# Patient Record
Sex: FEMALE | Race: WHITE | Employment: UNEMPLOYED | URBAN - METROPOLITAN AREA
[De-identification: names, ages, dates, MRNs, and addresses within clinical notes are randomized per-mention and may not be internally consistent; named-entity substitution may affect disease eponyms.]

---

## 2019-08-28 ENCOUNTER — HOSPITAL ENCOUNTER (EMERGENCY)
Age: 54
Discharge: HOME | End: 2019-08-28
Attending: EMERGENCY MEDICINE
Payer: COMMERCIAL

## 2019-08-28 ENCOUNTER — APPOINTMENT (OUTPATIENT)
Dept: GENERAL RADIOLOGY | Age: 54
End: 2019-08-28
Payer: COMMERCIAL

## 2019-08-28 VITALS
DIASTOLIC BLOOD PRESSURE: 77 MMHG | WEIGHT: 170 LBS | OXYGEN SATURATION: 96 % | TEMPERATURE: 98.3 F | BODY MASS INDEX: 29.02 KG/M2 | RESPIRATION RATE: 16 BRPM | HEIGHT: 64 IN | SYSTOLIC BLOOD PRESSURE: 166 MMHG | HEART RATE: 70 BPM

## 2019-08-28 DIAGNOSIS — R06.02 SHORTNESS OF BREATH: ICD-10-CM

## 2019-08-28 DIAGNOSIS — Z53.29 LEFT AGAINST MEDICAL ADVICE: ICD-10-CM

## 2019-08-28 DIAGNOSIS — R07.9 CHEST PAIN, UNSPECIFIED TYPE: Primary | ICD-10-CM

## 2019-08-28 LAB
ANION GAP SERPL CALCULATED.3IONS-SCNC: 13 MMOL/L (ref 7–16)
BASOPHILS ABSOLUTE: 0.1 E9/L (ref 0–0.2)
BASOPHILS RELATIVE PERCENT: 1 % (ref 0–2)
BUN BLDV-MCNC: 12 MG/DL (ref 6–20)
CALCIUM SERPL-MCNC: 9.3 MG/DL (ref 8.6–10.2)
CHLORIDE BLD-SCNC: 103 MMOL/L (ref 98–107)
CO2: 24 MMOL/L (ref 22–29)
CREAT SERPL-MCNC: 0.8 MG/DL (ref 0.5–1)
D DIMER: <200 NG/ML DDU
EKG ATRIAL RATE: 81 BPM
EKG P AXIS: 46 DEGREES
EKG P-R INTERVAL: 152 MS
EKG Q-T INTERVAL: 390 MS
EKG QRS DURATION: 88 MS
EKG QTC CALCULATION (BAZETT): 453 MS
EKG R AXIS: 70 DEGREES
EKG T AXIS: 0 DEGREES
EKG VENTRICULAR RATE: 81 BPM
EOSINOPHILS ABSOLUTE: 0.5 E9/L (ref 0.05–0.5)
EOSINOPHILS RELATIVE PERCENT: 4.8 % (ref 0–6)
GFR AFRICAN AMERICAN: >60
GFR NON-AFRICAN AMERICAN: >60 ML/MIN/1.73
GLUCOSE BLD-MCNC: 103 MG/DL (ref 74–99)
HCT VFR BLD CALC: 46.2 % (ref 34–48)
HEMOGLOBIN: 15.2 G/DL (ref 11.5–15.5)
IMMATURE GRANULOCYTES #: 0.04 E9/L
IMMATURE GRANULOCYTES %: 0.4 % (ref 0–5)
LYMPHOCYTES ABSOLUTE: 4.03 E9/L (ref 1.5–4)
LYMPHOCYTES RELATIVE PERCENT: 38.8 % (ref 20–42)
MCH RBC QN AUTO: 29.5 PG (ref 26–35)
MCHC RBC AUTO-ENTMCNC: 32.9 % (ref 32–34.5)
MCV RBC AUTO: 89.7 FL (ref 80–99.9)
MONOCYTES ABSOLUTE: 0.6 E9/L (ref 0.1–0.95)
MONOCYTES RELATIVE PERCENT: 5.8 % (ref 2–12)
NEUTROPHILS ABSOLUTE: 5.13 E9/L (ref 1.8–7.3)
NEUTROPHILS RELATIVE PERCENT: 49.2 % (ref 43–80)
PDW BLD-RTO: 14.5 FL (ref 11.5–15)
PLATELET # BLD: 401 E9/L (ref 130–450)
PMV BLD AUTO: 9.4 FL (ref 7–12)
POTASSIUM REFLEX MAGNESIUM: 3.9 MMOL/L (ref 3.5–5)
RBC # BLD: 5.15 E12/L (ref 3.5–5.5)
SODIUM BLD-SCNC: 140 MMOL/L (ref 132–146)
TROPONIN: <0.01 NG/ML (ref 0–0.03)
WBC # BLD: 10.4 E9/L (ref 4.5–11.5)

## 2019-08-28 PROCEDURE — 85025 COMPLETE CBC W/AUTO DIFF WBC: CPT

## 2019-08-28 PROCEDURE — 80048 BASIC METABOLIC PNL TOTAL CA: CPT

## 2019-08-28 PROCEDURE — 93010 ELECTROCARDIOGRAM REPORT: CPT | Performed by: INTERNAL MEDICINE

## 2019-08-28 PROCEDURE — 84484 ASSAY OF TROPONIN QUANT: CPT

## 2019-08-28 PROCEDURE — 85378 FIBRIN DEGRADE SEMIQUANT: CPT

## 2019-08-28 PROCEDURE — 99285 EMERGENCY DEPT VISIT HI MDM: CPT

## 2019-08-28 PROCEDURE — 71046 X-RAY EXAM CHEST 2 VIEWS: CPT

## 2019-08-28 PROCEDURE — 93005 ELECTROCARDIOGRAM TRACING: CPT | Performed by: EMERGENCY MEDICINE

## 2019-08-28 ASSESSMENT — PAIN DESCRIPTION - LOCATION: LOCATION: CHEST

## 2019-08-28 ASSESSMENT — ENCOUNTER SYMPTOMS
COUGH: 0
DIARRHEA: 0
SINUS PAIN: 0
SHORTNESS OF BREATH: 1
VOMITING: 0
ABDOMINAL PAIN: 0
SORE THROAT: 0
CHEST TIGHTNESS: 1
BACK PAIN: 0
NAUSEA: 0

## 2019-08-28 ASSESSMENT — PAIN DESCRIPTION - PAIN TYPE: TYPE: ACUTE PAIN

## 2019-08-28 ASSESSMENT — PAIN SCALES - GENERAL: PAINLEVEL_OUTOF10: 6

## 2019-08-28 NOTE — ED PROVIDER NOTES
range of motion. Neck supple. Cardiovascular: Normal rate, regular rhythm, normal heart sounds and intact distal pulses. Pulmonary/Chest: Effort normal and breath sounds normal. No respiratory distress. She exhibits tenderness. Tenderness along the left upper chest with palpation   Abdominal: Soft. Bowel sounds are normal. She exhibits no distension. There is no tenderness. Musculoskeletal: Normal range of motion. She exhibits no edema. Tender left shoulder, no deformity, normal R OM   Neurological: She is alert and oriented to person, place, and time. No cranial nerve deficit or sensory deficit. She exhibits normal muscle tone. Skin: Skin is warm and dry. Capillary refill takes less than 2 seconds. She is not diaphoretic. Psychiatric: She has a normal mood and affect. Her behavior is normal. Judgment and thought content normal.   Nursing note and vitals reviewed. Procedures     MDM  Number of Diagnoses or Management Options  Chest pain, unspecified type:   Shortness of breath:   Diagnosis management comments: 58-year-old female presents with chest pain and shortness of breath that have been ongoing for the past week or so. Besides elevated BP, vitals are stable. She is nontoxic-appearing. Will obtain basic labs and reevaluate. Work-up reveals T wave inversions on EKG without prior for comparison. Discussed my concern that the patient should be brought in for cardiac evaluation but she refuses, stating that she has to go home to take care of her daughter. Welcomed her back for reevaluation at any time and she voices understanding. All questions answered and she is discharged with stable vital signs. Amount and/or Complexity of Data Reviewed  Clinical lab tests: ordered and reviewed  Tests in the radiology section of CPT®: ordered and reviewed         ED Course as of Aug 28 0818   Wed Aug 28, 2019   0712 EKG: This EKG is signed and interpreted by me.     Rate: 81  Rhythm: Sinus  Interpretation: nsr, TWI anteroseptal and inferior leads, no HUONG  Comparison: no previous EKG      [CW]   0727 Patient in NAD, awaiting labs    [CW]   0749 D-Dimer, Quant: <200 [CW]   0800 Discussed results with patient and offered admission for evaluation for her chest pain and rule out ACS. Patient adamantly refused, states she has to go home now to take care of her daughter. Discussed that she should stop smoking and she should follow-up and establish care with a primary care physician to have a cardiac evaluation. Welcomed her to return to the emergency department at any time for reevaluation and admission and she voiced understanding. She has the mental capacity to make her own decisions and she understood her risk of leaving included morbidity and mortality. Patient had a chance to ask questions and she understood my concern for her leaving with chest pain and SOB AGAINST MEDICAL ADVICE. Vital signs stable, patient in no acute distress. [CW]      ED Course User Index  [CW] Devika Powell, DO        The HEART Risk Score for Acute Coronary Syndrome  Age <46 years, 55-63, 65+  ?  1   > 2 Risk Factors for CAD?*, 1-2, 0  2   History: slight, moderate, highly suspicious  1   EKG: Normal, nonspecific repolarization changes, ST depression  1   Troponin; low, 1-3x normal  Limit, 3x+  0   Total HEART Score:  5     *Risk factors as follows:                  - History/Family history of CAD    - Hypertension      - Hyperlipidemia     - Diabetes mellitus  - Current smoker  - Obesity    Predicts 6-week risk of major adverse cardiac event. Low Score (0-3 points), risk of MACE of 0.9-1.7%. Moderate Score (4-6 points), risk of MACE of 12-16.6%. High Score (7-10 points), risk of MACE of 50-65%.       Well's Criteria for Pulmonary Embolism  Clinical Signs and Symptoms of DVT? no   PE Is #1 Diagnosis, or Equally Likely no   Heart Rate > 100? no   Immobilization at least 3 days, or Surgery in the previous 4 Panel w/ Reflex to MG   Result Value Ref Range    Sodium 140 132 - 146 mmol/L    Potassium reflex Magnesium 3.9 3.5 - 5.0 mmol/L    Chloride 103 98 - 107 mmol/L    CO2 24 22 - 29 mmol/L    Anion Gap 13 7 - 16 mmol/L    Glucose 103 (H) 74 - 99 mg/dL    BUN 12 6 - 20 mg/dL    CREATININE 0.8 0.5 - 1.0 mg/dL    GFR Non-African American >60 >=60 mL/min/1.73    GFR African American >60     Calcium 9.3 8.6 - 10.2 mg/dL   Troponin   Result Value Ref Range    Troponin <0.01 0.00 - 0.03 ng/mL   D-Dimer, Quantitative   Result Value Ref Range    D-Dimer, Quant <200 ng/mL DDU   EKG 12 Lead   Result Value Ref Range    Ventricular Rate 81 BPM    Atrial Rate 81 BPM    P-R Interval 152 ms    QRS Duration 88 ms    Q-T Interval 390 ms    QTc Calculation (Bazett) 453 ms    P Axis 46 degrees    R Axis 70 degrees    T Axis 0 degrees       Radiology:  XR CHEST STANDARD (2 VW)   Final Result   No airspace opacities or pleural effusion.                                         ------------------------- NURSING NOTES AND VITALS REVIEWED ---------------------------  Date / Time Roomed:  8/28/2019  5:38 AM  ED Bed Assignment:  GARLAND/GARLAND    The nursing notes within the ED encounter and vital signs as below have been reviewed. BP (!) 166/77   Pulse 70   Temp 98.3 °F (36.8 °C) (Oral)   Resp 16   Ht 5' 4\" (1.626 m)   Wt 170 lb (77.1 kg)   SpO2 96%   BMI 29.18 kg/m²   Oxygen Saturation Interpretation: Normal      ------------------------------------------ PROGRESS NOTES ------------------------------------------  ED COURSE MEDICATIONS:              Medications - No data to display    I have spoken with the patient and discussed todays results, in addition to providing specific details for the plan of care and counseling regarding the diagnosis and prognosis. Their questions are answered at this time and they are agreeable with the plan. I discussed at length with them reasons for immediate return here for re evaluation.  They will followup with primary care by calling their office tomorrow. --------------------------------- ADDITIONAL PROVIDER NOTES ---------------------------------  At this time the patient is without objective evidence of an acute process requiring hospitalization or inpatient management. They have remained hemodynamically stable throughout their entire ED visit and are stable for discharge with outpatient follow-up. The plan has been discussed in detail and they are aware of the specific conditions for emergent return, as well as the importance of follow-up. There are no discharge medications for this patient. Diagnosis:  1. Chest pain, unspecified type    2. Shortness of breath    3.  Left against medical advice        Disposition:  Patient's disposition: [address] [place] [address]  Resident  08/28/19 [id number]

## 2019-08-28 NOTE — ED NOTES
Pt states that she has been having cp for the past week. States that earlier this week when walking to her daughters house he became sob. Pt states that her pain goes down her right arm. Pt states that she thinks that it is a pulled muscle but she has a family history and she wants to make sure that it is not cardiac related. Pt is resting in bed at this time.  Will continue to monitor     Veena Keys RN  08/28/19 2058

## 2019-09-13 ENCOUNTER — OFFICE VISIT (OUTPATIENT)
Dept: FAMILY MEDICINE CLINIC | Age: 54
End: 2019-09-13
Payer: COMMERCIAL

## 2019-09-13 VITALS
HEIGHT: 64 IN | BODY MASS INDEX: 29.06 KG/M2 | WEIGHT: 170.2 LBS | RESPIRATION RATE: 18 BRPM | TEMPERATURE: 97 F | SYSTOLIC BLOOD PRESSURE: 153 MMHG | HEART RATE: 82 BPM | DIASTOLIC BLOOD PRESSURE: 88 MMHG

## 2019-09-13 DIAGNOSIS — R07.9 CHEST PAIN, UNSPECIFIED TYPE: Primary | ICD-10-CM

## 2019-09-13 DIAGNOSIS — I10 ESSENTIAL HYPERTENSION: ICD-10-CM

## 2019-09-13 DIAGNOSIS — Z13.31 POSITIVE DEPRESSION SCREENING: ICD-10-CM

## 2019-09-13 PROCEDURE — 96160 PT-FOCUSED HLTH RISK ASSMT: CPT | Performed by: FAMILY MEDICINE

## 2019-09-13 PROCEDURE — 93000 ELECTROCARDIOGRAM COMPLETE: CPT | Performed by: STUDENT IN AN ORGANIZED HEALTH CARE EDUCATION/TRAINING PROGRAM

## 2019-09-13 PROCEDURE — 99213 OFFICE O/P EST LOW 20 MIN: CPT | Performed by: STUDENT IN AN ORGANIZED HEALTH CARE EDUCATION/TRAINING PROGRAM

## 2019-09-13 PROCEDURE — G8419 CALC BMI OUT NRM PARAM NOF/U: HCPCS | Performed by: STUDENT IN AN ORGANIZED HEALTH CARE EDUCATION/TRAINING PROGRAM

## 2019-09-13 PROCEDURE — 3017F COLORECTAL CA SCREEN DOC REV: CPT | Performed by: STUDENT IN AN ORGANIZED HEALTH CARE EDUCATION/TRAINING PROGRAM

## 2019-09-13 PROCEDURE — G8431 POS CLIN DEPRES SCRN F/U DOC: HCPCS | Performed by: STUDENT IN AN ORGANIZED HEALTH CARE EDUCATION/TRAINING PROGRAM

## 2019-09-13 PROCEDURE — 4004F PT TOBACCO SCREEN RCVD TLK: CPT | Performed by: STUDENT IN AN ORGANIZED HEALTH CARE EDUCATION/TRAINING PROGRAM

## 2019-09-13 PROCEDURE — G8427 DOCREV CUR MEDS BY ELIG CLIN: HCPCS | Performed by: STUDENT IN AN ORGANIZED HEALTH CARE EDUCATION/TRAINING PROGRAM

## 2019-09-13 RX ORDER — ATORVASTATIN CALCIUM 20 MG/1
10 TABLET, FILM COATED ORAL DAILY
Qty: 30 TABLET | Refills: 3 | Status: SHIPPED | OUTPATIENT
Start: 2019-09-13 | End: 2020-01-12 | Stop reason: SDUPTHER

## 2019-09-13 RX ORDER — ATORVASTATIN CALCIUM 20 MG
20 TABLET ORAL DAILY
Qty: 30 TABLET | Refills: 3
Start: 2019-09-13 | End: 2019-09-13 | Stop reason: SDUPTHER

## 2019-09-13 RX ORDER — NITROGLYCERIN 0.4 MG/1
0.4 TABLET SUBLINGUAL EVERY 5 MIN PRN
Qty: 25 TABLET | Refills: 1 | Status: SHIPPED | OUTPATIENT
Start: 2019-09-13 | End: 2021-11-06

## 2019-09-13 RX ORDER — LISINOPRIL 10 MG/1
10 TABLET ORAL DAILY
Qty: 30 TABLET | Refills: 5 | Status: SHIPPED | OUTPATIENT
Start: 2019-09-13 | End: 2019-09-16 | Stop reason: SINTOL

## 2019-09-13 RX ORDER — BUPROPION HYDROCHLORIDE 150 MG/1
150 TABLET, EXTENDED RELEASE ORAL 2 TIMES DAILY
Qty: 60 TABLET | Refills: 3 | Status: SHIPPED | OUTPATIENT
Start: 2019-09-13 | End: 2020-01-12 | Stop reason: ALTCHOICE

## 2019-09-13 ASSESSMENT — PATIENT HEALTH QUESTIONNAIRE - PHQ9
SUM OF ALL RESPONSES TO PHQ QUESTIONS 1-9: 21
6. FEELING BAD ABOUT YOURSELF - OR THAT YOU ARE A FAILURE OR HAVE LET YOURSELF OR YOUR FAMILY DOWN: 2
9. THOUGHTS THAT YOU WOULD BE BETTER OFF DEAD, OR OF HURTING YOURSELF: 0
4. FEELING TIRED OR HAVING LITTLE ENERGY: 3
1. LITTLE INTEREST OR PLEASURE IN DOING THINGS: 3
2. FEELING DOWN, DEPRESSED OR HOPELESS: 3
10. IF YOU CHECKED OFF ANY PROBLEMS, HOW DIFFICULT HAVE THESE PROBLEMS MADE IT FOR YOU TO DO YOUR WORK, TAKE CARE OF THINGS AT HOME, OR GET ALONG WITH OTHER PEOPLE: 2
SUM OF ALL RESPONSES TO PHQ9 QUESTIONS 1 & 2: 6
5. POOR APPETITE OR OVEREATING: 3
8. MOVING OR SPEAKING SO SLOWLY THAT OTHER PEOPLE COULD HAVE NOTICED. OR THE OPPOSITE, BEING SO FIGETY OR RESTLESS THAT YOU HAVE BEEN MOVING AROUND A LOT MORE THAN USUAL: 1
7. TROUBLE CONCENTRATING ON THINGS, SUCH AS READING THE NEWSPAPER OR WATCHING TELEVISION: 3
SUM OF ALL RESPONSES TO PHQ QUESTIONS 1-9: 21
3. TROUBLE FALLING OR STAYING ASLEEP: 3

## 2019-09-14 ASSESSMENT — ENCOUNTER SYMPTOMS
CHEST TIGHTNESS: 1
COUGH: 0
WHEEZING: 0
SHORTNESS OF BREATH: 0

## 2019-09-16 ENCOUNTER — TELEPHONE (OUTPATIENT)
Dept: FAMILY MEDICINE CLINIC | Age: 54
End: 2019-09-16

## 2019-09-16 ENCOUNTER — HOSPITAL ENCOUNTER (OUTPATIENT)
Age: 54
Discharge: HOME | End: 2019-09-16
Payer: COMMERCIAL

## 2019-09-16 ENCOUNTER — TELEPHONE (OUTPATIENT)
Dept: ADMINISTRATIVE | Age: 54
End: 2019-09-16

## 2019-09-16 DIAGNOSIS — R07.9 CHEST PAIN, UNSPECIFIED TYPE: ICD-10-CM

## 2019-09-16 LAB
ANION GAP SERPL CALCULATED.3IONS-SCNC: 12 MMOL/L (ref 7–16)
BUN BLDV-MCNC: 20 MG/DL (ref 6–20)
CALCIUM SERPL-MCNC: 9.1 MG/DL (ref 8.6–10.2)
CHLORIDE BLD-SCNC: 105 MMOL/L (ref 98–107)
CHOLESTEROL, TOTAL: 202 MG/DL (ref 0–199)
CO2: 26 MMOL/L (ref 22–29)
CREAT SERPL-MCNC: 1.1 MG/DL (ref 0.5–1)
GFR AFRICAN AMERICAN: >60
GFR NON-AFRICAN AMERICAN: 52 ML/MIN/1.73
GLUCOSE BLD-MCNC: 109 MG/DL (ref 74–99)
HDLC SERPL-MCNC: 37 MG/DL
LDL CHOLESTEROL CALCULATED: 142 MG/DL (ref 0–99)
POTASSIUM SERPL-SCNC: 4.3 MMOL/L (ref 3.5–5)
SODIUM BLD-SCNC: 143 MMOL/L (ref 132–146)
TRIGL SERPL-MCNC: 113 MG/DL (ref 0–149)
VLDLC SERPL CALC-MCNC: 23 MG/DL

## 2019-09-16 PROCEDURE — 80048 BASIC METABOLIC PNL TOTAL CA: CPT

## 2019-09-16 PROCEDURE — 80061 LIPID PANEL: CPT

## 2019-09-16 PROCEDURE — 36415 COLL VENOUS BLD VENIPUNCTURE: CPT

## 2019-09-18 ENCOUNTER — TELEPHONE (OUTPATIENT)
Dept: FAMILY MEDICINE CLINIC | Age: 54
End: 2019-09-18

## 2019-09-30 ENCOUNTER — OFFICE VISIT (OUTPATIENT)
Dept: CARDIOLOGY CLINIC | Age: 54
End: 2019-09-30
Payer: COMMERCIAL

## 2019-09-30 VITALS
BODY MASS INDEX: 28.68 KG/M2 | WEIGHT: 168 LBS | HEIGHT: 64 IN | RESPIRATION RATE: 16 BRPM | HEART RATE: 77 BPM | DIASTOLIC BLOOD PRESSURE: 80 MMHG | SYSTOLIC BLOOD PRESSURE: 130 MMHG

## 2019-09-30 DIAGNOSIS — R07.9 CHEST PAIN, UNSPECIFIED TYPE: ICD-10-CM

## 2019-09-30 DIAGNOSIS — R94.31 ABNORMAL EKG: ICD-10-CM

## 2019-09-30 DIAGNOSIS — I51.9 HEART PROBLEM: Primary | ICD-10-CM

## 2019-09-30 PROCEDURE — 93000 ELECTROCARDIOGRAM COMPLETE: CPT | Performed by: INTERNAL MEDICINE

## 2019-09-30 PROCEDURE — 99242 OFF/OP CONSLTJ NEW/EST SF 20: CPT | Performed by: INTERNAL MEDICINE

## 2019-09-30 PROCEDURE — G8419 CALC BMI OUT NRM PARAM NOF/U: HCPCS | Performed by: INTERNAL MEDICINE

## 2019-09-30 PROCEDURE — G8427 DOCREV CUR MEDS BY ELIG CLIN: HCPCS | Performed by: INTERNAL MEDICINE

## 2019-10-02 ENCOUNTER — OFFICE VISIT (OUTPATIENT)
Dept: FAMILY MEDICINE CLINIC | Age: 54
End: 2019-10-02
Payer: COMMERCIAL

## 2019-10-02 VITALS
RESPIRATION RATE: 18 BRPM | HEART RATE: 75 BPM | OXYGEN SATURATION: 98 % | WEIGHT: 168 LBS | DIASTOLIC BLOOD PRESSURE: 80 MMHG | HEIGHT: 64 IN | BODY MASS INDEX: 28.68 KG/M2 | SYSTOLIC BLOOD PRESSURE: 126 MMHG

## 2019-10-02 DIAGNOSIS — Z72.0 TOBACCO ABUSE: ICD-10-CM

## 2019-10-02 DIAGNOSIS — F32.A DEPRESSION, UNSPECIFIED DEPRESSION TYPE: ICD-10-CM

## 2019-10-02 DIAGNOSIS — R07.9 CHEST PAIN, UNSPECIFIED TYPE: Primary | ICD-10-CM

## 2019-10-02 PROCEDURE — 4004F PT TOBACCO SCREEN RCVD TLK: CPT | Performed by: STUDENT IN AN ORGANIZED HEALTH CARE EDUCATION/TRAINING PROGRAM

## 2019-10-02 PROCEDURE — G8484 FLU IMMUNIZE NO ADMIN: HCPCS | Performed by: STUDENT IN AN ORGANIZED HEALTH CARE EDUCATION/TRAINING PROGRAM

## 2019-10-02 PROCEDURE — G8419 CALC BMI OUT NRM PARAM NOF/U: HCPCS | Performed by: STUDENT IN AN ORGANIZED HEALTH CARE EDUCATION/TRAINING PROGRAM

## 2019-10-02 PROCEDURE — 99213 OFFICE O/P EST LOW 20 MIN: CPT | Performed by: STUDENT IN AN ORGANIZED HEALTH CARE EDUCATION/TRAINING PROGRAM

## 2019-10-02 PROCEDURE — G8427 DOCREV CUR MEDS BY ELIG CLIN: HCPCS | Performed by: STUDENT IN AN ORGANIZED HEALTH CARE EDUCATION/TRAINING PROGRAM

## 2019-10-02 PROCEDURE — 3017F COLORECTAL CA SCREEN DOC REV: CPT | Performed by: STUDENT IN AN ORGANIZED HEALTH CARE EDUCATION/TRAINING PROGRAM

## 2019-10-02 ASSESSMENT — ENCOUNTER SYMPTOMS
COUGH: 0
BACK PAIN: 0
ABDOMINAL PAIN: 0
SORE THROAT: 0
NAUSEA: 0
CHEST TIGHTNESS: 0
DIARRHEA: 0
WHEEZING: 0

## 2019-10-03 ENCOUNTER — TELEPHONE (OUTPATIENT)
Dept: CARDIOLOGY | Age: 54
End: 2019-10-03

## 2019-10-04 DIAGNOSIS — R06.02 SHORTNESS OF BREATH: ICD-10-CM

## 2019-10-04 DIAGNOSIS — R07.9 CHEST PAIN, UNSPECIFIED TYPE: Primary | ICD-10-CM

## 2019-10-07 ENCOUNTER — HOSPITAL ENCOUNTER (OUTPATIENT)
Dept: CARDIOLOGY | Age: 54
Discharge: HOME | End: 2019-10-07
Payer: COMMERCIAL

## 2019-10-07 VITALS
DIASTOLIC BLOOD PRESSURE: 90 MMHG | BODY MASS INDEX: 28.68 KG/M2 | HEIGHT: 64 IN | SYSTOLIC BLOOD PRESSURE: 150 MMHG | WEIGHT: 168 LBS

## 2019-10-07 DIAGNOSIS — R94.31 ABNORMAL EKG: ICD-10-CM

## 2019-10-07 DIAGNOSIS — R06.02 SHORTNESS OF BREATH: ICD-10-CM

## 2019-10-07 DIAGNOSIS — R07.9 CHEST PAIN, UNSPECIFIED TYPE: ICD-10-CM

## 2019-10-14 ENCOUNTER — TELEPHONE (OUTPATIENT)
Dept: CARDIOLOGY | Age: 54
End: 2019-10-14

## 2019-10-16 ENCOUNTER — HOSPITAL ENCOUNTER (OUTPATIENT)
Dept: CARDIOLOGY | Age: 54
Discharge: HOME | End: 2019-10-16
Payer: COMMERCIAL

## 2019-10-16 VITALS
DIASTOLIC BLOOD PRESSURE: 78 MMHG | HEIGHT: 64 IN | WEIGHT: 168 LBS | HEART RATE: 91 BPM | BODY MASS INDEX: 28.68 KG/M2 | SYSTOLIC BLOOD PRESSURE: 136 MMHG

## 2019-10-16 PROCEDURE — 93017 CV STRESS TEST TRACING ONLY: CPT

## 2019-10-17 ENCOUNTER — TELEPHONE (OUTPATIENT)
Dept: CARDIOLOGY CLINIC | Age: 54
End: 2019-10-17

## 2019-10-17 DIAGNOSIS — R06.02 SHORTNESS OF BREATH: Primary | ICD-10-CM

## 2019-10-17 DIAGNOSIS — R07.9 CHEST PAIN, UNSPECIFIED TYPE: ICD-10-CM

## 2019-10-17 DIAGNOSIS — R94.39 ABNORMAL STRESS ECG WITH TREADMILL: ICD-10-CM

## 2019-11-01 ENCOUNTER — TELEPHONE (OUTPATIENT)
Dept: CARDIOLOGY | Age: 54
End: 2019-11-01

## 2019-11-05 ENCOUNTER — TELEPHONE (OUTPATIENT)
Dept: CARDIOLOGY | Age: 54
End: 2019-11-05

## 2019-11-19 ENCOUNTER — HOSPITAL ENCOUNTER (EMERGENCY)
Age: 54
Discharge: HOME | End: 2019-11-19
Payer: COMMERCIAL

## 2019-11-19 ENCOUNTER — APPOINTMENT (OUTPATIENT)
Dept: GENERAL RADIOLOGY | Age: 54
End: 2019-11-19
Payer: COMMERCIAL

## 2019-11-19 VITALS
HEART RATE: 92 BPM | WEIGHT: 168 LBS | DIASTOLIC BLOOD PRESSURE: 84 MMHG | TEMPERATURE: 98.2 F | RESPIRATION RATE: 16 BRPM | SYSTOLIC BLOOD PRESSURE: 182 MMHG | HEIGHT: 64 IN | OXYGEN SATURATION: 97 % | BODY MASS INDEX: 28.68 KG/M2

## 2019-11-19 DIAGNOSIS — S93.601A SPRAIN OF RIGHT FOOT, INITIAL ENCOUNTER: Primary | ICD-10-CM

## 2019-11-19 PROCEDURE — 6370000000 HC RX 637 (ALT 250 FOR IP)

## 2019-11-19 PROCEDURE — 73630 X-RAY EXAM OF FOOT: CPT

## 2019-11-19 PROCEDURE — 73610 X-RAY EXAM OF ANKLE: CPT

## 2019-11-19 PROCEDURE — 99283 EMERGENCY DEPT VISIT LOW MDM: CPT

## 2019-11-19 RX ORDER — IBUPROFEN 600 MG/1
600 TABLET ORAL ONCE
Status: COMPLETED | OUTPATIENT
Start: 2019-11-19 | End: 2019-11-19

## 2019-11-19 RX ORDER — IBUPROFEN 600 MG/1
TABLET ORAL
Status: COMPLETED
Start: 2019-11-19 | End: 2019-11-19

## 2019-11-19 RX ADMIN — IBUPROFEN 600 MG: 600 TABLET, FILM COATED ORAL at 01:21

## 2019-11-19 RX ADMIN — IBUPROFEN 600 MG: 600 TABLET ORAL at 01:21

## 2019-11-19 ASSESSMENT — PAIN DESCRIPTION - ORIENTATION
ORIENTATION: RIGHT
ORIENTATION: RIGHT

## 2019-11-19 ASSESSMENT — PAIN SCALES - GENERAL
PAINLEVEL_OUTOF10: 7
PAINLEVEL_OUTOF10: 10
PAINLEVEL_OUTOF10: 10

## 2019-11-19 ASSESSMENT — PAIN DESCRIPTION - LOCATION: LOCATION: FOOT

## 2019-11-19 ASSESSMENT — PAIN DESCRIPTION - PAIN TYPE
TYPE: ACUTE PAIN
TYPE: ACUTE PAIN

## 2019-11-19 ASSESSMENT — PAIN DESCRIPTION - DESCRIPTORS: DESCRIPTORS: ACHING

## 2019-11-25 ENCOUNTER — TELEPHONE (OUTPATIENT)
Dept: CARDIOLOGY | Age: 54
End: 2019-11-25

## 2020-01-10 ENCOUNTER — OFFICE VISIT (OUTPATIENT)
Dept: FAMILY MEDICINE CLINIC | Age: 55
End: 2020-01-10
Payer: COMMERCIAL

## 2020-01-10 VITALS
HEART RATE: 76 BPM | WEIGHT: 160 LBS | HEIGHT: 64 IN | SYSTOLIC BLOOD PRESSURE: 147 MMHG | RESPIRATION RATE: 16 BRPM | BODY MASS INDEX: 27.31 KG/M2 | OXYGEN SATURATION: 98 % | DIASTOLIC BLOOD PRESSURE: 80 MMHG

## 2020-01-10 LAB
CHP ED QC CHECK: NORMAL
GLUCOSE BLD-MCNC: 104 MG/DL
HBA1C MFR BLD: 5.4 %

## 2020-01-10 PROCEDURE — 96160 PT-FOCUSED HLTH RISK ASSMT: CPT | Performed by: STUDENT IN AN ORGANIZED HEALTH CARE EDUCATION/TRAINING PROGRAM

## 2020-01-10 PROCEDURE — 3017F COLORECTAL CA SCREEN DOC REV: CPT | Performed by: STUDENT IN AN ORGANIZED HEALTH CARE EDUCATION/TRAINING PROGRAM

## 2020-01-10 PROCEDURE — 82962 GLUCOSE BLOOD TEST: CPT | Performed by: STUDENT IN AN ORGANIZED HEALTH CARE EDUCATION/TRAINING PROGRAM

## 2020-01-10 PROCEDURE — 4004F PT TOBACCO SCREEN RCVD TLK: CPT | Performed by: STUDENT IN AN ORGANIZED HEALTH CARE EDUCATION/TRAINING PROGRAM

## 2020-01-10 PROCEDURE — G8431 POS CLIN DEPRES SCRN F/U DOC: HCPCS | Performed by: STUDENT IN AN ORGANIZED HEALTH CARE EDUCATION/TRAINING PROGRAM

## 2020-01-10 PROCEDURE — G8427 DOCREV CUR MEDS BY ELIG CLIN: HCPCS | Performed by: STUDENT IN AN ORGANIZED HEALTH CARE EDUCATION/TRAINING PROGRAM

## 2020-01-10 PROCEDURE — 90715 TDAP VACCINE 7 YRS/> IM: CPT | Performed by: STUDENT IN AN ORGANIZED HEALTH CARE EDUCATION/TRAINING PROGRAM

## 2020-01-10 PROCEDURE — 99213 OFFICE O/P EST LOW 20 MIN: CPT | Performed by: STUDENT IN AN ORGANIZED HEALTH CARE EDUCATION/TRAINING PROGRAM

## 2020-01-10 PROCEDURE — G8484 FLU IMMUNIZE NO ADMIN: HCPCS | Performed by: STUDENT IN AN ORGANIZED HEALTH CARE EDUCATION/TRAINING PROGRAM

## 2020-01-10 PROCEDURE — 90471 IMMUNIZATION ADMIN: CPT | Performed by: STUDENT IN AN ORGANIZED HEALTH CARE EDUCATION/TRAINING PROGRAM

## 2020-01-10 PROCEDURE — 83036 HEMOGLOBIN GLYCOSYLATED A1C: CPT | Performed by: STUDENT IN AN ORGANIZED HEALTH CARE EDUCATION/TRAINING PROGRAM

## 2020-01-10 PROCEDURE — G8419 CALC BMI OUT NRM PARAM NOF/U: HCPCS | Performed by: STUDENT IN AN ORGANIZED HEALTH CARE EDUCATION/TRAINING PROGRAM

## 2020-01-10 RX ORDER — BUPROPION HYDROCHLORIDE 150 MG/1
150 TABLET, EXTENDED RELEASE ORAL 2 TIMES DAILY
Qty: 60 TABLET | Refills: 3 | Status: CANCELLED | OUTPATIENT
Start: 2020-01-10

## 2020-01-10 ASSESSMENT — PATIENT HEALTH QUESTIONNAIRE - PHQ9
7. TROUBLE CONCENTRATING ON THINGS, SUCH AS READING THE NEWSPAPER OR WATCHING TELEVISION: 3
4. FEELING TIRED OR HAVING LITTLE ENERGY: 3
6. FEELING BAD ABOUT YOURSELF - OR THAT YOU ARE A FAILURE OR HAVE LET YOURSELF OR YOUR FAMILY DOWN: 0
SUM OF ALL RESPONSES TO PHQ QUESTIONS 1-9: 20
10. IF YOU CHECKED OFF ANY PROBLEMS, HOW DIFFICULT HAVE THESE PROBLEMS MADE IT FOR YOU TO DO YOUR WORK, TAKE CARE OF THINGS AT HOME, OR GET ALONG WITH OTHER PEOPLE: 3
2. FEELING DOWN, DEPRESSED OR HOPELESS: 3
SUM OF ALL RESPONSES TO PHQ9 QUESTIONS 1 & 2: 6
5. POOR APPETITE OR OVEREATING: 3
SUM OF ALL RESPONSES TO PHQ QUESTIONS 1-9: 20
8. MOVING OR SPEAKING SO SLOWLY THAT OTHER PEOPLE COULD HAVE NOTICED. OR THE OPPOSITE, BEING SO FIGETY OR RESTLESS THAT YOU HAVE BEEN MOVING AROUND A LOT MORE THAN USUAL: 2
9. THOUGHTS THAT YOU WOULD BE BETTER OFF DEAD, OR OF HURTING YOURSELF: 0
1. LITTLE INTEREST OR PLEASURE IN DOING THINGS: 3
3. TROUBLE FALLING OR STAYING ASLEEP: 3

## 2020-01-10 ASSESSMENT — COLUMBIA-SUICIDE SEVERITY RATING SCALE - C-SSRS
1. WITHIN THE PAST MONTH, HAVE YOU WISHED YOU WERE DEAD OR WISHED YOU COULD GO TO SLEEP AND NOT WAKE UP?: NO
6. HAVE YOU EVER DONE ANYTHING, STARTED TO DO ANYTHING, OR PREPARED TO DO ANYTHING TO END YOUR LIFE?: NO
2. HAVE YOU ACTUALLY HAD ANY THOUGHTS OF KILLING YOURSELF?: NO

## 2020-01-10 NOTE — PROGRESS NOTES
[facility]  Precepting Note    Subjective:  F/u of depression   On Wellbutrin. Symptoms are controlled but feels that the dose of wellbutrin needs to be increased  No SI/HI  ROS otherwise negative     Past medical, surgical, family and social history were reviewed, non-contributory, and unchanged unless otherwise stated. Objective:    BP (!) 147/80   Pulse 76   Resp 16   Ht 5' 4\" (1.626 m)   Wt 160 lb (72.6 kg)   SpO2 98%   Breastfeeding? No   BMI 27.46 kg/m²     Exam is as noted by resident with the following changes, additions or corrections:    General:  NAD; alert & oriented x 3   Heart:  RRR, no murmurs, gallops, or rubs. Lungs:  CTA bilaterally, no wheeze, rales or rhonchi  Abd: bowel sounds present, nontender, nondistended, no masses  Extrem:  No clubbing, cyanosis, or edema    Assessment/Plan:  Depression: increase dose of Wellbutrin. F/u in 3 weeks     Attending Physician Statement  I have reviewed the chart, including any radiology or labs. I have discussed the case, including pertinent history and exam findings with the resident. I agree with the assessment, plan and orders as documented by the resident. Please refer to the resident note for additional information.       Electronically signed by Traci Larson MD on 1/10/2020 at 11:24 AM

## 2020-01-10 NOTE — PROGRESS NOTES
[facility]  Family Medicine Residency Program  Phone: [phone]  Fax: [phone]    Patient:  Jaison Rodrgiuez 53 y.o. female                                 Date of Service: 1/10/20                            Chiefcomplaint:   Chief Complaint   Patient presents with    Depression         History of Present Illness: The patient is a 53 y.o. female  with PMH of HTN,Hyperlipidemia, Depression,  presented to the clinic for follow up visit for Hypertension and Depression. She further stated that she recently visited her Cardiologist Dr Justo Cardenas and did  Exercise Stress test which was non reassuring and has been waiting for another test /Nuclear stress test and waiting  Just for insurance approval . She denied any chest pain, SOB, headache, Dizziness, nausea vomiting in this visit. She is chronic patient of depression on medication, she said that she has been taking wellbutrin  BID. She further  mentioned that it has not been effective so she wants to increase the dose. She  further elaborated that she has been taking 300 BID in the past ,& mentioned that it had worked  very well for a few years. We suggested that that  It is very high dose so we cant refill that much , instead we can do Wellbutrin  mg  once daily( the max dos),have also advised her  to follow up with in 2-3 weeks,been advised to make a call in a week about medication effects and about the status of depression/ how she is feeling after dose adjustment . She repeatedly denied any suicidal ideation during my conversation. She neither has any  plan or intend to do it. She further explained that she has developed cold sore in her mouth ,at  inner part of lower lip. It started with bunch of vesicles , it is painful and burning. She has had similar lesion in the past that got better with 3 doses of  Acyclovir .     Regarding health  Maintenance , she is okay to do Mamaogram , FIT test and other lab test.    Review of Systems:   Review of Systems   Constitutional: Negative for chills and fever. HENT: Negative for congestion, mouth sores, nosebleeds, rhinorrhea, sinus pressure and sore throat. Eyes: Negative for redness. Respiratory: Negative for cough, chest tightness and wheezing. Cardiovascular: Negative for chest pain, palpitations and leg swelling. Gastrointestinal: Negative for abdominal pain, blood in stool, constipation, diarrhea, nausea and vomiting. Endocrine: Negative. Genitourinary: Negative for dysuria, enuresis and pelvic pain. Allergic/Immunologic: Negative. Neurological: Negative. Negative for seizures, syncope, facial asymmetry and light-headedness. Hematological: Negative. Psychiatric/Behavioral: Positive for decreased concentration and sleep disturbance. Negative for agitation, behavioral problems, confusion, dysphoric mood, hallucinations, self-injury and suicidal ideas. The patient is nervous/anxious. The patient is not hyperactive.     Lower lip : small grouped vesicle at inner lip    Past Medical History:      Diagnosis Date    Depression     Hyperlipidemia     Hypertension        Past Surgical History:        Procedure Laterality Date    ANKLE SURGERY   or 2016    LEFT   ankles and screws    BACK SURGERY      Cervical surgery      SECTION      FRACTURE SURGERY      Rotaor cuff surgery    HYSTERECTOMY         Allergies:    Aspirin; Lisinopril; Morphine; and Seasonal    Social History:   Social History     Socioeconomic History    Marital status:      Spouse name: Not on file    Number of children: Not on file    Years of education: Not on file    Highest education level: Not on file   Occupational History    Not on file   Social Needs    Financial resource strain: Not on file    Food insecurity:     Worry: Not on file     Inability: Not on file    Transportation needs:     Medical: Not on file     Non-medical: Not on file   Tobacco Use    Smoking status: Current Every Day Smoker     Packs/day: 0.75     Types: Cigarettes     Start date: 9/13/1973    Smokeless tobacco: Never Used   Substance and Sexual Activity    Alcohol use: Yes     Comment: occassional    Drug use: No    Sexual activity: Not Currently   Lifestyle    Physical activity:     Days per week: Not on file     Minutes per session: Not on file    Stress: Not on file   Relationships    Social connections:     Talks on phone: Not on file     Gets together: Not on file     Attends [employer] service: Not on file     Active member of club or organization: Not on file     Attends meetings of clubs or organizations: Not on file     Relationship status: Not on file    Intimate partner violence:     Fear of current or ex partner: Not on file     Emotionally abused: Not on file     Physically abused: Not on file     Forced sexual activity: Not on file   Other Topics Concern    Not on file   Social History Narrative    Not on file        Family History:       Problem Relation Age of Onset    Stroke Mother 76        mini     Stroke Father 76    Heart Attack Maternal Grandfather        BP Readings from Last 3 Encounters:   11/19/19 (!) 182/84   10/16/19 136/78   10/07/19 (!) 150/90       Physical Exam:    Vitals: Resp 16   Ht 5' 4\" (1.626 m)   Wt 160 lb (72.6 kg)   SpO2 98%   Breastfeeding? No   BMI 27.46 kg/m²   General Appearance: Well developed, awake, alert, oriented, no acute distress  HEENT: Normocephalic,atraumatic. PERRL, EOM's intact, EAC without erythemaor swelling, no pallor or icterus. Mouth : small grouped vesicle lower  inner lip , no pustule. , no ulcers  Neck: Supple, symmetrical, trachea midline. No JVD. Chest wall/Lung: Clear to auscultation bilaterally,  respirations unlabored.  No ronchi/wheezing/rales  Heart[de-identified]  Regular rate and rhythm, S1and S2 normal,Abdomen: Soft, non-tender, bowel sounds normoactive, no masses, no organomegaly  Extremities:  Extremities normal, atraumatic, no cyanosis. no edema. Skin: Skin color, texture, turgor normal, no rashes or lesions  Musculokeletal: ROM grossly normal in all joints of extremities, no obvious joint swelling. Lymph nodes: no lymph node enlargement appreciated  Neurologic:   Alert&Oriented. Normal gait and coordination  No focal neurological deficits appreaciated         Psychiatric: has a normal mood and affect. Behavior is normal. No suicidal ideation, Normal HMF. Assessment and Plan:       The 10-year ASCVD risk score (Torie Milan et al., 2013) is: 13.6%    Values used to calculate the score:      Age: 53 years      Sex: Female      Is Non- : No      Diabetic: No      Tobacco smoker: Yes      Systolic Blood Pressure: 833 mmHg      Is BP treated: No      HDL Cholesterol: 37 mg/dL      Total Cholesterol: 202 mg/dL  Milly Del Rio was seen today for depression. Diagnoses and all orders for this visit:    Major Depression  _ she is a pt with hx of depression  - she was taking Wellbutrin for long time without any side effects  - She said that  she was okay with Wellbutrin  BID in the past   - We had started wellbutrin  BID in our last visit  - She wanted us to adjust the dose of wellbutrin as she was taking much higher dose without any side effects and got nice improvement in the past.  - We gave her Wellbutrin  Mg Once daily the max dose we can go further and have asked her to follow up with in 2-3 weeks, She will call us earlier than her appointment about how she has been feeling after changing the dose . Cold sore  - grouped vesicle inside her lower lip , no pustule, no ulcder  Looks like Herpes type 1 lesion  - gave acyclovir , as she told us that she has had similar lesion in the past and got better with acyclovir oral tabs.     Hypertension   She is a known pt of Hypertension on medication  - her initial BP was elevated and re-checked BP at clinic  was with in normal rage  - we possible that grammatical, syntax,  or spelling errors may occur. On the basis of positive PHQ-9 screening (PHQ-9 Total Score: 20), the following plan was implemented: false positive screen suspected- will re-evaluate at next visit. Patient will follow-up in 3 week(s) with PCP.

## 2020-01-12 RX ORDER — ACYCLOVIR 400 MG/1
400 TABLET ORAL 2 TIMES DAILY
Qty: 6 TABLET | Refills: 0 | Status: SHIPPED
Start: 2020-01-12 | End: 2020-03-04

## 2020-01-12 RX ORDER — BUPROPION HYDROCHLORIDE 450 MG/1
450 TABLET, FILM COATED, EXTENDED RELEASE ORAL EVERY MORNING
Qty: 60 TABLET | Refills: 2 | Status: SHIPPED | OUTPATIENT
Start: 2020-01-12 | End: 2020-01-13 | Stop reason: CLARIF

## 2020-01-12 RX ORDER — ATORVASTATIN CALCIUM 20 MG/1
10 TABLET, FILM COATED ORAL DAILY
Qty: 30 TABLET | Refills: 3 | Status: SHIPPED
Start: 2020-01-12 | End: 2020-04-07 | Stop reason: SDUPTHER

## 2020-01-12 ASSESSMENT — ENCOUNTER SYMPTOMS
WHEEZING: 0
VOMITING: 0
DIARRHEA: 0
ALLERGIC/IMMUNOLOGIC NEGATIVE: 1
RHINORRHEA: 0
ABDOMINAL PAIN: 0
CHEST TIGHTNESS: 0
BLOOD IN STOOL: 0
CONSTIPATION: 0
NAUSEA: 0
SORE THROAT: 0
SINUS PRESSURE: 0
EYE REDNESS: 0
COUGH: 0

## 2020-01-13 RX ORDER — BUPROPION HYDROCHLORIDE 150 MG/1
450 TABLET ORAL EVERY MORNING
Qty: 90 TABLET | Refills: 2 | Status: SHIPPED
Start: 2020-01-13 | End: 2020-04-08 | Stop reason: SDUPTHER

## 2020-01-31 ENCOUNTER — OFFICE VISIT (OUTPATIENT)
Dept: FAMILY MEDICINE CLINIC | Age: 55
End: 2020-01-31
Payer: COMMERCIAL

## 2020-01-31 VITALS
WEIGHT: 167 LBS | HEART RATE: 74 BPM | HEIGHT: 64 IN | SYSTOLIC BLOOD PRESSURE: 139 MMHG | OXYGEN SATURATION: 96 % | RESPIRATION RATE: 14 BRPM | TEMPERATURE: 98.3 F | DIASTOLIC BLOOD PRESSURE: 94 MMHG | BODY MASS INDEX: 28.51 KG/M2

## 2020-01-31 PROCEDURE — 99213 OFFICE O/P EST LOW 20 MIN: CPT | Performed by: STUDENT IN AN ORGANIZED HEALTH CARE EDUCATION/TRAINING PROGRAM

## 2020-01-31 PROCEDURE — G8419 CALC BMI OUT NRM PARAM NOF/U: HCPCS | Performed by: STUDENT IN AN ORGANIZED HEALTH CARE EDUCATION/TRAINING PROGRAM

## 2020-01-31 PROCEDURE — G8484 FLU IMMUNIZE NO ADMIN: HCPCS | Performed by: STUDENT IN AN ORGANIZED HEALTH CARE EDUCATION/TRAINING PROGRAM

## 2020-01-31 PROCEDURE — 4004F PT TOBACCO SCREEN RCVD TLK: CPT | Performed by: STUDENT IN AN ORGANIZED HEALTH CARE EDUCATION/TRAINING PROGRAM

## 2020-01-31 PROCEDURE — 3017F COLORECTAL CA SCREEN DOC REV: CPT | Performed by: STUDENT IN AN ORGANIZED HEALTH CARE EDUCATION/TRAINING PROGRAM

## 2020-01-31 PROCEDURE — G8427 DOCREV CUR MEDS BY ELIG CLIN: HCPCS | Performed by: STUDENT IN AN ORGANIZED HEALTH CARE EDUCATION/TRAINING PROGRAM

## 2020-01-31 RX ORDER — AMLODIPINE BESYLATE 5 MG/1
5 TABLET ORAL DAILY
Qty: 30 TABLET | Refills: 0 | Status: SHIPPED
Start: 2020-01-31 | End: 2020-04-07 | Stop reason: SDUPTHER

## 2020-01-31 NOTE — PROGRESS NOTES
[facility]  Precepting Note    Subjective:  F/u of depression  Doing well on Wellbutrin    BP Is elevated. Could not tolerate Lisinopril  asymptomatic    ROS otherwise negative     Past medical, surgical, family and social history were reviewed, non-contributory, and unchanged unless otherwise stated. Objective:    BP (!) 139/94   Pulse 74   Temp 98.3 °F (36.8 °C) (Oral)   Resp 14   Ht 5' 4\" (1.626 m)   Wt 167 lb (75.8 kg)   SpO2 96%   BMI 28.67 kg/m²     Exam is as noted by resident with the following changes, additions or corrections:    General:  NAD; alert & oriented x 3   Heart:  RRR, no murmurs, gallops, or rubs. Lungs:  CTA bilaterally, no wheeze, rales or rhonchi  Abd: bowel sounds present, nontender, nondistended, no masses  Extrem:  No clubbing, cyanosis, or edema    Assessment/Plan:  Depression: symptoms improved  HTN: started Norvasc, monitor BP  Follow with cardiology for cardiac stress test     Attending Physician Statement  I have reviewed the chart, including any radiology or labs. I have discussed the case, including pertinent history and exam findings with the resident. I agree with the assessment, plan and orders as documented by the resident. Please refer to the resident note for additional information.       Electronically signed by Ignacio Guerin MD on 1/31/2020 at 10:04 AM

## 2020-01-31 NOTE — PROGRESS NOTES
urine volume, dysuria and hematuria. Musculoskeletal: Negative. Negative for back pain, joint swelling and neck stiffness. Skin: Negative for pallor and rash. Allergic/Immunologic: Negative. Neurological: Negative. Negative for dizziness, seizures, syncope and light-headedness. Psychiatric/Behavioral: Negative for behavioral problems, confusion, decreased concentration and sleep disturbance. The patient is nervous/anxious.         Past Medical History:      Diagnosis Date    Depression     Hyperlipidemia     Hypertension        Past Surgical History:        Procedure Laterality Date    ANKLE SURGERY   or 2016    LEFT   ankles and screws    BACK SURGERY      Cervical surgery      SECTION      FRACTURE SURGERY      Rotaor cuff surgery    HYSTERECTOMY         Allergies:    Aspirin; Lisinopril; Morphine; and Seasonal    Social History:   Social History     Socioeconomic History    Marital status:      Spouse name: Not on file    Number of children: Not on file    Years of education: Not on file    Highest education level: Not on file   Occupational History    Not on file   Social Needs    Financial resource strain: Not on file    Food insecurity:     Worry: Not on file     Inability: Not on file    Transportation needs:     Medical: Not on file     Non-medical: Not on file   Tobacco Use    Smoking status: Current Every Day Smoker     Packs/day: 0.75     Types: Cigarettes     Start date: 1973    Smokeless tobacco: Never Used   Substance and Sexual Activity    Alcohol use: Yes     Comment: occassional    Drug use: No    Sexual activity: Not Currently   Lifestyle    Physical activity:     Days per week: Not on file     Minutes per session: Not on file    Stress: Not on file   Relationships    Social connections:     Talks on phone: Not on file     Gets together: Not on file     Attends Congregational service: Not on file     Active member of club or organization: Not on file     Attends meetings of clubs or organizations: Not on file     Relationship status: Not on file    Intimate partner violence:     Fear of current or ex partner: Not on file     Emotionally abused: Not on file     Physically abused: Not on file     Forced sexual activity: Not on file   Other Topics Concern    Not on file   Social History Narrative    Not on file        Family History:       Problem Relation Age of Onset    Stroke Mother 76        mini     Stroke Father 76    Heart Attack Maternal Grandfather        BP Readings from Last 3 Encounters:   02/03/20 134/83   01/31/20 (!) 139/94   01/10/20 (!) 147/80       Physical Exam:    Vitals: BP (!) 139/94   Pulse 74   Temp 98.3 °F (36.8 °C) (Oral)   Resp 14   Ht 5' 4\" (1.626 m)   Wt 167 lb (75.8 kg)   SpO2 96%   BMI 28.67 kg/m²   General Appearance: Well developed, awake, alert, oriented, no acute distress  HEENT: Normocephalic,atraumatic. PERRL, EOM's intact, EAC without erythemaor swelling, no pallor or icterus. Neck: Supple, symmetrical, trachea midline. No JVD. Chest wall/Lung: Clear to auscultation bilaterally,  respirations unlabored. No ronchi/wheezing/rales  Heart[de-identified]  Regular rate and rhythm, S1and S2 normal, no murmur, rub or gallop. Abdomen: Soft, non-tender, bowel sounds normoactive, no masses, no organomegaly  Extremities:  Extremities normal, atraumatic, no cyanosis. no edema. Skin: Skin color, texture, turgor normal, no rashes or lesions  Musculokeletal: ROM grossly normal in all joints of extremities, no obvious joint swelling. Lymph nodes: no lymph node enlargement appreciated  Neurologic:   Alert&Oriented. Normal gait and coordination  No focal neurological deficits appreaciated         Psychiatric: has a normal mood and affect.  Behavior is normal.       Assessment and Plan:     The 10-year ASCVD risk score (Mohan Quiles, et al., 2013) is: 7.7%    Values used to calculate the score:      Age: 53 years      Sex: Female Is Non- : No      Diabetic: No      Tobacco smoker: Yes      Systolic Blood Pressure: 212 mmHg      Is BP treated: No      HDL Cholesterol: 37 mg/dL      Total Cholesterol: 202 mg/dL  Meagan Peña was seen today for medication check. Diagnoses and all orders for this visit:    Recurrent major depressive disorder, remission status unspecified ([facility].)  -She is a patient with recurrent  depressive disorder   -She has been taking Wellbutrin  mg daily  -She stated that she has been  feeling better, better mood , getting more pleasure in her regular activities  -We advised her to continue the same dose and have  explained her this is the max dose we can rationally prescribe. -She denied any unwanted side effects and tolerating the medication very well    Essential hypertension  -She has persistent elevated blood pressure  -She told us the blood pressure taken at home was also  elevated  -She was given lisinopril in the past but she could not take due to unwanted side effect  -We started low-dose amlodipine 5 mg daily  -She will take/measure blood pressure at home and put a log  -She will follow-up with us with log blood pressure report    Other orders  -     amLODIPine (NORVASC) 5 MG tablet; Take 1 tablet by mouth daily          I encourage further reading and education about your health conditions. Information on many healthconditions is provided by the [employer] Academy [employer]: https://familydoctor. org/  Please bring any questions to me at your next visit. Return to Office: Return in about 3 months (around 4/30/2020).     Medication List:    Current Outpatient Medications   Medication Sig Dispense Refill    amLODIPine (NORVASC) 5 MG tablet Take 1 tablet by mouth daily 30 tablet 0    buPROPion (WELLBUTRIN XL) 150 MG extended release tablet Take 3 tablets by mouth every morning 90 tablet 2    atorvastatin (LIPITOR) 20 MG tablet Take 0.5 tablets by mouth daily 30 tablet 3    acyclovir (ZOVIRAX) 400 MG tablet Take 1 tablet by mouth 2 times daily (Patient not taking: Reported on 2/3/2020) 6 tablet 0    nitroGLYCERIN (NITROSTAT) 0.4 MG SL tablet Place 1 tablet under the tongue every 5 minutes as needed for Chest pain (Chest pain) up to max of 3 total doses. If no relief after 1 dose, call 911. (Patient taking differently: Place 0.4 mg under the tongue every 5 minutes as needed for Chest pain (Chest pain) Indications: has never taken up to max of 3 total doses. If no relief after 1 dose, call 911.) 25 tablet 1     No current facility-administered medications for this visit. Abdulaziz Deluna MD       This document may have been prepared at least partiallythrough the use of voice recognition software. Although effort is taken to assure the accuracy of this document, it is possible that grammatical, syntax,  or spelling errors may occur.

## 2020-02-03 ENCOUNTER — OFFICE VISIT (OUTPATIENT)
Dept: ENT CLINIC | Age: 55
End: 2020-02-03
Payer: COMMERCIAL

## 2020-02-03 ENCOUNTER — PROCEDURE VISIT (OUTPATIENT)
Dept: AUDIOLOGY | Age: 55
End: 2020-02-03
Payer: COMMERCIAL

## 2020-02-03 VITALS
WEIGHT: 170 LBS | DIASTOLIC BLOOD PRESSURE: 83 MMHG | HEIGHT: 64 IN | HEART RATE: 70 BPM | SYSTOLIC BLOOD PRESSURE: 134 MMHG | BODY MASS INDEX: 29.02 KG/M2

## 2020-02-03 PROBLEM — F33.9 RECURRENT MAJOR DEPRESSIVE DISORDER (HCC): Status: ACTIVE | Noted: 2020-02-03

## 2020-02-03 PROBLEM — I10 ESSENTIAL HYPERTENSION: Status: ACTIVE | Noted: 2020-02-03

## 2020-02-03 LAB
CONTROL: NORMAL
HEMOCCULT STL QL: NORMAL

## 2020-02-03 PROCEDURE — G8427 DOCREV CUR MEDS BY ELIG CLIN: HCPCS | Performed by: OTOLARYNGOLOGY

## 2020-02-03 PROCEDURE — G8484 FLU IMMUNIZE NO ADMIN: HCPCS | Performed by: OTOLARYNGOLOGY

## 2020-02-03 PROCEDURE — 92567 TYMPANOMETRY: CPT | Performed by: AUDIOLOGIST

## 2020-02-03 PROCEDURE — G8419 CALC BMI OUT NRM PARAM NOF/U: HCPCS | Performed by: OTOLARYNGOLOGY

## 2020-02-03 PROCEDURE — 3017F COLORECTAL CA SCREEN DOC REV: CPT | Performed by: OTOLARYNGOLOGY

## 2020-02-03 PROCEDURE — 99204 OFFICE O/P NEW MOD 45 MIN: CPT | Performed by: OTOLARYNGOLOGY

## 2020-02-03 PROCEDURE — 92557 COMPREHENSIVE HEARING TEST: CPT | Performed by: AUDIOLOGIST

## 2020-02-03 PROCEDURE — 4004F PT TOBACCO SCREEN RCVD TLK: CPT | Performed by: OTOLARYNGOLOGY

## 2020-02-03 ASSESSMENT — ENCOUNTER SYMPTOMS
BACK PAIN: 0
SORE THROAT: 0
EYES NEGATIVE: 1
DIARRHEA: 0
ABDOMINAL PAIN: 0
ALLERGIC/IMMUNOLOGIC NEGATIVE: 1
RHINORRHEA: 0
EYE PAIN: 0
GASTROINTESTINAL NEGATIVE: 1
EYE REDNESS: 0

## 2020-02-03 NOTE — PROGRESS NOTES
DEPARTMENT OF OTOLARYNGOLOGY   HISTORY AND PHYSICAL      PATIENT: Jin Wang : 1965 (53 y.o.)   DATE: February 3, 2020  REFERRED BY: No referring provider defined for this encounter. HISTORY OBTAINED FROM:  patient    CHIEF COMPLAINT:  had concerns including New Patient (decreased hearing both ears x2 years, reports occasional pressure, ringing, and sounds seem muffled ). HISTORY OF PRESENT ILLNESS:                                                                                        Jin Wang is a(n) 53 y.o. female without a significant past medical history presents to the office today as a new patient for evaluation of her hearing loss that started about 2 years ago, bilaterally. Patient also complains of occasional pressure, periodic tinnitus, muffled sound, and popping sound. She exposed to loud sound when she was younger - track racing. Patient has trouble hearing everywhere including crowded environment. She will not hear if the person doesn't look straight at her. Denies vertigo or imbalance. Denies history of ear infection or surgery. Past Medical History:   Diagnosis Date    Depression     Hyperlipidemia     Hypertension         Past Surgical History:   Procedure Laterality Date    ANKLE SURGERY   or 2016    LEFT   ankles and screws    BACK SURGERY      Cervical surgery      SECTION      FRACTURE SURGERY      Rotaor cuff surgery    HYSTERECTOMY           Current Outpatient Medications:     amLODIPine (NORVASC) 5 MG tablet, Take 1 tablet by mouth daily, Disp: 30 tablet, Rfl: 0    buPROPion (WELLBUTRIN XL) 150 MG extended release tablet, Take 3 tablets by mouth every morning, Disp: 90 tablet, Rfl: 2    atorvastatin (LIPITOR) 20 MG tablet, Take 0.5 tablets by mouth daily, Disp: 30 tablet, Rfl: 3    nitroGLYCERIN (NITROSTAT) 0.4 MG SL tablet, Place 1 tablet under the tongue every 5 minutes as needed for Chest pain (Chest pain) up to max of 3 total doses.  If partner violence:     Fear of current or ex partner: Not on file     Emotionally abused: Not on file     Physically abused: Not on file     Forced sexual activity: Not on file   Other Topics Concern    Not on file   Social History Narrative    Not on file       REVIEW OF SYSTEMS:  Review of Systems  Constitutional: Negative for chills and fever. Eyes: Negative for discharge and itching. ENT: Negative for congestion, ear discharge, ear pain, hearing loss and sore throat. Respiratory: Negative for chest tightness and shortness of breath. Cardiovascular: Negative for chestpain and palpitations. Gastrointestinal: Negative for abdominal distention and abdominal pain. Endocrinology: Negative for heat and cold intolerance. Neurological: Negative for focal weaknessor seizure   Skin: Negative for rash and itchiness   Psychiatric: Negative for depression and hallucinations     PHYSICAL EXAM:                                                                                                                /83 (Site: Right Upper Arm, Position: Sitting, Cuff Size: Large Adult)   Pulse 70   Ht 5' 4\" (1.626 m)   Wt 170 lb (77.1 kg)   BMI 29.18 kg/m²   Physical Exam  Constitutional: Appears well-developed and well-nourished. Appears as stated age. Eyes: Lids and lashes normal, pupils equal, extra ocular muscles intact, sclera clear   ENT: Normocephalic, without obvious abnormality, atraumatic, sinuses nontender on palpation, external ears without lesions, bilateral EACs & TMs WNL, oral pharynx with moist mucus membranes, gums normal and edentulous   Neck:  Supple, symmetrical,trachea midline, no adenopathy, thyroid symmetric, not enlarged and no tenderness, skin normal   Respiratory: No increased work of breathing.  No stridor or wheezes   Cardiovascular: Regular rate   Skin: Warm and dry   Neurologic:  Alert and oriented       ASSESSMENTAND PLAN: Diagnosis Orders   1. Sensorineural hearing loss (SNHL) of both ears         · Audio and tymp done - showed mild SNHL bilaterally. Tymp normal bilaterally  · Patient is encouraged with hearing protection  · Patient may try hearing aids if beneficial  · Follow up in 1 year with yearly hearing test or sooner if symptoms acutely exacerbate    Patient was seen and examined with attending physician, who agrees with the assessment andplans. Maria T Kathleen DO  Resident Physician  [facility]  Otolaryngology Residency  2/3/2020  9:58 AM          Mayme Certain  1965      I have discussed the case, including pertinent history and exam findings with the resident. I have seen and examined the patient and the key elements of the encounter have been performed by me. I agree with the assessment, plan and orders as documented by the resident. Patient here for follow up of medical problems. Remainder of medical problems as per resident note.       [address] [facility],   2/8/20

## 2020-03-04 ENCOUNTER — APPOINTMENT (OUTPATIENT)
Dept: GENERAL RADIOLOGY | Age: 55
End: 2020-03-04
Payer: COMMERCIAL

## 2020-03-04 ENCOUNTER — HOSPITAL ENCOUNTER (EMERGENCY)
Age: 55
Discharge: HOME | End: 2020-03-04
Payer: COMMERCIAL

## 2020-03-04 VITALS
DIASTOLIC BLOOD PRESSURE: 79 MMHG | HEART RATE: 91 BPM | TEMPERATURE: 98.2 F | HEIGHT: 64 IN | BODY MASS INDEX: 29.02 KG/M2 | SYSTOLIC BLOOD PRESSURE: 157 MMHG | WEIGHT: 170 LBS | RESPIRATION RATE: 16 BRPM | OXYGEN SATURATION: 96 %

## 2020-03-04 LAB
EKG ATRIAL RATE: 88 BPM
EKG P AXIS: 41 DEGREES
EKG P-R INTERVAL: 150 MS
EKG Q-T INTERVAL: 398 MS
EKG QRS DURATION: 92 MS
EKG QTC CALCULATION (BAZETT): 481 MS
EKG R AXIS: 74 DEGREES
EKG T AXIS: 12 DEGREES
EKG VENTRICULAR RATE: 88 BPM
INFLUENZA A BY PCR: NOT DETECTED
INFLUENZA B BY PCR: NOT DETECTED
STREP GRP A PCR: NEGATIVE

## 2020-03-04 PROCEDURE — 99283 EMERGENCY DEPT VISIT LOW MDM: CPT

## 2020-03-04 PROCEDURE — 87502 INFLUENZA DNA AMP PROBE: CPT

## 2020-03-04 PROCEDURE — 87880 STREP A ASSAY W/OPTIC: CPT

## 2020-03-04 PROCEDURE — 71046 X-RAY EXAM CHEST 2 VIEWS: CPT

## 2020-03-04 PROCEDURE — 93005 ELECTROCARDIOGRAM TRACING: CPT | Performed by: EMERGENCY MEDICINE

## 2020-03-04 RX ORDER — ACYCLOVIR 400 MG/1
400 TABLET ORAL
Qty: 50 TABLET | Refills: 0 | Status: SHIPPED | OUTPATIENT
Start: 2020-03-04 | End: 2020-11-02 | Stop reason: SDUPTHER

## 2020-03-04 RX ORDER — BENZONATATE 100 MG/1
100 CAPSULE ORAL 3 TIMES DAILY PRN
Qty: 21 CAPSULE | Refills: 0 | Status: SHIPPED | OUTPATIENT
Start: 2020-03-04 | End: 2020-03-11

## 2020-03-04 RX ORDER — ALBUTEROL SULFATE 90 UG/1
2 AEROSOL, METERED RESPIRATORY (INHALATION) EVERY 6 HOURS PRN
Qty: 1 INHALER | Refills: 0 | Status: SHIPPED | OUTPATIENT
Start: 2020-03-04 | End: 2020-11-02 | Stop reason: SDUPTHER

## 2020-03-04 ASSESSMENT — PAIN DESCRIPTION - FREQUENCY: FREQUENCY: CONTINUOUS

## 2020-03-04 ASSESSMENT — PAIN DESCRIPTION - DESCRIPTORS: DESCRIPTORS: ACHING;CONSTANT;DISCOMFORT

## 2020-03-04 ASSESSMENT — PAIN DESCRIPTION - LOCATION: LOCATION: CHEST

## 2020-03-04 ASSESSMENT — PAIN DESCRIPTION - PAIN TYPE: TYPE: ACUTE PAIN

## 2020-03-04 ASSESSMENT — PAIN DESCRIPTION - ONSET: ONSET: ON-GOING

## 2020-03-04 ASSESSMENT — PAIN DESCRIPTION - PROGRESSION: CLINICAL_PROGRESSION: NOT CHANGED

## 2020-03-04 ASSESSMENT — PAIN SCALES - GENERAL: PAINLEVEL_OUTOF10: 8

## 2020-03-05 ENCOUNTER — TELEPHONE (OUTPATIENT)
Dept: FAMILY MEDICINE CLINIC | Age: 55
End: 2020-03-05

## 2020-03-06 NOTE — ED PROVIDER NOTES
12 Lead   Result Value Ref Range    Ventricular Rate 88 BPM    Atrial Rate 88 BPM    P-R Interval 150 ms    QRS Duration 92 ms    Q-T Interval 398 ms    QTc Calculation (Bazett) 481 ms    P Axis 41 degrees    R Axis 74 degrees    T Axis 12 degrees       RADIOLOGY:  Interpreted by Radiologist.  XR CHEST STANDARD (2 VW)   Final Result   No acute cardiopulmonary pathology. ------------------------- NURSING NOTES AND VITALS REVIEWED ---------------------------   The nursing notes within the ED encounter and vital signs as below have been reviewed. BP (!) 157/79   Pulse 91   Temp 98.2 °F (36.8 °C) (Oral)   Resp 16   Ht 5' 4\" (1.626 m)   Wt 170 lb (77.1 kg)   SpO2 96%   BMI 29.18 kg/m²   Oxygen Saturation Interpretation: Normal      ---------------------------------------------------PHYSICAL EXAM--------------------------------------      Constitutional/General: Alert and oriented x3, well appearing, non toxic in NAD  Head: Normocephalic and atraumatic  Eyes: PERRL, EOMI  Mouth: Oropharynx clear, handling secretions, no trismus small herpetic lesion of the right upper lip  Neck: Supple, full ROM,   Pulmonary: Lungs clear to auscultation bilaterally, no wheezes, rales, or rhonchi. Not in respiratory distress  Cardiovascular:  Regular rate and rhythm, no murmurs, gallops, or rubs. 2+ distal pulses  Abdomen: Soft, non tender, non distended,   Extremities: Moves all extremities x 4. Warm and well perfused  Skin: warm and dry without rash  Neurologic: GCS 15,  Psych: Normal Affect      ------------------------------ ED COURSE/MEDICAL DECISION MAKING----------------------  Medications - No data to display      ED COURSE:       Medical Decision Making:    Patient has history of cold sores requesting acyclovir for treatment. She was negative for influenza and strep patient treated symptomatically Tessalon Perles Mucinex follow-up primary care 1 to 2 days    Counseling:    The emergency provider has spoken with the patient and discussed todays results, in addition to providing specific details for the plan of care and counseling regarding the diagnosis and prognosis. Questions are answered at this time and they are agreeable with the plan.      --------------------------------- IMPRESSION AND DISPOSITION ---------------------------------    IMPRESSION  1. Viral URI with cough    2. Cold sore        DISPOSITION  Disposition: Discharge to home  Patient condition is good      NOTE: This report was transcribed using voice recognition software.  Every effort was made to ensure accuracy; however, inadvertent computerized transcription errors may be present     Zechariah Brewster  03/05/20 1919

## 2020-04-08 RX ORDER — BUPROPION HYDROCHLORIDE 150 MG/1
450 TABLET ORAL EVERY MORNING
Qty: 90 TABLET | Refills: 2 | Status: SHIPPED | OUTPATIENT
Start: 2020-04-08 | End: 2020-11-02 | Stop reason: SDUPTHER

## 2020-04-08 RX ORDER — AMLODIPINE BESYLATE 5 MG/1
5 TABLET ORAL DAILY
Qty: 30 TABLET | Refills: 2 | Status: SHIPPED
Start: 2020-04-08 | End: 2020-05-05

## 2020-04-08 RX ORDER — ATORVASTATIN CALCIUM 20 MG/1
10 TABLET, FILM COATED ORAL DAILY
Qty: 30 TABLET | Refills: 2 | Status: SHIPPED | OUTPATIENT
Start: 2020-04-08 | End: 2020-11-02 | Stop reason: SDUPTHER

## 2020-04-23 ENCOUNTER — HOSPITAL ENCOUNTER (EMERGENCY)
Age: 55
Discharge: HOME | End: 2020-04-23
Attending: EMERGENCY MEDICINE
Payer: COMMERCIAL

## 2020-04-23 VITALS
HEIGHT: 64 IN | WEIGHT: 170 LBS | DIASTOLIC BLOOD PRESSURE: 86 MMHG | SYSTOLIC BLOOD PRESSURE: 175 MMHG | TEMPERATURE: 97.8 F | BODY MASS INDEX: 29.02 KG/M2 | HEART RATE: 95 BPM | OXYGEN SATURATION: 96 % | RESPIRATION RATE: 16 BRPM

## 2020-04-23 PROCEDURE — 99282 EMERGENCY DEPT VISIT SF MDM: CPT

## 2020-04-23 PROCEDURE — 12001 RPR S/N/AX/GEN/TRNK 2.5CM/<: CPT

## 2020-04-23 PROCEDURE — 90471 IMMUNIZATION ADMIN: CPT | Performed by: PHYSICIAN ASSISTANT

## 2020-04-23 PROCEDURE — 6370000000 HC RX 637 (ALT 250 FOR IP): Performed by: PHYSICIAN ASSISTANT

## 2020-04-23 PROCEDURE — 90715 TDAP VACCINE 7 YRS/> IM: CPT | Performed by: PHYSICIAN ASSISTANT

## 2020-04-23 PROCEDURE — 6360000002 HC RX W HCPCS: Performed by: PHYSICIAN ASSISTANT

## 2020-04-23 RX ORDER — LIDOCAINE HYDROCHLORIDE 20 MG/ML
5 INJECTION, SOLUTION INFILTRATION; PERINEURAL ONCE
Status: DISCONTINUED | OUTPATIENT
Start: 2020-04-23 | End: 2020-04-23 | Stop reason: HOSPADM

## 2020-04-23 RX ORDER — GINSENG 100 MG
CAPSULE ORAL ONCE
Status: COMPLETED | OUTPATIENT
Start: 2020-04-23 | End: 2020-04-23

## 2020-04-23 RX ADMIN — TETANUS TOXOID, REDUCED DIPHTHERIA TOXOID AND ACELLULAR PERTUSSIS VACCINE, ADSORBED 0.5 ML: 5; 2.5; 8; 8; 2.5 SUSPENSION INTRAMUSCULAR at 19:35

## 2020-04-23 RX ADMIN — BACITRACIN: 500 OINTMENT TOPICAL at 19:35

## 2020-04-23 ASSESSMENT — PAIN DESCRIPTION - ORIENTATION: ORIENTATION: RIGHT

## 2020-04-23 ASSESSMENT — PAIN DESCRIPTION - FREQUENCY: FREQUENCY: CONTINUOUS

## 2020-04-23 ASSESSMENT — PAIN DESCRIPTION - DESCRIPTORS: DESCRIPTORS: THROBBING

## 2020-04-23 ASSESSMENT — PAIN DESCRIPTION - PAIN TYPE: TYPE: ACUTE PAIN

## 2020-04-23 ASSESSMENT — PAIN - FUNCTIONAL ASSESSMENT: PAIN_FUNCTIONAL_ASSESSMENT: PREVENTS OR INTERFERES SOME ACTIVE ACTIVITIES AND ADLS

## 2020-04-23 ASSESSMENT — PAIN DESCRIPTION - LOCATION: LOCATION: FINGER (COMMENT WHICH ONE)

## 2020-04-23 ASSESSMENT — PAIN DESCRIPTION - ONSET: ONSET: SUDDEN
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
## 2020-04-24 ENCOUNTER — TELEPHONE (OUTPATIENT)
Dept: PRIMARY CARE CLINIC | Age: 55
End: 2020-04-24

## 2020-04-24 ENCOUNTER — TELEPHONE (OUTPATIENT)
Dept: ORTHOPEDIC SURGERY | Age: 55
End: 2020-04-24

## 2020-04-24 NOTE — TELEPHONE ENCOUNTER
[facility] ED Follow Up Call    ED Visit Date:  2020    Patient: Nayana Martínez Patient : 1965     Terri Dawson MD     Spoke with: Patient    Interactive Patient Contact:  Was patient able to fill all prescriptions: na  Is patient taking all medications as directed on the After Visit Summary? yes  Does patient understand their visit instructions: Yes  Does patient have questions or concerns that need addressed?: no    Current patient status and patient narrative:  improved    Self-care instructions reviewed:  yes    Face to face required? no  Able to participate in virtual visit? yes, Patient does have a smart phone and is able to participate in VV. ED summary reviewed, including labs, tests and important procedures:yes    Scheduled to see Dr. Mert Mansfield on 2020  Scheduled to see PCP on 2020.     Owen Mueller RN   20

## 2020-04-26 ENCOUNTER — TELEPHONE (OUTPATIENT)
Dept: ORTHOPEDIC SURGERY | Age: 55
End: 2020-04-26

## 2020-04-27 ENCOUNTER — OFFICE VISIT (OUTPATIENT)
Dept: ORTHOPEDIC SURGERY | Age: 55
End: 2020-04-27
Payer: COMMERCIAL

## 2020-04-27 VITALS — WEIGHT: 160 LBS | BODY MASS INDEX: 27.31 KG/M2 | HEIGHT: 64 IN | TEMPERATURE: 97.7 F

## 2020-04-27 PROCEDURE — 4004F PT TOBACCO SCREEN RCVD TLK: CPT | Performed by: ORTHOPAEDIC SURGERY

## 2020-04-27 PROCEDURE — 3017F COLORECTAL CA SCREEN DOC REV: CPT | Performed by: ORTHOPAEDIC SURGERY

## 2020-04-27 PROCEDURE — 99202 OFFICE O/P NEW SF 15 MIN: CPT | Performed by: ORTHOPAEDIC SURGERY

## 2020-04-27 PROCEDURE — G8419 CALC BMI OUT NRM PARAM NOF/U: HCPCS | Performed by: ORTHOPAEDIC SURGERY

## 2020-04-27 PROCEDURE — G8427 DOCREV CUR MEDS BY ELIG CLIN: HCPCS | Performed by: ORTHOPAEDIC SURGERY

## 2020-04-27 NOTE — PROGRESS NOTES
Temp 97.7 °F (36.5 °C) (Oral)   Ht 5' 4\" (1.626 m)   Wt 160 lb (72.6 kg)   BMI 27.46 kg/m²   CONSTITUTIONAL:  awake, alert, cooperative, no apparent distress, and appears stated age  EYES:  Lids and lashes normal, pupils equal, round and reactive to light, extra ocular muscles intact, sclera clear, conjunctiva normal  ENT:  Normocephalic, without obvious abnormality, atraumatic, sinuses nontender on palpation, external ears without lesions, oral pharynx with moist mucus membranes, tonsils without erythema or exudates, gums normal and good dentition. NECK:  Supple, symmetrical, trachea midline, no adenopathy, thyroid symmetric, not enlarged and no tenderness, skin normal  NEUROLOGIC:  Awake, alert, oriented to name, place and time. Cranial nerves II-XII are grossly intact. Motor is 5 out of 5 bilaterally. Sensory is intact. gait is normal.  MUSCULOSKELETAL:    Right upper extremity: Nontender of the shoulder elbow and wrist region. Oblique laceration extending across the palmar ulnar aspect of the index finger DIP joint. FDP and FDS function intact. Full digital flexion extension. Radial digital nerve sensation intact light touch. Paresthesias and diminished sensation in the ulnar digital nerve distally. There is brisk capillary refill to the tip of the finger. She is otherwise neurovascular intact. DATA:    CBC:   Lab Results   Component Value Date    WBC 10.4 08/28/2019    RBC 5.15 08/28/2019    HGB 15.2 08/28/2019    HCT 46.2 08/28/2019    MCV 89.7 08/28/2019    MCH 29.5 08/28/2019    MCHC 32.9 08/28/2019    RDW 14.5 08/28/2019     08/28/2019    MPV 9.4 08/28/2019     PT/INR:  No results found for: PROTIME, INR      IMPRESSION:  · Right index finger laceration with diminished sensation in the ulnar digital nerve distribution at the level of the distal interphalangeal joint    PLAN:  I have explained today's findings with the patient. She does not have any signs or symptoms of infection.

## 2020-04-30 ENCOUNTER — OFFICE VISIT (OUTPATIENT)
Dept: FAMILY MEDICINE CLINIC | Age: 55
End: 2020-04-30
Payer: COMMERCIAL

## 2020-04-30 VITALS
OXYGEN SATURATION: 98 % | TEMPERATURE: 97.9 F | WEIGHT: 168 LBS | RESPIRATION RATE: 16 BRPM | SYSTOLIC BLOOD PRESSURE: 156 MMHG | HEIGHT: 64 IN | HEART RATE: 80 BPM | DIASTOLIC BLOOD PRESSURE: 93 MMHG | BODY MASS INDEX: 28.68 KG/M2

## 2020-04-30 PROBLEM — J06.9 VIRAL UPPER RESPIRATORY TRACT INFECTION: Status: ACTIVE | Noted: 2020-04-30

## 2020-04-30 PROBLEM — B00.1 HERPES LABIALIS: Status: ACTIVE | Noted: 2020-04-30

## 2020-04-30 PROBLEM — S61.219A LACERATION OF FINGER: Status: ACTIVE | Noted: 2020-04-30

## 2020-04-30 PROCEDURE — G8431 POS CLIN DEPRES SCRN F/U DOC: HCPCS | Performed by: STUDENT IN AN ORGANIZED HEALTH CARE EDUCATION/TRAINING PROGRAM

## 2020-04-30 PROCEDURE — 96160 PT-FOCUSED HLTH RISK ASSMT: CPT | Performed by: STUDENT IN AN ORGANIZED HEALTH CARE EDUCATION/TRAINING PROGRAM

## 2020-04-30 PROCEDURE — 99213 OFFICE O/P EST LOW 20 MIN: CPT | Performed by: STUDENT IN AN ORGANIZED HEALTH CARE EDUCATION/TRAINING PROGRAM

## 2020-04-30 PROCEDURE — 3017F COLORECTAL CA SCREEN DOC REV: CPT | Performed by: STUDENT IN AN ORGANIZED HEALTH CARE EDUCATION/TRAINING PROGRAM

## 2020-04-30 PROCEDURE — G8427 DOCREV CUR MEDS BY ELIG CLIN: HCPCS | Performed by: STUDENT IN AN ORGANIZED HEALTH CARE EDUCATION/TRAINING PROGRAM

## 2020-04-30 PROCEDURE — 4004F PT TOBACCO SCREEN RCVD TLK: CPT | Performed by: STUDENT IN AN ORGANIZED HEALTH CARE EDUCATION/TRAINING PROGRAM

## 2020-04-30 PROCEDURE — G8419 CALC BMI OUT NRM PARAM NOF/U: HCPCS | Performed by: STUDENT IN AN ORGANIZED HEALTH CARE EDUCATION/TRAINING PROGRAM

## 2020-04-30 RX ORDER — SERTRALINE HYDROCHLORIDE 25 MG/1
25 TABLET, FILM COATED ORAL DAILY
Qty: 30 TABLET | Refills: 3 | Status: SHIPPED | OUTPATIENT
Start: 2020-04-30 | End: 2020-11-02 | Stop reason: SDUPTHER

## 2020-04-30 ASSESSMENT — ENCOUNTER SYMPTOMS
DIARRHEA: 0
EYES NEGATIVE: 1
SORE THROAT: 0
ALLERGIC/IMMUNOLOGIC NEGATIVE: 1
SHORTNESS OF BREATH: 0
CHEST TIGHTNESS: 0
EYE REDNESS: 0
BLOOD IN STOOL: 0
NAUSEA: 0
GASTROINTESTINAL NEGATIVE: 1

## 2020-04-30 ASSESSMENT — PATIENT HEALTH QUESTIONNAIRE - PHQ9
SUM OF ALL RESPONSES TO PHQ QUESTIONS 1-9: 14
SUM OF ALL RESPONSES TO PHQ QUESTIONS 1-9: 14
8. MOVING OR SPEAKING SO SLOWLY THAT OTHER PEOPLE COULD HAVE NOTICED. OR THE OPPOSITE, BEING SO FIGETY OR RESTLESS THAT YOU HAVE BEEN MOVING AROUND A LOT MORE THAN USUAL: 0
7. TROUBLE CONCENTRATING ON THINGS, SUCH AS READING THE NEWSPAPER OR WATCHING TELEVISION: 3
2. FEELING DOWN, DEPRESSED OR HOPELESS: 2
SUM OF ALL RESPONSES TO PHQ9 QUESTIONS 1 & 2: 3
1. LITTLE INTEREST OR PLEASURE IN DOING THINGS: 1
5. POOR APPETITE OR OVEREATING: 3
6. FEELING BAD ABOUT YOURSELF - OR THAT YOU ARE A FAILURE OR HAVE LET YOURSELF OR YOUR FAMILY DOWN: 0
10. IF YOU CHECKED OFF ANY PROBLEMS, HOW DIFFICULT HAVE THESE PROBLEMS MADE IT FOR YOU TO DO YOUR WORK, TAKE CARE OF THINGS AT HOME, OR GET ALONG WITH OTHER PEOPLE: 1
4. FEELING TIRED OR HAVING LITTLE ENERGY: 2
3. TROUBLE FALLING OR STAYING ASLEEP: 3
9. THOUGHTS THAT YOU WOULD BE BETTER OFF DEAD, OR OF HURTING YOURSELF: 0

## 2020-04-30 NOTE — PROGRESS NOTES
Tobacco Use    Smoking status: Current Every Day Smoker     Packs/day: 0.07     Types: Cigarettes     Start date: 9/13/1973    Smokeless tobacco: Never Used    Tobacco comment: 6-7 a day   Substance and Sexual Activity    Alcohol use: Yes     Comment: occassional    Drug use: No    Sexual activity: Not Currently   Lifestyle    Physical activity     Days per week: Not on file     Minutes per session: Not on file    Stress: Not on file   Relationships    Social connections     Talks on phone: Not on file     Gets together: Not on file     Attends Rastafari service: Not on file     Active member of club or organization: Not on file     Attends meetings of clubs or organizations: Not on file     Relationship status: Not on file    Intimate partner violence     Fear of current or ex partner: Not on file     Emotionally abused: Not on file     Physically abused: Not on file     Forced sexual activity: Not on file   Other Topics Concern    Not on file   Social History Narrative    Not on file        Family History:       Problem Relation Age of Onset    Stroke Mother 76        mini     Stroke Father 76    Heart Attack Maternal Grandfather        BP Readings from Last 3 Encounters:   04/30/20 (!) 156/93   04/23/20 (!) 175/86   03/04/20 (!) 157/79       Physical Exam:    Vitals: BP (!) 156/93   Pulse 80   Temp 97.9 °F (36.6 °C) (Temporal)   Resp 16   Ht 5' 4\" (1.626 m)   Wt 168 lb (76.2 kg)   SpO2 98%   Breastfeeding No   BMI 28.84 kg/m²   General Appearance: Well developed, awake, alert, oriented, no acute distress  HEENT: Normocephalic,atraumatic. PERRL, EOM's intact, EAC without erythemaor swelling, no pallor or icterus. Neck: Supple, symmetrical, trachea midline. No JVD. Chest wall/Lung: Clear to auscultation bilaterally,  respirations unlabored. No ronchi/wheezing/rales  Heart[de-identified]  Regular rate and rhythm, S1and S2 normal, no murmur, rub or gallop.   Abdomen: Soft, non-tender, bowel sounds normoactive, no masses, no organomegaly  Extremities:  Extremities normal, atraumatic, no cyanosis. no edema. Skin: Skin color, texture, turgor normal, no rashes or lesions  Musculokeletal: ROM grossly normal in all joints of extremities, no obvious joint swelling. Right index finger; incised wound on healing process, suture in situ  Lymph nodes: no lymph node enlargement appreciated  Neurologic:   Alert&Oriented. Normal gait and coordination  No focal neurological deficits appreaciated         Psychiatric: has a normal mood and affect. Behavior is normal.       Assessment and Plan:         Radha Rizo was seen today for suture / staple removal, hypertension and depression. Diagnoses and all orders for this visit:    Laceration of right index finger, foreign body presence unspecified, nail damage status unspecified, subsequent encounter  -      - MN REMOVAL OF SUTURES  -Patient presented for suture removal on her right index finger  -She has had cut injury on palmar aspect of right index finger a week ago, suture was placed in ED and she already visited hand surgeon Dr. Bruna Brown  -She requested us to remove the suture, the wound was healthy and aligned , we removed the suture today.     Essential hypertension  -Known patient with essential hypertension taking 5 mg daily  -Blood pressure taken at clinic was elevated 156/93, her other blood pressure records for past 3- 4-months looks elevated  -We recommended to increase the dose of amlodipine 10 mg daily  -We will measure her blood pressure at home and put a log    Recurrent major depressive disorder, [facility] status unspecified ([facility]  -Patient has been taking Wellbutrin 450 mg /extended release daily for a long time  -She is tolerating Wellbutrin very well, no unusual side effects, she feels slightly agitated during nighttime(she thinks that it might be related to the stress at home)  -We have added Zoloft 25 daily, have properly  discussed regarding drug interaction and side effects  -We have also suggested to visit counselor to address stress related issues. Positive depression screening  -     Positive Screen for Clinical Depression with a Documented Follow-up Plan     Other orders  -     sertraline (ZOLOFT) 25 MG tablet; Take 1 tablet by mouth daily      I encourage further reading and education about your health conditions. Information on many healthconditions is provided by the [employer] Academy [employer]: https://familydoctor. org/  Please bring any questions to me at your next visit. Return to Office: Return in about 3 months (around 7/30/2020). Medication List:    Current Outpatient Medications   Medication Sig Dispense Refill    sertraline (ZOLOFT) 25 MG tablet Take 1 tablet by mouth daily 30 tablet 3    amLODIPine (NORVASC) 5 MG tablet Take 1 tablet by mouth daily 30 tablet 2    buPROPion (WELLBUTRIN XL) 150 MG extended release tablet Take 3 tablets by mouth every morning 90 tablet 2    atorvastatin (LIPITOR) 20 MG tablet Take 0.5 tablets by mouth daily 30 tablet 2    albuterol sulfate HFA (PROAIR HFA) 108 (90 Base) MCG/ACT inhaler Inhale 2 puffs into the lungs every 6 hours as needed for Wheezing (Patient not taking: Reported on 4/30/2020) 1 Inhaler 0    nitroGLYCERIN (NITROSTAT) 0.4 MG SL tablet Place 1 tablet under the tongue every 5 minutes as needed for Chest pain (Chest pain) up to max of 3 total doses. If no relief after 1 dose, call 911. (Patient not taking: Reported on 4/30/2020) 25 tablet 1     No current facility-administered medications for this visit. Kristi Russell MD     This document may have been prepared at least partiallythrough the use of voice recognition software. Although effort is taken to assure the accuracy of this document, it is possible that grammatical, syntax,  or spelling errors may occur.   On the basis of positive PHQ-9 screening (PHQ-9 Total Score: 14), the following plan was

## 2020-05-05 ENCOUNTER — HOSPITAL ENCOUNTER (EMERGENCY)
Age: 55
Discharge: HOME | End: 2020-05-05
Attending: EMERGENCY MEDICINE
Payer: COMMERCIAL

## 2020-05-05 VITALS
TEMPERATURE: 98 F | OXYGEN SATURATION: 95 % | SYSTOLIC BLOOD PRESSURE: 168 MMHG | HEART RATE: 88 BPM | RESPIRATION RATE: 16 BRPM | DIASTOLIC BLOOD PRESSURE: 74 MMHG | BODY MASS INDEX: 28.17 KG/M2 | HEIGHT: 64 IN | WEIGHT: 165 LBS

## 2020-05-05 PROCEDURE — 99282 EMERGENCY DEPT VISIT SF MDM: CPT

## 2020-05-05 RX ORDER — AMLODIPINE BESYLATE 5 MG/1
TABLET ORAL
Qty: 30 TABLET | Refills: 2 | Status: SHIPPED | OUTPATIENT
Start: 2020-05-05 | End: 2020-11-02 | Stop reason: SDUPTHER

## 2020-05-05 RX ORDER — CEPHALEXIN 500 MG/1
500 CAPSULE ORAL 4 TIMES DAILY
Qty: 20 CAPSULE | Refills: 0 | Status: SHIPPED | OUTPATIENT
Start: 2020-05-06 | End: 2020-05-11

## 2020-05-05 ASSESSMENT — ENCOUNTER SYMPTOMS
COLOR CHANGE: 0
NAUSEA: 0
COUGH: 0
BACK PAIN: 0
DIARRHEA: 0
SHORTNESS OF BREATH: 0
BLOOD IN STOOL: 0
CHEST TIGHTNESS: 0
VOMITING: 0
ABDOMINAL PAIN: 0

## 2020-05-06 ENCOUNTER — TELEPHONE (OUTPATIENT)
Dept: PRIMARY CARE CLINIC | Age: 55
End: 2020-05-06

## 2020-05-06 NOTE — ED PROVIDER NOTES
Normal    --------------------------------------- ED Clinical Course/MDM --------------------------------------    Patient is a 69-year-old female here for wound check of her right index finger. Tendon laceration via a kitchen knife on 23 April. Did follow-up with orthopedic surgery hand surgeon, no further interventions planned. States she had change in the color, was slightly erythematous. Is able to flex and extend her DIP. No fusiform swelling, localized tenderness. Knievel signs are negative,   Not felt to be flexor tenosynovitis. Afebrile, no signs of lymphadenitis. Due to the minimal erythema, decision made to cover with Keflex and close outpatient follow-up. Given concerning signs symptoms which return emergency department. Additional verbal orders provided. I have spoken with the patient and discussed todays results, in addition to providing specific details for the plan of care and counseling regarding the diagnosis and prognosis. Their questions are answered at this time and they are agreeable with the plan. I discussed at length with them reasons for immediate return here for re evaluation. They will followup with their orthopedic surgeon and primary care physician by calling their office tomorrow. --------------------------------- ADDITIONAL PROVIDER NOTES ---------------------------------  At this time the patient is without objective evidence of an acute process requiring hospitalization or inpatient management. They have remained hemodynamically stable throughout their entire ED visit and are stable for discharge with outpatient follow-up. The plan has been discussed in detail and they are aware of the specific conditions for emergent return, as well as the importance of follow-up. New Prescriptions    CEPHALEXIN (KEFLEX) 500 MG CAPSULE    Take 1 capsule by mouth 4 times daily for 5 days       Diagnosis:  1. Visit for wound check    2.  Elevated blood pressure reading

## 2020-11-02 ENCOUNTER — OFFICE VISIT (OUTPATIENT)
Dept: PRIMARY CARE CLINIC | Age: 55
End: 2020-11-02
Payer: COMMERCIAL

## 2020-11-02 VITALS
HEIGHT: 64 IN | OXYGEN SATURATION: 97 % | SYSTOLIC BLOOD PRESSURE: 130 MMHG | HEART RATE: 90 BPM | DIASTOLIC BLOOD PRESSURE: 82 MMHG | WEIGHT: 154 LBS | TEMPERATURE: 99.8 F | BODY MASS INDEX: 26.29 KG/M2

## 2020-11-02 DIAGNOSIS — Z20.822 ENCOUNTER FOR LABORATORY TESTING FOR COVID-19 VIRUS: ICD-10-CM

## 2020-11-02 LAB — S PYO AG THROAT QL: NORMAL

## 2020-11-02 PROCEDURE — G8484 FLU IMMUNIZE NO ADMIN: HCPCS | Performed by: NURSE PRACTITIONER

## 2020-11-02 PROCEDURE — 4004F PT TOBACCO SCREEN RCVD TLK: CPT | Performed by: NURSE PRACTITIONER

## 2020-11-02 PROCEDURE — G8427 DOCREV CUR MEDS BY ELIG CLIN: HCPCS | Performed by: NURSE PRACTITIONER

## 2020-11-02 PROCEDURE — G8419 CALC BMI OUT NRM PARAM NOF/U: HCPCS | Performed by: NURSE PRACTITIONER

## 2020-11-02 PROCEDURE — 99213 OFFICE O/P EST LOW 20 MIN: CPT | Performed by: NURSE PRACTITIONER

## 2020-11-02 PROCEDURE — 87880 STREP A ASSAY W/OPTIC: CPT | Performed by: NURSE PRACTITIONER

## 2020-11-02 PROCEDURE — 3017F COLORECTAL CA SCREEN DOC REV: CPT | Performed by: NURSE PRACTITIONER

## 2020-11-02 RX ORDER — ACYCLOVIR 400 MG/1
400 TABLET ORAL
Qty: 50 TABLET | Refills: 0 | Status: SHIPPED
Start: 2020-11-02 | End: 2021-01-12

## 2020-11-02 RX ORDER — BUPROPION HYDROCHLORIDE 150 MG/1
450 TABLET ORAL EVERY MORNING
Qty: 90 TABLET | Refills: 1 | Status: SHIPPED
Start: 2020-11-02 | End: 2021-01-10 | Stop reason: SDUPTHER

## 2020-11-02 RX ORDER — AMLODIPINE BESYLATE 5 MG/1
TABLET ORAL
Qty: 30 TABLET | Refills: 1 | Status: SHIPPED
Start: 2020-11-02 | End: 2021-01-10 | Stop reason: SDUPTHER

## 2020-11-02 RX ORDER — FLUTICASONE PROPIONATE 50 MCG
1 SPRAY, SUSPENSION (ML) NASAL DAILY
Qty: 1 BOTTLE | Refills: 0 | Status: SHIPPED
Start: 2020-11-02 | End: 2020-11-22

## 2020-11-02 RX ORDER — ATORVASTATIN CALCIUM 20 MG/1
10 TABLET, FILM COATED ORAL DAILY
Qty: 30 TABLET | Refills: 1 | Status: SHIPPED
Start: 2020-11-02 | End: 2021-01-10 | Stop reason: SDUPTHER

## 2020-11-02 RX ORDER — SERTRALINE HYDROCHLORIDE 25 MG/1
25 TABLET, FILM COATED ORAL DAILY
Qty: 30 TABLET | Refills: 1 | Status: SHIPPED
Start: 2020-11-02 | End: 2021-01-10 | Stop reason: SDUPTHER

## 2020-11-02 RX ORDER — ALBUTEROL SULFATE 90 UG/1
2 AEROSOL, METERED RESPIRATORY (INHALATION) EVERY 6 HOURS PRN
Qty: 1 INHALER | Refills: 0 | Status: SHIPPED
Start: 2020-11-02 | End: 2020-11-22

## 2020-11-02 RX ORDER — DOXYCYCLINE HYCLATE 100 MG/1
100 CAPSULE ORAL 2 TIMES DAILY
Qty: 20 CAPSULE | Refills: 0 | Status: SHIPPED | OUTPATIENT
Start: 2020-11-02 | End: 2020-11-12

## 2020-11-02 NOTE — TELEPHONE ENCOUNTER
Last Appointment 4/30/20  No Show Appointment   7/28/2020  Next Appointment      Patient went to walk in care for Covid like symptoms, she can not come into the office due to these symptoms. Patient requesting refills on several medications.  meds for #30 days/1 refill pending drs approval.

## 2020-11-03 LAB
SARS-COV-2: NOT DETECTED
SOURCE: NORMAL

## 2020-11-22 ENCOUNTER — APPOINTMENT (OUTPATIENT)
Dept: CT IMAGING | Age: 55
End: 2020-11-22
Payer: COMMERCIAL

## 2020-11-22 ENCOUNTER — HOSPITAL ENCOUNTER (EMERGENCY)
Age: 55
Discharge: HOME | End: 2020-11-22
Attending: EMERGENCY MEDICINE
Payer: COMMERCIAL

## 2020-11-22 VITALS
OXYGEN SATURATION: 99 % | DIASTOLIC BLOOD PRESSURE: 70 MMHG | HEIGHT: 64 IN | SYSTOLIC BLOOD PRESSURE: 136 MMHG | HEART RATE: 94 BPM | RESPIRATION RATE: 16 BRPM | BODY MASS INDEX: 27.31 KG/M2 | TEMPERATURE: 96.4 F | WEIGHT: 160 LBS

## 2020-11-22 PROCEDURE — 96372 THER/PROPH/DIAG INJ SC/IM: CPT

## 2020-11-22 PROCEDURE — 99284 EMERGENCY DEPT VISIT MOD MDM: CPT

## 2020-11-22 PROCEDURE — 72125 CT NECK SPINE W/O DYE: CPT

## 2020-11-22 PROCEDURE — 6360000002 HC RX W HCPCS: Performed by: STUDENT IN AN ORGANIZED HEALTH CARE EDUCATION/TRAINING PROGRAM

## 2020-11-22 RX ORDER — ORPHENADRINE CITRATE 100 MG/1
100 TABLET, EXTENDED RELEASE ORAL 2 TIMES DAILY
Qty: 20 TABLET | Refills: 0 | Status: SHIPPED | OUTPATIENT
Start: 2020-11-22 | End: 2020-12-02

## 2020-11-22 RX ORDER — ORPHENADRINE CITRATE 30 MG/ML
60 INJECTION INTRAMUSCULAR; INTRAVENOUS ONCE
Status: COMPLETED | OUTPATIENT
Start: 2020-11-22 | End: 2020-11-22

## 2020-11-22 RX ADMIN — ORPHENADRINE CITRATE 60 MG: 30 INJECTION INTRAMUSCULAR; INTRAVENOUS at 19:28

## 2020-11-22 ASSESSMENT — ENCOUNTER SYMPTOMS
PHOTOPHOBIA: 0
VISUAL CHANGE: 0

## 2020-11-23 ASSESSMENT — ENCOUNTER SYMPTOMS
NAUSEA: 0
DIARRHEA: 0
ABDOMINAL PAIN: 0
VOMITING: 0
SHORTNESS OF BREATH: 0
ABDOMINAL DISTENTION: 0
COUGH: 0
CONSTIPATION: 0

## 2020-11-23 NOTE — ED PROVIDER NOTES
Millie Luciano is a 29-year-old female present emergency department with bilateral neck pain that has been present for several months but it became more severe over the past weekend. Patient denies any trauma to the neck but does have a history of spine surgery unknown details that was performed by Dr. Swapna Dubon in 2009 or 2010. Patient states that she has random shooting pain from her lower C-spine of to the top of her head. Patient has more pain on the right side but does have occasional left-sided pain as well. Patient denies any midline cervical tenderness. Patient denies any trauma to the spine. Patient does not have a headache, lightheadedness, dizziness, nausea, vomiting. Patient denies any weakness to the upper extremities, denies numbness or tingling. Pain does not happen every time patient moves her head but happens occasionally. Patient denies any vision changes. The history is provided by the patient and medical records. Neck Pain   Pain location: c spine. Quality:  Shooting and stiffness  Stiffness is present:  All day  Radiates to: to the top of head. Pain severity:  Mild  Pain is:  Same all the time  Onset quality:  Gradual  Duration:  3 months  Timing:  Intermittent  Progression:  Worsening  Chronicity:  New  Context: not fall, not lifting a heavy object, not MCA, not MVC and not recent injury    Relieved by:  Nothing  Worsened by:  Nothing  Ineffective treatments:  None tried  Associated symptoms: no fever, no numbness, no photophobia, no syncope, no tingling, no visual change and no weakness    Risk factors: no recent head injury and no recurrent falls         Review of Systems   Constitutional: Negative for chills, diaphoresis, fatigue and fever. Eyes: Negative for photophobia. Respiratory: Negative for cough and shortness of breath. Cardiovascular: Negative for syncope.    Gastrointestinal: Negative for abdominal distention, abdominal pain, constipation, diarrhea, nausea and vomiting. Genitourinary: Negative for difficulty urinating and dysuria. Musculoskeletal: Positive for neck pain. Negative for neck stiffness. Skin: Negative for pallor and rash. Allergic/Immunologic: Negative for immunocompromised state. Neurological: Negative for tingling, weakness and numbness. Psychiatric/Behavioral: Negative for confusion. Physical Exam  Vitals signs and nursing note reviewed. Constitutional:       General: She is not in acute distress. Appearance: Normal appearance. She is not ill-appearing. HENT:      Head: Normocephalic and atraumatic. Eyes:      General: No scleral icterus. Conjunctiva/sclera: Conjunctivae normal.      Pupils: Pupils are equal, round, and reactive to light. Neck:      Musculoskeletal: Normal range of motion and neck supple. Muscular tenderness present. No neck rigidity. Cardiovascular:      Pulses: Normal pulses. Comments: Equal 2+ radial pulses  Pulmonary:      Effort: Pulmonary effort is normal.      Breath sounds: Normal breath sounds. Abdominal:      General: Bowel sounds are normal. There is no distension. Palpations: Abdomen is soft. Tenderness: There is no abdominal tenderness. There is no guarding or rebound. Comments: Very mild RLQ pain on exam  Negative psoas  Negative rovsing     Musculoskeletal:      Right lower leg: No edema. Left lower leg: No edema. Skin:     General: Skin is warm and dry. Capillary Refill: Capillary refill takes less than 2 seconds. Coloration: Skin is not pale. Findings: No erythema or rash. Neurological:      Mental Status: She is alert and oriented to person, place, and time. Cranial Nerves: No cranial nerve deficit. Sensory: No sensory deficit. Motor: No weakness.       Coordination: Coordination normal.      Gait: Gait normal.   Psychiatric:         Mood and Affect: Mood normal.          Procedures     MDM  Number of Diagnoses or Management Options  Acute strain of neck muscle, initial encounter:   Spinal stenosis of cervical region:   Diagnosis management comments: Brooke Saba is a 63year old female who presented to ED with cervical spine pain that has been present for several months. Patient given norflex and toradol with improvement of symptoms. Patient's CT scan significant for spinal stenosis and degenerative disc disease without fracture and stable surgical repair. Patient does not have any radicular symptoms currently and denies symptoms of claudication currently. Patient is well appearing likely this is a chronic condition. Patient will be discharged home and advised to follow up with Dr. Christopher Orosco patient may need MRI if symptoms continue, exam is consistent with paraspinal tenderness and trapezius tenderness on the right likely cause of patient's symptoms  Discussed results and plan with patient along with indications to return to ED patient is agreeable to plan and well appearing at time of re-evaluation. CT findings discussed with patient.             --------------------------------------------- PAST HISTORY ---------------------------------------------  Past Medical History:  has a past medical history of Depression, Hyperlipidemia, and Hypertension. Past Surgical History:  has a past surgical history that includes  section; fracture surgery; back surgery; Hysterectomy; and Ankle surgery (2015 or 2016). Social History:  reports that she has been smoking cigarettes. She started smoking about 47 years ago. She has been smoking about 0.07 packs per day. She has never used smokeless tobacco. She reports current alcohol use. She reports that she does not use drugs. Family History: family history includes Heart Attack in her maternal grandfather; Stroke (age of onset: 76) in her father and mother. The patients home medications have been reviewed.     Allergies: Aspirin; Lisinopril; Morphine; and Seasonal    -------------------------------------------------- RESULTS -------------------------------------------------  Labs:  No results found for this visit on 11/22/20. Radiology:  CT CERVICAL SPINE WO CONTRAST   Final Result   1. Postsurgical changes at C5/C6 with osseous fusion of vertebral bodies. 2.  Degenerative posterior osteophytosis also at C5/C6 results in   mild-to-moderate effacement of ventral thecal sac. 3.  Right neural foraminal narrowing at level of C3/C4.          ------------------------- NURSING NOTES AND VITALS REVIEWED ---------------------------  Date / Time Roomed:  11/22/2020  6:56 PM  ED Bed Assignment:  20/20    The nursing notes within the ED encounter and vital signs as below have been reviewed. /70   Pulse 94   Temp 96.4 °F (35.8 °C) (Temporal)   Resp 16   Ht 5' 4\" (1.626 m)   Wt 160 lb (72.6 kg)   SpO2 99%   BMI 27.46 kg/m²   Oxygen Saturation Interpretation: Normal      ------------------------------------------ PROGRESS NOTES ------------------------------------------  10:53 AM EST  I have spoken with the patient and discussed todays results, in addition to providing specific details for the plan of care and counseling regarding the diagnosis and prognosis. Their questions are answered at this time and they are agreeable with the plan. I discussed at length with them reasons for immediate return here for re evaluation. They will followup with their primary care physician by calling their office tomorrow. --------------------------------- ADDITIONAL PROVIDER NOTES ---------------------------------  At this time the patient is without objective evidence of an acute process requiring hospitalization or inpatient management. They have remained hemodynamically stable throughout their entire ED visit and are stable for discharge with outpatient follow-up.      The plan has been discussed in detail and they are aware of the specific conditions for emergent return, as well as the importance of follow-up. Discharge Medication List as of 11/22/2020  8:41 PM          Diagnosis:  1. Acute strain of neck muscle, initial encounter    2. Spinal stenosis of cervical region        Disposition:  Patient's disposition: Discharge to home  Patient's condition is stable.                   Harley Moreira MD  Resident  11/23/20 [id number]

## 2020-11-24 ENCOUNTER — OFFICE VISIT (OUTPATIENT)
Dept: FAMILY MEDICINE CLINIC | Age: 55
End: 2020-11-24
Payer: COMMERCIAL

## 2020-11-24 VITALS
TEMPERATURE: 97.5 F | BODY MASS INDEX: 27.69 KG/M2 | WEIGHT: 162.2 LBS | HEIGHT: 64 IN | OXYGEN SATURATION: 98 % | SYSTOLIC BLOOD PRESSURE: 134 MMHG | DIASTOLIC BLOOD PRESSURE: 84 MMHG | HEART RATE: 86 BPM | RESPIRATION RATE: 16 BRPM

## 2020-11-24 PROCEDURE — G8482 FLU IMMUNIZE ORDER/ADMIN: HCPCS | Performed by: STUDENT IN AN ORGANIZED HEALTH CARE EDUCATION/TRAINING PROGRAM

## 2020-11-24 PROCEDURE — G8419 CALC BMI OUT NRM PARAM NOF/U: HCPCS | Performed by: STUDENT IN AN ORGANIZED HEALTH CARE EDUCATION/TRAINING PROGRAM

## 2020-11-24 PROCEDURE — 3017F COLORECTAL CA SCREEN DOC REV: CPT | Performed by: STUDENT IN AN ORGANIZED HEALTH CARE EDUCATION/TRAINING PROGRAM

## 2020-11-24 PROCEDURE — G8427 DOCREV CUR MEDS BY ELIG CLIN: HCPCS | Performed by: STUDENT IN AN ORGANIZED HEALTH CARE EDUCATION/TRAINING PROGRAM

## 2020-11-24 PROCEDURE — 4004F PT TOBACCO SCREEN RCVD TLK: CPT | Performed by: STUDENT IN AN ORGANIZED HEALTH CARE EDUCATION/TRAINING PROGRAM

## 2020-11-24 PROCEDURE — 99213 OFFICE O/P EST LOW 20 MIN: CPT | Performed by: STUDENT IN AN ORGANIZED HEALTH CARE EDUCATION/TRAINING PROGRAM

## 2020-11-24 RX ORDER — METHOCARBAMOL 500 MG/1
750 TABLET, FILM COATED ORAL 3 TIMES DAILY
Qty: 45 TABLET | Refills: 0 | Status: SHIPPED | OUTPATIENT
Start: 2020-11-24 | End: 2020-12-04

## 2020-11-24 NOTE — PROGRESS NOTES
S: 63 y.o. female with   Chief Complaint   Patient presents with    Neck Pain     Need new ortho referral    Medication Problem     Norflex not covered by insurance, requesting an alternative medication       ED f/u, chronic neck pain, h/o discectomy over 10 years ago with Dr. Dari Julien  More pain last couple weeks, right side, low cervical, some neck stiffness  No arm paresthesias or weakness  CT c-spine done, deg changes, right neural foraminal narrowing at C3/C4  Norflex not helping  Wants to f/u with spine surgery    BP Readings from Last 3 Encounters:   11/24/20 134/84   11/22/20 136/70   11/02/20 130/82       O: VS:  height is 5' 4\" (1.626 m) and weight is 162 lb 3.2 oz (73.6 kg). Her temporal temperature is 97.5 °F (36.4 °C). Her blood pressure is 134/84 and her pulse is 86. Her respiration is 16 and oxygen saturation is 98%. AAO/NAD, appropriate affect for mood  CV:  RRR, no murmur  Resp: CTAB  MS: Decreased cervical ROM, strength 5/5 UE b/l    Impression/Plan:   1) Neck pain: +deg changes with h/o discectomy and fusion C5/6, switch muscle relaxer, PT referral, patient with check with insurance for in-network neurosurgery/spine surgery        Health Maintenance Due   Topic Date Due    Hepatitis C screen  1965    HIV screen  06/29/1979    Cervical cancer screen  06/29/1985    Breast cancer screen  06/29/2014    Shingles Vaccine (1 of 2) 06/29/2014    Lipid screen  09/16/2020    Potassium monitoring  09/16/2020    Creatinine monitoring  09/16/2020         Attending Physician Statement  I have discussed the case, including pertinent history and exam findings with the resident. I agree with the documented assessment and plan.       Aditi Castellanos MD

## 2020-11-24 NOTE — PROGRESS NOTES
[facility]  Family Medicine Residency Program  Phone: [phone]  Fax: [phone]    Patient:  Prasanna Henson 63 y.o. female                                 Date of Service: 11/24/20                            Chiefcomplaint:   Chief Complaint   Patient presents with    Neck Pain     Need new ortho referral    Medication Problem     Norflex not covered by insurance, requesting an alternative medication         History of Present Illness: The patient is a 63 y.o. female  presented to the clinic : Neck pain for last couple of months: this is Post ED visit follow-up  -Patient has had surgery in the neck a years ago  -Now she has been having neck pain basically posterior neck on right side, -she has had trouble with turning head towards both right and left, denied any fall, direct trauma, denied any numbness and tingling, denied any weakness in the arm, -she used to take muscle relaxant which is not working  Well now, she wants other medication for pain.  -She said that she is going to find  neurosurgeon that her [employer] will cover and will let us know sothat we can make a referral  -CT neck showed degenerative changes with posterior osteophytes and foraminal stenosis, she has had fusion surgery at the levelC5-C6  -She said that she is willing to visit physical therapist in the meantime  -Patient denied any chest pain, shortness of breath, nausea vomiting, fever chills, history of malignancy, bowel bladder problem, limb weakness, joint pain, rash      Review of Systems:   Review of Systems   Constitutional: Negative. Negative for chills, fever and unexpected weight change. HENT: Negative. Negative for congestion and sore throat. Eyes: Negative for redness. Respiratory: Negative. Negative for cough, chest tightness and shortness of breath. Cardiovascular: Negative. Negative for chest pain, palpitations and leg swelling. Gastrointestinal: Negative.   Negative for abdominal pain, constipation, nausea and vomiting. Genitourinary: Negative. Negative for dysuria and hematuria. Musculoskeletal: Positive for neck pain and neck stiffness. Skin: Negative for pallor, rash and wound. Allergic/Immunologic: Negative. Neurological: Positive for headaches. Negative for dizziness, tremors, syncope, weakness and light-headedness. Hematological: Negative. Negative for adenopathy. Psychiatric/Behavioral: Negative. Negative for agitation and confusion.        Past Medical History:      Diagnosis Date    Depression     Hyperlipidemia     Hypertension        Past Surgical History:        Procedure Laterality Date    ANKLE SURGERY   or 2016    LEFT   ankles and screws    BACK SURGERY      Cervical surgery      SECTION      FRACTURE SURGERY      Rotaor cuff surgery    HYSTERECTOMY         Allergies:    Aspirin; Lisinopril; Morphine; and Seasonal    Social History:   Social History     Socioeconomic History    Marital status:      Spouse name: Not on file    Number of children: Not on file    Years of education: Not on file    Highest education level: Not on file   Occupational History    Not on file   Social Needs    Financial resource strain: Not on file    Food insecurity     Worry: Not on file     Inability: Not on file    Transportation needs     Medical: Not on file     Non-medical: Not on file   Tobacco Use    Smoking status: Current Every Day Smoker     Packs/day: 0.07     Types: Cigarettes     Start date: 1973    Smokeless tobacco: Never Used    Tobacco comment: 6-7 a day   Substance and Sexual Activity    Alcohol use: Yes     Comment: occassional    Drug use: No    Sexual activity: Not Currently   Lifestyle    Physical activity     Days per week: Not on file     Minutes per session: Not on file    Stress: Not on file   Relationships    Social connections     Talks on phone: Not on file     Gets together: Not on file Attends Yazidi service: Not on file     Active member of club or organization: Not on file     Attends meetings of clubs or organizations: Not on file     Relationship status: Not on file    Intimate partner violence     Fear of current or ex partner: Not on file     Emotionally abused: Not on file     Physically abused: Not on file     Forced sexual activity: Not on file   Other Topics Concern    Not on file   Social History Narrative    Not on file        Family History:       Problem Relation Age of Onset    Stroke Mother 76        mini     Stroke Father 76    Heart Attack Maternal Grandfather        BP Readings from Last 3 Encounters:   11/24/20 134/84   11/22/20 136/70   11/02/20 130/82       Physical Exam:    Vitals: /84 (Site: Left Upper Arm, Position: Sitting, Cuff Size: Medium Adult)   Pulse 86   Temp 97.5 °F (36.4 °C) (Temporal)   Resp 16   Ht 5' 4\" (1.626 m)   Wt 162 lb 3.2 oz (73.6 kg)   SpO2 98%   BMI 27.84 kg/m²   General Appearance: Well developed, awake, alert, oriented, no acute distress  HEENT: Normocephalic,atraumatic. PERRL, EOM's intact, EAC without erythemaor swelling, no pallor or icterus. Neck: Supple, symmetrical, trachea midline. No JVD. Chest wall/Lung: Clear to auscultation bilaterally,  respirations unlabored. No ronchi/wheezing/rales  Heart[de-identified]  Regular rate and rhythm, S1and S2 normal, no murmur, rub or gallop. Abdomen: Soft, non-tender, bowel sounds normoactive, no masses, no organomegaly  Extremities:  Extremities normal, atraumatic, no cyanosis. no edema. Skin: Skin color, texture, turgor normal, no rashes or lesions  Musculokeletal: ROM grossly normal in all joints of extremities, no obvious joint swelling. Lymph nodes: no lymph node enlargement appreciated  Neurologic:   Alert&Oriented. Normal gait and coordination  No focal neurological deficits appreaciated         Psychiatric: has a normal mood and affect.  Behavior is normal.   Neck; stiffness present, range of motion mildly restricted toward right side, Spurling negative, mild to moderate tenderness over lower cervical spine and paracervical muscle region, power reflexes sensation intact in both upper limbs    Assessment and Plan:         [facility] was seen today for neck pain and medication problem. Diagnoses and all orders for this visit:    Neck pain, chronic  -Patient has neck pain on and off for many years, she stated that she has had neck surgery/possibly anterior discectomy with fusion C5-6 level many years back  -Has had neck pain on and off, visited ED, CT neck done there showed degenerative changes with posterior osteophytes and foraminal stenosis, she has had fusion surgery C5-C6  -We have referred to physical therapist  -We have given muscle relaxant, that she will be taking it with NSAID  -She told us that she is going to search spine surgeon or neuro surgeon for referral , as she did surgery  Long ago  and wants to follow-up with them again        Other orders  -     methocarbamol (ROBAXIN) 500 MG tablet; Take 1.5 tablets by mouth 3 times daily for 10 days  -     [facility] - Physical Therapy, Claudia Mcclain          I encourage further reading and education about your health conditions. Information on many healthconditions is provided by the American Academy of Family Physicians: https://familydoctor. org/  Please bring any questions to me at your next visit. Return to Office: Return in about 3 months (around 2/24/2021).     Medication List:    Current Outpatient Medications   Medication Sig Dispense Refill    methocarbamol (ROBAXIN) 500 MG tablet Take 1.5 tablets by mouth 3 times daily for 10 days 45 tablet 0    orphenadrine (NORFLEX) 100 MG extended release tablet Take 1 tablet by mouth 2 times daily for 10 days 20 tablet 0    amLODIPine (NORVASC) 5 MG tablet take 1 tablet by mouth once daily 30 tablet 1    sertraline (ZOLOFT) 25 MG tablet Take 1 tablet by mouth daily 30 tablet 1    buPROPion (WELLBUTRIN XL) 150 MG extended release tablet Take 3 tablets by mouth every morning 90 tablet 1    atorvastatin (LIPITOR) 20 MG tablet Take 0.5 tablets by mouth daily 30 tablet 1    nitroGLYCERIN (NITROSTAT) 0.4 MG SL tablet Place 1 tablet under the tongue every 5 minutes as needed for Chest pain (Chest pain) up to max of 3 total doses. If no relief after 1 dose, call 911. 25 tablet 1     No current facility-administered medications for this visit. Jesus Recinos MD       This document may have been prepared at least partiallythrough the use of voice recognition software. Although effort is taken to assure the accuracy of this document, it is possible that grammatical, syntax,  or spelling errors may occur.

## 2020-11-26 ASSESSMENT — ENCOUNTER SYMPTOMS
EYE REDNESS: 0
CHEST TIGHTNESS: 0
SHORTNESS OF BREATH: 0
RESPIRATORY NEGATIVE: 1
ALLERGIC/IMMUNOLOGIC NEGATIVE: 1
CONSTIPATION: 0
VOMITING: 0
GASTROINTESTINAL NEGATIVE: 1
NAUSEA: 0
ABDOMINAL PAIN: 0
COUGH: 0
SORE THROAT: 0

## 2020-11-30 ENCOUNTER — TELEPHONE (OUTPATIENT)
Dept: FAMILY MEDICINE CLINIC | Age: 55
End: 2020-11-30

## 2020-11-30 NOTE — TELEPHONE ENCOUNTER
Last Appointment   11/24/2020  Next Appointment  2/24/2021    Patient states she was to check with her insurance and call dr with a name of a Neurosurgeon they will cover. Patient would like a referral to . Please add diagnosis, referral pending drs review.

## 2020-12-03 ENCOUNTER — HOSPITAL ENCOUNTER (OUTPATIENT)
Dept: MAMMOGRAPHY | Age: 55
Discharge: HOME | End: 2020-12-05
Payer: COMMERCIAL

## 2020-12-03 PROCEDURE — 77067 SCR MAMMO BI INCL CAD: CPT

## 2020-12-14 ENCOUNTER — HOSPITAL ENCOUNTER (OUTPATIENT)
Dept: PHYSICAL THERAPY | Age: 55
Setting detail: THERAPIES SERIES
Discharge: HOME | End: 2020-12-14
Payer: COMMERCIAL

## 2020-12-14 PROCEDURE — 97161 PT EVAL LOW COMPLEX 20 MIN: CPT | Performed by: PHYSICAL THERAPIST

## 2020-12-14 ASSESSMENT — PAIN DESCRIPTION - PAIN TYPE: TYPE: CHRONIC PAIN

## 2020-12-14 ASSESSMENT — PAIN DESCRIPTION - FREQUENCY: FREQUENCY: INTERMITTENT

## 2020-12-14 ASSESSMENT — PAIN DESCRIPTION - LOCATION: LOCATION: NECK

## 2020-12-14 ASSESSMENT — PAIN SCALES - GENERAL: PAINLEVEL_OUTOF10: 10

## 2020-12-14 ASSESSMENT — PAIN DESCRIPTION - ORIENTATION: ORIENTATION: RIGHT;LEFT

## 2020-12-14 ASSESSMENT — PAIN DESCRIPTION - DESCRIPTORS: DESCRIPTORS: SHARP

## 2020-12-14 NOTE — PROGRESS NOTES
Physical Therapy  Initial Assessment  Date: 2020  Patient Name: Justo Khanna  MRN: [id number]  : 1965          Restrictions       Subjective   General  Referring Practitioner: Jayla Woodruff  Diagnosis: neck pain  Follows Commands: Within Functional Limits  PT Visit Information  PT Insurance Information: Sergio  Total # of Visits to Date: 1  Subjective  Subjective: Patient presents to PT with c/o neck pain. Symptoms have been intermittent for approx 6 months. Admits to past discetomy from approx 10 years ago (Dr Reinaldo Browning). Patient denies any strength loss or n/t to extremities. Cervical Xray with degenerative changes across C5-6 region. No issues across past surgical location per chart. She takes ibuprofen and tylenol for pain relief prn. Pain Screening  Patient Currently in Pain: Yes  Pain Assessment  Pain Assessment: 0-10  Pain Level: 10  Pain Type: Chronic pain  Pain Location: Neck  Pain Orientation: Right;Left  Pain Descriptors: Sharp  Pain Frequency: Intermittent  Vital Signs  Patient Currently in Pain: Yes    Objective     Observation/Palpation  Posture: Fair(fwd head/rounded shoulders)  Palpation: pain across bilateral upper trap R > L  Edema: no obvious edema    AROM RUE (degrees)  RUE AROM : WFL  AROM LUE (degrees)  LUE AROM : WFL  Spine  Cervical: WFL    Strength RUE  Strength RUE: WFL  Strength LUE  Strength LUE: WFL  Strength Other  Other: Cervical/upper trap- grossly 4-/5 all planes        Sensation  Overall Sensation Status: WFL             Ambulation  Ambulation?: Yes  Ambulation 1  Surface: level tile  Device: No Device  Assistance: Independent  Gait Deviations: None  Balance  Sitting - Static: Good  Sitting - Dynamic: Good  Standing - Static: Good  Standing - Dynamic: Good     Assessment   Conditions Requiring Skilled Therapeutic Intervention  Body structures, Functions, Activity limitations: Decreased strength;Decreased posture; Increased pain Assessment: pt presents to PT with c/o intermittent cervical pain; limited strength across region with hindered posturing  Prognosis: Fair  Decision Making: Low Complexity  REQUIRES PT FOLLOW UP: Yes         Plan   Plan  Times per week: 1-2x/week x 4 weeks  Current Treatment Recommendations: Strengthening, ROM, Manual Therapy - Soft Tissue Mobilization, Pain Management, Home Exercise Program, Safety Education & Training, Patient/Caregiver Education & Training, Modalities     PT Education   Patient educated on importance of erect posturing; pt instructed on use of lumbar roll for seated posturing; also instructed to use one pillow under head to avoid increased cervical flexion; pt also instructed to limit bending head fwd playing games on phone- all movements would produce cervical flexion and could provoke symptoms         Goals  Short term goals  Time Frame for Short term goals: 2 weeks  Short term goal 1: increase strength of cervical region to grossly 4/5 improving upright posture to Ambriz Sachs term goal 2: decrease pain to < 5/10 across cervical region with activity  Long term goals  Time Frame for Long term goals : 4 weeks  Long term goal 1: increase strength of cervical region to grossly 4+/5 improving upright posture to GGOD-  Long term goal 2: decrease pain to < 3/10 across cervical region with activity  Long term goal 3: pt demonstrates independence with HEP  Patient Goals   Patient goals : Reduce neck pain       Therapy Time   Individual Concurrent Group Co-treatment   Time In 1320         Time Out 1345         Minutes 25                 Gen Dasilva, PT

## 2020-12-16 ENCOUNTER — HOSPITAL ENCOUNTER (OUTPATIENT)
Dept: PHYSICAL THERAPY | Age: 55
Setting detail: THERAPIES SERIES
Discharge: HOME | End: 2020-12-16
Payer: COMMERCIAL

## 2020-12-16 PROCEDURE — G0283 ELEC STIM OTHER THAN WOUND: HCPCS | Performed by: PHYSICAL THERAPIST

## 2020-12-16 PROCEDURE — 97110 THERAPEUTIC EXERCISES: CPT | Performed by: PHYSICAL THERAPIST

## 2020-12-16 NOTE — PROGRESS NOTES
[facility]  Phone: [phone] Fax: [phone]       Physical Therapy Daily Treatment Note  Date:  2020    Patient Name:  Angelique Kate    :  1965  MRN: [id number]    Restrictions/Precautions:    Diagnosis:  Neck pain  Insurance/Certification information: [employer]   Referring Physician:  Morgan Moran of care signed (Y/N):    Visit# / total visits:    Pain level: /10  Time In:    1100    Time Out:    1150      Subjective:  Pt reports cont to have intermittent symptoms across cervical region     Exercises:  Exercise/Equipment Resistance/Repetitions Other comments   UBE   x6mins 3f/3b           Chin tucks        w ext   x10  x10           Shrugs    x15    scap retractions    x15           Upper trap str   5x10s ea                                                                                       Other Therapeutic Activities:      Home Exercise Program:      Manual Treatments:  NT    Modalities: MH/IFC x 20mins      Timed Code Treatment Minutes: Total Treatment Minutes:      Treatment/Activity Tolerance:  [x] Patient tolerated treatment well [] Patient limited by fatigue  [] Patient limited by pain  [] Patient limited by other medical complications  [] Other: performed there ex focusing on increasing ROM/strength of cervical region to reduce pain and improve posturing; cont fwd head posturing throughout. Mild symptoms with ex. Performed modalities following for symptom relief.      Plan:   [x] Continue per plan of care [] Alter current plan (see comments)  [] Plan of care initiated [] Hold pending MD visit [] Discharge  Plan for Next Session:         Treatment Charges: Mins Units   Initial Evaluation     Re-Evaluation     Ther Exercise         TE 30 2   Manual Therapy     MT     Ther Activities        TA     Gait Training          GT     Neuro Re-education NR     Modalities 20 1   Non-Billable Service Time     Other     Total Time/Units 50 3 Electronically signed by:  Jonathan Gonzales DPT

## 2020-12-29 ENCOUNTER — HOSPITAL ENCOUNTER (OUTPATIENT)
Dept: PHYSICAL THERAPY | Age: 55
Setting detail: THERAPIES SERIES
Discharge: HOME | End: 2020-12-29
Payer: COMMERCIAL

## 2020-12-29 PROCEDURE — 97110 THERAPEUTIC EXERCISES: CPT | Performed by: PHYSICAL THERAPIST

## 2020-12-29 PROCEDURE — G0283 ELEC STIM OTHER THAN WOUND: HCPCS | Performed by: PHYSICAL THERAPIST

## 2020-12-29 NOTE — PROGRESS NOTES
[facility]  Phone: [phone] Fax: [phone]       Physical Therapy Daily Treatment Note  Date:  2020    Patient Name:  Freda Bauman    :  1965  MRN: [id number]    Restrictions/Precautions:    Diagnosis:  Neck pain  Insurance/Certification information: [facility] [facility] [address]   Referring Physician:  Regina Rendon of care signed (Y/N):    Visit# / total visits:  3/8  Pain level: /10  Time In:    1400    Time Out:    11852     Subjective:  Pt reports having some cont soreness across neck region. Admits to performing exercises at home    Exercises:  Exercise/Equipment Resistance/Repetitions Other comments   UBE   x6mins 3f/3b           Chin tucks        w ext   x10  x10           Shrugs    x15    scap retractions    x15           Upper trap str   5x10s ea            Chest str     6x10s                                                                       Other Therapeutic Activities:      Home Exercise Program:      Manual Treatments:  NT    Modalities: MH/IFC x 20mins      Timed Code Treatment Minutes: Total Treatment Minutes:      Treatment/Activity Tolerance:  [x] Patient tolerated treatment well [] Patient limited by fatigue  [] Patient limited by pain  [] Patient limited by other medical complications  [] Other: performed there ex focusing on increasing ROM/strength of cervical region to reduce pain and improve posturing; cont fwd head posturing throughout. Added chest stretch to reduce IR of shoulders. Performed modalities following for symptom relief.      Plan:   [x] Continue per plan of care [] Alter current plan (see comments)  [] Plan of care initiated [] Hold pending MD visit [] Discharge  Plan for Next Session:         Treatment Charges: Mins Units   Initial Evaluation     Re-Evaluation     Ther Exercise         TE 30 2   Manual Therapy     MT     Ther Activities        TA     Gait Training          GT     Neuro Re-education NR     Modalities 20 1 Non-Billable Service Time     Other     Total Time/Units 50 3     Electronically signed by:  Melissa Castro DPT

## 2020-12-31 ENCOUNTER — HOSPITAL ENCOUNTER (OUTPATIENT)
Dept: PHYSICAL THERAPY | Age: 55
Setting detail: THERAPIES SERIES
Discharge: HOME | End: 2020-12-31
Payer: COMMERCIAL

## 2020-12-31 PROCEDURE — 97110 THERAPEUTIC EXERCISES: CPT | Performed by: PHYSICAL THERAPIST

## 2020-12-31 PROCEDURE — G0283 ELEC STIM OTHER THAN WOUND: HCPCS | Performed by: PHYSICAL THERAPIST

## 2020-12-31 NOTE — PROGRESS NOTES
[facility]  Phone: [phone] Fax: [phone]       Physical Therapy Daily Treatment Note  Date:  2020    Patient Name:  Jaclyn Angulo    :  1965  MRN: [id number]    Restrictions/Precautions:    Diagnosis:  Neck pain  Insurance/Certification information: [employer]   Referring Physician:  Morgna Moran of care signed (Y/N):    Visit# / total visits:    Pain level: /10  Time In:    1350   Time Out:    1440     Subjective:  Pt reports having less frequent spasms     Exercises:  Exercise/Equipment Resistance/Repetitions Other comments   UBE   x6mins 3f/3b           Chin tucks        w ext   x10  x10           Shrugs    x15    scap retractions    x15           Upper trap str   5x10s ea            Chest str     6x10s                                                                       Other Therapeutic Activities:      Home Exercise Program:      Manual Treatments:  NT    Modalities: MH/IFC x 20mins      Timed Code Treatment Minutes: Total Treatment Minutes:      Treatment/Activity Tolerance:  [x] Patient tolerated treatment well [] Patient limited by fatigue  [] Patient limited by pain  [] Patient limited by other medical complications  [] Other: performed there ex focusing on increasing ROM/strength of cervical region to reduce pain and improve posturing; Posturing has improved with pt being aware of positioning. Cont chest stretch to reduce IR of shoulders. Performed modalities following for symptom relief.      Plan:   [x] Continue per plan of care [] Alter current plan (see comments)  [] Plan of care initiated [] Hold pending MD visit [] Discharge  Plan for Next Session:         Treatment Charges: Mins Units   Initial Evaluation     Re-Evaluation     Ther Exercise         TE 30 2   Manual Therapy     MT     Ther Activities        TA     Gait Training          GT     Neuro Re-education NR     Modalities 20 1   Non-Billable Service Time     Other Total Time/Units 50 3     Electronically signed by:  Nohemi DUCKWORTHT

## 2021-01-05 ENCOUNTER — HOSPITAL ENCOUNTER (OUTPATIENT)
Dept: PHYSICAL THERAPY | Age: 56
Setting detail: THERAPIES SERIES
Discharge: HOME | End: 2021-01-05
Payer: COMMERCIAL

## 2021-01-05 PROCEDURE — 97110 THERAPEUTIC EXERCISES: CPT | Performed by: PHYSICAL THERAPIST

## 2021-01-05 PROCEDURE — G0283 ELEC STIM OTHER THAN WOUND: HCPCS | Performed by: PHYSICAL THERAPIST

## 2021-01-05 NOTE — PROGRESS NOTES
[facility]  Phone: [phone] Fax: [phone]       Physical Therapy Daily Treatment Note  Date:  2021    Patient Name:  Rufus Allen    :  1965  MRN: [id number]    Restrictions/Precautions:    Diagnosis:  Neck pain  Insurance/Certification information: [employer]   Referring Physician:  Morgan Moran of care signed (Y/N):    Visit# / total visits:    Pain level: /10  Time In:    1400   Time Out:    1450     Subjective:  Pt reports having some tightness across mid back region. States neck is moving more. Exercises:  Exercise/Equipment Resistance/Repetitions Other comments   UBE   x6mins 3f/3b           Chin tucks        w ext   x10  x10           Shrugs    x15    scap retractions    x15           Upper trap str   5x10s ea            Chest str     6x10s                                                                       Other Therapeutic Activities:      Home Exercise Program:      Manual Treatments:  NT    Modalities: MH/IFC x 20mins      Timed Code Treatment Minutes: Total Treatment Minutes:      Treatment/Activity Tolerance:  [x] Patient tolerated treatment well [] Patient limited by fatigue  [] Patient limited by pain  [] Patient limited by other medical complications  [] Other: performed there ex focusing on increasing ROM/strength of cervical region to reduce pain and improve posturing; Posturing cont to improve. ROM of cervical region is [facility]. Performed modalities following for symptom relief.      Plan:   [x] Continue per plan of care [] Alter current plan (see comments)  [] Plan of care initiated [] Hold pending MD visit [] Discharge  Plan for Next Session:         Treatment Charges: Mins Units   Initial Evaluation     Re-Evaluation     Ther Exercise         TE 30 2   Manual Therapy     MT     Ther Activities        TA     Gait Training          GT     Neuro Re-education NR     Modalities 20 1   Non-Billable Service Time     Other Total Time/Units 50 3     Electronically signed by:  Randy Brady DPT

## 2021-01-07 ENCOUNTER — HOSPITAL ENCOUNTER (OUTPATIENT)
Dept: PHYSICAL THERAPY | Age: 56
Setting detail: THERAPIES SERIES
Discharge: HOME | End: 2021-01-07
Payer: COMMERCIAL

## 2021-01-07 PROCEDURE — 97110 THERAPEUTIC EXERCISES: CPT | Performed by: PHYSICAL THERAPIST

## 2021-01-07 PROCEDURE — G0283 ELEC STIM OTHER THAN WOUND: HCPCS | Performed by: PHYSICAL THERAPIST

## 2021-01-08 DIAGNOSIS — I10 ESSENTIAL HYPERTENSION: ICD-10-CM

## 2021-01-08 DIAGNOSIS — E78.5 HYPERLIPIDEMIA, UNSPECIFIED HYPERLIPIDEMIA TYPE: ICD-10-CM

## 2021-01-08 DIAGNOSIS — B00.1 HERPES LABIALIS: ICD-10-CM

## 2021-01-08 DIAGNOSIS — F41.9 ANXIETY: ICD-10-CM

## 2021-01-08 NOTE — TELEPHONE ENCOUNTER
Last Appointment   11/24/2020  Next Appointment  2/24/2021    Patient called for several refills.   Med pending drs approval.

## 2021-01-10 RX ORDER — AMLODIPINE BESYLATE 5 MG/1
TABLET ORAL
Qty: 30 TABLET | Refills: 1 | Status: SHIPPED
Start: 2021-01-10 | End: 2021-03-25 | Stop reason: SDUPTHER

## 2021-01-10 RX ORDER — BUPROPION HYDROCHLORIDE 150 MG/1
450 TABLET ORAL EVERY MORNING
Qty: 90 TABLET | Refills: 1 | Status: SHIPPED
Start: 2021-01-10 | End: 2021-03-25 | Stop reason: SDUPTHER

## 2021-01-10 RX ORDER — SERTRALINE HYDROCHLORIDE 25 MG/1
25 TABLET, FILM COATED ORAL DAILY
Qty: 30 TABLET | Refills: 1 | Status: SHIPPED
Start: 2021-01-10 | End: 2021-03-25

## 2021-01-10 RX ORDER — ATORVASTATIN CALCIUM 20 MG/1
10 TABLET, FILM COATED ORAL DAILY
Qty: 30 TABLET | Refills: 1 | Status: SHIPPED
Start: 2021-01-10 | End: 2021-03-25 | Stop reason: SDUPTHER

## 2021-01-12 RX ORDER — ACYCLOVIR 400 MG/1
TABLET ORAL
Qty: 50 TABLET | Refills: 0 | Status: SHIPPED
Start: 2021-01-12 | End: 2021-03-25 | Stop reason: SDUPTHER

## 2021-01-12 RX ORDER — BUPROPION HYDROCHLORIDE 150 MG/1
TABLET ORAL
Qty: 90 TABLET | Refills: 1 | Status: SHIPPED
Start: 2021-01-12 | End: 2021-03-25

## 2021-01-12 RX ORDER — AMLODIPINE BESYLATE 5 MG/1
TABLET ORAL
Qty: 30 TABLET | Refills: 1 | Status: SHIPPED
Start: 2021-01-12 | End: 2021-03-25

## 2021-01-12 RX ORDER — ATORVASTATIN CALCIUM 20 MG/1
TABLET, FILM COATED ORAL
Qty: 30 TABLET | Refills: 1 | Status: SHIPPED
Start: 2021-01-12 | End: 2021-03-25

## 2021-03-10 ENCOUNTER — TELEPHONE (OUTPATIENT)
Dept: ADMINISTRATIVE | Age: 56
End: 2021-03-10

## 2021-03-10 NOTE — TELEPHONE ENCOUNTER
After scheduling patient and trying to attach referral and I received a pop up that stated for patient not to be scheduled. I contacted Koki RIVAS at Neuro via Skype to let her know, explaining that I didn't see this note not to schedule the patient, and that I was unable to attach the referral at this time. Koki messaged me back stating that she changed the status on the referral and attached it the note.

## 2021-03-10 NOTE — TELEPHONE ENCOUNTER
Farshad Law (son) called to  Neuro appt for Cervical (neck) region somatic dysfunction. Patient had been scheduled in Jan (no show), Farshad Law says he wants Jose Casiano - asked him for Mother to arrive 10 min prior, protocols read, completed, mask, call if ill.

## 2021-03-25 ENCOUNTER — OFFICE VISIT (OUTPATIENT)
Dept: FAMILY MEDICINE CLINIC | Age: 56
End: 2021-03-25
Payer: COMMERCIAL

## 2021-03-25 VITALS
DIASTOLIC BLOOD PRESSURE: 95 MMHG | BODY MASS INDEX: 27.83 KG/M2 | HEART RATE: 98 BPM | HEIGHT: 64 IN | WEIGHT: 163 LBS | RESPIRATION RATE: 18 BRPM | TEMPERATURE: 97.9 F | OXYGEN SATURATION: 97 % | SYSTOLIC BLOOD PRESSURE: 165 MMHG

## 2021-03-25 DIAGNOSIS — Z12.11 COLON CANCER SCREENING: ICD-10-CM

## 2021-03-25 DIAGNOSIS — I10 ESSENTIAL HYPERTENSION: ICD-10-CM

## 2021-03-25 DIAGNOSIS — G89.29 CHRONIC NECK PAIN: ICD-10-CM

## 2021-03-25 DIAGNOSIS — B00.1 HERPES LABIALIS: ICD-10-CM

## 2021-03-25 DIAGNOSIS — F41.9 ANXIETY: ICD-10-CM

## 2021-03-25 DIAGNOSIS — M54.2 CHRONIC NECK PAIN: ICD-10-CM

## 2021-03-25 DIAGNOSIS — E78.5 HYPERLIPIDEMIA, UNSPECIFIED HYPERLIPIDEMIA TYPE: ICD-10-CM

## 2021-03-25 PROCEDURE — 4004F PT TOBACCO SCREEN RCVD TLK: CPT | Performed by: STUDENT IN AN ORGANIZED HEALTH CARE EDUCATION/TRAINING PROGRAM

## 2021-03-25 PROCEDURE — 3017F COLORECTAL CA SCREEN DOC REV: CPT | Performed by: STUDENT IN AN ORGANIZED HEALTH CARE EDUCATION/TRAINING PROGRAM

## 2021-03-25 PROCEDURE — 99213 OFFICE O/P EST LOW 20 MIN: CPT | Performed by: STUDENT IN AN ORGANIZED HEALTH CARE EDUCATION/TRAINING PROGRAM

## 2021-03-25 PROCEDURE — G8482 FLU IMMUNIZE ORDER/ADMIN: HCPCS | Performed by: STUDENT IN AN ORGANIZED HEALTH CARE EDUCATION/TRAINING PROGRAM

## 2021-03-25 PROCEDURE — G8419 CALC BMI OUT NRM PARAM NOF/U: HCPCS | Performed by: STUDENT IN AN ORGANIZED HEALTH CARE EDUCATION/TRAINING PROGRAM

## 2021-03-25 PROCEDURE — G8427 DOCREV CUR MEDS BY ELIG CLIN: HCPCS | Performed by: STUDENT IN AN ORGANIZED HEALTH CARE EDUCATION/TRAINING PROGRAM

## 2021-03-25 RX ORDER — ATORVASTATIN CALCIUM 20 MG/1
10 TABLET, FILM COATED ORAL DAILY
Qty: 30 TABLET | Refills: 1 | Status: SHIPPED | OUTPATIENT
Start: 2021-03-25 | End: 2021-09-14 | Stop reason: SDUPTHER

## 2021-03-25 RX ORDER — ACYCLOVIR 400 MG/1
400 TABLET ORAL 2 TIMES DAILY
Qty: 14 TABLET | Refills: 2 | Status: SHIPPED
Start: 2021-03-25 | End: 2021-05-03

## 2021-03-25 RX ORDER — TIZANIDINE 4 MG/1
4 TABLET ORAL NIGHTLY PRN
Qty: 20 TABLET | Refills: 1 | Status: SHIPPED
Start: 2021-03-25 | End: 2021-05-19

## 2021-03-25 RX ORDER — SERTRALINE HYDROCHLORIDE 25 MG/1
25 TABLET, FILM COATED ORAL DAILY
Qty: 30 TABLET | Refills: 1 | Status: CANCELLED | OUTPATIENT
Start: 2021-03-25

## 2021-03-25 RX ORDER — AMLODIPINE BESYLATE 2.5 MG/1
2.5 TABLET ORAL DAILY
Qty: 90 TABLET | Refills: 1 | Status: SHIPPED | OUTPATIENT
Start: 2021-03-25 | End: 2021-09-14 | Stop reason: SDUPTHER

## 2021-03-25 RX ORDER — AMLODIPINE BESYLATE 5 MG/1
TABLET ORAL
Qty: 30 TABLET | Refills: 1 | Status: SHIPPED | OUTPATIENT
Start: 2021-03-25 | End: 2021-09-14 | Stop reason: SDUPTHER

## 2021-03-25 RX ORDER — BUPROPION HYDROCHLORIDE 150 MG/1
300 TABLET ORAL EVERY MORNING
Qty: 90 TABLET | Refills: 1 | Status: SHIPPED
Start: 2021-03-25 | End: 2021-06-14 | Stop reason: SDUPTHER

## 2021-03-25 ASSESSMENT — ENCOUNTER SYMPTOMS
RESPIRATORY NEGATIVE: 1
ALLERGIC/IMMUNOLOGIC NEGATIVE: 1
ABDOMINAL PAIN: 0
SORE THROAT: 0
NAUSEA: 0
COUGH: 0
VOMITING: 0
GASTROINTESTINAL NEGATIVE: 1
SHORTNESS OF BREATH: 0
EYES NEGATIVE: 1

## 2021-03-25 NOTE — PROGRESS NOTES
[facility]  Family Medicine Residency Program  Phone: [phone]  Fax: [phone]    Patient:  Adrienne Mora 63 y.o. female                                 Date of Service: 3/25/21                            Chiefcomplaint:   Chief Complaint   Patient presents with    Hypertension    Depression         History of Present Illness: The patient is a 63 y.o. female  presented to the clinic: Neck pain, history of depression, refill medication  - Has Hx of Neck pain for last couple of months,Patient has had surgery in the neck a years ago  -Now she has been having neck pain basically posterior neck ,she has had trouble with turning head towards both right and left, denied any fall, direct trauma, denied any numbness and tingling, denied any weakness in the arm, -she used to take muscle relaxant on an off.  -CT neck done before showed degenerative changes with posterior osteophytes and foraminal stenosis, she has had fusion surgery at the level C5-C6  -She said that she did visit Physical therapy in the past.  -Patient denied any chest pain, shortness of breath, nausea vomiting, fever chills, history of malignancy, bowel bladder problem, limb weakness, joint pain, rash  -Patient wants refill of medication  -Patient is waiting for appointment with neurosurgeon in May, is also interested to visit pain management doctor for chronic persistent neck pain  -Patient has history of hypertension and taking amlodipine 5 daily, today's blood pressure is 165/95  -Patient has had history of herpes labialis and takes acyclovir ,asked for refill    Review of Systems:   Review of Systems   HENT: Negative. Negative for congestion and sore throat. Eyes: Negative. Respiratory: Negative. Negative for cough and shortness of breath. Cardiovascular: Negative. Negative for chest pain, palpitations and leg swelling. Gastrointestinal: Negative. Negative for abdominal pain, nausea and vomiting. Relationship status: Not on file    Intimate partner violence     Fear of current or ex partner: Not on file     Emotionally abused: Not on file     Physically abused: Not on file     Forced sexual activity: Not on file   Other Topics Concern    Not on file   Social History Narrative    Not on file        Family History:       Problem Relation Age of Onset    Stroke Mother 76        mini     Stroke Father 76    Heart Attack Maternal Grandfather        BP Readings from Last 3 Encounters:   03/25/21 (!) 165/95   11/24/20 134/84   11/22/20 136/70       Physical Exam:    Vitals: BP (!) 165/95   Pulse 98   Temp 97.9 °F (36.6 °C) (Temporal)   Resp 18   Ht 5' 4\" (1.626 m)   Wt 163 lb (73.9 kg)   SpO2 97%   BMI 27.98 kg/m²   General Appearance: Well developed, awake, alert, oriented, no acute distress  HEENT: Normocephalic,atraumatic. PERRL, EOM's intact, EAC without erythemaor swelling, no pallor or icterus. Neck: Supple, symmetrical, trachea midline. No JVD. Localized tenderness lower part of posterior neck oversee 4-5-6, minimal painful with full range of motion  Chest wall/Lung: Clear to auscultation bilaterally,  respirations unlabored. No ronchi/wheezing/rales  Heart[de-identified]  Regular rate and rhythm, S1and S2 normal, no murmur, rub or gallop. Abdomen: Soft, non-tender, bowel sounds normoactive, no masses, no organomegaly  Extremities:  Extremities normal, atraumatic, no cyanosis. no edema. Skin: Skin color, texture, turgor normal, no rashes or lesions  Musculokeletal: ROM grossly normal in all joints of extremities, no obvious joint swelling. Lymph nodes: no lymph node enlargement appreciated  Neurologic:   Alert&Oriented. Normal gait and coordination  No focal neurological deficits appreaciated         Psychiatric: has a normal mood and affect.  Behavior is normal.   The 10-year ASCVD risk score (Patricia Langford et al., 2013) is: 15.9%    Values used to calculate the score:      Age: 63 years      Sex: Female      Is Non- : No      Diabetic: No      Tobacco smoker: Yes      Systolic Blood Pressure: 180 mmHg      Is BP treated: Yes      HDL Cholesterol: 37 mg/dL      Total Cholesterol: 202 mg/dL    Assessment and Plan:         Sudarshan Barba was seen today for hypertension and depression. Diagnoses and all orders for this visit:    Chronic neck pain  -Has had history of chronic neck pain, has had C -spine fusion surgery in the past  -Admits mild to moderate neck pain, waiting for follow-up with spine surgeon in the month of May  -Tried physical therapy and pain medication, did not help much  -She is still has had neck pain willing to see some pain management doctor after visiting the spine surgeon  -Due to persistent neck pain, patient is not able to go back to the  work  -Patient is willing to go back to work after she visited a spine surgeon and possibly seeing pain management doctor    Essential hypertension  -     amLODIPine (NORVASC) 5 MG tablet; take 1 tablet by mouth once daily  -     Cologuard; Future  -     COMPREHENSIVE METABOLIC PANEL; Future  -     CBC WITH AUTO DIFFERENTIAL; Future  -     LIPID PANEL; Future  -     TSH; Future  -     HEPATITIS C ANTIBODY; Future    -Known patient with essential hypertension, takes amlodipine 5 daily  -The blood pressure taken today was elevated, 165/95  -We suggested to increase the dose of amlodipine 7.5 mg daily  -We requested to check blood pressure at home and put a log      Anxiety/depression  -     buPROPion (WELLBUTRIN XL) 150 MG extended release tablet; Take 2 tablets by mouth every morning  -     COMPREHENSIVE METABOLIC PANEL; Future  -     CBC WITH AUTO DIFFERENTIAL; Future  -     LIPID PANEL; Future  -     TSH; Future  -     HEPATITIS C ANTIBODY;  Future    -Patient has history of anxiety and depression, has been taking Wellbutrin 450 daily  -Working very well, no side effects  -Wants us to refill the medication    Hyperlipidemia, unspecified hyperlipidemia type  -     atorvastatin (LIPITOR) 20 MG tablet; Take 0.5 tablets by mouth daily  -     Cologuard; Future  -     COMPREHENSIVE METABOLIC PANEL; Future  -     CBC WITH AUTO DIFFERENTIAL; Future  -     LIPID PANEL; Future  -     TSH; Future  -     HEPATITIS C ANTIBODY; Future    -Patient is on Lipitor  -We have ordered lab    Herpes labialis  -     acyclovir (ZOVIRAX) 400 MG tablet; Take 1 tablet by mouth 2 times daily  -History of on and off herpes labialis, takes acyclovir    Colon cancer screening  -     Cologuard; Future    Other orders  -     amLODIPine (NORVASC) 2.5 MG tablet; Take 1 tablet by mouth daily  -     tiZANidine (ZANAFLEX) 4 MG tablet; Take 1 tablet by mouth nightly as needed (muscle pain)    Medical Decision Making  Number and Complexity of Problems Addressed:   Stable Chronic Illnesses: Chronic pain, hypertension, hyperlipidemia, depression  Level of Problems Addressed: Moderate (1+ chronic illness with exacerbation, progression, or side effects of treatment / 2+ stable chronic illnesses / 1 undiagnosed new problem with uncertain prognosis / 1 acute illness with systemic symptoms / 1 acute complicated injury)  Amount and/or Complexity of Data to be Reviewed and Analyzed:     Moderate (1 of 3 Categories) Category 1 (Any three of the following, can duplicate except historian) Same as Limited Category 1 plus Assessment Requiring Independent Historian / Category 2 Independent Interpretation of Tests (not separately reported) / Category 3 Discussion of Management or Test Interpretation with External Physician  Risk of Complications and/or Morbidity or Mortality of Patient Management:   Moderate risk of morbidity from additional diagnostic testing or treatment (ex. prescription drug management / minor surgery with risk factors / elective major surgery without risk factors / diagnosis or treatment significantly limited by social determinants of health)  Today's visit has a medical decision making level of Moderate. I encourage further reading and education about your health conditions. Information on many healthconditions is provided by the American Academy of Family Physicians: https://familydoctor. org/  Please bring any questions to me at your next visit. Return to Office: Return in about 3 months (around 6/25/2021). Medication List:    Current Outpatient Medications   Medication Sig Dispense Refill    buPROPion (WELLBUTRIN XL) 150 MG extended release tablet Take 2 tablets by mouth every morning 90 tablet 1    atorvastatin (LIPITOR) 20 MG tablet Take 0.5 tablets by mouth daily 30 tablet 1    amLODIPine (NORVASC) 5 MG tablet take 1 tablet by mouth once daily 30 tablet 1    acyclovir (ZOVIRAX) 400 MG tablet Take 1 tablet by mouth 2 times daily 14 tablet 2    amLODIPine (NORVASC) 2.5 MG tablet Take 1 tablet by mouth daily 90 tablet 1    tiZANidine (ZANAFLEX) 4 MG tablet Take 1 tablet by mouth nightly as needed (muscle pain) 20 tablet 1    nitroGLYCERIN (NITROSTAT) 0.4 MG SL tablet Place 1 tablet under the tongue every 5 minutes as needed for Chest pain (Chest pain) up to max of 3 total doses. If no relief after 1 dose, call 911. 25 tablet 1     No current facility-administered medications for this visit. Jennyfer Cespedes MD       This document may have been prepared at least partiallythrough the use of voice recognition software. Although effort is taken to assure the accuracy of this document, it is possible that grammatical, syntax,  or spelling errors may occur.

## 2021-03-25 NOTE — PROGRESS NOTES
Subjective:    Deepak is here for a follow up for hypertension. She has a number of other problems and she also has depression and is on Wellbutrin 150 mg 3 tabs QD taken at one time. She had neck surgery and is to follow up in May with her neurosurgeon. She is due for labs and medication renewal.  She needs Acyclovir for recurrent herpes simplex. ROS: Otherwise negative    Patient Active Problem List   Diagnosis    Recurrent major depressive disorder ([facility] [address]    Essential hypertension    Viral upper respiratory tract infection    Laceration of finger    Herpes labialis       Past medical, surgical, family and social history were reviewed, non-contributory, and unchanged unless otherwise stated. Objective:    BP (!) 165/95   Pulse 98   Temp 97.9 °F (36.6 °C) (Temporal)   Resp 18   Ht 5' 4\" (1.626 m)   Wt 163 lb (73.9 kg)   SpO2 97%   BMI 27.98 kg/m²     Exam is as noted by resident with the following changes, additions or corrections:      Assessment/Plan:        Deepak was seen today for hypertension and depression. Diagnoses and all orders for this visit:    Chronic neck pain    Essential hypertension  -     amLODIPine (NORVASC) 5 MG tablet; take 1 tablet by mouth once daily  -     Cologuard; Future  -     COMPREHENSIVE METABOLIC PANEL; Future  -     CBC WITH AUTO DIFFERENTIAL; Future  -     LIPID PANEL; Future  -     TSH; Future  -     HEPATITIS C ANTIBODY; Future    Anxiety  -     buPROPion (WELLBUTRIN XL) 150 MG extended release tablet; Take 2 tablets by mouth every morning  -     COMPREHENSIVE METABOLIC PANEL; Future  -     CBC WITH AUTO DIFFERENTIAL; Future  -     LIPID PANEL; Future  -     TSH; Future  -     HEPATITIS C ANTIBODY; Future    Hyperlipidemia, unspecified hyperlipidemia type  -     atorvastatin (LIPITOR) 20 MG tablet; Take 0.5 tablets by mouth daily  -     Cologuard; Future  -     COMPREHENSIVE METABOLIC PANEL; Future  -     CBC WITH AUTO DIFFERENTIAL;  Future  -     LIPID PANEL; Future  -     TSH; Future  -     HEPATITIS C ANTIBODY; Future    Herpes labialis  -     acyclovir (ZOVIRAX) 400 MG tablet; Take 1 tablet by mouth 2 times daily    Colon cancer screening  -     Cologuard; Future    Other orders  -     amLODIPine (NORVASC) 2.5 MG tablet; Take 1 tablet by mouth daily  -     tiZANidine (ZANAFLEX) 4 MG tablet; Take 1 tablet by mouth nightly as needed (muscle pain)           Attending Physician Statement    I have reviewed the chart, including any radiology or labs. I have discussed the case, including pertinent history and exam findings with the resident. I agree with the assessment, plan and orders as documented by the resident. Please refer to the resident note for additional information.       Electronically signed by Shakira Mei DO on 3/28/2021 at 4:45 PM

## 2021-03-26 ENCOUNTER — HOSPITAL ENCOUNTER (OUTPATIENT)
Age: 56
Discharge: HOME | End: 2021-03-26
Payer: COMMERCIAL

## 2021-03-26 DIAGNOSIS — F41.9 ANXIETY: ICD-10-CM

## 2021-03-26 DIAGNOSIS — I10 ESSENTIAL HYPERTENSION: ICD-10-CM

## 2021-03-26 DIAGNOSIS — E78.5 HYPERLIPIDEMIA, UNSPECIFIED HYPERLIPIDEMIA TYPE: ICD-10-CM

## 2021-03-26 LAB
ALBUMIN SERPL-MCNC: 4.2 G/DL (ref 3.5–5.2)
ALP BLD-CCNC: 131 U/L (ref 35–104)
ALT SERPL-CCNC: 12 U/L (ref 0–32)
ANION GAP SERPL CALCULATED.3IONS-SCNC: 9 MMOL/L (ref 7–16)
AST SERPL-CCNC: 15 U/L (ref 0–31)
BASOPHILS ABSOLUTE: 0.09 E9/L (ref 0–0.2)
BASOPHILS RELATIVE PERCENT: 1 % (ref 0–2)
BILIRUB SERPL-MCNC: 0.4 MG/DL (ref 0–1.2)
BUN BLDV-MCNC: 10 MG/DL (ref 6–20)
CALCIUM SERPL-MCNC: 9.5 MG/DL (ref 8.6–10.2)
CHLORIDE BLD-SCNC: 105 MMOL/L (ref 98–107)
CHOLESTEROL, TOTAL: 227 MG/DL (ref 0–199)
CO2: 28 MMOL/L (ref 22–29)
CREAT SERPL-MCNC: 0.9 MG/DL (ref 0.5–1)
EOSINOPHILS ABSOLUTE: 0.42 E9/L (ref 0.05–0.5)
EOSINOPHILS RELATIVE PERCENT: 4.8 % (ref 0–6)
GFR AFRICAN AMERICAN: >60
GFR NON-AFRICAN AMERICAN: >60 ML/MIN/1.73
GLUCOSE BLD-MCNC: 114 MG/DL (ref 74–99)
HCT VFR BLD CALC: 41.3 % (ref 34–48)
HDLC SERPL-MCNC: 48 MG/DL
HEMOGLOBIN: 13.2 G/DL (ref 11.5–15.5)
IMMATURE GRANULOCYTES #: 0.04 E9/L
IMMATURE GRANULOCYTES %: 0.5 % (ref 0–5)
LDL CHOLESTEROL CALCULATED: 154 MG/DL (ref 0–99)
LYMPHOCYTES ABSOLUTE: 2.65 E9/L (ref 1.5–4)
LYMPHOCYTES RELATIVE PERCENT: 30.6 % (ref 20–42)
MCH RBC QN AUTO: 28.9 PG (ref 26–35)
MCHC RBC AUTO-ENTMCNC: 32 % (ref 32–34.5)
MCV RBC AUTO: 90.4 FL (ref 80–99.9)
MONOCYTES ABSOLUTE: 0.46 E9/L (ref 0.1–0.95)
MONOCYTES RELATIVE PERCENT: 5.3 % (ref 2–12)
NEUTROPHILS ABSOLUTE: 5.01 E9/L (ref 1.8–7.3)
NEUTROPHILS RELATIVE PERCENT: 57.8 % (ref 43–80)
PDW BLD-RTO: 14 FL (ref 11.5–15)
PLATELET # BLD: 419 E9/L (ref 130–450)
PMV BLD AUTO: 9.5 FL (ref 7–12)
POTASSIUM SERPL-SCNC: 4.2 MMOL/L (ref 3.5–5)
RBC # BLD: 4.57 E12/L (ref 3.5–5.5)
SODIUM BLD-SCNC: 142 MMOL/L (ref 132–146)
TOTAL PROTEIN: 7.9 G/DL (ref 6.4–8.3)
TRIGL SERPL-MCNC: 123 MG/DL (ref 0–149)
TSH SERPL DL<=0.05 MIU/L-ACNC: 1.62 UIU/ML (ref 0.27–4.2)
VLDLC SERPL CALC-MCNC: 25 MG/DL
WBC # BLD: 8.7 E9/L (ref 4.5–11.5)

## 2021-03-26 PROCEDURE — 36415 COLL VENOUS BLD VENIPUNCTURE: CPT

## 2021-03-26 PROCEDURE — 85025 COMPLETE CBC W/AUTO DIFF WBC: CPT

## 2021-03-26 PROCEDURE — 80061 LIPID PANEL: CPT

## 2021-03-26 PROCEDURE — 84443 ASSAY THYROID STIM HORMONE: CPT

## 2021-03-26 PROCEDURE — 86803 HEPATITIS C AB TEST: CPT

## 2021-03-26 PROCEDURE — 80053 COMPREHEN METABOLIC PANEL: CPT

## 2021-03-29 LAB — HEPATITIS C ANTIBODY INTERPRETATION: NORMAL

## 2021-04-30 DIAGNOSIS — B00.1 HERPES LABIALIS: ICD-10-CM

## 2021-05-03 NOTE — TELEPHONE ENCOUNTER
Last Appointment   3/25/2021  Next Appointment  6/14/2021    3/25/21 Acyclovir was sent for patient to take 1 bid, but only 14 tablets with refills was sent. Amount dispensed needs corrected to a 30 day supply if patient is to take everyday.

## 2021-05-04 RX ORDER — TIZANIDINE 4 MG/1
TABLET ORAL
Qty: 20 TABLET | Refills: 1 | OUTPATIENT
Start: 2021-05-04

## 2021-05-05 DIAGNOSIS — B00.1 HERPES LABIALIS: ICD-10-CM

## 2021-05-05 RX ORDER — ACYCLOVIR 400 MG/1
TABLET ORAL
Qty: 14 TABLET | Refills: 2 | OUTPATIENT
Start: 2021-05-05

## 2021-05-06 RX ORDER — ACYCLOVIR 400 MG/1
TABLET ORAL
Qty: 14 TABLET | Refills: 2 | Status: SHIPPED
Start: 2021-05-06 | End: 2021-06-06

## 2021-05-19 ENCOUNTER — OFFICE VISIT (OUTPATIENT)
Dept: NEUROLOGY | Age: 56
End: 2021-05-19
Payer: COMMERCIAL

## 2021-05-19 VITALS
TEMPERATURE: 97.6 F | HEART RATE: 83 BPM | OXYGEN SATURATION: 97 % | DIASTOLIC BLOOD PRESSURE: 79 MMHG | SYSTOLIC BLOOD PRESSURE: 124 MMHG

## 2021-05-19 DIAGNOSIS — Z98.890 HISTORY OF CERVICAL SPINAL SURGERY: ICD-10-CM

## 2021-05-19 DIAGNOSIS — M54.2 CERVICAL PAIN: Primary | ICD-10-CM

## 2021-05-19 PROCEDURE — 99204 OFFICE O/P NEW MOD 45 MIN: CPT | Performed by: NURSE PRACTITIONER

## 2021-05-19 PROCEDURE — 3017F COLORECTAL CA SCREEN DOC REV: CPT | Performed by: NURSE PRACTITIONER

## 2021-05-19 PROCEDURE — 4004F PT TOBACCO SCREEN RCVD TLK: CPT | Performed by: NURSE PRACTITIONER

## 2021-05-19 PROCEDURE — G8427 DOCREV CUR MEDS BY ELIG CLIN: HCPCS | Performed by: NURSE PRACTITIONER

## 2021-05-19 PROCEDURE — G8419 CALC BMI OUT NRM PARAM NOF/U: HCPCS | Performed by: NURSE PRACTITIONER

## 2021-05-19 RX ORDER — GABAPENTIN 100 MG/1
100 CAPSULE ORAL NIGHTLY
Qty: 90 CAPSULE | Refills: 0 | Status: SHIPPED
Start: 2021-05-19 | End: 2021-06-30

## 2021-05-19 RX ORDER — BACLOFEN 10 MG/1
10 TABLET ORAL 2 TIMES DAILY
Qty: 60 TABLET | Refills: 3 | Status: SHIPPED
Start: 2021-05-19 | End: 2021-09-14

## 2021-05-19 NOTE — PROGRESS NOTES
strength 5/5   XI: neck extension strength  5/5   XII: tongue strength  Normal     Motor:  5/5 throughout  Normal bulk and tone  No drift   No abnormal movements    Sensory:  LT and PP normal except very sensitive to C spine all levels  Vibration normal throughout except decrease at C spine all levels     Coordination:   FN, FFM and JASON normal  HS normal    Gait:  Normal  Romberg's negative    DTR:   Right Brachioradialis reflex 3+  Left Brachioradialis reflex 3+  Right Biceps reflex 3+  Left Biceps reflex 3+  Right Triceps reflex 3+  Left Triceps reflex 3+  Right Quadriceps reflex 3+  Left Quadriceps reflex 3+  Right Achilles reflex 3+  Left Achilles reflex 3+    No Babinskis  No Vallejo's    No other pathological reflexes    Laboratory/Radiology:  ry/Radiology:     CBC with Differential:    Lab Results   Component Value Date    WBC 8.7 03/26/2021    RBC 4.57 03/26/2021    HGB 13.2 03/26/2021    HCT 41.3 03/26/2021     03/26/2021    MCV 90.4 03/26/2021    MCH 28.9 03/26/2021    MCHC 32.0 03/26/2021    RDW 14.0 03/26/2021    LYMPHOPCT 30.6 03/26/2021    MONOPCT 5.3 03/26/2021    BASOPCT 1.0 03/26/2021    MONOSABS 0.46 03/26/2021    LYMPHSABS 2.65 03/26/2021    EOSABS 0.42 03/26/2021    BASOSABS 0.09 03/26/2021     CMP:    Lab Results   Component Value Date     03/26/2021    K 4.2 03/26/2021    K 3.9 08/28/2019     03/26/2021    CO2 28 03/26/2021    BUN 10 03/26/2021    CREATININE 0.9 03/26/2021    GFRAA >60 03/26/2021    LABGLOM >60 03/26/2021    GLUCOSE 114 03/26/2021    PROT 7.9 03/26/2021    LABALBU 4.2 03/26/2021    CALCIUM 9.5 03/26/2021    BILITOT 0.4 03/26/2021    ALKPHOS 131 03/26/2021    AST 15 03/26/2021    ALT 12 03/26/2021     Hepatic Function Panel:    Lab Results   Component Value Date    ALKPHOS 131 03/26/2021    ALT 12 03/26/2021    AST 15 03/26/2021    PROT 7.9 03/26/2021    BILITOT 0.4 03/26/2021    LABALBU 4.2 03/26/2021     HgBA1c:    Lab Results   Component Value Date

## 2021-05-21 ENCOUNTER — HOSPITAL ENCOUNTER (OUTPATIENT)
Age: 56
Discharge: HOME | End: 2021-05-21
Payer: COMMERCIAL

## 2021-05-21 DIAGNOSIS — M54.2 CERVICAL PAIN: ICD-10-CM

## 2021-05-21 LAB
ANION GAP SERPL CALCULATED.3IONS-SCNC: 12 MMOL/L (ref 7–16)
BUN BLDV-MCNC: 10 MG/DL (ref 6–20)
CALCIUM SERPL-MCNC: 9.3 MG/DL (ref 8.6–10.2)
CHLORIDE BLD-SCNC: 104 MMOL/L (ref 98–107)
CO2: 26 MMOL/L (ref 22–29)
CREAT SERPL-MCNC: 0.9 MG/DL (ref 0.5–1)
GFR AFRICAN AMERICAN: >60
GFR NON-AFRICAN AMERICAN: >60 ML/MIN/1.73
GLUCOSE BLD-MCNC: 115 MG/DL (ref 74–99)
POTASSIUM SERPL-SCNC: 3.7 MMOL/L (ref 3.5–5)
SODIUM BLD-SCNC: 142 MMOL/L (ref 132–146)

## 2021-05-21 PROCEDURE — 36415 COLL VENOUS BLD VENIPUNCTURE: CPT

## 2021-05-21 PROCEDURE — 80048 BASIC METABOLIC PNL TOTAL CA: CPT

## 2021-05-27 ENCOUNTER — TELEPHONE (OUTPATIENT)
Dept: PAIN MANAGEMENT | Age: 56
End: 2021-05-27

## 2021-05-27 DIAGNOSIS — B00.1 HERPES LABIALIS: ICD-10-CM

## 2021-05-27 RX ORDER — ACYCLOVIR 400 MG/1
TABLET ORAL
Qty: 14 TABLET | Refills: 2 | OUTPATIENT
Start: 2021-05-27

## 2021-05-27 NOTE — TELEPHONE ENCOUNTER
DIPAK denial of MRI Cervical spine, does not meet guidelines.   Electronically signed by Bg Louie MA on 5/27/21 at 8:27 AM EDT

## 2021-06-03 DIAGNOSIS — B00.1 HERPES LABIALIS: ICD-10-CM

## 2021-06-06 RX ORDER — ACYCLOVIR 400 MG/1
TABLET ORAL
Qty: 14 TABLET | Refills: 2 | Status: SHIPPED | OUTPATIENT
Start: 2021-06-06 | End: 2021-09-14 | Stop reason: SDUPTHER

## 2021-06-14 ENCOUNTER — OFFICE VISIT (OUTPATIENT)
Dept: FAMILY MEDICINE CLINIC | Age: 56
End: 2021-06-14
Payer: COMMERCIAL

## 2021-06-14 VITALS
SYSTOLIC BLOOD PRESSURE: 122 MMHG | TEMPERATURE: 98 F | HEIGHT: 64 IN | DIASTOLIC BLOOD PRESSURE: 77 MMHG | HEART RATE: 90 BPM | BODY MASS INDEX: 30.52 KG/M2 | WEIGHT: 178.8 LBS

## 2021-06-14 DIAGNOSIS — R73.09 ELEVATED GLUCOSE: ICD-10-CM

## 2021-06-14 DIAGNOSIS — Z12.11 COLON CANCER SCREENING: ICD-10-CM

## 2021-06-14 DIAGNOSIS — F41.9 ANXIETY: Primary | ICD-10-CM

## 2021-06-14 PROCEDURE — G8417 CALC BMI ABV UP PARAM F/U: HCPCS | Performed by: STUDENT IN AN ORGANIZED HEALTH CARE EDUCATION/TRAINING PROGRAM

## 2021-06-14 PROCEDURE — 99213 OFFICE O/P EST LOW 20 MIN: CPT | Performed by: STUDENT IN AN ORGANIZED HEALTH CARE EDUCATION/TRAINING PROGRAM

## 2021-06-14 PROCEDURE — 4004F PT TOBACCO SCREEN RCVD TLK: CPT | Performed by: STUDENT IN AN ORGANIZED HEALTH CARE EDUCATION/TRAINING PROGRAM

## 2021-06-14 PROCEDURE — G8427 DOCREV CUR MEDS BY ELIG CLIN: HCPCS | Performed by: STUDENT IN AN ORGANIZED HEALTH CARE EDUCATION/TRAINING PROGRAM

## 2021-06-14 PROCEDURE — 3017F COLORECTAL CA SCREEN DOC REV: CPT | Performed by: STUDENT IN AN ORGANIZED HEALTH CARE EDUCATION/TRAINING PROGRAM

## 2021-06-14 RX ORDER — BUPROPION HYDROCHLORIDE 300 MG/1
300 TABLET ORAL EVERY MORNING
Qty: 90 TABLET | Refills: 2 | Status: SHIPPED | OUTPATIENT
Start: 2021-06-14 | End: 2022-03-01 | Stop reason: SDUPTHER

## 2021-06-14 RX ORDER — VENLAFAXINE HYDROCHLORIDE 37.5 MG/1
37.5 CAPSULE, EXTENDED RELEASE ORAL DAILY
Qty: 90 CAPSULE | Refills: 1 | Status: SHIPPED
Start: 2021-06-14 | End: 2021-06-30

## 2021-06-14 NOTE — PROGRESS NOTES
[facility]  Family Medicine Residency Program  Phone: [phone]  Fax: [phone]    Patient:  [place] Dk 63 y.o. female                                 Date of Service: 6/14/21                            Chiefcomplaint:   Chief Complaint   Patient presents with    3 Month Follow-Up     Anxiety, HTN, Hyperlipidemia          History of Present Illness: The patient is a 63 y.o. female  presented to the clinic ; for regular follow-up  -Patient does not have any new concern today, wants refill of medication  -Patient is stated that she recently did lab work, everything looks okay  -Patient further mentioned that she has had history of depression on Wellbutrin 300 daily, still she is feeling  A little bit down and will be happy if we could add something else, but she denied any suicidal ideation, sleeping problem. -Patient has been taking amlodipine 7.5 daily and her blood pressure is very well controlled, the blood pressure taken today was 122/77  -She has slightly elevated lipid panel, she is already on Lipitor  -She is okay to get A1c check as she has had slight elevated glucose and do  lipid panel in follow-up visit  -We also discussed regarding Pap smear and Cologuard test    Review of Systems:   Review of Systems   Constitutional: Negative. Negative for chills and fever. HENT: Negative. Negative for congestion and sore throat. Eyes: Negative for redness. Respiratory: Negative for chest tightness and shortness of breath. Cardiovascular: Negative. Negative for chest pain, palpitations and leg swelling. Gastrointestinal: Negative. Negative for constipation. Genitourinary: Negative. Negative for dysuria and hematuria. Musculoskeletal: Positive for neck pain. Skin: Negative. Allergic/Immunologic: Negative. Neurological: Negative. Negative for dizziness, syncope and numbness. Psychiatric/Behavioral: Positive for decreased concentration.  Negative for sleep disturbance. The patient is nervous/anxious. Past Medical History:      Diagnosis Date    Depression     Hyperlipidemia     Hypertension        Past Surgical History:        Procedure Laterality Date    ANKLE SURGERY  2015 or 2016    LEFT   ankles and screws    BACK SURGERY      Cervical surgery      SECTION      FRACTURE SURGERY      Rotaor cuff surgery    HYSTERECTOMY         Allergies:    Aspirin, Lisinopril, Morphine, and Seasonal    Social History:   Social History     Socioeconomic History    Marital status:      Spouse name: Not on file    Number of children: Not on file    Years of education: Not on file    Highest education level: Not on file   Occupational History    Not on file   Tobacco Use    Smoking status: Current Every Day Smoker     Packs/day: 0.07     Types: Cigarettes     Start date: 1973    Smokeless tobacco: Never Used    Tobacco comment: 6-7 a day   Vaping Use    Vaping Use: Former   Substance and Sexual Activity    Alcohol use: Yes     Comment: occassional    Drug use: No    Sexual activity: Not Currently   Other Topics Concern    Not on file   Social History Narrative    Not on file     Social Determinants of Health     Financial Resource Strain:     Difficulty of Paying Living Expenses:    Food Insecurity:     Worried About Running Out of Food in the Last Year:     Ran Out of Food in the Last Year:    Transportation Needs:     Lack of Transportation (Medical):      Lack of Transportation (Non-Medical):    Physical Activity:     Days of Exercise per Week:     Minutes of Exercise per Session:    Stress:     Feeling of Stress :    Social Connections:     Frequency of Communication with Friends and Family:     Frequency of Social Gatherings with Friends and Family:     Attends Confucianism Services:     Active Member of Clubs or Organizations:     Attends Club or Organization Meetings:     Marital Status:    Intimate Partner Violence:     Fear of Current or Ex-Partner:     Emotionally Abused:     Physically Abused:     Sexually Abused:         Family History:       Problem Relation Age of Onset    Stroke Mother 76        mini     Stroke Father 76    Heart Attack Maternal Grandfather        BP Readings from Last 3 Encounters:   06/14/21 122/77   05/19/21 124/79   03/25/21 (!) 165/95       Physical Exam:    Vitals: /77   Pulse 90   Temp 98 °F (36.7 °C) (Temporal)   Ht 5' 4\" (1.626 m)   Wt 178 lb 12.8 oz (81.1 kg)   BMI 30.69 kg/m²   General Appearance: Well developed, awake, alert, oriented, no acute distress  HEENT: Normocephalic,atraumatic. PERRL, EOM's intact, EAC without erythemaor swelling, no pallor or icterus. Neck: Supple, symmetrical, trachea midline. No JVD. Chest wall/Lung: Clear to auscultation bilaterally,  respirations unlabored. No ronchi/wheezing/rales  Heart[de-identified]  Regular rate and rhythm, S1and S2 normal, no murmur, rub or gallop. Abdomen: Soft, non-tender, bowel sounds normoactive, no masses, no organomegaly  Extremities:  Extremities normal, atraumatic, no cyanosis. no edema. Skin: Skin color, texture, turgor normal, no rashes or lesions  Musculokeletal: ROM grossly normal in all joints of extremities, no obvious joint swelling. Lymph nodes: no lymph node enlargement appreciated  Neurologic:   Alert&Oriented. Normal gait and coordination  No focal neurological deficits appreaciated         Psychiatric: has a normal mood and affect. Behavior is normal.       Assessment and Plan:       The 10-year ASCVD risk score (Kary Capps, et al., 2013) is: 8%    Values used to calculate the score:      Age: 63 years      Sex: Female      Is Non- : No      Diabetic: No      Tobacco smoker: Yes      Systolic Blood Pressure: 071 mmHg      Is BP treated: Yes      HDL Cholesterol: 48 mg/dL      Total Cholesterol: 227 mg/dL  Chantel Salazar was seen today for 3 month follow-up.     Diagnoses and all orders for this visit:    Anxiety/depression  -     buPROPion (WELLBUTRIN XL) 300 MG extended release tablet; Take 1 tablet by mouth every morning  -     LIPID PANEL; Future  -Seasonal patient with anxiety and depression on Wellbutrin 300 mg extended release tab  daily  -She she admits a  little bit down recently, she she would be happy if we add some other antidepressant  -We have added venlafaxine 37.5 mg extended release, daily  -She will follow-up within 3-month      Elevated glucose  -     HEMOGLOBIN A1C; Future  -She has slightly elevated blood glucose level, we have ordered A1c    Essential hypertension  -taking amlodipine 7.5 daily and her blood pressure is very well controlled, the blood pressure taken today was 122/77  -Suggested to continue the medication  -Suggested to check blood pressure at home and put a log      Colon cancer screening  -     Cologchet (For External Results Only); Future    Other orders  -     venlafaxine (EFFEXOR XR) 37.5 MG extended release capsule; Take 1 capsule by mouth daily    Medical Decision Making  Number and Complexity of Problems Addressed:   Stable Chronic Illnesses: Anxiety depression, hypertension  Level of Problems Addressed:  Low (2+ self-limited or minor problems / 1 stable chronic illness / 1 acute, uncomplicated illness or injury)  Amount and/or Complexity of Data to be Reviewed and Analyzed:    Limited (1 of 2 Categories) Category 1 (Any two of the following, can duplicate): External Note from Unique Source, Review Results of Unique Test, Order Unique Test / Category 2 (Assessment Requiring Independent Historian(s))  Risk of Complications and/or Morbidity or Mortality of Patient Management:   Low risk of morbidity from additional diagnostic testing or treatment  Today's visit has a medical decision making level of Low. I encourage further reading and education about your health conditions.   Information on many healthconditions is provided by the American Academy of Family Physicians: https://familydoctor. org/  Please bring any questions to me at your next visit. Return to Office: Return in about 3 months (around 9/14/2021). Medication List:    Current Outpatient Medications   Medication Sig Dispense Refill    venlafaxine (EFFEXOR XR) 37.5 MG extended release capsule Take 1 capsule by mouth daily 90 capsule 1    buPROPion (WELLBUTRIN XL) 300 MG extended release tablet Take 1 tablet by mouth every morning 90 tablet 2    acyclovir (ZOVIRAX) 400 MG tablet take 1 tablet by mouth twice a day 14 tablet 2    baclofen (LIORESAL) 10 MG tablet Take 1 tablet by mouth 2 times daily 60 tablet 3    gabapentin (NEURONTIN) 100 MG capsule Take 1 capsule by mouth nightly for 90 days. Intended supply: 30 days 90 capsule 0    atorvastatin (LIPITOR) 20 MG tablet Take 0.5 tablets by mouth daily 30 tablet 1    amLODIPine (NORVASC) 5 MG tablet take 1 tablet by mouth once daily 30 tablet 1    amLODIPine (NORVASC) 2.5 MG tablet Take 1 tablet by mouth daily 90 tablet 1    nitroGLYCERIN (NITROSTAT) 0.4 MG SL tablet Place 1 tablet under the tongue every 5 minutes as needed for Chest pain (Chest pain) up to max of 3 total doses. If no relief after 1 dose, call 911. 25 tablet 1     No current facility-administered medications for this visit. Odette Ivey MD       This document may have been prepared at least partiallythrough the use of voice recognition software. Although effort is taken to assure the accuracy of this document, it is possible that grammatical, syntax,  or spelling errors may occur.

## 2021-06-14 NOTE — PROGRESS NOTES
Subjective:    Griffin Brito is here for several issues. She has HTN, and is now experiencing an acute exacerbation of chronic depression. She had used Venlafaxine in the past.      ROS: Otherwise negative    Patient Active Problem List   Diagnosis    Recurrent major depressive disorder ([facility] [address]    Essential hypertension    Viral upper respiratory tract infection    Laceration of finger    Herpes labialis       Past medical, surgical, family and social history were reviewed, non-contributory, and unchanged unless otherwise stated. Objective:    /77   Pulse 90   Temp 98 °F (36.7 °C) (Temporal)   Ht 5' 4\" (1.626 m)   Wt 178 lb 12.8 oz (81.1 kg)   BMI 30.69 kg/m²     Exam is as noted by resident with the following changes, additions or corrections:      Assessment/Plan:        Griffin Brito was seen today for 3 month follow-up. Diagnoses and all orders for this visit:    Anxiety  -     buPROPion (WELLBUTRIN XL) 300 MG extended release tablet; Take 1 tablet by mouth every morning  -     LIPID PANEL; Future    Elevated glucose  -     HEMOGLOBIN A1C; Future    Colon cancer screening  -     Cologuard (For External Results Only); Future    Other orders  -     venlafaxine (EFFEXOR XR) 37.5 MG extended release capsule; Take 1 capsule by mouth daily           Attending Physician Statement    I have reviewed the chart, including any radiology or labs. I have discussed the case, including pertinent history and exam findings with the resident. I agree with the assessment, plan and orders as documented by the resident. Please refer to the resident note for additional information.       Electronically signed by Casper Perera DO on 6/17/2021 at 8:31 AM

## 2021-06-16 ASSESSMENT — ENCOUNTER SYMPTOMS
SORE THROAT: 0
SHORTNESS OF BREATH: 0
CONSTIPATION: 0
GASTROINTESTINAL NEGATIVE: 1
EYE REDNESS: 0
CHEST TIGHTNESS: 0
ALLERGIC/IMMUNOLOGIC NEGATIVE: 1

## 2021-06-22 ENCOUNTER — HOSPITAL ENCOUNTER (EMERGENCY)
Age: 56
Discharge: HOME | End: 2021-06-22
Payer: COMMERCIAL

## 2021-06-22 ENCOUNTER — APPOINTMENT (OUTPATIENT)
Dept: GENERAL RADIOLOGY | Age: 56
End: 2021-06-22
Payer: COMMERCIAL

## 2021-06-22 VITALS
DIASTOLIC BLOOD PRESSURE: 84 MMHG | RESPIRATION RATE: 16 BRPM | SYSTOLIC BLOOD PRESSURE: 128 MMHG | HEART RATE: 84 BPM | TEMPERATURE: 98.2 F | OXYGEN SATURATION: 98 %

## 2021-06-22 DIAGNOSIS — S63.501A SPRAIN OF RIGHT WRIST, INITIAL ENCOUNTER: Primary | ICD-10-CM

## 2021-06-22 DIAGNOSIS — M18.11 DEGENERATIVE ARTHRITIS OF THUMB, RIGHT: ICD-10-CM

## 2021-06-22 PROCEDURE — 6370000000 HC RX 637 (ALT 250 FOR IP): Performed by: NURSE PRACTITIONER

## 2021-06-22 PROCEDURE — 99284 EMERGENCY DEPT VISIT MOD MDM: CPT

## 2021-06-22 PROCEDURE — 73110 X-RAY EXAM OF WRIST: CPT

## 2021-06-22 RX ORDER — METHYLPREDNISOLONE 4 MG/1
TABLET ORAL
Qty: 1 KIT | Status: SHIPPED | OUTPATIENT
Start: 2021-06-22 | End: 2021-06-28

## 2021-06-22 RX ORDER — IBUPROFEN 800 MG/1
800 TABLET ORAL ONCE
Status: COMPLETED | OUTPATIENT
Start: 2021-06-22 | End: 2021-06-22

## 2021-06-22 RX ORDER — ACETAMINOPHEN 500 MG
500 TABLET ORAL EVERY 6 HOURS PRN
Qty: 20 TABLET | Refills: 0 | Status: SHIPPED | OUTPATIENT
Start: 2021-06-22 | End: 2022-04-13

## 2021-06-22 RX ADMIN — IBUPROFEN 800 MG: 800 TABLET, FILM COATED ORAL at 13:49

## 2021-06-22 ASSESSMENT — PAIN DESCRIPTION - DESCRIPTORS: DESCRIPTORS: ACHING

## 2021-06-22 ASSESSMENT — PAIN DESCRIPTION - LOCATION: LOCATION: WRIST

## 2021-06-22 ASSESSMENT — PAIN SCALES - GENERAL
PAINLEVEL_OUTOF10: 8
PAINLEVEL_OUTOF10: 8

## 2021-06-22 ASSESSMENT — PAIN DESCRIPTION - ORIENTATION: ORIENTATION: RIGHT

## 2021-06-22 ASSESSMENT — PAIN DESCRIPTION - PAIN TYPE: TYPE: ACUTE PAIN

## 2021-06-22 ASSESSMENT — PAIN DESCRIPTION - FREQUENCY: FREQUENCY: CONTINUOUS
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
## 2021-06-23 ENCOUNTER — TELEPHONE (OUTPATIENT)
Dept: ADMINISTRATIVE | Age: 56
End: 2021-06-23

## 2021-06-23 NOTE — TELEPHONE ENCOUNTER
Ed f/u  In 2 days wrist  Sprained . No avail appts . Please advise .  Patient can be reached @  [phone] (work) ask for Diogenes Smalls

## 2021-06-30 ENCOUNTER — OFFICE VISIT (OUTPATIENT)
Dept: NEUROLOGY | Age: 56
End: 2021-06-30
Payer: COMMERCIAL

## 2021-06-30 VITALS
HEART RATE: 85 BPM | DIASTOLIC BLOOD PRESSURE: 86 MMHG | TEMPERATURE: 98.6 F | OXYGEN SATURATION: 96 % | SYSTOLIC BLOOD PRESSURE: 152 MMHG

## 2021-06-30 DIAGNOSIS — Z98.890 HISTORY OF CERVICAL SPINAL SURGERY: Primary | ICD-10-CM

## 2021-06-30 DIAGNOSIS — M54.2 CERVICAL PAIN: ICD-10-CM

## 2021-06-30 PROCEDURE — 99213 OFFICE O/P EST LOW 20 MIN: CPT | Performed by: NURSE PRACTITIONER

## 2021-06-30 PROCEDURE — G8427 DOCREV CUR MEDS BY ELIG CLIN: HCPCS | Performed by: NURSE PRACTITIONER

## 2021-06-30 PROCEDURE — 3017F COLORECTAL CA SCREEN DOC REV: CPT | Performed by: NURSE PRACTITIONER

## 2021-06-30 PROCEDURE — G8417 CALC BMI ABV UP PARAM F/U: HCPCS | Performed by: NURSE PRACTITIONER

## 2021-06-30 PROCEDURE — 4004F PT TOBACCO SCREEN RCVD TLK: CPT | Performed by: NURSE PRACTITIONER

## 2021-06-30 RX ORDER — GABAPENTIN 300 MG/1
300 CAPSULE ORAL NIGHTLY
Qty: 90 CAPSULE | Refills: 0 | Status: SHIPPED
Start: 2021-06-30 | End: 2021-10-11

## 2021-06-30 NOTE — PROGRESS NOTES
[address] [facility]. Lexx Baez M.D., F.A.C.P. Florence Ruiz, DNP, APRN, ACNS-BC  Madi Mixon. Katja Bermeo, MSN, APRN-FNP-C  Berry Sacks, MSN, APRN-FNP-C  JULIANA Cruz, PALUCILLE [address], MSN, APRN-FNP-C  [address] [place], [address]  Phone: [phone]  Fax: [phone]       Cindi Polo is a 61 y.o. right handed female     Patient follows for cervical pain and decreased ROM of cervical spine      Onset of pain started in November 2020 and eventually she went to ED in December 2020 due to significant bilateral neck pain. CT of C spine showed significant spinal stenosis and degenerative disc disease without fracture with stable surgical repair. Back in 2009 or 2010 she did have anterior cervical spine surgery by Dr. Anders Bautista and reports that she had some type of new procedure where something was implanted. She complained of a shooting pain that radiated from her neck into the back of her head. She did not have injury or trauma to her neck to cause these symptoms. She had no dizziness, lightheadedness, headache or nausea/vomiting. No paresthesias into her bilateral arms. She has been to PT which helped with the stiffness in her neck but the pain continues. She went to PT back in December 2020 through January 2021 for 6 weeks. She has had no recent MRI of her C spine. She continues to report neck pain with the right side with greater pain than the left side. She has a history of left shoulder surgery and cannot decipher if her pain on the left side is related to the shoulder surgery or the neck pain. She has tried massaging her neck but no other OTC pain relievers or muscle rubs. Cervical pain is worsening hurts to touch her cervical area most of the pain is at the base of her neck. Baclofen and Neurontin helps with the pain.      Sleep: not sleeping only a couple hours     No issues with chewing or swallowing  No chest pain or palpitations  No SOB  No vertigo, lightheadedness or loss of consciousness  No falls, tripping or stumbling  No incontinence of bowels or bladder  No itching or bruising appreciated  + numbness and tingling both arms into hands  + left arm weakness    ROS is otherwise negative    Objective:     BP (!) 152/86 (Site: Left Lower Arm)   Pulse 85   Temp 98.6 °F (37 °C)   SpO2 96%     General appearance: alert, appears stated age, cooperative and in no distress  Head: normocephalic, without obvious abnormality, atraumatic  Eyes: conjunctivae/corneas clear; no drainage  Neck:  supple, symmetrical, trachea midline, decrease ROM in all directions, large lump to C 5-7 area which is tender to touch and PP  Lungs: clear to auscultation bilaterally  Heart: regular rate and rhythm, S1, S2 normal, no murmur  Abdomen: soft, non-tender; bowel sounds normal  Extremities: normal, atraumatic, no cyanosis or edema  Skin:  color, texture, turgor normal--no rashes or lesions      Mental Status: alert and oriented x 4    Appropriate attention/concentration  Intact fundus of knowledge  Repetition intact  Memories intact    Speech: no dysarthria  Language: no aphasias---reading, writing, repetition, and object identification intact    Cranial Nerves:  I: smell    II: visual acuity     II: visual fields Full    II: pupils DOTTIE   III,VII: ptosis None   III,IV,VI: extraocular muscles  EOMI without nystagmus   V: mastication Normal   V: facial light touch sensation  Normal   V,VII: corneal reflex     VII: facial muscle function - upper  Normal   VII: facial muscle function - lower Normal   VIII: hearing Normal   IX: soft palate elevation  Normal   IX,X: gag reflex    XI: trapezius strength  5/5   XI: sternocleidomastoid strength 5/5   XI: neck extension strength  5/5   XII: tongue strength  Normal     Motor:  5/5 throughout  Normal bulk and tone  No drift   No abnormal movements    Sensory:  LT and PP normal except very sensitive to C spine all levels  Vibration normal throughout except decrease at C spine all levels     Coordination:   FN, FFM and JASON normal  HS normal    Gait:  Normal    DTR:   Right Brachioradialis reflex 3+  Left Brachioradialis reflex 3+  Right Biceps reflex 3+  Left Biceps reflex 3+  Right Triceps reflex 3+  Left Triceps reflex 3+  Right Quadriceps reflex 3+  Left Quadriceps reflex 3+  Right Achilles reflex 3+  Left Achilles reflex 3+    Laboratory/Radiology:  ry/Radiology:     CBC with Differential:    Lab Results   Component Value Date    WBC 8.7 03/26/2021    RBC 4.57 03/26/2021    HGB 13.2 03/26/2021    HCT 41.3 03/26/2021     03/26/2021    MCV 90.4 03/26/2021    MCH 28.9 03/26/2021    MCHC 32.0 03/26/2021    RDW 14.0 03/26/2021    LYMPHOPCT 30.6 03/26/2021    MONOPCT 5.3 03/26/2021    BASOPCT 1.0 03/26/2021    MONOSABS 0.46 03/26/2021    LYMPHSABS 2.65 03/26/2021    EOSABS 0.42 03/26/2021    BASOSABS 0.09 03/26/2021     CMP:    Lab Results   Component Value Date     05/21/2021    K 3.7 05/21/2021    K 3.9 08/28/2019     05/21/2021    CO2 26 05/21/2021    BUN 10 05/21/2021    CREATININE 0.9 05/21/2021    GFRAA >60 05/21/2021    LABGLOM >60 05/21/2021    GLUCOSE 115 05/21/2021    PROT 7.9 03/26/2021    LABALBU 4.2 03/26/2021    CALCIUM 9.3 05/21/2021    BILITOT 0.4 03/26/2021    ALKPHOS 131 03/26/2021    AST 15 03/26/2021    ALT 12 03/26/2021     Hepatic Function Panel:    Lab Results   Component Value Date    ALKPHOS 131 03/26/2021    ALT 12 03/26/2021    AST 15 03/26/2021    PROT 7.9 03/26/2021    BILITOT 0.4 03/26/2021    LABALBU 4.2 03/26/2021     HgBA1c:    Lab Results   Component Value Date    LABA1C 5.4 01/10/2020     FLP:    Lab Results   Component Value Date    TRIG 123 03/26/2021    HDL 48 03/26/2021    LDLCALC 154 03/26/2021    LABVLDL 25 03/26/2021     CT C spine: 1.  Postsurgical changes at C5/C6 with osseous fusion of vertebral bodies.   2.  Degenerative posterior osteophytosis also at C5/C6 results in mild-to-moderate effacement of ventral thecal sac. 3.  Right neural foraminal narrowing at level of C3/C4.     All labs and images were personally reviewed at the time of this visit    Assessment:     Cervical spine pain  --- previous history of C spine surgery   --- sensitivity to the C spine region with LT, PP and Vibration  --- pain elicited with palpation to C spine region especially at C 5-6 region   --- ROM of Cspine is limited in all directions  --- will need MRI C spine for more definitive study/imaging rule out abnormality   --- PT did have 6 weeks of PT for her cervical pain from December 2020 through January 2021    Plan:     MRI C spine w/wo    BMP prior to MRI    Continue Baclofen 10 mg BID to take during the day    Continue Neurontin and increase to 300 mg nightly     Follow up in 2 months after MRI C spine or will call if we need to refer to [address] [place] [address]    Call with questions or concerns      ALL Vega CNP  1:25 PM  6/30/2021

## 2021-07-07 ENCOUNTER — TELEPHONE (OUTPATIENT)
Dept: NEUROLOGY | Age: 56
End: 2021-07-07

## 2021-07-07 NOTE — TELEPHONE ENCOUNTER
MRI C Spine approved for [facility] 6/30/21 to 7/30/2021.   Electronically signed by Campos Cummins MA on 7/7/21 at 7:50 AM EDT

## 2021-07-08 ENCOUNTER — HOSPITAL ENCOUNTER (OUTPATIENT)
Age: 56
Discharge: HOME | End: 2021-07-08
Payer: COMMERCIAL

## 2021-07-08 DIAGNOSIS — F41.9 ANXIETY: ICD-10-CM

## 2021-07-08 DIAGNOSIS — R73.09 ELEVATED GLUCOSE: ICD-10-CM

## 2021-07-08 DIAGNOSIS — Z98.890 HISTORY OF CERVICAL SPINAL SURGERY: ICD-10-CM

## 2021-07-08 DIAGNOSIS — M54.2 CERVICAL PAIN: ICD-10-CM

## 2021-07-08 LAB
ANION GAP SERPL CALCULATED.3IONS-SCNC: 10 MMOL/L (ref 7–16)
BUN BLDV-MCNC: 11 MG/DL (ref 6–20)
CALCIUM SERPL-MCNC: 9.5 MG/DL (ref 8.6–10.2)
CHLORIDE BLD-SCNC: 104 MMOL/L (ref 98–107)
CHOLESTEROL, TOTAL: 172 MG/DL (ref 0–199)
CO2: 27 MMOL/L (ref 22–29)
CREAT SERPL-MCNC: 1.3 MG/DL (ref 0.5–1)
GFR AFRICAN AMERICAN: 51
GFR NON-AFRICAN AMERICAN: 42 ML/MIN/1.73
GLUCOSE BLD-MCNC: 100 MG/DL (ref 74–99)
HBA1C MFR BLD: 5.7 % (ref 4–5.6)
HDLC SERPL-MCNC: 43 MG/DL
LDL CHOLESTEROL CALCULATED: 106 MG/DL (ref 0–99)
POTASSIUM SERPL-SCNC: 4.3 MMOL/L (ref 3.5–5)
SODIUM BLD-SCNC: 141 MMOL/L (ref 132–146)
TRIGL SERPL-MCNC: 116 MG/DL (ref 0–149)
VLDLC SERPL CALC-MCNC: 23 MG/DL

## 2021-07-08 PROCEDURE — 36415 COLL VENOUS BLD VENIPUNCTURE: CPT

## 2021-07-08 PROCEDURE — 83036 HEMOGLOBIN GLYCOSYLATED A1C: CPT

## 2021-07-08 PROCEDURE — 80061 LIPID PANEL: CPT

## 2021-07-08 PROCEDURE — 80048 BASIC METABOLIC PNL TOTAL CA: CPT

## 2021-07-16 ENCOUNTER — HOSPITAL ENCOUNTER (OUTPATIENT)
Dept: MRI IMAGING | Age: 56
Discharge: HOME | End: 2021-07-18
Payer: COMMERCIAL

## 2021-07-16 DIAGNOSIS — Z98.890 HISTORY OF CERVICAL SPINAL SURGERY: ICD-10-CM

## 2021-07-16 DIAGNOSIS — M54.2 CERVICAL PAIN: ICD-10-CM

## 2021-07-16 PROCEDURE — A9579 GAD-BASE MR CONTRAST NOS,1ML: HCPCS | Performed by: RADIOLOGY

## 2021-07-16 PROCEDURE — 72156 MRI NECK SPINE W/O & W/DYE: CPT

## 2021-07-16 PROCEDURE — 6360000004 HC RX CONTRAST MEDICATION: Performed by: RADIOLOGY

## 2021-07-16 RX ADMIN — GADOTERIDOL 16 ML: 279.3 INJECTION, SOLUTION INTRAVENOUS at 16:03

## 2021-07-18 NOTE — RESULT ENCOUNTER NOTE
Please let patient know MRI C spine was reviewed and does she want to follow up with Dr. Hector Hong or have another NSGY evaluate.   Thanks

## 2021-07-19 ENCOUNTER — TELEPHONE (OUTPATIENT)
Dept: NEUROLOGY | Age: 56
End: 2021-07-19

## 2021-07-19 NOTE — TELEPHONE ENCOUNTER
Patient would like referral back to Dr Nicolas Devlin.  Will need a new referral.   Electronically signed by Yoly Zuniga MA on 7/19/21 at 2:42 PM EDT

## 2021-07-19 NOTE — TELEPHONE ENCOUNTER
MA notified of Ann's response. Patient will check with insurance to she who is in network for her and will call back with names.   Electronically signed by Natanael Jaime MA on 7/19/21 at 9:11 AM EDT

## 2021-07-19 NOTE — TELEPHONE ENCOUNTER
----- Message from Criss Terrell, [address] - CNP sent at 7/18/2021 10:00 AM EDT -----  Please let patient know MRI C spine was reviewed and does she want to follow up with Dr. Patti Mora or have another NSGY evaluate.   Thanks

## 2021-07-20 ENCOUNTER — TELEPHONE (OUTPATIENT)
Dept: NEUROLOGY | Age: 56
End: 2021-07-20

## 2021-07-20 DIAGNOSIS — Z98.890 HISTORY OF CERVICAL SPINAL SURGERY: Primary | ICD-10-CM

## 2021-07-20 DIAGNOSIS — M54.2 CERVICAL PAIN: ICD-10-CM

## 2021-07-20 NOTE — TELEPHONE ENCOUNTER
MA faxed referral to Dr Cleo Godwin office.   Electronically signed by Yolande Bumpers, MA on 7/20/21 at 12:50 PM EDT

## 2021-07-20 NOTE — TELEPHONE ENCOUNTER
Patient notified, referral placed.   Electronically signed by Eduardo Jaquez MA on 7/20/21 at 10:45 AM EDT

## 2021-09-14 ENCOUNTER — HOSPITAL ENCOUNTER (OUTPATIENT)
Age: 56
Discharge: HOME | End: 2021-09-14
Payer: COMMERCIAL

## 2021-09-14 ENCOUNTER — OFFICE VISIT (OUTPATIENT)
Dept: FAMILY MEDICINE CLINIC | Age: 56
End: 2021-09-14
Payer: COMMERCIAL

## 2021-09-14 VITALS
TEMPERATURE: 97.8 F | OXYGEN SATURATION: 96 % | HEIGHT: 64 IN | HEART RATE: 85 BPM | SYSTOLIC BLOOD PRESSURE: 121 MMHG | DIASTOLIC BLOOD PRESSURE: 79 MMHG | BODY MASS INDEX: 31.92 KG/M2 | WEIGHT: 187 LBS | RESPIRATION RATE: 18 BRPM

## 2021-09-14 DIAGNOSIS — T30.0 FIRST DEGREE BURN INJURY: ICD-10-CM

## 2021-09-14 DIAGNOSIS — I10 ESSENTIAL HYPERTENSION: ICD-10-CM

## 2021-09-14 DIAGNOSIS — Z01.818 PRE-OP EXAM: Primary | ICD-10-CM

## 2021-09-14 DIAGNOSIS — Z01.818 PRE-OP EXAM: ICD-10-CM

## 2021-09-14 DIAGNOSIS — B00.1 HERPES LABIALIS: ICD-10-CM

## 2021-09-14 DIAGNOSIS — E78.5 HYPERLIPIDEMIA, UNSPECIFIED HYPERLIPIDEMIA TYPE: ICD-10-CM

## 2021-09-14 PROBLEM — J06.9 VIRAL UPPER RESPIRATORY TRACT INFECTION: Status: RESOLVED | Noted: 2020-04-30 | Resolved: 2021-09-14

## 2021-09-14 PROBLEM — S61.219A LACERATION OF FINGER: Status: RESOLVED | Noted: 2020-04-30 | Resolved: 2021-09-14

## 2021-09-14 LAB
ANION GAP SERPL CALCULATED.3IONS-SCNC: 12 MMOL/L (ref 7–16)
BASOPHILS ABSOLUTE: 0.09 E9/L (ref 0–0.2)
BASOPHILS RELATIVE PERCENT: 0.9 % (ref 0–2)
BUN BLDV-MCNC: 16 MG/DL (ref 6–20)
CALCIUM SERPL-MCNC: 9.2 MG/DL (ref 8.6–10.2)
CHLORIDE BLD-SCNC: 103 MMOL/L (ref 98–107)
CO2: 25 MMOL/L (ref 22–29)
CREAT SERPL-MCNC: 0.8 MG/DL (ref 0.5–1)
EOSINOPHILS ABSOLUTE: 0.32 E9/L (ref 0.05–0.5)
EOSINOPHILS RELATIVE PERCENT: 3.1 % (ref 0–6)
GFR AFRICAN AMERICAN: >60
GFR NON-AFRICAN AMERICAN: >60 ML/MIN/1.73
GLUCOSE BLD-MCNC: 101 MG/DL (ref 74–99)
HCT VFR BLD CALC: 42 % (ref 34–48)
HEMOGLOBIN: 13.8 G/DL (ref 11.5–15.5)
IMMATURE GRANULOCYTES #: 0.04 E9/L
IMMATURE GRANULOCYTES %: 0.4 % (ref 0–5)
LYMPHOCYTES ABSOLUTE: 2.92 E9/L (ref 1.5–4)
LYMPHOCYTES RELATIVE PERCENT: 28.3 % (ref 20–42)
MCH RBC QN AUTO: 29.8 PG (ref 26–35)
MCHC RBC AUTO-ENTMCNC: 32.9 % (ref 32–34.5)
MCV RBC AUTO: 90.7 FL (ref 80–99.9)
MONOCYTES ABSOLUTE: 0.61 E9/L (ref 0.1–0.95)
MONOCYTES RELATIVE PERCENT: 5.9 % (ref 2–12)
NEUTROPHILS ABSOLUTE: 6.32 E9/L (ref 1.8–7.3)
NEUTROPHILS RELATIVE PERCENT: 61.4 % (ref 43–80)
PDW BLD-RTO: 13.8 FL (ref 11.5–15)
PLATELET # BLD: 395 E9/L (ref 130–450)
PMV BLD AUTO: 9.8 FL (ref 7–12)
POTASSIUM SERPL-SCNC: 4.2 MMOL/L (ref 3.5–5)
RBC # BLD: 4.63 E12/L (ref 3.5–5.5)
SODIUM BLD-SCNC: 140 MMOL/L (ref 132–146)
WBC # BLD: 10.3 E9/L (ref 4.5–11.5)

## 2021-09-14 PROCEDURE — G8417 CALC BMI ABV UP PARAM F/U: HCPCS | Performed by: FAMILY MEDICINE

## 2021-09-14 PROCEDURE — 4004F PT TOBACCO SCREEN RCVD TLK: CPT | Performed by: FAMILY MEDICINE

## 2021-09-14 PROCEDURE — 99213 OFFICE O/P EST LOW 20 MIN: CPT | Performed by: FAMILY MEDICINE

## 2021-09-14 PROCEDURE — 3017F COLORECTAL CA SCREEN DOC REV: CPT | Performed by: FAMILY MEDICINE

## 2021-09-14 PROCEDURE — 85025 COMPLETE CBC W/AUTO DIFF WBC: CPT

## 2021-09-14 PROCEDURE — 80048 BASIC METABOLIC PNL TOTAL CA: CPT

## 2021-09-14 PROCEDURE — G8427 DOCREV CUR MEDS BY ELIG CLIN: HCPCS | Performed by: FAMILY MEDICINE

## 2021-09-14 PROCEDURE — 93000 ELECTROCARDIOGRAM COMPLETE: CPT | Performed by: FAMILY MEDICINE

## 2021-09-14 PROCEDURE — 36415 COLL VENOUS BLD VENIPUNCTURE: CPT

## 2021-09-14 RX ORDER — AMLODIPINE BESYLATE 2.5 MG/1
2.5 TABLET ORAL DAILY
Qty: 90 TABLET | Refills: 1 | Status: SHIPPED
Start: 2021-09-14 | End: 2022-02-23

## 2021-09-14 RX ORDER — ATORVASTATIN CALCIUM 20 MG/1
10 TABLET, FILM COATED ORAL DAILY
Qty: 30 TABLET | Refills: 1 | Status: SHIPPED
Start: 2021-09-14 | End: 2021-12-14 | Stop reason: SDUPTHER

## 2021-09-14 RX ORDER — BACLOFEN 10 MG/1
TABLET ORAL
Qty: 60 TABLET | Refills: 3 | Status: SHIPPED
Start: 2021-09-14 | End: 2022-01-04

## 2021-09-14 RX ORDER — AMLODIPINE BESYLATE 5 MG/1
TABLET ORAL
Qty: 30 TABLET | Refills: 1 | Status: SHIPPED
Start: 2021-09-14 | End: 2021-11-08

## 2021-09-14 RX ORDER — ACYCLOVIR 400 MG/1
TABLET ORAL
Qty: 14 TABLET | Refills: 2 | Status: SHIPPED
Start: 2021-09-14 | End: 2021-10-04

## 2021-09-14 SDOH — ECONOMIC STABILITY: FOOD INSECURITY: WITHIN THE PAST 12 MONTHS, YOU WORRIED THAT YOUR FOOD WOULD RUN OUT BEFORE YOU GOT MONEY TO BUY MORE.: NEVER TRUE

## 2021-09-14 SDOH — ECONOMIC STABILITY: FOOD INSECURITY: WITHIN THE PAST 12 MONTHS, THE FOOD YOU BOUGHT JUST DIDN'T LAST AND YOU DIDN'T HAVE MONEY TO GET MORE.: NEVER TRUE

## 2021-09-14 ASSESSMENT — ENCOUNTER SYMPTOMS
CONSTIPATION: 0
TROUBLE SWALLOWING: 0
SORE THROAT: 0
NAUSEA: 0
ABDOMINAL PAIN: 0
VOMITING: 0
COUGH: 0
DIARRHEA: 0

## 2021-09-14 ASSESSMENT — SOCIAL DETERMINANTS OF HEALTH (SDOH): HOW HARD IS IT FOR YOU TO PAY FOR THE VERY BASICS LIKE FOOD, HOUSING, MEDICAL CARE, AND HEATING?: NOT HARD AT ALL
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
## 2021-09-15 PROBLEM — E78.5 HYPERLIPIDEMIA: Status: ACTIVE | Noted: 2021-09-15

## 2021-09-15 PROBLEM — Z01.818 PRE-OP EXAM: Status: ACTIVE | Noted: 2021-09-15

## 2021-09-15 PROBLEM — T30.0 FIRST DEGREE BURN INJURY: Status: ACTIVE | Noted: 2021-09-15

## 2021-09-15 ASSESSMENT — ENCOUNTER SYMPTOMS: COLOR CHANGE: 1

## 2021-09-16 ENCOUNTER — HOSPITAL ENCOUNTER (OUTPATIENT)
Age: 56
Discharge: HOME | End: 2021-09-18

## 2021-09-16 PROCEDURE — 87081 CULTURE SCREEN ONLY: CPT

## 2021-09-18 LAB — MRSA CULTURE ONLY: NORMAL

## 2021-10-02 DIAGNOSIS — B00.1 HERPES LABIALIS: ICD-10-CM

## 2021-10-04 RX ORDER — ACYCLOVIR 400 MG/1
TABLET ORAL
Qty: 14 TABLET | Refills: 2 | Status: SHIPPED
Start: 2021-10-04 | End: 2021-12-14 | Stop reason: SDUPTHER

## 2021-10-11 DIAGNOSIS — M54.2 CERVICAL PAIN: ICD-10-CM

## 2021-10-11 RX ORDER — GABAPENTIN 300 MG/1
300 CAPSULE ORAL NIGHTLY
Qty: 90 CAPSULE | Refills: 3 | Status: SHIPPED
Start: 2021-10-11 | End: 2022-09-30 | Stop reason: SDUPTHER

## 2021-10-15 PROBLEM — Z01.818 PRE-OP EXAM: Status: RESOLVED | Noted: 2021-09-15 | Resolved: 2021-10-15

## 2021-10-23 DIAGNOSIS — B00.1 HERPES LABIALIS: ICD-10-CM

## 2021-10-25 RX ORDER — ACYCLOVIR 400 MG/1
TABLET ORAL
Qty: 14 TABLET | Refills: 2 | OUTPATIENT
Start: 2021-10-25

## 2021-11-06 ENCOUNTER — APPOINTMENT (OUTPATIENT)
Dept: CT IMAGING | Age: 56
End: 2021-11-06
Payer: COMMERCIAL

## 2021-11-06 ENCOUNTER — HOSPITAL ENCOUNTER (EMERGENCY)
Age: 56
Discharge: HOME | End: 2021-11-06
Payer: COMMERCIAL

## 2021-11-06 VITALS
TEMPERATURE: 97 F | OXYGEN SATURATION: 97 % | DIASTOLIC BLOOD PRESSURE: 84 MMHG | SYSTOLIC BLOOD PRESSURE: 139 MMHG | WEIGHT: 180 LBS | HEART RATE: 90 BPM | RESPIRATION RATE: 14 BRPM | HEIGHT: 64 IN | BODY MASS INDEX: 30.73 KG/M2

## 2021-11-06 DIAGNOSIS — H83.03 LABYRINTHITIS OF BOTH EARS: ICD-10-CM

## 2021-11-06 DIAGNOSIS — H93.13 TINNITUS OF BOTH EARS: Primary | ICD-10-CM

## 2021-11-06 PROCEDURE — 6370000000 HC RX 637 (ALT 250 FOR IP): Performed by: PHYSICIAN ASSISTANT

## 2021-11-06 PROCEDURE — 70450 CT HEAD/BRAIN W/O DYE: CPT

## 2021-11-06 PROCEDURE — 99283 EMERGENCY DEPT VISIT LOW MDM: CPT

## 2021-11-06 RX ORDER — MECLIZINE HYDROCHLORIDE 25 MG/1
25 TABLET ORAL 2 TIMES DAILY PRN
Qty: 30 TABLET | Refills: 0 | Status: SHIPPED | OUTPATIENT
Start: 2021-11-06 | End: 2022-04-13

## 2021-11-06 RX ORDER — MECLIZINE HCL 12.5 MG/1
25 TABLET ORAL ONCE
Status: COMPLETED | OUTPATIENT
Start: 2021-11-06 | End: 2021-11-06

## 2021-11-06 RX ADMIN — MECLIZINE 25 MG: 12.5 TABLET ORAL at 14:47

## 2021-11-06 ASSESSMENT — PAIN SCALES - GENERAL: PAINLEVEL_OUTOF10: 6

## 2021-11-06 NOTE — ED PROVIDER NOTES
[facility]     Department of Emergency Medicine   ED  Provider Note  Admit Date/RoomTime: 2021  2:39 PM  ED Room: 35/35    Chief Complaint:   Tinnitus and Otalgia (BL x2-3 Weeks)    History of Present Illness      Kayy Mitchell is a 61 y.o. old female who presents to the ED for tinnitus to bilateral ears that has been ongoing for the past 3 weeks. Patient also reports waking up this morning with some slight discomfort to her left ear. She denies any past history of ear problems. She states she did call ENT and cannot get in until . She does have some congestion and drainage. She does report some sinus pressure as well. She denies any vision changes, neck pain, chest pain, shortness of breath, pain with breathing, abdominal pain, nausea, vomiting, fever/chills, rash, or difficulty with ambulation or balance. Patient is alert oriented x3 and in no apparent distress at this exam.  She is nontoxic-appearing. ROS   Pertinent positives and negatives are stated within HPI, all other systems reviewed and are negative. Past Medical History:  has a past medical history of Depression, Hyperlipidemia, Hypertension, Laceration of finger, and Viral upper respiratory tract infection. Past Surgical History:  has a past surgical history that includes  section; fracture surgery; back surgery; Hysterectomy; and Ankle surgery (2015 or 2016). Social History:  reports that she has been smoking cigarettes. She started smoking about 48 years ago. She has a 3.15 pack-year smoking history. She has never used smokeless tobacco. She reports current alcohol use. She reports that she does not use drugs. Family History: family history includes Heart Attack in her maternal grandfather; Stroke (age of onset: 76) in her father and mother. The patients home medications have been reviewed. Allergies: Aspirin, Lisinopril, Morphine, and Seasonal  Allergies have been reviewed with patient. Physical Exam   VS:  /84   Pulse 90   Temp 97 °F (36.1 °C) (Temporal)   Resp 14   Ht 5' 4\" (1.626 m)   Wt 180 lb (81.6 kg)   SpO2 97%   BMI 30.90 kg/m²      Oxygen Saturation Interpretation: Normal.    Constitutional:  Alert and oriented x3, development consistent with age. NAD  Eyes: EOMI, non-injected conjunctiva   Ears:  External Ears: Bilateral normal.              TM's & External Canals:  normal TM's and external ear canals both ears. No canal redness or edema, no TM effusions  Throat: Post nasal drainage, no erythema or exudates noted. , uvula midline, Airway patent     Neck/Lymphatic: Supple. There is no cervical node tenderness. Non-tender with no meningeal signs   Respiratory:  Clear to auscultation and breath sounds equal, good air flow. No respiratory distress, unlabored breathing   CV: Regular rate and rhythm  Abdomen: Soft, non-tender, non-distended  Integument:  No rashes or erythema present. Neurological:  Motor functions intact. Stable gait    Lab / Imaging Results   (All laboratory and radiology results have been personally reviewed by myself)  Labs:  No results found for this visit on 11/06/21. Imaging: All Radiology results interpreted by Radiologist unless otherwise noted. CT HEAD WO CONTRAST   Final Result   No acute intracranial abnormality. ED Course / Medical Decision Making   ED Medications:   Medications   meclizine (ANTIVERT) tablet 25 mg (25 mg Oral Given 11/6/21 1447)     Consults:  None    Procedures:  none     Medical Decision Making:   Patient is well appearing, non toxic and appropriate for outpatient management. Plan is for symptom management and PCP follow up. Counseling: The emergency provider has spoken with the patient and/or caregiver and discussed todays results, in addition to providing specific details for the plan of care and counseling regarding the diagnosis and prognosis.   Questions are answered at this time and they are agreeable with the plan. All results reviewed with pt and all questions answered. I discussed the differential, results and discharge plan with the patient and/or family/friend/caregiver if present. I emphasized the importance of follow-up with the physician I referred them to in the timeframe recommended. I explained reasons for the patient to return to the Emergency Department. Additional verbal discharge instructions were also given and discussed with the patient to supplement those generated by the EMR. We also discussed medications that were prescribed (if any) including common side effects and interactions. All questions were addressed. They understand return precautions and discharge instructions. The patient and/or family/friend/caregiver expressed understanding. Vitals were stable and they were in no distress at discharge. Patient was able to ambulate out of department no difficulty or complaints of dizziness. Assessment     1. Tinnitus of both ears    2. Labyrinthitis of both ears      Plan   Discharge to home  Patient condition is good    New Medications     New Prescriptions    MECLIZINE (ANTIVERT) 25 MG TABLET    Take 1 tablet by mouth 2 times daily as needed for Dizziness       Electronically signed by Ama West PA-C   DD: 11/6/21  **This report was transcribed using voice recognition software. Every effort was made to ensure accuracy; however, inadvertent computerized transcription errors may be present.     END OF ED PROVIDER NOTE         Ama West PA-C  11/06/21 1997

## 2021-11-07 DIAGNOSIS — I10 ESSENTIAL HYPERTENSION: ICD-10-CM

## 2021-11-08 RX ORDER — AMLODIPINE BESYLATE 5 MG/1
TABLET ORAL
Qty: 30 TABLET | Refills: 1 | Status: SHIPPED
Start: 2021-11-08 | End: 2022-01-03

## 2021-11-08 NOTE — TELEPHONE ENCOUNTER
Requested Prescriptions     Pending Prescriptions Disp Refills    amLODIPine (NORVASC) 5 MG tablet [Pharmacy Med Name: AMLODIPINE BESYLATE 5 MG TAB] 30 tablet 1     Sig: take 1 tablet by mouth once daily

## 2021-11-23 ENCOUNTER — OFFICE VISIT (OUTPATIENT)
Dept: FAMILY MEDICINE CLINIC | Age: 56
End: 2021-11-23
Payer: COMMERCIAL

## 2021-11-23 VITALS
TEMPERATURE: 97.8 F | WEIGHT: 192.2 LBS | SYSTOLIC BLOOD PRESSURE: 134 MMHG | DIASTOLIC BLOOD PRESSURE: 78 MMHG | HEIGHT: 64 IN | RESPIRATION RATE: 14 BRPM | BODY MASS INDEX: 32.81 KG/M2 | HEART RATE: 86 BPM | OXYGEN SATURATION: 98 %

## 2021-11-23 DIAGNOSIS — J30.9 ALLERGIC RHINITIS, UNSPECIFIED SEASONALITY, UNSPECIFIED TRIGGER: Primary | ICD-10-CM

## 2021-11-23 DIAGNOSIS — H93.13 TINNITUS OF BOTH EARS: ICD-10-CM

## 2021-11-23 PROCEDURE — 99213 OFFICE O/P EST LOW 20 MIN: CPT | Performed by: STUDENT IN AN ORGANIZED HEALTH CARE EDUCATION/TRAINING PROGRAM

## 2021-11-23 PROCEDURE — G8427 DOCREV CUR MEDS BY ELIG CLIN: HCPCS | Performed by: STUDENT IN AN ORGANIZED HEALTH CARE EDUCATION/TRAINING PROGRAM

## 2021-11-23 PROCEDURE — G8484 FLU IMMUNIZE NO ADMIN: HCPCS | Performed by: STUDENT IN AN ORGANIZED HEALTH CARE EDUCATION/TRAINING PROGRAM

## 2021-11-23 PROCEDURE — G8417 CALC BMI ABV UP PARAM F/U: HCPCS | Performed by: STUDENT IN AN ORGANIZED HEALTH CARE EDUCATION/TRAINING PROGRAM

## 2021-11-23 PROCEDURE — 4004F PT TOBACCO SCREEN RCVD TLK: CPT | Performed by: STUDENT IN AN ORGANIZED HEALTH CARE EDUCATION/TRAINING PROGRAM

## 2021-11-23 PROCEDURE — 3017F COLORECTAL CA SCREEN DOC REV: CPT | Performed by: STUDENT IN AN ORGANIZED HEALTH CARE EDUCATION/TRAINING PROGRAM

## 2021-11-23 RX ORDER — HYDROCODONE BITARTRATE AND ACETAMINOPHEN 5; 325 MG/1; MG/1
TABLET ORAL
COMMUNITY
Start: 2021-11-19 | End: 2022-04-13

## 2021-11-23 RX ORDER — FLUTICASONE PROPIONATE 50 MCG
2 SPRAY, SUSPENSION (ML) NASAL DAILY
Qty: 48 G | Refills: 1 | Status: SHIPPED
Start: 2021-11-23 | End: 2022-04-13

## 2021-11-23 RX ORDER — FEXOFENADINE HCL 180 MG/1
180 TABLET ORAL DAILY
Qty: 30 TABLET | Refills: 0 | Status: SHIPPED | OUTPATIENT
Start: 2021-11-23 | End: 2021-12-23

## 2021-11-23 ASSESSMENT — ENCOUNTER SYMPTOMS
PHOTOPHOBIA: 0
TROUBLE SWALLOWING: 0
ABDOMINAL DISTENTION: 0
COUGH: 0
EYE PAIN: 0
NAUSEA: 0
SINUS PAIN: 0
SINUS PRESSURE: 0
SHORTNESS OF BREATH: 0
VOMITING: 0
SORE THROAT: 0
CONSTIPATION: 0
DIARRHEA: 0
RHINORRHEA: 1
ABDOMINAL PAIN: 0

## 2021-11-23 NOTE — PROGRESS NOTES
S: 61 y.o. female with   Chief Complaint   Patient presents with    Ear Problem     B/L \"Buzzing\" times 1 month    Migraine       1 month with tinnitus, no injury, +borderline hearing loss and in needs of hearing aids   Went to ED, little dizziness and given meclizine, resolved  Trouble sleeping  Worse at night  Appt with ENT next month    O: VS:  height is 5' 4\" (1.626 m) and weight is 192 lb 3.2 oz (87.2 kg). Her temporal temperature is 97.8 °F (36.6 °C). Her blood pressure is 134/78 and her pulse is 86. Her respiration is 14 and oxygen saturation is 98%. BP Readings from Last 3 Encounters:   11/23/21 134/78   11/06/21 139/84   09/14/21 121/79     See resident note      Impression/Plan:   1) Tinnitus  2) Known mild hearing loss    Trial of nasal steroid and regular antihistamine. Fan/white noise at night to help with sleep. Keep appt with ENT next month. Health Maintenance Due   Topic Date Due    HIV screen  Never done    Shingles Vaccine (1 of 2) Never done    COVID-19 Vaccine (3 - Booster for Rhonda Estimable series) 10/14/2021         Attending Physician Statement  I have discussed the case, including pertinent history and exam findings with the resident. I agree with the documented assessment and plan.       Wendy Liriano MD

## 2021-12-14 ENCOUNTER — OFFICE VISIT (OUTPATIENT)
Dept: FAMILY MEDICINE CLINIC | Age: 56
End: 2021-12-14
Payer: COMMERCIAL

## 2021-12-14 VITALS
DIASTOLIC BLOOD PRESSURE: 78 MMHG | HEIGHT: 64 IN | BODY MASS INDEX: 32.95 KG/M2 | WEIGHT: 193 LBS | OXYGEN SATURATION: 97 % | RESPIRATION RATE: 18 BRPM | SYSTOLIC BLOOD PRESSURE: 124 MMHG | TEMPERATURE: 97.5 F | HEART RATE: 79 BPM

## 2021-12-14 DIAGNOSIS — E78.5 HYPERLIPIDEMIA, UNSPECIFIED HYPERLIPIDEMIA TYPE: ICD-10-CM

## 2021-12-14 DIAGNOSIS — I10 ESSENTIAL HYPERTENSION: ICD-10-CM

## 2021-12-14 DIAGNOSIS — R73.03 PREDIABETES: Primary | ICD-10-CM

## 2021-12-14 DIAGNOSIS — B00.1 HERPES LABIALIS: ICD-10-CM

## 2021-12-14 LAB — HBA1C MFR BLD: 6.1 %

## 2021-12-14 PROCEDURE — G8427 DOCREV CUR MEDS BY ELIG CLIN: HCPCS | Performed by: FAMILY MEDICINE

## 2021-12-14 PROCEDURE — 83036 HEMOGLOBIN GLYCOSYLATED A1C: CPT | Performed by: FAMILY MEDICINE

## 2021-12-14 PROCEDURE — 4004F PT TOBACCO SCREEN RCVD TLK: CPT | Performed by: FAMILY MEDICINE

## 2021-12-14 PROCEDURE — G8482 FLU IMMUNIZE ORDER/ADMIN: HCPCS | Performed by: FAMILY MEDICINE

## 2021-12-14 PROCEDURE — G8417 CALC BMI ABV UP PARAM F/U: HCPCS | Performed by: FAMILY MEDICINE

## 2021-12-14 PROCEDURE — 99213 OFFICE O/P EST LOW 20 MIN: CPT | Performed by: FAMILY MEDICINE

## 2021-12-14 PROCEDURE — 3017F COLORECTAL CA SCREEN DOC REV: CPT | Performed by: FAMILY MEDICINE

## 2021-12-14 RX ORDER — METFORMIN HYDROCHLORIDE 500 MG/1
500 TABLET, EXTENDED RELEASE ORAL
Qty: 90 TABLET | Refills: 1 | Status: SHIPPED
Start: 2021-12-14 | End: 2022-04-13 | Stop reason: SDUPTHER

## 2021-12-14 RX ORDER — ACYCLOVIR 400 MG/1
400 TABLET ORAL ONCE
Qty: 14 TABLET | Refills: 2 | Status: SHIPPED
Start: 2021-12-14 | End: 2022-01-21

## 2021-12-14 RX ORDER — ATORVASTATIN CALCIUM 20 MG/1
10 TABLET, FILM COATED ORAL DAILY
Qty: 30 TABLET | Refills: 1 | Status: SHIPPED
Start: 2021-12-14 | End: 2021-12-14

## 2021-12-14 RX ORDER — ATORVASTATIN CALCIUM 10 MG/1
10 TABLET, FILM COATED ORAL DAILY
Qty: 30 TABLET | Refills: 3 | Status: SHIPPED
Start: 2021-12-14 | End: 2022-04-13 | Stop reason: SDUPTHER

## 2021-12-14 ASSESSMENT — ENCOUNTER SYMPTOMS
DIARRHEA: 0
SORE THROAT: 0
CONSTIPATION: 0
TROUBLE SWALLOWING: 0
ABDOMINAL PAIN: 0
VOMITING: 0
NAUSEA: 0
COUGH: 0
BACK PAIN: 1

## 2021-12-14 NOTE — PROGRESS NOTES
S: 61 y.o. female with   Chief Complaint   Patient presents with    Hypertension       HTN - no sx - at goal  Pre-DM - A1C up slightly but still in range      O: VS:  height is 5' 4\" (1.626 m) and weight is 193 lb (87.5 kg). Her temporal temperature is 97.5 °F (36.4 °C). Her blood pressure is 124/78 and her pulse is 79. Her respiration is 18 and oxygen saturation is 97%. BP Readings from Last 3 Encounters:   12/14/21 124/78   11/23/21 134/78   11/06/21 139/84     See resident note      Impression/Plan:   1) HTN - cont meds - controlled   2) Pre-DM - lifestyle discussed      Health Maintenance Due   Topic Date Due    HIV screen  Never done    Shingles Vaccine (1 of 2) Never done         Attending Physician Statement  I have discussed the case, including pertinent history and exam findings with the resident. I agree with the documented assessment and plan.       Wilder Cherry MD

## 2021-12-14 NOTE — PROGRESS NOTES
[facility]  Family Medicine Residency  Phone: [phone]  Fax: [phone]    Patient:  Shayla Garcia 61 y.o. female                                 Date of Service: 12/14/21                            Chiefcomplaint:   Chief Complaint   Patient presents with    Hypertension         History of Present Illness: The patient is a 61 y.o. female  presented to the clinic with complaints as above. Here for 3 mo fu     Tinnitus  Constant, hearing is getting impaired too   Seeing ENT this Friday    HTN  Pt remains of norvasc 5 mg and 2.5 mg   Checks at home, highest 132/85, usually stays   Has one episode of having lightheadedness, unsure if its inner ear. Elevated A1C  A1C: 5.7 in July     HLD  Continue Lipitor 20mg half pill day  The 10-year ASCVD risk score (Niharika Leslie, et al., 2013) is: 7.4%    Values used to calculate the score:      Age: 61 years      Sex: Female      Is Non- : No      Diabetic: No      Tobacco smoker: Yes      Systolic Blood Pressure: 878 mmHg      Is BP treated: Yes      HDL Cholesterol: 43 mg/dL      Total Cholesterol: 172 mg/dL     Spine surgery Jan 25, Dr Chaya Suárez , refilling gabapentin 300 Qd   Baclofen in morning and afternoon   Neurontin : for back pain   Bupropion: 300 mg Depression     Review of Systems:   Review of Systems   Constitutional: Negative for chills and fever. HENT: Positive for tinnitus. Negative for congestion, sore throat and trouble swallowing. Respiratory: Negative for cough. Cardiovascular: Negative for chest pain and leg swelling. Gastrointestinal: Negative for abdominal pain, constipation, diarrhea, nausea and vomiting. Genitourinary: Negative for difficulty urinating. Musculoskeletal: Positive for back pain. Negative for arthralgias and myalgias. Skin: Negative for rash and wound. Neurological: Negative for dizziness and headaches. Psychiatric/Behavioral: Negative for agitation.        Past Medical History:      Diagnosis Date    Depression     Hyperlipidemia     Hypertension     Laceration of finger 2020    Viral upper respiratory tract infection 2020       Past Surgical History:        Procedure Laterality Date    ANKLE SURGERY  2015 or 2016    LEFT   ankles and screws    BACK SURGERY      Cervical surgery      SECTION      FRACTURE SURGERY      Rotaor cuff surgery    HYSTERECTOMY         Allergies:    Aspirin, Lisinopril, Morphine, and Seasonal    Social History:   Social History     Socioeconomic History    Marital status:      Spouse name: Not on file    Number of children: Not on file    Years of education: Not on file    Highest education level: Not on file   Occupational History    Not on file   Tobacco Use    Smoking status: Current Every Day Smoker     Packs/day: 0.07     Years: 45.00     Pack years: 3.15     Types: Cigarettes     Start date: 1973    Smokeless tobacco: Never Used    Tobacco comment: 1 every other day   Vaping Use    Vaping Use: Former   Substance and Sexual Activity    Alcohol use: Yes     Comment: occassional    Drug use: No    Sexual activity: Not Currently   Other Topics Concern    Not on file   Social History Narrative    Not on file     Social Determinants of Health     Financial Resource Strain: Low Risk     Difficulty of Paying Living Expenses: Not hard at all   Food Insecurity: No Food Insecurity    Worried About Running Out of Food in the Last Year: Never true    Bright of Food in the Last Year: Never true   Transportation Needs:     Lack of Transportation (Medical): Not on file    Lack of Transportation (Non-Medical):  Not on file   Physical Activity:     Days of Exercise per Week: Not on file    Minutes of Exercise per Session: Not on file   Stress:     Feeling of Stress : Not on file   Social Connections:     Frequency of Communication with Friends and Family: Not on file    Frequency of Social Gatherings with Friends and Family: Not on file    Attends Christian Services: Not on file    Active Member of Clubs or Organizations: Not on file    Attends Club or Organization Meetings: Not on file    Marital Status: Not on file   Intimate Partner Violence:     Fear of Current or Ex-Partner: Not on file    Emotionally Abused: Not on file    Physically Abused: Not on file    Sexually Abused: Not on file   Housing Stability:     Unable to Pay for Housing in the Last Year: Not on file    Number of [place] in the Last Year: Not on file    Unstable Housing in the Last Year: Not on file        Family History:       Problem Relation Age of Onset    Stroke Mother 76        mini     Stroke Father 76    Heart Attack Maternal Grandfather        Physical Exam:    Vitals: /78   Pulse 79   Temp 97.5 °F (36.4 °C) (Temporal)   Resp 18   Ht 5' 4\" (1.626 m)   Wt 193 lb (87.5 kg)   SpO2 97%   BMI 33.13 kg/m²   BP Readings from Last 3 Encounters:   12/14/21 124/78   11/23/21 134/78   11/06/21 139/84     General Appearance: Well developed, awake, alert, oriented, no acute distress  HEENT: Normocephalic,atraumatic. PERRL, EOM's intact, EAC without erythema or swelling, no pallor or icterus. Neck: Supple, symmetrical, trachea midline. No JVD. Chest wall/Lung: Clear to auscultation bilaterally,  respirations unlabored. No ronchi/wheezing/rales  Heart[de-identified]  Regular rate and rhythm, S1and S2 normal, no murmur, rub or gallop. Abdomen: Soft, non-tender, bowel sounds normoactive, no masses, no organomegaly  Extremities:  Extremities normal, atraumatic, no cyanosis. edema. Skin: Skin color, texture, turgor normal, no rashes or lesions  Musculokeletal: ROM grossly normal in all joints of extremities, no obvious joint swelling. Lymph nodes: no lymph node enlargement appreciated  Neurologic:   Alert&Oriented.     Normal gait and coordination  No focal neurological deficits appreaciated         Psychiatric: has a normal mood and affect. Behavior is normal.       Assessment and Plan:       1. Prediabetes  A1C today 6.1, encouraged diet and physical activity (which is limited at this time due to back pain)   Will start patient on Metformin  once daily  - POCT glycosylated hemoglobin (Hb A1C)    2. Hyperlipidemia, unspecified hyperlipidemia type  Pt ASCVD 8.6  Will continue Lipitor 10 mg for now   - atorvastatin (LIPITOR) 20 MG tablet; Take 0.5 tablets by mouth daily  Dispense: 30 tablet; Refill: 1    3. HTN  Well controlled ,stable BP readings at home  Will keep BP goal <130/85  Will continue Norvasc 5 mg and 2.5 mg      4. Herpes labialis  refill  - acyclovir (ZOVIRAX) 400 MG tablet; Take 1 tablet by mouth once for 1 dose  Dispense: 14 tablet; Refill: 2    Pt will RTC after her spinal surgery and recovery . Return to Office: Return in about 4 months (around 4/29/2022) for DM check, HTN. I encourage further reading and education about your health conditions. Information on many healthconditions is provided by the American Academy of [employer]icians: https://familydoctor. org/  Please bring any questions to me at your next visit. This document may have been prepared at least partiallythrough the use of voice recognition software. Although effort is taken to assure the accuracy of this document, it is possible that grammatical, syntax,  or spelling errors may occur.     Medication List:    Current Outpatient Medications   Medication Sig Dispense Refill    acyclovir (ZOVIRAX) 400 MG tablet Take 1 tablet by mouth once for 1 dose 14 tablet 2    metFORMIN (GLUCOPHAGE-XR) 500 MG extended release tablet Take 1 tablet by mouth daily (with breakfast) 90 tablet 1    atorvastatin (LIPITOR) 10 MG tablet Take 1 tablet by mouth daily 30 tablet 3    HYDROcodone-acetaminophen (NORCO) 5-325 MG per tablet       fluticasone (FLONASE) 50 MCG/ACT nasal spray 2 sprays by Each Nostril route daily 48 g 1    fexofenadine (ALLEGRA) 180 MG tablet Take 1 tablet by mouth daily 30 tablet 0    amLODIPine (NORVASC) 5 MG tablet take 1 tablet by mouth once daily 30 tablet 1    meclizine (ANTIVERT) 25 MG tablet Take 1 tablet by mouth 2 times daily as needed for Dizziness 30 tablet 0    gabapentin (NEURONTIN) 300 MG capsule Take 1 capsule by mouth nightly for 90 days. 90 capsule 3    baclofen (LIORESAL) 10 MG tablet take 1 tablet by mouth twice a day 60 tablet 3    amLODIPine (NORVASC) 2.5 MG tablet Take 1 tablet by mouth daily 90 tablet 1    acetaminophen (APAP EXTRA STRENGTH) 500 MG tablet Take 1 tablet by mouth every 6 hours as needed for Pain 20 tablet 0    buPROPion (WELLBUTRIN XL) 300 MG extended release tablet Take 1 tablet by mouth every morning 90 tablet 2     No current facility-administered medications for this visit.         Kar Frazier MD

## 2021-12-17 ENCOUNTER — PROCEDURE VISIT (OUTPATIENT)
Dept: AUDIOLOGY | Age: 56
End: 2021-12-17
Payer: COMMERCIAL

## 2021-12-17 ENCOUNTER — OFFICE VISIT (OUTPATIENT)
Dept: ENT CLINIC | Age: 56
End: 2021-12-17
Payer: COMMERCIAL

## 2021-12-17 VITALS — BODY MASS INDEX: 32.44 KG/M2 | WEIGHT: 190 LBS | HEIGHT: 64 IN

## 2021-12-17 DIAGNOSIS — H90.3 SENSORINEURAL HEARING LOSS, BILATERAL: ICD-10-CM

## 2021-12-17 DIAGNOSIS — H91.93 BILATERAL HEARING LOSS, UNSPECIFIED HEARING LOSS TYPE: Primary | ICD-10-CM

## 2021-12-17 DIAGNOSIS — H93.13 TINNITUS OF BOTH EARS: Primary | ICD-10-CM

## 2021-12-17 DIAGNOSIS — H93.13 TINNITUS OF BOTH EARS: ICD-10-CM

## 2021-12-17 PROCEDURE — 92567 TYMPANOMETRY: CPT | Performed by: AUDIOLOGIST

## 2021-12-17 PROCEDURE — 3017F COLORECTAL CA SCREEN DOC REV: CPT | Performed by: NURSE PRACTITIONER

## 2021-12-17 PROCEDURE — 4004F PT TOBACCO SCREEN RCVD TLK: CPT | Performed by: NURSE PRACTITIONER

## 2021-12-17 PROCEDURE — G8417 CALC BMI ABV UP PARAM F/U: HCPCS | Performed by: NURSE PRACTITIONER

## 2021-12-17 PROCEDURE — G8482 FLU IMMUNIZE ORDER/ADMIN: HCPCS | Performed by: NURSE PRACTITIONER

## 2021-12-17 PROCEDURE — G8427 DOCREV CUR MEDS BY ELIG CLIN: HCPCS | Performed by: NURSE PRACTITIONER

## 2021-12-17 PROCEDURE — 92557 COMPREHENSIVE HEARING TEST: CPT | Performed by: AUDIOLOGIST

## 2021-12-17 PROCEDURE — 99203 OFFICE O/P NEW LOW 30 MIN: CPT | Performed by: NURSE PRACTITIONER

## 2021-12-17 NOTE — PROGRESS NOTES
[facility] Otolaryngology  Dr. Aylin Rodarte D.O. Ms.Ed. New Consult       Patient Name:  Maria Teresa Menezes  :  1965     CHIEF C/O:    Chief Complaint   Patient presents with    Follow-up     B/L hearing loss, ringing in ears, keeps her up at night,        HISTORY OBTAINED FROM:  patient    HISTORY OF PRESENT ILLNESS:       Roderick Mcnamara is a 61y.o. year old female, here today for tinnitus and hearing loss. Symptoms for 4 months  Constant static sound in both ears  Does notice hearing loss  Family hx of hearing loss - father  Some noise exposure from watching auto racing  No pain or drainage from either ear  Denies current dizziness  No hx of ear infections or previous ear surgeries  Denies chronic sinus issues  Is currently taking flonase and allegra for allergy symptoms.             Past Medical History:   Diagnosis Date    Depression     Hyperlipidemia     Hypertension     Laceration of finger 2020    Viral upper respiratory tract infection 2020     Past Surgical History:   Procedure Laterality Date    ANKLE SURGERY   or 2016    LEFT   ankles and screws    BACK SURGERY      Cervical surgery      SECTION      FRACTURE SURGERY      Rotaor cuff surgery    HYSTERECTOMY         Current Outpatient Medications:     metFORMIN (GLUCOPHAGE-XR) 500 MG extended release tablet, Take 1 tablet by mouth daily (with breakfast), Disp: 90 tablet, Rfl: 1    atorvastatin (LIPITOR) 10 MG tablet, Take 1 tablet by mouth daily, Disp: 30 tablet, Rfl: 3    HYDROcodone-acetaminophen (NORCO) 5-325 MG per tablet, , Disp: , Rfl:     fluticasone (FLONASE) 50 MCG/ACT nasal spray, 2 sprays by Each Nostril route daily, Disp: 48 g, Rfl: 1    fexofenadine (ALLEGRA) 180 MG tablet, Take 1 tablet by mouth daily, Disp: 30 tablet, Rfl: 0    amLODIPine (NORVASC) 5 MG tablet, take 1 tablet by mouth once daily, Disp: 30 tablet, Rfl: 1    meclizine (ANTIVERT) 25 MG tablet, Take 1 tablet by mouth 2 times daily as needed for Dizziness, Disp: 30 tablet, Rfl: 0    gabapentin (NEURONTIN) 300 MG capsule, Take 1 capsule by mouth nightly for 90 days. , Disp: 90 capsule, Rfl: 3    baclofen (LIORESAL) 10 MG tablet, take 1 tablet by mouth twice a day, Disp: 60 tablet, Rfl: 3    amLODIPine (NORVASC) 2.5 MG tablet, Take 1 tablet by mouth daily, Disp: 90 tablet, Rfl: 1    acetaminophen (APAP EXTRA STRENGTH) 500 MG tablet, Take 1 tablet by mouth every 6 hours as needed for Pain, Disp: 20 tablet, Rfl: 0    buPROPion (WELLBUTRIN XL) 300 MG extended release tablet, Take 1 tablet by mouth every morning, Disp: 90 tablet, Rfl: 2  Aspirin, Lisinopril, Morphine, and Seasonal  Social History     Tobacco Use    Smoking status: Current Every Day Smoker     Packs/day: 0.07     Years: 45.00     Pack years: 3.15     Types: Cigarettes     Start date: 9/13/1973    Smokeless tobacco: Never Used    Tobacco comment: 1 every other day   Vaping Use    Vaping Use: Former   Substance Use Topics    Alcohol use: Yes     Comment: occassional    Drug use: No     Family History   Problem Relation Age of Onset    Stroke Mother 76        mini     Stroke Father 76    Heart Attack Maternal Grandfather        Review of Systems   HENT: Positive for hearing loss and tinnitus. Negative for congestion, ear discharge, ear pain, postnasal drip, rhinorrhea, sinus pressure and sinus pain. Ht 5' 4\" (1.626 m)   Wt 190 lb (86.2 kg)   BMI 32.61 kg/m²   Physical Exam  Constitutional:       Appearance: Normal appearance. HENT:      Head: Normocephalic. Right Ear: Tympanic membrane, ear canal and external ear normal.      Left Ear: Tympanic membrane, ear canal and external ear normal.      Nose: Nose normal. No rhinorrhea. Right Turbinates: Not pale. Left Turbinates: Not pale. Mouth/Throat:      Lips: Pink. Mouth: Mucous membranes are moist.      Pharynx: Oropharynx is clear.    Eyes:      Conjunctiva/sclera: Conjunctivae normal.      Pupils: Pupils are equal, round, and reactive to light. Cardiovascular:      Rate and Rhythm: Normal rate and regular rhythm. Pulses: Normal pulses. Pulmonary:      Effort: Pulmonary effort is normal. No respiratory distress. Breath sounds: No stridor. Musculoskeletal:         General: Normal range of motion. Cervical back: Normal range of motion. No rigidity. No muscular tenderness. Skin:     General: Skin is warm and dry. Neurological:      General: No focal deficit present. Mental Status: She is alert and oriented to person, place, and time. Psychiatric:         Mood and Affect: Mood normal.         Behavior: Behavior normal.         Thought Content: Thought content normal.         Judgment: Judgment normal.           Audiogram and tympanogram reviewed with patient. Audiogram reveals 30 dB hearing loss in the right ear with 90% discrimination at 60 dB, 20 dB of hearing loss in the left ear with 88% discrimination at 60 dB. Audiogram is symmetrical. Tympanogram reveals type A curve in the right ear, with type A curve in the left ear. IMPRESSION/PLAN:    Arn Ellen was seen today for follow-up. Diagnoses and all orders for this visit:    Bilateral hearing loss, unspecified hearing loss type  -     DONNA Mitchell, Audiology, [place]    Tinnitus of both ears      Audiogram is reviewed with patient showing mild bilateral sensorineural hearing loss. Masking techniques are discussed with the patient to help with tinnitus symptoms including running fans or noise machines at nighttime when it is bothersome. She should avoid television or music at nighttime as this may intensify her symptoms. She is interested in hearing aids and will be directed to audiology for further questions and fitment. She will follow up in 6 months. She is instructed to call with any new or worsening symptoms prior to her next appointment.         Kannan Parnell, MSN, FNP-C  [facility] - Ear, Nose and Throat    The information contained in this note has been dictated using drug and medical speech recognition software and may contain errors

## 2021-12-17 NOTE — PROGRESS NOTES
This patient was referred for audiometric/tympanometric testing by FABIEN Shaw due to bilateral tinnitus. Patient reported constant, very bothersome tinnitus that began approximately four months ago and interferes with her ability to sleep at night. She has tried using TV to drown at tinnitus at night but reported it is not helpful. Endorsed perceived decline in hearing sensitivity since time of last hearing test and history of noise exposure from [place] time [place] race [place]. Denied otalgia, otorrhea, aural fullness, history of otologic surgery. Audiometry revealed hearing sensitivity within normal limits 250-500 Hz sloping to mild to moderate  sensorineural hearing loss, bilaterally. Reliability was good. Speech reception thresholds were in good agreement with the pure tone averages, bilaterally. Speech discrimination scores were excellent, bilaterally. Tympanometry revealed normal middle ear peak pressure and compliance, bilaterally. The results were reviewed with the patient. Recommendations for follow up will be made pending physician consult.     Electronically signed by Sarah Downing on 12/17/2021 at 8:55 AM

## 2021-12-22 ENCOUNTER — PROCEDURE VISIT (OUTPATIENT)
Dept: AUDIOLOGY | Age: 56
End: 2021-12-22

## 2021-12-22 DIAGNOSIS — H93.13 TINNITUS OF BOTH EARS: ICD-10-CM

## 2021-12-22 DIAGNOSIS — H90.3 SENSORINEURAL HEARING LOSS, BILATERAL: Primary | ICD-10-CM

## 2021-12-22 PROCEDURE — 99024 POSTOP FOLLOW-UP VISIT: CPT | Performed by: AUDIOLOGIST

## 2021-12-22 NOTE — PROGRESS NOTES
Patient was here for a hearing aid evaluation. She would like to self pay for hearing aids for hearing loss and tinnitus. Will order champagne A[place] [id number]. Patient will follow up 1/19/22 for fitting.      Erasmo Osgood, AuD [facility]  [address] [facility] [place] [id number]  Electronically signed by Erasmo Osgood, AuD on 12/22/2021 at 11:33 AM

## 2021-12-27 ASSESSMENT — ENCOUNTER SYMPTOMS
RHINORRHEA: 0
SINUS PAIN: 0
SINUS PRESSURE: 0

## 2022-01-01 DIAGNOSIS — I10 ESSENTIAL HYPERTENSION: ICD-10-CM

## 2022-01-03 RX ORDER — AMLODIPINE BESYLATE 5 MG/1
TABLET ORAL
Qty: 30 TABLET | Refills: 1 | Status: SHIPPED
Start: 2022-01-03 | End: 2022-02-28 | Stop reason: SDUPTHER

## 2022-01-04 RX ORDER — BACLOFEN 10 MG/1
TABLET ORAL
Qty: 60 TABLET | Refills: 3 | Status: SHIPPED
Start: 2022-01-04 | End: 2022-04-22

## 2022-01-19 ENCOUNTER — HOSPITAL ENCOUNTER (OUTPATIENT)
Age: 57
Discharge: HOME | End: 2022-01-21

## 2022-01-19 ENCOUNTER — PROCEDURE VISIT (OUTPATIENT)
Dept: AUDIOLOGY | Age: 57
End: 2022-01-19

## 2022-01-19 DIAGNOSIS — H90.3 SENSORINEURAL HEARING LOSS, BILATERAL: Primary | ICD-10-CM

## 2022-01-19 LAB
ABO/RH: NORMAL
ANION GAP SERPL CALCULATED.3IONS-SCNC: 14 MMOL/L (ref 7–16)
ANTIBODY SCREEN: NORMAL
BUN BLDV-MCNC: 11 MG/DL (ref 6–20)
CALCIUM SERPL-MCNC: 9.5 MG/DL (ref 8.6–10.2)
CHLORIDE BLD-SCNC: 101 MMOL/L (ref 98–107)
CO2: 25 MMOL/L (ref 22–29)
CREAT SERPL-MCNC: 0.8 MG/DL (ref 0.5–1)
GFR AFRICAN AMERICAN: >60
GFR NON-AFRICAN AMERICAN: >60 ML/MIN/1.73
GLUCOSE BLD-MCNC: 101 MG/DL (ref 74–99)
HCT VFR BLD CALC: 42.1 % (ref 34–48)
HEMOGLOBIN: 13.6 G/DL (ref 11.5–15.5)
MCH RBC QN AUTO: 29.1 PG (ref 26–35)
MCHC RBC AUTO-ENTMCNC: 32.3 % (ref 32–34.5)
MCV RBC AUTO: 90 FL (ref 80–99.9)
PDW BLD-RTO: 14.2 FL (ref 11.5–15)
PLATELET # BLD: 452 E9/L (ref 130–450)
PMV BLD AUTO: 9.8 FL (ref 7–12)
POTASSIUM SERPL-SCNC: 3.6 MMOL/L (ref 3.5–5)
RBC # BLD: 4.68 E12/L (ref 3.5–5.5)
SODIUM BLD-SCNC: 140 MMOL/L (ref 132–146)
WBC # BLD: 9.1 E9/L (ref 4.5–11.5)

## 2022-01-19 PROCEDURE — 87081 CULTURE SCREEN ONLY: CPT

## 2022-01-19 PROCEDURE — 85027 COMPLETE CBC AUTOMATED: CPT

## 2022-01-19 PROCEDURE — 99999 PR OFFICE/OUTPT VISIT,PROCEDURE ONLY: CPT | Performed by: AUDIOLOGIST

## 2022-01-19 PROCEDURE — 80048 BASIC METABOLIC PNL TOTAL CA: CPT

## 2022-01-19 PROCEDURE — 86900 BLOOD TYPING SEROLOGIC ABO: CPT

## 2022-01-19 PROCEDURE — 86850 RBC ANTIBODY SCREEN: CPT

## 2022-01-19 PROCEDURE — 86901 BLOOD TYPING SEROLOGIC RH(D): CPT

## 2022-01-19 NOTE — PROGRESS NOTES
Fit with binaural  [facility]  hearing aids. Instructed in use and care. Gave battery charger, warranty information and scheduled 30 day check for 2/16/22. Made following adjustments: VC on, 85% target, auto increase to 100% on. Hearing aid contract/battery warning form reviewed and signed. Hearing aids paired to phone brittanie. Patient was satisfied and will follow up on above date, unless problems arise. No charge visit today. Will bill at 30 day follow up.     Electronically signed by Sarah Snyder on 1/19/2022 at 5:23 PM

## 2022-01-21 ENCOUNTER — OFFICE VISIT (OUTPATIENT)
Dept: FAMILY MEDICINE CLINIC | Age: 57
End: 2022-01-21
Payer: COMMERCIAL

## 2022-01-21 VITALS
RESPIRATION RATE: 16 BRPM | OXYGEN SATURATION: 98 % | TEMPERATURE: 97.6 F | WEIGHT: 195 LBS | BODY MASS INDEX: 33.29 KG/M2 | HEIGHT: 64 IN | SYSTOLIC BLOOD PRESSURE: 133 MMHG | DIASTOLIC BLOOD PRESSURE: 69 MMHG | HEART RATE: 72 BPM

## 2022-01-21 DIAGNOSIS — Z01.818 PRE-OP TESTING: Primary | ICD-10-CM

## 2022-01-21 LAB — MRSA CULTURE ONLY: NORMAL

## 2022-01-21 PROCEDURE — 4004F PT TOBACCO SCREEN RCVD TLK: CPT | Performed by: FAMILY MEDICINE

## 2022-01-21 PROCEDURE — G8427 DOCREV CUR MEDS BY ELIG CLIN: HCPCS | Performed by: FAMILY MEDICINE

## 2022-01-21 PROCEDURE — 99213 OFFICE O/P EST LOW 20 MIN: CPT | Performed by: FAMILY MEDICINE

## 2022-01-21 PROCEDURE — 3017F COLORECTAL CA SCREEN DOC REV: CPT | Performed by: FAMILY MEDICINE

## 2022-01-21 PROCEDURE — G8417 CALC BMI ABV UP PARAM F/U: HCPCS | Performed by: FAMILY MEDICINE

## 2022-01-21 PROCEDURE — G8482 FLU IMMUNIZE ORDER/ADMIN: HCPCS | Performed by: FAMILY MEDICINE

## 2022-01-21 ASSESSMENT — PATIENT HEALTH QUESTIONNAIRE - PHQ9
9. THOUGHTS THAT YOU WOULD BE BETTER OFF DEAD, OR OF HURTING YOURSELF: 0
SUM OF ALL RESPONSES TO PHQ QUESTIONS 1-9: 6
4. FEELING TIRED OR HAVING LITTLE ENERGY: 1
SUM OF ALL RESPONSES TO PHQ QUESTIONS 1-9: 6
3. TROUBLE FALLING OR STAYING ASLEEP: 2
10. IF YOU CHECKED OFF ANY PROBLEMS, HOW DIFFICULT HAVE THESE PROBLEMS MADE IT FOR YOU TO DO YOUR WORK, TAKE CARE OF THINGS AT HOME, OR GET ALONG WITH OTHER PEOPLE: 0
7. TROUBLE CONCENTRATING ON THINGS, SUCH AS READING THE NEWSPAPER OR WATCHING TELEVISION: 0
2. FEELING DOWN, DEPRESSED OR HOPELESS: 0
8. MOVING OR SPEAKING SO SLOWLY THAT OTHER PEOPLE COULD HAVE NOTICED. OR THE OPPOSITE, BEING SO FIGETY OR RESTLESS THAT YOU HAVE BEEN MOVING AROUND A LOT MORE THAN USUAL: 0
1. LITTLE INTEREST OR PLEASURE IN DOING THINGS: 0
SUM OF ALL RESPONSES TO PHQ QUESTIONS 1-9: 6
6. FEELING BAD ABOUT YOURSELF - OR THAT YOU ARE A FAILURE OR HAVE LET YOURSELF OR YOUR FAMILY DOWN: 0
SUM OF ALL RESPONSES TO PHQ QUESTIONS 1-9: 6
5. POOR APPETITE OR OVEREATING: 3
SUM OF ALL RESPONSES TO PHQ9 QUESTIONS 1 & 2: 0

## 2022-01-21 NOTE — PROGRESS NOTES
[facility]  Family Medicine Residency  Phone: [phone]  Fax: [phone]    Patient:  Yaz Johnston 61 y.o. female                                 Date of Service: 22                            Chiefcomplaint:   Chief Complaint   Patient presents with    Pre-op Exam         History of Present Illness: The patient is a 61 y.o. female  presented to the clinic with complaints as above. Here for preop clearnace for cervical spine surgery for cervical DDD and cervical spondylosis ,   Does has degenerative posterior osteophytosis also at C5/C6, worsening neck pain with radiculopathy into both arms  Mets : 4   No problem with anesthesisa  Never required transfusions  This was initially postponed from Step  due to insurance issues. Review of Systems:   Review of Systems   Constitutional: Negative for chills and fever. HENT: Negative for congestion, sore throat and trouble swallowing. Respiratory: Negative for cough. Cardiovascular: Negative for chest pain and leg swelling. Gastrointestinal: Negative for abdominal pain, constipation, diarrhea, nausea and vomiting. Genitourinary: Negative for difficulty urinating. Musculoskeletal: Negative for arthralgias and myalgias. Skin: Negative for rash and wound. Neurological: Negative for dizziness and headaches. Psychiatric/Behavioral: Negative for agitation.        Past Medical History:      Diagnosis Date    Depression     Hyperlipidemia     Hypertension     Laceration of finger 2020    Viral upper respiratory tract infection 2020       Past Surgical History:        Procedure Laterality Date    ANKLE SURGERY  2015 or 2016    LEFT   ankles and screws    BACK SURGERY      Cervical surgery      SECTION      FRACTURE SURGERY      Rotaor cuff surgery    HYSTERECTOMY         Allergies:    Aspirin, Lisinopril, Morphine, and Seasonal    Social History:   Social History     Socioeconomic History    Marital status:      Spouse name: Not on file    Number of children: Not on file    Years of education: Not on file    Highest education level: Not on file   Occupational History    Not on file   Tobacco Use    Smoking status: Current Every Day Smoker     Packs/day: 0.07     Years: 45.00     Pack years: 3.15     Types: Cigarettes     Start date: 9/13/1973    Smokeless tobacco: Never Used    Tobacco comment: 1 every other day   Vaping Use    Vaping Use: Former   Substance and Sexual Activity    Alcohol use: Yes     Comment: occassional    Drug use: No    Sexual activity: Not Currently   Other Topics Concern    Not on file   Social History Narrative    Not on file     Social Determinants of Health     Financial Resource Strain: Low Risk     Difficulty of Paying Living Expenses: Not hard at all   Food Insecurity: No Food Insecurity    Worried About [address] [employer] in the Last Year: Never true    [facility] in the Last Year: Never true   Transportation Needs:     Lack of Transportation (Medical): Not on file    Lack of Transportation (Non-Medical):  Not on file   Physical Activity:     Days of Exercise per Week: Not on file    Minutes of Exercise per Session: Not on file   Stress:     Feeling of Stress : Not on file   Social Connections:     Frequency of Communication with Friends and Family: Not on file    Frequency of Social Gatherings with Friends and Family: Not on file    Attends Sikhism Services: Not on file    Active Member of Clubs or Organizations: Not on file    Attends Club or Organization Meetings: Not on file    Marital Status: Not on file   Intimate Partner Violence:     Fear of Current or Ex-Partner: Not on file    Emotionally Abused: Not on file    Physically Abused: Not on file    Sexually Abused: Not on file   Housing Stability:     Unable to Pay for Housing in the Last Year: Not on file    Number of [place] in the Last Year: Not on file    Unstable Housing in the Last Year: Not on file        Family History:       Problem Relation Age of Onset    Stroke Mother 76        mini     Stroke Father 76    Heart Attack Maternal Grandfather        Physical Exam:    Vitals: /69   Pulse 72   Temp 97.6 °F (36.4 °C) (Temporal)   Resp 16   Ht 5' 4\" (1.626 m)   Wt 195 lb (88.5 kg)   SpO2 98%   BMI 33.47 kg/m²   BP Readings from Last 3 Encounters:   01/21/22 133/69   12/14/21 124/78   11/23/21 134/78     General Appearance: Well developed, awake, alert, oriented, no acute distress  HEENT: Normocephalic,atraumatic. PERRL, EOM's intact, EAC without erythema or swelling, no pallor or icterus. Neck: Supple, symmetrical, trachea midline. No JVD. Chest wall/Lung: Clear to auscultation bilaterally,  respirations unlabored. No ronchi/wheezing/rales  Heart[de-identified]  Regular rate and rhythm, S1and S2 normal, no murmur, rub or gallop. Abdomen: Soft, non-tender, bowel sounds normoactive, no masses, no organomegaly  Extremities:  Extremities normal, atraumatic, no cyanosis. edema. Skin: Skin color, texture, turgor normal, no rashes or lesions  Musculokeletal: ROM grossly normal in all joints of extremities, no obvious joint swelling. Lymph nodes: no lymph node enlargement appreciated  Neurologic:   Alert&Oriented. Normal gait and coordination  No focal neurological deficits appreaciated         Psychiatric: has a normal mood and affect. Behavior is normal.       Assessment and Plan:       1. Pre-op testing  Low risk for surgery  Pt will hold metformin on day of surgery  Paper work filled and faxed to ortho  Has recently done cbc, bmp and is on proph topical abx for MRSA  No need for EKG since does not meet criteria as listed on papers. Return to Office: No follow-ups on file. I encourage further reading and education about your health conditions.   Information on many healthconditions is provided by the American Academy of Family Physicians: https://familydoctor. org/  Please bring any questions to me at your next visit. This document may have been prepared at least partiallythrough the use of voice recognition software. Although effort is taken to assure the accuracy of this document, it is possible that grammatical, syntax,  or spelling errors may occur. Medication List:    Current Outpatient Medications   Medication Sig Dispense Refill    baclofen (LIORESAL) 10 MG tablet take 1 tablet by mouth twice a day 60 tablet 3    amLODIPine (NORVASC) 5 MG tablet take 1 tablet by mouth once daily 30 tablet 1    metFORMIN (GLUCOPHAGE-XR) 500 MG extended release tablet Take 1 tablet by mouth daily (with breakfast) 90 tablet 1    atorvastatin (LIPITOR) 10 MG tablet Take 1 tablet by mouth daily 30 tablet 3    HYDROcodone-acetaminophen (NORCO) 5-325 MG per tablet       amLODIPine (NORVASC) 2.5 MG tablet Take 1 tablet by mouth daily 90 tablet 1    acetaminophen (APAP EXTRA STRENGTH) 500 MG tablet Take 1 tablet by mouth every 6 hours as needed for Pain 20 tablet 0    buPROPion (WELLBUTRIN XL) 300 MG extended release tablet Take 1 tablet by mouth every morning 90 tablet 2    fluticasone (FLONASE) 50 MCG/ACT nasal spray 2 sprays by Each Nostril route daily (Patient not taking: Reported on 1/21/2022) 48 g 1    meclizine (ANTIVERT) 25 MG tablet Take 1 tablet by mouth 2 times daily as needed for Dizziness (Patient not taking: Reported on 1/21/2022) 30 tablet 0    gabapentin (NEURONTIN) 300 MG capsule Take 1 capsule by mouth nightly for 90 days. 90 capsule 3     No current facility-administered medications for this visit.         Nanda Alcocer MD

## 2022-01-21 NOTE — PROGRESS NOTES
[facility]  Precepting Note    Subjective:  Pre op examination for cervical spinal surgery for DDD  Worsening back pain with radiculopathy  DM2: controlled on current treatment    ROS otherwise negative     Past medical, surgical, family and social history were reviewed, non-contributory, and unchanged unless otherwise stated. Objective:    /69   Pulse 72   Temp 97.6 °F (36.4 °C) (Temporal)   Resp 16   Ht 5' 4\" (1.626 m)   Wt 195 lb (88.5 kg)   SpO2 98%   BMI 33.47 kg/m²     Exam is as noted by resident    Assessment/Plan:  Worsening back pain  Dm2    Patient is at low risk for the proposed surgery     Attending Physician Statement  I have reviewed the chart, including any radiology or labs. I have discussed the case, including pertinent history and exam findings with the resident. I agree with the assessment, plan and orders as documented by the resident. Please refer to the resident note for additional information.       Electronically signed by Lelo Olivares MD on 1/21/2022 at 10:11 AM

## 2022-01-24 ASSESSMENT — ENCOUNTER SYMPTOMS
VOMITING: 0
COUGH: 0
CONSTIPATION: 0
ABDOMINAL PAIN: 0
SORE THROAT: 0
NAUSEA: 0
TROUBLE SWALLOWING: 0
DIARRHEA: 0

## 2022-02-02 ENCOUNTER — HOSPITAL ENCOUNTER (OUTPATIENT)
Age: 57
Discharge: HOME | End: 2022-02-04

## 2022-02-02 LAB
ABO/RH: NORMAL
ANTIBODY SCREEN: NORMAL

## 2022-02-02 PROCEDURE — 86901 BLOOD TYPING SEROLOGIC RH(D): CPT

## 2022-02-02 PROCEDURE — 86850 RBC ANTIBODY SCREEN: CPT

## 2022-02-02 PROCEDURE — 86900 BLOOD TYPING SEROLOGIC ABO: CPT

## 2022-02-03 ENCOUNTER — HOSPITAL ENCOUNTER (OUTPATIENT)
Age: 57
Discharge: HOME | End: 2022-02-05

## 2022-02-03 LAB
ANION GAP SERPL CALCULATED.3IONS-SCNC: 15 MMOL/L (ref 7–16)
BUN BLDV-MCNC: 15 MG/DL (ref 6–20)
CALCIUM SERPL-MCNC: 8.6 MG/DL (ref 8.6–10.2)
CHLORIDE BLD-SCNC: 101 MMOL/L (ref 98–107)
CO2: 21 MMOL/L (ref 22–29)
CREAT SERPL-MCNC: 0.7 MG/DL (ref 0.5–1)
GFR AFRICAN AMERICAN: >60
GFR NON-AFRICAN AMERICAN: >60 ML/MIN/1.73
GLUCOSE BLD-MCNC: 146 MG/DL (ref 74–99)
HCT VFR BLD CALC: 39.6 % (ref 34–48)
HEMOGLOBIN: 12.7 G/DL (ref 11.5–15.5)
MCH RBC QN AUTO: 28.9 PG (ref 26–35)
MCHC RBC AUTO-ENTMCNC: 32.1 % (ref 32–34.5)
MCV RBC AUTO: 90.2 FL (ref 80–99.9)
PDW BLD-RTO: 13.8 FL (ref 11.5–15)
PLATELET # BLD: 415 E9/L (ref 130–450)
PMV BLD AUTO: 9.6 FL (ref 7–12)
POTASSIUM SERPL-SCNC: 4.3 MMOL/L (ref 3.5–5)
RBC # BLD: 4.39 E12/L (ref 3.5–5.5)
SODIUM BLD-SCNC: 137 MMOL/L (ref 132–146)
WBC # BLD: 17.1 E9/L (ref 4.5–11.5)

## 2022-02-03 PROCEDURE — 85027 COMPLETE CBC AUTOMATED: CPT

## 2022-02-03 PROCEDURE — 80048 BASIC METABOLIC PNL TOTAL CA: CPT

## 2022-02-16 ENCOUNTER — PROCEDURE VISIT (OUTPATIENT)
Dept: AUDIOLOGY | Age: 57
End: 2022-02-16

## 2022-02-16 DIAGNOSIS — H90.3 SENSORINEURAL HEARING LOSS, BILATERAL: Primary | ICD-10-CM

## 2022-02-16 PROCEDURE — V5160 DISPENSING FEE BINAURAL: HCPCS | Performed by: AUDIOLOGIST

## 2022-02-16 PROCEDURE — V5140 BEHIND EAR BINAUR HEARING AI: HCPCS | Performed by: AUDIOLOGIST

## 2022-02-16 NOTE — PROGRESS NOTES
Patient was here for one month follow up for hearing aids. She reported doing well overall with hearing aids. She reported a hissing/static sound intermittently. Turned off sound recover, patient said she noticed improvement. Patient was satisfied and will follow up as needed. Billing completed today, patient taken to billing desk.     Sarah Madrigal [facility]  [place] [id number]  side effects

## 2022-02-23 DIAGNOSIS — I10 ESSENTIAL HYPERTENSION: ICD-10-CM

## 2022-02-23 PROBLEM — Z01.818 PRE-OP TESTING: Status: RESOLVED | Noted: 2021-09-15 | Resolved: 2022-02-23

## 2022-02-23 RX ORDER — AMLODIPINE BESYLATE 2.5 MG/1
TABLET ORAL
Qty: 90 TABLET | Refills: 1 | Status: SHIPPED
Start: 2022-02-23 | End: 2022-04-13 | Stop reason: SDUPTHER

## 2022-02-26 DIAGNOSIS — I10 ESSENTIAL HYPERTENSION: ICD-10-CM

## 2022-02-28 DIAGNOSIS — I10 ESSENTIAL HYPERTENSION: ICD-10-CM

## 2022-02-28 DIAGNOSIS — F41.9 ANXIETY: ICD-10-CM

## 2022-02-28 RX ORDER — AMLODIPINE BESYLATE 5 MG/1
TABLET ORAL
Qty: 30 TABLET | Refills: 1 | OUTPATIENT
Start: 2022-02-28

## 2022-03-01 RX ORDER — AMLODIPINE BESYLATE 5 MG/1
TABLET ORAL
Qty: 30 TABLET | Refills: 1 | Status: SHIPPED
Start: 2022-03-01 | End: 2022-04-13 | Stop reason: SDUPTHER

## 2022-03-01 RX ORDER — BUPROPION HYDROCHLORIDE 300 MG/1
300 TABLET ORAL EVERY MORNING
Qty: 90 TABLET | Refills: 2 | Status: SHIPPED
Start: 2022-03-01 | End: 2022-04-13 | Stop reason: SDUPTHER

## 2022-04-12 DIAGNOSIS — B00.1 HERPES LABIALIS: ICD-10-CM

## 2022-04-12 RX ORDER — ACYCLOVIR 400 MG/1
400 TABLET ORAL ONCE
Qty: 14 TABLET | Refills: 2 | Status: SHIPPED | OUTPATIENT
Start: 2022-04-12 | End: 2022-04-12

## 2022-04-13 ENCOUNTER — OFFICE VISIT (OUTPATIENT)
Dept: FAMILY MEDICINE CLINIC | Age: 57
End: 2022-04-13
Payer: COMMERCIAL

## 2022-04-13 VITALS
HEIGHT: 64 IN | RESPIRATION RATE: 16 BRPM | WEIGHT: 187 LBS | DIASTOLIC BLOOD PRESSURE: 81 MMHG | BODY MASS INDEX: 31.92 KG/M2 | HEART RATE: 104 BPM | TEMPERATURE: 97.5 F | OXYGEN SATURATION: 98 % | SYSTOLIC BLOOD PRESSURE: 129 MMHG

## 2022-04-13 DIAGNOSIS — R73.03 PREDIABETES: Primary | ICD-10-CM

## 2022-04-13 DIAGNOSIS — I10 ESSENTIAL HYPERTENSION: ICD-10-CM

## 2022-04-13 DIAGNOSIS — Z12.31 ENCOUNTER FOR SCREENING MAMMOGRAM FOR MALIGNANT NEOPLASM OF BREAST: ICD-10-CM

## 2022-04-13 DIAGNOSIS — M54.2 CERVICAL PAIN: ICD-10-CM

## 2022-04-13 LAB — HBA1C MFR BLD: 6 %

## 2022-04-13 PROCEDURE — 99213 OFFICE O/P EST LOW 20 MIN: CPT | Performed by: FAMILY MEDICINE

## 2022-04-13 PROCEDURE — 4004F PT TOBACCO SCREEN RCVD TLK: CPT | Performed by: FAMILY MEDICINE

## 2022-04-13 PROCEDURE — G8427 DOCREV CUR MEDS BY ELIG CLIN: HCPCS | Performed by: FAMILY MEDICINE

## 2022-04-13 PROCEDURE — 3017F COLORECTAL CA SCREEN DOC REV: CPT | Performed by: FAMILY MEDICINE

## 2022-04-13 PROCEDURE — G8417 CALC BMI ABV UP PARAM F/U: HCPCS | Performed by: FAMILY MEDICINE

## 2022-04-13 PROCEDURE — 83036 HEMOGLOBIN GLYCOSYLATED A1C: CPT | Performed by: FAMILY MEDICINE

## 2022-04-13 RX ORDER — AMLODIPINE BESYLATE 2.5 MG/1
TABLET ORAL
Qty: 90 TABLET | Refills: 1 | Status: SHIPPED
Start: 2022-04-13 | End: 2022-07-13

## 2022-04-13 RX ORDER — GABAPENTIN 300 MG/1
300 CAPSULE ORAL NIGHTLY
Qty: 90 CAPSULE | Refills: 3 | Status: CANCELLED | OUTPATIENT
Start: 2022-04-13 | End: 2022-07-12

## 2022-04-13 RX ORDER — BUPROPION HYDROCHLORIDE 300 MG/1
300 TABLET ORAL EVERY MORNING
Qty: 90 TABLET | Refills: 2 | Status: SHIPPED
Start: 2022-04-13 | End: 2022-08-09 | Stop reason: SDUPTHER

## 2022-04-13 RX ORDER — AMLODIPINE BESYLATE 5 MG/1
TABLET ORAL
Qty: 30 TABLET | Refills: 1 | Status: SHIPPED
Start: 2022-04-13 | End: 2022-06-14

## 2022-04-13 RX ORDER — METFORMIN HYDROCHLORIDE 500 MG/1
500 TABLET, EXTENDED RELEASE ORAL
Qty: 90 TABLET | Refills: 1 | Status: SHIPPED
Start: 2022-04-13 | End: 2022-08-09 | Stop reason: SDUPTHER

## 2022-04-13 RX ORDER — ATORVASTATIN CALCIUM 10 MG/1
10 TABLET, FILM COATED ORAL DAILY
Qty: 30 TABLET | Refills: 3 | Status: SHIPPED
Start: 2022-04-13 | End: 2022-08-09

## 2022-04-13 ASSESSMENT — ENCOUNTER SYMPTOMS
NAUSEA: 0
TROUBLE SWALLOWING: 0
DIARRHEA: 0
CONSTIPATION: 0
COUGH: 0
ABDOMINAL PAIN: 0
SORE THROAT: 0
VOMITING: 0

## 2022-04-13 NOTE — PROGRESS NOTES
[facility]  Family Medicine Residency  Phone: [phone]  Fax: [phone]    Patient:  Adam Pepper 61 y.o. female                                 Date of Service: 22                            Chiefcomplaint:   Chief Complaint   Patient presents with    Hypertension    Depression    Hyperlipidemia       History of Present Illness: The patient is a 61 y.o. female  presented to the clinic with complaints as above. Here for DM followup   On metformin 500 mg XR  A1C:  : 6.1     HTN   On norvasc 5 and 2.5 mg daily    HLD   On lipitor 10 mg    Follows with Dr Billie Luna   Hx of spinal surgery(c6-c7)   takes baclofen and gabapentin  Once in while feels numbness  Wants to get off of gabapentin and baclofen   Sees them next on     Mammo : 2020 , will schedule today    CRC screening : cologard  was normal     Depression : takes Wellbutrin daily   Mood stable   No SI/HI       Review of Systems:   Review of Systems   Constitutional: Negative for chills and fever. HENT: Negative for congestion, sore throat and trouble swallowing. Respiratory: Negative for cough. Cardiovascular: Negative for chest pain and leg swelling. Gastrointestinal: Negative for abdominal pain, constipation, diarrhea, nausea and vomiting. Genitourinary: Negative for difficulty urinating. Musculoskeletal: Negative for arthralgias and myalgias. Skin: Negative for rash and wound. Neurological: Negative for dizziness and headaches. Psychiatric/Behavioral: Negative for agitation.        Past Medical History:      Diagnosis Date    Depression     Hyperlipidemia     Hypertension     Laceration of finger 2020    Viral upper respiratory tract infection 2020       Past Surgical History:        Procedure Laterality Date    ANKLE SURGERY  2015 or 2016    LEFT   ankles and screws    BACK SURGERY      Cervical surgery      SECTION      FRACTURE SURGERY      Rotaor cuff surgery    HYSTERECTOMY         Allergies:    Aspirin, Lisinopril, Morphine, and Seasonal    Social History:   Social History     Socioeconomic History    Marital status:      Spouse name: Not on file    Number of children: Not on file    Years of education: Not on file    Highest education level: Not on file   Occupational History    Not on file   Tobacco Use    Smoking status: Current Every Day Smoker     Packs/day: 0.07     Years: 45.00     Pack years: 3.15     Types: Cigarettes     Start date: 9/13/1973    Smokeless tobacco: Never Used    Tobacco comment: 1 every other day   Vaping Use    Vaping Use: Former   Substance and Sexual Activity    Alcohol use: Yes     Comment: occassional    Drug use: No    Sexual activity: Not Currently   Other Topics Concern    Not on file   Social History Narrative    Not on file     Social Determinants of Health     Financial Resource Strain: Low Risk     Difficulty of Paying Living Expenses: Not hard at all   Food Insecurity: No Food Insecurity    Worried About [address] [employer] in the Last Year: Never true    [address] [employer] in the Last Year: Never true   Transportation Needs:     Lack of Transportation (Medical): Not on file    Lack of Transportation (Non-Medical):  Not on file   Physical Activity:     Days of Exercise per Week: Not on file    Minutes of Exercise per Session: Not on file   Stress:     Feeling of Stress : Not on file   Social Connections:     Frequency of Communication with Friends and Family: Not on file    Frequency of Social Gatherings with Friends and Family: Not on file    Attends Church Services: Not on file    Active Member of Clubs or Organizations: Not on file    Attends Club or Organization Meetings: Not on file    Marital Status: Not on file   Intimate Partner Violence:     Fear of Current or Ex-Partner: Not on file    Emotionally Abused: Not on file    Physically Abused: Not on file   Alice Me Sexually Abused: Not on file   Housing Stability:     Unable to Pay for Housing in the Last Year: Not on file    Number of [place] in the Last Year: Not on file    Unstable Housing in the Last Year: Not on file        Family History:       Problem Relation Age of Onset    Stroke Mother 76        mini     Stroke Father 76    Heart Attack Maternal Grandfather        Physical Exam:    Vitals: /81 (Site: Left Upper Arm, Position: Sitting, Cuff Size: Medium Adult)   Pulse 104   Temp 97.5 °F (36.4 °C) (Temporal)   Resp 16   Ht 5' 4\" (1.626 m)   Wt 187 lb (84.8 kg)   SpO2 98%   BMI 32.10 kg/m²   BP Readings from Last 3 Encounters:   04/13/22 129/81   01/21/22 133/69   12/14/21 124/78     General Appearance: Well developed, awake, alert, oriented, no acute distress  HEENT: Normocephalic,atraumatic. PERRL, EOM's intact, EAC without erythema or swelling, no pallor or icterus. Neck: Supple, symmetrical, trachea midline. No JVD. Chest wall/Lung: Clear to auscultation  bilaterally,  respirations unlabored. No ronchi/wheezing/rales  Heart[de-identified]  Regular rate and rhythm, S1and S2 normal, no murmur, rub or gallop. Abdomen: Soft, non-tender, bowel sounds normoactive, no masses, no organomegaly  Extremities:  Extremities normal, atraumatic, no cyanosis. edema. Skin: Skin color, texture, turgor normal, no rashes or lesions  Musculokeletal: ROM grossly normal in all joints of extremities, no obvious joint swelling. Lymph nodes: no lymph node enlargement appreciated  Neurologic:   Alert&Oriented. Normal gait and coordination  No focal neurological deficits appreaciated         Psychiatric: has a normal mood and affect. Behavior is normal.       Assessment and Plan:       1. Prediabetes  A1c : 6.0 today  Continue metformin 500mg XR  - POCT glycosylated hemoglobin (Hb A1C)    2.  Essential hypertension  BP stable , continue same meds  - amLODIPine (NORVASC) 5 MG tablet; take 1 tablet by mouth once daily  Dispense: 30 tablet; Refill: 1  - amLODIPine (NORVASC) 2.5 MG tablet; take 1 tablet by mouth once daily  Dispense: 90 tablet; Refill: 1    3. Cervical pain  Follows with Dr. Jermaine Potter     4. Encounter for screening mammogram for malignant neoplasm of breast  - MICHELLE DIGITAL SCREEN BILATERAL PER PROTOCOL; Future      Return to Office: Return in about 3 months (around 7/13/2022) for DM check, HTN. I encourage further reading and education about your health conditions. Information on many healthconditions is provided by the American Academy of Family Physicians: https://familydoctor. org/  Please bring any questions to me at your next visit. This document may have been prepared at least partiallythrough the use of voice recognition software. Although effort is taken to assure the accuracy of this document, it is possible that grammatical, syntax,  or spelling errors may occur. Medication List:    Current Outpatient Medications   Medication Sig Dispense Refill    amLODIPine (NORVASC) 5 MG tablet take 1 tablet by mouth once daily 30 tablet 1    amLODIPine (NORVASC) 2.5 MG tablet take 1 tablet by mouth once daily 90 tablet 1    buPROPion (WELLBUTRIN XL) 300 MG extended release tablet Take 1 tablet by mouth every morning 90 tablet 2    metFORMIN (GLUCOPHAGE-XR) 500 MG extended release tablet Take 1 tablet by mouth daily (with breakfast) 90 tablet 1    atorvastatin (LIPITOR) 10 MG tablet Take 1 tablet by mouth daily 30 tablet 3    baclofen (LIORESAL) 10 MG tablet take 1 tablet by mouth twice a day 60 tablet 3    gabapentin (NEURONTIN) 300 MG capsule Take 1 capsule by mouth nightly for 90 days. 90 capsule 3     No current facility-administered medications for this visit.         Sherryle Grandchild, MD

## 2022-04-13 NOTE — PROGRESS NOTES
Follow up of pre-DM2 HTN, HLP  On metformin ER  Examination  Blood pressure 129/81, pulse 104, temperature 97.5 °F (36.4 °C), temperature source Temporal, resp. rate 16, height 5' 4\" (1.626 m), weight 187 lb (84.8 kg), SpO2 98 %, not currently breastfeeding. A1c 6.0%  Stable exam  Mood normal  A/P  Continue same  Mammogram  Attending Physician Statement  I have discussed the case, including pertinent history and exam findings with the resident. I agree with the documented assessment and plan.

## 2022-04-14 PROBLEM — M54.2 CERVICAL PAIN: Status: ACTIVE | Noted: 2022-04-14

## 2022-04-14 PROBLEM — Z12.31 ENCOUNTER FOR SCREENING MAMMOGRAM FOR MALIGNANT NEOPLASM OF BREAST: Status: ACTIVE | Noted: 2022-04-14

## 2022-04-15 ENCOUNTER — HOSPITAL ENCOUNTER (OUTPATIENT)
Dept: MAMMOGRAPHY | Age: 57
Discharge: HOME | End: 2022-04-17
Payer: COMMERCIAL

## 2022-04-15 DIAGNOSIS — Z12.31 ENCOUNTER FOR SCREENING MAMMOGRAM FOR MALIGNANT NEOPLASM OF BREAST: ICD-10-CM

## 2022-04-15 PROCEDURE — 77067 SCR MAMMO BI INCL CAD: CPT

## 2022-04-22 RX ORDER — BACLOFEN 10 MG/1
TABLET ORAL
Qty: 60 TABLET | Refills: 3 | Status: SHIPPED
Start: 2022-04-22 | End: 2022-07-13

## 2022-05-14 PROBLEM — Z12.31 ENCOUNTER FOR SCREENING MAMMOGRAM FOR MALIGNANT NEOPLASM OF BREAST: Status: RESOLVED | Noted: 2022-04-14 | Resolved: 2022-05-14

## 2022-05-18 DIAGNOSIS — B00.1 HERPES LABIALIS: ICD-10-CM

## 2022-05-18 RX ORDER — ACYCLOVIR 400 MG/1
400 TABLET ORAL ONCE
Qty: 14 TABLET | Refills: 2 | OUTPATIENT
Start: 2022-05-18 | End: 2022-05-18

## 2022-06-14 DIAGNOSIS — I10 ESSENTIAL HYPERTENSION: ICD-10-CM

## 2022-06-14 RX ORDER — AMLODIPINE BESYLATE 5 MG/1
TABLET ORAL
Qty: 30 TABLET | Refills: 1 | Status: SHIPPED
Start: 2022-06-14 | End: 2022-08-09 | Stop reason: SDUPTHER

## 2022-07-08 ENCOUNTER — OFFICE VISIT (OUTPATIENT)
Dept: ENT CLINIC | Age: 57
End: 2022-07-08
Payer: COMMERCIAL

## 2022-07-08 VITALS
HEART RATE: 94 BPM | WEIGHT: 190 LBS | BODY MASS INDEX: 32.44 KG/M2 | HEIGHT: 64 IN | DIASTOLIC BLOOD PRESSURE: 75 MMHG | SYSTOLIC BLOOD PRESSURE: 133 MMHG

## 2022-07-08 DIAGNOSIS — H91.93 BILATERAL HEARING LOSS, UNSPECIFIED HEARING LOSS TYPE: ICD-10-CM

## 2022-07-08 DIAGNOSIS — H93.A9 PULSATILE TINNITUS: Primary | ICD-10-CM

## 2022-07-08 PROCEDURE — G8427 DOCREV CUR MEDS BY ELIG CLIN: HCPCS | Performed by: NURSE PRACTITIONER

## 2022-07-08 PROCEDURE — 99214 OFFICE O/P EST MOD 30 MIN: CPT | Performed by: NURSE PRACTITIONER

## 2022-07-08 PROCEDURE — 4004F PT TOBACCO SCREEN RCVD TLK: CPT | Performed by: NURSE PRACTITIONER

## 2022-07-08 PROCEDURE — 3017F COLORECTAL CA SCREEN DOC REV: CPT | Performed by: NURSE PRACTITIONER

## 2022-07-08 PROCEDURE — G8417 CALC BMI ABV UP PARAM F/U: HCPCS | Performed by: NURSE PRACTITIONER

## 2022-07-08 ASSESSMENT — ENCOUNTER SYMPTOMS
SINUS PRESSURE: 0
EYES NEGATIVE: 1
SHORTNESS OF BREATH: 0
STRIDOR: 0
RESPIRATORY NEGATIVE: 1
SINUS PAIN: 0
RHINORRHEA: 0

## 2022-07-08 NOTE — PROGRESS NOTES
[address] [facility] Otolaryngology  Dr. Geno Leon. [place], [address] [facility]        Patient Name:  Jaki Brown  :  1965     CHIEF C/O:    Chief Complaint   Patient presents with    Follow-up     when hearing aids are out of ears ringing comes back, hearing aids are helping       HISTORY OBTAINED FROM:  patient    HISTORY OF PRESENT ILLNESS:       Josy Fuchs is a 61y.o. year old female, here today for follow up of hearing loss and tinnitus. Last seen 6 months ago   Received hearing aids 5 months ago, states help her tinnitus significantly when wearing  Still bothersome when out and more noticeable  Has found that television is the best distraction when trying to fall asleep  Continues to be a pulsatile whooshing sound in both ears, constant when not using hearing aids  No new ear pain or pressure  States she is doing well.           Past Medical History:   Diagnosis Date    Depression     Hyperlipidemia     Hypertension     Laceration of finger 2020    Viral upper respiratory tract infection 2020     Past Surgical History:   Procedure Laterality Date    ANKLE SURGERY   or 2016    LEFT   ankles and screws    BACK SURGERY      Cervical surgery      SECTION      FRACTURE SURGERY      Rotaor cuff surgery    HYSTERECTOMY (CERVIX STATUS UNKNOWN)         Current Outpatient Medications:     amLODIPine (NORVASC) 5 MG tablet, take 1 tablet by mouth once daily, Disp: 30 tablet, Rfl: 1    baclofen (LIORESAL) 10 MG tablet, take 1 tablet by mouth twice a day, Disp: 60 tablet, Rfl: 3    amLODIPine (NORVASC) 2.5 MG tablet, take 1 tablet by mouth once daily, Disp: 90 tablet, Rfl: 1    buPROPion (WELLBUTRIN XL) 300 MG extended release tablet, Take 1 tablet by mouth every morning, Disp: 90 tablet, Rfl: 2    metFORMIN (GLUCOPHAGE-XR) 500 MG extended release tablet, Take 1 tablet by mouth daily (with breakfast), Disp: 90 tablet, Rfl: 1    atorvastatin (LIPITOR) 10 MG tablet, Take 1 tablet by mouth daily, Disp: 30 tablet, Rfl: 3    gabapentin (NEURONTIN) 300 MG capsule, Take 1 capsule by mouth nightly for 90 days. , Disp: 90 capsule, Rfl: 3  Aspirin, Lisinopril, Morphine, and Seasonal  Social History     Tobacco Use    Smoking status: Current Every Day Smoker     Packs/day: 0.07     Years: 45.00     Pack years: 3.15     Types: Cigarettes     Start date: 9/13/1973    Smokeless tobacco: Never Used    Tobacco comment: 1 every other day   Vaping Use    Vaping Use: Former   Substance Use Topics    Alcohol use: Yes     Comment: occassional    Drug use: No     Family History   Problem Relation Age of Onset    Stroke Mother 76        mini     Stroke Father 76    Heart Attack Maternal Grandfather        Review of Systems   Constitutional: Negative. Negative for activity change and appetite change. HENT: Positive for hearing loss and tinnitus (Pulsatile whooshing sound). Negative for congestion, ear discharge, ear pain, postnasal drip, rhinorrhea, sinus pressure and sinus pain. Eyes: Negative. Respiratory: Negative. Negative for shortness of breath and stridor. Cardiovascular: Negative. Negative for chest pain and palpitations. Endocrine: Negative. Musculoskeletal: Negative. Skin: Negative. Neurological: Negative. Negative for dizziness. Hematological: Negative. Psychiatric/Behavioral: Negative. /75 (Site: Left Upper Arm, Position: Sitting, Cuff Size: Medium Adult)   Pulse 94   Ht 5' 4\" (1.626 m)   Wt 190 lb (86.2 kg)   Breastfeeding No   BMI 32.61 kg/m²   Physical Exam  Constitutional:       Appearance: Normal appearance. HENT:      Head: Normocephalic. Right Ear: Tympanic membrane, ear canal and external ear normal. Decreased hearing noted. Left Ear: Tympanic membrane, ear canal and external ear normal. Decreased hearing noted. Nose: Nose normal. No rhinorrhea. Right Turbinates: Not pale. Left Turbinates: Not pale. Mouth/Throat:      Lips: Pink. Mouth: Mucous membranes are moist.      Pharynx: Oropharynx is clear. Eyes:      Conjunctiva/sclera: Conjunctivae normal.      Pupils: Pupils are equal, round, and reactive to light. Cardiovascular:      Rate and Rhythm: Normal rate and regular rhythm. Pulses: Normal pulses. Pulmonary:      Effort: Pulmonary effort is normal. No respiratory distress. Breath sounds: No stridor. Musculoskeletal:         General: Normal range of motion. Cervical back: Normal range of motion. No rigidity. No muscular tenderness. Skin:     General: Skin is warm and dry. Neurological:      General: No focal deficit present. Mental Status: She is alert and oriented to person, place, and time. Psychiatric:         Mood and Affect: Mood normal.         Behavior: Behavior normal.         Thought Content: Thought content normal.         Judgment: Judgment normal.         IMPRESSION/PLAN:    Sonja Trinidad was seen today for follow-up. Diagnoses and all orders for this visit:    Pulsatile tinnitus  -     CT IAC POSTERIOR FOSSA W WO CONTRAST; Future  -     BUN & Creatinine; Future    Bilateral hearing loss, unspecified hearing loss type      Patient seen and examined today for 6-month follow-up of hearing aids and ongoing tinnitus. She states that her pulsatile tinnitus has not improved and remains bothersome, stating that it actually has become more noticeable at times when not wearing her hearing aids. Due to no previous imaging at this time a CT of the IAC posterior fossa with and without contrast will be ordered for further evaluation of the vasculature around the ear structures. She will follow-up in 1 month for reevaluation. She is instructed to call with any new or worsening symptoms prior to her next appointment.       CRISTAL Khanna, FNP-C  [address] [facility], Nose and Throat    The information contained in this note has been dictated using drug and medical speech recognition software and may contain errors

## 2022-07-12 ENCOUNTER — TELEPHONE (OUTPATIENT)
Dept: ENT CLINIC | Age: 57
End: 2022-07-12

## 2022-07-12 NOTE — TELEPHONE ENCOUNTER
Was told when scheduling Pt CT that if they are taking Metformin, Pt is diabetic, that they should stop medication for 48 hours post procedure. Relayed this information with Pt and Pt acknowledged.

## 2022-07-13 ENCOUNTER — OFFICE VISIT (OUTPATIENT)
Dept: FAMILY MEDICINE CLINIC | Age: 57
End: 2022-07-13
Payer: COMMERCIAL

## 2022-07-13 VITALS
TEMPERATURE: 97.5 F | DIASTOLIC BLOOD PRESSURE: 60 MMHG | BODY MASS INDEX: 32.1 KG/M2 | HEIGHT: 64 IN | HEART RATE: 67 BPM | WEIGHT: 188 LBS | SYSTOLIC BLOOD PRESSURE: 96 MMHG | OXYGEN SATURATION: 98 %

## 2022-07-13 DIAGNOSIS — E66.09 CLASS 1 OBESITY DUE TO EXCESS CALORIES WITHOUT SERIOUS COMORBIDITY WITH BODY MASS INDEX (BMI) OF 32.0 TO 32.9 IN ADULT: ICD-10-CM

## 2022-07-13 DIAGNOSIS — I10 ESSENTIAL HYPERTENSION: ICD-10-CM

## 2022-07-13 DIAGNOSIS — R73.03 PREDIABETES: Primary | ICD-10-CM

## 2022-07-13 PROBLEM — E66.811 CLASS 1 OBESITY DUE TO EXCESS CALORIES WITHOUT SERIOUS COMORBIDITY WITH BODY MASS INDEX (BMI) OF 32.0 TO 32.9 IN ADULT: Status: ACTIVE | Noted: 2022-07-13

## 2022-07-13 LAB — HBA1C MFR BLD: 6.1 %

## 2022-07-13 PROCEDURE — G8417 CALC BMI ABV UP PARAM F/U: HCPCS | Performed by: FAMILY MEDICINE

## 2022-07-13 PROCEDURE — 99213 OFFICE O/P EST LOW 20 MIN: CPT | Performed by: FAMILY MEDICINE

## 2022-07-13 PROCEDURE — 3017F COLORECTAL CA SCREEN DOC REV: CPT | Performed by: FAMILY MEDICINE

## 2022-07-13 PROCEDURE — 83036 HEMOGLOBIN GLYCOSYLATED A1C: CPT | Performed by: FAMILY MEDICINE

## 2022-07-13 PROCEDURE — 4004F PT TOBACCO SCREEN RCVD TLK: CPT | Performed by: FAMILY MEDICINE

## 2022-07-13 PROCEDURE — G8427 DOCREV CUR MEDS BY ELIG CLIN: HCPCS | Performed by: FAMILY MEDICINE

## 2022-07-13 ASSESSMENT — ENCOUNTER SYMPTOMS
VOMITING: 0
SORE THROAT: 0
TROUBLE SWALLOWING: 0
ABDOMINAL PAIN: 0
NAUSEA: 0
COUGH: 0
CONSTIPATION: 0
DIARRHEA: 0

## 2022-07-13 NOTE — PROGRESS NOTES
Follow up of pre-diabetes and HTN  Home BP low  Asymptomatic  A1c today is 6.1%  Examination  Blood pressure 96/60, pulse 67, temperature 97.5 °F (36.4 °C), height 5' 4\" (1.626 m), weight 188 lb (85.3 kg), SpO2 98 %, not currently breastfeeding. obese  Unremarkable exam otherwise. A/P  Reduce norvasc to 5 mg daily  Diet and weight loss  Check labs  Continue to monitor BP. Attending Physician Statement  I have discussed the case, including pertinent history and exam findings with the resident. I agree with the documented assessment and plan.

## 2022-07-13 NOTE — PROGRESS NOTES
2022    Avram Frankel is a 61 y.o. femalehere for:    HPI:  Here for HTN   Bp at home runs low usually 101/76  No dizziness , lightheadedness, no cp , sob  Norvasc  7.5 mg     Prediabetes  A1C : 6.1  Metformin  mg     Obesity  Due for lipids  Watching her diet at this time       BP Readings from Last 3 Encounters:   22 96/60   22 133/75   22 129/81     Current Outpatient Medications   Medication Sig Dispense Refill    amLODIPine (NORVASC) 5 MG tablet take 1 tablet by mouth once daily 30 tablet 1    buPROPion (WELLBUTRIN XL) 300 MG extended release tablet Take 1 tablet by mouth every morning 90 tablet 2    metFORMIN (GLUCOPHAGE-XR) 500 MG extended release tablet Take 1 tablet by mouth daily (with breakfast) 90 tablet 1    atorvastatin (LIPITOR) 10 MG tablet Take 1 tablet by mouth daily 30 tablet 3    gabapentin (NEURONTIN) 300 MG capsule Take 1 capsule by mouth nightly for 90 days. 90 capsule 3     No current facility-administered medications for this visit.       Allergies   Allergen Reactions    Aspirin Other (See Comments)    Lisinopril Rash     Red rash on face,arms,upper back    Morphine Nausea And Vomiting    Seasonal Other (See Comments)       Past Medical & Surgical History:      Diagnosis Date    Depression     Diabetes 1.5, managed as type 2 ([address]     Hyperlipidemia     Hypertension     Laceration of finger 2020    Viral upper respiratory tract infection 2020     Past Surgical History:   Procedure Laterality Date    ANKLE SURGERY   or 2016    LEFT   ankles and screws    BACK SURGERY      Cervical surgery      SECTION      FRACTURE SURGERY      Rotaor cuff surgery    HYSTERECTOMY (CERVIX STATUS UNKNOWN)         Family History:      Problem Relation Age of Onset    Stroke Mother 76        mini     Stroke Father 76    Heart Attack Maternal Grandfather        Social History:  Social History     Tobacco Use    Smoking status: Current Every Day Smoker     Packs/day: 0.07     Years: 45.00     Pack years: 3.15     Types: Cigarettes     Start date: 9/13/1973    Smokeless tobacco: Never Used    Tobacco comment: 1 every other day   Substance Use Topics    Alcohol use: Yes     Comment: occassional       Immunization History   Administered Date(s) Administered    COVID-19, MODERNA BLUE border, Primary or Immunocompromised, (age 12y+), IM, 100 mcg/0.5mL 03/17/2021, 04/14/2021    COVID-19, MODERNA Booster BLUE border, (age 18y+), IM, 50mcg/0.25mL 10/27/2021    DTaP vaccine 04/23/2020    Influenza A (B2I8-10) Vaccine PF IM 12/03/2009    Influenza Virus Vaccine 12/03/2009, 10/14/2016, 10/09/2017, 09/13/2018, 09/22/2019    Influenza, MDCK Quadv, IM, PF (Flucelvax 2 yrs and older) 09/23/2021    Influenza, Kranthi Cuna, IM, PF (6 mo and older Fluzone, Flulaval, Fluarix, and 3 yrs and older Afluria) 10/14/2016, 10/09/2017, 09/13/2018, 09/22/2019, 10/30/2020    Pneumococcal Polysaccharide ([id number]) 09/13/2018    Tdap (Boostrix, Adacel) 01/10/2020, 04/23/2020       Review of Systems   Constitutional: Negative for chills and fever. HENT: Negative for congestion, sore throat and trouble swallowing. Respiratory: Negative for cough. Cardiovascular: Negative for chest pain and leg swelling. Gastrointestinal: Negative for abdominal pain, constipation, diarrhea, nausea and vomiting. Genitourinary: Negative for difficulty urinating. Musculoskeletal: Negative for arthralgias and myalgias. Skin: Negative for rash and wound. Neurological: Negative for dizziness and headaches. Psychiatric/Behavioral: Negative for agitation. VS:  BP 96/60   Pulse 67   Temp 97.5 °F (36.4 °C)   Ht 5' 4\" (1.626 m)   Wt 188 lb (85.3 kg)   SpO2 98%   BMI 32.27 kg/m²     Physical Exam  Constitutional:       Appearance: Normal appearance. HENT:      Head: Normocephalic. Nose: Nose normal. No rhinorrhea. Eyes:      General: No scleral icterus. Conjunctiva/sclera: Conjunctivae normal.   Cardiovascular:      Rate and Rhythm: Normal rate and regular rhythm. Pulses: Normal pulses. Heart sounds: Normal heart sounds. No murmur heard. Pulmonary:      Breath sounds: Normal breath sounds. No wheezing, rhonchi or rales. Abdominal:      General: Abdomen is flat. Bowel sounds are normal.   Musculoskeletal:      Right lower leg: No edema. Left lower leg: No edema. Skin:     General: Skin is warm. Findings: No rash. Neurological:      General: No focal deficit present. Mental Status: She is alert. Assessment/Plan:    1. Prediabetes  Continue metformin 500 mg XR at this time  Given resources for healthy diet   - POCT glycosylated hemoglobin (Hb A1C)  - LIPID PANEL; Future    2. Essential hypertension  BP running at home and in clinic today, declines any symptoms   Will reduce norvasc from 7.5 mg to 5 mg daily   Given exercise prescription resources   Pt will give us phone call in 2 weeks for BP checks     Follow up:  3 months for HTN , Prediabetes, obesity     Patient agrees with the above stated plan. Josy Fuchs received counseling on the following healthy behaviors: nutrition, exercise and medication adherence.     Wang Pickett MD  PGY-3 Family Medicine

## 2022-07-25 ENCOUNTER — HOSPITAL ENCOUNTER (OUTPATIENT)
Age: 57
Discharge: HOME | End: 2022-07-25
Payer: COMMERCIAL

## 2022-07-25 DIAGNOSIS — H93.A9 PULSATILE TINNITUS: ICD-10-CM

## 2022-07-25 DIAGNOSIS — R73.03 PREDIABETES: ICD-10-CM

## 2022-07-25 LAB
BUN BLDV-MCNC: 23 MG/DL (ref 6–20)
CHOLESTEROL, TOTAL: 167 MG/DL (ref 0–199)
CREAT SERPL-MCNC: 0.9 MG/DL (ref 0.5–1)
GFR AFRICAN AMERICAN: >60
GFR NON-AFRICAN AMERICAN: >60 ML/MIN/1.73
HDLC SERPL-MCNC: 50 MG/DL
LDL CHOLESTEROL CALCULATED: 103 MG/DL (ref 0–99)
TRIGL SERPL-MCNC: 72 MG/DL (ref 0–149)
VLDLC SERPL CALC-MCNC: 14 MG/DL

## 2022-07-25 PROCEDURE — 36415 COLL VENOUS BLD VENIPUNCTURE: CPT

## 2022-07-25 PROCEDURE — 84520 ASSAY OF UREA NITROGEN: CPT

## 2022-07-25 PROCEDURE — 82565 ASSAY OF CREATININE: CPT

## 2022-07-25 PROCEDURE — 80061 LIPID PANEL: CPT

## 2022-07-28 ENCOUNTER — HOSPITAL ENCOUNTER (OUTPATIENT)
Dept: CT IMAGING | Age: 57
Discharge: HOME | End: 2022-07-30
Payer: COMMERCIAL

## 2022-07-28 DIAGNOSIS — H93.A9 PULSATILE TINNITUS: ICD-10-CM

## 2022-07-28 PROCEDURE — 2580000003 HC RX 258: Performed by: RADIOLOGY

## 2022-07-28 PROCEDURE — 6360000004 HC RX CONTRAST MEDICATION: Performed by: RADIOLOGY

## 2022-07-28 PROCEDURE — 70482 CT ORBIT/EAR/FOSSA W/O&W/DYE: CPT

## 2022-07-28 RX ORDER — SODIUM CHLORIDE 0.9 % (FLUSH) 0.9 %
10 SYRINGE (ML) INJECTION PRN
Status: COMPLETED | OUTPATIENT
Start: 2022-07-28 | End: 2022-07-28

## 2022-07-28 RX ADMIN — SODIUM CHLORIDE, PRESERVATIVE FREE 10 ML: 5 INJECTION INTRAVENOUS at 16:10

## 2022-07-28 RX ADMIN — IOPAMIDOL 50 ML: 755 INJECTION, SOLUTION INTRAVENOUS at 16:15

## 2022-08-08 DIAGNOSIS — I10 ESSENTIAL HYPERTENSION: ICD-10-CM

## 2022-08-08 RX ORDER — BACLOFEN 10 MG/1
TABLET ORAL
Qty: 60 TABLET | Refills: 3 | OUTPATIENT
Start: 2022-08-08

## 2022-08-08 NOTE — TELEPHONE ENCOUNTER
Baclofen was discontinued By Dr Elma Ureña.   Electronically signed by Umang Calderon MA on 8/8/2022 at 10:13 AM

## 2022-08-09 ENCOUNTER — OFFICE VISIT (OUTPATIENT)
Dept: ENT CLINIC | Age: 57
End: 2022-08-09
Payer: COMMERCIAL

## 2022-08-09 VITALS — SYSTOLIC BLOOD PRESSURE: 133 MMHG | DIASTOLIC BLOOD PRESSURE: 78 MMHG | HEART RATE: 97 BPM

## 2022-08-09 DIAGNOSIS — H90.3 SENSORINEURAL HEARING LOSS (SNHL) OF BOTH EARS: ICD-10-CM

## 2022-08-09 DIAGNOSIS — H93.A9 PULSATILE TINNITUS: Primary | ICD-10-CM

## 2022-08-09 DIAGNOSIS — H93.13 TINNITUS OF BOTH EARS: ICD-10-CM

## 2022-08-09 PROCEDURE — 4004F PT TOBACCO SCREEN RCVD TLK: CPT | Performed by: NURSE PRACTITIONER

## 2022-08-09 PROCEDURE — 99213 OFFICE O/P EST LOW 20 MIN: CPT | Performed by: NURSE PRACTITIONER

## 2022-08-09 PROCEDURE — G8417 CALC BMI ABV UP PARAM F/U: HCPCS | Performed by: NURSE PRACTITIONER

## 2022-08-09 PROCEDURE — G8427 DOCREV CUR MEDS BY ELIG CLIN: HCPCS | Performed by: NURSE PRACTITIONER

## 2022-08-09 PROCEDURE — 3017F COLORECTAL CA SCREEN DOC REV: CPT | Performed by: NURSE PRACTITIONER

## 2022-08-09 RX ORDER — METFORMIN HYDROCHLORIDE 500 MG/1
500 TABLET, EXTENDED RELEASE ORAL
Qty: 90 TABLET | Refills: 1 | Status: SHIPPED
Start: 2022-08-09 | End: 2022-09-30 | Stop reason: SDUPTHER

## 2022-08-09 RX ORDER — AMLODIPINE BESYLATE 5 MG/1
TABLET ORAL
Qty: 30 TABLET | Refills: 2 | OUTPATIENT
Start: 2022-08-09

## 2022-08-09 RX ORDER — ATORVASTATIN CALCIUM 10 MG/1
TABLET, FILM COATED ORAL
Qty: 30 TABLET | Refills: 3 | Status: SHIPPED
Start: 2022-08-09 | End: 2022-09-30 | Stop reason: SDUPTHER

## 2022-08-09 RX ORDER — BUPROPION HYDROCHLORIDE 300 MG/1
300 TABLET ORAL EVERY MORNING
Qty: 90 TABLET | Refills: 2 | Status: SHIPPED
Start: 2022-08-09 | End: 2022-09-30 | Stop reason: SDUPTHER

## 2022-08-09 RX ORDER — AMLODIPINE BESYLATE 5 MG/1
TABLET ORAL
Qty: 30 TABLET | Refills: 1 | Status: SHIPPED
Start: 2022-08-09 | End: 2022-09-30 | Stop reason: SDUPTHER

## 2022-08-09 ASSESSMENT — ENCOUNTER SYMPTOMS
SINUS PAIN: 0
SINUS PRESSURE: 0
STRIDOR: 0
RHINORRHEA: 0
EYES NEGATIVE: 1
RESPIRATORY NEGATIVE: 1
SHORTNESS OF BREATH: 0

## 2022-08-09 NOTE — PROGRESS NOTES
[facility] Otolaryngology  Dr. Chloe Dai. Mekhi Muñoz, [address] [facility]        Patient Name:  Diego Lopez  :  1965     CHIEF C/O:    Chief Complaint   Patient presents with    Results     CT done 22    Tinnitus     persistent       HISTORY OBTAINED FROM:  patient    HISTORY OF PRESENT ILLNESS:       Lobo Allred is a 61y.o. year old female, here today for follow up of pulsatile tinnitus and CT results. Patient was last seen 1 month ago and underwent a CT of the IACs with and without contrast for pulsatile tinnitus. CT scan showed no findings within the temporal bones and normal jugular bulb. Patient states that she continues to have symptoms of pulsatile tinnitus of both ears that is constant throughout the day. It continues to be worse at nighttime when laying down. She is able to help cover the sound somewhat by using her television at nighttime. She denies any new symptoms of dizziness. She denies any changes to her hearing.       Past Medical History:   Diagnosis Date    Depression     Diabetes 1.5, managed as type 2 ([facility] [address]     Hyperlipidemia     Hypertension     Laceration of finger 2020    Viral upper respiratory tract infection 2020     Past Surgical History:   Procedure Laterality Date    ANKLE SURGERY   or 2016    LEFT   ankles and screws    BACK SURGERY      Cervical surgery      SECTION      FRACTURE SURGERY      Rotaor cuff surgery    HYSTERECTOMY (CERVIX STATUS UNKNOWN)         Current Outpatient Medications:     atorvastatin (LIPITOR) 10 MG tablet, take 1 tablet by mouth once daily, Disp: 30 tablet, Rfl: 3    amLODIPine (NORVASC) 5 MG tablet, Take one tablet by mouth daily, Disp: 30 tablet, Rfl: 1    buPROPion (WELLBUTRIN XL) 300 MG extended release tablet, Take 1 tablet by mouth every morning, Disp: 90 tablet, Rfl: 2    metFORMIN (GLUCOPHAGE-XR) 500 MG extended release tablet, Take 1 tablet by mouth daily (with breakfast), Disp: 90 tablet, Rfl: 1    gabapentin (NEURONTIN) 300 MG capsule, Take 1 capsule by mouth nightly for 90 days. , Disp: 90 capsule, Rfl: 3  Latex, Aspirin, Lisinopril, Morphine, and Seasonal  Social History     Tobacco Use    Smoking status: Every Day     Packs/day: 0.07     Years: 45.00     Pack years: 3.15     Types: Cigarettes     Start date: 9/13/1973    Smokeless tobacco: Never    Tobacco comments:     1 every other day   Vaping Use    Vaping Use: Former   Substance Use Topics    Alcohol use: Yes     Comment: occassional    Drug use: No     Family History   Problem Relation Age of Onset    Stroke Mother 76        mini     Stroke Father 76    Heart Attack Maternal Grandfather        Review of Systems   Constitutional: Negative. Negative for activity change and appetite change. HENT:  Positive for hearing loss and tinnitus (Pulsatile whooshing sound). Negative for congestion, ear discharge, ear pain, postnasal drip, rhinorrhea, sinus pressure and sinus pain. Eyes: Negative. Respiratory: Negative. Negative for shortness of breath and stridor. Cardiovascular: Negative. Negative for chest pain and palpitations. Endocrine: Negative. Musculoskeletal: Negative. Skin: Negative. Neurological: Negative. Negative for dizziness. Hematological: Negative. Psychiatric/Behavioral: Negative. /78 (Site: Left Upper Arm, Position: Sitting, Cuff Size: Large Adult)   Pulse 97   Physical Exam  Constitutional:       Appearance: Normal appearance. HENT:      Head: Normocephalic. Right Ear: Tympanic membrane, ear canal and external ear normal. Decreased hearing noted. Left Ear: Tympanic membrane, ear canal and external ear normal. Decreased hearing noted. Ears:        Nose: Nose normal. No rhinorrhea. Right Turbinates: Not pale. Left Turbinates: Not pale. Mouth/Throat:      Lips: Pink. Mouth: Mucous membranes are moist.      Pharynx: Oropharynx is clear.    Eyes:      Conjunctiva/sclera: Conjunctivae normal. Pupils: Pupils are equal, round, and reactive to light. Cardiovascular:      Rate and Rhythm: Normal rate and regular rhythm. Pulses: Normal pulses. Pulmonary:      Effort: Pulmonary effort is normal. No respiratory distress. Breath sounds: No stridor. Musculoskeletal:         General: Normal range of motion. Cervical back: Normal range of motion. No rigidity. No muscular tenderness. Skin:     General: Skin is warm and dry. Neurological:      General: No focal deficit present. Mental Status: She is alert and oriented to person, place, and time. Psychiatric:         Mood and Affect: Mood normal.         Behavior: Behavior normal.         Thought Content: Thought content normal.         Judgment: Judgment normal.     CT IAC:      Impression   Unremarkable appearance of the temporal bones. RECOMMENDATIONS:   Unavailable         IMPRESSION/PLAN:      Delmi Dunn was seen today for results and tinnitus. Diagnoses and all orders for this visit:    Pulsatile tinnitus  -     US CAROTID ARTERY BILATERAL; Future    Sensorineural hearing loss (SNHL) of both ears    Tinnitus of both ears        CT results reviewed with patient with no significant findings of the temporal bones or surrounding area. At this time an ultrasound of the carotids will be performed for further evaluation of vascular cause of her pulsatile tinnitus. She will follow-up in 1 month for reevaluation. She instructed to call with any new or worsening symptoms prior to next appointment.       Jamir Brunson, CRISTAL, FNP-C  [facility], Nose and Throat    The information contained in this note has been dictated using drug and medical speech recognition software and may contain errors

## 2022-08-29 ENCOUNTER — HOSPITAL ENCOUNTER (OUTPATIENT)
Dept: ULTRASOUND IMAGING | Age: 57
Discharge: HOME | End: 2022-08-31
Payer: COMMERCIAL

## 2022-08-29 DIAGNOSIS — H93.A9 PULSATILE TINNITUS: ICD-10-CM

## 2022-08-29 PROCEDURE — 93880 EXTRACRANIAL BILAT STUDY: CPT

## 2022-09-09 ENCOUNTER — OFFICE VISIT (OUTPATIENT)
Dept: ENT CLINIC | Age: 57
End: 2022-09-09
Payer: COMMERCIAL

## 2022-09-09 VITALS
SYSTOLIC BLOOD PRESSURE: 137 MMHG | HEIGHT: 64 IN | WEIGHT: 189 LBS | BODY MASS INDEX: 32.27 KG/M2 | DIASTOLIC BLOOD PRESSURE: 86 MMHG | HEART RATE: 90 BPM

## 2022-09-09 DIAGNOSIS — H90.3 SENSORINEURAL HEARING LOSS (SNHL) OF BOTH EARS: ICD-10-CM

## 2022-09-09 DIAGNOSIS — H93.A9 PULSATILE TINNITUS: Primary | ICD-10-CM

## 2022-09-09 PROCEDURE — 3017F COLORECTAL CA SCREEN DOC REV: CPT | Performed by: NURSE PRACTITIONER

## 2022-09-09 PROCEDURE — G8417 CALC BMI ABV UP PARAM F/U: HCPCS | Performed by: NURSE PRACTITIONER

## 2022-09-09 PROCEDURE — 4004F PT TOBACCO SCREEN RCVD TLK: CPT | Performed by: NURSE PRACTITIONER

## 2022-09-09 PROCEDURE — G8427 DOCREV CUR MEDS BY ELIG CLIN: HCPCS | Performed by: NURSE PRACTITIONER

## 2022-09-09 PROCEDURE — 99213 OFFICE O/P EST LOW 20 MIN: CPT | Performed by: NURSE PRACTITIONER

## 2022-09-09 NOTE — PROGRESS NOTES
[facility] Otolaryngology  Dr. Briana Weber. Lucy Smart. Ms.Ed        Patient Name:  Andrew Falcon  :  1965     CHIEF C/O:    Chief Complaint   Patient presents with    Follow-up     1 month carotid Ultra sound results Pt states she is still having ringing in both ears . HISTORY OBTAINED FROM:  patient    HISTORY OF PRESENT ILLNESS:       Delmi Dunn is a 61y.o. year old female, here today for follow up of pulsatile tinnitus and ultrasound results. Patient was last seen 1 month ago for continuation of pulsatile tinnitus in both ears with ultrasound of the carotids completed. This testing returned back with no significant findings. Patient states that her symptoms have remained stable with no significant changes. She does note that her symptoms are less noticeable when wearing her hearing aids and more bothersome when she removes them. She denies any new ear pain or pressure. She denies any new dizziness.       Past Medical History:   Diagnosis Date    Depression     Diabetes 1.5, managed as type 2 ([facility] [address]     Hyperlipidemia     Hypertension     Laceration of finger 2020    Viral upper respiratory tract infection 2020     Past Surgical History:   Procedure Laterality Date    ANKLE SURGERY   or 2016    LEFT   ankles and screws    BACK SURGERY      Cervical surgery      SECTION      FRACTURE SURGERY      Rotaor cuff surgery    HYSTERECTOMY (CERVIX STATUS UNKNOWN)         Current Outpatient Medications:     atorvastatin (LIPITOR) 10 MG tablet, take 1 tablet by mouth once daily, Disp: 30 tablet, Rfl: 3    amLODIPine (NORVASC) 5 MG tablet, Take one tablet by mouth daily, Disp: 30 tablet, Rfl: 1    buPROPion (WELLBUTRIN XL) 300 MG extended release tablet, Take 1 tablet by mouth every morning, Disp: 90 tablet, Rfl: 2    metFORMIN (GLUCOPHAGE-XR) 500 MG extended release tablet, Take 1 tablet by mouth daily (with breakfast), Disp: 90 tablet, Rfl: 1    gabapentin (NEURONTIN) 300 MG capsule, Take 1 capsule by mouth nightly for 90 days. , Disp: 90 capsule, Rfl: 3  Latex, Aspirin, Lisinopril, Morphine, and Seasonal  Social History     Tobacco Use    Smoking status: Every Day     Packs/day: 0.07     Years: 45.00     Pack years: 3.15     Types: Cigarettes     Start date: 9/13/1973    Smokeless tobacco: Never    Tobacco comments:     1 every other day   Vaping Use    Vaping Use: Former   Substance Use Topics    Alcohol use: Yes     Comment: occassional    Drug use: No     Family History   Problem Relation Age of Onset    Stroke Mother 76        mini     Stroke Father 76    Heart Attack Maternal Grandfather        Review of Systems   Constitutional: Negative. Negative for activity change and appetite change. HENT:  Positive for hearing loss and tinnitus (Pulsatile whooshing sound). Negative for congestion, ear discharge, ear pain, postnasal drip, rhinorrhea, sinus pressure and sinus pain. Eyes: Negative. Respiratory: Negative. Negative for shortness of breath and stridor. Cardiovascular: Negative. Negative for chest pain and palpitations. Endocrine: Negative. Musculoskeletal: Negative. Skin: Negative. Neurological: Negative. Negative for dizziness. Hematological: Negative. Psychiatric/Behavioral: Negative. /86   Pulse 90   Ht 5' 4\" (1.626 m)   Wt 189 lb (85.7 kg)   BMI 32.44 kg/m²   Physical Exam  Constitutional:       Appearance: Normal appearance. HENT:      Head: Normocephalic. Right Ear: Tympanic membrane, ear canal and external ear normal. Decreased hearing noted. Left Ear: Tympanic membrane, ear canal and external ear normal. Decreased hearing noted. Ears:        Nose: Nose normal. No rhinorrhea. Right Turbinates: Not pale. Left Turbinates: Not pale. Mouth/Throat:      Lips: Pink. Mouth: Mucous membranes are moist.      Pharynx: Oropharynx is clear.    Eyes:      Conjunctiva/sclera: Conjunctivae normal.      Pupils: Pupils are equal, round, and reactive to light. Cardiovascular:      Rate and Rhythm: Normal rate and regular rhythm. Pulses: Normal pulses. Pulmonary:      Effort: Pulmonary effort is normal. No respiratory distress. Breath sounds: No stridor. Musculoskeletal:         General: Normal range of motion. Cervical back: Normal range of motion. No rigidity. No muscular tenderness. Skin:     General: Skin is warm and dry. Neurological:      General: No focal deficit present. Mental Status: She is alert and oriented to person, place, and time. Psychiatric:         Mood and Affect: Mood normal.         Behavior: Behavior normal.         Thought Content: Thought content normal.         Judgment: Judgment normal.     US Carotids:  Impression   The right internal carotid artery demonstrates 0-50% stenosis. The left internal carotid artery demonstrates 0-50% stenosis. Bilateral vertebral arteries are patent with flow in the normal direction. IMPRESSION/PLAN:      Huyen Tapia was seen today for follow-up. Diagnoses and all orders for this visit:    Pulsatile tinnitus    Sensorineural hearing loss (SNHL) of both ears    Ultrasound results reviewed with the patient and found to be negative. At this time we will continue to monitor patient and she will follow-up in December 2022 for her 1 year audio evaluation. She will continue using her hearing aids during the day and masking techniques at night to help try and cover the tinnitus. She will call for any new or worsening of symptoms prior to her next appointment.     Jennifer Valentine MSN, FNP-C  [address] [place] [address], Nose and Throat    The information contained in this note has been dictated using drug and medical speech recognition software and may contain errors

## 2022-09-15 DIAGNOSIS — M54.2 CERVICAL PAIN: ICD-10-CM

## 2022-09-15 ASSESSMENT — ENCOUNTER SYMPTOMS
SINUS PAIN: 0
SHORTNESS OF BREATH: 0
RESPIRATORY NEGATIVE: 1
EYES NEGATIVE: 1
SINUS PRESSURE: 0
RHINORRHEA: 0
STRIDOR: 0

## 2022-09-15 NOTE — TELEPHONE ENCOUNTER
Patient needs appt not here since 6/2021.   Electronically signed by Fernanda Park MA on 9/15/2022 at 1:03 PM

## 2022-09-15 NOTE — TELEPHONE ENCOUNTER
MA left message for patient to call and schedule an appt.   Electronically signed by Isabel Zamarripa MA on 9/15/2022 at 4:00 PM

## 2022-09-16 RX ORDER — GABAPENTIN 300 MG/1
300 CAPSULE ORAL NIGHTLY
Qty: 90 CAPSULE | Refills: 3 | OUTPATIENT
Start: 2022-09-16 | End: 2022-12-15

## 2022-09-30 ENCOUNTER — OFFICE VISIT (OUTPATIENT)
Dept: FAMILY MEDICINE CLINIC | Age: 57
End: 2022-09-30
Payer: COMMERCIAL

## 2022-09-30 VITALS
DIASTOLIC BLOOD PRESSURE: 85 MMHG | WEIGHT: 190 LBS | HEIGHT: 64 IN | HEART RATE: 98 BPM | RESPIRATION RATE: 18 BRPM | SYSTOLIC BLOOD PRESSURE: 130 MMHG | BODY MASS INDEX: 32.44 KG/M2 | OXYGEN SATURATION: 98 %

## 2022-09-30 DIAGNOSIS — M54.2 CERVICAL PAIN: ICD-10-CM

## 2022-09-30 DIAGNOSIS — R73.03 PREDIABETES: Primary | ICD-10-CM

## 2022-09-30 DIAGNOSIS — I10 ESSENTIAL HYPERTENSION: ICD-10-CM

## 2022-09-30 PROCEDURE — 3017F COLORECTAL CA SCREEN DOC REV: CPT | Performed by: FAMILY MEDICINE

## 2022-09-30 PROCEDURE — G8427 DOCREV CUR MEDS BY ELIG CLIN: HCPCS | Performed by: FAMILY MEDICINE

## 2022-09-30 PROCEDURE — 99214 OFFICE O/P EST MOD 30 MIN: CPT | Performed by: FAMILY MEDICINE

## 2022-09-30 PROCEDURE — 4004F PT TOBACCO SCREEN RCVD TLK: CPT | Performed by: FAMILY MEDICINE

## 2022-09-30 PROCEDURE — G8417 CALC BMI ABV UP PARAM F/U: HCPCS | Performed by: FAMILY MEDICINE

## 2022-09-30 RX ORDER — BUPROPION HYDROCHLORIDE 300 MG/1
300 TABLET ORAL EVERY MORNING
Qty: 90 TABLET | Refills: 2 | Status: SHIPPED | OUTPATIENT
Start: 2022-09-30

## 2022-09-30 RX ORDER — GABAPENTIN 300 MG/1
300 CAPSULE ORAL NIGHTLY
Qty: 90 CAPSULE | Refills: 0 | Status: SHIPPED | OUTPATIENT
Start: 2022-09-30 | End: 2022-12-29

## 2022-09-30 RX ORDER — ATORVASTATIN CALCIUM 10 MG/1
TABLET, FILM COATED ORAL
Qty: 30 TABLET | Refills: 3 | Status: SHIPPED | OUTPATIENT
Start: 2022-09-30

## 2022-09-30 RX ORDER — AMLODIPINE BESYLATE 5 MG/1
TABLET ORAL
Qty: 30 TABLET | Refills: 1 | Status: SHIPPED | OUTPATIENT
Start: 2022-09-30

## 2022-09-30 RX ORDER — METFORMIN HYDROCHLORIDE 500 MG/1
500 TABLET, EXTENDED RELEASE ORAL
Qty: 90 TABLET | Refills: 1 | Status: SHIPPED | OUTPATIENT
Start: 2022-09-30

## 2022-09-30 SDOH — ECONOMIC STABILITY: FOOD INSECURITY: WITHIN THE PAST 12 MONTHS, YOU WORRIED THAT YOUR FOOD WOULD RUN OUT BEFORE YOU GOT MONEY TO BUY MORE.: NEVER TRUE

## 2022-09-30 SDOH — ECONOMIC STABILITY: FOOD INSECURITY: WITHIN THE PAST 12 MONTHS, THE FOOD YOU BOUGHT JUST DIDN'T LAST AND YOU DIDN'T HAVE MONEY TO GET MORE.: NEVER TRUE

## 2022-09-30 ASSESSMENT — SOCIAL DETERMINANTS OF HEALTH (SDOH): HOW HARD IS IT FOR YOU TO PAY FOR THE VERY BASICS LIKE FOOD, HOUSING, MEDICAL CARE, AND HEATING?: NOT HARD AT ALL

## 2022-09-30 NOTE — PROGRESS NOTES
S: 61 y.o. female with   Chief Complaint   Patient presents with    Diabetes       PreDM2/HTN - BP controlled, no Sx to report. O: VS:  height is 5' 4\" (1.626 m) and weight is 190 lb (86.2 kg). Her blood pressure is 130/85 and her pulse is 98. Her respiration is 18 and oxygen saturation is 98%. BP Readings from Last 3 Encounters:   09/30/22 130/85   09/09/22 137/86   08/09/22 133/78     See resident note      Impression/Plan:   1) Htn - controlled cont meds   2) Pre-Dm - cont metformin  3) refill gabapentin       Health Maintenance Due   Topic Date Due    HIV screen  Never done    Cervical cancer screen  Never done    Colorectal Cancer Screen  07/02/2022         Attending Physician Statement  I have discussed the case, including pertinent history and exam findings with the resident. I agree with the documented assessment and plan.       Jaimie Knott MD

## 2022-09-30 NOTE — PROGRESS NOTES
10/2/2022    Wei Ji is a 61 y.o. femalehere for:    HPI:    Here for HTN fu  Norvasc was reduced last time to 5 mg daily  Pt denies any symptoms     Pre-DM   Remains on metformin 500 mg daily   Continue lipitor 10 mg daily   Denies any hypoglycemic events       BP Readings from Last 3 Encounters:   22 130/85   22 137/86   22 133/78     Current Outpatient Medications   Medication Sig Dispense Refill    amLODIPine (NORVASC) 5 MG tablet Take one tablet by mouth daily 30 tablet 1    atorvastatin (LIPITOR) 10 MG tablet take 1 tablet by mouth once daily 30 tablet 3    buPROPion (WELLBUTRIN XL) 300 MG extended release tablet Take 1 tablet by mouth every morning 90 tablet 2    gabapentin (NEURONTIN) 300 MG capsule Take 1 capsule by mouth nightly for 90 days. 90 capsule 0    metFORMIN (GLUCOPHAGE-XR) 500 MG extended release tablet Take 1 tablet by mouth daily (with breakfast) 90 tablet 1     No current facility-administered medications for this visit.       Allergies   Allergen Reactions    Latex      Other reaction(s): swelling & sores    Aspirin Other (See Comments)    Lisinopril Rash     Red rash on face,arms,upper back    Morphine Nausea And Vomiting    Seasonal Other (See Comments)       Past Medical & Surgical History:      Diagnosis Date    Depression     Diabetes 1.5, managed as type 2 ([address]     Hyperlipidemia     Hypertension     Laceration of finger 2020    Viral upper respiratory tract infection 2020     Past Surgical History:   Procedure Laterality Date    ANKLE SURGERY   or 2016    LEFT   ankles and screws    BACK SURGERY      Cervical surgery      SECTION      FRACTURE SURGERY      Rotaor cuff surgery    HYSTERECTOMY (CERVIX STATUS UNKNOWN)         Family History:      Problem Relation Age of Onset    Stroke Mother 76        mini     Stroke Father 76    Heart Attack Maternal Grandfather        Social History:  Social History     Tobacco Use    Smoking status: Every Day     Packs/day: 0.07     Years: 45.00     Pack years: 3.15     Types: Cigarettes     Start date: 9/13/1973    Smokeless tobacco: Never    Tobacco comments:     1 every other day   Substance Use Topics    Alcohol use: Yes     Comment: occassional       Immunization History   Administered Date(s) Administered    COVID-19, MODERNA BLUE border, Primary or Immunocompromised, (age 12y+), IM, 100 mcg/0.5mL 03/17/2021, 04/14/2021    COVID-19, MODERNA Booster BLUE border, (age 18y+), IM, 50mcg/0.25mL 10/27/2021    COVID-19, PFIZER Bivalent BOOSTER, (age 12y+), IM, 30 mcg/0.3 mL dose 09/08/2022    DTaP vaccine 04/23/2020    Influenza A (F1T5-01) Vaccine PF IM 12/03/2009    Influenza Virus Vaccine 12/03/2009, 10/14/2016, 10/09/2017, 09/13/2018, 09/22/2019    Influenza, FLUARIX, FLULAVAL, FLUZONE (age 10 mo+) AND AFLURIA, (age 1 y+), PF, 0.5mL 10/14/2016, 10/09/2017, 09/13/2018, 09/22/2019, 10/30/2020    Influenza, FLUCELVAX, (age 10 mo+), MDCK, PF, 0.5mL 09/23/2021    Pneumococcal Polysaccharide ([id number]) 09/13/2018    Tdap (Boostrix, Adacel) 01/10/2020, 04/23/2020       Review of Systems   Constitutional:  Negative for chills and fever. HENT:  Negative for congestion, sore throat and trouble swallowing. Respiratory:  Negative for cough. Cardiovascular:  Negative for chest pain and leg swelling. Gastrointestinal:  Negative for abdominal pain, constipation, diarrhea, nausea and vomiting. Genitourinary:  Negative for difficulty urinating. Musculoskeletal:  Negative for arthralgias and myalgias. Skin:  Negative for rash and wound. Neurological:  Negative for dizziness and headaches. Psychiatric/Behavioral:  Negative for agitation. VS:  /85   Pulse 98   Resp 18   Ht 5' 4\" (1.626 m)   Wt 190 lb (86.2 kg)   SpO2 98%   BMI 32.61 kg/m²     Physical Exam  Constitutional:       Appearance: Normal appearance. HENT:      Head: Normocephalic. Nose: Nose normal. No rhinorrhea.    Eyes: General: No scleral icterus. Conjunctiva/sclera: Conjunctivae normal.   Cardiovascular:      Rate and Rhythm: Normal rate and regular rhythm. Pulses: Normal pulses. Heart sounds: Normal heart sounds. No murmur heard. Pulmonary:      Breath sounds: Normal breath sounds. No wheezing, rhonchi or rales. Abdominal:      General: Abdomen is flat. Bowel sounds are normal.   Musculoskeletal:      Right lower leg: No edema. Left lower leg: No edema. Skin:     General: Skin is warm. Findings: No rash. Neurological:      General: No focal deficit present. Mental Status: She is alert. Assessment/Plan:  1. Prediabetes  Continue metformin 500 mg daily    2. Essential hypertension  Continue same dose   - amLODIPine (NORVASC) 5 MG tablet; Take one tablet by mouth daily  Dispense: 30 tablet; Refill: 1    3. Cervical pain  Refill, pt used to fu with neurology, not anymore  - gabapentin (NEURONTIN) 300 MG capsule; Take 1 capsule by mouth nightly for 90 days. Dispense: 90 capsule; Refill: 0    Follow up:  4 months. Patient agrees with the above stated plan. Terri Albert received counseling on the following healthy behaviors: nutrition, exercise, and medication adherence.     Donya Silva MD  PGY-3 Family Medicine

## 2022-10-02 ASSESSMENT — ENCOUNTER SYMPTOMS
VOMITING: 0
SORE THROAT: 0
CONSTIPATION: 0
TROUBLE SWALLOWING: 0
ABDOMINAL PAIN: 0
DIARRHEA: 0
NAUSEA: 0
COUGH: 0

## 2022-10-19 ENCOUNTER — TELEPHONE (OUTPATIENT)
Dept: AUDIOLOGY | Age: 57
End: 2022-10-19

## 2022-10-19 NOTE — TELEPHONE ENCOUNTER
Patient called for hearing aid check. She reported hearing aid is not charging, turning red in , intermittently dying after being fully charged. Told her battery issues are likely something we will need to send in to the  for. She will drop off today and will send in to [facility].

## 2022-11-24 DIAGNOSIS — I10 ESSENTIAL HYPERTENSION: ICD-10-CM

## 2022-11-28 RX ORDER — AMLODIPINE BESYLATE 5 MG/1
TABLET ORAL
Qty: 30 TABLET | Refills: 1 | Status: SHIPPED
Start: 2022-11-28 | End: 2022-11-30 | Stop reason: SDUPTHER

## 2022-11-30 ENCOUNTER — OFFICE VISIT (OUTPATIENT)
Dept: FAMILY MEDICINE CLINIC | Age: 57
End: 2022-11-30
Payer: COMMERCIAL

## 2022-11-30 VITALS
HEART RATE: 89 BPM | SYSTOLIC BLOOD PRESSURE: 128 MMHG | DIASTOLIC BLOOD PRESSURE: 92 MMHG | WEIGHT: 193 LBS | RESPIRATION RATE: 18 BRPM | BODY MASS INDEX: 32.95 KG/M2 | OXYGEN SATURATION: 97 % | HEIGHT: 64 IN

## 2022-11-30 DIAGNOSIS — M54.2 CERVICAL PAIN: ICD-10-CM

## 2022-11-30 DIAGNOSIS — F33.0 MILD EPISODE OF RECURRENT MAJOR DEPRESSIVE DISORDER (HCC): ICD-10-CM

## 2022-11-30 DIAGNOSIS — I10 ESSENTIAL HYPERTENSION: ICD-10-CM

## 2022-11-30 DIAGNOSIS — I10 ESSENTIAL HYPERTENSION: Primary | ICD-10-CM

## 2022-11-30 DIAGNOSIS — R73.03 PREDIABETES: ICD-10-CM

## 2022-11-30 DIAGNOSIS — E66.09 CLASS 1 OBESITY DUE TO EXCESS CALORIES WITHOUT SERIOUS COMORBIDITY WITH BODY MASS INDEX (BMI) OF 32.0 TO 32.9 IN ADULT: ICD-10-CM

## 2022-11-30 PROBLEM — B00.1 HERPES LABIALIS: Status: RESOLVED | Noted: 2020-04-30 | Resolved: 2022-11-30

## 2022-11-30 PROBLEM — T30.0 FIRST DEGREE BURN INJURY: Status: RESOLVED | Noted: 2021-09-15 | Resolved: 2022-11-30

## 2022-11-30 LAB
ALBUMIN SERPL-MCNC: 4.6 G/DL (ref 3.5–5.2)
ALP BLD-CCNC: 138 U/L (ref 35–104)
ALT SERPL-CCNC: 18 U/L (ref 0–32)
ANION GAP SERPL CALCULATED.3IONS-SCNC: 18 MMOL/L (ref 7–16)
AST SERPL-CCNC: 16 U/L (ref 0–31)
BILIRUB SERPL-MCNC: 0.3 MG/DL (ref 0–1.2)
BUN BLDV-MCNC: 9 MG/DL (ref 6–20)
CALCIUM SERPL-MCNC: 10.2 MG/DL (ref 8.6–10.2)
CHLORIDE BLD-SCNC: 98 MMOL/L (ref 98–107)
CO2: 24 MMOL/L (ref 22–29)
CREAT SERPL-MCNC: 0.6 MG/DL (ref 0.5–1)
GFR SERPL CREATININE-BSD FRML MDRD: >60 ML/MIN/1.73
GLUCOSE BLD-MCNC: 100 MG/DL (ref 74–99)
HBA1C MFR BLD: 6 %
POTASSIUM SERPL-SCNC: 3.7 MMOL/L (ref 3.5–5)
SODIUM BLD-SCNC: 140 MMOL/L (ref 132–146)
TOTAL PROTEIN: 8.3 G/DL (ref 6.4–8.3)

## 2022-11-30 PROCEDURE — 83036 HEMOGLOBIN GLYCOSYLATED A1C: CPT | Performed by: FAMILY MEDICINE

## 2022-11-30 PROCEDURE — 3078F DIAST BP <80 MM HG: CPT | Performed by: FAMILY MEDICINE

## 2022-11-30 PROCEDURE — G8427 DOCREV CUR MEDS BY ELIG CLIN: HCPCS | Performed by: FAMILY MEDICINE

## 2022-11-30 PROCEDURE — 3074F SYST BP LT 130 MM HG: CPT | Performed by: FAMILY MEDICINE

## 2022-11-30 PROCEDURE — G8484 FLU IMMUNIZE NO ADMIN: HCPCS | Performed by: FAMILY MEDICINE

## 2022-11-30 PROCEDURE — 4004F PT TOBACCO SCREEN RCVD TLK: CPT | Performed by: FAMILY MEDICINE

## 2022-11-30 PROCEDURE — G8417 CALC BMI ABV UP PARAM F/U: HCPCS | Performed by: FAMILY MEDICINE

## 2022-11-30 PROCEDURE — 3017F COLORECTAL CA SCREEN DOC REV: CPT | Performed by: FAMILY MEDICINE

## 2022-11-30 PROCEDURE — 99213 OFFICE O/P EST LOW 20 MIN: CPT | Performed by: FAMILY MEDICINE

## 2022-11-30 RX ORDER — ATORVASTATIN CALCIUM 10 MG/1
TABLET, FILM COATED ORAL
Qty: 30 TABLET | Refills: 3 | Status: SHIPPED | OUTPATIENT
Start: 2022-11-30

## 2022-11-30 RX ORDER — METFORMIN HYDROCHLORIDE 500 MG/1
500 TABLET, EXTENDED RELEASE ORAL
Qty: 90 TABLET | Refills: 1 | Status: SHIPPED | OUTPATIENT
Start: 2022-11-30

## 2022-11-30 RX ORDER — AMLODIPINE BESYLATE 5 MG/1
TABLET ORAL
Qty: 30 TABLET | Refills: 1 | Status: SHIPPED | OUTPATIENT
Start: 2022-11-30

## 2022-11-30 RX ORDER — BUPROPION HYDROCHLORIDE 300 MG/1
300 TABLET ORAL EVERY MORNING
Qty: 90 TABLET | Refills: 2 | Status: SHIPPED | OUTPATIENT
Start: 2022-11-30

## 2022-11-30 RX ORDER — GABAPENTIN 300 MG/1
300 CAPSULE ORAL NIGHTLY
Qty: 90 CAPSULE | Refills: 0 | Status: SHIPPED | OUTPATIENT
Start: 2022-11-30 | End: 2023-02-28

## 2022-11-30 RX ORDER — BUPROPION HYDROCHLORIDE 150 MG/1
150 TABLET ORAL EVERY MORNING
Qty: 30 TABLET | Refills: 3 | Status: SHIPPED | OUTPATIENT
Start: 2022-11-30

## 2022-11-30 ASSESSMENT — ENCOUNTER SYMPTOMS
NAUSEA: 0
CONSTIPATION: 0
VOMITING: 0
ABDOMINAL PAIN: 0
TROUBLE SWALLOWING: 0
SORE THROAT: 0
COUGH: 0
DIARRHEA: 0

## 2022-11-30 ASSESSMENT — PATIENT HEALTH QUESTIONNAIRE - PHQ9
SUM OF ALL RESPONSES TO PHQ QUESTIONS 1-9: 6
7. TROUBLE CONCENTRATING ON THINGS, SUCH AS READING THE NEWSPAPER OR WATCHING TELEVISION: 0
2. FEELING DOWN, DEPRESSED OR HOPELESS: 1
5. POOR APPETITE OR OVEREATING: 1
9. THOUGHTS THAT YOU WOULD BE BETTER OFF DEAD, OR OF HURTING YOURSELF: 0
SUM OF ALL RESPONSES TO PHQ QUESTIONS 1-9: 6
SUM OF ALL RESPONSES TO PHQ9 QUESTIONS 1 & 2: 1
3. TROUBLE FALLING OR STAYING ASLEEP: 1
6. FEELING BAD ABOUT YOURSELF - OR THAT YOU ARE A FAILURE OR HAVE LET YOURSELF OR YOUR FAMILY DOWN: 0
4. FEELING TIRED OR HAVING LITTLE ENERGY: 3
8. MOVING OR SPEAKING SO SLOWLY THAT OTHER PEOPLE COULD HAVE NOTICED. OR THE OPPOSITE, BEING SO FIGETY OR RESTLESS THAT YOU HAVE BEEN MOVING AROUND A LOT MORE THAN USUAL: 0
1. LITTLE INTEREST OR PLEASURE IN DOING THINGS: 0
SUM OF ALL RESPONSES TO PHQ QUESTIONS 1-9: 6
10. IF YOU CHECKED OFF ANY PROBLEMS, HOW DIFFICULT HAVE THESE PROBLEMS MADE IT FOR YOU TO DO YOUR WORK, TAKE CARE OF THINGS AT HOME, OR GET ALONG WITH OTHER PEOPLE: 1
SUM OF ALL RESPONSES TO PHQ QUESTIONS 1-9: 6
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
## 2022-11-30 NOTE — PROGRESS NOTES
[address]  Precepting Note    Subjective:  HTN and Prediabetes follow up   Taking medications as prescribed  Neuropathic pain controlled with Gabapentin  Hx of depression on wellbutrin, more frequent crying spells  PHQ-9 score of 6 today. No SI/HI    ROS otherwise negative     Past medical, surgical, family and social history were reviewed, non-contributory, and unchanged unless otherwise stated. Objective:    BP (!) 147/96   Pulse 89   Resp 18   Ht 5' 4\" (1.626 m)   Wt 193 lb (87.5 kg)   SpO2 97%   BMI 33.13 kg/m²     Exam is as noted by resident with the following changes, additions or corrections:    General:  NAD; alert & oriented x 3   Heart:  RRR, no murmurs, gallops, or rubs. Lungs:  CTA bilaterally, no wheeze, rales or rhonchi  Abd: bowel sounds present, nontender, nondistended, no masses  Extrem:  No clubbing, cyanosis, or edema    Assessment/Plan:  HTN- cont Norvasc 5  Prediabetes- A1c 6.0 today. Cont lifestyle modification, Metformin  Depression- increase wellbutrin to 450mg     Attending Physician Statement  I have reviewed the chart, including any radiology or labs. I have discussed the case, including pertinent history and exam findings with the resident. I agree with the assessment, plan and orders as documented by the resident. Please refer to the resident note for additional information.       Electronically signed by [place]fieldradha Leija MD on 11/30/2022 at 1:21 PM

## 2022-12-07 ENCOUNTER — OFFICE VISIT (OUTPATIENT)
Dept: FAMILY MEDICINE CLINIC | Age: 57
End: 2022-12-07
Payer: COMMERCIAL

## 2022-12-07 VITALS
RESPIRATION RATE: 16 BRPM | DIASTOLIC BLOOD PRESSURE: 75 MMHG | HEIGHT: 64 IN | OXYGEN SATURATION: 98 % | BODY MASS INDEX: 32.61 KG/M2 | WEIGHT: 191 LBS | SYSTOLIC BLOOD PRESSURE: 126 MMHG | TEMPERATURE: 97 F | HEART RATE: 91 BPM

## 2022-12-07 DIAGNOSIS — B00.1 HERPES LABIALIS: ICD-10-CM

## 2022-12-07 DIAGNOSIS — G47.00 INSOMNIA, UNSPECIFIED TYPE: ICD-10-CM

## 2022-12-07 DIAGNOSIS — G47.00 INSOMNIA, UNSPECIFIED TYPE: Primary | ICD-10-CM

## 2022-12-07 LAB
BASOPHILS ABSOLUTE: 0.12 E9/L (ref 0–0.2)
BASOPHILS RELATIVE PERCENT: 0.9 % (ref 0–2)
EOSINOPHILS ABSOLUTE: 0.33 E9/L (ref 0.05–0.5)
EOSINOPHILS RELATIVE PERCENT: 2.5 % (ref 0–6)
HCT VFR BLD CALC: 45.8 % (ref 34–48)
HEMOGLOBIN: 14.6 G/DL (ref 11.5–15.5)
IMMATURE GRANULOCYTES #: 0.06 E9/L
IMMATURE GRANULOCYTES %: 0.5 % (ref 0–5)
LYMPHOCYTES ABSOLUTE: 3.66 E9/L (ref 1.5–4)
LYMPHOCYTES RELATIVE PERCENT: 27.6 % (ref 20–42)
MCH RBC QN AUTO: 28.9 PG (ref 26–35)
MCHC RBC AUTO-ENTMCNC: 31.9 % (ref 32–34.5)
MCV RBC AUTO: 90.5 FL (ref 80–99.9)
MONOCYTES ABSOLUTE: 0.73 E9/L (ref 0.1–0.95)
MONOCYTES RELATIVE PERCENT: 5.5 % (ref 2–12)
NEUTROPHILS ABSOLUTE: 8.37 E9/L (ref 1.8–7.3)
NEUTROPHILS RELATIVE PERCENT: 63 % (ref 43–80)
PDW BLD-RTO: 14.6 FL (ref 11.5–15)
PLATELET # BLD: 450 E9/L (ref 130–450)
PMV BLD AUTO: 10.3 FL (ref 7–12)
RBC # BLD: 5.06 E12/L (ref 3.5–5.5)
TSH SERPL DL<=0.05 MIU/L-ACNC: 0.83 UIU/ML (ref 0.27–4.2)
WBC # BLD: 13.3 E9/L (ref 4.5–11.5)

## 2022-12-07 PROCEDURE — 3078F DIAST BP <80 MM HG: CPT | Performed by: FAMILY MEDICINE

## 2022-12-07 PROCEDURE — 3074F SYST BP LT 130 MM HG: CPT | Performed by: FAMILY MEDICINE

## 2022-12-07 PROCEDURE — 4004F PT TOBACCO SCREEN RCVD TLK: CPT | Performed by: FAMILY MEDICINE

## 2022-12-07 PROCEDURE — 99213 OFFICE O/P EST LOW 20 MIN: CPT | Performed by: FAMILY MEDICINE

## 2022-12-07 PROCEDURE — G8484 FLU IMMUNIZE NO ADMIN: HCPCS | Performed by: FAMILY MEDICINE

## 2022-12-07 PROCEDURE — G8427 DOCREV CUR MEDS BY ELIG CLIN: HCPCS | Performed by: FAMILY MEDICINE

## 2022-12-07 PROCEDURE — 3017F COLORECTAL CA SCREEN DOC REV: CPT | Performed by: FAMILY MEDICINE

## 2022-12-07 PROCEDURE — G8417 CALC BMI ABV UP PARAM F/U: HCPCS | Performed by: FAMILY MEDICINE

## 2022-12-07 RX ORDER — ACYCLOVIR 400 MG/1
400 TABLET ORAL ONCE
Qty: 14 TABLET | Refills: 2 | Status: SHIPPED | OUTPATIENT
Start: 2022-12-07 | End: 2022-12-07

## 2022-12-07 ASSESSMENT — PATIENT HEALTH QUESTIONNAIRE - PHQ9
3. TROUBLE FALLING OR STAYING ASLEEP: 2
5. POOR APPETITE OR OVEREATING: 1
10. IF YOU CHECKED OFF ANY PROBLEMS, HOW DIFFICULT HAVE THESE PROBLEMS MADE IT FOR YOU TO DO YOUR WORK, TAKE CARE OF THINGS AT HOME, OR GET ALONG WITH OTHER PEOPLE: 0
9. THOUGHTS THAT YOU WOULD BE BETTER OFF DEAD, OR OF HURTING YOURSELF: 0
SUM OF ALL RESPONSES TO PHQ9 QUESTIONS 1 & 2: 0
SUM OF ALL RESPONSES TO PHQ QUESTIONS 1-9: 4
7. TROUBLE CONCENTRATING ON THINGS, SUCH AS READING THE NEWSPAPER OR WATCHING TELEVISION: 0
6. FEELING BAD ABOUT YOURSELF - OR THAT YOU ARE A FAILURE OR HAVE LET YOURSELF OR YOUR FAMILY DOWN: 0
SUM OF ALL RESPONSES TO PHQ QUESTIONS 1-9: 4
8. MOVING OR SPEAKING SO SLOWLY THAT OTHER PEOPLE COULD HAVE NOTICED. OR THE OPPOSITE, BEING SO FIGETY OR RESTLESS THAT YOU HAVE BEEN MOVING AROUND A LOT MORE THAN USUAL: 0
4. FEELING TIRED OR HAVING LITTLE ENERGY: 1
1. LITTLE INTEREST OR PLEASURE IN DOING THINGS: 0
SUM OF ALL RESPONSES TO PHQ QUESTIONS 1-9: 4
2. FEELING DOWN, DEPRESSED OR HOPELESS: 0
SUM OF ALL RESPONSES TO PHQ QUESTIONS 1-9: 4

## 2022-12-07 ASSESSMENT — ENCOUNTER SYMPTOMS
DIARRHEA: 0
NAUSEA: 0
TROUBLE SWALLOWING: 0
ABDOMINAL PAIN: 0
VOMITING: 0
COUGH: 0
SORE THROAT: 0
CONSTIPATION: 0

## 2022-12-07 ASSESSMENT — ANXIETY QUESTIONNAIRES
3. WORRYING TOO MUCH ABOUT DIFFERENT THINGS: 3
1. FEELING NERVOUS, ANXIOUS, OR ON EDGE: 1
4. TROUBLE RELAXING: 1
7. FEELING AFRAID AS IF SOMETHING AWFUL MIGHT HAPPEN: 0
6. BECOMING EASILY ANNOYED OR IRRITABLE: 2
2. NOT BEING ABLE TO STOP OR CONTROL WORRYING: 3
IF YOU CHECKED OFF ANY PROBLEMS ON THIS QUESTIONNAIRE, HOW DIFFICULT HAVE THESE PROBLEMS MADE IT FOR YOU TO DO YOUR WORK, TAKE CARE OF THINGS AT HOME, OR GET ALONG WITH OTHER PEOPLE: NOT DIFFICULT AT ALL
GAD7 TOTAL SCORE: 10
5. BEING SO RESTLESS THAT IT IS HARD TO SIT STILL: 0
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
## 2022-12-07 NOTE — PROGRESS NOTES
[address]  Precepting Note    Subjective: Insomnia follow up   Difficulty staying asleep about 2x/wk. No day time naps. On Gabapentin for neuropathy  Limits caffeine  Frequent urination  Partner notes snoring    ROS otherwise negative     Past medical, surgical, family and social history were reviewed, non-contributory, and unchanged unless otherwise stated. Objective:    /75   Pulse 91   Temp 97 °F (36.1 °C)   Resp 16   Ht 5' 4\" (1.626 m)   Wt 191 lb (86.6 kg)   SpO2 98%   BMI 32.79 kg/m²     Exam is as noted by resident with the following changes, additions or corrections:    General:  NAD; alert & oriented x 3   Heart:  RRR, no murmurs, gallops, or rubs. Lungs:  CTA bilaterally, no wheeze, rales or rhonchi  Abd: bowel sounds present, nontender, nondistended, no masses  Extrem:  No clubbing, cyanosis, or edema    Assessment/Plan:  Snoring- elevated [place]. Refer for sleep study  Sleep disturbance- sleep hygeine     Attending Physician Statement  I have reviewed the chart, including any radiology or labs. I have discussed the case, including pertinent history and exam findings with the resident. I agree with the assessment, plan and orders as documented by the resident. Please refer to the resident note for additional information.       Electronically signed by Preeti Espinal MD on 12/7/2022 at 1:48 PM

## 2022-12-10 LAB
Lab: NORMAL
REPORT: NORMAL
THIS TEST SENT TO: NORMAL

## 2022-12-13 ENCOUNTER — HOSPITAL ENCOUNTER (OUTPATIENT)
Dept: SLEEP CENTER | Age: 57
Discharge: HOME | End: 2022-12-13
Payer: COMMERCIAL

## 2022-12-13 DIAGNOSIS — G47.00 INSOMNIA, UNSPECIFIED TYPE: ICD-10-CM

## 2022-12-13 PROCEDURE — 95810 POLYSOM 6/> YRS 4/> PARAM: CPT

## 2022-12-14 VITALS — HEIGHT: 64 IN | OXYGEN SATURATION: 94 % | BODY MASS INDEX: 32.44 KG/M2 | HEART RATE: 97 BPM | WEIGHT: 190 LBS

## 2022-12-14 ASSESSMENT — SLEEP AND FATIGUE QUESTIONNAIRES
ESS TOTAL SCORE: 0
HOW LIKELY ARE YOU TO NOD OFF OR FALL ASLEEP WHEN YOU ARE A PASSENGER IN A CAR FOR AN HOUR WITHOUT A BREAK: 0
HOW LIKELY ARE YOU TO NOD OFF OR FALL ASLEEP IN A CAR, WHILE STOPPED FOR A FEW MINUTES IN TRAFFIC: 0
HOW LIKELY ARE YOU TO NOD OFF OR FALL ASLEEP WHILE SITTING INACTIVE IN A PUBLIC PLACE: 0
HOW LIKELY ARE YOU TO NOD OFF OR FALL ASLEEP WHILE SITTING QUIETLY AFTER LUNCH WITHOUT ALCOHOL: 0
HOW LIKELY ARE YOU TO NOD OFF OR FALL ASLEEP WHILE SITTING AND READING: 0
HOW LIKELY ARE YOU TO NOD OFF OR FALL ASLEEP WHILE WATCHING TV: 0
HOW LIKELY ARE YOU TO NOD OFF OR FALL ASLEEP WHILE LYING DOWN TO REST IN THE AFTERNOON WHEN CIRCUMSTANCES PERMIT: 0
HOW LIKELY ARE YOU TO NOD OFF OR FALL ASLEEP WHILE SITTING AND TALKING TO SOMEONE: 0

## 2022-12-19 ENCOUNTER — PROCEDURE VISIT (OUTPATIENT)
Dept: AUDIOLOGY | Age: 57
End: 2022-12-19
Payer: COMMERCIAL

## 2022-12-19 ENCOUNTER — OFFICE VISIT (OUTPATIENT)
Dept: ENT CLINIC | Age: 57
End: 2022-12-19
Payer: COMMERCIAL

## 2022-12-19 VITALS
DIASTOLIC BLOOD PRESSURE: 83 MMHG | BODY MASS INDEX: 32.44 KG/M2 | HEART RATE: 97 BPM | HEIGHT: 64 IN | WEIGHT: 190 LBS | SYSTOLIC BLOOD PRESSURE: 135 MMHG

## 2022-12-19 DIAGNOSIS — H90.3 SENSORINEURAL HEARING LOSS (SNHL) OF BOTH EARS: Primary | ICD-10-CM

## 2022-12-19 DIAGNOSIS — M26.621 TENDERNESS OF RIGHT TEMPOROMANDIBULAR JOINT: ICD-10-CM

## 2022-12-19 DIAGNOSIS — H91.8X9 ASYMMETRICAL HEARING LOSS: Primary | ICD-10-CM

## 2022-12-19 DIAGNOSIS — Z01.818 PREOP TESTING: ICD-10-CM

## 2022-12-19 DIAGNOSIS — H93.13 TINNITUS AURIUM, BILATERAL: ICD-10-CM

## 2022-12-19 PROCEDURE — G8484 FLU IMMUNIZE NO ADMIN: HCPCS | Performed by: NURSE PRACTITIONER

## 2022-12-19 PROCEDURE — 99213 OFFICE O/P EST LOW 20 MIN: CPT | Performed by: NURSE PRACTITIONER

## 2022-12-19 PROCEDURE — 3078F DIAST BP <80 MM HG: CPT | Performed by: NURSE PRACTITIONER

## 2022-12-19 PROCEDURE — 92557 COMPREHENSIVE HEARING TEST: CPT | Performed by: AUDIOLOGIST

## 2022-12-19 PROCEDURE — 3074F SYST BP LT 130 MM HG: CPT | Performed by: NURSE PRACTITIONER

## 2022-12-19 PROCEDURE — 92567 TYMPANOMETRY: CPT | Performed by: AUDIOLOGIST

## 2022-12-19 PROCEDURE — G8427 DOCREV CUR MEDS BY ELIG CLIN: HCPCS | Performed by: NURSE PRACTITIONER

## 2022-12-19 PROCEDURE — G8417 CALC BMI ABV UP PARAM F/U: HCPCS | Performed by: NURSE PRACTITIONER

## 2022-12-19 PROCEDURE — 4004F PT TOBACCO SCREEN RCVD TLK: CPT | Performed by: NURSE PRACTITIONER

## 2022-12-19 PROCEDURE — 3017F COLORECTAL CA SCREEN DOC REV: CPT | Performed by: NURSE PRACTITIONER

## 2022-12-19 RX ORDER — METHYLPREDNISOLONE 4 MG/1
4 TABLET ORAL SEE ADMIN INSTRUCTIONS
Qty: 1 KIT | Refills: 0 | Status: SHIPPED | OUTPATIENT
Start: 2022-12-19 | End: 2022-12-25

## 2022-12-19 ASSESSMENT — ENCOUNTER SYMPTOMS
RHINORRHEA: 0
SINUS PRESSURE: 0
SHORTNESS OF BREATH: 0
STRIDOR: 0
RESPIRATORY NEGATIVE: 1
EYES NEGATIVE: 1
SINUS PAIN: 0
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
## 2022-12-19 NOTE — PROGRESS NOTES
This patient was referred for audiometric and tympanometric testing by FABIEN Humphrey due to hearing loss. She reports no significant changes, but tinnitus is still bothersome. Hearing aids are working well. Audiometry using pure tone air and bone conduction testing revealed normal hearing through 500 Hz, with a mild-to-moderately severe sloping high frequency  sensorineural hearing loss, bilaterally. Reliability was good. Speech reception thresholds were in good agreement with the pure tone averages, bilaterally. Speech discrimination scores were excellent, bilaterally. Tympanometry revealed normal middle ear peak pressure and compliance, bilaterally. The results were reviewed with the patient. Results are generally similar to last year, with some changes in high frequencies noted. Recommendations for follow up will be made pending physician consult.     Electronically signed by Uli Jones on 12/19/2022 at 12:59 PM

## 2023-01-02 ENCOUNTER — HOSPITAL ENCOUNTER (OUTPATIENT)
Age: 58
Discharge: HOME | End: 2023-01-02
Payer: COMMERCIAL

## 2023-01-02 DIAGNOSIS — Z01.818 PREOP TESTING: ICD-10-CM

## 2023-01-02 LAB
BUN BLDV-MCNC: 13 MG/DL (ref 6–20)
CREAT SERPL-MCNC: 0.9 MG/DL (ref 0.5–1)
GFR SERPL CREATININE-BSD FRML MDRD: >60 ML/MIN/1.73

## 2023-01-02 PROCEDURE — 36415 COLL VENOUS BLD VENIPUNCTURE: CPT

## 2023-01-02 PROCEDURE — 84520 ASSAY OF UREA NITROGEN: CPT

## 2023-01-02 PROCEDURE — 82565 ASSAY OF CREATININE: CPT

## 2023-01-03 ENCOUNTER — HOSPITAL ENCOUNTER (OUTPATIENT)
Dept: MRI IMAGING | Age: 58
Discharge: HOME | End: 2023-01-05
Payer: COMMERCIAL

## 2023-01-03 DIAGNOSIS — H93.13 TINNITUS AURIUM, BILATERAL: ICD-10-CM

## 2023-01-03 DIAGNOSIS — H91.8X9 ASYMMETRICAL HEARING LOSS: ICD-10-CM

## 2023-01-03 PROCEDURE — A9579 GAD-BASE MR CONTRAST NOS,1ML: HCPCS | Performed by: RADIOLOGY

## 2023-01-03 PROCEDURE — 6360000004 HC RX CONTRAST MEDICATION: Performed by: RADIOLOGY

## 2023-01-03 PROCEDURE — 70553 MRI BRAIN STEM W/O & W/DYE: CPT

## 2023-01-03 RX ADMIN — GADOTERIDOL 17 ML: 279.3 INJECTION, SOLUTION INTRAVENOUS at 18:44

## 2023-01-10 ENCOUNTER — TELEPHONE (OUTPATIENT)
Dept: FAMILY MEDICINE CLINIC | Age: 58
End: 2023-01-10

## 2023-01-10 NOTE — TELEPHONE ENCOUNTER
Patient wants called with the results of her sleep study from December. Also, patient wonders when she needs to be seen by Dr. Vineet Tom.

## 2023-01-10 NOTE — TELEPHONE ENCOUNTER
Called sleep center, they are waiting for pulm analysis. They are giving it to him directly to expedite plan.

## 2023-01-14 DIAGNOSIS — B00.1 HERPES LABIALIS: ICD-10-CM

## 2023-01-16 ENCOUNTER — TELEPHONE (OUTPATIENT)
Dept: FAMILY MEDICINE CLINIC | Age: 58
End: 2023-01-16

## 2023-01-16 RX ORDER — ACYCLOVIR 400 MG/1
TABLET ORAL
Qty: 14 TABLET | Refills: 2 | Status: SHIPPED | OUTPATIENT
Start: 2023-01-16

## 2023-01-17 ENCOUNTER — TELEPHONE (OUTPATIENT)
Dept: SLEEP MEDICINE | Age: 58
End: 2023-01-17

## 2023-01-17 NOTE — TELEPHONE ENCOUNTER
Pt ret call and discussed SS results (mild HIREN).  recommends pt coming to office to discuss tx options. Pt is open to this.   Appt made for 3/13

## 2023-01-19 NOTE — TELEPHONE ENCOUNTER
Sleep study results & follow up plan were discussed with patient on 1/17. She has a sleep medicine appt 3/13

## 2023-01-24 ENCOUNTER — OFFICE VISIT (OUTPATIENT)
Dept: ENT CLINIC | Age: 58
End: 2023-01-24
Payer: COMMERCIAL

## 2023-01-24 VITALS — WEIGHT: 190 LBS | HEIGHT: 64 IN | BODY MASS INDEX: 32.44 KG/M2

## 2023-01-24 DIAGNOSIS — R90.89 ABNORMAL BRAIN MRI: ICD-10-CM

## 2023-01-24 DIAGNOSIS — H90.3 SENSORINEURAL HEARING LOSS (SNHL) OF BOTH EARS: ICD-10-CM

## 2023-01-24 DIAGNOSIS — H93.A9 PULSATILE TINNITUS: ICD-10-CM

## 2023-01-24 DIAGNOSIS — G93.0 INTRACRANIAL ARACHNOID CYST: Primary | ICD-10-CM

## 2023-01-24 PROCEDURE — 99213 OFFICE O/P EST LOW 20 MIN: CPT | Performed by: NURSE PRACTITIONER

## 2023-01-24 PROCEDURE — G8417 CALC BMI ABV UP PARAM F/U: HCPCS | Performed by: NURSE PRACTITIONER

## 2023-01-24 PROCEDURE — G8427 DOCREV CUR MEDS BY ELIG CLIN: HCPCS | Performed by: NURSE PRACTITIONER

## 2023-01-24 PROCEDURE — 3017F COLORECTAL CA SCREEN DOC REV: CPT | Performed by: NURSE PRACTITIONER

## 2023-01-24 PROCEDURE — 4004F PT TOBACCO SCREEN RCVD TLK: CPT | Performed by: NURSE PRACTITIONER

## 2023-01-24 PROCEDURE — G8484 FLU IMMUNIZE NO ADMIN: HCPCS | Performed by: NURSE PRACTITIONER

## 2023-01-24 ASSESSMENT — ENCOUNTER SYMPTOMS
SINUS PAIN: 0
SHORTNESS OF BREATH: 0
RHINORRHEA: 0
SINUS PRESSURE: 0
EYES NEGATIVE: 1
RESPIRATORY NEGATIVE: 1
STRIDOR: 0
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
## 2023-01-27 ENCOUNTER — HOSPITAL ENCOUNTER (EMERGENCY)
Age: 58
Discharge: HOME | End: 2023-01-27
Payer: COMMERCIAL

## 2023-01-27 VITALS
HEART RATE: 100 BPM | OXYGEN SATURATION: 97 % | DIASTOLIC BLOOD PRESSURE: 93 MMHG | TEMPERATURE: 97.7 F | SYSTOLIC BLOOD PRESSURE: 153 MMHG | RESPIRATION RATE: 16 BRPM

## 2023-01-27 DIAGNOSIS — J02.9 VIRAL PHARYNGITIS: Primary | ICD-10-CM

## 2023-01-27 LAB — STREP GRP A PCR: NEGATIVE

## 2023-01-27 PROCEDURE — 87880 STREP A ASSAY W/OPTIC: CPT

## 2023-01-27 PROCEDURE — 99283 EMERGENCY DEPT VISIT LOW MDM: CPT

## 2023-01-27 ASSESSMENT — PAIN - FUNCTIONAL ASSESSMENT
PAIN_FUNCTIONAL_ASSESSMENT: PREVENTS OR INTERFERES SOME ACTIVE ACTIVITIES AND ADLS
PAIN_FUNCTIONAL_ASSESSMENT: 0-10

## 2023-01-27 ASSESSMENT — PAIN SCALES - GENERAL: PAINLEVEL_OUTOF10: 9

## 2023-01-27 ASSESSMENT — PAIN DESCRIPTION - PAIN TYPE: TYPE: ACUTE PAIN

## 2023-01-27 ASSESSMENT — PAIN DESCRIPTION - LOCATION: LOCATION: THROAT

## 2023-01-27 ASSESSMENT — PAIN DESCRIPTION - DESCRIPTORS: DESCRIPTORS: SORE

## 2023-01-27 ASSESSMENT — PAIN DESCRIPTION - ORIENTATION: ORIENTATION: LEFT

## 2023-01-27 ASSESSMENT — PAIN DESCRIPTION - FREQUENCY: FREQUENCY: INTERMITTENT

## 2023-01-30 ENCOUNTER — OFFICE VISIT (OUTPATIENT)
Dept: NEUROSURGERY | Age: 58
End: 2023-01-30
Payer: COMMERCIAL

## 2023-01-30 VITALS
SYSTOLIC BLOOD PRESSURE: 147 MMHG | WEIGHT: 190 LBS | OXYGEN SATURATION: 96 % | BODY MASS INDEX: 32.44 KG/M2 | HEART RATE: 102 BPM | DIASTOLIC BLOOD PRESSURE: 93 MMHG | HEIGHT: 64 IN

## 2023-01-30 DIAGNOSIS — G93.0 INTRACRANIAL ARACHNOID CYST: Primary | ICD-10-CM

## 2023-01-30 PROCEDURE — 3077F SYST BP >= 140 MM HG: CPT | Performed by: NEUROLOGICAL SURGERY

## 2023-01-30 PROCEDURE — G8427 DOCREV CUR MEDS BY ELIG CLIN: HCPCS | Performed by: NEUROLOGICAL SURGERY

## 2023-01-30 PROCEDURE — 99202 OFFICE O/P NEW SF 15 MIN: CPT

## 2023-01-30 PROCEDURE — G8417 CALC BMI ABV UP PARAM F/U: HCPCS | Performed by: NEUROLOGICAL SURGERY

## 2023-01-30 PROCEDURE — G8484 FLU IMMUNIZE NO ADMIN: HCPCS | Performed by: NEUROLOGICAL SURGERY

## 2023-01-30 PROCEDURE — 99204 OFFICE O/P NEW MOD 45 MIN: CPT | Performed by: NEUROLOGICAL SURGERY

## 2023-01-30 PROCEDURE — 3080F DIAST BP >= 90 MM HG: CPT | Performed by: NEUROLOGICAL SURGERY

## 2023-01-30 PROCEDURE — 3017F COLORECTAL CA SCREEN DOC REV: CPT | Performed by: NEUROLOGICAL SURGERY

## 2023-01-30 PROCEDURE — 4004F PT TOBACCO SCREEN RCVD TLK: CPT | Performed by: NEUROLOGICAL SURGERY
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
## 2023-02-21 DIAGNOSIS — B00.1 HERPES LABIALIS: ICD-10-CM

## 2023-02-21 NOTE — TELEPHONE ENCOUNTER
Last Appointment   12/7/2022  Next Appointment  Visit date not found    Follow up:  after sleep study

## 2023-02-22 RX ORDER — ACYCLOVIR 400 MG/1
TABLET ORAL
Qty: 14 TABLET | Refills: 2 | Status: SHIPPED | OUTPATIENT
Start: 2023-02-22

## 2023-03-13 ENCOUNTER — OFFICE VISIT (OUTPATIENT)
Dept: SLEEP MEDICINE | Age: 58
End: 2023-03-13
Payer: COMMERCIAL

## 2023-03-13 VITALS
WEIGHT: 191.8 LBS | DIASTOLIC BLOOD PRESSURE: 85 MMHG | HEIGHT: 64 IN | BODY MASS INDEX: 32.74 KG/M2 | SYSTOLIC BLOOD PRESSURE: 147 MMHG | RESPIRATION RATE: 14 BRPM | HEART RATE: 98 BPM | OXYGEN SATURATION: 98 %

## 2023-03-13 DIAGNOSIS — G47.33 OSA (OBSTRUCTIVE SLEEP APNEA): Primary | ICD-10-CM

## 2023-03-13 PROCEDURE — G8427 DOCREV CUR MEDS BY ELIG CLIN: HCPCS | Performed by: INTERNAL MEDICINE

## 2023-03-13 PROCEDURE — 3077F SYST BP >= 140 MM HG: CPT | Performed by: INTERNAL MEDICINE

## 2023-03-13 PROCEDURE — G8484 FLU IMMUNIZE NO ADMIN: HCPCS | Performed by: INTERNAL MEDICINE

## 2023-03-13 PROCEDURE — 4004F PT TOBACCO SCREEN RCVD TLK: CPT | Performed by: INTERNAL MEDICINE

## 2023-03-13 PROCEDURE — 3079F DIAST BP 80-89 MM HG: CPT | Performed by: INTERNAL MEDICINE

## 2023-03-13 PROCEDURE — 99203 OFFICE O/P NEW LOW 30 MIN: CPT | Performed by: INTERNAL MEDICINE

## 2023-03-13 PROCEDURE — 3017F COLORECTAL CA SCREEN DOC REV: CPT | Performed by: INTERNAL MEDICINE

## 2023-03-13 PROCEDURE — G8417 CALC BMI ABV UP PARAM F/U: HCPCS | Performed by: INTERNAL MEDICINE

## 2023-03-13 ASSESSMENT — SLEEP AND FATIGUE QUESTIONNAIRES
HOW LIKELY ARE YOU TO NOD OFF OR FALL ASLEEP WHILE WATCHING TV: 2
HOW LIKELY ARE YOU TO NOD OFF OR FALL ASLEEP WHEN YOU ARE A PASSENGER IN A CAR FOR AN HOUR WITHOUT A BREAK: 1
HOW LIKELY ARE YOU TO NOD OFF OR FALL ASLEEP IN A CAR, WHILE STOPPED FOR A FEW MINUTES IN TRAFFIC: 0
HOW LIKELY ARE YOU TO NOD OFF OR FALL ASLEEP WHILE LYING DOWN TO REST IN THE AFTERNOON WHEN CIRCUMSTANCES PERMIT: 2
HOW LIKELY ARE YOU TO NOD OFF OR FALL ASLEEP WHILE SITTING INACTIVE IN A PUBLIC PLACE: 0
ESS TOTAL SCORE: 6
HOW LIKELY ARE YOU TO NOD OFF OR FALL ASLEEP WHILE SITTING QUIETLY AFTER LUNCH WITHOUT ALCOHOL: 1
HOW LIKELY ARE YOU TO NOD OFF OR FALL ASLEEP WHILE SITTING AND TALKING TO SOMEONE: 0
HOW LIKELY ARE YOU TO NOD OFF OR FALL ASLEEP WHILE SITTING AND READING: 0

## 2023-03-13 ASSESSMENT — ENCOUNTER SYMPTOMS
ALLERGIC/IMMUNOLOGIC NEGATIVE: 1
RESPIRATORY NEGATIVE: 1
BACK PAIN: 1
EYES NEGATIVE: 1
GASTROINTESTINAL NEGATIVE: 1
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
## 2023-03-17 DIAGNOSIS — I10 ESSENTIAL HYPERTENSION: ICD-10-CM

## 2023-03-22 RX ORDER — BUPROPION HYDROCHLORIDE 150 MG/1
150 TABLET ORAL EVERY MORNING
Qty: 30 TABLET | Refills: 3 | Status: SHIPPED | OUTPATIENT
Start: 2023-03-22

## 2023-03-22 RX ORDER — AMLODIPINE BESYLATE 5 MG/1
TABLET ORAL
Qty: 30 TABLET | Refills: 1 | Status: SHIPPED | OUTPATIENT
Start: 2023-03-22

## 2023-03-30 DIAGNOSIS — B00.1 HERPES LABIALIS: ICD-10-CM

## 2023-03-30 RX ORDER — ACYCLOVIR 400 MG/1
TABLET ORAL
Qty: 14 TABLET | Refills: 2 | Status: SHIPPED | OUTPATIENT
Start: 2023-03-30

## 2023-03-31 DIAGNOSIS — G47.33 OSA (OBSTRUCTIVE SLEEP APNEA): Primary | ICD-10-CM

## 2023-03-31 SDOH — ECONOMIC STABILITY: FOOD INSECURITY: WITHIN THE PAST 12 MONTHS, THE FOOD YOU BOUGHT JUST DIDN'T LAST AND YOU DIDN'T HAVE MONEY TO GET MORE.: NEVER TRUE

## 2023-03-31 SDOH — ECONOMIC STABILITY: INCOME INSECURITY: HOW HARD IS IT FOR YOU TO PAY FOR THE VERY BASICS LIKE FOOD, HOUSING, MEDICAL CARE, AND HEATING?: NOT HARD AT ALL

## 2023-03-31 SDOH — ECONOMIC STABILITY: TRANSPORTATION INSECURITY
IN THE PAST 12 MONTHS, HAS LACK OF TRANSPORTATION KEPT YOU FROM MEETINGS, WORK, OR FROM GETTING THINGS NEEDED FOR DAILY LIVING?: NO

## 2023-03-31 SDOH — ECONOMIC STABILITY: FOOD INSECURITY: WITHIN THE PAST 12 MONTHS, YOU WORRIED THAT YOUR FOOD WOULD RUN OUT BEFORE YOU GOT MONEY TO BUY MORE.: NEVER TRUE

## 2023-03-31 SDOH — ECONOMIC STABILITY: HOUSING INSECURITY
IN THE LAST 12 MONTHS, WAS THERE A TIME WHEN YOU DID NOT HAVE A STEADY PLACE TO SLEEP OR SLEPT IN A SHELTER (INCLUDING NOW)?: NO

## 2023-04-03 ENCOUNTER — OFFICE VISIT (OUTPATIENT)
Dept: FAMILY MEDICINE CLINIC | Age: 58
End: 2023-04-03
Payer: COMMERCIAL

## 2023-04-03 VITALS
DIASTOLIC BLOOD PRESSURE: 82 MMHG | HEART RATE: 100 BPM | HEIGHT: 64 IN | BODY MASS INDEX: 32.61 KG/M2 | WEIGHT: 191 LBS | SYSTOLIC BLOOD PRESSURE: 110 MMHG

## 2023-04-03 DIAGNOSIS — G47.33 OSA (OBSTRUCTIVE SLEEP APNEA): Primary | ICD-10-CM

## 2023-04-03 DIAGNOSIS — I10 ESSENTIAL HYPERTENSION: ICD-10-CM

## 2023-04-03 DIAGNOSIS — R73.03 PREDIABETES: ICD-10-CM

## 2023-04-03 PROCEDURE — G8427 DOCREV CUR MEDS BY ELIG CLIN: HCPCS | Performed by: FAMILY MEDICINE

## 2023-04-03 PROCEDURE — 99213 OFFICE O/P EST LOW 20 MIN: CPT | Performed by: FAMILY MEDICINE

## 2023-04-03 PROCEDURE — 3074F SYST BP LT 130 MM HG: CPT | Performed by: FAMILY MEDICINE

## 2023-04-03 PROCEDURE — 4004F PT TOBACCO SCREEN RCVD TLK: CPT | Performed by: FAMILY MEDICINE

## 2023-04-03 PROCEDURE — G8417 CALC BMI ABV UP PARAM F/U: HCPCS | Performed by: FAMILY MEDICINE

## 2023-04-03 PROCEDURE — 3017F COLORECTAL CA SCREEN DOC REV: CPT | Performed by: FAMILY MEDICINE

## 2023-04-03 PROCEDURE — 3078F DIAST BP <80 MM HG: CPT | Performed by: FAMILY MEDICINE

## 2023-04-03 RX ORDER — BUPROPION HYDROCHLORIDE 300 MG/1
300 TABLET ORAL EVERY MORNING
Qty: 90 TABLET | Refills: 2 | Status: SHIPPED | OUTPATIENT
Start: 2023-04-03

## 2023-04-03 RX ORDER — BUPROPION HYDROCHLORIDE 150 MG/1
150 TABLET ORAL EVERY MORNING
Qty: 30 TABLET | Refills: 3 | Status: SHIPPED | OUTPATIENT
Start: 2023-04-03

## 2023-04-03 RX ORDER — METFORMIN HYDROCHLORIDE 500 MG/1
500 TABLET, EXTENDED RELEASE ORAL
Qty: 90 TABLET | Refills: 1 | Status: SHIPPED | OUTPATIENT
Start: 2023-04-03

## 2023-04-03 RX ORDER — GABAPENTIN 300 MG/1
300 CAPSULE ORAL NIGHTLY
Qty: 90 CAPSULE | Refills: 0 | Status: SHIPPED | OUTPATIENT
Start: 2023-04-03 | End: 2023-07-02

## 2023-04-03 RX ORDER — ACYCLOVIR 400 MG/1
TABLET ORAL
Qty: 14 TABLET | Refills: 2 | Status: SHIPPED | OUTPATIENT
Start: 2023-04-03

## 2023-04-03 RX ORDER — AMLODIPINE BESYLATE 5 MG/1
5 TABLET ORAL DAILY
Qty: 30 TABLET | Refills: 1 | Status: SHIPPED | OUTPATIENT
Start: 2023-04-03

## 2023-04-03 RX ORDER — ATORVASTATIN CALCIUM 10 MG/1
TABLET, FILM COATED ORAL
Qty: 30 TABLET | Refills: 3 | Status: SHIPPED | OUTPATIENT
Start: 2023-04-03

## 2023-04-03 ASSESSMENT — ENCOUNTER SYMPTOMS
TROUBLE SWALLOWING: 0
COUGH: 0
DIARRHEA: 0
SORE THROAT: 0
CONSTIPATION: 0
ABDOMINAL PAIN: 0
NAUSEA: 0
VOMITING: 0

## 2023-04-03 ASSESSMENT — PATIENT HEALTH QUESTIONNAIRE - PHQ9
SUM OF ALL RESPONSES TO PHQ QUESTIONS 1-9: 0
3. TROUBLE FALLING OR STAYING ASLEEP: 0
SUM OF ALL RESPONSES TO PHQ QUESTIONS 1-9: 0
SUM OF ALL RESPONSES TO PHQ QUESTIONS 1-9: 0
1. LITTLE INTEREST OR PLEASURE IN DOING THINGS: 0
8. MOVING OR SPEAKING SO SLOWLY THAT OTHER PEOPLE COULD HAVE NOTICED. OR THE OPPOSITE, BEING SO FIGETY OR RESTLESS THAT YOU HAVE BEEN MOVING AROUND A LOT MORE THAN USUAL: 0
2. FEELING DOWN, DEPRESSED OR HOPELESS: 0
7. TROUBLE CONCENTRATING ON THINGS, SUCH AS READING THE NEWSPAPER OR WATCHING TELEVISION: 0
5. POOR APPETITE OR OVEREATING: 0
SUM OF ALL RESPONSES TO PHQ QUESTIONS 1-9: 0
9. THOUGHTS THAT YOU WOULD BE BETTER OFF DEAD, OR OF HURTING YOURSELF: 0
4. FEELING TIRED OR HAVING LITTLE ENERGY: 0
10. IF YOU CHECKED OFF ANY PROBLEMS, HOW DIFFICULT HAVE THESE PROBLEMS MADE IT FOR YOU TO DO YOUR WORK, TAKE CARE OF THINGS AT HOME, OR GET ALONG WITH OTHER PEOPLE: 0
6. FEELING BAD ABOUT YOURSELF - OR THAT YOU ARE A FAILURE OR HAVE LET YOURSELF OR YOUR FAMILY DOWN: 0
DEPRESSION UNABLE TO ASSESS: FUNCTIONAL CAPACITY MOTIVATION LIMITS ACCURACY
SUM OF ALL RESPONSES TO PHQ9 QUESTIONS 1 & 2: 0

## 2023-04-04 ENCOUNTER — TELEPHONE (OUTPATIENT)
Dept: SLEEP MEDICINE | Age: 58
End: 2023-04-04

## 2023-04-04 NOTE — TELEPHONE ENCOUNTER
Let pt know that Medicaid is now requiring a titration study before they will approve CPAP. Explained to her what a titration study is and that the lab will call to get her scheduled.   Will keep [place] appointment until titration is done and pt get her CPAP

## 2023-04-06 ENCOUNTER — HOSPITAL ENCOUNTER (OUTPATIENT)
Dept: SLEEP CENTER | Age: 58
Discharge: HOME | End: 2023-04-06
Payer: COMMERCIAL

## 2023-04-06 DIAGNOSIS — G47.33 OSA (OBSTRUCTIVE SLEEP APNEA): ICD-10-CM

## 2023-04-06 PROCEDURE — 95811 POLYSOM 6/>YRS CPAP 4/> PARM: CPT

## 2023-04-07 VITALS
BODY MASS INDEX: 32.44 KG/M2 | HEIGHT: 64 IN | SYSTOLIC BLOOD PRESSURE: 133 MMHG | OXYGEN SATURATION: 95 % | DIASTOLIC BLOOD PRESSURE: 88 MMHG | HEART RATE: 81 BPM | WEIGHT: 190 LBS

## 2023-04-07 ASSESSMENT — SLEEP AND FATIGUE QUESTIONNAIRES
ESS TOTAL SCORE: 0
HOW LIKELY ARE YOU TO NOD OFF OR FALL ASLEEP IN A CAR, WHILE STOPPED FOR A FEW MINUTES IN TRAFFIC: 0
HOW LIKELY ARE YOU TO NOD OFF OR FALL ASLEEP WHILE SITTING AND READING: 0
HOW LIKELY ARE YOU TO NOD OFF OR FALL ASLEEP WHILE SITTING QUIETLY AFTER LUNCH WITHOUT ALCOHOL: 0
HOW LIKELY ARE YOU TO NOD OFF OR FALL ASLEEP WHILE SITTING AND TALKING TO SOMEONE: 0
HOW LIKELY ARE YOU TO NOD OFF OR FALL ASLEEP WHILE LYING DOWN TO REST IN THE AFTERNOON WHEN CIRCUMSTANCES PERMIT: 0
HOW LIKELY ARE YOU TO NOD OFF OR FALL ASLEEP WHEN YOU ARE A PASSENGER IN A CAR FOR AN HOUR WITHOUT A BREAK: 0
HOW LIKELY ARE YOU TO NOD OFF OR FALL ASLEEP WHILE WATCHING TV: 0
HOW LIKELY ARE YOU TO NOD OFF OR FALL ASLEEP WHILE SITTING INACTIVE IN A PUBLIC PLACE: 0

## 2023-05-01 DIAGNOSIS — G47.33 OSA (OBSTRUCTIVE SLEEP APNEA): Primary | ICD-10-CM

## 2023-05-02 ENCOUNTER — TELEPHONE (OUTPATIENT)
Dept: SLEEP MEDICINE | Age: 58
End: 2023-05-02

## 2023-05-02 NOTE — TELEPHONE ENCOUNTER
Discussed titration study results with pt (successful titration). Dr does recommend starting CPAP therapy. Pt is amendable to this. Pt does not have preference for DME co.  All necessary information will be sent to [facility] . Pt agreeable to this.   Appt kept for 6/22

## 2023-05-02 NOTE — PROGRESS NOTES
Chief Complaint   Chest Congestion (Started yesterday ); Nasal Congestion; Headache; and Pharyngitis      History of Present Illness   Source of history provided by:  patient. Hernán Gallego is a 63 y.o. old female who presents to the flu clinic with complaints of Chills, Pharyngitis, Rhinorrhea, Nasal Congestion, Post nasal drip, dry Cough and Chest congestion x 2 days. States symptoms have worsened since onset. Has been taking dayquil without symptomatic relief. Denies any Fever, Shortness of breath, Nausea, Vomiting, Chest Pain, Abdominal Pain, Rash, Lethargy or Close contact with a lab confirmed COVID-19 patient within 14 days of symptom onset . Denies any hx of asthma, COPD or emphysema. ROS   Pertinent positives and negatives are stated within HPI, all other systems reviewed and are negative. Past Medical History:  has a past medical history of Depression, Hyperlipidemia, and Hypertension. Past Surgical History:  has a past surgical history that includes  section; fracture surgery; back surgery; Hysterectomy; and Ankle surgery (2015 or 2016). Social History:  reports that she has been smoking cigarettes. She started smoking about 47 years ago. She has been smoking about 0.07 packs per day. She has never used smokeless tobacco. She reports current alcohol use. She reports that she does not use drugs. Family History: family history includes Heart Attack in her maternal grandfather; Stroke (age of onset: 76) in her father and mother. Allergies: Aspirin; Lisinopril; Morphine; and Seasonal    Physical Exam   Vital Signs:  /82   Pulse 90   Temp 99.8 °F (37.7 °C)   Ht 5' 4\" (1.626 m)   Wt 154 lb (69.9 kg)   SpO2 97%   BMI 26.43 kg/m²    Oxygen Saturation Interpretation: Normal.    Constitutional:  Alert, development consistent with age. NAD. Head:  NC/NT. Airway patent. Ears: TMs clear bilaterally. Canals without exudate or swelling bilaterally.   Mouth: Posterior pharynx with mild erythema and clear postnasal drip. There is no tonsillar hypertrophy or exudate. Neck:  Normal ROM. Supple. There is no anterior cervical adenopathy noted. Lungs: CTAB without wheezes, rales, or rhonchi. CV:  Regular rate and rhythm, normal heart sounds, without pathological murmurs, ectopy, gallops, or rubs. Skin:  Normal turgor. Warm, dry, without visible rash. Lymphatic: No lymphangitis or adenopathy noted. Neurological:  Oriented. Motor functions intact. Lab / Imaging Results   (All laboratory and radiology results have been personally reviewed by myself)  Labs:  Results for orders placed or performed in visit on 11/02/20   POCT rapid strep A   Result Value Ref Range    Strep A Ag None Detected None Detected       Imaging: All Radiology results interpreted by Radiologist unless otherwise noted. No results found. Medical Decision Making   Pt non-toxic, in no apparent distress and stable at time of discharge. Assessment/Plan   Savana Denson was seen today for chest congestion, nasal congestion, headache and pharyngitis. Diagnoses and all orders for this visit:    Upper respiratory infection with cough and congestion  -     doxycycline hyclate (VIBRAMYCIN) 100 MG capsule; Take 1 capsule by mouth 2 times daily for 10 days  -     fluticasone (FLONASE) 50 MCG/ACT nasal spray; 1 spray by Each Nostril route daily    Pharyngitis, unspecified etiology  -     POCT rapid strep A - Negative    Encounter for laboratory testing for COVID-19 virus  -     COVID-19 Ambulatory; Future    Patient advised that she can do Coricidin HBP OTC for symptomatic relief. She is also advised to either take OTC probiotic or eat yogurt for GI symptoms with antibiotic. Prescription for doxycycline sent to pharmacy for to take in approximately 3 to 5 days if symptoms persist or worsen. COVID-19 swab obtained and pending, will call with results once available. Advised self-quarantine at home in the interim.  Increase fluids and rest. Symptomatic relief discussed including Tylenol prn pain/fever. Schedule f/u with PCP in 7-10 days if symptoms persist. ED sooner if symptoms worsen or change. ED immediately with high or refractory fever, progressive SOB, dyspnea, CP, calf pain/swelling, shaking chills, vomiting, abdominal pain, lethargy, flank pain, or decreased urinary output. Pt verbalizes understanding and is in agreement with plan of care. All questions answered. Lorena López, ALL - CNP    This visit was provided as a focused evaluation during the COVID -19 pandemic/national emergency. A comprehensive review of all previous patient history and testing was not conducted. Pertinent findings were elicited during the visit. Helical Rim Advancement Flap Text: The defect edges were debeveled with a #15 blade scalpel.  Given the location of the defect and the proximity to free margins (helical rim) a double helical rim advancement flap was deemed most appropriate. Using a sterile surgical marker, the appropriate advancement flaps were drawn incorporating the defect and placing the expected incisions between the helical rim and antihelix where possible.  The area thus outlined was incised through and through with a #15 scalpel blade.  With a skin hook and iris scissors, the flaps were gently and sharply undermined and freed up. Folllowing this, the designed flaps were carried over into the primary defect and sutured into place.

## 2023-06-11 RX ORDER — ACYCLOVIR 400 MG/1
TABLET ORAL
Qty: 14 TABLET | Refills: 2 | Status: SHIPPED | OUTPATIENT
Start: 2023-06-11

## 2023-06-22 ENCOUNTER — OFFICE VISIT (OUTPATIENT)
Dept: SLEEP MEDICINE | Age: 58
End: 2023-06-22
Payer: COMMERCIAL

## 2023-06-22 VITALS
OXYGEN SATURATION: 97 % | DIASTOLIC BLOOD PRESSURE: 88 MMHG | RESPIRATION RATE: 14 BRPM | SYSTOLIC BLOOD PRESSURE: 152 MMHG | HEART RATE: 98 BPM | WEIGHT: 175.4 LBS | BODY MASS INDEX: 29.94 KG/M2 | HEIGHT: 64 IN

## 2023-06-22 DIAGNOSIS — G47.33 OSA (OBSTRUCTIVE SLEEP APNEA): Primary | ICD-10-CM

## 2023-06-22 PROCEDURE — 4004F PT TOBACCO SCREEN RCVD TLK: CPT | Performed by: INTERNAL MEDICINE

## 2023-06-22 PROCEDURE — G8427 DOCREV CUR MEDS BY ELIG CLIN: HCPCS | Performed by: INTERNAL MEDICINE

## 2023-06-22 PROCEDURE — 3079F DIAST BP 80-89 MM HG: CPT | Performed by: INTERNAL MEDICINE

## 2023-06-22 PROCEDURE — 3077F SYST BP >= 140 MM HG: CPT | Performed by: INTERNAL MEDICINE

## 2023-06-22 PROCEDURE — 3017F COLORECTAL CA SCREEN DOC REV: CPT | Performed by: INTERNAL MEDICINE

## 2023-06-22 PROCEDURE — 99213 OFFICE O/P EST LOW 20 MIN: CPT | Performed by: INTERNAL MEDICINE

## 2023-06-22 PROCEDURE — G8417 CALC BMI ABV UP PARAM F/U: HCPCS | Performed by: INTERNAL MEDICINE

## 2023-06-22 RX ORDER — CYCLOBENZAPRINE HCL 10 MG
10 TABLET ORAL DAILY
COMMUNITY
Start: 2023-06-21

## 2023-06-22 ASSESSMENT — SLEEP AND FATIGUE QUESTIONNAIRES
HOW LIKELY ARE YOU TO NOD OFF OR FALL ASLEEP WHILE SITTING INACTIVE IN A PUBLIC PLACE: 0
HOW LIKELY ARE YOU TO NOD OFF OR FALL ASLEEP WHEN YOU ARE A PASSENGER IN A CAR FOR AN HOUR WITHOUT A BREAK: 0
HOW LIKELY ARE YOU TO NOD OFF OR FALL ASLEEP WHILE SITTING AND TALKING TO SOMEONE: 0
HOW LIKELY ARE YOU TO NOD OFF OR FALL ASLEEP IN A CAR, WHILE STOPPED FOR A FEW MINUTES IN TRAFFIC: 0
HOW LIKELY ARE YOU TO NOD OFF OR FALL ASLEEP WHILE LYING DOWN TO REST IN THE AFTERNOON WHEN CIRCUMSTANCES PERMIT: 0
HOW LIKELY ARE YOU TO NOD OFF OR FALL ASLEEP WHILE SITTING QUIETLY AFTER LUNCH WITHOUT ALCOHOL: 0
ESS TOTAL SCORE: 1
HOW LIKELY ARE YOU TO NOD OFF OR FALL ASLEEP WHILE SITTING AND READING: 0
HOW LIKELY ARE YOU TO NOD OFF OR FALL ASLEEP WHILE WATCHING TV: 1

## 2023-06-22 ASSESSMENT — ENCOUNTER SYMPTOMS
EYES NEGATIVE: 1
ALLERGIC/IMMUNOLOGIC NEGATIVE: 1
GASTROINTESTINAL NEGATIVE: 1
RESPIRATORY NEGATIVE: 1
BACK PAIN: 1
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
## 2023-07-16 RX ORDER — GABAPENTIN 300 MG/1
300 CAPSULE ORAL NIGHTLY
Qty: 90 CAPSULE | Refills: 0 | Status: CANCELLED | OUTPATIENT
Start: 2023-07-16 | End: 2023-10-14

## 2023-07-17 RX ORDER — GABAPENTIN 300 MG/1
300 CAPSULE ORAL NIGHTLY
Qty: 90 CAPSULE | Refills: 0 | Status: CANCELLED | OUTPATIENT
Start: 2023-07-17 | End: 2023-10-15

## 2023-07-19 DIAGNOSIS — I10 ESSENTIAL HYPERTENSION: ICD-10-CM

## 2023-07-19 RX ORDER — AMLODIPINE BESYLATE 5 MG/1
5 TABLET ORAL DAILY
Qty: 30 TABLET | Refills: 1 | Status: SHIPPED | OUTPATIENT
Start: 2023-07-19

## 2023-07-19 RX ORDER — GABAPENTIN 300 MG/1
300 CAPSULE ORAL NIGHTLY
Qty: 90 CAPSULE | Refills: 0 | Status: SHIPPED | OUTPATIENT
Start: 2023-07-19 | End: 2023-10-17

## 2023-07-19 RX ORDER — ACYCLOVIR 400 MG/1
TABLET ORAL
Qty: 14 TABLET | Refills: 2 | OUTPATIENT
Start: 2023-07-19

## 2023-08-09 ENCOUNTER — OFFICE VISIT (OUTPATIENT)
Dept: FAMILY MEDICINE CLINIC | Age: 58
End: 2023-08-09

## 2023-08-09 VITALS
RESPIRATION RATE: 16 BRPM | HEIGHT: 64 IN | SYSTOLIC BLOOD PRESSURE: 121 MMHG | TEMPERATURE: 97.6 F | BODY MASS INDEX: 29.53 KG/M2 | HEART RATE: 96 BPM | OXYGEN SATURATION: 96 % | DIASTOLIC BLOOD PRESSURE: 88 MMHG | WEIGHT: 173 LBS

## 2023-08-09 DIAGNOSIS — F33.0 MILD EPISODE OF RECURRENT MAJOR DEPRESSIVE DISORDER (HCC): ICD-10-CM

## 2023-08-09 DIAGNOSIS — R73.03 PREDIABETES: Primary | ICD-10-CM

## 2023-08-09 DIAGNOSIS — E78.5 HYPERLIPIDEMIA, UNSPECIFIED HYPERLIPIDEMIA TYPE: ICD-10-CM

## 2023-08-09 DIAGNOSIS — I10 ESSENTIAL HYPERTENSION: ICD-10-CM

## 2023-08-09 DIAGNOSIS — G89.29 CHRONIC LOW BACK PAIN WITHOUT SCIATICA, UNSPECIFIED BACK PAIN LATERALITY: ICD-10-CM

## 2023-08-09 DIAGNOSIS — M54.50 CHRONIC LOW BACK PAIN WITHOUT SCIATICA, UNSPECIFIED BACK PAIN LATERALITY: ICD-10-CM

## 2023-08-09 LAB
CHOLESTEROL: 200 MG/DL
HBA1C MFR BLD: 5.8 %
HDLC SERPL-MCNC: 43 MG/DL
LDL CHOLESTEROL: 124 MG/DL
TRIGL SERPL-MCNC: 163 MG/DL
VLDLC SERPL CALC-MCNC: 33 MG/DL

## 2023-08-09 RX ORDER — GABAPENTIN 300 MG/1
300 CAPSULE ORAL NIGHTLY
Qty: 90 CAPSULE | Refills: 0 | Status: SHIPPED | OUTPATIENT
Start: 2023-08-09 | End: 2023-11-07

## 2023-08-09 RX ORDER — AMLODIPINE BESYLATE 5 MG/1
5 TABLET ORAL DAILY
Qty: 30 TABLET | Refills: 1 | Status: SHIPPED | OUTPATIENT
Start: 2023-08-09

## 2023-08-09 RX ORDER — BUPROPION HYDROCHLORIDE 300 MG/1
300 TABLET ORAL EVERY MORNING
Qty: 90 TABLET | Refills: 2 | Status: SHIPPED | OUTPATIENT
Start: 2023-08-09

## 2023-08-09 RX ORDER — METFORMIN HYDROCHLORIDE 500 MG/1
500 TABLET, EXTENDED RELEASE ORAL
Qty: 90 TABLET | Refills: 1 | Status: SHIPPED | OUTPATIENT
Start: 2023-08-09

## 2023-08-09 RX ORDER — ATORVASTATIN CALCIUM 10 MG/1
TABLET, FILM COATED ORAL
Qty: 30 TABLET | Refills: 3 | Status: SHIPPED | OUTPATIENT
Start: 2023-08-09

## 2023-08-09 RX ORDER — CYCLOBENZAPRINE HCL 10 MG
10 TABLET ORAL DAILY
Qty: 60 TABLET | Refills: 0 | Status: SHIPPED | OUTPATIENT
Start: 2023-08-09

## 2023-08-09 RX ORDER — OLOPATADINE HYDROCHLORIDE 2 MG/ML
SOLUTION/ DROPS OPHTHALMIC
COMMUNITY
Start: 2023-06-01

## 2023-08-09 RX ORDER — ACYCLOVIR 400 MG/1
TABLET ORAL
COMMUNITY
Start: 2023-07-06

## 2023-08-09 RX ORDER — BUPROPION HYDROCHLORIDE 150 MG/1
150 TABLET ORAL EVERY MORNING
Qty: 30 TABLET | Refills: 3 | Status: SHIPPED | OUTPATIENT
Start: 2023-08-09

## 2023-08-09 ASSESSMENT — ENCOUNTER SYMPTOMS
BACK PAIN: 1
COUGH: 0
SHORTNESS OF BREATH: 0
CHEST TIGHTNESS: 0
NAUSEA: 0
VOMITING: 0
ABDOMINAL PAIN: 0
DIARRHEA: 0

## 2023-08-15 NOTE — PROGRESS NOTES
[address] [place] [address] PRE-ADMISSION TESTING   ENDOSCOPY/ COLONSCOPY INSTRUCTIONS  PAT- Phone Number: [phone]    ENDOSCOPY/ COLONSCOPY INSTRUCTIONS:     [x] Nothing by mouth after midnight. Including no gum, candy, mints, or water. [x] You may brush your teeth, gargle, but do NOT swallow water. [x] Do not wear makeup, lotions, powders, deodorant. [x] Arrange transportation with a responsible adult  to and from the hospital. If you do not have a responsible adult  to transport you, you will need to make arrangements with a medical transportation company. Arrange for someone to be with you for the remainder of the day and for 24 hours after your procedure due to having had anesthesia. -Who will be your  for transportation?____Son____   -Who will be staying with you for 24 hrs after your procedure?__________________    PARKING INSTRUCTIONS:     [x] ARRIVAL DATE & TIME:  8/23  @  0700   [x] Times are subject to change. We will contact you the business day before surgery if that were to occur. [x] Enter into the [email or web address]. Two people may accompany you. Masks are not required. [x] Parking Lot \"I\" is where you will park. It is located on the corner of [address] [place] [address] and [address] [facility]. The entrance is on [address] [facility]. Only one vehicle - per patient, is permitted in parking lot. Upon entering the parking lot, a voucher ticket will print. MEDICATION INSTRUCTIONS:    [x] Bring a complete list of your medications, please write the last time you took the medicine, give this list to the nurse in Pre-Op. [x] Take only the following medications the morning of surgery with 1-2 ounces of water:  amlodipine, wellbutrin   [x] Stop all herbal supplements and vitamins 5 days before surgery. [x] Stop all NSAIDS 7 days before surgery. [x] DO NOT take any diabetic medicine the morning of surgery. Follow instructions for insulin the day before surgery.   [x]

## 2023-08-23 ENCOUNTER — HOSPITAL ENCOUNTER (OUTPATIENT)
Age: 58
Setting detail: OUTPATIENT SURGERY
Discharge: HOME | End: 2023-08-23
Attending: INTERNAL MEDICINE | Admitting: INTERNAL MEDICINE
Payer: COMMERCIAL

## 2023-08-23 ENCOUNTER — ANESTHESIA EVENT (OUTPATIENT)
Dept: ENDOSCOPY | Age: 58
End: 2023-08-23
Payer: COMMERCIAL

## 2023-08-23 ENCOUNTER — ANESTHESIA (OUTPATIENT)
Dept: ENDOSCOPY | Age: 58
End: 2023-08-23
Payer: COMMERCIAL

## 2023-08-23 VITALS
WEIGHT: 175 LBS | OXYGEN SATURATION: 95 % | SYSTOLIC BLOOD PRESSURE: 145 MMHG | TEMPERATURE: 97.2 F | HEART RATE: 83 BPM | RESPIRATION RATE: 20 BRPM | DIASTOLIC BLOOD PRESSURE: 70 MMHG | BODY MASS INDEX: 29.88 KG/M2 | HEIGHT: 64 IN

## 2023-08-23 DIAGNOSIS — Z01.812 PRE-OPERATIVE LABORATORY EXAMINATION: Primary | ICD-10-CM

## 2023-08-23 DIAGNOSIS — K86.2 PANCREATIC CYST: ICD-10-CM

## 2023-08-23 LAB
AMYLASE FLD-CCNC: 44 U/L
CARCINOEMBRYONIC ANTIGEN: 0.2 NG/ML
CHP ED QC CHECK: NORMAL
GLUCOSE BLD-MCNC: 106 MG/DL
GLUCOSE BLD-MCNC: 106 MG/DL (ref 74–99)
GLUCOSE FLD-MCNC: 111 MG/DL
SPECIMEN TYPE: NORMAL

## 2023-08-23 PROCEDURE — 7100000011 HC PHASE II RECOVERY - ADDTL 15 MIN: Performed by: INTERNAL MEDICINE

## 2023-08-23 PROCEDURE — 82150 ASSAY OF AMYLASE: CPT

## 2023-08-23 PROCEDURE — 3609018500 HC EGD US SCOPE W/ADJACENT STRUCTURES: Performed by: INTERNAL MEDICINE

## 2023-08-23 PROCEDURE — 82962 GLUCOSE BLOOD TEST: CPT

## 2023-08-23 PROCEDURE — 82945 GLUCOSE OTHER FLUID: CPT

## 2023-08-23 PROCEDURE — 88173 CYTOPATH EVAL FNA REPORT: CPT

## 2023-08-23 PROCEDURE — 6360000002 HC RX W HCPCS: Performed by: NURSE ANESTHETIST, CERTIFIED REGISTERED

## 2023-08-23 PROCEDURE — 3700000001 HC ADD 15 MINUTES (ANESTHESIA): Performed by: INTERNAL MEDICINE

## 2023-08-23 PROCEDURE — 2720000010 HC SURG SUPPLY STERILE: Performed by: INTERNAL MEDICINE

## 2023-08-23 PROCEDURE — 7100000010 HC PHASE II RECOVERY - FIRST 15 MIN: Performed by: INTERNAL MEDICINE

## 2023-08-23 PROCEDURE — 3700000000 HC ANESTHESIA ATTENDED CARE: Performed by: INTERNAL MEDICINE

## 2023-08-23 PROCEDURE — C1753 CATH, INTRAVAS ULTRASOUND: HCPCS | Performed by: INTERNAL MEDICINE

## 2023-08-23 PROCEDURE — 2709999900 HC NON-CHARGEABLE SUPPLY: Performed by: INTERNAL MEDICINE

## 2023-08-23 PROCEDURE — 2580000003 HC RX 258: Performed by: NURSE ANESTHETIST, CERTIFIED REGISTERED

## 2023-08-23 PROCEDURE — 82378 CARCINOEMBRYONIC ANTIGEN: CPT

## 2023-08-23 PROCEDURE — 88305 TISSUE EXAM BY PATHOLOGIST: CPT

## 2023-08-23 RX ORDER — CIPROFLOXACIN 2 MG/ML
INJECTION, SOLUTION INTRAVENOUS PRN
Status: DISCONTINUED | OUTPATIENT
Start: 2023-08-23 | End: 2023-08-23 | Stop reason: SDUPTHER

## 2023-08-23 RX ORDER — SODIUM CHLORIDE 0.9 % (FLUSH) 0.9 %
5-40 SYRINGE (ML) INJECTION PRN
Status: DISCONTINUED | OUTPATIENT
Start: 2023-08-23 | End: 2023-08-23 | Stop reason: HOSPADM

## 2023-08-23 RX ORDER — HYDRALAZINE HYDROCHLORIDE 20 MG/ML
10 INJECTION INTRAMUSCULAR; INTRAVENOUS
Status: DISCONTINUED | OUTPATIENT
Start: 2023-08-23 | End: 2023-08-23 | Stop reason: HOSPADM

## 2023-08-23 RX ORDER — CIPROFLOXACIN 2 MG/ML
INJECTION, SOLUTION INTRAVENOUS
Status: COMPLETED
Start: 2023-08-23 | End: 2023-08-23

## 2023-08-23 RX ORDER — OXYCODONE HYDROCHLORIDE 5 MG/1
5 TABLET ORAL
Status: DISCONTINUED | OUTPATIENT
Start: 2023-08-23 | End: 2023-08-23 | Stop reason: HOSPADM

## 2023-08-23 RX ORDER — DIPHENHYDRAMINE HYDROCHLORIDE 50 MG/ML
12.5 INJECTION INTRAMUSCULAR; INTRAVENOUS
Status: DISCONTINUED | OUTPATIENT
Start: 2023-08-23 | End: 2023-08-23 | Stop reason: HOSPADM

## 2023-08-23 RX ORDER — LABETALOL HYDROCHLORIDE 5 MG/ML
10 INJECTION, SOLUTION INTRAVENOUS
Status: DISCONTINUED | OUTPATIENT
Start: 2023-08-23 | End: 2023-08-23 | Stop reason: HOSPADM

## 2023-08-23 RX ORDER — HYDROMORPHONE HYDROCHLORIDE 1 MG/ML
0.25 INJECTION, SOLUTION INTRAMUSCULAR; INTRAVENOUS; SUBCUTANEOUS EVERY 5 MIN PRN
Status: DISCONTINUED | OUTPATIENT
Start: 2023-08-23 | End: 2023-08-23 | Stop reason: HOSPADM

## 2023-08-23 RX ORDER — PROPOFOL 10 MG/ML
INJECTION, EMULSION INTRAVENOUS CONTINUOUS PRN
Status: DISCONTINUED | OUTPATIENT
Start: 2023-08-23 | End: 2023-08-23 | Stop reason: SDUPTHER

## 2023-08-23 RX ORDER — DEXTROSE MONOHYDRATE 100 MG/ML
INJECTION, SOLUTION INTRAVENOUS CONTINUOUS PRN
Status: DISCONTINUED | OUTPATIENT
Start: 2023-08-23 | End: 2023-08-23 | Stop reason: HOSPADM

## 2023-08-23 RX ORDER — SODIUM CHLORIDE 9 MG/ML
25 INJECTION, SOLUTION INTRAVENOUS PRN
Status: DISCONTINUED | OUTPATIENT
Start: 2023-08-23 | End: 2023-08-23 | Stop reason: HOSPADM

## 2023-08-23 RX ORDER — SODIUM CHLORIDE 0.9 % (FLUSH) 0.9 %
5-40 SYRINGE (ML) INJECTION EVERY 12 HOURS SCHEDULED
Status: DISCONTINUED | OUTPATIENT
Start: 2023-08-23 | End: 2023-08-23 | Stop reason: HOSPADM

## 2023-08-23 RX ORDER — ONDANSETRON 2 MG/ML
4 INJECTION INTRAMUSCULAR; INTRAVENOUS
Status: DISCONTINUED | OUTPATIENT
Start: 2023-08-23 | End: 2023-08-23 | Stop reason: HOSPADM

## 2023-08-23 RX ORDER — HYDROMORPHONE HYDROCHLORIDE 1 MG/ML
0.5 INJECTION, SOLUTION INTRAMUSCULAR; INTRAVENOUS; SUBCUTANEOUS EVERY 5 MIN PRN
Status: DISCONTINUED | OUTPATIENT
Start: 2023-08-23 | End: 2023-08-23 | Stop reason: HOSPADM

## 2023-08-23 RX ORDER — DROPERIDOL 2.5 MG/ML
0.62 INJECTION, SOLUTION INTRAMUSCULAR; INTRAVENOUS
Status: DISCONTINUED | OUTPATIENT
Start: 2023-08-23 | End: 2023-08-23 | Stop reason: HOSPADM

## 2023-08-23 RX ORDER — ACETAMINOPHEN 325 MG/1
650 TABLET ORAL
Status: DISCONTINUED | OUTPATIENT
Start: 2023-08-23 | End: 2023-08-23 | Stop reason: HOSPADM

## 2023-08-23 RX ORDER — SODIUM CHLORIDE, SODIUM LACTATE, POTASSIUM CHLORIDE, CALCIUM CHLORIDE 600; 310; 30; 20 MG/100ML; MG/100ML; MG/100ML; MG/100ML
INJECTION, SOLUTION INTRAVENOUS CONTINUOUS
Status: DISCONTINUED | OUTPATIENT
Start: 2023-08-23 | End: 2023-08-23 | Stop reason: HOSPADM

## 2023-08-23 RX ORDER — SODIUM CHLORIDE 9 MG/ML
INJECTION, SOLUTION INTRAVENOUS CONTINUOUS PRN
Status: DISCONTINUED | OUTPATIENT
Start: 2023-08-23 | End: 2023-08-23 | Stop reason: SDUPTHER

## 2023-08-23 RX ORDER — SODIUM CHLORIDE 9 MG/ML
INJECTION, SOLUTION INTRAVENOUS PRN
Status: DISCONTINUED | OUTPATIENT
Start: 2023-08-23 | End: 2023-08-23 | Stop reason: HOSPADM

## 2023-08-23 RX ADMIN — PROPOFOL 50 MG: 10 INJECTION, EMULSION INTRAVENOUS at 08:13

## 2023-08-23 RX ADMIN — CIPROFLOXACIN 400 MG: 2 INJECTION, SOLUTION INTRAVENOUS at 08:07

## 2023-08-23 RX ADMIN — SODIUM CHLORIDE: 9 INJECTION, SOLUTION INTRAVENOUS at 07:36

## 2023-08-23 RX ADMIN — PROPOFOL 50 MG: 10 INJECTION, EMULSION INTRAVENOUS at 08:00

## 2023-08-23 RX ADMIN — PROPOFOL 50 MG: 10 INJECTION, EMULSION INTRAVENOUS at 08:05

## 2023-08-23 RX ADMIN — PROPOFOL 100 MCG/KG/MIN: 10 INJECTION, EMULSION INTRAVENOUS at 07:39

## 2023-08-23 RX ADMIN — PROPOFOL 50 MG: 10 INJECTION, EMULSION INTRAVENOUS at 07:40

## 2023-08-23 RX ADMIN — PROPOFOL 50 MG: 10 INJECTION, EMULSION INTRAVENOUS at 07:54

## 2023-08-23 ASSESSMENT — PAIN DESCRIPTION - DESCRIPTORS: DESCRIPTORS: DISCOMFORT

## 2023-08-23 ASSESSMENT — PAIN SCALES - GENERAL
PAINLEVEL_OUTOF10: 0

## 2023-08-23 ASSESSMENT — PAIN - FUNCTIONAL ASSESSMENT
PAIN_FUNCTIONAL_ASSESSMENT: ACTIVITIES ARE NOT PREVENTED
PAIN_FUNCTIONAL_ASSESSMENT: 0-10

## 2023-08-23 NOTE — ANESTHESIA POSTPROCEDURE EVALUATION
Department of Anesthesiology  Postprocedure Note    Patient: Kalie Lind  MRN: [id number]  YOB: 1965  Date of evaluation: 8/23/2023      Procedure Summary     Date: 08/23/23 Room / Location: [facility]en Armas [facility] 03 / [facility]    Anesthesia Start: 0585 Anesthesia Stop: 0830    Procedure: EGD ESOPHAGOGASTRODUODENOSCOPY ULTRASOUND, FNA, Diagnosis:       Pancreatic cyst      (Pancreatic cyst [K86.2])    Surgeons: Connor Brown DO Responsible Provider: Kassi Akhtar MD    Anesthesia Type: MAC ASA Status: 3          Anesthesia Type: No value filed.     Jewel Phase I: Jewel Score: 10    Jewel Phase II: Jewel Score: 10      Anesthesia Post Evaluation    Patient location during evaluation: PACU  Patient participation: complete - patient participated  Level of consciousness: awake and alert  Airway patency: patent  Nausea & Vomiting: no nausea and no vomiting  Complications: no  Cardiovascular status: blood pressure returned to baseline  Respiratory status: acceptable  Hydration status: euvolemic  Pain management: adequate

## 2023-08-23 NOTE — BRIEF OP NOTE
EGD perform 8/23/23    Downhill varices from UES to ~30cm, GEJ ~36. No red radha or nipple sign. No banding performed secondary to location of varices and need for more proximal placement of the band ~20cm from denture, note that this is a high risk location for placement with out no active bleeding. No fresh or old blood on exam.    Etiology of these varices will have to evaluated, Chest imaging to be ordered to eval for malignancy/SVC thrombus. Should bleeding occur/pt clinically deteriorate, I would consider CTA +/-  possible salvage banding. Case will be discussed with GS and patient's son.     Full procedure note to follow,    Patricia Deluna DO
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
## 2023-08-23 NOTE — PROCEDURES
Procedure:    Endoscopic Ultrasound    Indication:    Abnormal imaging    Consent:   Informed consent was obtained from the patient including and not limited to risk of perforation, aspiration of gastric contents or teeth, bleeding, infection, dental breakage, ileus, need for surgery, or worst case death. Sedation:  MAC    Findings:  Endoscope was advanced easily through mouth to second portion of duodenum    Oropharynx:    views are limited but grossly normal.    Esophagus:   Mucosa is normal.  GEJ at ~37 cm. Stomach:   Antrum and body were with food otherwise essentially normal.     Retroflexed views showed normal fundus and cardia. Duodenum: Bulb/D2 is is with mild external compression. Endoscope was withdrawn and exchanged for the echo endoscope    Large pancreatic head cyst, largest dimension I could measure was ~10.cm x 8.5cm. septums/loculation noted with questionable mural nodule,     Mildly dilated CBD however difficult to characterize secondary to the above cystic lesion      IMPRESSION AND PLAN:     1. Normal upper endoscopy. Biopsies of antrum and small bowel were obtained to r/o H.pylori and celiac disease      Follow up as outpatient in office, call [phone] to schedule for appointment.       Ari Atkinson DO  8/23/2023  8:37 AM

## 2023-08-23 NOTE — PROCEDURES
Operative Note     Procedure(s):  EGD ESOPHAGOGASTRODUODENOSCOPY ULTRASOUND  EGD BIOPSY    Indication:  Pancreatic cyst  Abnormal imaging        Detailed Description of Procedure:   Pre-Anesthesia Assessment:  Prior to the procedure, a history and physical was performed and patient medications and allergies were reviewed. The risks, benefits, complications, treatment options and expected outcomes were discussed with the patient. The possibilities of reaction to medication, pulmonary aspiration, perforation of the gastrointestinal tract, bleeding requiring transfusion or operation, respiratory failure requiring placement on a ventilator, failure to diagnose a condition or identify polyps/lesions, and acute pancreatitis with potential need for prolonged hospitalization were discussed with the patient. All questions were answered and informed consent was obtained. Patient identification and proposed procedure were verified by the physician, the nurse, the anesthesiologist, the anesthetist, and the technician in the preprocedure area. Please see accompanying documentation. After informed consent was obtained, the scope was passed under direct vision. The gastroscope/echoendoscope was introduced through the mouth and advanced to the duodenum performing a visual inspection of mucosa, then the ultrasound examination was performed. Once finished CO2 was suctioned as best as possible and the the scope was withdrawn from the patient. The procedure was performed without difficulty. The patient tolerated the procedure well. EGD:  The mucosa of the esophagus was normal  The Z-line was regular and found at 38cm. No appreciable hiatal hernia      The fundus, cardia, body, and antrum showed normal mucosa.       The mucosa of the duodenum was normal, there was some mild extrinsic compression of the duodenum noted in the bulb of the duodenum, likely secondary to cyst noted on prior imaging     EUS:     The region of

## 2023-08-24 LAB — NON-GYN CYTOLOGY REPORT: NORMAL

## 2023-09-06 ENCOUNTER — TELEPHONE (OUTPATIENT)
Dept: HEMATOLOGY | Age: 58
End: 2023-09-06

## 2023-09-06 NOTE — TELEPHONE ENCOUNTER
The patient is scheduled for her new patient appt from Dr Deng Linda on 10/06/2023 [facility] at 8:00 am. Directions to the office were given to the patient and she was informed to bring her photo id and insurance card to her appt.  The patient confirmed all of the above and all questions were answered at this time  Electronically signed by Carmen Angeles MA on 9/6/2023 at 10:19 AM

## 2023-09-20 ENCOUNTER — HOSPITAL ENCOUNTER (OUTPATIENT)
Age: 58
Discharge: HOME | End: 2023-09-22

## 2023-09-20 PROBLEM — K63.5 COLON POLYPS: Status: ACTIVE | Noted: 2023-09-20

## 2023-09-26 LAB — SURGICAL PATHOLOGY REPORT: NORMAL

## 2023-10-06 ENCOUNTER — OFFICE VISIT (OUTPATIENT)
Dept: HEMATOLOGY | Age: 58
End: 2023-10-06
Payer: COMMERCIAL

## 2023-10-06 VITALS
WEIGHT: 169 LBS | DIASTOLIC BLOOD PRESSURE: 78 MMHG | SYSTOLIC BLOOD PRESSURE: 132 MMHG | TEMPERATURE: 97.3 F | HEART RATE: 76 BPM | BODY MASS INDEX: 28.85 KG/M2 | HEIGHT: 64 IN | OXYGEN SATURATION: 96 %

## 2023-10-06 DIAGNOSIS — D27.9 SEROUS CYSTADENOMA: Primary | ICD-10-CM

## 2023-10-06 PROCEDURE — 3075F SYST BP GE 130 - 139MM HG: CPT | Performed by: STUDENT IN AN ORGANIZED HEALTH CARE EDUCATION/TRAINING PROGRAM

## 2023-10-06 PROCEDURE — 3078F DIAST BP <80 MM HG: CPT | Performed by: STUDENT IN AN ORGANIZED HEALTH CARE EDUCATION/TRAINING PROGRAM

## 2023-10-06 PROCEDURE — 99205 OFFICE O/P NEW HI 60 MIN: CPT | Performed by: STUDENT IN AN ORGANIZED HEALTH CARE EDUCATION/TRAINING PROGRAM

## 2023-10-06 ASSESSMENT — ENCOUNTER SYMPTOMS
EYES NEGATIVE: 1
NAUSEA: 1
RESPIRATORY NEGATIVE: 1
BACK PAIN: 1
ABDOMINAL PAIN: 1
ALLERGIC/IMMUNOLOGIC NEGATIVE: 1

## 2023-10-06 NOTE — PROGRESS NOTES
take 1 tablet by mouth AS A ONE TIME DOSE      gabapentin (NEURONTIN) 300 MG capsule Take 1 capsule by mouth nightly for 90 days. 90 capsule 0    amLODIPine (NORVASC) 5 MG tablet Take 1 tablet by mouth daily 30 tablet 1    cyclobenzaprine (FLEXERIL) 10 MG tablet Take 1 tablet by mouth daily 60 tablet 0    atorvastatin (LIPITOR) 10 MG tablet take 1 tablet by mouth once daily 30 tablet 3    buPROPion (WELLBUTRIN XL) 150 MG extended release tablet Take 1 tablet by mouth every morning 30 tablet 3    buPROPion (WELLBUTRIN XL) 300 MG extended release tablet Take 1 tablet by mouth every morning 90 tablet 2    metFORMIN (GLUCOPHAGE-XR) 500 MG extended release tablet Take 1 tablet by mouth daily (with breakfast) 90 tablet 1     No current facility-administered medications for this visit.        Allergies   Allergen Reactions    Latex      Other reaction(s): swelling & sores    Milk-Related Compounds     Aspirin Other (See Comments)    Lisinopril Rash     Red rash on face,arms,upper back    Morphine Nausea And Vomiting    Seasonal Other (See Comments)       Family History   Problem Relation Age of Onset    Stroke Mother 76        mini     Stroke Father 76    Heart Attack Maternal Grandfather        Social History     Socioeconomic History    Marital status:      Spouse name: Not on file    Number of children: Not on file    Years of education: Not on file    Highest education level: Not on file   Occupational History    Not on file   Tobacco Use    Smoking status: Every Day     Packs/day: 0.50     Years: 45.00     Additional pack years: 0.00     Total pack years: 22.50     Types: Cigarettes     Start date: 9/13/1973    Smokeless tobacco: Never    Tobacco comments:     1 every other day   Vaping Use    Vaping Use: Former   Substance and Sexual Activity    Alcohol use: Yes     Comment: occassional    Drug use: No    Sexual activity: Not Currently   Other Topics Concern    Not on file   Social History Narrative    Not on

## 2023-10-09 ENCOUNTER — PREP FOR PROCEDURE (OUTPATIENT)
Dept: SURGERY | Age: 58
End: 2023-10-09

## 2023-10-09 DIAGNOSIS — D27.9 SEROUS CYSTADENOMA: ICD-10-CM

## 2023-10-10 RX ORDER — SODIUM CHLORIDE 0.9 % (FLUSH) 0.9 %
5-40 SYRINGE (ML) INJECTION EVERY 12 HOURS SCHEDULED
Status: CANCELLED | OUTPATIENT
Start: 2023-10-10

## 2023-10-10 RX ORDER — SODIUM CHLORIDE 0.9 % (FLUSH) 0.9 %
5-40 SYRINGE (ML) INJECTION PRN
Status: CANCELLED | OUTPATIENT
Start: 2023-10-10

## 2023-10-10 RX ORDER — SODIUM CHLORIDE 9 MG/ML
INJECTION, SOLUTION INTRAVENOUS PRN
Status: CANCELLED | OUTPATIENT
Start: 2023-10-10

## 2023-10-13 RX ORDER — POLYETHYLENE GLYCOL 3350 17 G/17G
17 POWDER, FOR SOLUTION ORAL DAILY PRN
Status: CANCELLED | OUTPATIENT
Start: 2023-10-13

## 2023-10-13 RX ORDER — PANTOPRAZOLE SODIUM 20 MG/1
40 TABLET, DELAYED RELEASE ORAL
Status: CANCELLED | OUTPATIENT
Start: 2023-10-13 | End: 2023-10-23

## 2023-10-13 RX ORDER — NALOXONE HYDROCHLORIDE 0.4 MG/ML
INJECTION, SOLUTION INTRAMUSCULAR; INTRAVENOUS; SUBCUTANEOUS PRN
Status: CANCELLED | OUTPATIENT
Start: 2023-10-13

## 2023-10-17 ENCOUNTER — TELEPHONE (OUTPATIENT)
Dept: HEMATOLOGY | Age: 58
End: 2023-10-17

## 2023-10-17 RX ORDER — SODIUM CHLORIDE 9 MG/ML
INJECTION, SOLUTION INTRAVENOUS PRN
Status: CANCELLED | OUTPATIENT
Start: 2023-10-17

## 2023-10-17 NOTE — PROGRESS NOTES
[address]   PRE-ADMISSION TESTING GENERAL INSTRUCTIONS  [facility] Phone Number: [phone]      GENERAL INSTRUCTIONS:    [x] Antibacterial Soap Shower Night before  surgery. [x] CHG Wipes instruction sheet and wipes given. [x] Do not wear makeup, lotions, powders, deodorant. [x] Nothing by mouth after midnight; including gum, candy, mints, or water. [x] You may brush your teeth, gargle, but do NOT swallow water. [x] No tobacco products, illegal drugs, or alcohol within 24 hours of your surgery. [x] Jewelry or valuables should not be brought to the hospital. All body and/or tongue piercing's must be removed prior to arriving to hospital. No contact lens or hair pins. [x] Arrange transportation with a responsible adult  to and from the hospital. Arrange for someone to be with you for the remainder of the day and for 24 hours after your procedure due to having had anesthesia. -Who will be your  for transportation? Jose Rafael Chambers (ex-)        -Who will be staying with you for 24 hrs after your procedure? Jose Rafael Chambers (ex-)  [x] Bring insurance card and photo ID. [x] Bring copy of living will or healthcare power of  papers to be placed in your electronic record. [x] Transfusion Laine Aleah) Bracelet: Please bring with you to hospital, day of surgery. PARKING INSTRUCTIONS:     [x] ARRIVAL DATE & TIME:  10/27 at 5:30 am  [x] Times are subject to change. We will contact you the business day before surgery if that were to occur. [x] Enter into the [employer]. Two people may accompany you. Masks are not required. [x] Parking Lot \"I\" is where you will park. It is located on the corner of [address] [place] [address] and [address] [facility]. The entrance is on [address] [facility]. Only one vehicle - per patient, is permitted in parking lot. Upon entering the parking lot, a voucher ticket will print.     EDUCATION INSTRUCTIONS:           [x] [facility]

## 2023-10-17 NOTE — TELEPHONE ENCOUNTER
PAT dept called and stated the patient was due for cortizone shot on 10/23/23 for knee injections. Per Dr Mason Tillman she would rather the patient not have this done prior to her surgery.  I called and spoke with Taisha Archer in PAT and she will reach out to the patient and let her know  Electronically signed by Vahid Ware MA on 10/17/2023 at 3:17 PM

## 2023-10-17 NOTE — PROGRESS NOTES
I called Ashley Dasilva at Dr. Alvaro Sexton office to find out if the patient should get a steroid injection in her knee that was scheduled on 10/23. Per Dr. Rochelle Naidu, the patient should hold off on getting the injection before surgery. I called the patient and notified her to hold the steroid injection prior to surgery.

## 2023-10-19 ENCOUNTER — TELEPHONE (OUTPATIENT)
Dept: HEMATOLOGY | Age: 58
End: 2023-10-19

## 2023-10-19 ENCOUNTER — OFFICE VISIT (OUTPATIENT)
Dept: FAMILY MEDICINE CLINIC | Age: 58
End: 2023-10-19
Payer: COMMERCIAL

## 2023-10-19 VITALS
BODY MASS INDEX: 29.23 KG/M2 | DIASTOLIC BLOOD PRESSURE: 70 MMHG | RESPIRATION RATE: 18 BRPM | WEIGHT: 165 LBS | HEART RATE: 64 BPM | SYSTOLIC BLOOD PRESSURE: 116 MMHG | OXYGEN SATURATION: 94 % | HEIGHT: 63 IN

## 2023-10-19 DIAGNOSIS — R73.03 PREDIABETES: ICD-10-CM

## 2023-10-19 DIAGNOSIS — F32.A DEPRESSION, UNSPECIFIED DEPRESSION TYPE: ICD-10-CM

## 2023-10-19 DIAGNOSIS — I10 ESSENTIAL HYPERTENSION: ICD-10-CM

## 2023-10-19 DIAGNOSIS — R05.9 COUGH, UNSPECIFIED TYPE: Primary | ICD-10-CM

## 2023-10-19 PROCEDURE — 3075F SYST BP GE 130 - 139MM HG: CPT

## 2023-10-19 PROCEDURE — G8417 CALC BMI ABV UP PARAM F/U: HCPCS

## 2023-10-19 PROCEDURE — 3017F COLORECTAL CA SCREEN DOC REV: CPT

## 2023-10-19 PROCEDURE — 99214 OFFICE O/P EST MOD 30 MIN: CPT

## 2023-10-19 PROCEDURE — G8427 DOCREV CUR MEDS BY ELIG CLIN: HCPCS

## 2023-10-19 PROCEDURE — 4004F PT TOBACCO SCREEN RCVD TLK: CPT

## 2023-10-19 PROCEDURE — G8484 FLU IMMUNIZE NO ADMIN: HCPCS

## 2023-10-19 PROCEDURE — 3078F DIAST BP <80 MM HG: CPT

## 2023-10-19 ASSESSMENT — PATIENT HEALTH QUESTIONNAIRE - PHQ9
7. TROUBLE CONCENTRATING ON THINGS, SUCH AS READING THE NEWSPAPER OR WATCHING TELEVISION: 1
3. TROUBLE FALLING OR STAYING ASLEEP: 0
SUM OF ALL RESPONSES TO PHQ QUESTIONS 1-9: 2
SUM OF ALL RESPONSES TO PHQ QUESTIONS 1-9: 5
SUM OF ALL RESPONSES TO PHQ QUESTIONS 1-9: 5
10. IF YOU CHECKED OFF ANY PROBLEMS, HOW DIFFICULT HAVE THESE PROBLEMS MADE IT FOR YOU TO DO YOUR WORK, TAKE CARE OF THINGS AT HOME, OR GET ALONG WITH OTHER PEOPLE: 1
SUM OF ALL RESPONSES TO PHQ QUESTIONS 1-9: 2
SUM OF ALL RESPONSES TO PHQ QUESTIONS 1-9: 2
1. LITTLE INTEREST OR PLEASURE IN DOING THINGS: 1
8. MOVING OR SPEAKING SO SLOWLY THAT OTHER PEOPLE COULD HAVE NOTICED. OR THE OPPOSITE, BEING SO FIGETY OR RESTLESS THAT YOU HAVE BEEN MOVING AROUND A LOT MORE THAN USUAL: 0
2. FEELING DOWN, DEPRESSED OR HOPELESS: 1
SUM OF ALL RESPONSES TO PHQ QUESTIONS 1-9: 2
1. LITTLE INTEREST OR PLEASURE IN DOING THINGS: 1
SUM OF ALL RESPONSES TO PHQ QUESTIONS 1-9: 5
2. FEELING DOWN, DEPRESSED OR HOPELESS: 1
SUM OF ALL RESPONSES TO PHQ9 QUESTIONS 1 & 2: 2
5. POOR APPETITE OR OVEREATING: 1
9. THOUGHTS THAT YOU WOULD BE BETTER OFF DEAD, OR OF HURTING YOURSELF: 0
SUM OF ALL RESPONSES TO PHQ QUESTIONS 1-9: 5
6. FEELING BAD ABOUT YOURSELF - OR THAT YOU ARE A FAILURE OR HAVE LET YOURSELF OR YOUR FAMILY DOWN: 0
SUM OF ALL RESPONSES TO PHQ9 QUESTIONS 1 & 2: 2
4. FEELING TIRED OR HAVING LITTLE ENERGY: 1

## 2023-10-19 NOTE — PROGRESS NOTES
S: 60 y.o. female with   Chief Complaint   Patient presents with    Hypertension    Diabetes       Pt is going to have a whipple procedures bc of a an pancreatic cyst.  She is here today to discuss her HTN and depression. She has a cough for 2 days. No fever. She would like to decrease her wellbutrin to 300 from 450. She got her flu vaccine. O: VS:  height is 1.6 m (5' 2.99\") and weight is 74.8 kg (165 lb). Her blood pressure is 130/91 (abnormal) and her pulse is 64. Her respiration is 18 and oxygen saturation is 94%. BP Readings from Last 3 Encounters:   10/19/23 (!) 130/91   10/06/23 132/78   08/23/23 (!) 145/70     See resident note      Impression/Plan:   1) URI - pt ed on OTC care  2) depression - decrease dose to 300. Will consider further decrease at future visit. 3) pancreatic cyst - pt has pre-op tmw. Health Maintenance Due   Topic Date Due    Hepatitis B vaccine (1 of 3 - 3-dose series) Never done    HIV screen  Never done    Low dose CT lung screening &/or counseling  Never done         Attending Physician Statement  I have discussed the case, including pertinent history and exam findings with the resident. I also have seen the patient and performed key portions of the examination. I agree with the documented assessment and plan.       Susana Osler, MD
Current Outpatient Medications   Medication Sig Dispense Refill    olopatadine (PATADAY) 0.2 % SOLN ophthalmic solution Place 1 drop into both eyes as needed      acyclovir (ZOVIRAX) 400 MG tablet take 1 tablet by mouth AS A ONE TIME DOSE      gabapentin (NEURONTIN) 300 MG capsule Take 1 capsule by mouth nightly for 90 days. 90 capsule 0    amLODIPine (NORVASC) 5 MG tablet Take 1 tablet by mouth daily 30 tablet 1    atorvastatin (LIPITOR) 10 MG tablet take 1 tablet by mouth once daily 30 tablet 3    buPROPion (WELLBUTRIN XL) 300 MG extended release tablet Take 1 tablet by mouth every morning 90 tablet 2    metFORMIN (GLUCOPHAGE-XR) 500 MG extended release tablet Take 1 tablet by mouth daily (with breakfast) 90 tablet 1    cyclobenzaprine (FLEXERIL) 10 MG tablet Take 1 tablet by mouth daily (Patient not taking: Reported on 10/17/2023) 60 tablet 0     No current facility-administered medications for this visit. Return to Office: Return in about 3 months (around 1/19/2024) for BP and DM follow-up. This document may have been prepared at least partially through the use of voice recognition software. Although effort is taken to assure the accuracy of this document, it is possible that grammatical, syntax,  or spelling errors may occur.     Randolph Martínez MD  Family Medicine Resident, PGY-1  [facility]  10/20/2023 1:42 PM

## 2023-10-19 NOTE — TELEPHONE ENCOUNTER
I faxed auth request for cpt code 88211 for inpatient stay on 10/27/23 at [facility]. I received call stating Justice Younger was approved for [id number] with inpatient stay starting on 10/27/23-10/28/23 with auth #[id number].   I did request a faxed auth approval.      Electronically signed by Paola Mahoney RN on 10/19/2023 at 1:17 PM

## 2023-10-20 ENCOUNTER — HOSPITAL ENCOUNTER (OUTPATIENT)
Dept: PREADMISSION TESTING | Age: 58
Discharge: HOME | End: 2023-10-20
Payer: COMMERCIAL

## 2023-10-20 VITALS
RESPIRATION RATE: 18 BRPM | TEMPERATURE: 96.8 F | WEIGHT: 164 LBS | SYSTOLIC BLOOD PRESSURE: 136 MMHG | HEIGHT: 64 IN | DIASTOLIC BLOOD PRESSURE: 63 MMHG | BODY MASS INDEX: 28 KG/M2 | OXYGEN SATURATION: 95 % | HEART RATE: 76 BPM

## 2023-10-20 DIAGNOSIS — Z01.810 PRE-OPERATIVE CARDIOVASCULAR EXAMINATION: ICD-10-CM

## 2023-10-20 DIAGNOSIS — Z01.812 PRE-OPERATIVE LABORATORY EXAMINATION: Primary | ICD-10-CM

## 2023-10-20 LAB
ABO + RH BLD: NORMAL
ALBUMIN SERPL-MCNC: 3.6 G/DL (ref 3.5–5.2)
ALP SERPL-CCNC: 124 U/L (ref 35–104)
ALT SERPL-CCNC: 14 U/L (ref 0–32)
ANION GAP SERPL CALCULATED.3IONS-SCNC: 16 MMOL/L (ref 7–16)
ARM BAND NUMBER: NORMAL
AST SERPL-CCNC: 14 U/L (ref 0–31)
BILIRUB SERPL-MCNC: 0.4 MG/DL (ref 0–1.2)
BLOOD BANK SAMPLE EXPIRATION: NORMAL
BLOOD GROUP ANTIBODIES SERPL: NEGATIVE
BUN SERPL-MCNC: 8 MG/DL (ref 6–20)
CALCIUM SERPL-MCNC: 8.8 MG/DL (ref 8.6–10.2)
CHLORIDE SERPL-SCNC: 101 MMOL/L (ref 98–107)
CO2 SERPL-SCNC: 20 MMOL/L (ref 22–29)
CREAT SERPL-MCNC: 0.8 MG/DL (ref 0.5–1)
EKG ATRIAL RATE: 69 BPM
EKG P AXIS: 40 DEGREES
EKG P-R INTERVAL: 156 MS
EKG Q-T INTERVAL: 438 MS
EKG QRS DURATION: 100 MS
EKG QTC CALCULATION (BAZETT): 469 MS
EKG R AXIS: 65 DEGREES
EKG T AXIS: 11 DEGREES
EKG VENTRICULAR RATE: 69 BPM
ERYTHROCYTE [DISTWIDTH] IN BLOOD BY AUTOMATED COUNT: 14.1 % (ref 11.5–15)
GFR SERPL CREATININE-BSD FRML MDRD: >60 ML/MIN/1.73M2
GLUCOSE SERPL-MCNC: 102 MG/DL (ref 74–99)
HCT VFR BLD AUTO: 43.8 % (ref 34–48)
HGB BLD-MCNC: 14.4 G/DL (ref 11.5–15.5)
INR PPP: 1.1
MCH RBC QN AUTO: 28.1 PG (ref 26–35)
MCHC RBC AUTO-ENTMCNC: 32.9 G/DL (ref 32–34.5)
MCV RBC AUTO: 85.4 FL (ref 80–99.9)
PLATELET # BLD AUTO: 310 K/UL (ref 130–450)
PMV BLD AUTO: 9.9 FL (ref 7–12)
POTASSIUM SERPL-SCNC: 3.1 MMOL/L (ref 3.5–5)
PROT SERPL-MCNC: 7.1 G/DL (ref 6.4–8.3)
PROTHROMBIN TIME: 11.6 SEC (ref 9.3–12.4)
RBC # BLD AUTO: 5.13 M/UL (ref 3.5–5.5)
SODIUM SERPL-SCNC: 137 MMOL/L (ref 132–146)
WBC OTHER # BLD: 8.5 K/UL (ref 4.5–11.5)

## 2023-10-20 PROCEDURE — 86850 RBC ANTIBODY SCREEN: CPT

## 2023-10-20 PROCEDURE — 85027 COMPLETE CBC AUTOMATED: CPT

## 2023-10-20 PROCEDURE — 86900 BLOOD TYPING SEROLOGIC ABO: CPT

## 2023-10-20 PROCEDURE — 93005 ELECTROCARDIOGRAM TRACING: CPT | Performed by: CLINICAL NURSE SPECIALIST

## 2023-10-20 PROCEDURE — 80053 COMPREHEN METABOLIC PANEL: CPT

## 2023-10-20 PROCEDURE — 36415 COLL VENOUS BLD VENIPUNCTURE: CPT

## 2023-10-20 PROCEDURE — 85610 PROTHROMBIN TIME: CPT

## 2023-10-20 PROCEDURE — 87081 CULTURE SCREEN ONLY: CPT

## 2023-10-20 PROCEDURE — 86901 BLOOD TYPING SEROLOGIC RH(D): CPT

## 2023-10-21 LAB
MICROORGANISM SPEC CULT: NORMAL
SPECIMEN DESCRIPTION: NORMAL

## 2023-10-23 DIAGNOSIS — D27.9 SEROUS CYSTADENOMA: ICD-10-CM

## 2023-10-23 DIAGNOSIS — I10 ESSENTIAL HYPERTENSION: Primary | ICD-10-CM

## 2023-10-23 DIAGNOSIS — E66.09 CLASS 1 OBESITY DUE TO EXCESS CALORIES WITHOUT SERIOUS COMORBIDITY WITH BODY MASS INDEX (BMI) OF 32.0 TO 32.9 IN ADULT: ICD-10-CM

## 2023-10-23 DIAGNOSIS — R94.31 ABNORMAL EKG: ICD-10-CM

## 2023-10-23 DIAGNOSIS — I10 ESSENTIAL HYPERTENSION: ICD-10-CM

## 2023-10-23 DIAGNOSIS — R94.31 ABNORMAL EKG: Primary | ICD-10-CM

## 2023-10-23 DIAGNOSIS — E78.5 HYPERLIPIDEMIA, UNSPECIFIED HYPERLIPIDEMIA TYPE: ICD-10-CM

## 2023-10-23 NOTE — PROGRESS NOTES
Incoming call from PAT stating patient had an abnormal EKG and will need cardiac clearance prior to surgery. Dr. Brennan Can and Patient informed. Referral placed to Cardiologist due to patient not having been established with one previously. Surgery for 10/27/23 canceled and to be rescheduled pending cardiac clearance.

## 2023-10-25 ENCOUNTER — TELEPHONE (OUTPATIENT)
Dept: HEMATOLOGY | Age: 58
End: 2023-10-25

## 2023-10-25 ENCOUNTER — OFFICE VISIT (OUTPATIENT)
Dept: CARDIOLOGY CLINIC | Age: 58
End: 2023-10-25
Payer: COMMERCIAL

## 2023-10-25 ENCOUNTER — PREP FOR PROCEDURE (OUTPATIENT)
Dept: HEMATOLOGY | Age: 58
End: 2023-10-25

## 2023-10-25 VITALS
SYSTOLIC BLOOD PRESSURE: 130 MMHG | HEART RATE: 91 BPM | RESPIRATION RATE: 12 BRPM | DIASTOLIC BLOOD PRESSURE: 72 MMHG | HEIGHT: 64 IN | WEIGHT: 169 LBS | BODY MASS INDEX: 28.85 KG/M2

## 2023-10-25 DIAGNOSIS — R94.31 ABNORMAL EKG: ICD-10-CM

## 2023-10-25 DIAGNOSIS — K86.2 CYST OF PANCREAS: ICD-10-CM

## 2023-10-25 DIAGNOSIS — K86.2 PANCREATIC CYST: ICD-10-CM

## 2023-10-25 DIAGNOSIS — Z01.810 PREOP CARDIOVASCULAR EXAM: Primary | ICD-10-CM

## 2023-10-25 PROBLEM — M54.2 CERVICAL PAIN: Status: RESOLVED | Noted: 2022-11-30 | Resolved: 2023-10-25

## 2023-10-25 PROBLEM — D27.9 SEROUS CYSTADENOMA: Status: RESOLVED | Noted: 2023-10-09 | Resolved: 2023-10-25

## 2023-10-25 PROCEDURE — 99204 OFFICE O/P NEW MOD 45 MIN: CPT | Performed by: INTERNAL MEDICINE

## 2023-10-25 PROCEDURE — 3017F COLORECTAL CA SCREEN DOC REV: CPT | Performed by: INTERNAL MEDICINE

## 2023-10-25 PROCEDURE — 3078F DIAST BP <80 MM HG: CPT | Performed by: INTERNAL MEDICINE

## 2023-10-25 PROCEDURE — G8484 FLU IMMUNIZE NO ADMIN: HCPCS | Performed by: INTERNAL MEDICINE

## 2023-10-25 PROCEDURE — G8427 DOCREV CUR MEDS BY ELIG CLIN: HCPCS | Performed by: INTERNAL MEDICINE

## 2023-10-25 PROCEDURE — 93000 ELECTROCARDIOGRAM COMPLETE: CPT | Performed by: INTERNAL MEDICINE

## 2023-10-25 PROCEDURE — G8417 CALC BMI ABV UP PARAM F/U: HCPCS | Performed by: INTERNAL MEDICINE

## 2023-10-25 PROCEDURE — 4004F PT TOBACCO SCREEN RCVD TLK: CPT | Performed by: INTERNAL MEDICINE

## 2023-10-25 PROCEDURE — 3075F SYST BP GE 130 - 139MM HG: CPT | Performed by: INTERNAL MEDICINE

## 2023-10-25 RX ORDER — REGADENOSON 0.08 MG/ML
0.4 INJECTION, SOLUTION INTRAVENOUS
Status: SHIPPED | OUTPATIENT
Start: 2023-10-25

## 2023-10-25 NOTE — PROGRESS NOTES
heart failure, rhythm other than sinus, severe obstructive valvular disease,cva, insulin, ckd  Able to achieve 4 METS with AODLs  HIGH RISK  surgical procedure  Will need pharmacologic stress for risk stratification       Tegan Plummer M.D  [facility]

## 2023-10-25 NOTE — TELEPHONE ENCOUNTER
Spoke to Dave Morales today and informed her that surgery was rescheduled for 11/17/23. She expressed understanding. Pt stated that she seen her cardiologist today and they scheduled her for a stress test on 11/13/23. Encouraged patient to call with any questions or concerns. Message left with insurance to call office regarding changing auth date.

## 2023-11-06 RX ORDER — SODIUM CHLORIDE 0.9 % (FLUSH) 0.9 %
5-40 SYRINGE (ML) INJECTION PRN
Status: CANCELLED | OUTPATIENT
Start: 2023-11-06

## 2023-11-06 RX ORDER — ACETAMINOPHEN 325 MG/1
1000 TABLET ORAL ONCE
Status: CANCELLED | OUTPATIENT
Start: 2023-11-06 | End: 2023-11-06

## 2023-11-06 RX ORDER — CELECOXIB 200 MG/1
200 CAPSULE ORAL ONCE
Status: CANCELLED | OUTPATIENT
Start: 2023-11-06 | End: 2023-11-06

## 2023-11-06 RX ORDER — SODIUM CHLORIDE 9 MG/ML
INJECTION, SOLUTION INTRAVENOUS PRN
Status: CANCELLED | OUTPATIENT
Start: 2023-11-06

## 2023-11-06 RX ORDER — SODIUM CHLORIDE 0.9 % (FLUSH) 0.9 %
5-40 SYRINGE (ML) INJECTION EVERY 12 HOURS SCHEDULED
Status: CANCELLED | OUTPATIENT
Start: 2023-11-06

## 2023-11-07 DIAGNOSIS — I10 ESSENTIAL HYPERTENSION: ICD-10-CM

## 2023-11-09 ENCOUNTER — TELEPHONE (OUTPATIENT)
Dept: CARDIOLOGY | Age: 58
End: 2023-11-09

## 2023-11-09 RX ORDER — AMLODIPINE BESYLATE 5 MG/1
5 TABLET ORAL DAILY
Qty: 30 TABLET | Refills: 2 | Status: SHIPPED | OUTPATIENT
Start: 2023-11-09

## 2023-11-09 NOTE — TELEPHONE ENCOUNTER
Spoke with patient and confirmed Lexiscan stress test appointment on November 13, 2023 at 0700. Instructions for test,medication list, and COVID-19 preprocedure information reviewed with patient, questions answered. Patient verbalized understanding.

## 2023-11-10 ENCOUNTER — TELEPHONE (OUTPATIENT)
Dept: CARDIOLOGY | Age: 58
End: 2023-11-10

## 2023-11-10 NOTE — TELEPHONE ENCOUNTER
Good Morning -  I went to check on this patients auth status as their test is scheduled for Monday 11/13. Status updated to Denied.    The documentation attached states:  \"If you don’t agree with this decision and want to appeal, please call us at [phone]\"    Ref#[id number]    If this is unable to be attempted today, please let me know and we will cancel patient for Monday and re-schedule when/if approved.    Thank you!

## 2023-11-10 NOTE — TELEPHONE ENCOUNTER
Called to inform patient that ins. Did not approve authorization for testing 11/13. Stress cancelled for time being. Staff notified. Appeal to be attempted for approval. Will re-schedule patient for testing as soon as approval received. Patient needs testing for clearance for upcoming procedure.     More

## 2023-11-10 NOTE — TELEPHONE ENCOUNTER
Good Afternoon -  Patient has been notified and appointment cancelled for 11/13 until confirmation that appeal has been approved.    Letter sent to patient from ins. Provider.    Documentation will be scanned into patient chart under media - \"Insurance Authorization\"    Thank you!

## 2023-11-13 ENCOUNTER — HOSPITAL ENCOUNTER (OUTPATIENT)
Dept: CARDIOLOGY | Age: 58
Discharge: HOME | End: 2023-11-13
Attending: INTERNAL MEDICINE

## 2023-11-13 ENCOUNTER — TELEPHONE (OUTPATIENT)
Dept: HEMATOLOGY | Age: 58
End: 2023-11-13

## 2023-11-13 NOTE — TELEPHONE ENCOUNTER
Incoming call from Emy Hernandez requesting to know if she is still able to get her knee injection the week of her scheduled surgery with Dr. Edelmira Candelario.

## 2023-11-17 ENCOUNTER — HOSPITAL ENCOUNTER (OUTPATIENT)
Dept: PREADMISSION TESTING | Age: 58
Discharge: HOME | End: 2023-11-17
Payer: COMMERCIAL

## 2023-11-17 VITALS
HEIGHT: 64 IN | HEART RATE: 91 BPM | BODY MASS INDEX: 29.19 KG/M2 | RESPIRATION RATE: 16 BRPM | TEMPERATURE: 98.4 F | OXYGEN SATURATION: 98 % | WEIGHT: 171 LBS | SYSTOLIC BLOOD PRESSURE: 147 MMHG | DIASTOLIC BLOOD PRESSURE: 83 MMHG

## 2023-11-17 DIAGNOSIS — K86.2 CYST OF PANCREAS: ICD-10-CM

## 2023-11-17 LAB
ABO + RH BLD: NORMAL
ALBUMIN SERPL-MCNC: 4.2 G/DL (ref 3.5–5.2)
ALP SERPL-CCNC: 134 U/L (ref 35–104)
ALT SERPL-CCNC: 13 U/L (ref 0–32)
ANION GAP SERPL CALCULATED.3IONS-SCNC: 13 MMOL/L (ref 7–16)
ARM BAND NUMBER: NORMAL
AST SERPL-CCNC: 10 U/L (ref 0–31)
BASOPHILS # BLD: 0.05 K/UL (ref 0–0.2)
BASOPHILS NFR BLD: 0 % (ref 0–2)
BILIRUB SERPL-MCNC: <0.2 MG/DL (ref 0–1.2)
BLOOD BANK SAMPLE EXPIRATION: NORMAL
BLOOD GROUP ANTIBODIES SERPL: NEGATIVE
BUN SERPL-MCNC: 20 MG/DL (ref 6–20)
CALCIUM SERPL-MCNC: 9.4 MG/DL (ref 8.6–10.2)
CHLORIDE SERPL-SCNC: 102 MMOL/L (ref 98–107)
CO2 SERPL-SCNC: 21 MMOL/L (ref 22–29)
CREAT SERPL-MCNC: 0.7 MG/DL (ref 0.5–1)
EOSINOPHIL # BLD: 0 K/UL (ref 0.05–0.5)
EOSINOPHILS RELATIVE PERCENT: 0 % (ref 0–6)
ERYTHROCYTE [DISTWIDTH] IN BLOOD BY AUTOMATED COUNT: 15.1 % (ref 11.5–15)
GFR SERPL CREATININE-BSD FRML MDRD: >60 ML/MIN/1.73M2
GLUCOSE SERPL-MCNC: 136 MG/DL (ref 74–99)
HCT VFR BLD AUTO: 43.9 % (ref 34–48)
HGB BLD-MCNC: 14.2 G/DL (ref 11.5–15.5)
IMM GRANULOCYTES # BLD AUTO: 0.25 K/UL (ref 0–0.58)
IMM GRANULOCYTES NFR BLD: 1 % (ref 0–5)
INR PPP: 1
LYMPHOCYTES NFR BLD: 1.85 K/UL (ref 1.5–4)
LYMPHOCYTES RELATIVE PERCENT: 8 % (ref 20–42)
MCH RBC QN AUTO: 28.2 PG (ref 26–35)
MCHC RBC AUTO-ENTMCNC: 32.3 G/DL (ref 32–34.5)
MCV RBC AUTO: 87.1 FL (ref 80–99.9)
MONOCYTES NFR BLD: 0.64 K/UL (ref 0.1–0.95)
MONOCYTES NFR BLD: 3 % (ref 2–12)
NEUTROPHILS NFR BLD: 88 % (ref 43–80)
NEUTS SEG NFR BLD: 19.86 K/UL (ref 1.8–7.3)
PLATELET # BLD AUTO: 412 K/UL (ref 130–450)
PMV BLD AUTO: 10.2 FL (ref 7–12)
POTASSIUM SERPL-SCNC: 4 MMOL/L (ref 3.5–5)
PROT SERPL-MCNC: 7.7 G/DL (ref 6.4–8.3)
PROTHROMBIN TIME: 10.7 SEC (ref 9.3–12.4)
RBC # BLD AUTO: 5.04 M/UL (ref 3.5–5.5)
SODIUM SERPL-SCNC: 136 MMOL/L (ref 132–146)
WBC OTHER # BLD: 22.7 K/UL (ref 4.5–11.5)

## 2023-11-17 PROCEDURE — 86900 BLOOD TYPING SEROLOGIC ABO: CPT

## 2023-11-17 PROCEDURE — 85610 PROTHROMBIN TIME: CPT

## 2023-11-17 PROCEDURE — 85025 COMPLETE CBC W/AUTO DIFF WBC: CPT

## 2023-11-17 PROCEDURE — 80053 COMPREHEN METABOLIC PANEL: CPT

## 2023-11-17 PROCEDURE — 86901 BLOOD TYPING SEROLOGIC RH(D): CPT

## 2023-11-17 PROCEDURE — 86850 RBC ANTIBODY SCREEN: CPT

## 2023-11-22 ENCOUNTER — TELEPHONE (OUTPATIENT)
Dept: HEMATOLOGY | Age: 58
End: 2023-11-22

## 2023-11-22 NOTE — TELEPHONE ENCOUNTER
PAT called regarding status of patient's pre operative stress test. Called Dr. Arabella Villavicencio office and LVM asking them to return call to clinic for update. Pt is scheduled to have sx on 12/27/23.

## 2023-11-24 LAB
ABO/RH: NORMAL
ANTIBODY SCREEN: NEGATIVE
ARM BAND NUMBER: NORMAL
BLOOD BANK DISPENSE STATUS: NORMAL
BLOOD BANK DISPENSE STATUS: NORMAL
BLOOD BANK SAMPLE EXPIRATION: NORMAL
BPU ID: NORMAL
BPU ID: NORMAL
COMPONENT: NORMAL
COMPONENT: NORMAL
CROSSMATCH RESULT: NORMAL
CROSSMATCH RESULT: NORMAL
TRANSFUSION STATUS: NORMAL
TRANSFUSION STATUS: NORMAL
UNIT DIVISION: 0
UNIT DIVISION: 0

## 2023-11-24 NOTE — TELEPHONE ENCOUNTER
Good Morning  I received a call/VM from the patients Dr. Office regarding the procedure they have scheduled for Monday. This was the surgery that clearance was requested for.  The call was asking if the testing had been completed. I attempted to call back to provide update that his was not completed d/t auth status and inability to complete until approved. I requested a call back.    Patient was made aware at the time of cancellation but must not have communicated to the Dr.    I will provide update once I hear back from the office.  If there has been an update on this as far as appeal, please let me know.    Thank you!

## 2023-11-28 NOTE — TELEPHONE ENCOUNTER
Good Morning -  Patient called asking about the status of this case.      All information on this case was provided and noted that an appeal was requested.    Documents regarding appeal were uploaded 11/10 under media under \"Insurance Authorization\".   It is my understanding there is a window of 60days to request appeal and 15 days for approval.  The appeal can still be attempted if you wish to proceed.    Patient called and was asking for update as she had a procedure pending the results of this test.    Please call patient  to update patient on next steps.    Thank you

## 2023-12-04 DIAGNOSIS — Z01.818 PREOPERATIVE CLEARANCE: Primary | ICD-10-CM

## 2023-12-12 DIAGNOSIS — R73.03 PREDIABETES: ICD-10-CM

## 2023-12-13 ENCOUNTER — APPOINTMENT (OUTPATIENT)
Dept: GENERAL RADIOLOGY | Age: 58
End: 2023-12-13
Payer: COMMERCIAL

## 2023-12-13 ENCOUNTER — HOSPITAL ENCOUNTER (EMERGENCY)
Age: 58
Discharge: HOME | End: 2023-12-13
Attending: EMERGENCY MEDICINE
Payer: COMMERCIAL

## 2023-12-13 VITALS
RESPIRATION RATE: 16 BRPM | SYSTOLIC BLOOD PRESSURE: 138 MMHG | BODY MASS INDEX: 28.17 KG/M2 | TEMPERATURE: 97.4 F | WEIGHT: 165 LBS | HEART RATE: 95 BPM | DIASTOLIC BLOOD PRESSURE: 88 MMHG | HEIGHT: 64 IN | OXYGEN SATURATION: 96 %

## 2023-12-13 DIAGNOSIS — R07.9 CHEST PAIN, UNSPECIFIED TYPE: Primary | ICD-10-CM

## 2023-12-13 DIAGNOSIS — R06.89 DYSPNEA AND RESPIRATORY ABNORMALITIES: ICD-10-CM

## 2023-12-13 DIAGNOSIS — R06.00 DYSPNEA AND RESPIRATORY ABNORMALITIES: ICD-10-CM

## 2023-12-13 DIAGNOSIS — D72.829 LEUKOCYTOSIS, UNSPECIFIED TYPE: ICD-10-CM

## 2023-12-13 LAB
ALBUMIN SERPL-MCNC: 4.2 G/DL (ref 3.5–5.2)
ALP SERPL-CCNC: 133 U/L (ref 35–104)
ALT SERPL-CCNC: 16 U/L (ref 0–32)
ANION GAP SERPL CALCULATED.3IONS-SCNC: 11 MMOL/L (ref 7–16)
AST SERPL-CCNC: 12 U/L (ref 0–31)
BASOPHILS # BLD: 0.1 K/UL (ref 0–0.2)
BASOPHILS NFR BLD: 1 % (ref 0–2)
BILIRUB SERPL-MCNC: <0.2 MG/DL (ref 0–1.2)
BNP SERPL-MCNC: <36 PG/ML (ref 0–125)
BUN SERPL-MCNC: 17 MG/DL (ref 6–20)
CALCIUM SERPL-MCNC: 9.2 MG/DL (ref 8.6–10.2)
CHLORIDE SERPL-SCNC: 104 MMOL/L (ref 98–107)
CO2 SERPL-SCNC: 24 MMOL/L (ref 22–29)
CREAT SERPL-MCNC: 0.8 MG/DL (ref 0.5–1)
D DIMER: <200 NG/ML DDU (ref 0–232)
EKG ATRIAL RATE: 86 BPM
EKG P AXIS: 64 DEGREES
EKG P-R INTERVAL: 152 MS
EKG Q-T INTERVAL: 364 MS
EKG QRS DURATION: 88 MS
EKG QTC CALCULATION (BAZETT): 435 MS
EKG R AXIS: 52 DEGREES
EKG T AXIS: -17 DEGREES
EKG VENTRICULAR RATE: 86 BPM
EOSINOPHIL # BLD: 0.39 K/UL (ref 0.05–0.5)
EOSINOPHILS RELATIVE PERCENT: 3 % (ref 0–6)
ERYTHROCYTE [DISTWIDTH] IN BLOOD BY AUTOMATED COUNT: 15 % (ref 11.5–15)
GFR SERPL CREATININE-BSD FRML MDRD: >60 ML/MIN/1.73M2
GLUCOSE SERPL-MCNC: 92 MG/DL (ref 74–99)
HCT VFR BLD AUTO: 43.8 % (ref 34–48)
HGB BLD-MCNC: 14.5 G/DL (ref 11.5–15.5)
IMM GRANULOCYTES # BLD AUTO: 0.09 K/UL (ref 0–0.58)
IMM GRANULOCYTES NFR BLD: 1 % (ref 0–5)
INFLUENZA A BY PCR: NOT DETECTED
INFLUENZA B BY PCR: NOT DETECTED
LYMPHOCYTES NFR BLD: 3.49 K/UL (ref 1.5–4)
LYMPHOCYTES RELATIVE PERCENT: 26 % (ref 20–42)
MCH RBC QN AUTO: 29.1 PG (ref 26–35)
MCHC RBC AUTO-ENTMCNC: 33.1 G/DL (ref 32–34.5)
MCV RBC AUTO: 88 FL (ref 80–99.9)
MONOCYTES NFR BLD: 0.72 K/UL (ref 0.1–0.95)
MONOCYTES NFR BLD: 5 % (ref 2–12)
NEUTROPHILS NFR BLD: 65 % (ref 43–80)
NEUTS SEG NFR BLD: 8.8 K/UL (ref 1.8–7.3)
PLATELET # BLD AUTO: 396 K/UL (ref 130–450)
PMV BLD AUTO: 9.2 FL (ref 7–12)
POTASSIUM SERPL-SCNC: 4.1 MMOL/L (ref 3.5–5)
PROT SERPL-MCNC: 7.7 G/DL (ref 6.4–8.3)
RBC # BLD AUTO: 4.98 M/UL (ref 3.5–5.5)
SARS-COV-2 RDRP RESP QL NAA+PROBE: NOT DETECTED
SODIUM SERPL-SCNC: 139 MMOL/L (ref 132–146)
SPECIMEN DESCRIPTION: NORMAL
TROPONIN I SERPL HS-MCNC: <6 NG/L (ref 0–9)
TROPONIN I SERPL HS-MCNC: <6 NG/L (ref 0–9)
WBC OTHER # BLD: 13.6 K/UL (ref 4.5–11.5)

## 2023-12-13 PROCEDURE — 93005 ELECTROCARDIOGRAM TRACING: CPT | Performed by: NURSE PRACTITIONER

## 2023-12-13 PROCEDURE — 87502 INFLUENZA DNA AMP PROBE: CPT

## 2023-12-13 PROCEDURE — 99285 EMERGENCY DEPT VISIT HI MDM: CPT

## 2023-12-13 PROCEDURE — 83880 ASSAY OF NATRIURETIC PEPTIDE: CPT

## 2023-12-13 PROCEDURE — 85379 FIBRIN DEGRADATION QUANT: CPT

## 2023-12-13 PROCEDURE — 80053 COMPREHEN METABOLIC PANEL: CPT

## 2023-12-13 PROCEDURE — 87635 SARS-COV-2 COVID-19 AMP PRB: CPT

## 2023-12-13 PROCEDURE — 84484 ASSAY OF TROPONIN QUANT: CPT

## 2023-12-13 PROCEDURE — 93010 ELECTROCARDIOGRAM REPORT: CPT | Performed by: INTERNAL MEDICINE

## 2023-12-13 PROCEDURE — 85025 COMPLETE CBC W/AUTO DIFF WBC: CPT

## 2023-12-13 PROCEDURE — 71046 X-RAY EXAM CHEST 2 VIEWS: CPT

## 2023-12-13 ASSESSMENT — PAIN - FUNCTIONAL ASSESSMENT: PAIN_FUNCTIONAL_ASSESSMENT: 0-10

## 2023-12-13 ASSESSMENT — PAIN SCALES - GENERAL: PAINLEVEL_OUTOF10: 7

## 2023-12-13 ASSESSMENT — PAIN DESCRIPTION - PAIN TYPE: TYPE: ACUTE PAIN

## 2023-12-13 ASSESSMENT — PAIN DESCRIPTION - LOCATION: LOCATION: ARM

## 2023-12-13 ASSESSMENT — PAIN DESCRIPTION - ORIENTATION: ORIENTATION: LEFT

## 2023-12-13 ASSESSMENT — PAIN DESCRIPTION - DESCRIPTORS: DESCRIPTORS: SHARP

## 2023-12-13 ASSESSMENT — PAIN DESCRIPTION - FREQUENCY: FREQUENCY: CONTINUOUS

## 2023-12-13 NOTE — ED PROVIDER NOTES
Department of Emergency Medicine     Written by: Kayla Anderson DO  Patient Name: Latoya Peña Date: 2023  3:10 PM  MRN: [id number]                   : 1965      HPI  Chief Complaint   Patient presents with    Shortness of Breath     With pain down left arm. Started about 60 min pta. Dizziness     Light headed      This is a 62-year-old female with past medical history of hypertension, hyperlipidemia, pancreatic cyst who presents to the emergency department today complaining of shortness of breath. Patient states about 1 hour prior to arrival to the emergency department, she started getting some bilateral chest tightness as well as shortness of breath states she did have some pain radiating down her left arm as well. Also had some associated lightheadedness. She still feels this way at present. She states she did have some sweating when she first arrived to the emergency department. Denies any known cardiac history. Patient denies any dizziness, fever or chills, nausea or vomiting, abdominal pain, hematuria or dysuria, constipation or diarrhea. Nursing notes were all reviewed and agreed with or any disagreements were addressed in the HPI. Review of systems:    Pertinent positives and negatives mentioned in the HPI/MDM. Physical Exam  Constitutional:       General: She is not in acute distress. HENT:      Head: Normocephalic and atraumatic. Eyes:      Extraocular Movements: Extraocular movements intact. Pupils: Pupils are equal, round, and reactive to light. Cardiovascular:      Rate and Rhythm: Normal rate and regular rhythm. Pulmonary:      Effort: Pulmonary effort is normal.      Breath sounds: Normal breath sounds. No stridor. No wheezing, rhonchi or rales. Abdominal:      General: Abdomen is flat. There is no distension. Palpations: Abdomen is soft. Tenderness: There is no guarding. Musculoskeletal:         General: No deformity.  Normal range Neutrophils Absolute 8.80 (*)     All other components within normal limits   COMPREHENSIVE METABOLIC PANEL - Abnormal; Notable for the following components:    Alkaline Phosphatase 133 (*)     All other components within normal limits   COVID-19, RAPID   INFLUENZA A + B, PCR   TROPONIN   BRAIN NATRIURETIC PEPTIDE   D-DIMER, QUANTITATIVE   TROPONIN       As interpreted by me, the above displayed labs are abnormal. All other labs obtained during this visit were within normal range or not returned as of this dictation. RADIOLOGY:  Non-plain film images such as CT, Ultrasound and MRI are read by the radiologist. Plain radiographic images are visualized and preliminarily interpreted by the ED Provider with the below findings:    My interpretation of chest x-ray: No significant pneumonia, pneumothorax, acute rib fractures, or mediastinal widening noted. Interpretation per the Radiologist below, if available at the time of this note:    XR CHEST (2 VW)   Final Result   No acute cardiopulmonary disease. XR CHEST (2 VW)    Result Date: 12/13/2023  EXAMINATION: TWO XRAY VIEWS OF THE CHEST 12/13/2023 3:15 pm COMPARISON: 03/04/2020 HISTORY: ORDERING SYSTEM PROVIDED HISTORY: CHEST PAIN, DIZZINESS TECHNOLOGIST PROVIDED HISTORY: Reason for exam:->CHEST PAIN, DIZZINESS FINDINGS: Normal heart size. No acute pulmonary infiltrate. No vascular congestion or pleural effusion. No pneumothorax. Prior anterior surgical fusion of the lower cervical spine. No acute cardiopulmonary disease. No results found. Medications administered today:  Medications - No data to display    CONSULTS: discussion with bolded \"IP consult\", otherwise consult was likely placed by admitting service  None    MDM   History is obtained from patient for patient's acute onset of mild shortness of breath. 62-year-old female senting to the emergency department with concerns for shortness of breath, chest pain, lightheadedness.

## 2023-12-13 NOTE — ED NOTES
Department of Emergency Medicine  FIRST PROVIDER TRIAGE NOTE             Independent MLP           12/13/23  2:24 PM EST    Date of Encounter: 12/13/23   MRN: [id number]      HPI: Jose Samuels is a 60 y.o. female who presents to the ED for Shortness of Breath (With pain down left arm. Started about 60 min pta. ) and Dizziness (Light headed )     FELT SWEATY COMING HERE. DENIES NAUSEA, VOMITING. ROS: Negative for abd pain or back pain. PE: Gen Appearance/Constitutional: alert  HEENT: NC/NT. PERRLA,  Airway patent. Initial Plan of Care: All treatment areas with department are currently occupied. Plan to order/Initiate the following while awaiting opening in ED: EKG and imaging studies.   Initiate Treatment-Testing, Proceed toTreatment Area When Bed Available for ED Attending/MLP to Continue Care    Electronically signed by ALL Villarreal CNP   DD: 12/13/23      ALL Villarreal CNP  12/13/23 [id number]

## 2023-12-14 ENCOUNTER — OFFICE VISIT (OUTPATIENT)
Dept: FAMILY MEDICINE CLINIC | Age: 58
End: 2023-12-14
Payer: COMMERCIAL

## 2023-12-14 VITALS
RESPIRATION RATE: 16 BRPM | SYSTOLIC BLOOD PRESSURE: 132 MMHG | WEIGHT: 169.75 LBS | DIASTOLIC BLOOD PRESSURE: 86 MMHG | BODY MASS INDEX: 28.98 KG/M2 | TEMPERATURE: 97.3 F | HEIGHT: 64 IN | OXYGEN SATURATION: 97 % | HEART RATE: 91 BPM

## 2023-12-14 DIAGNOSIS — Z72.0 TOBACCO ABUSE: ICD-10-CM

## 2023-12-14 DIAGNOSIS — R07.9 CHEST PAIN, UNSPECIFIED TYPE: Primary | ICD-10-CM

## 2023-12-14 PROCEDURE — 99213 OFFICE O/P EST LOW 20 MIN: CPT

## 2023-12-14 PROCEDURE — 3079F DIAST BP 80-89 MM HG: CPT

## 2023-12-14 PROCEDURE — G8484 FLU IMMUNIZE NO ADMIN: HCPCS

## 2023-12-14 PROCEDURE — 3017F COLORECTAL CA SCREEN DOC REV: CPT

## 2023-12-14 PROCEDURE — 3075F SYST BP GE 130 - 139MM HG: CPT

## 2023-12-14 PROCEDURE — G8427 DOCREV CUR MEDS BY ELIG CLIN: HCPCS

## 2023-12-14 PROCEDURE — 4004F PT TOBACCO SCREEN RCVD TLK: CPT

## 2023-12-14 PROCEDURE — G8417 CALC BMI ABV UP PARAM F/U: HCPCS

## 2023-12-14 RX ORDER — ATORVASTATIN CALCIUM 10 MG/1
TABLET, FILM COATED ORAL
Qty: 30 TABLET | Refills: 3 | Status: SHIPPED | OUTPATIENT
Start: 2023-12-14

## 2024-01-04 DIAGNOSIS — R52 PAIN: Primary | ICD-10-CM

## 2024-01-04 RX ORDER — TRAMADOL HYDROCHLORIDE 50 MG/1
50 TABLET ORAL EVERY 6 HOURS PRN
Qty: 28 TABLET | Refills: 0 | Status: CANCELLED | OUTPATIENT
Start: 2024-01-04 | End: 2024-01-11

## 2024-01-04 NOTE — TELEPHONE ENCOUNTER
Leatha called and c/o pain and wanted to know when she could have her surgery. Explained to Leatha that we have to get her cardiac clearance before Dr. Rogers can proceed with sx. She expressed understanding and asked if there was anything we can do to help with the pain in the meantime. Spoke with Dr. Rogers who instructed staff pt can have Tramadol. Reviewed allergies with patient. Pt expressed she has taken tramadol with no problem in the past. Called in:    [facility] [address]   Tramadol 50 mg tablet  Dispense 28 tablets, 0 refills   Take 1 tablet PO q6 hours PRN pain for up to 7 days.

## 2024-01-17 ENCOUNTER — PROCEDURE VISIT (OUTPATIENT)
Dept: AUDIOLOGY | Age: 59
End: 2024-01-17
Payer: COMMERCIAL

## 2024-01-17 ENCOUNTER — OFFICE VISIT (OUTPATIENT)
Dept: ENT CLINIC | Age: 59
End: 2024-01-17
Payer: COMMERCIAL

## 2024-01-17 VITALS — BODY MASS INDEX: 28.85 KG/M2 | HEIGHT: 64 IN | WEIGHT: 169 LBS

## 2024-01-17 DIAGNOSIS — H90.3 SENSORINEURAL HEARING LOSS (SNHL) OF BOTH EARS: Primary | ICD-10-CM

## 2024-01-17 DIAGNOSIS — H93.13 TINNITUS AURIUM, BILATERAL: ICD-10-CM

## 2024-01-17 PROCEDURE — 92567 TYMPANOMETRY: CPT | Performed by: AUDIOLOGIST

## 2024-01-17 PROCEDURE — 92557 COMPREHENSIVE HEARING TEST: CPT | Performed by: AUDIOLOGIST

## 2024-01-17 PROCEDURE — G8484 FLU IMMUNIZE NO ADMIN: HCPCS | Performed by: NURSE PRACTITIONER

## 2024-01-17 PROCEDURE — 3017F COLORECTAL CA SCREEN DOC REV: CPT | Performed by: NURSE PRACTITIONER

## 2024-01-17 PROCEDURE — G8417 CALC BMI ABV UP PARAM F/U: HCPCS | Performed by: NURSE PRACTITIONER

## 2024-01-17 PROCEDURE — 99213 OFFICE O/P EST LOW 20 MIN: CPT | Performed by: NURSE PRACTITIONER

## 2024-01-17 PROCEDURE — 4004F PT TOBACCO SCREEN RCVD TLK: CPT | Performed by: NURSE PRACTITIONER

## 2024-01-17 PROCEDURE — G8427 DOCREV CUR MEDS BY ELIG CLIN: HCPCS | Performed by: NURSE PRACTITIONER

## 2024-01-17 RX ORDER — TRAMADOL HYDROCHLORIDE 50 MG/1
50 TABLET ORAL EVERY 6 HOURS PRN
COMMUNITY
Start: 2024-01-04

## 2024-01-17 ASSESSMENT — ENCOUNTER SYMPTOMS
SHORTNESS OF BREATH: 0
RESPIRATORY NEGATIVE: 1
STRIDOR: 0
RHINORRHEA: 0
SINUS PAIN: 0
EYES NEGATIVE: 1
SINUS PRESSURE: 0

## 2024-01-17 NOTE — PROGRESS NOTES
follow-up.    Diagnoses and all orders for this visit:    Sensorineural hearing loss (SNHL) of both ears    Tinnitus aurium, bilateral      Audiogram was reviewed with the patient showing some decrease in the low frequencies bilaterally without significant change to her speech recognition score or word discrimination.  She will be directed to audiology for adjustment of her hearing aids in the low frequencies.  She will follow-up in 1 year for reevaluation.  She will call for any new or worsening symptoms prior to her next appointment.      CRISTAL Rodriguez, FNP-C  [facility]    The information contained in this note has been dictated using drug and medical speech recognition software and may contain errors

## 2024-01-17 NOTE — PROGRESS NOTES
This patient was referred for audiometric and tympanometric testing by FABIEN Castro due to established bilateral hearing loss.     Audiometry using pure tone air and bone conduction testing revealed an essentially mild sloping to a moderately severe sensorineural hearing loss, bilaterally. Reliability was good. Speech reception thresholds were in good agreement with the pure tone averages, bilaterally. Speech discrimination scores were excellent, bilaterally.    Tympanometry revealed normal middle ear peak pressure and compliance, bilaterally.    The results were reviewed with the patient.     Updated hearing aids with today's test results. Changed to vented domes. Hearing aids still paired to phone.    Recommendations for follow up will be made pending physician consult.      Sarah Salas CCC-A  [facility] [id number]   Electronically signed by Sarah Salas on 1/17/2024 at 2:48 PM

## 2024-01-19 ASSESSMENT — PATIENT HEALTH QUESTIONNAIRE - PHQ9
4. FEELING TIRED OR HAVING LITTLE ENERGY: 3
5. POOR APPETITE OR OVEREATING: 3
6. FEELING BAD ABOUT YOURSELF - OR THAT YOU ARE A FAILURE OR HAVE LET YOURSELF OR YOUR FAMILY DOWN: SEVERAL DAYS
8. MOVING OR SPEAKING SO SLOWLY THAT OTHER PEOPLE COULD HAVE NOTICED. OR THE OPPOSITE - BEING SO FIDGETY OR RESTLESS THAT YOU HAVE BEEN MOVING AROUND A LOT MORE THAN USUAL: SEVERAL DAYS
SUM OF ALL RESPONSES TO PHQ QUESTIONS 1-9: 19
8. MOVING OR SPEAKING SO SLOWLY THAT OTHER PEOPLE COULD HAVE NOTICED. OR THE OPPOSITE, BEING SO FIGETY OR RESTLESS THAT YOU HAVE BEEN MOVING AROUND A LOT MORE THAN USUAL: 1
SUM OF ALL RESPONSES TO PHQ QUESTIONS 1-9: 19
10. IF YOU CHECKED OFF ANY PROBLEMS, HOW DIFFICULT HAVE THESE PROBLEMS MADE IT FOR YOU TO DO YOUR WORK, TAKE CARE OF THINGS AT HOME, OR GET ALONG WITH OTHER PEOPLE: 1
6. FEELING BAD ABOUT YOURSELF - OR THAT YOU ARE A FAILURE OR HAVE LET YOURSELF OR YOUR FAMILY DOWN: 1
7. TROUBLE CONCENTRATING ON THINGS, SUCH AS READING THE NEWSPAPER OR WATCHING TELEVISION: 3
1. LITTLE INTEREST OR PLEASURE IN DOING THINGS: 3
4. FEELING TIRED OR HAVING LITTLE ENERGY: NEARLY EVERY DAY
10. IF YOU CHECKED OFF ANY PROBLEMS, HOW DIFFICULT HAVE THESE PROBLEMS MADE IT FOR YOU TO DO YOUR WORK, TAKE CARE OF THINGS AT HOME, OR GET ALONG WITH OTHER PEOPLE: SOMEWHAT DIFFICULT
9. THOUGHTS THAT YOU WOULD BE BETTER OFF DEAD, OR OF HURTING YOURSELF: NOT AT ALL
2. FEELING DOWN, DEPRESSED OR HOPELESS: 2
5. POOR APPETITE OR OVEREATING: NEARLY EVERY DAY
3. TROUBLE FALLING OR STAYING ASLEEP: NEARLY EVERY DAY
1. LITTLE INTEREST OR PLEASURE IN DOING THINGS: NEARLY EVERY DAY
SUM OF ALL RESPONSES TO PHQ QUESTIONS 1-9: 19
7. TROUBLE CONCENTRATING ON THINGS, SUCH AS READING THE NEWSPAPER OR WATCHING TELEVISION: NEARLY EVERY DAY
3. TROUBLE FALLING OR STAYING ASLEEP: 3
2. FEELING DOWN, DEPRESSED OR HOPELESS: MORE THAN HALF THE DAYS
SUM OF ALL RESPONSES TO PHQ9 QUESTIONS 1 & 2: 5
9. THOUGHTS THAT YOU WOULD BE BETTER OFF DEAD, OR OF HURTING YOURSELF: 0

## 2024-01-23 ENCOUNTER — OFFICE VISIT (OUTPATIENT)
Dept: FAMILY MEDICINE CLINIC | Age: 59
End: 2024-01-23
Payer: COMMERCIAL

## 2024-01-23 VITALS
OXYGEN SATURATION: 97 % | SYSTOLIC BLOOD PRESSURE: 136 MMHG | HEART RATE: 72 BPM | WEIGHT: 172 LBS | DIASTOLIC BLOOD PRESSURE: 85 MMHG | TEMPERATURE: 97.8 F | BODY MASS INDEX: 29.52 KG/M2 | RESPIRATION RATE: 18 BRPM

## 2024-01-23 DIAGNOSIS — M54.50 CHRONIC LOW BACK PAIN WITHOUT SCIATICA, UNSPECIFIED BACK PAIN LATERALITY: ICD-10-CM

## 2024-01-23 DIAGNOSIS — R73.03 PREDIABETES: ICD-10-CM

## 2024-01-23 DIAGNOSIS — F17.200 CURRENT SMOKER: ICD-10-CM

## 2024-01-23 DIAGNOSIS — I10 ESSENTIAL HYPERTENSION: Primary | ICD-10-CM

## 2024-01-23 DIAGNOSIS — R73.03 PREDIABETES: Primary | ICD-10-CM

## 2024-01-23 DIAGNOSIS — K86.2 PANCREATIC CYST: ICD-10-CM

## 2024-01-23 DIAGNOSIS — F32.A DEPRESSION, UNSPECIFIED DEPRESSION TYPE: ICD-10-CM

## 2024-01-23 DIAGNOSIS — G89.29 CHRONIC LOW BACK PAIN WITHOUT SCIATICA, UNSPECIFIED BACK PAIN LATERALITY: ICD-10-CM

## 2024-01-23 LAB — HBA1C MFR BLD: 5.7 %

## 2024-01-23 PROCEDURE — G8417 CALC BMI ABV UP PARAM F/U: HCPCS

## 2024-01-23 PROCEDURE — G8427 DOCREV CUR MEDS BY ELIG CLIN: HCPCS

## 2024-01-23 PROCEDURE — 3017F COLORECTAL CA SCREEN DOC REV: CPT

## 2024-01-23 PROCEDURE — G8484 FLU IMMUNIZE NO ADMIN: HCPCS

## 2024-01-23 PROCEDURE — 3079F DIAST BP 80-89 MM HG: CPT

## 2024-01-23 PROCEDURE — 4004F PT TOBACCO SCREEN RCVD TLK: CPT

## 2024-01-23 PROCEDURE — 3075F SYST BP GE 130 - 139MM HG: CPT

## 2024-01-23 PROCEDURE — 83036 HEMOGLOBIN GLYCOSYLATED A1C: CPT

## 2024-01-23 PROCEDURE — 99214 OFFICE O/P EST MOD 30 MIN: CPT

## 2024-01-23 RX ORDER — NICOTINE 21 MG/24HR
1 PATCH, TRANSDERMAL 24 HOURS TRANSDERMAL DAILY
Qty: 30 PATCH | Refills: 1 | Status: SHIPPED | OUTPATIENT
Start: 2024-01-23

## 2024-01-23 RX ORDER — CYCLOBENZAPRINE HCL 10 MG
10 TABLET ORAL DAILY
Qty: 60 TABLET | Refills: 0 | Status: CANCELLED | OUTPATIENT
Start: 2024-01-23

## 2024-01-23 RX ORDER — AMLODIPINE BESYLATE 5 MG/1
5 TABLET ORAL DAILY
Qty: 30 TABLET | Refills: 2 | Status: SHIPPED | OUTPATIENT
Start: 2024-01-23

## 2024-01-23 RX ORDER — GABAPENTIN 300 MG/1
300 CAPSULE ORAL NIGHTLY
Qty: 90 CAPSULE | Refills: 0 | Status: SHIPPED | OUTPATIENT
Start: 2024-01-23 | End: 2024-04-22

## 2024-01-23 RX ORDER — BLOOD-GLUCOSE METER
1 KIT MISCELLANEOUS DAILY
Qty: 1 KIT | Refills: 0 | Status: SHIPPED | OUTPATIENT
Start: 2024-01-23

## 2024-01-23 RX ORDER — METFORMIN HYDROCHLORIDE 500 MG/1
500 TABLET, EXTENDED RELEASE ORAL
Qty: 90 TABLET | Refills: 1 | Status: CANCELLED | OUTPATIENT
Start: 2024-01-23

## 2024-01-23 ASSESSMENT — ENCOUNTER SYMPTOMS
WHEEZING: 0
ABDOMINAL PAIN: 1
APNEA: 0
COUGH: 0
VOMITING: 0
ABDOMINAL DISTENTION: 0
RHINORRHEA: 0
CONSTIPATION: 1
NAUSEA: 0
DIARRHEA: 0

## 2024-01-23 NOTE — PROGRESS NOTES
S: 58 y.o. female with   Chief Complaint   Patient presents with   • Hypertension       Htn controlled, no CV sx    Pancreatic cyst - following with hepatobiliary surg for this.  Stable pain tx with tramadol PRN from surgeon.  Some constipation effectively tx with usual meds. Working with cardio for stress testing prior to surgery.     Smoker - has been cutting down. Will hold on CT lung screen for now.     Mood ok     O: VS:  weight is 78 kg (172 lb). Her temporal temperature is 97.8 °F (36.6 °C). Her blood pressure is 136/85 and her pulse is 72. Her respiration is 18 and oxygen saturation is 97%.   BP Readings from Last 3 Encounters:   01/23/24 136/85   12/14/23 132/86   12/13/23 138/88     See resident note      Impression/Plan:   1) HTN - controlled   2) Mood - Depression sx still present - consider counseling   3) pre-dm - wants to quit metformin - will caution that A1C may increase       Health Maintenance Due   Topic Date Due   • Hepatitis B vaccine (1 of 3 - 3-dose series) Never done   • HIV screen  Never done   • Low dose CT lung screening &/or counseling  Never done         Attending Physician Statement  I have discussed the case, including pertinent history and exam findings with the resident. I also have seen the patient and performed key portions of the examination.  I agree with the documented assessment and plan.      James Cordova MD  
Rhythm: Normal rate and regular rhythm.      Pulses: Normal pulses.      Heart sounds: Normal heart sounds.   Pulmonary:      Effort: Pulmonary effort is normal.      Breath sounds: Normal breath sounds. No wheezing.   Abdominal:      General: Abdomen is flat.      Palpations: Abdomen is soft.   Musculoskeletal:         General: Normal range of motion.      Cervical back: Normal range of motion.      Right lower leg: No edema.      Left lower leg: No edema.   Neurological:      General: No focal deficit present.      Mental Status: She is alert and oriented to person, place, and time.   Psychiatric:         Mood and Affect: Mood normal.         Behavior: Behavior normal.           ASSESSMENT AND PLAN     1. Essential hypertension  Blood pressure currently stable on 5 mg of amlodipine, denies any side effects from the medication.  Reports good compliance.  Refilled Norvasc 5 mg, to be taken once daily    2. Chronic low back pain without sciatica, unspecified back pain laterality  We refilled patient's gabapentin 300 mg a day., for 90 days.    3. Prediabetes  Point-of-care hemoglobin A1c is 5.7 today, it was 5.8 back in 8/23.  Patient is requesting to be taken off of her metformin.  She is advised to stay away from processed food and excessive carbs.  We have also prescribed her a glucometer and have advised her to check her fasting and random blood sugar, and to call our office if her fasting blood sugar is above 126 mg/dl or if she has multiple random blood sugar readings above 200.  In case of uncontrolled blood sugar levels we would consider restarting metformin.  We will also check her hemoglobin A1c at the 3-month follow-up visit.  - POCT glycosylated hemoglobin (Hb A1C)    4. Depression  According to patient she is doing okay on the current dose of Wellbutrin.  Her PHQ score was 19, she attributes it to being stressed, but is doing generally okay with the medication.  She denies having any homicidal or suicidal

## 2024-01-24 ENCOUNTER — OFFICE VISIT (OUTPATIENT)
Dept: SLEEP MEDICINE | Age: 59
End: 2024-01-24
Payer: COMMERCIAL

## 2024-01-24 VITALS
BODY MASS INDEX: 29.62 KG/M2 | RESPIRATION RATE: 17 BRPM | HEART RATE: 87 BPM | HEIGHT: 64 IN | WEIGHT: 173.5 LBS | SYSTOLIC BLOOD PRESSURE: 125 MMHG | OXYGEN SATURATION: 97 % | DIASTOLIC BLOOD PRESSURE: 70 MMHG

## 2024-01-24 DIAGNOSIS — R53.83 OTHER FATIGUE: ICD-10-CM

## 2024-01-24 DIAGNOSIS — G47.33 OSA (OBSTRUCTIVE SLEEP APNEA): Primary | ICD-10-CM

## 2024-01-24 PROCEDURE — 3017F COLORECTAL CA SCREEN DOC REV: CPT | Performed by: INTERNAL MEDICINE

## 2024-01-24 PROCEDURE — G8484 FLU IMMUNIZE NO ADMIN: HCPCS | Performed by: INTERNAL MEDICINE

## 2024-01-24 PROCEDURE — G8417 CALC BMI ABV UP PARAM F/U: HCPCS | Performed by: INTERNAL MEDICINE

## 2024-01-24 PROCEDURE — 99214 OFFICE O/P EST MOD 30 MIN: CPT | Performed by: INTERNAL MEDICINE

## 2024-01-24 PROCEDURE — 4004F PT TOBACCO SCREEN RCVD TLK: CPT | Performed by: INTERNAL MEDICINE

## 2024-01-24 PROCEDURE — 3078F DIAST BP <80 MM HG: CPT | Performed by: INTERNAL MEDICINE

## 2024-01-24 PROCEDURE — 3074F SYST BP LT 130 MM HG: CPT | Performed by: INTERNAL MEDICINE

## 2024-01-24 PROCEDURE — G8427 DOCREV CUR MEDS BY ELIG CLIN: HCPCS | Performed by: INTERNAL MEDICINE

## 2024-01-24 RX ORDER — BLOOD-GLUCOSE METER
1 EACH MISCELLANEOUS DAILY PRN
Qty: 1 KIT | Refills: 0 | Status: CANCELLED | OUTPATIENT
Start: 2024-01-24

## 2024-01-24 RX ORDER — LANCETS
1 EACH MISCELLANEOUS DAILY
Qty: 100 EACH | Refills: 3 | Status: SHIPPED | OUTPATIENT
Start: 2024-01-24

## 2024-01-24 RX ORDER — BLOOD SUGAR DIAGNOSTIC
1 STRIP MISCELLANEOUS DAILY
Qty: 100 EACH | Refills: 3 | Status: SHIPPED | OUTPATIENT
Start: 2024-01-24

## 2024-01-24 ASSESSMENT — ENCOUNTER SYMPTOMS
RESPIRATORY NEGATIVE: 1
EYES NEGATIVE: 1
BACK PAIN: 1
ALLERGIC/IMMUNOLOGIC NEGATIVE: 1
GASTROINTESTINAL NEGATIVE: 1

## 2024-01-24 ASSESSMENT — SLEEP AND FATIGUE QUESTIONNAIRES
HOW LIKELY ARE YOU TO NOD OFF OR FALL ASLEEP WHEN YOU ARE A PASSENGER IN A CAR FOR AN HOUR WITHOUT A BREAK: 1
HOW LIKELY ARE YOU TO NOD OFF OR FALL ASLEEP WHILE LYING DOWN TO REST IN THE AFTERNOON WHEN CIRCUMSTANCES PERMIT: 2
HOW LIKELY ARE YOU TO NOD OFF OR FALL ASLEEP WHILE SITTING INACTIVE IN A PUBLIC PLACE: 0
HOW LIKELY ARE YOU TO NOD OFF OR FALL ASLEEP WHILE SITTING QUIETLY AFTER LUNCH WITHOUT ALCOHOL: 0
HOW LIKELY ARE YOU TO NOD OFF OR FALL ASLEEP IN A CAR, WHILE STOPPED FOR A FEW MINUTES IN TRAFFIC: 0
HOW LIKELY ARE YOU TO NOD OFF OR FALL ASLEEP WHILE SITTING AND READING: 0
ESS TOTAL SCORE: 6
HOW LIKELY ARE YOU TO NOD OFF OR FALL ASLEEP WHILE WATCHING TV: 3
HOW LIKELY ARE YOU TO NOD OFF OR FALL ASLEEP WHILE SITTING AND TALKING TO SOMEONE: 0
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
## 2024-01-24 NOTE — TELEPHONE ENCOUNTER
Attempted to give verbal order to dispense whatever brand is covered, but pharmacy states they are unable to verify what product is covered without a prior authorization. Pharmacist states they will update the RX for the One Touch Ultra Machine for us to attempt the PA for, but they will need separate orders for the strips and lancets

## 2024-02-08 ENCOUNTER — TELEPHONE (OUTPATIENT)
Dept: CARDIOLOGY | Age: 59
End: 2024-02-08

## 2024-02-08 NOTE — TELEPHONE ENCOUNTER
Auth denied thru [facility].    [phone]    Trackin     Please advise.    Electronically signed by Natalia Rey on 2024 at 1:19 PM

## 2024-02-08 NOTE — TELEPHONE ENCOUNTER
Left message on voice mail to remind patient of Lexiscan stress test appointment on February 12, 2024 at 0800. Instructions for test,, and COVID-19 preprocedure information left on voice mail. Asked patient to call with any questions or if unable to keep appointment.

## 2024-02-10 DIAGNOSIS — F33.0 MILD EPISODE OF RECURRENT MAJOR DEPRESSIVE DISORDER (HCC): ICD-10-CM

## 2024-02-11 RX ORDER — BUPROPION HYDROCHLORIDE 150 MG/1
150 TABLET ORAL EVERY MORNING
Qty: 30 TABLET | Refills: 3 | OUTPATIENT
Start: 2024-02-11

## 2024-02-12 ENCOUNTER — HOSPITAL ENCOUNTER (OUTPATIENT)
Dept: CARDIOLOGY | Age: 59
Discharge: HOME | End: 2024-02-12
Attending: INTERNAL MEDICINE

## 2024-02-13 DIAGNOSIS — D13.6 PANCREATIC CYSTADENOMA: Primary | ICD-10-CM

## 2024-02-13 RX ORDER — TRAMADOL HYDROCHLORIDE 50 MG/1
50 TABLET ORAL EVERY 6 HOURS PRN
Qty: 20 TABLET | Refills: 0 | Status: SHIPPED | OUTPATIENT
Start: 2024-02-13 | End: 2024-02-20

## 2024-02-21 ENCOUNTER — APPOINTMENT (OUTPATIENT)
Dept: GENERAL RADIOLOGY | Age: 59
End: 2024-02-21
Payer: COMMERCIAL

## 2024-02-21 ENCOUNTER — HOSPITAL ENCOUNTER (OUTPATIENT)
Age: 59
Setting detail: OBSERVATION
Discharge: HOME | End: 2024-02-23
Attending: STUDENT IN AN ORGANIZED HEALTH CARE EDUCATION/TRAINING PROGRAM | Admitting: INTERNAL MEDICINE
Payer: COMMERCIAL

## 2024-02-21 DIAGNOSIS — R07.9 CHEST PAIN, UNSPECIFIED TYPE: ICD-10-CM

## 2024-02-21 DIAGNOSIS — R10.9 ABDOMINAL PAIN, UNSPECIFIED ABDOMINAL LOCATION: Primary | ICD-10-CM

## 2024-02-21 LAB
ALBUMIN SERPL-MCNC: 4.4 G/DL (ref 3.5–5.2)
ALP SERPL-CCNC: 145 U/L (ref 35–104)
ALT SERPL-CCNC: 21 U/L (ref 0–32)
ANION GAP SERPL CALCULATED.3IONS-SCNC: 12 MMOL/L (ref 7–16)
AST SERPL-CCNC: 15 U/L (ref 0–31)
BACTERIA URNS QL MICRO: ABNORMAL
BASOPHILS # BLD: 0.1 K/UL (ref 0–0.2)
BASOPHILS NFR BLD: 1 % (ref 0–2)
BILIRUB SERPL-MCNC: 0.2 MG/DL (ref 0–1.2)
BILIRUB UR QL STRIP: NEGATIVE
BUN SERPL-MCNC: 20 MG/DL (ref 6–20)
CALCIUM SERPL-MCNC: 9.4 MG/DL (ref 8.6–10.2)
CHLORIDE SERPL-SCNC: 101 MMOL/L (ref 98–107)
CLARITY UR: CLEAR
CO2 SERPL-SCNC: 25 MMOL/L (ref 22–29)
COLOR UR: YELLOW
CREAT SERPL-MCNC: 0.9 MG/DL (ref 0.5–1)
D DIMER: <200 NG/ML DDU (ref 0–232)
EOSINOPHIL # BLD: 0.4 K/UL (ref 0.05–0.5)
EOSINOPHILS RELATIVE PERCENT: 2 % (ref 0–6)
ERYTHROCYTE [DISTWIDTH] IN BLOOD BY AUTOMATED COUNT: 14.4 % (ref 11.5–15)
GFR SERPL CREATININE-BSD FRML MDRD: >60 ML/MIN/1.73M2
GLUCOSE SERPL-MCNC: 94 MG/DL (ref 74–99)
GLUCOSE UR STRIP-MCNC: NEGATIVE MG/DL
HCT VFR BLD AUTO: 45.1 % (ref 34–48)
HGB BLD-MCNC: 14.6 G/DL (ref 11.5–15.5)
HGB UR QL STRIP.AUTO: ABNORMAL
IMM GRANULOCYTES # BLD AUTO: 0.08 K/UL (ref 0–0.58)
IMM GRANULOCYTES NFR BLD: 1 % (ref 0–5)
KETONES UR STRIP-MCNC: NEGATIVE MG/DL
LACTATE BLDV-SCNC: 0.9 MMOL/L (ref 0.5–2.2)
LEUKOCYTE ESTERASE UR QL STRIP: ABNORMAL
LIPASE SERPL-CCNC: 21 U/L (ref 13–60)
LYMPHOCYTES NFR BLD: 3.91 K/UL (ref 1.5–4)
LYMPHOCYTES RELATIVE PERCENT: 23 % (ref 20–42)
MAGNESIUM SERPL-MCNC: 2.4 MG/DL (ref 1.6–2.6)
MCH RBC QN AUTO: 29.1 PG (ref 26–35)
MCHC RBC AUTO-ENTMCNC: 32.4 G/DL (ref 32–34.5)
MCV RBC AUTO: 90 FL (ref 80–99.9)
MONOCYTES NFR BLD: 0.89 K/UL (ref 0.1–0.95)
MONOCYTES NFR BLD: 5 % (ref 2–12)
NEUTROPHILS NFR BLD: 69 % (ref 43–80)
NEUTS SEG NFR BLD: 11.68 K/UL (ref 1.8–7.3)
NITRITE UR QL STRIP: NEGATIVE
PH UR STRIP: 6 [PH] (ref 5–9)
PLATELET # BLD AUTO: 405 K/UL (ref 130–450)
PMV BLD AUTO: 9.4 FL (ref 7–12)
POTASSIUM SERPL-SCNC: 3.8 MMOL/L (ref 3.5–5)
PROT SERPL-MCNC: 8.3 G/DL (ref 6.4–8.3)
PROT UR STRIP-MCNC: NEGATIVE MG/DL
RBC # BLD AUTO: 5.01 M/UL (ref 3.5–5.5)
RBC #/AREA URNS HPF: ABNORMAL /HPF
SODIUM SERPL-SCNC: 138 MMOL/L (ref 132–146)
SP GR UR STRIP: 1.02 (ref 1–1.03)
TROPONIN I SERPL HS-MCNC: <6 NG/L (ref 0–9)
UROBILINOGEN UR STRIP-ACNC: 0.2 EU/DL (ref 0–1)
WBC #/AREA URNS HPF: ABNORMAL /HPF
WBC OTHER # BLD: 17.1 K/UL (ref 4.5–11.5)

## 2024-02-21 PROCEDURE — 6360000002 HC RX W HCPCS: Performed by: STUDENT IN AN ORGANIZED HEALTH CARE EDUCATION/TRAINING PROGRAM

## 2024-02-21 PROCEDURE — 83735 ASSAY OF MAGNESIUM: CPT

## 2024-02-21 PROCEDURE — 80053 COMPREHEN METABOLIC PANEL: CPT

## 2024-02-21 PROCEDURE — 93005 ELECTROCARDIOGRAM TRACING: CPT | Performed by: STUDENT IN AN ORGANIZED HEALTH CARE EDUCATION/TRAINING PROGRAM

## 2024-02-21 PROCEDURE — 71045 X-RAY EXAM CHEST 1 VIEW: CPT

## 2024-02-21 PROCEDURE — 85025 COMPLETE CBC W/AUTO DIFF WBC: CPT

## 2024-02-21 PROCEDURE — 96372 THER/PROPH/DIAG INJ SC/IM: CPT

## 2024-02-21 PROCEDURE — 99285 EMERGENCY DEPT VISIT HI MDM: CPT

## 2024-02-21 PROCEDURE — 83605 ASSAY OF LACTIC ACID: CPT

## 2024-02-21 PROCEDURE — 85379 FIBRIN DEGRADATION QUANT: CPT

## 2024-02-21 PROCEDURE — 81001 URINALYSIS AUTO W/SCOPE: CPT

## 2024-02-21 PROCEDURE — 84484 ASSAY OF TROPONIN QUANT: CPT

## 2024-02-21 PROCEDURE — 83690 ASSAY OF LIPASE: CPT

## 2024-02-21 RX ORDER — FENTANYL CITRATE 50 UG/ML
50 INJECTION, SOLUTION INTRAMUSCULAR; INTRAVENOUS ONCE
Status: COMPLETED | OUTPATIENT
Start: 2024-02-21 | End: 2024-02-21

## 2024-02-21 RX ADMIN — FENTANYL CITRATE 50 MCG: 50 INJECTION INTRAMUSCULAR; INTRAVENOUS at 20:41

## 2024-02-21 ASSESSMENT — ENCOUNTER SYMPTOMS
VOMITING: 0
NAUSEA: 0
BACK PAIN: 1
SHORTNESS OF BREATH: 1
ABDOMINAL PAIN: 1
DIARRHEA: 0

## 2024-02-21 ASSESSMENT — PAIN DESCRIPTION - ORIENTATION: ORIENTATION: LEFT;MID

## 2024-02-21 ASSESSMENT — LIFESTYLE VARIABLES
HOW MANY STANDARD DRINKS CONTAINING ALCOHOL DO YOU HAVE ON A TYPICAL DAY: 1 OR 2
HOW OFTEN DO YOU HAVE A DRINK CONTAINING ALCOHOL: MONTHLY OR LESS

## 2024-02-21 ASSESSMENT — PAIN SCALES - GENERAL: PAINLEVEL_OUTOF10: 9

## 2024-02-21 ASSESSMENT — PAIN DESCRIPTION - LOCATION: LOCATION: ABDOMEN;CHEST

## 2024-02-21 NOTE — ED TRIAGE NOTES
Department of Emergency Medicine  FIRST PROVIDER TRIAGE NOTE             Independent MLP           2/21/24  6:35 PM EST    Date of Encounter: 2/21/24   MRN: [id number]      HPI: Leatha Willard is a 58 y.o. female who presents to the ED for Chest Pain   Abd pain, hx pancreatic cyst    ROS: Negative for fever or vomiting.    PE: Gen Appearance/Constitutional: alert  HEENT: NC/NT. PERRLA,  Airway patent.     Initial Plan of Care: All treatment areas with department are currently occupied. Plan to order/Initiate the following while awaiting opening in ED: .  Initiate Treatment-Testing, Proceed toTreatment Area When Bed Available for ED Attending/MLP to Continue Care    Electronically signed by ALL Kerr CNP   DD: 2/21/24

## 2024-02-22 ENCOUNTER — APPOINTMENT (OUTPATIENT)
Dept: NUCLEAR MEDICINE | Age: 59
End: 2024-02-22
Attending: INTERNAL MEDICINE
Payer: COMMERCIAL

## 2024-02-22 ENCOUNTER — APPOINTMENT (OUTPATIENT)
Age: 59
End: 2024-02-22
Attending: INTERNAL MEDICINE
Payer: COMMERCIAL

## 2024-02-22 ENCOUNTER — APPOINTMENT (OUTPATIENT)
Dept: CT IMAGING | Age: 59
End: 2024-02-22
Attending: STUDENT IN AN ORGANIZED HEALTH CARE EDUCATION/TRAINING PROGRAM
Payer: COMMERCIAL

## 2024-02-22 PROBLEM — R07.9 CHEST PAIN: Status: ACTIVE | Noted: 2024-02-22

## 2024-02-22 PROBLEM — Z72.0 TOBACCO ABUSE: Status: ACTIVE | Noted: 2024-02-22

## 2024-02-22 PROBLEM — R10.9 ABDOMINAL PAIN: Status: ACTIVE | Noted: 2024-02-22

## 2024-02-22 LAB
CHOLEST SERPL-MCNC: 201 MG/DL
ECHO BSA: 1.88 M2
HBA1C MFR BLD: 5.8 % (ref 4–5.6)
HDLC SERPL-MCNC: 56 MG/DL
LDLC SERPL CALC-MCNC: 133 MG/DL
NUC STRESS EJECTION FRACTION: 75 %
STRESS BASELINE DIAS BP: 84 MMHG
STRESS BASELINE HR: 70 BPM
STRESS BASELINE SYS BP: 148 MMHG
STRESS ESTIMATED WORKLOAD: 1 METS
STRESS PEAK DIAS BP: 90 MMHG
STRESS PEAK SYS BP: 158 MMHG
STRESS PERCENT HR ACHIEVED: 66 %
STRESS POST PEAK HR: 107 BPM
STRESS RATE PRESSURE PRODUCT: NORMAL BPM*MMHG
STRESS TARGET HR: 161 BPM
TID: 1.36
TRIGL SERPL-MCNC: 58 MG/DL
TROPONIN I SERPL HS-MCNC: <6 NG/L (ref 0–9)
VLDLC SERPL CALC-MCNC: 12 MG/DL

## 2024-02-22 PROCEDURE — 74177 CT ABD & PELVIS W/CONTRAST: CPT

## 2024-02-22 PROCEDURE — 2580000003 HC RX 258: Performed by: INTERNAL MEDICINE

## 2024-02-22 PROCEDURE — 3430000000 HC RX DIAGNOSTIC RADIOPHARMACEUTICAL: Performed by: RADIOLOGY

## 2024-02-22 PROCEDURE — 6360000004 HC RX CONTRAST MEDICATION: Performed by: RADIOLOGY

## 2024-02-22 PROCEDURE — G0378 HOSPITAL OBSERVATION PER HR: HCPCS

## 2024-02-22 PROCEDURE — 78452 HT MUSCLE IMAGE SPECT MULT: CPT | Performed by: INTERNAL MEDICINE

## 2024-02-22 PROCEDURE — 6370000000 HC RX 637 (ALT 250 FOR IP): Performed by: INTERNAL MEDICINE

## 2024-02-22 PROCEDURE — 93017 CV STRESS TEST TRACING ONLY: CPT

## 2024-02-22 PROCEDURE — APPSS60 APP SPLIT SHARED TIME 46-60 MINUTES

## 2024-02-22 PROCEDURE — 93016 CV STRESS TEST SUPVJ ONLY: CPT | Performed by: INTERNAL MEDICINE

## 2024-02-22 PROCEDURE — 94660 CPAP INITIATION&MGMT: CPT

## 2024-02-22 PROCEDURE — 78452 HT MUSCLE IMAGE SPECT MULT: CPT

## 2024-02-22 PROCEDURE — 83036 HEMOGLOBIN GLYCOSYLATED A1C: CPT

## 2024-02-22 PROCEDURE — 80061 LIPID PANEL: CPT

## 2024-02-22 PROCEDURE — 84484 ASSAY OF TROPONIN QUANT: CPT

## 2024-02-22 PROCEDURE — 93018 CV STRESS TEST I&R ONLY: CPT | Performed by: INTERNAL MEDICINE

## 2024-02-22 PROCEDURE — 36415 COLL VENOUS BLD VENIPUNCTURE: CPT

## 2024-02-22 PROCEDURE — A9500 TC99M SESTAMIBI: HCPCS | Performed by: RADIOLOGY

## 2024-02-22 PROCEDURE — 99223 1ST HOSP IP/OBS HIGH 75: CPT | Performed by: INTERNAL MEDICINE

## 2024-02-22 PROCEDURE — 99215 OFFICE O/P EST HI 40 MIN: CPT | Performed by: INTERNAL MEDICINE

## 2024-02-22 PROCEDURE — 6360000002 HC RX W HCPCS: Performed by: INTERNAL MEDICINE

## 2024-02-22 RX ORDER — TETRAKIS(2-METHOXYISOBUTYLISOCYANIDE)COPPER(I) TETRAFLUOROBORATE 1 MG/ML
12 INJECTION, POWDER, LYOPHILIZED, FOR SOLUTION INTRAVENOUS
Status: COMPLETED | OUTPATIENT
Start: 2024-02-22 | End: 2024-02-22

## 2024-02-22 RX ORDER — SODIUM CHLORIDE 0.9 % (FLUSH) 0.9 %
10 SYRINGE (ML) INJECTION PRN
Status: DISCONTINUED | OUTPATIENT
Start: 2024-02-22 | End: 2024-02-23 | Stop reason: HOSPADM

## 2024-02-22 RX ORDER — ACETAMINOPHEN 650 MG/1
650 SUPPOSITORY RECTAL EVERY 6 HOURS PRN
Status: DISCONTINUED | OUTPATIENT
Start: 2024-02-22 | End: 2024-02-23 | Stop reason: HOSPADM

## 2024-02-22 RX ORDER — GABAPENTIN 300 MG/1
300 CAPSULE ORAL NIGHTLY
Status: DISCONTINUED | OUTPATIENT
Start: 2024-02-22 | End: 2024-02-23 | Stop reason: HOSPADM

## 2024-02-22 RX ORDER — REGADENOSON 0.08 MG/ML
0.4 INJECTION, SOLUTION INTRAVENOUS
Status: COMPLETED | OUTPATIENT
Start: 2024-02-22 | End: 2024-02-22

## 2024-02-22 RX ORDER — POTASSIUM CHLORIDE 20 MEQ/1
40 TABLET, EXTENDED RELEASE ORAL PRN
Status: DISCONTINUED | OUTPATIENT
Start: 2024-02-22 | End: 2024-02-23 | Stop reason: HOSPADM

## 2024-02-22 RX ORDER — SODIUM CHLORIDE 9 MG/ML
INJECTION, SOLUTION INTRAVENOUS CONTINUOUS
Status: DISCONTINUED | OUTPATIENT
Start: 2024-02-23 | End: 2024-02-23 | Stop reason: HOSPADM

## 2024-02-22 RX ORDER — ONDANSETRON 2 MG/ML
4 INJECTION INTRAMUSCULAR; INTRAVENOUS EVERY 6 HOURS PRN
Status: DISCONTINUED | OUTPATIENT
Start: 2024-02-22 | End: 2024-02-23 | Stop reason: HOSPADM

## 2024-02-22 RX ORDER — KETOTIFEN FUMARATE 0.35 MG/ML
1 SOLUTION/ DROPS OPHTHALMIC 2 TIMES DAILY PRN
Status: DISCONTINUED | OUTPATIENT
Start: 2024-02-22 | End: 2024-02-23 | Stop reason: HOSPADM

## 2024-02-22 RX ORDER — SODIUM CHLORIDE 9 MG/ML
INJECTION, SOLUTION INTRAVENOUS PRN
Status: DISCONTINUED | OUTPATIENT
Start: 2024-02-22 | End: 2024-02-23 | Stop reason: HOSPADM

## 2024-02-22 RX ORDER — MAGNESIUM SULFATE IN WATER 40 MG/ML
2000 INJECTION, SOLUTION INTRAVENOUS PRN
Status: DISCONTINUED | OUTPATIENT
Start: 2024-02-22 | End: 2024-02-23 | Stop reason: HOSPADM

## 2024-02-22 RX ORDER — ENOXAPARIN SODIUM 100 MG/ML
40 INJECTION SUBCUTANEOUS DAILY
Status: DISCONTINUED | OUTPATIENT
Start: 2024-02-22 | End: 2024-02-23 | Stop reason: HOSPADM

## 2024-02-22 RX ORDER — ATORVASTATIN CALCIUM 10 MG/1
10 TABLET, FILM COATED ORAL DAILY
Status: DISCONTINUED | OUTPATIENT
Start: 2024-02-22 | End: 2024-02-23 | Stop reason: HOSPADM

## 2024-02-22 RX ORDER — SODIUM CHLORIDE 0.9 % (FLUSH) 0.9 %
5-40 SYRINGE (ML) INJECTION EVERY 12 HOURS SCHEDULED
Status: DISCONTINUED | OUTPATIENT
Start: 2024-02-22 | End: 2024-02-23 | Stop reason: HOSPADM

## 2024-02-22 RX ORDER — ACETAMINOPHEN 325 MG/1
650 TABLET ORAL EVERY 6 HOURS PRN
Status: DISCONTINUED | OUTPATIENT
Start: 2024-02-22 | End: 2024-02-23 | Stop reason: HOSPADM

## 2024-02-22 RX ORDER — AMLODIPINE BESYLATE 5 MG/1
5 TABLET ORAL DAILY
Status: DISCONTINUED | OUTPATIENT
Start: 2024-02-22 | End: 2024-02-23 | Stop reason: HOSPADM

## 2024-02-22 RX ORDER — M-VIT,TX,IRON,MINS/CALC/FOLIC 27MG-0.4MG
1 TABLET ORAL DAILY
COMMUNITY

## 2024-02-22 RX ORDER — CYCLOBENZAPRINE HCL 10 MG
10 TABLET ORAL DAILY
Status: DISCONTINUED | OUTPATIENT
Start: 2024-02-22 | End: 2024-02-22

## 2024-02-22 RX ORDER — TETRAKIS(2-METHOXYISOBUTYLISOCYANIDE)COPPER(I) TETRAFLUOROBORATE 1 MG/ML
35 INJECTION, POWDER, LYOPHILIZED, FOR SOLUTION INTRAVENOUS
Status: COMPLETED | OUTPATIENT
Start: 2024-02-22 | End: 2024-02-22

## 2024-02-22 RX ORDER — BUPROPION HYDROCHLORIDE 300 MG/1
300 TABLET ORAL EVERY MORNING
Status: DISCONTINUED | OUTPATIENT
Start: 2024-02-22 | End: 2024-02-23 | Stop reason: HOSPADM

## 2024-02-22 RX ORDER — ONDANSETRON 4 MG/1
4 TABLET, ORALLY DISINTEGRATING ORAL EVERY 8 HOURS PRN
Status: DISCONTINUED | OUTPATIENT
Start: 2024-02-22 | End: 2024-02-23 | Stop reason: HOSPADM

## 2024-02-22 RX ORDER — ATORVASTATIN CALCIUM 10 MG/1
10 TABLET, FILM COATED ORAL NIGHTLY
COMMUNITY

## 2024-02-22 RX ORDER — POTASSIUM CHLORIDE 7.45 MG/ML
10 INJECTION INTRAVENOUS PRN
Status: DISCONTINUED | OUTPATIENT
Start: 2024-02-22 | End: 2024-02-23 | Stop reason: HOSPADM

## 2024-02-22 RX ADMIN — Medication 35 MILLICURIE: at 11:41

## 2024-02-22 RX ADMIN — Medication 12 MILLICURIE: at 09:26

## 2024-02-22 RX ADMIN — AMLODIPINE BESYLATE 5 MG: 5 TABLET ORAL at 14:39

## 2024-02-22 RX ADMIN — GABAPENTIN 300 MG: 300 CAPSULE ORAL at 21:34

## 2024-02-22 RX ADMIN — ATORVASTATIN CALCIUM 10 MG: 10 TABLET, FILM COATED ORAL at 21:33

## 2024-02-22 RX ADMIN — IOPAMIDOL 75 ML: 755 INJECTION, SOLUTION INTRAVENOUS at 04:45

## 2024-02-22 RX ADMIN — BUPROPION HYDROCHLORIDE 300 MG: 300 TABLET, FILM COATED, EXTENDED RELEASE ORAL at 14:39

## 2024-02-22 RX ADMIN — REGADENOSON 0.4 MG: 0.08 INJECTION, SOLUTION INTRAVENOUS at 10:55

## 2024-02-22 RX ADMIN — SODIUM CHLORIDE, PRESERVATIVE FREE 10 ML: 5 INJECTION INTRAVENOUS at 21:34

## 2024-02-22 ASSESSMENT — PAIN DESCRIPTION - ONSET: ONSET: ON-GOING

## 2024-02-22 ASSESSMENT — PAIN DESCRIPTION - PAIN TYPE: TYPE: ACUTE PAIN

## 2024-02-22 ASSESSMENT — PAIN SCALES - GENERAL: PAINLEVEL_OUTOF10: 7

## 2024-02-22 ASSESSMENT — PAIN DESCRIPTION - FREQUENCY: FREQUENCY: CONTINUOUS

## 2024-02-22 ASSESSMENT — PAIN - FUNCTIONAL ASSESSMENT: PAIN_FUNCTIONAL_ASSESSMENT: PREVENTS OR INTERFERES SOME ACTIVE ACTIVITIES AND ADLS

## 2024-02-22 ASSESSMENT — PAIN DESCRIPTION - LOCATION: LOCATION: ABDOMEN

## 2024-02-22 ASSESSMENT — PAIN DESCRIPTION - DIRECTION: RADIATING_TOWARDS: BACK

## 2024-02-22 ASSESSMENT — PAIN DESCRIPTION - DESCRIPTORS: DESCRIPTORS: STABBING;NAGGING

## 2024-02-22 ASSESSMENT — PAIN DESCRIPTION - ORIENTATION: ORIENTATION: MID;LEFT;UPPER

## 2024-02-22 NOTE — ED PROVIDER NOTES
hospitalization.        Is this patient to be included in the SEP-1 core measure? No Exclusion criteria - the patient is NOT to be included for SEP-1 Core Measure due to: Infection is not suspected              This patient's ED course included: a personal history and physicial examination, re-evaluation prior to disposition, and multiple bedside re-evaluations    This patient has remained hemodynamically stable during their ED course.    Diagnosis:  1. Abdominal pain, unspecified abdominal location    2. Chest pain, unspecified type        Disposition:  Patient's disposition: pending CT       Julissa Cooper MD  02/22/24 4056       Julissa Cooper MD  02/22/24 0308

## 2024-02-22 NOTE — CONSULTS
Inpatient Cardiology Consultation      Reason for Consult:  Abnormal Stress     Consulting Physician: Dr. Ford     Requesting Physician:  Dr. Lucero     Date of Consultation: 2/22/2024    History of Present Illness:   Leatha Willard is a 58 year old female known to [facility] through Dr. Brown. She last saw him outpatient for preoperative clearance on October 25, 2023 for removal of large pancreatic cyst (11.9 cm) with whipple procedure.  He did recommend having a stress test prior to the procedure however stress test was denied through her insurance company.  She was recently seen in the emergency department on 12/18/2023 with complaints of right-sided chest pain.  Patient was discharged home at that time.    Patient presented to the emergency department on 2/21/2024 with complaints of chest pain and abdominal pain.  She reports that she has been having right-sided chest pain intermittently for quite some time however today she started having left-sided chest pain radiating to her left arm and back while she was watching TV.  She did take tramadol at home prior to coming in without any relief.  She did also admit to some shortness of breath and dizziness.  On arrival to the ED blood pressure was 154/97, afebrile, 92 bpm and 98% on room air.  Labs include sodium 138, potassium 3.8, BUN/creatinine 20/0.9, magnesium 2.4, troponin<6 (x3), WBC 17.1, A1c 5.8, Hgb 14.6, D-dimer <200.  Patient was admitted for further evaluation. She received one dose fentanyl IV for pain in the ED.  She did have a stress test today and the study is most consistent with small area of myocardial ischemia. Findings suggest a low to moderate risk of cardiac events.  Prompting cardiology consult.    Patient reports that she has been dealing with right-sided chest pain for the last 2 months however yesterday evening when watching TV she started to experience a pressure on the left side of her chest around 6 PM.  She was unable to  ordered testing/procedures and current problem list  Counseling/educating the patient  Coordinating care with other healthcare professionals  Documenting clinical information in the patient's electronic health records     Electronically signed by Shivani Ford MD on 2/22/2024 at 3:31 PM
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
## 2024-02-22 NOTE — ED NOTES
Patient was seen by Dr. Cooper initially.  Patient was turned over to me but due to CT pending.  Patient reporting ongoing abdominal pain which is not new for the patient she does have a history of pancreatic cyst.  She has seen surgery about this.  But will plan patient to come to the hospital today as well as chest pains that were intermittent on the left side going down the left arm.  Patient reports in December she was having pains on the right side he was seen at [facility].  Patient reports that this is a new pain that sharp patient reporting no diaphoresis.  She reports no fever chills or cough.  Patient troponin was 6 EKG was rate of 85 sinus with depression anterior laterally as well as inferior looks unchanged from prior.  But patient has had no cardiac workup for the last several years.  Patient made aware of findings and plan.  Patient will be admitted.  Call was placed to internal medicine on-call.  Patient agreeable with admission.     Ray Mackenzie MD  02/22/24 [id number]

## 2024-02-23 VITALS
WEIGHT: 170.7 LBS | TEMPERATURE: 98.1 F | HEART RATE: 78 BPM | OXYGEN SATURATION: 100 % | SYSTOLIC BLOOD PRESSURE: 135 MMHG | DIASTOLIC BLOOD PRESSURE: 75 MMHG | HEIGHT: 64 IN | RESPIRATION RATE: 16 BRPM | BODY MASS INDEX: 29.14 KG/M2

## 2024-02-23 LAB
ABO + RH BLD: NORMAL
ARM BAND NUMBER: NORMAL
BLOOD BANK SAMPLE EXPIRATION: NORMAL
BLOOD GROUP ANTIBODIES SERPL: NEGATIVE
ECHO BSA: 1.88 M2
EKG ATRIAL RATE: 85 BPM
EKG P AXIS: 58 DEGREES
EKG P-R INTERVAL: 150 MS
EKG Q-T INTERVAL: 380 MS
EKG QRS DURATION: 82 MS
EKG QTC CALCULATION (BAZETT): 452 MS
EKG R AXIS: 62 DEGREES
EKG T AXIS: -3 DEGREES
EKG VENTRICULAR RATE: 85 BPM
ERYTHROCYTE [DISTWIDTH] IN BLOOD BY AUTOMATED COUNT: 14.3 % (ref 11.5–15)
HCT VFR BLD AUTO: 43.1 % (ref 34–48)
HGB BLD-MCNC: 13.8 G/DL (ref 11.5–15.5)
MCH RBC QN AUTO: 29.1 PG (ref 26–35)
MCHC RBC AUTO-ENTMCNC: 32 G/DL (ref 32–34.5)
MCV RBC AUTO: 90.9 FL (ref 80–99.9)
PLATELET # BLD AUTO: 417 K/UL (ref 130–450)
PMV BLD AUTO: 9.6 FL (ref 7–12)
RBC # BLD AUTO: 4.74 M/UL (ref 3.5–5.5)
WBC OTHER # BLD: 12.8 K/UL (ref 4.5–11.5)

## 2024-02-23 PROCEDURE — C1894 INTRO/SHEATH, NON-LASER: HCPCS | Performed by: INTERNAL MEDICINE

## 2024-02-23 PROCEDURE — 99239 HOSP IP/OBS DSCHRG MGMT >30: CPT | Performed by: INTERNAL MEDICINE

## 2024-02-23 PROCEDURE — 6370000000 HC RX 637 (ALT 250 FOR IP): Performed by: INTERNAL MEDICINE

## 2024-02-23 PROCEDURE — 86901 BLOOD TYPING SEROLOGIC RH(D): CPT

## 2024-02-23 PROCEDURE — 2500000003 HC RX 250 WO HCPCS: Performed by: INTERNAL MEDICINE

## 2024-02-23 PROCEDURE — 94660 CPAP INITIATION&MGMT: CPT

## 2024-02-23 PROCEDURE — 99213 OFFICE O/P EST LOW 20 MIN: CPT | Performed by: INTERNAL MEDICINE

## 2024-02-23 PROCEDURE — G0378 HOSPITAL OBSERVATION PER HR: HCPCS

## 2024-02-23 PROCEDURE — C1769 GUIDE WIRE: HCPCS | Performed by: INTERNAL MEDICINE

## 2024-02-23 PROCEDURE — 93010 ELECTROCARDIOGRAM REPORT: CPT | Performed by: INTERNAL MEDICINE

## 2024-02-23 PROCEDURE — 6360000004 HC RX CONTRAST MEDICATION: Performed by: INTERNAL MEDICINE

## 2024-02-23 PROCEDURE — 2580000003 HC RX 258: Performed by: INTERNAL MEDICINE

## 2024-02-23 PROCEDURE — 2709999900 HC NON-CHARGEABLE SUPPLY: Performed by: INTERNAL MEDICINE

## 2024-02-23 PROCEDURE — 86900 BLOOD TYPING SEROLOGIC ABO: CPT

## 2024-02-23 PROCEDURE — 85027 COMPLETE CBC AUTOMATED: CPT

## 2024-02-23 PROCEDURE — 93458 L HRT ARTERY/VENTRICLE ANGIO: CPT | Performed by: INTERNAL MEDICINE

## 2024-02-23 PROCEDURE — 86850 RBC ANTIBODY SCREEN: CPT

## 2024-02-23 PROCEDURE — 2580000003 HC RX 258

## 2024-02-23 PROCEDURE — 6360000002 HC RX W HCPCS: Performed by: INTERNAL MEDICINE

## 2024-02-23 PROCEDURE — 93454 CORONARY ARTERY ANGIO S&I: CPT | Performed by: INTERNAL MEDICINE

## 2024-02-23 PROCEDURE — 36415 COLL VENOUS BLD VENIPUNCTURE: CPT

## 2024-02-23 RX ORDER — MIDAZOLAM HYDROCHLORIDE 1 MG/ML
INJECTION INTRAMUSCULAR; INTRAVENOUS PRN
Status: DISCONTINUED | OUTPATIENT
Start: 2024-02-23 | End: 2024-02-23 | Stop reason: HOSPADM

## 2024-02-23 RX ORDER — SODIUM CHLORIDE 9 MG/ML
INJECTION, SOLUTION INTRAVENOUS CONTINUOUS PRN
Status: COMPLETED | OUTPATIENT
Start: 2024-02-23 | End: 2024-02-23

## 2024-02-23 RX ORDER — ASPIRIN 81 MG/1
TABLET, CHEWABLE ORAL PRN
Status: DISCONTINUED | OUTPATIENT
Start: 2024-02-23 | End: 2024-02-23 | Stop reason: HOSPADM

## 2024-02-23 RX ORDER — ASPIRIN 81 MG/1
81 TABLET, CHEWABLE ORAL DAILY
Status: CANCELLED | OUTPATIENT
Start: 2024-02-24

## 2024-02-23 RX ORDER — HEPARIN SODIUM 10000 [USP'U]/ML
INJECTION, SOLUTION INTRAVENOUS; SUBCUTANEOUS PRN
Status: DISCONTINUED | OUTPATIENT
Start: 2024-02-23 | End: 2024-02-23 | Stop reason: HOSPADM

## 2024-02-23 RX ORDER — SODIUM CHLORIDE 9 MG/ML
INJECTION, SOLUTION INTRAVENOUS CONTINUOUS
Status: CANCELLED | OUTPATIENT
Start: 2024-02-23

## 2024-02-23 RX ORDER — FENTANYL CITRATE 50 UG/ML
INJECTION, SOLUTION INTRAMUSCULAR; INTRAVENOUS PRN
Status: DISCONTINUED | OUTPATIENT
Start: 2024-02-23 | End: 2024-02-23 | Stop reason: HOSPADM

## 2024-02-23 RX ORDER — VERAPAMIL HYDROCHLORIDE 2.5 MG/ML
INJECTION, SOLUTION INTRAVENOUS PRN
Status: DISCONTINUED | OUTPATIENT
Start: 2024-02-23 | End: 2024-02-23 | Stop reason: HOSPADM

## 2024-02-23 RX ORDER — ATORVASTATIN CALCIUM 20 MG/1
20 TABLET, FILM COATED ORAL DAILY
Status: CANCELLED | OUTPATIENT
Start: 2024-02-23

## 2024-02-23 RX ORDER — NITROGLYCERIN 20 MG/100ML
INJECTION INTRAVENOUS CONTINUOUS PRN
Status: COMPLETED | OUTPATIENT
Start: 2024-02-23 | End: 2024-02-23

## 2024-02-23 RX ADMIN — AMLODIPINE BESYLATE 5 MG: 5 TABLET ORAL at 09:23

## 2024-02-23 RX ADMIN — BUPROPION HYDROCHLORIDE 300 MG: 300 TABLET, FILM COATED, EXTENDED RELEASE ORAL at 09:23

## 2024-02-23 RX ADMIN — SODIUM CHLORIDE: 9 INJECTION, SOLUTION INTRAVENOUS at 00:50

## 2024-02-23 NOTE — PROGRESS NOTES
[facility] Hospitalist Progress Note    Admitting Date and Time: 2/21/2024  6:39 PM  Admit Dx: Chest pain [R07.9]  Abdominal pain, unspecified abdominal location [R10.9]  Chest pain, unspecified type [R07.9]    Synopsis:    The patient is a 58 y.o. female patient of Dr. Grayson history of pancreatic pseudocyst, tobacco abuse, hypertension, borderline diabetes, hyperlipidemia, obesity who presents with chest pain.  Patient states yesterday she developed worsening abdominal pain not relieved by tramadol she also noted left-sided chest pain rating to her left arm.  No exacerbation relief.  No severe shortness of breath or diaphoresis.  No fever chills cough or trouble breathing.  In ED patient was evaluated and had low troponin at 6 x 2.  EKG demonstrated normal sinus rhythm at 86 with nonspecific ST-T wave abnormalities unchanged from previous.  Patient does not have recent stress test.  Her chest pain is currently resolved.    2/22 - stress test positive  2/23 - for LHC    Subjective:  Patient is being followed for Chest pain [R07.9]  Abdominal pain, unspecified abdominal location [R10.9]  Chest pain, unspecified type [R07.9]     Patient seen and examined at bedside this morning.  No complaints.    ROS: denies fever, chills, cp, sob, n/v, HA unless stated above.      amLODIPine  5 mg Oral Daily    atorvastatin  10 mg Oral Daily    buPROPion  300 mg Oral QAM    gabapentin  300 mg Oral Nightly    sodium chloride flush  5-40 mL IntraVENous 2 times per day    enoxaparin  40 mg SubCUTAneous Daily     ketotifen fumarate, 1 drop, BID PRN  sodium chloride flush, 10 mL, PRN  sodium chloride, , PRN  potassium chloride, 40 mEq, PRN   Or  potassium alternative oral replacement, 40 mEq, PRN   Or  potassium chloride, 10 mEq, PRN  magnesium sulfate, 2,000 mg, PRN  ondansetron, 4 mg, Q8H PRN   Or  ondansetron, 4 mg, Q6H PRN  acetaminophen, 650 mg, Q6H PRN   Or  acetaminophen, 650 mg, Q6H PRN  magnesium hydroxide, 30 mL,

## 2024-02-23 NOTE — PROGRESS NOTES
Placed armboard on patient with transparent dressing over radial site. Educated importance of not bending or putting pressure on right arm. Educated on signs of bleeding/hematoma and when to come to the ED. Patient agreeable. Sent supplies home with patient to change roll gauze that holds armboard in place.

## 2024-02-23 NOTE — CARE COORDINATION
2/23/24, Patient admitted for chest pain.  SW went to meet with patient-who was off floor due to having a catheterization today.  Patient presents from home and ideally patient to discharge to home when medically cleared for discharge.  PCP is Dr. Vasquez and Pharmacy is [facility] on [address].  No needs were identified in rounds.  SW to follow.      COLBY Robert  [facility] Case Management  [phone]

## 2024-02-23 NOTE — DISCHARGE SUMMARY
[facility] Hospitalist Physician Discharge Summary       Mekhi Escalona MD  [address] [id number]  [phone]    Call  Hospital follow up      Activity level: As tolerated     Dispo: Home      Condition on discharge: Stable     Patient ID:  Leatha Willard  [id number]  58 y.o.  1965    Admit date: 2/21/2024    Discharge date and time:  2/23/2024  1:24 PM    Admission Diagnoses: Principal Problem:    Chest pain  Active Problems:    Essential hypertension    Hyperlipidemia    Prediabetes    HIREN (obstructive sleep apnea)    Pancreatic cyst    Tobacco abuse-- quit 2 weeks ago    Abdominal pain  Resolved Problems:    * No resolved hospital problems. *      Discharge Diagnoses: Principal Problem:    Chest pain  Active Problems:    Essential hypertension    Hyperlipidemia    Prediabetes    HIREN (obstructive sleep apnea)    Pancreatic cyst    Tobacco abuse-- quit 2 weeks ago    Abdominal pain  Resolved Problems:    * No resolved hospital problems. *      Consults:  IP CONSULT TO CARDIOLOGY    Hospital Course:   Patient Leatha Willard is a 58 y.o. presented with Chest pain [R07.9]  Abdominal pain, unspecified abdominal location [R10.9]  Chest pain, unspecified type [R07.9]    The patient is a 58 y.o. female patient of Dr. Grayson history of pancreatic pseudocyst, tobacco abuse, hypertension, borderline diabetes, hyperlipidemia, obesity who presents with chest pain.  Patient states yesterday she developed worsening abdominal pain not relieved by tramadol she also noted left-sided chest pain rating to her left arm.  No exacerbation relief.  No severe shortness of breath or diaphoresis.  No fever chills cough or trouble breathing.  In ED patient was evaluated and had low troponin at 6 x 2.  EKG demonstrated normal sinus rhythm at 86 with nonspecific ST-T wave abnormalities unchanged from previous.  Patient does not have recent stress test.  Her chest pain is currently resolved.     2/22 - stress test  Conclusion: There is evidence of transient ischemic dilation (TID). TID was appreciated visually and quantitatively. TID ratio is 1.36.   ECG: The ECG was not diagnostic due to resting ST-T abnormalities.   Stress Combined Conclusion: The study is most consistent with small area of myocardial ischemia. Findings suggest a low to moderate risk of cardiac events.     CT ABDOMEN PELVIS W IV CONTRAST Additional Contrast? None    Result Date: 2/22/2024  EXAMINATION: CT OF THE ABDOMEN AND PELVIS WITH CONTRAST 2/22/2024 4:46 am TECHNIQUE: CT of the abdomen and pelvis was performed with the administration of intravenous contrast. Multiplanar reformatted images are provided for review. Automated exposure control, iterative reconstruction, and/or weight based adjustment of the mA/kV was utilized to reduce the radiation dose to as low as reasonably achievable. COMPARISON: None. HISTORY: ORDERING SYSTEM PROVIDED HISTORY: epigastric, LUQ pain, h/o pancreatic cyst, pain worsening today TECHNOLOGIST PROVIDED HISTORY: Additional Contrast?->None Reason for exam:->epigastric, LUQ pain, h/o pancreatic cyst, pain worsening today Decision Support Exception - unselect if not a suspected or confirmed emergency medical condition->Emergency Medical Condition (MA) What reading provider will be dictating this exam?->CRC FINDINGS: Lower Chest:  Visualized portion of the lower chest demonstrates no acute abnormality. Organs: The liver, gallbladder, spleen, adrenals are within normal limits. There is a multiloculated cystic lesion in the region of the pancreatic head/uncinate process, measuring approximately 10.9 x 7.4 x 11.9 cm. Internal septations and punctate calcifications are noted.  The remainder of the pancreas is grossly unremarkable.  No pancreatic ductal dilatation.  No peripancreatic fluid or stranding.  Bilateral kidneys are grossly unremarkable.  No hydronephrosis. GI/Bowel: In bowel the appendix is normal.  There are scattered

## 2024-02-23 NOTE — PROGRESS NOTES
appendix.    Lexiscan MPS 2/22/2024:    Perfusion Comments: LV perfusion is probably abnormal. There is evidence of inducible ischemia.    Perfusion Defect: There is a mild severity left ventricular stress perfusion defect that is small in size present in the apex segment(s) that is reversible. The defect appears to be probable ischemia.    Stress Function: Left ventricular function post-stress is normal. Post-stress ejection fraction is 75%. The stress end diastolic cavity size is normal.    Perfusion Conclusion: There is evidence of transient ischemic dilation (TID). TID was appreciated visually and quantitatively. TID ratio is 1.36.    ECG: The ECG was not diagnostic due to resting ST-T abnormalities.    Stress Combined Conclusion: The study is most consistent with small area of myocardial ischemia. Findings suggest a low to moderate risk of cardiac events.    Telemetry: Sinus rhythm      ASSESSMENT:   Abnormal stress test.   Chest pain, doubt cardiac  Hypertension  Hyperlipidemia  Prediabetes  Obesity  Obstructive sleep apnea, compliant with CPAP  Pancreatic cyst, is supposed to have surgery in that regard  Chronic low back pain  History of cervical spine fusion  History of left rotator cuff repair  History of right ankle ORIF        PLAN:  1.  Continue current cardiac medications for now  2.  Catheterization +/- PCI today.  The procedure, risks, benefits and alternative therapies were again explained to patient.  She understood and consented to proceed  3.  The recommendations to follow        I spent 25 minutes completing this encounter. Total time included the following:  Independently interviewing the patient (HPI, ROS, PMH, PSH, FMH, SH, allergies and medications).  Independently performing a medical appropriate examination  Ordering medications, tests and/or procedures  Formulating the assessment/plan and reviewing the rationale for the above recommendations  Reviewing available records, results of all  previously ordered testing/procedures and current problem list  Counseling/educating the patient  Coordinating care with other healthcare professionals  Documenting clinical information in the patient's electronic health records     Electronically signed by Shivani Ford MD on 2/23/2024 at 11:49 AM

## 2024-02-23 NOTE — PROGRESS NOTES
4 Eyes Skin Assessment     NAME:  Leatha Willard  YOB: 1965  MEDICAL RECORD NUMBER:  [id number]    The patient is being assessed for  Admission    I agree that at least one RN has performed a thorough Head to Toe Skin Assessment on the patient. ALL assessment sites listed below have been assessed.      Areas assessed by both nurses:    Head, Face, Ears,Sacrum, Elbows, Knees,Heels        Does the Patient have a Wound? No noted wound(s)       Donal Prevention initiated by RN: No  Wound Care Orders initiated by RN: No    Pressure Injury (Stage 3,4, Unstageable, DTI, NWPT, and Complex wounds) if present, place Wound referral order by RN under : No    New Ostomies, if present place, Ostomy referral order under : No     Nurse 1 eSignature: Electronically signed by Arianne Cain RN on 2/22/24 at 7:38 PM EST    **SHARE this note so that the co-signing nurse can place an eSignature**    Nurse 2 eSignature: Electronically signed by Xochitl Meyer RN on 2/22/24 at 7:38 PM EST4 Eyes Skin Assessment

## 2024-02-23 NOTE — PROGRESS NOTES
Cardiac catheterization showed mild to moderate nonobstructive coronary artery disease.  May be discharged from cardiac standpoint later today after the resting period is over.  Cardiology will sign off.  Please call if needed    Electronically signed by Shivani Ford MD on 2/23/2024 at 12:37 PM

## 2024-02-26 ENCOUNTER — TELEPHONE (OUTPATIENT)
Dept: FAMILY MEDICINE CLINIC | Age: 59
End: 2024-02-26

## 2024-02-26 NOTE — TELEPHONE ENCOUNTER
Care Transitions Initial Follow Up Call    Outreach made within 2 business days of discharge: Yes    Patient: Leatha Willard Patient : 1965   MRN: [id number]  Reason for Admission: Chest pain  Discharge Date: 24       Spoke with: Leatha    Discharge department/facility: [facility] Interactive Patient Contact:  Was patient able to fill all prescriptions: Yes  Was patient instructed to bring all medications to the follow-up visit: Yes  Is patient taking all medications as directed in the discharge summary? Yes  Does patient understand their discharge instructions: Yes  Does patient have questions or concerns that need addressed prior to 7-14 day follow up office visit: no    Scheduled appointment with PCP within 7-14 days    Follow Up  Future Appointments   Date Time Provider Department Center   2024  1:30 PM Mekhi Escalona MD [facility]   2024  1:00 PM Mekhi Escalona MD [facility]   2025  1:00 PM [facility] - C[facility]   2025  1:00 PM Dory Pacheco DO [facility]       ADRIEL ZAFAR RN

## 2024-02-28 DIAGNOSIS — D13.6 PANCREATIC CYSTADENOMA: ICD-10-CM

## 2024-02-28 RX ORDER — TRAMADOL HYDROCHLORIDE 50 MG/1
50 TABLET ORAL EVERY 6 HOURS PRN
Qty: 20 TABLET | Refills: 0 | Status: SHIPPED | OUTPATIENT
Start: 2024-02-28 | End: 2024-03-06

## 2024-02-28 NOTE — TELEPHONE ENCOUNTER
Incoming call from Leatha requesting refill of her tramadol. Medication pended for approval.     OARRS reviewed, Tramadol refilled for pain. 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 OK to refill per Dr Rogers.  Calos Phan, APRN - CNP 2/28/2024 3:20 PM

## 2024-02-29 ENCOUNTER — TELEPHONE (OUTPATIENT)
Dept: HEMATOLOGY | Age: 59
End: 2024-02-29

## 2024-02-29 ENCOUNTER — OFFICE VISIT (OUTPATIENT)
Dept: FAMILY MEDICINE CLINIC | Age: 59
End: 2024-02-29
Payer: COMMERCIAL

## 2024-02-29 VITALS
DIASTOLIC BLOOD PRESSURE: 75 MMHG | TEMPERATURE: 97.5 F | HEIGHT: 64 IN | WEIGHT: 169 LBS | SYSTOLIC BLOOD PRESSURE: 130 MMHG | BODY MASS INDEX: 28.85 KG/M2 | HEART RATE: 79 BPM | RESPIRATION RATE: 16 BRPM | OXYGEN SATURATION: 97 %

## 2024-02-29 DIAGNOSIS — Z09 HOSPITAL DISCHARGE FOLLOW-UP: Primary | ICD-10-CM

## 2024-02-29 PROCEDURE — 99215 OFFICE O/P EST HI 40 MIN: CPT

## 2024-02-29 PROCEDURE — 1111F DSCHRG MED/CURRENT MED MERGE: CPT

## 2024-02-29 NOTE — PROGRESS NOTES
Post-Discharge Transitional Care Management Progress Note      Leatha Willard   YOB: 1965    Date of Office Visit:  2/29/2024  Date of Hospital Admission: 2/21/24  Date of Hospital Discharge: 2/23/24    Care management risk score Rising risk (score 2-5) and Complex Care (Scores >=6): No Risk Score On File     Non face to face  following discharge, date last encounter closed (first attempt may have been earlier): 02/26/2024 02/26/2024    Call initiated 2 business days of discharge: Yes    ASSESSMENT/PLAN:   Below is the assessment and plan developed based on review of pertinent history, physical exam, labs, studies, and medications.  Hospital discharge follow-up  -     NE DISCHARGE MEDS RECONCILED W/ CURRENT OUTPATIENT MED LIST  Patient status post left heart catheterization 2/22/2024: Low to moderate risk, no stents placed.  Patient was not started on aspirin after the procedure.  We had a discussion of starting aspirin however patient said that she still will have a Whipple procedure arranged by Dr. Rogers for the removal of her pancreatic cyst.  Currently remains symptom-free, does not have chest pain on exertion, nausea, vomiting.  Patient has quit smoking since 3 weeks, is on nicotine patch 14 g/day.  We are maintaining with the current dose as she feels comfortable with the dose and does not want any changes before she undergoes her surgery.  Medical Decision Making: moderate complexity  Return in about 3 months (around 5/29/2024).    On this date 2/29/2024 I have spent 30 minutes reviewing previous notes, test results and face to face with the patient discussing the diagnosis and importance of compliance with the treatment plan as well as documenting on the day of the visit.     Subjective:   58-year-old female with past medical history of hypertension, tobacco abuse, obstructive sleep apnea, hyperlipidemia, prediabetes, pancreatic cyst is here for hospital follow-up:  - Patient was admitted

## 2024-02-29 NOTE — PROGRESS NOTES
[facility]  Precepting Note    Subjective:  Hospital follow up   Chest pain s/p cath- moderate CAD, medication management recommended, no stents placed  Chest pain now improved  Quit tobacco 3 weeks ago  Pancreatic cyst 11cm- needs Whipple surgery  On Tramadol for pain  ROS otherwise negative    Past medical, surgical, family and social history were reviewed, non-contributory, and unchanged unless otherwise stated.    Objective:    /75   Pulse 79   Temp 97.5 °F (36.4 °C)   Resp 16   Ht 1.626 m (5' 4.02\")   Wt 76.7 kg (169 lb)   SpO2 97%   BMI 28.99 kg/m²     Exam is as noted by resident with the following changes, additions or corrections:    General:  NAD; alert & oriented x 3   Heart:  RRR, no murmurs, gallops, or rubs.  Lungs:  CTA bilaterally, no wheeze, rales or rhonchi  Abd: bowel sounds present, nontender, nondistended, no masses  Extrem:  No clubbing, cyanosis, or edema  Right forearm swelling    Assessment/Plan:    R forearm swelling- supportive management. Check US if worsening  CAD- ASCVD 8.5%. Cont statin, discuss aspirin  TUD-cont patch  HIREN- on CPAP     Attending Physician Statement  I have reviewed the chart, including any radiology or labs, and have seen the patient with the resident(s).  I personally reviewed and performed key elements of the history and exam.  I agree with the assessment, plan and orders as documented by the resident.  Please refer to the resident note for additional information.      Electronically signed by Betzaida Zhu MD on 2/29/2024 at 1:57 PM

## 2024-02-29 NOTE — TELEPHONE ENCOUNTER
I called [facility] pharmacy regarding the patients tramadol script. I spoke with the pharmacist and gave him a verbal OK to fill the script under Dr Rogers's information. The [facility] stated he would change the info and fill the script for the patient. The patient was made aware and instructed to call the pharmacy before she picks them up  Electronically signed by Angelica Pike MA on 2/29/2024 at 9:28 AM

## 2024-03-01 ENCOUNTER — PREP FOR PROCEDURE (OUTPATIENT)
Dept: HEMATOLOGY | Age: 59
End: 2024-03-01

## 2024-03-01 DIAGNOSIS — D27.9 SEROUS CYSTADENOMA: ICD-10-CM

## 2024-03-04 ENCOUNTER — TELEPHONE (OUTPATIENT)
Dept: HEMATOLOGY | Age: 59
End: 2024-03-04

## 2024-03-04 RX ORDER — SODIUM CHLORIDE 0.9 % (FLUSH) 0.9 %
5-40 SYRINGE (ML) INJECTION EVERY 12 HOURS SCHEDULED
Status: CANCELLED | OUTPATIENT
Start: 2024-03-04

## 2024-03-04 RX ORDER — POLYETHYLENE GLYCOL 3350 17 G/17G
17 POWDER, FOR SOLUTION ORAL DAILY PRN
Status: CANCELLED | OUTPATIENT
Start: 2024-03-04

## 2024-03-04 RX ORDER — ACETAMINOPHEN 325 MG/1
1000 TABLET ORAL ONCE
Status: CANCELLED | OUTPATIENT
Start: 2024-03-04 | End: 2024-03-04

## 2024-03-04 RX ORDER — SODIUM CHLORIDE 9 MG/ML
INJECTION, SOLUTION INTRAVENOUS PRN
Status: CANCELLED | OUTPATIENT
Start: 2024-03-04

## 2024-03-04 RX ORDER — SODIUM CHLORIDE 0.9 % (FLUSH) 0.9 %
5-40 SYRINGE (ML) INJECTION PRN
Status: CANCELLED | OUTPATIENT
Start: 2024-03-04

## 2024-03-04 RX ORDER — CELECOXIB 200 MG/1
200 CAPSULE ORAL ONCE
Status: CANCELLED | OUTPATIENT
Start: 2024-03-04 | End: 2024-03-04

## 2024-03-04 RX ORDER — NALOXONE HYDROCHLORIDE 0.4 MG/ML
INJECTION, SOLUTION INTRAMUSCULAR; INTRAVENOUS; SUBCUTANEOUS PRN
Status: CANCELLED | OUTPATIENT
Start: 2024-03-04

## 2024-03-04 NOTE — TELEPHONE ENCOUNTER
Leatha's surgery has been rescheduled for 4/15/24 at [facility] with Dr. Rogers.   Prior auth previously obtained. Working on getting the date on the approval updated to 4/15/24. Auth# [id number].   [facility] ultrasound scheduled. Spoke to [place]. Confirmation# [id number]  Called and spoke to Leatha who confirmed her surgery date. She is aware that PAT will be reaching out to her with further instructions. Pt denies taking any blood thinners, ASA, diabetic or weight loss medications.

## 2024-03-13 ENCOUNTER — TELEPHONE (OUTPATIENT)
Dept: HEMATOLOGY | Age: 59
End: 2024-03-13

## 2024-03-13 NOTE — TELEPHONE ENCOUNTER
Refill for tramadol Ok'd by Calos Phan NP and the following was called into [facility]    Tramadol 50 mg tablets  Take 1 tablet by mouth every 6 hours as needed for pain for up to 7 days  #20  0 refills    The patient was also notified of this refill  Electronically signed by Angelica Pike MA on 3/13/2024 at 9:20 AM

## 2024-03-18 RX ORDER — NICOTINE 21 MG/24HR
1 PATCH, TRANSDERMAL 24 HOURS TRANSDERMAL DAILY
Qty: 30 PATCH | Refills: 1 | Status: CANCELLED | OUTPATIENT
Start: 2024-03-18

## 2024-03-20 ENCOUNTER — TELEPHONE (OUTPATIENT)
Dept: HEMATOLOGY | Age: 59
End: 2024-03-20

## 2024-03-20 RX ORDER — NICOTINE 21 MG/24HR
PATCH, TRANSDERMAL 24 HOURS TRANSDERMAL
Qty: 28 PATCH | OUTPATIENT
Start: 2024-03-20

## 2024-03-20 RX ORDER — NICOTINE 21 MG/24HR
1 PATCH, TRANSDERMAL 24 HOURS TRANSDERMAL DAILY
Qty: 30 PATCH | Refills: 1 | Status: SHIPPED | OUTPATIENT
Start: 2024-03-20

## 2024-03-20 NOTE — TELEPHONE ENCOUNTER
Signed         Refill for tramadol Ok'd by Calos Phan NP and the following was called into [facility]     Tramadol 50 mg tablets  Take 1 tablet by mouth every 6 hours as needed for pain for up to 7 days  #20  0 refills     The patient was also notified of this refill         Electronically signed by Angelica Pike MA on 3/20/2024 at 9:31 AM

## 2024-03-26 ENCOUNTER — TELEPHONE (OUTPATIENT)
Dept: HEMATOLOGY | Age: 59
End: 2024-03-26

## 2024-03-26 NOTE — TELEPHONE ENCOUNTER
Rx called in for Leatha upon her request.   [facility], [place]   Tramadol 50 mg tablet. Take 1 tablet PO every 6 hours PRN pain.   Dispense 20 tablets. 0 Refills     Refill approved by Calos Phan NP via phone.

## 2024-03-28 RX ORDER — DOCUSATE SODIUM 100 MG/1
100 CAPSULE, LIQUID FILLED ORAL EVERY MORNING
Status: ON HOLD | COMMUNITY

## 2024-03-28 RX ORDER — TRAMADOL HYDROCHLORIDE 50 MG/1
50 TABLET ORAL EVERY 6 HOURS PRN
Status: ON HOLD | COMMUNITY
Start: 2024-03-20

## 2024-03-28 NOTE — PROGRESS NOTES
[facility]   PRE-ADMISSION TESTING GENERAL INSTRUCTIONS  PAT Phone Number: [phone]      GENERAL INSTRUCTIONS:    [x] Antibacterial Soap Shower Night before  surgery.  [x] CHG Wipes instruction sheet and wipes given.   [x] Do not wear makeup, lotions, powders, deodorant.   [x] Nothing by mouth after midnight; including gum, candy, mints, or water.  [x] You may brush your teeth, gargle, but do NOT swallow water.   [x] No tobacco products, illegal drugs, marijuana or alcohol within 24 hours of your surgery.  [x] Jewelry or valuables should not be brought to the hospital. All body and/or tongue piercing's must be removed prior to arriving to hospital. No contact lens or hair pins.   [x] Arrange transportation with a responsible adult  to and from the hospital. Arrange for someone to be with you for the remainder of the day and for 24 hours after your procedure due to having had anesthesia.          -Who will be your  for transportation?  Paul ex-  [x] Bring insurance card and photo ID.  [x] Transfusion Bracelet: Please bring with you to hospital, day of surgery.     PARKING INSTRUCTIONS:     [x] ARRIVAL DATE  4/15 & TIME   5:30 am:   [x]Surgery time may change, if it does we will call you the business day before your scheduled surgery.  [x] Enter into the [facility] Entrance. Two adults may accompany you.   [x] Parking lot '\"I\"  is located on [address] [facility] [address] (the corner of [facility]  To enter the lot,you will need to get a parking ticket at the gate .  To exit you will be given a free parking voucher.  You will need both the ticket and parking voucher to exit the parking lot. One car per patient is allowed to park in this lot.   Walk up the front walk to the [place] Entrance, the door will be locked an employee will greet you and let you in.    EDUCATION INSTRUCTIONS:        [x] Pre-admission Testing educational folder given  [x] Incentive

## 2024-03-31 DIAGNOSIS — E78.5 HYPERLIPIDEMIA, UNSPECIFIED HYPERLIPIDEMIA TYPE: Primary | ICD-10-CM

## 2024-04-02 RX ORDER — ATORVASTATIN CALCIUM 10 MG/1
10 TABLET, FILM COATED ORAL DAILY
Qty: 90 TABLET | Refills: 1 | Status: SHIPPED | OUTPATIENT
Start: 2024-04-02

## 2024-04-03 ENCOUNTER — TELEPHONE (OUTPATIENT)
Dept: HEMATOLOGY | Age: 59
End: 2024-04-03

## 2024-04-03 NOTE — TELEPHONE ENCOUNTER
Rx called in for Leatha upon her request.   [facility], [place]   Tramadol 50 mg tablet. Take 1 tablet PO every 6 hours PRN pain.   Dispense 20 tablets. 0 Refills   Patient notified of refill  Electronically signed by Angelica Pike MA on 4/3/2024 at 1:16 PM

## 2024-04-04 ENCOUNTER — TELEPHONE (OUTPATIENT)
Dept: HEMATOLOGY | Age: 59
End: 2024-04-04

## 2024-04-04 NOTE — TELEPHONE ENCOUNTER
New Auth requested for Surgery. CPT 66717.   [facility] provider portal. Auth# [id number]    Patient given Ensure to drink before and after surgery.   EnsureSurgery x10 Lot# [id number]  EnsureSurgery x10 Lot# [id number]  EnsurePreSurgery x3 Lot# [id number]

## 2024-04-05 ENCOUNTER — HOSPITAL ENCOUNTER (OUTPATIENT)
Dept: PREADMISSION TESTING | Age: 59
Discharge: HOME | End: 2024-04-05
Payer: COMMERCIAL

## 2024-04-05 VITALS
HEART RATE: 84 BPM | WEIGHT: 168 LBS | HEIGHT: 64 IN | RESPIRATION RATE: 16 BRPM | SYSTOLIC BLOOD PRESSURE: 142 MMHG | DIASTOLIC BLOOD PRESSURE: 68 MMHG | BODY MASS INDEX: 28.68 KG/M2 | OXYGEN SATURATION: 98 % | TEMPERATURE: 98 F

## 2024-04-05 DIAGNOSIS — D27.9 SEROUS CYSTADENOMA: ICD-10-CM

## 2024-04-05 DIAGNOSIS — Z01.812 PRE-OPERATIVE LABORATORY EXAMINATION: ICD-10-CM

## 2024-04-05 LAB
ABO + RH BLD: NORMAL
ALBUMIN SERPL-MCNC: 4.1 G/DL (ref 3.5–5.2)
ALP SERPL-CCNC: 138 U/L (ref 35–104)
ALT SERPL-CCNC: 15 U/L (ref 0–32)
ANION GAP SERPL CALCULATED.3IONS-SCNC: 13 MMOL/L (ref 7–16)
ARM BAND NUMBER: NORMAL
AST SERPL-CCNC: 13 U/L (ref 0–31)
BILIRUB SERPL-MCNC: 0.4 MG/DL (ref 0–1.2)
BLOOD BANK SAMPLE EXPIRATION: NORMAL
BLOOD GROUP ANTIBODIES SERPL: NEGATIVE
BUN SERPL-MCNC: 14 MG/DL (ref 6–20)
CALCIUM SERPL-MCNC: 9.4 MG/DL (ref 8.6–10.2)
CHLORIDE SERPL-SCNC: 104 MMOL/L (ref 98–107)
CO2 SERPL-SCNC: 24 MMOL/L (ref 22–29)
CREAT SERPL-MCNC: 0.9 MG/DL (ref 0.5–1)
ERYTHROCYTE [DISTWIDTH] IN BLOOD BY AUTOMATED COUNT: 13.6 % (ref 11.5–15)
GFR SERPL CREATININE-BSD FRML MDRD: 78 ML/MIN/1.73M2
GLUCOSE SERPL-MCNC: 94 MG/DL (ref 74–99)
HCT VFR BLD AUTO: 44.2 % (ref 34–48)
HGB BLD-MCNC: 14.5 G/DL (ref 11.5–15.5)
INR PPP: 1.1
MCH RBC QN AUTO: 29.9 PG (ref 26–35)
MCHC RBC AUTO-ENTMCNC: 32.8 G/DL (ref 32–34.5)
MCV RBC AUTO: 91.1 FL (ref 80–99.9)
PLATELET # BLD AUTO: 348 K/UL (ref 130–450)
PMV BLD AUTO: 9.1 FL (ref 7–12)
POTASSIUM SERPL-SCNC: 4.6 MMOL/L (ref 3.5–5)
PROT SERPL-MCNC: 7.4 G/DL (ref 6.4–8.3)
PROTHROMBIN TIME: 11.7 SEC (ref 9.3–12.4)
RBC # BLD AUTO: 4.85 M/UL (ref 3.5–5.5)
SODIUM SERPL-SCNC: 141 MMOL/L (ref 132–146)
WBC OTHER # BLD: 9.6 K/UL (ref 4.5–11.5)

## 2024-04-05 PROCEDURE — 85610 PROTHROMBIN TIME: CPT

## 2024-04-05 PROCEDURE — 87081 CULTURE SCREEN ONLY: CPT

## 2024-04-05 PROCEDURE — 85027 COMPLETE CBC AUTOMATED: CPT

## 2024-04-05 PROCEDURE — 86901 BLOOD TYPING SEROLOGIC RH(D): CPT

## 2024-04-05 PROCEDURE — 86850 RBC ANTIBODY SCREEN: CPT

## 2024-04-05 PROCEDURE — 86900 BLOOD TYPING SEROLOGIC ABO: CPT

## 2024-04-05 PROCEDURE — 36415 COLL VENOUS BLD VENIPUNCTURE: CPT

## 2024-04-05 PROCEDURE — 80053 COMPREHEN METABOLIC PANEL: CPT

## 2024-04-06 LAB
MICROORGANISM SPEC CULT: NORMAL
SPECIMEN DESCRIPTION: NORMAL

## 2024-04-09 ENCOUNTER — TELEPHONE (OUTPATIENT)
Dept: HEMATOLOGY | Age: 59
End: 2024-04-09

## 2024-04-09 NOTE — TELEPHONE ENCOUNTER
Rx called in for Leatha upon her request.   [facility], [place]   Tramadol 50 mg tablet. Take 1 tablet PO every 6 hours PRN pain.   Dispense 20 tablets. 0 Refills          Refill approved by Dr. Rogers

## 2024-04-10 ENCOUNTER — OFFICE VISIT (OUTPATIENT)
Dept: HEMATOLOGY | Age: 59
End: 2024-04-10
Payer: COMMERCIAL

## 2024-04-10 VITALS
TEMPERATURE: 97.5 F | SYSTOLIC BLOOD PRESSURE: 143 MMHG | RESPIRATION RATE: 15 BRPM | BODY MASS INDEX: 31.05 KG/M2 | HEIGHT: 64 IN | HEART RATE: 99 BPM | DIASTOLIC BLOOD PRESSURE: 77 MMHG | WEIGHT: 181.9 LBS | OXYGEN SATURATION: 97 %

## 2024-04-10 DIAGNOSIS — D27.9 SEROUS CYSTADENOMA: Primary | ICD-10-CM

## 2024-04-10 PROCEDURE — 3077F SYST BP >= 140 MM HG: CPT | Performed by: STUDENT IN AN ORGANIZED HEALTH CARE EDUCATION/TRAINING PROGRAM

## 2024-04-10 PROCEDURE — 3078F DIAST BP <80 MM HG: CPT | Performed by: STUDENT IN AN ORGANIZED HEALTH CARE EDUCATION/TRAINING PROGRAM

## 2024-04-10 PROCEDURE — 3017F COLORECTAL CA SCREEN DOC REV: CPT | Performed by: STUDENT IN AN ORGANIZED HEALTH CARE EDUCATION/TRAINING PROGRAM

## 2024-04-10 PROCEDURE — 99212 OFFICE O/P EST SF 10 MIN: CPT | Performed by: STUDENT IN AN ORGANIZED HEALTH CARE EDUCATION/TRAINING PROGRAM

## 2024-04-10 PROCEDURE — 99215 OFFICE O/P EST HI 40 MIN: CPT | Performed by: STUDENT IN AN ORGANIZED HEALTH CARE EDUCATION/TRAINING PROGRAM

## 2024-04-10 PROCEDURE — G8427 DOCREV CUR MEDS BY ELIG CLIN: HCPCS | Performed by: STUDENT IN AN ORGANIZED HEALTH CARE EDUCATION/TRAINING PROGRAM

## 2024-04-10 PROCEDURE — 4004F PT TOBACCO SCREEN RCVD TLK: CPT | Performed by: STUDENT IN AN ORGANIZED HEALTH CARE EDUCATION/TRAINING PROGRAM

## 2024-04-10 PROCEDURE — G8417 CALC BMI ABV UP PARAM F/U: HCPCS | Performed by: STUDENT IN AN ORGANIZED HEALTH CARE EDUCATION/TRAINING PROGRAM

## 2024-04-10 ASSESSMENT — ENCOUNTER SYMPTOMS
EYES NEGATIVE: 1
ALLERGIC/IMMUNOLOGIC NEGATIVE: 1
ABDOMINAL PAIN: 1
RESPIRATORY NEGATIVE: 1

## 2024-04-10 NOTE — H&P (VIEW-ONLY)
Hepatobiliary and Pancreatic Surgery Attending History and Physical    Patient's Name/Date of Birth: Leatha Willard /1965 (58 y.o.)    Date: April 10, 2024     CC: Serous cystadenoma    HPI:  10/6/2023:Leatha martinez is a 59 y/o F who presents for evaluation of pancreatic cyst. She has a hx of arthritis and back pain s/p cervical fusion. She was being evaluated for her mid back pain with imaging and was found to have a large pancreatic cyst on CT. She then had an MRI/MRCP showing a large >10cm cyst in the pancreatic head extending to the transverse colon mesentery. She had an EUS with Dr. Hammond at [facility]. FNA and fluid analysis was consistent with a benign cyst. There were no suspicious features present. She denies a history of pancreatitis and only occasional alcohol use. She does smoke 0.5ppd. She complains of mid back pain and abdominal discomfort with occasional nausea but not change in appetite, weight loss or vomiting.     4/10/2024: Patient presents today for follow-up.  She was scheduled for surgery back in October however during her preadmission testing visit there was concern for changes in her EKG requiring cardiology follow-up.  She did see 2 cardiologists who recommended a stress test.  However her insurance was not covering the procedure for quite some time.  She during that time did have issues with intermittent chest pain requiring hospitalization.  She finally was hospitalized in February with symptoms of chest pain.  She did have a stress test done which was positive.  He then had cardiac catheterization at that time.  Was found to have moderate nonobstructive coronary artery disease which was deemed unnecessary to place a stent.  She was able to be discharged home.  States she no longer has chest pain, shortness of breath.  She does still have her upper abdominal pain related to the cyst.  She has been eating well.  She did stop smoking and does use nicotine patch instead.    Past Medical

## 2024-04-10 NOTE — PROGRESS NOTES
auditory canals.  No abnormal  enhancement seen along of the facial or vestibulocochlear nerves. The inner  ear structures appear unremarkable. The middle ear cavities are clear. The  cisternal segments of the trigeminal nerves appear unremarkable.    INTRACRANIAL STRUCTURES/VENTRICLES:  There is no acute infarct.  Centered to  the right of midline, there is a 2.2 x 1.7 x 1.7 cm arachnoid cyst in the  quadrigeminal cistern.  No intra-axial mass or abnormal enhancement is seen.  Mild generalized cerebral volume loss is seen with mild-to-moderate chronic  microvascular ischemic changes.  No hydrocephalus or extra-axial fluid is  seen.    ORBITS: The visualized portion of the orbits demonstrate no acute abnormality.    SINUSES: Minimal mucosal thickening in the paranasal sinuses.  The mastoids  are clear.    BONES/SOFT TISSUES: The bone marrow signal intensity appears normal. The soft  tissues demonstrate no acute abnormality.    Impression  1.  Unremarkable MRI of the IACs.  No evidence vestibular schwannoma.  2.  No acute intracranial abnormality.  3. 2.2 x 1.1 x 1.1 cm arachnoid cyst in the quadrigeminal cistern, centered  to the right of midline.  No significant mass effect on the tectum.  No  hydrocephalus.    RECOMMENDATIONS:  Unavailable          Assessment/Plan:  58 yoF with Giant pancreatic head cyst with benign features and fluid analysis consistent with SCN   - We discussed her imaging today and her EUS findings. Appears that she has a rather large SCN which is abutting many branches of the SMV and SMA. Given its size and her symptomatology, I think the best course of action is resection which will require a whipple procedure. We would perform this open.   -  We discussed the whipple procedure in detail using pictorial aids and answered all her questions. We discussed risks of surgery including bleeding, infections, delayed gastric emptying (30-40%), post op pancreatic leak (15%), cardiovascular events,

## 2024-04-12 ENCOUNTER — ANESTHESIA EVENT (OUTPATIENT)
Dept: OPERATING ROOM | Age: 59
End: 2024-04-12
Payer: COMMERCIAL

## 2024-04-15 ENCOUNTER — HOSPITAL ENCOUNTER (INPATIENT)
Age: 59
DRG: 004 | End: 2024-04-15
Attending: STUDENT IN AN ORGANIZED HEALTH CARE EDUCATION/TRAINING PROGRAM | Admitting: STUDENT IN AN ORGANIZED HEALTH CARE EDUCATION/TRAINING PROGRAM
Payer: COMMERCIAL

## 2024-04-15 ENCOUNTER — ANESTHESIA (OUTPATIENT)
Dept: OPERATING ROOM | Age: 59
End: 2024-04-15
Payer: COMMERCIAL

## 2024-04-15 DIAGNOSIS — K86.2 CYST OF PANCREAS: ICD-10-CM

## 2024-04-15 DIAGNOSIS — K86.89 PANCREATIC FISTULA: ICD-10-CM

## 2024-04-15 DIAGNOSIS — D27.9 SEROUS CYSTADENOMA: ICD-10-CM

## 2024-04-15 DIAGNOSIS — Z01.812 PRE-OPERATIVE LABORATORY EXAMINATION: Primary | ICD-10-CM

## 2024-04-15 LAB
ALBUMIN SERPL-MCNC: 3.8 G/DL (ref 3.5–5.2)
ALP SERPL-CCNC: 87 U/L (ref 35–104)
ALT SERPL-CCNC: 165 U/L (ref 0–32)
ANION GAP SERPL CALCULATED.3IONS-SCNC: 15 MMOL/L (ref 7–16)
AST SERPL-CCNC: 160 U/L (ref 0–31)
BASOPHILS # BLD: 0.06 K/UL (ref 0–0.2)
BASOPHILS NFR BLD: 0 % (ref 0–2)
BILIRUB SERPL-MCNC: 1 MG/DL (ref 0–1.2)
BUN BLD-MCNC: 11 MG/DL (ref 6–20)
BUN BLD-MCNC: 11 MG/DL (ref 6–20)
BUN BLD-MCNC: 12 MG/DL (ref 6–20)
BUN BLD-MCNC: 12 MG/DL (ref 6–20)
BUN BLD-MCNC: 13 MG/DL (ref 6–20)
BUN SERPL-MCNC: 11 MG/DL (ref 6–20)
CA-I BLD-SCNC: 1.04 MMOL/L (ref 1.15–1.33)
CA-I BLD-SCNC: 1.04 MMOL/L (ref 1.15–1.33)
CA-I BLD-SCNC: 1.1 MMOL/L (ref 1.15–1.33)
CA-I BLD-SCNC: 1.1 MMOL/L (ref 1.15–1.33)
CA-I BLD-SCNC: 1.11 MMOL/L (ref 1.15–1.33)
CA-I BLD-SCNC: 1.14 MMOL/L (ref 1.15–1.33)
CALCIUM SERPL-MCNC: 7.7 MG/DL (ref 8.6–10.2)
CHLORIDE BLD-SCNC: 108 MMOL/L (ref 100–108)
CHLORIDE BLD-SCNC: 109 MMOL/L (ref 100–108)
CHLORIDE BLD-SCNC: 109 MMOL/L (ref 100–108)
CHLORIDE BLD-SCNC: 110 MMOL/L (ref 100–108)
CHLORIDE BLD-SCNC: 110 MMOL/L (ref 100–108)
CHLORIDE SERPL-SCNC: 105 MMOL/L (ref 98–107)
CO2 BLD CALC-SCNC: 20 MMOL/L (ref 22–29)
CO2 BLD CALC-SCNC: 20 MMOL/L (ref 22–29)
CO2 BLD CALC-SCNC: 21 MMOL/L (ref 22–29)
CO2 BLD CALC-SCNC: 22 MMOL/L (ref 22–29)
CO2 BLD CALC-SCNC: 24 MMOL/L (ref 22–29)
CO2 SERPL-SCNC: 21 MMOL/L (ref 22–29)
CREAT BLD-MCNC: 0.6 MG/DL (ref 0.5–1)
CREAT BLD-MCNC: 0.7 MG/DL (ref 0.5–1)
CREAT SERPL-MCNC: 0.7 MG/DL (ref 0.5–1)
EGFR, POC: >90 ML/MIN/1.73M2
EOSINOPHIL # BLD: 0.01 K/UL (ref 0.05–0.5)
EOSINOPHILS RELATIVE PERCENT: 0 % (ref 0–6)
ERYTHROCYTE [DISTWIDTH] IN BLOOD BY AUTOMATED COUNT: 14 % (ref 11.5–15)
GFR SERPL CREATININE-BSD FRML MDRD: >90 ML/MIN/1.73M2
GLUCOSE BLD-MCNC: 142 MG/DL (ref 74–99)
GLUCOSE BLD-MCNC: 157 MG/DL (ref 74–99)
GLUCOSE BLD-MCNC: 160 MG/DL (ref 74–99)
GLUCOSE BLD-MCNC: 163 MG/DL (ref 74–99)
GLUCOSE BLD-MCNC: 173 MG/DL (ref 74–99)
GLUCOSE BLD-MCNC: 175 MG/DL (ref 74–99)
GLUCOSE BLD-MCNC: 188 MG/DL (ref 74–99)
GLUCOSE BLD-MCNC: 190 MG/DL (ref 74–99)
GLUCOSE BLD-MCNC: 81 MG/DL (ref 74–99)
GLUCOSE SERPL-MCNC: 196 MG/DL (ref 74–99)
HCT VFR BLD AUTO: 31 % (ref 37–54)
HCT VFR BLD AUTO: 31 % (ref 37–54)
HCT VFR BLD AUTO: 34.7 % (ref 34–48)
HCT VFR BLD AUTO: 35 % (ref 37–54)
HCT VFR BLD AUTO: 38 % (ref 37–54)
HCT VFR BLD AUTO: 42 % (ref 37–54)
HGB BLD-MCNC: 11.1 G/DL (ref 11.5–15.5)
IMM GRANULOCYTES # BLD AUTO: 0.17 K/UL (ref 0–0.58)
IMM GRANULOCYTES NFR BLD: 1 % (ref 0–5)
LYMPHOCYTES NFR BLD: 0.82 K/UL (ref 1.5–4)
LYMPHOCYTES RELATIVE PERCENT: 4 % (ref 20–42)
MAGNESIUM SERPL-MCNC: 1.6 MG/DL (ref 1.6–2.6)
MCH RBC QN AUTO: 29.5 PG (ref 26–35)
MCHC RBC AUTO-ENTMCNC: 32 G/DL (ref 32–34.5)
MCV RBC AUTO: 92.3 FL (ref 80–99.9)
MONOCYTES NFR BLD: 0.9 K/UL (ref 0.1–0.95)
MONOCYTES NFR BLD: 4 % (ref 2–12)
NEGATIVE BASE EXCESS, ART: 1.5 MMOL/L
NEGATIVE BASE EXCESS, ART: 3.5 MMOL/L
NEGATIVE BASE EXCESS, ART: 3.8 MMOL/L
NEGATIVE BASE EXCESS, ART: 5 MMOL/L
NEGATIVE BASE EXCESS, ART: 5.3 MMOL/L
NEUTROPHILS NFR BLD: 91 % (ref 43–80)
NEUTS SEG NFR BLD: 19.49 K/UL (ref 1.8–7.3)
PHOSPHATE SERPL-MCNC: 3.1 MG/DL (ref 2.5–4.5)
PLATELET # BLD AUTO: 363 K/UL (ref 130–450)
PMV BLD AUTO: 9.3 FL (ref 7–12)
POC ANION GAP: 12 MMOL/L (ref 7–16)
POC ANION GAP: 13 MMOL/L (ref 7–16)
POC ANION GAP: 14 MMOL/L (ref 7–16)
POC ANION GAP: 14 MMOL/L (ref 7–16)
POC ANION GAP: 8 MMOL/L (ref 7–16)
POC HCO3: 20.3 MMOL/L (ref 22–26)
POC HCO3: 20.4 MMOL/L (ref 22–26)
POC HCO3: 21.2 MMOL/L (ref 22–26)
POC HCO3: 21.9 MMOL/L (ref 22–26)
POC HCO3: 24.7 MMOL/L (ref 22–26)
POC HEMOGLOBIN (CALC): 10.5 G/DL (ref 12.5–15.5)
POC HEMOGLOBIN (CALC): 10.6 G/DL (ref 12.5–15.5)
POC HEMOGLOBIN (CALC): 12 G/DL (ref 12.5–15.5)
POC HEMOGLOBIN (CALC): 13 G/DL (ref 12.5–15.5)
POC HEMOGLOBIN (CALC): 14.1 G/DL (ref 12.5–15.5)
POC LACTIC ACID: 1 MMOL/L (ref 0.5–2.2)
POC LACTIC ACID: 1.4 MMOL/L (ref 0.5–2.2)
POC LACTIC ACID: 2.9 MMOL/L (ref 0.5–2.2)
POC LACTIC ACID: 3.6 MMOL/L (ref 0.5–2.2)
POC LACTIC ACID: 4.2 MMOL/L (ref 0.5–2.2)
POC O2 SATURATION: 97.5 % (ref 92–98.5)
POC O2 SATURATION: 99.5 % (ref 92–98.5)
POC O2 SATURATION: 99.7 % (ref 92–98.5)
POC O2 SATURATION: 99.7 % (ref 92–98.5)
POC O2 SATURATION: 99.8 % (ref 92–98.5)
POC PCO2: 37.4 MM HG (ref 35–45)
POC PCO2: 38.5 MM HG (ref 35–45)
POC PCO2: 39 MM HG (ref 35–45)
POC PCO2: 39.5 MM HG (ref 35–45)
POC PCO2: 46 MM HG (ref 35–45)
POC PH: 7.33 (ref 7.35–7.45)
POC PH: 7.33 (ref 7.35–7.45)
POC PH: 7.34 (ref 7.35–7.45)
POC PH: 7.35 (ref 7.35–7.45)
POC PH: 7.36 (ref 7.35–7.45)
POC PO2: 103.4 MM HG (ref 80–100)
POC PO2: 178.8 MM HG (ref 80–100)
POC PO2: 200 MM HG (ref 80–100)
POC PO2: 222 MM HG (ref 80–100)
POC PO2: 227.3 MM HG (ref 80–100)
POTASSIUM BLD-SCNC: 3.1 MMOL/L (ref 3.5–5)
POTASSIUM BLD-SCNC: 3.3 MMOL/L (ref 3.5–5)
POTASSIUM BLD-SCNC: 3.5 MMOL/L (ref 3.5–5)
POTASSIUM BLD-SCNC: 3.6 MMOL/L (ref 3.5–5)
POTASSIUM BLD-SCNC: 3.7 MMOL/L (ref 3.5–5)
POTASSIUM SERPL-SCNC: 4 MMOL/L (ref 3.5–5)
PROT SERPL-MCNC: 5.8 G/DL (ref 6.4–8.3)
RBC # BLD AUTO: 3.76 M/UL (ref 3.5–5.5)
SODIUM BLD-SCNC: 141 MMOL/L (ref 132–146)
SODIUM BLD-SCNC: 142 MMOL/L (ref 132–146)
SODIUM BLD-SCNC: 142 MMOL/L (ref 132–146)
SODIUM BLD-SCNC: 144 MMOL/L (ref 132–146)
SODIUM BLD-SCNC: 145 MMOL/L (ref 132–146)
SODIUM SERPL-SCNC: 141 MMOL/L (ref 132–146)
WBC OTHER # BLD: 21.5 K/UL (ref 4.5–11.5)

## 2024-04-15 PROCEDURE — 2700000000 HC OXYGEN THERAPY PER DAY

## 2024-04-15 PROCEDURE — 88305 TISSUE EXAM BY PATHOLOGIST: CPT

## 2024-04-15 PROCEDURE — 2720000010 HC SURG SUPPLY STERILE: Performed by: STUDENT IN AN ORGANIZED HEALTH CARE EDUCATION/TRAINING PROGRAM

## 2024-04-15 PROCEDURE — 2500000003 HC RX 250 WO HCPCS

## 2024-04-15 PROCEDURE — P9045 ALBUMIN (HUMAN), 5%, 250 ML: HCPCS

## 2024-04-15 PROCEDURE — 83605 ASSAY OF LACTIC ACID: CPT

## 2024-04-15 PROCEDURE — 99222 1ST HOSP IP/OBS MODERATE 55: CPT | Performed by: SURGERY

## 2024-04-15 PROCEDURE — 82803 BLOOD GASES ANY COMBINATION: CPT

## 2024-04-15 PROCEDURE — 2580000003 HC RX 258

## 2024-04-15 PROCEDURE — 0DTJ0ZZ RESECTION OF APPENDIX, OPEN APPROACH: ICD-10-PCS | Performed by: STUDENT IN AN ORGANIZED HEALTH CARE EDUCATION/TRAINING PROGRAM

## 2024-04-15 PROCEDURE — 6370000000 HC RX 637 (ALT 250 FOR IP): Performed by: CLINICAL NURSE SPECIALIST

## 2024-04-15 PROCEDURE — 49905 OMENTAL FLAP INTRA-ABDOM: CPT | Performed by: STUDENT IN AN ORGANIZED HEALTH CARE EDUCATION/TRAINING PROGRAM

## 2024-04-15 PROCEDURE — 0FT40ZZ RESECTION OF GALLBLADDER, OPEN APPROACH: ICD-10-PCS | Performed by: STUDENT IN AN ORGANIZED HEALTH CARE EDUCATION/TRAINING PROGRAM

## 2024-04-15 PROCEDURE — 88304 TISSUE EXAM BY PATHOLOGIST: CPT

## 2024-04-15 PROCEDURE — 3700000001 HC ADD 15 MINUTES (ANESTHESIA): Performed by: STUDENT IN AN ORGANIZED HEALTH CARE EDUCATION/TRAINING PROGRAM

## 2024-04-15 PROCEDURE — 88331 PATH CONSLTJ SURG 1 BLK 1SPC: CPT

## 2024-04-15 PROCEDURE — 88302 TISSUE EXAM BY PATHOLOGIST: CPT

## 2024-04-15 PROCEDURE — 47801 PLACEMENT BILE DUCT SUPPORT: CPT | Performed by: STUDENT IN AN ORGANIZED HEALTH CARE EDUCATION/TRAINING PROGRAM

## 2024-04-15 PROCEDURE — 36592 COLLECT BLOOD FROM PICC: CPT

## 2024-04-15 PROCEDURE — 2580000003 HC RX 258: Performed by: CLINICAL NURSE SPECIALIST

## 2024-04-15 PROCEDURE — 88341 IMHCHEM/IMCYTCHM EA ADD ANTB: CPT

## 2024-04-15 PROCEDURE — 0F790DZ DILATION OF COMMON BILE DUCT WITH INTRALUMINAL DEVICE, OPEN APPROACH: ICD-10-PCS | Performed by: STUDENT IN AN ORGANIZED HEALTH CARE EDUCATION/TRAINING PROGRAM

## 2024-04-15 PROCEDURE — 82330 ASSAY OF CALCIUM: CPT

## 2024-04-15 PROCEDURE — 2709999900 HC NON-CHARGEABLE SUPPLY: Performed by: STUDENT IN AN ORGANIZED HEALTH CARE EDUCATION/TRAINING PROGRAM

## 2024-04-15 PROCEDURE — 80053 COMPREHEN METABOLIC PANEL: CPT

## 2024-04-15 PROCEDURE — 83735 ASSAY OF MAGNESIUM: CPT

## 2024-04-15 PROCEDURE — 88309 TISSUE EXAM BY PATHOLOGIST: CPT

## 2024-04-15 PROCEDURE — 3700000000 HC ANESTHESIA ATTENDED CARE: Performed by: STUDENT IN AN ORGANIZED HEALTH CARE EDUCATION/TRAINING PROGRAM

## 2024-04-15 PROCEDURE — 85014 HEMATOCRIT: CPT

## 2024-04-15 PROCEDURE — 6360000002 HC RX W HCPCS

## 2024-04-15 PROCEDURE — 3600000008 HC SURGERY OHS BASE: Performed by: STUDENT IN AN ORGANIZED HEALTH CARE EDUCATION/TRAINING PROGRAM

## 2024-04-15 PROCEDURE — 6360000002 HC RX W HCPCS: Performed by: CLINICAL NURSE SPECIALIST

## 2024-04-15 PROCEDURE — 88342 IMHCHEM/IMCYTCHM 1ST ANTB: CPT

## 2024-04-15 PROCEDURE — 2500000003 HC RX 250 WO HCPCS: Performed by: STUDENT IN AN ORGANIZED HEALTH CARE EDUCATION/TRAINING PROGRAM

## 2024-04-15 PROCEDURE — 7100000001 HC PACU RECOVERY - ADDTL 15 MIN

## 2024-04-15 PROCEDURE — 07BD0ZZ EXCISION OF AORTIC LYMPHATIC, OPEN APPROACH: ICD-10-PCS | Performed by: STUDENT IN AN ORGANIZED HEALTH CARE EDUCATION/TRAINING PROGRAM

## 2024-04-15 PROCEDURE — C9113 INJ PANTOPRAZOLE SODIUM, VIA: HCPCS | Performed by: STUDENT IN AN ORGANIZED HEALTH CARE EDUCATION/TRAINING PROGRAM

## 2024-04-15 PROCEDURE — 0FBG0ZZ EXCISION OF PANCREAS, OPEN APPROACH: ICD-10-PCS | Performed by: STUDENT IN AN ORGANIZED HEALTH CARE EDUCATION/TRAINING PROGRAM

## 2024-04-15 PROCEDURE — 6360000002 HC RX W HCPCS: Performed by: STUDENT IN AN ORGANIZED HEALTH CARE EDUCATION/TRAINING PROGRAM

## 2024-04-15 PROCEDURE — 3600000018 HC SURGERY OHS ADDTL 15MIN: Performed by: STUDENT IN AN ORGANIZED HEALTH CARE EDUCATION/TRAINING PROGRAM

## 2024-04-15 PROCEDURE — 6360000002 HC RX W HCPCS: Performed by: ANESTHESIOLOGY

## 2024-04-15 PROCEDURE — 88332 PATH CONSLTJ SURG EA ADD BLK: CPT

## 2024-04-15 PROCEDURE — 0DB90ZZ EXCISION OF DUODENUM, OPEN APPROACH: ICD-10-PCS | Performed by: STUDENT IN AN ORGANIZED HEALTH CARE EDUCATION/TRAINING PROGRAM

## 2024-04-15 PROCEDURE — P9047 ALBUMIN (HUMAN), 25%, 50ML: HCPCS

## 2024-04-15 PROCEDURE — 84100 ASSAY OF PHOSPHORUS: CPT

## 2024-04-15 PROCEDURE — 38747 REMOVE ABDOMINAL LYMPH NODES: CPT | Performed by: STUDENT IN AN ORGANIZED HEALTH CARE EDUCATION/TRAINING PROGRAM

## 2024-04-15 PROCEDURE — 87081 CULTURE SCREEN ONLY: CPT

## 2024-04-15 PROCEDURE — 88307 TISSUE EXAM BY PATHOLOGIST: CPT

## 2024-04-15 PROCEDURE — 2580000003 HC RX 258: Performed by: STUDENT IN AN ORGANIZED HEALTH CARE EDUCATION/TRAINING PROGRAM

## 2024-04-15 PROCEDURE — 85025 COMPLETE CBC W/AUTO DIFF WBC: CPT

## 2024-04-15 PROCEDURE — 0DXU0ZW TRANSFER OMENTUM TO ABDOMINAL REGION, OPEN APPROACH: ICD-10-PCS | Performed by: STUDENT IN AN ORGANIZED HEALTH CARE EDUCATION/TRAINING PROGRAM

## 2024-04-15 PROCEDURE — 48150 PARTIAL REMOVAL OF PANCREAS: CPT | Performed by: STUDENT IN AN ORGANIZED HEALTH CARE EDUCATION/TRAINING PROGRAM

## 2024-04-15 PROCEDURE — 82962 GLUCOSE BLOOD TEST: CPT

## 2024-04-15 PROCEDURE — 80047 BASIC METABLC PNL IONIZED CA: CPT

## 2024-04-15 PROCEDURE — A4216 STERILE WATER/SALINE, 10 ML: HCPCS | Performed by: STUDENT IN AN ORGANIZED HEALTH CARE EDUCATION/TRAINING PROGRAM

## 2024-04-15 PROCEDURE — 7100000000 HC PACU RECOVERY - FIRST 15 MIN

## 2024-04-15 PROCEDURE — 2000000000 HC ICU R&B

## 2024-04-15 DEVICE — CLIP INT SM TI EZ LD LIG SYS WECK HORIZON: Type: IMPLANTABLE DEVICE | Site: ABDOMEN | Status: FUNCTIONAL

## 2024-04-15 DEVICE — FEEDING TUBE
Type: IMPLANTABLE DEVICE | Site: ABDOMEN | Status: FUNCTIONAL
Brand: ARGYLE

## 2024-04-15 DEVICE — CLIP INT M L GRN TI TRNSVRS GRV CHEVRON SHP W/ PRECIS TIP: Type: IMPLANTABLE DEVICE | Site: ABDOMEN | Status: FUNCTIONAL

## 2024-04-15 RX ORDER — DEXAMETHASONE SODIUM PHOSPHATE 10 MG/ML
INJECTION INTRAMUSCULAR; INTRAVENOUS PRN
Status: DISCONTINUED | OUTPATIENT
Start: 2024-04-15 | End: 2024-04-15 | Stop reason: SDUPTHER

## 2024-04-15 RX ORDER — MIDAZOLAM HYDROCHLORIDE 1 MG/ML
INJECTION INTRAMUSCULAR; INTRAVENOUS PRN
Status: DISCONTINUED | OUTPATIENT
Start: 2024-04-15 | End: 2024-04-15 | Stop reason: SDUPTHER

## 2024-04-15 RX ORDER — LIDOCAINE HYDROCHLORIDE 10 MG/ML
INJECTION, SOLUTION EPIDURAL; INFILTRATION; INTRACAUDAL; PERINEURAL
Status: DISPENSED
Start: 2024-04-15 | End: 2024-04-15

## 2024-04-15 RX ORDER — MEPERIDINE HYDROCHLORIDE 25 MG/ML
12.5 INJECTION INTRAMUSCULAR; INTRAVENOUS; SUBCUTANEOUS EVERY 5 MIN PRN
Status: DISCONTINUED | OUTPATIENT
Start: 2024-04-15 | End: 2024-04-15

## 2024-04-15 RX ORDER — SODIUM CHLORIDE 0.9 % (FLUSH) 0.9 %
5-40 SYRINGE (ML) INJECTION EVERY 12 HOURS SCHEDULED
Status: DISCONTINUED | OUTPATIENT
Start: 2024-04-15 | End: 2024-04-15

## 2024-04-15 RX ORDER — LIDOCAINE HYDROCHLORIDE ANHYDROUS AND DEXTROSE MONOHYDRATE 5; 400 G/100ML; MG/100ML
INJECTION, SOLUTION INTRAVENOUS CONTINUOUS PRN
Status: DISCONTINUED | OUTPATIENT
Start: 2024-04-15 | End: 2024-04-15 | Stop reason: SDUPTHER

## 2024-04-15 RX ORDER — FENTANYL CITRATE 50 UG/ML
INJECTION, SOLUTION INTRAMUSCULAR; INTRAVENOUS PRN
Status: DISCONTINUED | OUTPATIENT
Start: 2024-04-15 | End: 2024-04-15 | Stop reason: SDUPTHER

## 2024-04-15 RX ORDER — INSULIN LISPRO 100 [IU]/ML
0-4 INJECTION, SOLUTION INTRAVENOUS; SUBCUTANEOUS EVERY 4 HOURS
Status: DISCONTINUED | OUTPATIENT
Start: 2024-04-15 | End: 2024-04-18

## 2024-04-15 RX ORDER — HYDROMORPHONE HYDROCHLORIDE 1 MG/ML
0.5 INJECTION, SOLUTION INTRAMUSCULAR; INTRAVENOUS; SUBCUTANEOUS EVERY 5 MIN PRN
Status: DISCONTINUED | OUTPATIENT
Start: 2024-04-15 | End: 2024-04-15

## 2024-04-15 RX ORDER — IPRATROPIUM BROMIDE AND ALBUTEROL SULFATE 2.5; .5 MG/3ML; MG/3ML
1 SOLUTION RESPIRATORY (INHALATION)
Status: DISCONTINUED | OUTPATIENT
Start: 2024-04-15 | End: 2024-04-15

## 2024-04-15 RX ORDER — ONDANSETRON 2 MG/ML
4 INJECTION INTRAMUSCULAR; INTRAVENOUS EVERY 6 HOURS PRN
Status: DISPENSED | OUTPATIENT
Start: 2024-04-15

## 2024-04-15 RX ORDER — HYDRALAZINE HYDROCHLORIDE 20 MG/ML
5 INJECTION INTRAMUSCULAR; INTRAVENOUS
Status: DISCONTINUED | OUTPATIENT
Start: 2024-04-15 | End: 2024-04-18 | Stop reason: HOSPADM

## 2024-04-15 RX ORDER — KETOROLAC TROMETHAMINE 30 MG/ML
30 INJECTION, SOLUTION INTRAMUSCULAR; INTRAVENOUS EVERY 6 HOURS
Status: DISCONTINUED | OUTPATIENT
Start: 2024-04-15 | End: 2024-04-17

## 2024-04-15 RX ORDER — ACETAMINOPHEN 325 MG/1
650 TABLET ORAL
Status: DISCONTINUED | OUTPATIENT
Start: 2024-04-15 | End: 2024-04-15

## 2024-04-15 RX ORDER — SODIUM CHLORIDE, SODIUM LACTATE, POTASSIUM CHLORIDE, CALCIUM CHLORIDE 600; 310; 30; 20 MG/100ML; MG/100ML; MG/100ML; MG/100ML
INJECTION, SOLUTION INTRAVENOUS CONTINUOUS PRN
Status: DISCONTINUED | OUTPATIENT
Start: 2024-04-15 | End: 2024-04-15 | Stop reason: SDUPTHER

## 2024-04-15 RX ORDER — CELECOXIB 100 MG/1
200 CAPSULE ORAL ONCE
Status: COMPLETED | OUTPATIENT
Start: 2024-04-15 | End: 2024-04-15

## 2024-04-15 RX ORDER — HYDROMORPHONE HYDROCHLORIDE 1 MG/ML
0.25 INJECTION, SOLUTION INTRAMUSCULAR; INTRAVENOUS; SUBCUTANEOUS EVERY 5 MIN PRN
Status: DISCONTINUED | OUTPATIENT
Start: 2024-04-15 | End: 2024-04-15

## 2024-04-15 RX ORDER — POTASSIUM CHLORIDE 7.45 MG/ML
INJECTION INTRAVENOUS PRN
Status: DISCONTINUED | OUTPATIENT
Start: 2024-04-15 | End: 2024-04-15 | Stop reason: SDUPTHER

## 2024-04-15 RX ORDER — PHENYLEPHRINE HCL IN 0.9% NACL 1 MG/10 ML
SYRINGE (ML) INTRAVENOUS PRN
Status: DISCONTINUED | OUTPATIENT
Start: 2024-04-15 | End: 2024-04-15 | Stop reason: SDUPTHER

## 2024-04-15 RX ORDER — ALBUMIN, HUMAN INJ 5% 5 %
SOLUTION INTRAVENOUS PRN
Status: DISCONTINUED | OUTPATIENT
Start: 2024-04-15 | End: 2024-04-15 | Stop reason: SDUPTHER

## 2024-04-15 RX ORDER — SODIUM CHLORIDE 9 MG/ML
INJECTION, SOLUTION INTRAVENOUS PRN
Status: DISCONTINUED | OUTPATIENT
Start: 2024-04-15 | End: 2024-04-15 | Stop reason: HOSPADM

## 2024-04-15 RX ORDER — ACETAMINOPHEN 500 MG
1000 TABLET ORAL ONCE
Status: COMPLETED | OUTPATIENT
Start: 2024-04-15 | End: 2024-04-15

## 2024-04-15 RX ORDER — VECURONIUM BROMIDE 1 MG/ML
INJECTION, POWDER, LYOPHILIZED, FOR SOLUTION INTRAVENOUS PRN
Status: DISCONTINUED | OUTPATIENT
Start: 2024-04-15 | End: 2024-04-15 | Stop reason: SDUPTHER

## 2024-04-15 RX ORDER — SODIUM CHLORIDE 9 MG/ML
INJECTION, SOLUTION INTRAVENOUS CONTINUOUS PRN
Status: DISCONTINUED | OUTPATIENT
Start: 2024-04-15 | End: 2024-04-15 | Stop reason: SDUPTHER

## 2024-04-15 RX ORDER — SODIUM CHLORIDE 9 MG/ML
INJECTION, SOLUTION INTRAVENOUS PRN
Status: DISCONTINUED | OUTPATIENT
Start: 2024-04-15 | End: 2024-04-17

## 2024-04-15 RX ORDER — ONDANSETRON 2 MG/ML
INJECTION INTRAMUSCULAR; INTRAVENOUS PRN
Status: DISCONTINUED | OUTPATIENT
Start: 2024-04-15 | End: 2024-04-15 | Stop reason: SDUPTHER

## 2024-04-15 RX ORDER — LABETALOL HYDROCHLORIDE 5 MG/ML
5 INJECTION, SOLUTION INTRAVENOUS
Status: DISCONTINUED | OUTPATIENT
Start: 2024-04-15 | End: 2024-04-18 | Stop reason: HOSPADM

## 2024-04-15 RX ORDER — ROPIVACAINE HYDROCHLORIDE 5 MG/ML
INJECTION, SOLUTION EPIDURAL; INFILTRATION; PERINEURAL PRN
Status: DISCONTINUED | OUTPATIENT
Start: 2024-04-15 | End: 2024-04-15 | Stop reason: ALTCHOICE

## 2024-04-15 RX ORDER — EPHEDRINE SULFATE/0.9% NACL/PF 25 MG/5 ML
SYRINGE (ML) INTRAVENOUS PRN
Status: DISCONTINUED | OUTPATIENT
Start: 2024-04-15 | End: 2024-04-15 | Stop reason: SDUPTHER

## 2024-04-15 RX ORDER — LIDOCAINE HYDROCHLORIDE 20 MG/ML
INJECTION, SOLUTION INTRAVENOUS PRN
Status: DISCONTINUED | OUTPATIENT
Start: 2024-04-15 | End: 2024-04-15 | Stop reason: SDUPTHER

## 2024-04-15 RX ORDER — LIDOCAINE HYDROCHLORIDE 20 MG/ML
INJECTION, SOLUTION INFILTRATION; PERINEURAL CONTINUOUS PRN
Status: DISCONTINUED | OUTPATIENT
Start: 2024-04-15 | End: 2024-04-15

## 2024-04-15 RX ORDER — SODIUM CHLORIDE 0.9 % (FLUSH) 0.9 %
5-40 SYRINGE (ML) INJECTION EVERY 12 HOURS SCHEDULED
Status: DISCONTINUED | OUTPATIENT
Start: 2024-04-15 | End: 2024-04-15 | Stop reason: HOSPADM

## 2024-04-15 RX ORDER — NALOXONE HYDROCHLORIDE 0.4 MG/ML
INJECTION, SOLUTION INTRAMUSCULAR; INTRAVENOUS; SUBCUTANEOUS PRN
Status: DISCONTINUED | OUTPATIENT
Start: 2024-04-15 | End: 2024-04-15

## 2024-04-15 RX ORDER — ONDANSETRON 2 MG/ML
4 INJECTION INTRAMUSCULAR; INTRAVENOUS
Status: DISCONTINUED | OUTPATIENT
Start: 2024-04-15 | End: 2024-04-15

## 2024-04-15 RX ORDER — DROPERIDOL 2.5 MG/ML
0.62 INJECTION, SOLUTION INTRAMUSCULAR; INTRAVENOUS
Status: DISCONTINUED | OUTPATIENT
Start: 2024-04-15 | End: 2024-04-15

## 2024-04-15 RX ORDER — SODIUM CHLORIDE, SODIUM LACTATE, POTASSIUM CHLORIDE, CALCIUM CHLORIDE 600; 310; 30; 20 MG/100ML; MG/100ML; MG/100ML; MG/100ML
INJECTION, SOLUTION INTRAVENOUS CONTINUOUS
Status: DISCONTINUED | OUTPATIENT
Start: 2024-04-15 | End: 2024-04-18

## 2024-04-15 RX ORDER — NALOXONE HYDROCHLORIDE 0.4 MG/ML
INJECTION, SOLUTION INTRAMUSCULAR; INTRAVENOUS; SUBCUTANEOUS PRN
Status: DISCONTINUED | OUTPATIENT
Start: 2024-04-15 | End: 2024-04-17

## 2024-04-15 RX ORDER — MAGNESIUM SULFATE IN WATER 40 MG/ML
4000 INJECTION, SOLUTION INTRAVENOUS ONCE
Status: COMPLETED | OUTPATIENT
Start: 2024-04-15 | End: 2024-04-15

## 2024-04-15 RX ORDER — SODIUM CHLORIDE 0.9 % (FLUSH) 0.9 %
5-40 SYRINGE (ML) INJECTION PRN
Status: DISCONTINUED | OUTPATIENT
Start: 2024-04-15 | End: 2024-04-15

## 2024-04-15 RX ORDER — DEXTROSE MONOHYDRATE 100 MG/ML
INJECTION, SOLUTION INTRAVENOUS CONTINUOUS PRN
Status: ACTIVE | OUTPATIENT
Start: 2024-04-15

## 2024-04-15 RX ORDER — POLYETHYLENE GLYCOL 3350 17 G/17G
17 POWDER, FOR SOLUTION ORAL DAILY PRN
Status: DISCONTINUED | OUTPATIENT
Start: 2024-04-15 | End: 2024-04-15

## 2024-04-15 RX ORDER — MIDAZOLAM HYDROCHLORIDE 2 MG/2ML
2 INJECTION, SOLUTION INTRAMUSCULAR; INTRAVENOUS
Status: DISCONTINUED | OUTPATIENT
Start: 2024-04-15 | End: 2024-04-15

## 2024-04-15 RX ORDER — PROPOFOL 10 MG/ML
INJECTION, EMULSION INTRAVENOUS PRN
Status: DISCONTINUED | OUTPATIENT
Start: 2024-04-15 | End: 2024-04-15 | Stop reason: SDUPTHER

## 2024-04-15 RX ORDER — SODIUM CHLORIDE 0.9 % (FLUSH) 0.9 %
5-40 SYRINGE (ML) INJECTION PRN
Status: DISCONTINUED | OUTPATIENT
Start: 2024-04-15 | End: 2024-04-15 | Stop reason: HOSPADM

## 2024-04-15 RX ORDER — SODIUM CHLORIDE 9 MG/ML
INJECTION, SOLUTION INTRAVENOUS PRN
Status: DISCONTINUED | OUTPATIENT
Start: 2024-04-15 | End: 2024-04-15

## 2024-04-15 RX ORDER — DIPHENHYDRAMINE HYDROCHLORIDE 50 MG/ML
12.5 INJECTION INTRAMUSCULAR; INTRAVENOUS
Status: DISCONTINUED | OUTPATIENT
Start: 2024-04-15 | End: 2024-04-15

## 2024-04-15 RX ORDER — GLYCOPYRROLATE 0.2 MG/ML
INJECTION INTRAMUSCULAR; INTRAVENOUS PRN
Status: DISCONTINUED | OUTPATIENT
Start: 2024-04-15 | End: 2024-04-15 | Stop reason: SDUPTHER

## 2024-04-15 RX ORDER — GLUCAGON 1 MG/ML
1 KIT INJECTION PRN
Status: ACTIVE | OUTPATIENT
Start: 2024-04-15

## 2024-04-15 RX ORDER — ALBUMIN (HUMAN) 12.5 G/50ML
SOLUTION INTRAVENOUS PRN
Status: DISCONTINUED | OUTPATIENT
Start: 2024-04-15 | End: 2024-04-15 | Stop reason: SDUPTHER

## 2024-04-15 RX ADMIN — SODIUM CHLORIDE: 9 INJECTION, SOLUTION INTRAVENOUS at 13:00

## 2024-04-15 RX ADMIN — VECURONIUM BROMIDE 3 MG: 1 INJECTION, POWDER, LYOPHILIZED, FOR SOLUTION INTRAVENOUS at 08:23

## 2024-04-15 RX ADMIN — GLYCOPYRROLATE 0.2 MG: 0.2 INJECTION INTRAMUSCULAR; INTRAVENOUS at 08:24

## 2024-04-15 RX ADMIN — SODIUM CHLORIDE, POTASSIUM CHLORIDE, SODIUM LACTATE AND CALCIUM CHLORIDE: 600; 310; 30; 20 INJECTION, SOLUTION INTRAVENOUS at 15:39

## 2024-04-15 RX ADMIN — HYDROMORPHONE HYDROCHLORIDE: 10 INJECTION, SOLUTION INTRAMUSCULAR; INTRAVENOUS; SUBCUTANEOUS at 20:33

## 2024-04-15 RX ADMIN — MAGNESIUM SULFATE IN WATER FOR 4000 MG: 40 INJECTION INTRAVENOUS at 16:08

## 2024-04-15 RX ADMIN — MIDAZOLAM 1 MG: 1 INJECTION INTRAMUSCULAR; INTRAVENOUS at 07:26

## 2024-04-15 RX ADMIN — Medication 50 MCG: at 09:20

## 2024-04-15 RX ADMIN — METHOCARBAMOL 750 MG: 1000 INJECTION, SOLUTION INTRAMUSCULAR; INTRAVENOUS at 20:54

## 2024-04-15 RX ADMIN — LIDOCAINE HYDROCHLORIDE ANHYDROUS AND DEXTROSE MONOHYDRATE 2 MG/KG/HR: .4; 5 INJECTION, SOLUTION INTRAVENOUS at 08:00

## 2024-04-15 RX ADMIN — PROPOFOL 150 MG: 10 INJECTION, EMULSION INTRAVENOUS at 07:37

## 2024-04-15 RX ADMIN — POTASSIUM CHLORIDE 10 MEQ: 7.46 INJECTION, SOLUTION INTRAVENOUS at 08:39

## 2024-04-15 RX ADMIN — FENTANYL CITRATE 50 MCG: 0.05 INJECTION, SOLUTION INTRAMUSCULAR; INTRAVENOUS at 07:37

## 2024-04-15 RX ADMIN — SODIUM CHLORIDE, POTASSIUM CHLORIDE, SODIUM LACTATE AND CALCIUM CHLORIDE: 600; 310; 30; 20 INJECTION, SOLUTION INTRAVENOUS at 11:45

## 2024-04-15 RX ADMIN — KETOROLAC TROMETHAMINE 30 MG: 30 INJECTION, SOLUTION INTRAMUSCULAR at 21:20

## 2024-04-15 RX ADMIN — Medication 50 MCG: at 12:24

## 2024-04-15 RX ADMIN — VECURONIUM BROMIDE 7 MG: 1 INJECTION, POWDER, LYOPHILIZED, FOR SOLUTION INTRAVENOUS at 07:37

## 2024-04-15 RX ADMIN — LIDOCAINE HYDROCHLORIDE 100 MG: 20 INJECTION, SOLUTION INTRAVENOUS at 07:37

## 2024-04-15 RX ADMIN — PIPERACILLIN AND TAZOBACTAM 3375 MG: 3; .375 INJECTION, POWDER, LYOPHILIZED, FOR SOLUTION INTRAVENOUS at 18:31

## 2024-04-15 RX ADMIN — POTASSIUM CHLORIDE 10 MEQ: 7.46 INJECTION, SOLUTION INTRAVENOUS at 09:35

## 2024-04-15 RX ADMIN — VECURONIUM BROMIDE 2 MG: 1 INJECTION, POWDER, LYOPHILIZED, FOR SOLUTION INTRAVENOUS at 10:23

## 2024-04-15 RX ADMIN — PIPERACILLIN AND TAZOBACTAM 3375 MG: 3; .375 INJECTION, POWDER, LYOPHILIZED, FOR SOLUTION INTRAVENOUS; PARENTERAL at 12:30

## 2024-04-15 RX ADMIN — FENTANYL CITRATE 50 MCG: 0.05 INJECTION, SOLUTION INTRAMUSCULAR; INTRAVENOUS at 08:23

## 2024-04-15 RX ADMIN — ALBUMIN (HUMAN) 12.5 G: 12.5 INJECTION, SOLUTION INTRAVENOUS at 09:21

## 2024-04-15 RX ADMIN — SODIUM BICARBONATE 25 MEQ: 84 INJECTION, SOLUTION INTRAVENOUS at 11:30

## 2024-04-15 RX ADMIN — EPHEDRINE SULFATE 5 MG: 5 INJECTION INTRAVENOUS at 08:08

## 2024-04-15 RX ADMIN — SODIUM CHLORIDE: 9 INJECTION, SOLUTION INTRAVENOUS at 07:20

## 2024-04-15 RX ADMIN — SODIUM CHLORIDE, POTASSIUM CHLORIDE, SODIUM LACTATE AND CALCIUM CHLORIDE: 600; 310; 30; 20 INJECTION, SOLUTION INTRAVENOUS at 08:00

## 2024-04-15 RX ADMIN — EPHEDRINE SULFATE 5 MG: 5 INJECTION INTRAVENOUS at 08:30

## 2024-04-15 RX ADMIN — DEXAMETHASONE SODIUM PHOSPHATE 10 MG: 10 INJECTION INTRAMUSCULAR; INTRAVENOUS at 07:44

## 2024-04-15 RX ADMIN — ACETAMINOPHEN 1000 MG: 500 TABLET ORAL at 06:00

## 2024-04-15 RX ADMIN — VECURONIUM BROMIDE 2 MG: 1 INJECTION, POWDER, LYOPHILIZED, FOR SOLUTION INTRAVENOUS at 09:10

## 2024-04-15 RX ADMIN — POTASSIUM PHOSPHATE, MONOBASIC AND POTASSIUM PHOSPHATE, DIBASIC 30 MMOL: 224; 236 INJECTION, SOLUTION, CONCENTRATE INTRAVENOUS at 16:59

## 2024-04-15 RX ADMIN — HYDROMORPHONE HYDROCHLORIDE 0.5 MG: 1 INJECTION, SOLUTION INTRAMUSCULAR; INTRAVENOUS; SUBCUTANEOUS at 15:45

## 2024-04-15 RX ADMIN — PIPERACILLIN AND TAZOBACTAM 3375 MG: 3; .375 INJECTION, POWDER, LYOPHILIZED, FOR SOLUTION INTRAVENOUS; PARENTERAL at 07:44

## 2024-04-15 RX ADMIN — SODIUM CHLORIDE, POTASSIUM CHLORIDE, SODIUM LACTATE AND CALCIUM CHLORIDE: 600; 310; 30; 20 INJECTION, SOLUTION INTRAVENOUS at 14:48

## 2024-04-15 RX ADMIN — CALCIUM GLUCONATE 2000 MG: 98 INJECTION, SOLUTION INTRAVENOUS at 17:00

## 2024-04-15 RX ADMIN — KETOROLAC TROMETHAMINE 30 MG: 30 INJECTION, SOLUTION INTRAMUSCULAR at 14:51

## 2024-04-15 RX ADMIN — ONDANSETRON 4 MG: 2 INJECTION INTRAMUSCULAR; INTRAVENOUS at 13:51

## 2024-04-15 RX ADMIN — PIPERACILLIN AND TAZOBACTAM 3375 MG: 3; .375 INJECTION, POWDER, LYOPHILIZED, FOR SOLUTION INTRAVENOUS; PARENTERAL at 10:30

## 2024-04-15 RX ADMIN — FENTANYL CITRATE 50 MCG: 0.05 INJECTION, SOLUTION INTRAMUSCULAR; INTRAVENOUS at 13:31

## 2024-04-15 RX ADMIN — SUGAMMADEX 200 MG: 100 INJECTION, SOLUTION INTRAVENOUS at 14:15

## 2024-04-15 RX ADMIN — MIDAZOLAM 1 MG: 1 INJECTION INTRAMUSCULAR; INTRAVENOUS at 07:20

## 2024-04-15 RX ADMIN — CELECOXIB 200 MG: 100 CAPSULE ORAL at 06:00

## 2024-04-15 RX ADMIN — SODIUM CHLORIDE: 9 INJECTION, SOLUTION INTRAVENOUS at 10:27

## 2024-04-15 RX ADMIN — ALBUMIN (HUMAN) 25 G: 0.25 INJECTION, SOLUTION INTRAVENOUS at 10:55

## 2024-04-15 RX ADMIN — SODIUM CHLORIDE, PRESERVATIVE FREE 40 MG: 5 INJECTION INTRAVENOUS at 14:51

## 2024-04-15 RX ADMIN — VECURONIUM BROMIDE 3 MG: 1 INJECTION, POWDER, LYOPHILIZED, FOR SOLUTION INTRAVENOUS at 11:17

## 2024-04-15 RX ADMIN — VECURONIUM BROMIDE 3 MG: 1 INJECTION, POWDER, LYOPHILIZED, FOR SOLUTION INTRAVENOUS at 12:16

## 2024-04-15 RX ADMIN — FENTANYL CITRATE 25 MCG: 0.05 INJECTION, SOLUTION INTRAMUSCULAR; INTRAVENOUS at 14:04

## 2024-04-15 RX ADMIN — METHOCARBAMOL 750 MG: 1000 INJECTION, SOLUTION INTRAMUSCULAR; INTRAVENOUS at 15:38

## 2024-04-15 RX ADMIN — FENTANYL CITRATE 50 MCG: 0.05 INJECTION, SOLUTION INTRAMUSCULAR; INTRAVENOUS at 08:00

## 2024-04-15 ASSESSMENT — LIFESTYLE VARIABLES: SMOKING_STATUS: 1

## 2024-04-15 ASSESSMENT — PAIN SCALES - GENERAL
PAINLEVEL_OUTOF10: 10
PAINLEVEL_OUTOF10: 8
PAINLEVEL_OUTOF10: 2

## 2024-04-15 ASSESSMENT — PAIN DESCRIPTION - LOCATION
LOCATION: ABDOMEN
LOCATION: ABDOMEN

## 2024-04-15 ASSESSMENT — PAIN DESCRIPTION - ORIENTATION
ORIENTATION: RIGHT;LEFT;INNER
ORIENTATION: RIGHT;LEFT;MID

## 2024-04-15 ASSESSMENT — PAIN DESCRIPTION - DESCRIPTORS
DESCRIPTORS: ACHING;CRUSHING;GNAWING
DESCRIPTORS: ACHING;DISCOMFORT

## 2024-04-15 ASSESSMENT — PAIN SCALES - WONG BAKER: WONGBAKER_NUMERICALRESPONSE: HURTS A LITTLE BIT

## 2024-04-15 ASSESSMENT — PAIN - FUNCTIONAL ASSESSMENT: PAIN_FUNCTIONAL_ASSESSMENT: NONE - DENIES PAIN

## 2024-04-15 NOTE — PLAN OF CARE
Problem: Discharge Planning  Goal: Discharge to home or other facility with appropriate resources  Outcome: Progressing     Problem: Skin/Tissue Integrity  Goal: Absence of new skin breakdown  Description: 1.  Monitor for areas of redness and/or skin breakdown  2.  Assess vascular access sites hourly  3.  Every 4-6 hours minimum:  Change oxygen saturation probe site  4.  Every 4-6 hours:  If on nasal continuous positive airway pressure, respiratory therapy assess nares and determine need for appliance change or resting period.  Outcome: Progressing     Problem: ABCDS Injury Assessment  Goal: Absence of physical injury  Outcome: Progressing     Problem: Respiratory - Adult  Goal: Achieves optimal ventilation and oxygenation  Outcome: Progressing     Problem: Cardiovascular - Adult  Goal: Maintains optimal cardiac output and hemodynamic stability  Outcome: Progressing  Goal: Absence of cardiac dysrhythmias or at baseline  Outcome: Progressing     Problem: Musculoskeletal - Adult  Goal: Return mobility to safest level of function  Outcome: Progressing  Goal: Maintain proper alignment of affected body part  Outcome: Progressing  Goal: Return ADL status to a safe level of function  Outcome: Progressing     Problem: Gastrointestinal - Adult  Goal: Minimal or absence of nausea and vomiting  Outcome: Progressing  Goal: Maintains or returns to baseline bowel function  Outcome: Progressing  Goal: Maintains adequate nutritional intake  Outcome: Progressing  Goal: Establish and maintain optimal ostomy function  Outcome: Progressing     Problem: Genitourinary - Adult  Goal: Absence of urinary retention  Outcome: Progressing  Goal: Urinary catheter remains patent  Outcome: Progressing     Problem: Infection - Adult  Goal: Absence of infection at discharge  Outcome: Progressing  Goal: Absence of infection during hospitalization  Outcome: Progressing  Goal: Absence of fever/infection during anticipated neutropenic  period  Outcome: Progressing     Problem: Metabolic/Fluid and Electrolytes - Adult  Goal: Electrolytes maintained within normal limits  Outcome: Progressing  Goal: Hemodynamic stability and optimal renal function maintained  Outcome: Progressing  Goal: Glucose maintained within prescribed range  Outcome: Progressing     Problem: Hematologic - Adult  Goal: Maintains hematologic stability  Outcome: Progressing     Problem: Chronic Conditions and Co-morbidities  Goal: Patient's chronic conditions and co-morbidity symptoms are monitored and maintained or improved  Outcome: Progressing     Problem: Pain  Goal: Verbalizes/displays adequate comfort level or baseline comfort level  Outcome: Progressing

## 2024-04-15 NOTE — PROGRESS NOTES
HEPATOBILIARY AND PANCREATIC SURGERY  POST-OPERATIVE NOTE  4/15/2024    CC: S/p Whipple    Subjective:  Patient still somnolent but having a bit of pain. Denies any nausea or vomiting.     Objective:  /70   Pulse 84   Temp 97.5 °F (36.4 °C) (Bladder)   Resp 13   Ht 1.626 m (5' 4\")   Wt 78.8 kg (173 lb 11.2 oz)   SpO2 97%   BMI 29.82 kg/m²     GENERAL:  Laying in bed, somnolent but arousable.   HEAD: Normocephalic, atraumatic  EYES: No sclera icterus, pupils equal  LUNGS:  No increased work of breathing  CARDIOVASCULAR:  RR  ABDOMEN:  Soft, appropriately tender to palpation around midline incision, which is covered with surgical glue. R GIULIANO w/ 40 cc serosang output, L GIULIANO 45 sanguinous output  EXTREMITIES: No edema or swelling  SKIN: Warm and dry    Assessment/Plan:  58 y.o. female with symptomatic 13 cm serous cystadenoma of the head of pancreas s/p open pylorus-preserving whipple, cholecystectomy, portal lymphadenectomy, appendectomy 4/15. Plan to follow the high-risk whipple pathway given increased POPF risk score.    - Appreciate ICU assistance and management  - Strict NPO  - Post-op labs reviewed, electrolytes repleted  - Daily labs  - Continue Zosyn q6h x 10 days  - SSI  - PPI  - Prn Zofran  - LR @ 100  - Multimodal pain control: dilaudid PCA, toradol 30 q6 cass, robaxin 750 q6 cass  - GIULIANO drains to bulb suction  - SCDs  - Maintain Fung    Electronically signed by Melani Davidson MD on 4/15/2024 at 5:31 PM

## 2024-04-15 NOTE — PROGRESS NOTES
Patient came to surgical suite with glasses and dentures which were placed in respective labeled containers and will return with patient to her room.     JOSEPHINE Farrar RN

## 2024-04-15 NOTE — CONSENT
Informed Consent for Blood Component Transfusion Note    I have discussed with the patient the rationale for blood component transfusion; its benefits in treating or preventing fatigue, organ damage, or death; and its risk which includes mild transfusion reactions, rare risk of blood borne infection, or more serious but rare reactions. I have discussed the alternatives to transfusion, including the risk and consequences of not receiving transfusion. The patient had an opportunity to ask questions and had agreed to proceed with transfusion of blood components.    Electronically signed by Norma Rogers MD on 4/15/24 at 7:38 AM EDT

## 2024-04-15 NOTE — PROGRESS NOTES
4 Eyes Skin Assessment     NAME:  Leatha Willard  YOB: 1965  MEDICAL RECORD NUMBER:  [id number]    The patient is being assessed for  Admission    I agree that at least one RN has performed a thorough Head to Toe Skin Assessment on the patient. ALL assessment sites listed below have been assessed.      Areas assessed by both nurses:    Head, Face, Ears, Shoulders, Back, Chest, Arms, Elbows, Hands, Sacrum. Buttock, Coccyx, Ischium, Legs. Feet and Heels, and Under Medical Devices     Heels bilateral dry, flaky  MLI with glue PATRICIA  Coccyx red blanchable        Does the Patient have a Wound? No noted wound(s)       Donal Prevention initiated by RN: Yes  Wound Care Orders initiated by RN: No    Pressure Injury (Stage 3,4, Unstageable, DTI, NWPT, and Complex wounds) if present, place Wound referral order by RN under : No    New Ostomies, if present place, Ostomy referral order under : No     Nurse 1 eSignature: Electronically signed by Campos Joy RN on 4/15/24 at 2:43 PM EDT    **SHARE this note so that the co-signing nurse can place an eSignature**    Nurse 2 eSignature: Electronically signed by Maricel Herrera RN on 4/15/24 at 3:48 PM EDT

## 2024-04-15 NOTE — ANESTHESIA PROCEDURE NOTES
Arterial Line:    An arterial line was placed using ultrasound guidance and surface landmarks, in the OR for the following indication(s): continuous blood pressure monitoring and blood sampling needed.    A 20 gauge (size), 1 and 3/4 inch (length), Arrow (type) catheter was placed, Seldinger technique used, into the left radial artery, secured by tape.  Anesthesia type: Local  Local infiltration: Injection    Events:  patient tolerated procedure well with no complications.    Additional notes:  Well tolerated.  Anesthesiologist: Ely Fortune MD  Resident/CRNA: Erin Butler APRN - CRNA  Performed: Resident/CRNA   Preanesthetic Checklist  Completed: patient identified, IV checked, site marked, risks and benefits discussed, surgical/procedural consents, equipment checked, pre-op evaluation, timeout performed, anesthesia consent given, oxygen available, monitors applied/VS acknowledged, fire risk safety assessment completed and verbalized and blood product R/B/A discussed and consented

## 2024-04-15 NOTE — INTERVAL H&P NOTE
Update History & Physical    The patient's History and Physical of April 10, 2024 was reviewed with the patient and I examined the patient. There was no change. The surgical site was confirmed by the patient and me.     Plan: The risks, benefits, expected outcome, and alternative to the recommended procedure have been discussed with the patient. Patient understands and wants to proceed with the procedure.     Electronically signed by Melani Davidson MD on 4/15/2024 at 6:51 AM
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
## 2024-04-15 NOTE — ANESTHESIA PROCEDURE NOTES
Central Venous Line:    A central venous line was placed using ultrasound guidance, in the OR for the following indication(s): central venous access and CVP monitoring.    Sterility preparation included the following: provider used sterile gloves, gown, hat and mask, hand hygiene performed prior to central venous catheter insertion, sterile gel and probe cover used in ultrasound-guided central venous catheter insertion and maximum sterile barriers used during central venous catheter insertion.    The patient was placed in Trendelenburg position.The right internal jugular vein was prepped.    The site was prepped with Chloraprep.  A 9 Fr (size), 10 (length), introducer double lumen was placed.    During the procedure, the following specific steps were taken: target vein identified, needle advanced into vein and blood aspirated and guidewire advanced into vein.    Intravenous verification was obtained by ultrasound, venous blood return and manometry.    Post insertion care included: all ports aspirated, all ports flushed easily, guidewire removed intact, Biopatch applied, line sutured in place and dressing applied.    During the procedure the patient experienced: patient tolerated procedure well with no complications.      Outcomes: uncomplicated and patient tolerated procedure well  Real-time US image taken/store: Yes  Anesthesia type: general..No  Staffing  Performed: Anesthesiologist   Anesthesiologist: Ely Fortune MD  Performed by: Ely Fortune MD  Authorized by: Ely Fortune MD    Preanesthetic Checklist  Completed: patient identified, IV checked, site marked, risks and benefits discussed, surgical/procedural consents, equipment checked, pre-op evaluation, timeout performed, anesthesia consent given, oxygen available, monitors applied/VS acknowledged, fire risk safety assessment completed and verbalized and blood product R/B/A discussed and consented

## 2024-04-15 NOTE — ANESTHESIA PRE PROCEDURE
METS)  (+) hypertension:, hyperlipidemia      ECG reviewed  Rhythm: regular  Rate: normal    Stress test reviewed  Cleared by cardiology     Beta Blocker:  Not on Beta Blocker      ROS comment: Cardiac Cath: 24  1.  Coronary artery disease  Left main: No significant angiographic disease  LAD: Large vessel with eccentric focal 40% proximal vessel narrowing and eccentric focal 40% mid vessel narrowing  LCx: Nondominant vessel.  50% ostial and proximal narrowing of the first obtuse marginal branch which is a small caliber vessel.  50% ostial narrowing of the third marginal branch.  RCA: Large dominant vessel with eccentric 40 to 50% proximal vessel narrowing and eccentric 40% mid vessel narrowing  2.  Normal left atrial size and systolic function with an estimated ejection fraction of 65%  3.  Normal left ventricular end-diastolic pressure    EK24  Normal sinus rhythm  Possible Left atrial enlargement  ST & T wave abnormality, consider inferior ischemia  ST & T wave abnormality, consider anterolateral ischemia  Abnormal ECG              Neuro/Psych:   (+) psychiatric history:depression/anxiety              ROS comment: Cervical spine surgery GI/Hepatic/Renal: Neg GI/Hepatic/Renal ROS            Endo/Other:    (+) DiabetesType II DM, well controlled, : arthritis: OA., malignancy/cancer (cystadenoma).                 Abdominal:   (+) obese    Abdomen: soft.      Vascular: negative vascular ROS.         Other Findings:             Anesthesia Plan      general     ASA 4       Induction: intravenous.  arterial line, BIS, CVP and central line  MIPS: Postoperative opioids intended, Prophylactic antiemetics administered and Postoperative trial extubation.  Anesthetic plan and risks discussed with patient.    Use of blood products discussed with patient whom.    Plan discussed with CRNA and attending.            DOS STAFF ADDENDUM:    Patient seen and chart reviewed.  Physical exam and history updated as

## 2024-04-15 NOTE — PROGRESS NOTES
INTRAOPERATIVE CONSULTATION (with FROZEN SECTION)  Portal LN - fatty tissue; lymph node not identified

## 2024-04-15 NOTE — OP NOTE
Operative Note      Patient: Leatha Willard  YOB: 1965  MRN: [id number]    Date of Procedure: 4/15/2024    Preop Diagosis: 58-year-old female with symptomatic 13 cm serous cystadenoma of the head of the pancreas    Post-Op Diagnosis: Same    Procedure:  Pylorus-preserving pancreaticoduodenectomy  Placement of biliary stent  Portal lymphadenectomy  Round ligament and omental pedicle flap harvest  Appendectomy    Surgeon(s):  Norma Rogers MD Chirichella, Thomas Joseph III, MD    Assistant:   Resident: Ros Campbell MD; Melani Davidson MD    Anesthesia: General    Estimated Blood Loss (mL): 200     Complications: None    Specimens:   ID Type Source Tests Collected by Time Destination   A : Portal lymph nodes Tissue Tissue SURGICAL PATHOLOGY Norma Rogers MD 4/15/2024 0943    B : Hepatic artery lymph node Tissue Tissue SURGICAL PATHOLOGY Norma Rogers MD 4/15/2024 0956    C : Gallbladder Tissue Gallbladder SURGICAL PATHOLOGY Norma Rogers MD 4/15/2024 1018    D : Pancreaticoduodenectomy with lymph nodes Tissue Tissue SURGICAL PATHOLOGY Norma Rogers MD 4/15/2024 1118    E : Appendix Tissue Appendix SURGICAL PATHOLOGY Norma Rogers MD 4/15/2024 1258        Implants:  Implant Name Type Inv. Item Serial No.  Lot No. LRB No. Used Action   CLIP INT SM TI EZ LD LIG SYS WECK HORIZON - [id number]  CLIP INT SM TI EZ LD LIG SYS WECK HORIZON  [employer]  N/A 2 Implanted   CLIP INT M L GRN TI TRNSVRS GRV CHEVRON SHP W/ PRECIS TIP - [id number]  CLIP INT M L GRN TI TRNSVRS GRV CHEVRON SHP W/ PRECIS TIP  [employer]  N/A 2 Implanted   TUBE FEEDING POLYUR F TUBE [id number] W ENFIT - [id number]  TUBE FEEDING POLYUR F TUBE [id number] W ENFIT  [facility]-  N/A 1 Implanted         Drains:   Closed/Suction Drain Right RLQ Bulb (Active)       Closed/Suction Drain Left LLQ Bulb (Active)       Urinary Catheter 04/15/24 Fung-Temperature (Active)       Findings:  Infection  Present At Time Of Surgery (PATOS) (choose all levels that have infection present):  No infection present  Other Findings: Large multiseptated serous cyst of the head of the pancreas.  Cyst wall stuck to the SMA.  Replaced right hepatic artery    Detailed Description of Procedure:   After informed consent was obtained patient was brought back to the operating suite placed supine on the OR table.  She underwent induction of general anesthesia.  Preoperative antibiotics were given.  Arterial and central lines were placed by anesthesia for cardiac monitoring.  Fung catheter was inserted.  The abdomen was prepped and draped in usual sterile fashion.  Timeout was performed to identify correct patient and procedure.    Midline incision was created with 10 blade.  Subcutaneous tissues were dissected down to the anterior fascia with cautery.  Fascia was scored and opened the length of the incision.  Preperitoneal fat was excised from the abdominal wall and the abdomen was entered.  We harvested a well-vascularized round ligament pedicle flap using cautery.  The falciform ligament was taken down.  There were some adhesions in the pelvis from prior pelvic surgery.  The omentum was large and bulky and was adherent in the pelvis.  Adhesions were taken down from the pelvis with the LigaSure device and cautery.  We then placed a Andre retractor.  We started by opening the gastrocolic ligament and entering the lesser sac.  This was done with LigaSure device and cautery.  We then mobilized the right colon and the hepatic flexure to aid in dissection of the duodenum.  The right colon was mobilized completely along the white line of Toldt until reaching the appendix.  With the colon retracted downward we began to kocherized the duodenum.  We kocherized from the lateral common bile duct until the inferior vena cava was completely exposed.  There was a large multiseptated cyst pushing on the duodenum from the head of the pancreas.

## 2024-04-15 NOTE — PROGRESS NOTES
Patient admitted to SICU with the following belongings:  Dentures (Upper), Dentures (Lower), Pants, Shirt, and Shoes, glasses, socks, undergarments, personal bag with jacket, pads, makeup/personal care bag, notebooks, book, perfume, phone, . The following belongings admitted with the patient, ALL, at bedside with patient.

## 2024-04-15 NOTE — PROGRESS NOTES
Patient arrived to the Surgical ICU from OR with bobby catheter intact and patent. Securing device applied. Bobby bag is hanging below the level of the bladder, safety clip attached to the bed sheet, tamper seal is intact, drainage bag is not on the floor, lack of dependent loop in tubing, and the drainage bag is less than half full.

## 2024-04-16 LAB
ALBUMIN SERPL-MCNC: 3.7 G/DL (ref 3.5–5.2)
ALP SERPL-CCNC: 89 U/L (ref 35–104)
ALT SERPL-CCNC: 145 U/L (ref 0–32)
AMYLASE SERPL-CCNC: 169 U/L (ref 20–100)
ANION GAP SERPL CALCULATED.3IONS-SCNC: 14 MMOL/L (ref 7–16)
AST SERPL-CCNC: 77 U/L (ref 0–31)
BASOPHILS # BLD: 0.03 K/UL (ref 0–0.2)
BASOPHILS NFR BLD: 0 % (ref 0–2)
BILIRUB SERPL-MCNC: 0.5 MG/DL (ref 0–1.2)
BUN SERPL-MCNC: 12 MG/DL (ref 6–20)
CA-I BLD-SCNC: 1.18 MMOL/L (ref 1.15–1.33)
CALCIUM SERPL-MCNC: 8.2 MG/DL (ref 8.6–10.2)
CHLORIDE SERPL-SCNC: 103 MMOL/L (ref 98–107)
CO2 SERPL-SCNC: 19 MMOL/L (ref 22–29)
CREAT SERPL-MCNC: 0.7 MG/DL (ref 0.5–1)
EOSINOPHIL # BLD: 0 K/UL (ref 0.05–0.5)
EOSINOPHILS RELATIVE PERCENT: 0 % (ref 0–6)
ERYTHROCYTE [DISTWIDTH] IN BLOOD BY AUTOMATED COUNT: 13.9 % (ref 11.5–15)
GFR SERPL CREATININE-BSD FRML MDRD: >90 ML/MIN/1.73M2
GLUCOSE BLD-MCNC: 134 MG/DL (ref 74–99)
GLUCOSE BLD-MCNC: 138 MG/DL (ref 74–99)
GLUCOSE BLD-MCNC: 140 MG/DL (ref 74–99)
GLUCOSE BLD-MCNC: 141 MG/DL (ref 74–99)
GLUCOSE BLD-MCNC: 153 MG/DL (ref 74–99)
GLUCOSE SERPL-MCNC: 141 MG/DL (ref 74–99)
HBA1C MFR BLD: 5.3 % (ref 4–5.6)
HCT VFR BLD AUTO: 35.1 % (ref 34–48)
HGB BLD-MCNC: 11.3 G/DL (ref 11.5–15.5)
IMM GRANULOCYTES # BLD AUTO: 0.1 K/UL (ref 0–0.58)
IMM GRANULOCYTES NFR BLD: 1 % (ref 0–5)
LYMPHOCYTES NFR BLD: 1.71 K/UL (ref 1.5–4)
LYMPHOCYTES RELATIVE PERCENT: 8 % (ref 20–42)
MAGNESIUM SERPL-MCNC: 2.3 MG/DL (ref 1.6–2.6)
MCH RBC QN AUTO: 29.5 PG (ref 26–35)
MCHC RBC AUTO-ENTMCNC: 32.2 G/DL (ref 32–34.5)
MCV RBC AUTO: 91.6 FL (ref 80–99.9)
MONOCYTES NFR BLD: 1.34 K/UL (ref 0.1–0.95)
MONOCYTES NFR BLD: 6 % (ref 2–12)
NEUTROPHILS NFR BLD: 85 % (ref 43–80)
NEUTS SEG NFR BLD: 18.04 K/UL (ref 1.8–7.3)
PHOSPHATE SERPL-MCNC: 3.8 MG/DL (ref 2.5–4.5)
PLATELET # BLD AUTO: 358 K/UL (ref 130–450)
PMV BLD AUTO: 9.4 FL (ref 7–12)
POTASSIUM SERPL-SCNC: 4.2 MMOL/L (ref 3.5–5)
PROT SERPL-MCNC: 5.8 G/DL (ref 6.4–8.3)
RBC # BLD AUTO: 3.83 M/UL (ref 3.5–5.5)
SODIUM SERPL-SCNC: 136 MMOL/L (ref 132–146)
WBC OTHER # BLD: 21.2 K/UL (ref 4.5–11.5)

## 2024-04-16 PROCEDURE — 2000000000 HC ICU R&B

## 2024-04-16 PROCEDURE — 6370000000 HC RX 637 (ALT 250 FOR IP): Performed by: STUDENT IN AN ORGANIZED HEALTH CARE EDUCATION/TRAINING PROGRAM

## 2024-04-16 PROCEDURE — 80053 COMPREHEN METABOLIC PANEL: CPT

## 2024-04-16 PROCEDURE — 83735 ASSAY OF MAGNESIUM: CPT

## 2024-04-16 PROCEDURE — 2580000003 HC RX 258: Performed by: STUDENT IN AN ORGANIZED HEALTH CARE EDUCATION/TRAINING PROGRAM

## 2024-04-16 PROCEDURE — 82330 ASSAY OF CALCIUM: CPT

## 2024-04-16 PROCEDURE — 82150 ASSAY OF AMYLASE: CPT

## 2024-04-16 PROCEDURE — 6360000002 HC RX W HCPCS: Performed by: STUDENT IN AN ORGANIZED HEALTH CARE EDUCATION/TRAINING PROGRAM

## 2024-04-16 PROCEDURE — 83036 HEMOGLOBIN GLYCOSYLATED A1C: CPT

## 2024-04-16 PROCEDURE — C9113 INJ PANTOPRAZOLE SODIUM, VIA: HCPCS | Performed by: STUDENT IN AN ORGANIZED HEALTH CARE EDUCATION/TRAINING PROGRAM

## 2024-04-16 PROCEDURE — 2700000000 HC OXYGEN THERAPY PER DAY

## 2024-04-16 PROCEDURE — 51798 US URINE CAPACITY MEASURE: CPT

## 2024-04-16 PROCEDURE — 36592 COLLECT BLOOD FROM PICC: CPT

## 2024-04-16 PROCEDURE — 99291 CRITICAL CARE FIRST HOUR: CPT | Performed by: SURGERY

## 2024-04-16 PROCEDURE — 82962 GLUCOSE BLOOD TEST: CPT

## 2024-04-16 PROCEDURE — 85025 COMPLETE CBC W/AUTO DIFF WBC: CPT

## 2024-04-16 PROCEDURE — 84100 ASSAY OF PHOSPHORUS: CPT

## 2024-04-16 RX ORDER — ENOXAPARIN SODIUM 100 MG/ML
40 INJECTION SUBCUTANEOUS DAILY
Status: DISPENSED | OUTPATIENT
Start: 2024-04-16

## 2024-04-16 RX ORDER — AMLODIPINE BESYLATE 5 MG/1
5 TABLET ORAL EVERY MORNING
Status: DISCONTINUED | OUTPATIENT
Start: 2024-04-16 | End: 2024-04-18

## 2024-04-16 RX ORDER — BUPROPION HYDROCHLORIDE 300 MG/1
300 TABLET ORAL EVERY MORNING
Status: DISCONTINUED | OUTPATIENT
Start: 2024-04-16 | End: 2024-04-25

## 2024-04-16 RX ORDER — ACETAMINOPHEN 325 MG/1
650 TABLET ORAL EVERY 6 HOURS SCHEDULED
Status: DISCONTINUED | OUTPATIENT
Start: 2024-04-16 | End: 2024-04-18

## 2024-04-16 RX ORDER — GABAPENTIN 100 MG/1
200 CAPSULE ORAL 3 TIMES DAILY
Status: DISCONTINUED | OUTPATIENT
Start: 2024-04-16 | End: 2024-04-16

## 2024-04-16 RX ORDER — ATORVASTATIN CALCIUM 10 MG/1
10 TABLET, FILM COATED ORAL NIGHTLY
Status: DISPENSED | OUTPATIENT
Start: 2024-04-16

## 2024-04-16 RX ORDER — KETOTIFEN FUMARATE 0.35 MG/ML
1 SOLUTION/ DROPS OPHTHALMIC 2 TIMES DAILY
Status: DISPENSED | OUTPATIENT
Start: 2024-04-16

## 2024-04-16 RX ADMIN — PIPERACILLIN AND TAZOBACTAM 3375 MG: 3; .375 INJECTION, POWDER, LYOPHILIZED, FOR SOLUTION INTRAVENOUS at 02:11

## 2024-04-16 RX ADMIN — KETOROLAC TROMETHAMINE 30 MG: 30 INJECTION, SOLUTION INTRAMUSCULAR at 08:36

## 2024-04-16 RX ADMIN — ONDANSETRON 4 MG: 2 INJECTION INTRAMUSCULAR; INTRAVENOUS at 15:45

## 2024-04-16 RX ADMIN — METHOCARBAMOL 750 MG: 1000 INJECTION, SOLUTION INTRAMUSCULAR; INTRAVENOUS at 08:38

## 2024-04-16 RX ADMIN — KETOTIFEN FUMARATE 1 DROP: 0.35 SOLUTION/ DROPS OPHTHALMIC at 20:15

## 2024-04-16 RX ADMIN — KETOROLAC TROMETHAMINE 30 MG: 30 INJECTION, SOLUTION INTRAMUSCULAR at 03:05

## 2024-04-16 RX ADMIN — METHOCARBAMOL 750 MG: 1000 INJECTION, SOLUTION INTRAMUSCULAR; INTRAVENOUS at 14:17

## 2024-04-16 RX ADMIN — ENOXAPARIN SODIUM 40 MG: 100 INJECTION SUBCUTANEOUS at 08:36

## 2024-04-16 RX ADMIN — METHOCARBAMOL 500 MG: 1000 INJECTION, SOLUTION INTRAMUSCULAR; INTRAVENOUS at 20:25

## 2024-04-16 RX ADMIN — KETOROLAC TROMETHAMINE 30 MG: 30 INJECTION, SOLUTION INTRAMUSCULAR at 20:15

## 2024-04-16 RX ADMIN — SODIUM CHLORIDE, POTASSIUM CHLORIDE, SODIUM LACTATE AND CALCIUM CHLORIDE: 600; 310; 30; 20 INJECTION, SOLUTION INTRAVENOUS at 15:41

## 2024-04-16 RX ADMIN — KETOTIFEN FUMARATE 1 DROP: 0.35 SOLUTION/ DROPS OPHTHALMIC at 10:31

## 2024-04-16 RX ADMIN — KETOROLAC TROMETHAMINE 30 MG: 30 INJECTION, SOLUTION INTRAMUSCULAR at 14:15

## 2024-04-16 RX ADMIN — SODIUM CHLORIDE, PRESERVATIVE FREE 40 MG: 5 INJECTION INTRAVENOUS at 08:36

## 2024-04-16 RX ADMIN — METHOCARBAMOL 750 MG: 1000 INJECTION, SOLUTION INTRAMUSCULAR; INTRAVENOUS at 03:07

## 2024-04-16 RX ADMIN — PIPERACILLIN AND TAZOBACTAM 3375 MG: 3; .375 INJECTION, POWDER, LYOPHILIZED, FOR SOLUTION INTRAVENOUS at 18:26

## 2024-04-16 RX ADMIN — SODIUM CHLORIDE, POTASSIUM CHLORIDE, SODIUM LACTATE AND CALCIUM CHLORIDE: 600; 310; 30; 20 INJECTION, SOLUTION INTRAVENOUS at 01:17

## 2024-04-16 RX ADMIN — PIPERACILLIN AND TAZOBACTAM 3375 MG: 3; .375 INJECTION, POWDER, LYOPHILIZED, FOR SOLUTION INTRAVENOUS at 10:33

## 2024-04-16 ASSESSMENT — PAIN DESCRIPTION - DESCRIPTORS
DESCRIPTORS: ACHING;SHARP;SHOOTING
DESCRIPTORS: ACHING;DISCOMFORT
DESCRIPTORS: ACHING;DISCOMFORT;SORE
DESCRIPTORS: ACHING;DISCOMFORT;SORE

## 2024-04-16 ASSESSMENT — PAIN SCALES - GENERAL
PAINLEVEL_OUTOF10: 7
PAINLEVEL_OUTOF10: 7
PAINLEVEL_OUTOF10: 9
PAINLEVEL_OUTOF10: 10
PAINLEVEL_OUTOF10: 10
PAINLEVEL_OUTOF10: 4
PAINLEVEL_OUTOF10: 7

## 2024-04-16 ASSESSMENT — PAIN DESCRIPTION - ORIENTATION
ORIENTATION: RIGHT;LEFT
ORIENTATION: MID

## 2024-04-16 ASSESSMENT — PAIN DESCRIPTION - LOCATION
LOCATION: ABDOMEN
LOCATION: ABDOMEN;RIB CAGE

## 2024-04-16 NOTE — PROGRESS NOTES
GENERAL SURGERY  DAILY PROGRESS NOTE  MEDICAL STUDENT NOTE    Patient's Name/Date of Birth: Leatha Willard / 1965    Date: 2024     CC: Removal of symptomatic 13 cm serous cystadenoma of the head of pancreas s/p open pylorus-preserving whipple, cholecystectomy, portal lymphadenectomy, appendectomy 4/15     Subjective:    POD#1. No acute events overnight. Patient still fatigued from procedure. Patient states that she has moderate abdominal pain. Patient has not had any flatus or BM's She denies any N/V, F, chest pain, SOB.     Objective:  Last 24Hrs  Temp  Av.2 °F (36.8 °C)  Min: 97.5 °F (36.4 °C)  Max: 98.8 °F (37.1 °C)  Resp  Av.9  Min: 10  Max: 21  Pulse  Av.4  Min: 77  Max: 107  Systolic (24hrs), Av , Min:106 , Max:158     Diastolic (24hrs), Av, Min:63, Max:81    SpO2  Av.2 %  Min: 92 %  Max: 97 %    I/O last 3 completed shifts:  In: 7248.7 [I.V.:5812; IV Piggyback:1436.7]  Out: 2880 [Urine:2710; Drains:170]    Drains:   Right GIULIANO w/ 40 cc serosanguinous output  Left GIULIANO drain with 45 cc sanguinous output.       General: In no acute distress, somnolent but arousable   Cardiovascular: Warm throughout, no edema  Respiratory: no respiratory distress, equal chest rise  Abdomen: soft, tender to palpation around incision site, nondistended. Right GIULIANO w/ 40 cc serosanguinous output. Left GIULIANO drain with 45 cc sanguinous output.   Skin: no obvious rashes. Warm and dry  Extremities: no edema or swelling      CBC  Recent Labs     04/15/24  1451 24  0412   WBC 21.5* 21.2*   RBC 3.76 3.83   HGB 11.1* 11.3*   HCT 34.7 35.1   MCV 92.3 91.6   MCH 29.5 29.5   MCHC 32.0 32.2   RDW 14.0 13.9    358   MPV 9.3 9.4       CMP  Recent Labs     04/15/24  1348 04/15/24  1451 24  0412   NA  --  141 136   K  --  4.0 4.2   CL  --  105 103   CO2  --  21* 19*   BUN  --  11 12   CREATININE 0.6 0.7 0.7   GLUCOSE  --  196* 141*   CALCIUM  --  7.7* 8.2*   PROT  --  5.8* 5.8*   BILITOT  --

## 2024-04-16 NOTE — PLAN OF CARE
RN  Outcome: Progressing  Flowsheets (Taken 4/16/2024 0800)  Electrolytes maintained within normal limits: Monitor labs and assess patient for signs and symptoms of electrolyte imbalances  4/16/2024 0250 by Kory Novoa RN  Outcome: Progressing  Flowsheets (Taken 4/15/2024 2000)  Electrolytes maintained within normal limits:   Monitor labs and assess patient for signs and symptoms of electrolyte imbalances   Administer electrolyte replacement as ordered   Monitor response to electrolyte replacements, including repeat lab results as appropriate   Instruct patient on fluid and nutrition restrictions as appropriate  Goal: Hemodynamic stability and optimal renal function maintained  4/16/2024 1027 by Faith Saldivar RN  Outcome: Progressing  Flowsheets (Taken 4/16/2024 0800)  Hemodynamic stability and optimal renal function maintained:   Monitor labs and assess for signs and symptoms of volume excess or deficit   Monitor intake, output and patient weight  4/16/2024 0250 by Kory Novoa RN  Outcome: Progressing  Flowsheets (Taken 4/15/2024 2000)  Hemodynamic stability and optimal renal function maintained:   Monitor intake, output and patient weight   Monitor labs and assess for signs and symptoms of volume excess or deficit   Monitor response to interventions for patient's volume status, including labs, urine output, blood pressure (other measures as available)   Instruct patient on fluid and nutrition restrictions as appropriate  Goal: Glucose maintained within prescribed range  Recent Flowsheet Documentation  Taken 4/16/2024 0800 by Faith Saldivar RN  Glucose maintained within prescribed range: Monitor blood glucose as ordered  4/16/2024 0250 by Kory Novoa RN  Outcome: Progressing  Flowsheets (Taken 4/15/2024 2000)  Glucose maintained within prescribed range:   Monitor blood glucose as ordered   Assess for signs and symptoms of hyperglycemia and hypoglycemia   Administer ordered medications to maintain glucose  functionality and self care  4/16/2024 1027 by Faiht Saldivar, RN  Outcome: Not Progressing  Flowsheets (Taken 4/16/2024 0800)  Achieves maximal functionality and self care: Monitor swallowing and airway patency with patient fatigue and changes in neurological status  4/16/2024 0250 by Kory Novoa, RN  Outcome: Progressing

## 2024-04-16 NOTE — DISCHARGE INSTRUCTIONS
Please remove tracheostomy sutures 7 days from procedure 5/8    Follow up in 2 months (on 6/28/2024) with [facility] ([phone]) or with primary care to get booster doses for:  1) meningococcal polysaccharide vaccine (Menveo) - second dose   2)Group B meningococcal vaccine-Bexsero second dose  Update the following vaccinations in 5 years:  - meningococcal vaccine booster every 5 years.    Home Care   Keep the incision area clean and dry   Okay to take a shower starting tomorrow.   Place ice on incisions to decrease the pain and bruising.   Physical Activity   Walk frequently to prevent blood clots in the legs, but do not do anything that causes pain in the incisions.   Breath deeply every hour to prevent pneumonia.   No heavy lifting over 10lbs for 4-6wks.  Work   Okay to return to work with light duty next week when your pain is controlled   Avoid Heavy lifting while at work   Await follow-up appointment in 2 weeks for full work clearance   Medications   Restart blood thinners in 24 hours if no sign of bleeding   Take Ibuprofen for moderate pain. Use the Oxycodone for more severe pain.   No driving while taking prescription pain medication   Follow-up   Schedule a follow-up appointment with Dr. Rogers in 2 weeks.  Call Your Doctor If Any of the Following Occurs   Signs of infection, including fever and chills   Redness, swelling, increasing pain, excessive bleeding, or discharge at the incision site   Cough, shortness of breath, chest pain   Increased abdominal pain   Nausea and/or vomiting that does not resolve off narcotics.   Pain, burning, urgency or frequency of urination, or blood in the urine   Pain and/or swelling in your feet, calves, or legs   Dark urine, light stools, or evidence of jaundice (yellowing of the skin or eyes).   In case of an emergency, CALL 911 immediately.

## 2024-04-16 NOTE — PROGRESS NOTES
HEPATOBILIARY AND PANCREATIC SURGERY  PROGRESS NOTE  4/16/2024    CC: S/p Whipple    Subjective:  Patient with soreness but controlled with PCA. No nausea or vomiting.      Objective:  /65   Pulse 86   Temp 98.6 °F (37 °C) (Bladder)   Resp 12   Ht 1.626 m (5' 4\")   Wt 78.8 kg (173 lb 11.2 oz)   SpO2 94%   BMI 29.82 kg/m²     GENERAL:  Laying in bed, somnolent but arousable.   HEAD: Normocephalic, atraumatic  EYES: No sclera icterus, pupils equal  LUNGS:  No increased work of breathing  CARDIOVASCULAR:  RR  ABDOMEN:  Soft, appropriately tender to palpation around midline incision, which is covered with surgical glue. R GIULIANO w/ 40 cc serosang output, L GIULIANO 45 sanguinous output  EXTREMITIES: No edema or swelling  SKIN: Warm and dry    Assessment/Plan:  58 y.o. female with symptomatic 13 cm serous cystadenoma of the head of pancreas s/p open pylorus-preserving whipple, cholecystectomy, portal lymphadenectomy, appendectomy 4/15. Plan to follow the high-risk whipple pathway given increased POPF risk score.    - Appreciate ICU assistance and management, okay for transfer out of SICU today  - Okay for sips/ice chips, <250 mL q4 hrs  - Daily labs  - Continue Zosyn q6h x 10 days  - SSI  - PPI  - Prn Zofran  - LR @ 40  - Multimodal pain control: dilaudid PCA, toradol 30 q6 cass, robaxin 750 q6 cass, adding cass gabapentin and tylenol  - GIULIANO drains to bulb suction  - SCDs  - Remove bobby today  - Remove arterial line today  - Remove central line if adequate IV access    Electronically signed by Melani Davidson MD on 4/16/2024 at 7:30 AM    Attending Physician Statement:    Chief Complaint: S/p open whipple    I have examined the patient and performed the key aspects of physical exam, reviewed the record (including all pertinent and new radiology images and laboratory findings), and discussed the case with the surgical team.  I agree with the assessment and plan with the following additions, corrections, and changes. 14pt

## 2024-04-16 NOTE — ANESTHESIA POSTPROCEDURE EVALUATION
Department of Anesthesiology  Postprocedure Note    Patient: Leatha Willard  MRN: [id number]  YOB: 1965  Date of evaluation: 4/16/2024    Procedure Summary       Date: 04/15/24 Room / Location: [address]    Anesthesia Start: 0720 Anesthesia Stop: 1435    Procedure: Open Whipple procedure (Abdomen) Diagnosis:       Serous cystadenoma      Cyst of pancreas      (Serous cystadenoma [D27.9])      (Cyst of pancreas [K86.2])    Surgeons: Norma Rogers MD Responsible Provider: Ely Fortune MD    Anesthesia Type: General ASA Status: 4            Anesthesia Type: General    Jewel Phase I: Jewel Score: 8    Jewel Phase II: Jewel Score: 8    Anesthesia Post Evaluation    Patient location during evaluation: ICU  Patient participation: complete - patient participated  Level of consciousness: awake and alert  Airway patency: patent  Nausea & Vomiting: no nausea and no vomiting  Cardiovascular status: blood pressure returned to baseline and hemodynamically stable  Respiratory status: acceptable  Hydration status: euvolemic  Pain management: adequate    No notable events documented.

## 2024-04-16 NOTE — PLAN OF CARE
Problem: Discharge Planning  Goal: Discharge to home or other facility with appropriate resources  4/16/2024 0250 by Kory Novoa RN  Outcome: Progressing  Flowsheets (Taken 4/15/2024 2000)  Discharge to home or other facility with appropriate resources:   Identify barriers to discharge with patient and caregiver   Arrange for needed discharge resources and transportation as appropriate   Identify discharge learning needs (meds, wound care, etc)   Refer to discharge planning if patient needs post-hospital services based on physician order or complex needs related to functional status, cognitive ability or social support system  4/15/2024 1441 by Campos Joy, RN  Outcome: Progressing  Flowsheets (Taken 4/15/2024 1430 by Maricel Herrera, RN)  Discharge to home or other facility with appropriate resources: Identify barriers to discharge with patient and caregiver     Problem: Skin/Tissue Integrity  Goal: Absence of new skin breakdown  Description: 1.  Monitor for areas of redness and/or skin breakdown  2.  Assess vascular access sites hourly  3.  Every 4-6 hours minimum:  Change oxygen saturation probe site  4.  Every 4-6 hours:  If on nasal continuous positive airway pressure, respiratory therapy assess nares and determine need for appliance change or resting period.  4/16/2024 0250 by Kory Novoa RN  Outcome: Progressing  4/15/2024 1441 by Campos Joy RN  Outcome: Progressing     Problem: ABCDS Injury Assessment  Goal: Absence of physical injury  4/16/2024 0250 by Kory Novoa RN  Outcome: Progressing  4/15/2024 1441 by Campos Joy RN  Outcome: Progressing     Problem: Respiratory - Adult  Goal: Achieves optimal ventilation and oxygenation  4/16/2024 0250 by Kory Novoa RN  Outcome: Progressing  Flowsheets (Taken 4/15/2024 2000)  Achieves optimal ventilation and oxygenation:   Assess for changes in respiratory status   Assess for changes in mentation and behavior   Position to facilitate oxygenation  (Taken 4/15/2024 1430 by Maricel Herrera RN)  Establish and maintain optimal ostomy function:   Monitor output from ostomies   Administer IV fluids and TPN as ordered     Problem: Genitourinary - Adult  Goal: Absence of urinary retention  4/16/2024 0250 by Kory Novoa RN  Outcome: Progressing  Flowsheets (Taken 4/15/2024 2000)  Absence of urinary retention:   Assess patient’s ability to void and empty bladder   Monitor intake/output and perform bladder scan as needed  4/15/2024 1441 by Campos Joy RN  Outcome: Progressing  Flowsheets (Taken 4/15/2024 1430 by Maricel Herrera RN)  Absence of urinary retention:   Assess patient’s ability to void and empty bladder   Monitor intake/output and perform bladder scan as needed   Place urinary catheter per Licensed Independent Practitioner order if needed  Goal: Urinary catheter remains patent  4/16/2024 0250 by Kory Novoa RN  Outcome: Progressing  Flowsheets (Taken 4/15/2024 2000)  Urinary catheter remains patent:   Assess patency of urinary catheter   Irrigate catheter per Licensed Independent Practitioner order if indicated and notify Licensed Independent Practitioner if unable to irrigate  4/15/2024 1441 by Campos Joy RN  Outcome: Progressing  Flowsheets (Taken 4/15/2024 1430 by Maricel Herrera RN)  Urinary catheter remains patent: Assess patency of urinary catheter     Problem: Infection - Adult  Goal: Absence of infection at discharge  4/16/2024 0250 by Kory Novoa RN  Outcome: Progressing  Flowsheets (Taken 4/15/2024 2000)  Absence of infection at discharge:   Assess and monitor for signs and symptoms of infection   Monitor lab/diagnostic results   Monitor all insertion sites i.e., indwelling lines, tubes and drains   Administer medications as ordered   Instruct and encourage patient and family to use good hand hygiene technique   Identify and instruct in appropriate isolation precautions for identified infection/condition  4/15/2024 1441 by Rufino

## 2024-04-16 NOTE — DISCHARGE SUMMARY
Physician Discharge Summary     Patient ID:  Leatha Willard  [id number]  58 y.o.  1965    Admit date: 4/15/2024    Discharge date and time: No discharge date for patient encounter.     Admitting Physician: Norma Rogers MD     Admission Diagnoses: Serous cystadenoma [D27.9]  Cyst of pancreas [K86.2]    Discharge Diagnoses: Principal Problem:    Serous cystadenoma  Active Problems:    Pre-operative laboratory examination    Cyst of pancreas    Pancreatic fistula    Sepsis with acute renal failure and septic shock (HCC)    JUSTINE (acute kidney injury) (HCC)    Hypophosphatemia    Acute respiratory failure with hypoxia (HCC)    Post-pancreatectomy diabetes (HCC)    On total parenteral nutrition    Hypoalbuminemia    Electrolyte imbalance    Hypocalcemia    Acute blood loss anemia    Hypokalemia  Resolved Problems:    * No resolved hospital problems. *      Admission Condition: fair    Discharged Condition: stable    Hospital Course:  Leatha Willard is a 58 y.o. female who presented with 13 cm symptomatic pancreatic serous cystadenoma. She underwent open pylorus-preserving whipple, cholecystectomy, portal lymphadenectomy, and appendectomy 4/15. The patient's course was otherwise uneventful. She progressed well, pain was controlled on PO medications. She was tolerating a regular diet with no nausea or vomiting, and was in a suitable condition for discharge to *** in stable condition.    4/15: pylorus-preserving whipple, cholecystectomy, portal lymphadenectomy, appendectomy.   4/16: bobby removed, pain control  4/17: no urine output throughout the night, JUSTINE develops, toradol stopped, increased fluids, possible pancreatic leak to be evaluated with CT imaging tomorrow.   4/18: better urine output, complete pancreatectomy and splenectomy, returned to SICU intubated  4/19: lasix, plan for extubation today or tomorrow   4/20: remains intubated, low dose levo, zosyn continues   4/21: remains intubated, levo, zosyn continues,  to take a shower starting tomorrow.   Place ice on incisions to decrease the pain and bruising.   Physical Activity   Walk frequently to prevent blood clots in the legs, but do not do anything that causes pain in the incisions.   Breath deeply every hour to prevent pneumonia.   No heavy lifting over 10lbs for 4-6wks.  Work   Okay to return to work with light duty next week when your pain is controlled   Avoid Heavy lifting while at work   Await follow-up appointment in 2 weeks for full work clearance   Medications   Restart blood thinners in 24 hours if no sign of bleeding   Take Ibuprofen for moderate pain. Use the Oxycodone for more severe pain.   No driving while taking prescription pain medication   Follow-up   Schedule a follow-up appointment with Dr. Rogers in 2 weeks.  Call Your Doctor If Any of the Following Occurs   Signs of infection, including fever and chills   Redness, swelling, increasing pain, excessive bleeding, or discharge at the incision site   Cough, shortness of breath, chest pain   Increased abdominal pain   Nausea and/or vomiting that does not resolve off narcotics.   Pain, burning, urgency or frequency of urination, or blood in the urine   Pain and/or swelling in your feet, calves, or legs   Dark urine, light stools, or evidence of jaundice (yellowing of the skin or eyes).   In case of an emergency, CALL 911 immediately.      Follow up:   Norma Rogers MD  [address] [id number] [phone]    Schedule an appointment as soon as possible for a visit in 2 week(s)  For post-op follow up    [facility] ([facility]  [address]  [place] [id number] [phone]        Jonathan Curiel MD  [address]  [place] [id number]  [phone]    Follow up in 1 week(s)      Abhay Costello MD  [address] [id number] [phone]    Follow up in 1 week(s)      Rick Julio MD  [address] [facility]

## 2024-04-16 NOTE — CARE COORDINATION
4/16 Care Coordination:Pt  presents to the SICU for post-op monitoring following a whipple.NPO, IV ATB,LR@100.GIULIANO drains to bulb suction. PCA pump.  Spoke with pt in her room/. PTA her Children live with her. Discharge plan is to return home. HAs no Hx of HHC or [facility]. Is Agreeable to HHC if needed. And has no preference. CM/SW will continue to follow for discharge planning.   Vivek NIEVES,RN--BC  [phone]

## 2024-04-17 ENCOUNTER — APPOINTMENT (OUTPATIENT)
Dept: GENERAL RADIOLOGY | Age: 59
DRG: 004 | End: 2024-04-17
Attending: STUDENT IN AN ORGANIZED HEALTH CARE EDUCATION/TRAINING PROGRAM
Payer: COMMERCIAL

## 2024-04-17 LAB
ALBUMIN SERPL-MCNC: 3.4 G/DL (ref 3.5–5.2)
ALP SERPL-CCNC: 111 U/L (ref 35–104)
ALT SERPL-CCNC: 103 U/L (ref 0–32)
AMYLASE FLD-CCNC: >7500 U/L
AMYLASE SERPL-CCNC: 109 U/L (ref 20–100)
ANION GAP SERPL CALCULATED.3IONS-SCNC: 15 MMOL/L (ref 7–16)
ANION GAP SERPL CALCULATED.3IONS-SCNC: 6 MMOL/L (ref 7–16)
AST SERPL-CCNC: 39 U/L (ref 0–31)
BASOPHILS # BLD: 0 K/UL (ref 0–0.2)
BASOPHILS NFR BLD: 0 % (ref 0–2)
BILIRUB SERPL-MCNC: 0.6 MG/DL (ref 0–1.2)
BUN SERPL-MCNC: 22 MG/DL (ref 6–20)
BUN SERPL-MCNC: 24 MG/DL (ref 6–20)
CA-I BLD-SCNC: 1.19 MMOL/L (ref 1.15–1.33)
CALCIUM SERPL-MCNC: 7.9 MG/DL (ref 8.6–10.2)
CALCIUM SERPL-MCNC: 8.6 MG/DL (ref 8.6–10.2)
CHLORIDE SERPL-SCNC: 103 MMOL/L (ref 98–107)
CHLORIDE SERPL-SCNC: 103 MMOL/L (ref 98–107)
CO2 SERPL-SCNC: 22 MMOL/L (ref 22–29)
CO2 SERPL-SCNC: 25 MMOL/L (ref 22–29)
CREAT SERPL-MCNC: 0.7 MG/DL (ref 0.5–1)
CREAT SERPL-MCNC: 1.1 MG/DL (ref 0.5–1)
CREAT UR-MCNC: 157.3 MG/DL (ref 29–226)
EOSINOPHIL # BLD: 0 K/UL (ref 0.05–0.5)
EOSINOPHILS RELATIVE PERCENT: 0 % (ref 0–6)
ERYTHROCYTE [DISTWIDTH] IN BLOOD BY AUTOMATED COUNT: 14 % (ref 11.5–15)
GFR SERPL CREATININE-BSD FRML MDRD: 57 ML/MIN/1.73M2
GFR SERPL CREATININE-BSD FRML MDRD: >90 ML/MIN/1.73M2
GLUCOSE BLD-MCNC: 137 MG/DL (ref 74–99)
GLUCOSE BLD-MCNC: 142 MG/DL (ref 74–99)
GLUCOSE BLD-MCNC: 144 MG/DL (ref 74–99)
GLUCOSE BLD-MCNC: 149 MG/DL (ref 74–99)
GLUCOSE BLD-MCNC: 162 MG/DL (ref 74–99)
GLUCOSE BLD-MCNC: 182 MG/DL (ref 74–99)
GLUCOSE BLD-MCNC: 203 MG/DL (ref 74–99)
GLUCOSE SERPL-MCNC: 149 MG/DL (ref 74–99)
GLUCOSE SERPL-MCNC: 156 MG/DL (ref 74–99)
HCT VFR BLD AUTO: 35.2 % (ref 34–48)
HGB BLD-MCNC: 11.6 G/DL (ref 11.5–15.5)
LYMPHOCYTES NFR BLD: 0.55 K/UL (ref 1.5–4)
LYMPHOCYTES RELATIVE PERCENT: 2 % (ref 20–42)
MAGNESIUM SERPL-MCNC: 2.2 MG/DL (ref 1.6–2.6)
MCH RBC QN AUTO: 30.4 PG (ref 26–35)
MCHC RBC AUTO-ENTMCNC: 33 G/DL (ref 32–34.5)
MCV RBC AUTO: 92.1 FL (ref 80–99.9)
MICROORGANISM SPEC CULT: NORMAL
MONOCYTES NFR BLD: 0.27 K/UL (ref 0.1–0.95)
MONOCYTES NFR BLD: 1 % (ref 2–12)
NEUTROPHILS NFR BLD: 97 % (ref 43–80)
NEUTS SEG NFR BLD: 30.48 K/UL (ref 1.8–7.3)
PHOSPHATE SERPL-MCNC: 3.5 MG/DL (ref 2.5–4.5)
PLATELET # BLD AUTO: 361 K/UL (ref 130–450)
PMV BLD AUTO: 9.7 FL (ref 7–12)
POTASSIUM SERPL-SCNC: 3.6 MMOL/L (ref 3.5–5)
POTASSIUM SERPL-SCNC: 4.2 MMOL/L (ref 3.5–5)
PROT SERPL-MCNC: 5.9 G/DL (ref 6.4–8.3)
RBC # BLD AUTO: 3.82 M/UL (ref 3.5–5.5)
RBC # BLD: ABNORMAL 10*6/UL
SODIUM SERPL-SCNC: 134 MMOL/L (ref 132–146)
SODIUM SERPL-SCNC: 140 MMOL/L (ref 132–146)
SODIUM UR-SCNC: <20 MMOL/L
SPECIMEN DESCRIPTION: NORMAL
SPECIMEN TYPE: NORMAL
WBC OTHER # BLD: 31.3 K/UL (ref 4.5–11.5)

## 2024-04-17 PROCEDURE — 83735 ASSAY OF MAGNESIUM: CPT

## 2024-04-17 PROCEDURE — 93005 ELECTROCARDIOGRAM TRACING: CPT | Performed by: STUDENT IN AN ORGANIZED HEALTH CARE EDUCATION/TRAINING PROGRAM

## 2024-04-17 PROCEDURE — 84300 ASSAY OF URINE SODIUM: CPT

## 2024-04-17 PROCEDURE — A4216 STERILE WATER/SALINE, 10 ML: HCPCS | Performed by: STUDENT IN AN ORGANIZED HEALTH CARE EDUCATION/TRAINING PROGRAM

## 2024-04-17 PROCEDURE — 6360000002 HC RX W HCPCS

## 2024-04-17 PROCEDURE — 84100 ASSAY OF PHOSPHORUS: CPT

## 2024-04-17 PROCEDURE — 6360000002 HC RX W HCPCS: Performed by: STUDENT IN AN ORGANIZED HEALTH CARE EDUCATION/TRAINING PROGRAM

## 2024-04-17 PROCEDURE — 2580000003 HC RX 258: Performed by: STUDENT IN AN ORGANIZED HEALTH CARE EDUCATION/TRAINING PROGRAM

## 2024-04-17 PROCEDURE — 80048 BASIC METABOLIC PNL TOTAL CA: CPT

## 2024-04-17 PROCEDURE — 36556 INSERT NON-TUNNEL CV CATH: CPT

## 2024-04-17 PROCEDURE — 2700000000 HC OXYGEN THERAPY PER DAY

## 2024-04-17 PROCEDURE — 51701 INSERT BLADDER CATHETER: CPT

## 2024-04-17 PROCEDURE — 6370000000 HC RX 637 (ALT 250 FOR IP): Performed by: STUDENT IN AN ORGANIZED HEALTH CARE EDUCATION/TRAINING PROGRAM

## 2024-04-17 PROCEDURE — 82150 ASSAY OF AMYLASE: CPT

## 2024-04-17 PROCEDURE — 36592 COLLECT BLOOD FROM PICC: CPT

## 2024-04-17 PROCEDURE — 51702 INSERT TEMP BLADDER CATH: CPT

## 2024-04-17 PROCEDURE — 6370000000 HC RX 637 (ALT 250 FOR IP)

## 2024-04-17 PROCEDURE — 82330 ASSAY OF CALCIUM: CPT

## 2024-04-17 PROCEDURE — 71045 X-RAY EXAM CHEST 1 VIEW: CPT

## 2024-04-17 PROCEDURE — 2000000000 HC ICU R&B

## 2024-04-17 PROCEDURE — 02HV33Z INSERTION OF INFUSION DEVICE INTO SUPERIOR VENA CAVA, PERCUTANEOUS APPROACH: ICD-10-PCS | Performed by: SURGERY

## 2024-04-17 PROCEDURE — 85025 COMPLETE CBC W/AUTO DIFF WBC: CPT

## 2024-04-17 PROCEDURE — 51798 US URINE CAPACITY MEASURE: CPT

## 2024-04-17 PROCEDURE — 3E0436Z INTRODUCTION OF NUTRITIONAL SUBSTANCE INTO CENTRAL VEIN, PERCUTANEOUS APPROACH: ICD-10-PCS | Performed by: STUDENT IN AN ORGANIZED HEALTH CARE EDUCATION/TRAINING PROGRAM

## 2024-04-17 PROCEDURE — C9113 INJ PANTOPRAZOLE SODIUM, VIA: HCPCS | Performed by: STUDENT IN AN ORGANIZED HEALTH CARE EDUCATION/TRAINING PROGRAM

## 2024-04-17 PROCEDURE — 2500000003 HC RX 250 WO HCPCS: Performed by: STUDENT IN AN ORGANIZED HEALTH CARE EDUCATION/TRAINING PROGRAM

## 2024-04-17 PROCEDURE — 80053 COMPREHEN METABOLIC PANEL: CPT

## 2024-04-17 PROCEDURE — 82570 ASSAY OF URINE CREATININE: CPT

## 2024-04-17 PROCEDURE — 99291 CRITICAL CARE FIRST HOUR: CPT | Performed by: SURGERY

## 2024-04-17 PROCEDURE — P9047 ALBUMIN (HUMAN), 25%, 50ML: HCPCS | Performed by: STUDENT IN AN ORGANIZED HEALTH CARE EDUCATION/TRAINING PROGRAM

## 2024-04-17 PROCEDURE — 82962 GLUCOSE BLOOD TEST: CPT

## 2024-04-17 RX ORDER — BENZONATATE 100 MG/1
100 CAPSULE ORAL 3 TIMES DAILY PRN
Status: ACTIVE | OUTPATIENT
Start: 2024-04-17

## 2024-04-17 RX ORDER — LIDOCAINE 4 G/G
1 PATCH TOPICAL DAILY
Status: DISPENSED | OUTPATIENT
Start: 2024-04-17

## 2024-04-17 RX ORDER — POTASSIUM CHLORIDE 29.8 MG/ML
20 INJECTION INTRAVENOUS
Status: COMPLETED | OUTPATIENT
Start: 2024-04-17 | End: 2024-04-17

## 2024-04-17 RX ORDER — ALBUMIN (HUMAN) 12.5 G/50ML
25 SOLUTION INTRAVENOUS ONCE
Status: COMPLETED | OUTPATIENT
Start: 2024-04-17 | End: 2024-04-17

## 2024-04-17 RX ORDER — GABAPENTIN 100 MG/1
200 CAPSULE ORAL 3 TIMES DAILY
Status: DISCONTINUED | OUTPATIENT
Start: 2024-04-17 | End: 2024-04-19

## 2024-04-17 RX ORDER — OXYCODONE HYDROCHLORIDE 5 MG/1
10 TABLET ORAL EVERY 4 HOURS PRN
Status: DISCONTINUED | OUTPATIENT
Start: 2024-04-17 | End: 2024-04-18

## 2024-04-17 RX ORDER — METOCLOPRAMIDE HYDROCHLORIDE 5 MG/ML
5 INJECTION INTRAMUSCULAR; INTRAVENOUS EVERY 8 HOURS
Status: DISCONTINUED | OUTPATIENT
Start: 2024-04-17 | End: 2024-04-18

## 2024-04-17 RX ORDER — OXYCODONE HYDROCHLORIDE 5 MG/1
5 TABLET ORAL EVERY 4 HOURS PRN
Status: DISCONTINUED | OUTPATIENT
Start: 2024-04-17 | End: 2024-04-18

## 2024-04-17 RX ORDER — SODIUM CHLORIDE, SODIUM LACTATE, POTASSIUM CHLORIDE, AND CALCIUM CHLORIDE .6; .31; .03; .02 G/100ML; G/100ML; G/100ML; G/100ML
1000 INJECTION, SOLUTION INTRAVENOUS ONCE
Status: COMPLETED | OUTPATIENT
Start: 2024-04-17 | End: 2024-04-17

## 2024-04-17 RX ADMIN — SODIUM CHLORIDE, PRESERVATIVE FREE 40 MG: 5 INJECTION INTRAVENOUS at 08:23

## 2024-04-17 RX ADMIN — ACETAMINOPHEN 650 MG: 325 TABLET ORAL at 10:16

## 2024-04-17 RX ADMIN — CALCIUM GLUCONATE: 98 INJECTION, SOLUTION INTRAVENOUS at 18:06

## 2024-04-17 RX ADMIN — ACETAMINOPHEN 650 MG: 325 TABLET ORAL at 01:21

## 2024-04-17 RX ADMIN — ATORVASTATIN CALCIUM 10 MG: 10 TABLET, FILM COATED ORAL at 19:43

## 2024-04-17 RX ADMIN — INSULIN LISPRO 1 UNITS: 100 INJECTION, SOLUTION INTRAVENOUS; SUBCUTANEOUS at 19:43

## 2024-04-17 RX ADMIN — ACETAMINOPHEN 650 MG: 325 TABLET ORAL at 18:11

## 2024-04-17 RX ADMIN — METOCLOPRAMIDE 5 MG: 5 INJECTION, SOLUTION INTRAMUSCULAR; INTRAVENOUS at 07:08

## 2024-04-17 RX ADMIN — PIPERACILLIN AND TAZOBACTAM 3375 MG: 3; .375 INJECTION, POWDER, LYOPHILIZED, FOR SOLUTION INTRAVENOUS at 11:36

## 2024-04-17 RX ADMIN — KETOROLAC TROMETHAMINE 30 MG: 30 INJECTION, SOLUTION INTRAMUSCULAR at 02:48

## 2024-04-17 RX ADMIN — SODIUM CHLORIDE, POTASSIUM CHLORIDE, SODIUM LACTATE AND CALCIUM CHLORIDE 1000 ML: 600; 310; 30; 20 INJECTION, SOLUTION INTRAVENOUS at 18:02

## 2024-04-17 RX ADMIN — BUPROPION HYDROCHLORIDE 300 MG: 300 TABLET, FILM COATED, EXTENDED RELEASE ORAL at 08:59

## 2024-04-17 RX ADMIN — METOCLOPRAMIDE 5 MG: 5 INJECTION, SOLUTION INTRAMUSCULAR; INTRAVENOUS at 22:33

## 2024-04-17 RX ADMIN — POTASSIUM CHLORIDE 20 MEQ: 29.8 INJECTION, SOLUTION INTRAVENOUS at 22:39

## 2024-04-17 RX ADMIN — HYDROMORPHONE HYDROCHLORIDE 0.5 MG: 1 INJECTION, SOLUTION INTRAMUSCULAR; INTRAVENOUS; SUBCUTANEOUS at 22:32

## 2024-04-17 RX ADMIN — KETOROLAC TROMETHAMINE 30 MG: 30 INJECTION, SOLUTION INTRAMUSCULAR at 08:28

## 2024-04-17 RX ADMIN — PIPERACILLIN AND TAZOBACTAM 3375 MG: 3; .375 INJECTION, POWDER, LYOPHILIZED, FOR SOLUTION INTRAVENOUS at 18:13

## 2024-04-17 RX ADMIN — METHOCARBAMOL 500 MG: 1000 INJECTION, SOLUTION INTRAMUSCULAR; INTRAVENOUS at 02:58

## 2024-04-17 RX ADMIN — ACETAMINOPHEN 650 MG: 325 TABLET ORAL at 04:45

## 2024-04-17 RX ADMIN — METOCLOPRAMIDE 5 MG: 5 INJECTION, SOLUTION INTRAMUSCULAR; INTRAVENOUS at 15:20

## 2024-04-17 RX ADMIN — ONDANSETRON 4 MG: 2 INJECTION INTRAMUSCULAR; INTRAVENOUS at 11:26

## 2024-04-17 RX ADMIN — GABAPENTIN 200 MG: 100 CAPSULE ORAL at 21:16

## 2024-04-17 RX ADMIN — HYDROMORPHONE HYDROCHLORIDE 0.5 MG: 1 INJECTION, SOLUTION INTRAMUSCULAR; INTRAVENOUS; SUBCUTANEOUS at 19:42

## 2024-04-17 RX ADMIN — KETOTIFEN FUMARATE 1 DROP: 0.35 SOLUTION/ DROPS OPHTHALMIC at 19:54

## 2024-04-17 RX ADMIN — ENOXAPARIN SODIUM 40 MG: 100 INJECTION SUBCUTANEOUS at 08:26

## 2024-04-17 RX ADMIN — OXYCODONE HYDROCHLORIDE 10 MG: 5 TABLET ORAL at 21:16

## 2024-04-17 RX ADMIN — PIPERACILLIN AND TAZOBACTAM 3375 MG: 3; .375 INJECTION, POWDER, LYOPHILIZED, FOR SOLUTION INTRAVENOUS at 01:32

## 2024-04-17 RX ADMIN — SODIUM CHLORIDE, POTASSIUM CHLORIDE, SODIUM LACTATE AND CALCIUM CHLORIDE: 600; 310; 30; 20 INJECTION, SOLUTION INTRAVENOUS at 13:18

## 2024-04-17 RX ADMIN — POTASSIUM CHLORIDE 20 MEQ: 29.8 INJECTION, SOLUTION INTRAVENOUS at 23:26

## 2024-04-17 RX ADMIN — KETOTIFEN FUMARATE 1 DROP: 0.35 SOLUTION/ DROPS OPHTHALMIC at 08:57

## 2024-04-17 RX ADMIN — ALBUMIN (HUMAN) 25 G: 0.25 INJECTION, SOLUTION INTRAVENOUS at 09:16

## 2024-04-17 RX ADMIN — AMLODIPINE BESYLATE 5 MG: 5 TABLET ORAL at 08:27

## 2024-04-17 ASSESSMENT — PAIN DESCRIPTION - FREQUENCY
FREQUENCY: CONTINUOUS

## 2024-04-17 ASSESSMENT — PAIN SCALES - GENERAL
PAINLEVEL_OUTOF10: 10
PAINLEVEL_OUTOF10: 9
PAINLEVEL_OUTOF10: 8
PAINLEVEL_OUTOF10: 9
PAINLEVEL_OUTOF10: 9
PAINLEVEL_OUTOF10: 10
PAINLEVEL_OUTOF10: 10
PAINLEVEL_OUTOF10: 8
PAINLEVEL_OUTOF10: 9
PAINLEVEL_OUTOF10: 10
PAINLEVEL_OUTOF10: 10
PAINLEVEL_OUTOF10: 4
PAINLEVEL_OUTOF10: 10
PAINLEVEL_OUTOF10: 9
PAINLEVEL_OUTOF10: 10
PAINLEVEL_OUTOF10: 10

## 2024-04-17 ASSESSMENT — PAIN DESCRIPTION - ORIENTATION
ORIENTATION: MID;LOWER
ORIENTATION: MID
ORIENTATION: MID
ORIENTATION: MID;LOWER
ORIENTATION: MID
ORIENTATION: MID
ORIENTATION: MID;LOWER
ORIENTATION: MID
ORIENTATION: MID

## 2024-04-17 ASSESSMENT — PAIN DESCRIPTION - LOCATION
LOCATION: ABDOMEN
LOCATION: ABDOMEN;BACK
LOCATION: ABDOMEN
LOCATION: ABDOMEN;INCISION
LOCATION: ABDOMEN
LOCATION: ABDOMEN;INCISION

## 2024-04-17 ASSESSMENT — PAIN DESCRIPTION - DESCRIPTORS
DESCRIPTORS: ACHING;BURNING;DISCOMFORT
DESCRIPTORS: STABBING;SHARP;SORE
DESCRIPTORS: ACHING;DISCOMFORT;SHARP;SORE;STABBING
DESCRIPTORS: ACHING;BURNING;DISCOMFORT
DESCRIPTORS: ACHING;DISCOMFORT
DESCRIPTORS: ACHING;BURNING;DISCOMFORT
DESCRIPTORS: BURNING;STABBING;SHARP
DESCRIPTORS: STABBING;SHARP;BURNING
DESCRIPTORS: ACHING;DISCOMFORT;SORE

## 2024-04-17 ASSESSMENT — PAIN DESCRIPTION - PAIN TYPE
TYPE: SURGICAL PAIN
TYPE: ACUTE PAIN;SURGICAL PAIN
TYPE: SURGICAL PAIN
TYPE: SURGICAL PAIN;ACUTE PAIN
TYPE: SURGICAL PAIN
TYPE: SURGICAL PAIN

## 2024-04-17 ASSESSMENT — PAIN - FUNCTIONAL ASSESSMENT

## 2024-04-17 ASSESSMENT — PAIN DESCRIPTION - ONSET
ONSET: ON-GOING

## 2024-04-17 NOTE — PROGRESS NOTES
[facility]  PROGRESS NOTE  ATTENDING NOTE    CRITICAL CARE    CC:  pancreatic cyst      HPI  Leatha Willard is a 58 y.o. female who presents to the SICU for post-op monitoring following a whipple. The patient has been admitted to the hospital since 4/15/2024 who presents for evaluation of pancreatic cyst. She has a hx of arthritis and back pain s/p cervical fusion. She was being evaluated for her mid back pain with imaging and was found to have a large pancreatic cyst on CT. She then had an MRI/MRCP showing a large >10cm cyst in the pancreatic head extending to the transverse colon mesentery. She had an EUS with Dr. Hammond at [facility]. FNA and fluid analysis was consistent with a benign cyst. There were no suspicious features present. Patient underwent a Pylorus-preserving pancreaticoduodenectomy on 4/15 and was admitted to the ICU post-operatively.     Patient Active Problem List   Diagnosis    Recurrent major depressive disorder (HCC)    Essential hypertension    Hyperlipidemia    Prediabetes    Class 1 obesity due to excess calories without serious comorbidity with body mass index (BMI) of 32.0 to 32.9 in adult    HIREN (obstructive sleep apnea)    Pancreatic cyst    Colon polyps    Cyst of pancreas    Chest pain    Tobacco abuse-- quit 2 weeks ago    Abdominal pain    Serous cystadenoma       OVERNIGHT EVENTS:  No UOP x 24h????    HOSPITAL COURSE:  4/15--pylorus preserving Whipple for large benign pancreatic cyst  4/16--continued ICU d/t nausea  4/17--bobby replaced, pancreatic leak, boluses given, albumin given, JUSTINE, toradol stopped, CVC changed    /86   Pulse (!) 121   Temp 98 °F (36.7 °C) (Oral)   Resp 21   Ht 1.626 m (5' 4\")   Wt 78.8 kg (173 lb 11.2 oz)   SpO2 90%   BMI 29.82 kg/m²   Physical Exam  Constitutional:       Appearance: She is obese.   HENT:      Head: Normocephalic and atraumatic.      Nose: Nose normal.      Mouth/Throat:      Mouth: Mucous membranes are moist.  perioperative antibiotics      DVT/GI ppx:  lovenox, PPI    CC TIME:  I spent 35 min managing this patients critical issues which are a constant threat to life excluding time teaching and performing procedures.      Marissa Ramirez MD, MSc, FACS  4/17/2024  6:07 PM

## 2024-04-17 NOTE — PROCEDURES
PROCEDURE NOTE  Date: 4/17/2024   Name: Leatha Willard  YOB: 1965    Procedures      Central Line    Date/Time: 4/17/2024 10:03 AM    Performed by: Jesse Hood MD  Authorized by: Jesse Hood MD  Consent: Verbal consent obtained.  Risks and benefits: risks, benefits and alternatives were discussed  Consent given by: patient  Patient understanding: patient states understanding of the procedure being performed  Patient identity confirmed: verbally with patient and arm band  Time out: Immediately prior to procedure a \"time out\" was called to verify the correct patient, procedure, equipment, support staff and site/side marked as required.  Indications: vascular access  Anesthesia: local infiltration    Anesthesia:  Local Anesthetic: lidocaine 1% with epinephrine    Sedation:  Patient sedated: no    Preparation: skin prepped with 2% chlorhexidine  Skin prep agent dried: skin prep agent completely dried prior to procedure  Sterile barriers: all five maximum sterile barriers used - cap, mask, sterile gown, sterile gloves, and large sterile sheet  Hand hygiene: hand hygiene performed prior to central venous catheter insertion  Location details: right internal jugular  Site selection rationale: Put guidewire in through previously placed introducer and exchanged for triple lumen cvc over the guidewire  Patient position: flat  Catheter type: triple lumen  Catheter size: 7 Fr  Ultrasound guidance: no  Number of attempts: 1  Successful placement: yes  Post-procedure: line sutured  Patient tolerance: patient tolerated the procedure well with no immediate complications  Comments: Dr. Ramirez was available for the procedure

## 2024-04-17 NOTE — PLAN OF CARE
range  4/16/2024 2056 by Brisa Mclaughlin RN  Outcome: Progressing  Flowsheets  Taken 4/16/2024 2000 by Brisa Mclaughlin RN  Glucose maintained within prescribed range: Monitor blood glucose as ordered  Taken 4/16/2024 0800 by Faith Saldivar RN  Glucose maintained within prescribed range: Monitor blood glucose as ordered     Problem: Hematologic - Adult  Goal: Maintains hematologic stability  4/16/2024 2056 by Brisa Mclaughlin RN  Outcome: Progressing  Flowsheets (Taken 4/16/2024 2000)  Maintains hematologic stability: Assess for signs and symptoms of bleeding or hemorrhage     Problem: Chronic Conditions and Co-morbidities  Goal: Patient's chronic conditions and co-morbidity symptoms are monitored and maintained or improved  4/16/2024 2056 by Brisa Mclaughlin RN  Outcome: Progressing  Flowsheets (Taken 4/16/2024 2000)  Care Plan - Patient's Chronic Conditions and Co-Morbidity Symptoms are Monitored and Maintained or Improved: Monitor and assess patient's chronic conditions and comorbid symptoms for stability, deterioration, or improvement     Problem: Pain  Goal: Verbalizes/displays adequate comfort level or baseline comfort level  4/16/2024 2056 by Brisa Mclaughlin RN  Outcome: Progressing  Flowsheets (Taken 4/16/2024 2000)  Verbalizes/displays adequate comfort level or baseline comfort level:   Encourage patient to monitor pain and request assistance   Assess pain using appropriate pain scale   Administer analgesics based on type and severity of pain and evaluate response   Implement non-pharmacological measures as appropriate and evaluate response     Problem: Safety - Adult  Goal: Free from fall injury  4/16/2024 2056 by Brisa Mclaughlin RN  Outcome: Progressing  Flowsheets (Taken 4/16/2024 2000)  Free From Fall Injury: Instruct family/caregiver on patient safety     Problem: Neurosensory - Adult  Goal: Achieves stable or improved neurological status  4/16/2024 2056 by Brisa Mclaughlin RN  Outcome: Progressing  Flowsheets (Taken  Progressing  Flowsheets  Taken 4/16/2024 2000 by Brisa Mclaughlin RN  Incisions, Wounds, or Drain Sites Healing Without Sign and Symptoms of Infection:   ADMISSION and DAILY: Assess and document risk factors for pressure ulcer development   TWICE DAILY: Assess and document skin integrity   TWICE DAILY: Assess and document dressing/incision, wound bed, drain sites and surrounding tissue  Taken 4/16/2024 0800 by Faith Saldivar RN  Incisions, Wounds, or Drain Sites Healing Without Sign and Symptoms of Infection: TWICE DAILY: Assess and document skin integrity  Goal: Oral mucous membranes remain intact  Recent Flowsheet Documentation  Taken 4/17/2024 0539 by June Zamora RN  Oral Mucous Membranes Remain Intact:   Assess oral mucosa and hygiene practices   Implement preventative oral hygiene regimen   Implement oral medicated treatments as ordered  4/16/2024 2056 by Brisa Mclaughlin RN  Outcome: Progressing  Flowsheets (Taken 4/16/2024 2000)  Oral Mucous Membranes Remain Intact: Assess oral mucosa and hygiene practices

## 2024-04-17 NOTE — PLAN OF CARE
output and hemodynamic stability  4/16/2024 2056 by Brisa Mclaughlin, RN  Outcome: Progressing  Flowsheets (Taken 4/16/2024 2000)  Maintains optimal cardiac output and hemodynamic stability:   Monitor blood pressure and heart rate   Monitor urine output and notify Licensed Independent Practitioner for values outside of normal range  4/16/2024 1027 by Faith Saldivar RN  Outcome: Progressing  Flowsheets (Taken 4/16/2024 0800)  Maintains optimal cardiac output and hemodynamic stability: Monitor blood pressure and heart rate  Goal: Absence of cardiac dysrhythmias or at baseline  4/16/2024 2056 by Brisa Mclaughlin RN  Outcome: Progressing  Flowsheets (Taken 4/16/2024 2000)  Absence of cardiac dysrhythmias or at baseline: Monitor cardiac rate and rhythm  4/16/2024 1027 by Faith Saldivar RN  Outcome: Progressing  Flowsheets (Taken 4/16/2024 0800)  Absence of cardiac dysrhythmias or at baseline: Monitor cardiac rate and rhythm     Problem: Musculoskeletal - Adult  Goal: Return mobility to safest level of function  Outcome: Progressing  Flowsheets (Taken 4/16/2024 2000)  Return Mobility to Safest Level of Function: Assess patient stability and activity tolerance for standing, transferring and ambulating with or without assistive devices  Goal: Maintain proper alignment of affected body part  Outcome: Progressing  Flowsheets (Taken 4/16/2024 2000)  Maintain proper alignment of affected body part: Support and protect limb and body alignment per provider's orders  Goal: Return ADL status to a safe level of function  Outcome: Progressing  Flowsheets (Taken 4/16/2024 2000)  Return ADL Status to a Safe Level of Function: Administer medication as ordered     Problem: Gastrointestinal - Adult  Goal: Minimal or absence of nausea and vomiting  4/16/2024 2056 by Brisa Mclaughlin, RN  Outcome: Progressing  Flowsheets (Taken 4/16/2024 2000)  Minimal or absence of nausea and vomiting: Administer IV fluids as ordered to ensure adequate  symptoms for stability, deterioration, or improvement     Problem: Pain  Goal: Verbalizes/displays adequate comfort level or baseline comfort level  Outcome: Progressing  Flowsheets (Taken 4/16/2024 2000)  Verbalizes/displays adequate comfort level or baseline comfort level:   Encourage patient to monitor pain and request assistance   Assess pain using appropriate pain scale   Administer analgesics based on type and severity of pain and evaluate response   Implement non-pharmacological measures as appropriate and evaluate response     Problem: Safety - Adult  Goal: Free from fall injury  Outcome: Progressing  Flowsheets (Taken 4/16/2024 2000)  Free From Fall Injury: Instruct family/caregiver on patient safety     Problem: Gastrointestinal - Adult  Goal: Maintains adequate nutritional intake  4/16/2024 2056 by Brisa Mclaughlin, RN  Outcome: Progressing  Flowsheets (Taken 4/16/2024 2000)  Maintains adequate nutritional intake: Monitor intake and output, weight and lab values  4/16/2024 1027 by Faith Saldivar, RN  Outcome: Not Progressing  Flowsheets (Taken 4/16/2024 0800)  Maintains adequate nutritional intake: Monitor intake and output, weight and lab values     Problem: Neurosensory - Adult  Goal: Achieves maximal functionality and self care  4/16/2024 2056 by Brisa Mclaughlin, RN  Outcome: Progressing  Flowsheets (Taken 4/16/2024 2000)  Achieves maximal functionality and self care: Monitor swallowing and airway patency with patient fatigue and changes in neurological status  4/16/2024 1027 by Faith Saldivar, RN  Outcome: Not Progressing  Flowsheets (Taken 4/16/2024 0800)  Achieves maximal functionality and self care: Monitor swallowing and airway patency with patient fatigue and changes in neurological status

## 2024-04-17 NOTE — PROGRESS NOTES
HEPATOBILIARY AND PANCREATIC SURGERY  PROGRESS NOTE  4/17/2024    CC: S/p Whipple    Subjective:  No nausea or vomiting. Remains in ICU. More clear mentally compared to yesterday.      Objective:  /86   Pulse (!) 114   Temp 98.5 °F (36.9 °C) (Temporal)   Resp 20   Ht 1.626 m (5' 4\")   Wt 78.8 kg (173 lb 11.2 oz)   SpO2 92%   BMI 29.82 kg/m²     GENERAL:  Lying in bed, responds to questions  HEAD: Normocephalic, atraumatic  EYES: No scleral icterus, pupils equal  LUNGS:  No increased work of breathing  CARDIOVASCULAR:  warm throughout, HR 110s  ABDOMEN:  Soft, minimally TTP around midline incision, which is covered with surgical glue. R GIULIANO w/ 115 cc and L GIULIANO 25 w/ brownish appearing output in the drains - on gauze, the fluid has fluorescence  EXTREMITIES: minimal edema   SKIN: warm and dry    CBC x3  Recent Labs     04/15/24  1451 04/16/24  0412 04/17/24  0443   WBC 21.5* 21.2* 31.3*   RBC 3.76 3.83 3.82   HGB 11.1* 11.3* 11.6   HCT 34.7 35.1 35.2   MCV 92.3 91.6 92.1   MCH 29.5 29.5 30.4   MCHC 32.0 32.2 33.0   RDW 14.0 13.9 14.0    358 361   MPV 9.3 9.4 9.7         CMP x3  Recent Labs     04/15/24  1451 04/16/24  0412 04/17/24  0443    136 140   K 4.0 4.2 4.2    103 103   CO2 21* 19* 22   BUN 11 12 24*   CREATININE 0.7 0.7 1.1*   GLUCOSE 196* 141* 149*   CALCIUM 7.7* 8.2* 8.6   PROT 5.8* 5.8* 5.9*   BILITOT 1.0 0.5 0.6   ALKPHOS 87 89 111*   * 77* 39*   * 145* 103*           Assessment/Plan:  58 y.o. female POD 2 from open pylorus-preserving whipple, cholecystectomy, portal lymphadenectomy, appendectomy for symptomatic 13 cm serous cystadenoma of the head of pancreas. Plan to follow the high-risk whipple pathway given increased POPF risk score.    Brownish output from drains, concerning for pancreatic leak.    Increased HR. WBC inc to 31K.    Keep in SICU    - limited clears only - don't advance  - inc LR to 80cc/hr  - continue daily labs  - continue zosyn  - scheduled

## 2024-04-17 NOTE — PROGRESS NOTES
Surgical Intensive Care Unit   Daily Progress Note     Patient's name:  Leatha Willard  Age/Gender: 58 y.o. female  Date of Admission: 4/15/2024  5:37 AM  Length of Stay: 2    *Reason for ICU:       HPI:   Leatha Willard is a 58 y.o. female who presented with 13 cm symptomatic pancreatic serous cystadenoma. She underwent open pylorus-preserving pancreaticoduodenectomy, cholecystectomy, portal lymphadenectomy, and appendectomy 4/15. The patient's course was otherwise uneventful. She progressed well, pain was controlled on PO medications. She was tolerating a regular diet with no nausea or vomiting     *Overnight Events:     No urine output overnight, bladder scan indicated around 345 cc, straight cath = 400 cc out    Reporting abdominal pain  HR 120s      Problem List:   Patient Active Problem List   Diagnosis    Recurrent major depressive disorder (HCC)    Essential hypertension    Hyperlipidemia    Prediabetes    Class 1 obesity due to excess calories without serious comorbidity with body mass index (BMI) of 32.0 to 32.9 in adult    HIREN (obstructive sleep apnea)    Pancreatic cyst    Colon polyps    Cyst of pancreas    Chest pain    Tobacco abuse-- quit 2 weeks ago    Abdominal pain    Serous cystadenoma       Surgical/Interventional Procedures:       *Average, Min, and Max for last 24 hours Vitals:  Temp:  Temp  Av.4 °F (36.9 °C)  Min: 98.2 °F (36.8 °C)  Max: 98.7 °F (37.1 °C)  RR: Resp  Avg: 15.8  Min: 11  Max: 28  HR: Pulse  Av.7  Min: 89  Max: 128  BP:  Systolic (24hrs), Av , Min:93 , Max:142   ; Diastolic (24hrs), Av, Min:67, Max:120    SpO2: SpO2  Av.4 %  Min: 91 %  Max: 95 %        *I/O:  Urine output (cc/kg/hr): 45 (.02 cc/kg/hr past 24 hours)   Bowel movements: none  Fluid balance: +5,395  Body weight:    *Lines:   Introducer right IJ 4/15 - exchange to CVC      tubes  Fung     drains  RLQ drain 4/15 - 115 cc (10 cc overnight )   LLQ drain 4/15 - 25 cc  (10 cc overnight)  Chips, Sips of Water with Meds, Sips of Clear Liquids / TPN  DVT proph: lovenox 40 mg SQ daily   Seizure proph: none  Mouth/eye care: per patient  Fung: straight cath   CVC sites: CVC double lumen  Ancillary consults: hepatobiliary   Family Update: as possible  CODE Status: Full Code    Dispo: ICU    Electronically signed by Jose Laboy DO 4/17/2024  8:19 AM

## 2024-04-17 NOTE — CONSULTS
Comprehensive Nutrition Assessment    Type and Reason for Visit:  Initial, Consult    Nutrition Recommendations/Plan:   Continue NPO, Modify Parenteral Nutrition to better meet estimated nutrient needs    TPN rec:   Standard central 3-in-1 @ 75 ml/hr to provide 1800 ml tv, 1863 kcals, 90 gm AA  Regimen meets 100% estimated nutrient needs    Pt is at risk for refeeding syndrome. Recommend starting TPN at half rate and advance as tolerated to goal rate  Monitor/replace electrolytes prn       Malnutrition Assessment:  Malnutrition Status:  At risk for malnutrition (Comment) (04/17/24 1125)    Context:  Acute Illness     Findings of the 6 clinical characteristics of malnutrition:  Energy Intake:  Mild decrease in energy intake (Comment) (ESVIN intake PTA at this time)  Weight Loss:  No significant weight loss (wt appears relatively stable x past year w/ some fluctuations noted)     Body Fat Loss:  No significant body fat loss     Muscle Mass Loss:  No significant muscle mass loss    Fluid Accumulation:  No significant fluid accumulation     Strength:  Not Performed    Nutrition Assessment:    Pt admit for elective OR 2/2 13 cm serous cystadenoma of the head of the pancreas s/p open pylorus-preserving whipple, cholecystectomy, portal lymphadenectomy, appendectomy 4/15. PMHx CAD, prediabetes. Noted brownish output from GIULIANO drains concerning for pancreatic leak. Plans to start TPN noted. Will provide updated TPN recs and monitor.    Nutrition Related Findings:    pt alert, hypoactive BS, abd distention, nausea, GIULIANO drains x2, +1-2 edema, +I/Os 5.6L, MAP 80 Wound Type: Surgical Incision       Current Nutrition Intake & Therapies:    Average Meal Intake: NPO     Diet NPO Exceptions are: Ice Chips, Sips of Water with Meds, Sips of Clear Liquids  PN-Adult  3-in-1 Central Line (Standard)  Current Parenteral Nutrition Orders:  Type and Formula: 3-in-1 Standard   Duration: Continuous  Rate/Volume: 50 ml/hr; 1200 ml TV  Goal PN  Orders Provides: 60 gm AA, 1242 kcals    Anthropometric Measures:  Height: 162.6 cm (5' 4\")  Ideal Body Weight (IBW): 120 lbs (55 kg)    Admission Body Weight: 78.5 kg (173 lb) (4/15  actual)  Current Body Weight: 78.5 kg (173 lb) (4/15 actual), 144.2 % IBW.    Current BMI (kg/m2): 29.7  Usual Body Weight: 86.6 kg (191 lb) (3/2023 actual per EMR)  % Weight Change (Calculated): -9.4                    BMI Categories: Overweight (BMI 25.0-29.9)    Estimated Daily Nutrient Needs:  Energy Requirements Based On: Formula  Weight Used for Energy Requirements: Current  Energy (kcal/day): MSJ x 1.3 SF = [id number]  Weight Used for Protein Requirements: Ideal  Protein (g/day):  (1.5-1.8 gm/kg IBW)     Fluid (ml/day): per critical care    Nutrition Diagnosis:   Inadequate oral intake related to altered GI function (s/p whipple) as evidenced by NPO or clear liquid status due to medical condition, nutrition support - parenteral nutrition    Nutrition Interventions:   Nutrition Education/Counseling: Education not indicated  Coordination of Nutrition Care: Continue to monitor while inpatient  Plan of Care discussed with: pharmacy, nursing    Goals:     Goals: Tolerate nutrition support at goal rate, by next RD assessment       Nutrition Monitoring and Evaluation:      Food/Nutrient Intake Outcomes: Parenteral Nutrition Intake/Tolerance  Physical Signs/Symptoms Outcomes: Biochemical Data, GI Status, Fluid Status or Edema, Hemodynamic Status, Nutrition Focused Physical Findings, Skin, Weight    Discharge Planning:    Too soon to determine     Ainsley Ackerman, MS, RD, LD  Contact: [phone]

## 2024-04-17 NOTE — PROGRESS NOTES
[facility]  PROGRESS NOTE  ATTENDING NOTE    CRITICAL CARE    CC:  pancreatic cyst      HPI  Leatha Willard is a 58 y.o. female who presents to the SICU for post-op monitoring following a whipple. The patient has been admitted to the hospital since 4/15/2024 who presents for evaluation of pancreatic cyst. She has a hx of arthritis and back pain s/p cervical fusion. She was being evaluated for her mid back pain with imaging and was found to have a large pancreatic cyst on CT. She then had an MRI/MRCP showing a large >10cm cyst in the pancreatic head extending to the transverse colon mesentery. She had an EUS with Dr. Hammond at [facility]. FNA and fluid analysis was consistent with a benign cyst. There were no suspicious features present. Patient underwent a Pylorus-preserving pancreaticoduodenectomy on 4/15 and was admitted to the ICU post-operatively.     Patient Active Problem List   Diagnosis    Recurrent major depressive disorder (HCC)    Essential hypertension    Hyperlipidemia    Prediabetes    Class 1 obesity due to excess calories without serious comorbidity with body mass index (BMI) of 32.0 to 32.9 in adult    HIREN (obstructive sleep apnea)    Pancreatic cyst    Colon polyps    Cyst of pancreas    Chest pain    Tobacco abuse-- quit 2 weeks ago    Abdominal pain    Serous cystadenoma       OVERNIGHT EVENTS:  Nauseated this AM    HOSPITAL COURSE:  4/15--pylorus preserving Whipple for large benign pancreatic cyst  4/16--continued ICU d/t nausea    /84   Pulse (!) 113   Temp 98.7 °F (37.1 °C) (Oral)   Resp 28   Ht 1.626 m (5' 4\")   Wt 78.8 kg (173 lb 11.2 oz)   SpO2 91%   BMI 29.82 kg/m²   Physical Exam  Constitutional:       Appearance: She is obese.   HENT:      Head: Normocephalic and atraumatic.      Nose: Nose normal.      Mouth/Throat:      Mouth: Mucous membranes are moist.      Pharynx: Oropharynx is clear.   Eyes:      Extraocular Movements: Extraocular movements intact.

## 2024-04-18 ENCOUNTER — ANESTHESIA (OUTPATIENT)
Dept: OPERATING ROOM | Age: 59
End: 2024-04-18
Payer: COMMERCIAL

## 2024-04-18 ENCOUNTER — ANESTHESIA EVENT (OUTPATIENT)
Dept: OPERATING ROOM | Age: 59
End: 2024-04-18
Payer: COMMERCIAL

## 2024-04-18 ENCOUNTER — APPOINTMENT (OUTPATIENT)
Dept: GENERAL RADIOLOGY | Age: 59
DRG: 004 | End: 2024-04-18
Attending: STUDENT IN AN ORGANIZED HEALTH CARE EDUCATION/TRAINING PROGRAM
Payer: COMMERCIAL

## 2024-04-18 ENCOUNTER — APPOINTMENT (OUTPATIENT)
Dept: CT IMAGING | Age: 59
DRG: 004 | End: 2024-04-18
Attending: STUDENT IN AN ORGANIZED HEALTH CARE EDUCATION/TRAINING PROGRAM
Payer: COMMERCIAL

## 2024-04-18 PROBLEM — R65.21 SEPSIS WITH ACUTE RENAL FAILURE AND SEPTIC SHOCK (HCC): Status: ACTIVE | Noted: 2024-04-18

## 2024-04-18 PROBLEM — A41.9 SEPSIS WITH ACUTE RENAL FAILURE AND SEPTIC SHOCK (HCC): Status: ACTIVE | Noted: 2024-04-18

## 2024-04-18 PROBLEM — N17.9 SEPSIS WITH ACUTE RENAL FAILURE AND SEPTIC SHOCK (HCC): Status: ACTIVE | Noted: 2024-04-18

## 2024-04-18 PROBLEM — K86.89 PANCREATIC FISTULA: Status: ACTIVE | Noted: 2024-04-18

## 2024-04-18 LAB
AADO2: 295.2 MMHG
ALBUMIN SERPL-MCNC: 3 G/DL (ref 3.5–5.2)
ALP SERPL-CCNC: 94 U/L (ref 35–104)
ALT SERPL-CCNC: 66 U/L (ref 0–32)
ANION GAP SERPL CALCULATED.3IONS-SCNC: 10 MMOL/L (ref 7–16)
ANION GAP SERPL CALCULATED.3IONS-SCNC: 7 MMOL/L (ref 7–16)
ANION GAP SERPL CALCULATED.3IONS-SCNC: 8 MMOL/L (ref 7–16)
AST SERPL-CCNC: 30 U/L (ref 0–31)
B.E.: -3.2 MMOL/L (ref -3–3)
BASOPHILS # BLD: 0 K/UL (ref 0–0.2)
BASOPHILS # BLD: 0.09 K/UL (ref 0–0.2)
BASOPHILS NFR BLD: 0 % (ref 0–2)
BASOPHILS NFR BLD: 0 % (ref 0–2)
BILIRUB SERPL-MCNC: 0.5 MG/DL (ref 0–1.2)
BUN BLD-MCNC: 14 MG/DL (ref 6–20)
BUN BLD-MCNC: 16 MG/DL (ref 6–20)
BUN SERPL-MCNC: 14 MG/DL (ref 6–20)
BUN SERPL-MCNC: 16 MG/DL (ref 6–20)
BUN SERPL-MCNC: 18 MG/DL (ref 6–20)
CA-I BLD-SCNC: 1.09 MMOL/L (ref 1.15–1.33)
CA-I BLD-SCNC: 1.11 MMOL/L (ref 1.15–1.33)
CA-I BLD-SCNC: 1.15 MMOL/L (ref 1.15–1.33)
CA-I BLD-SCNC: 1.17 MMOL/L (ref 1.15–1.33)
CA-I BLD-SCNC: 1.17 MMOL/L (ref 1.15–1.33)
CALCIUM SERPL-MCNC: 7.7 MG/DL (ref 8.6–10.2)
CALCIUM SERPL-MCNC: 7.8 MG/DL (ref 8.6–10.2)
CALCIUM SERPL-MCNC: 8.1 MG/DL (ref 8.6–10.2)
CHLORIDE BLD-SCNC: 101 MMOL/L (ref 100–108)
CHLORIDE BLD-SCNC: 105 MMOL/L (ref 100–108)
CHLORIDE SERPL-SCNC: 102 MMOL/L (ref 98–107)
CHLORIDE SERPL-SCNC: 103 MMOL/L (ref 98–107)
CHLORIDE SERPL-SCNC: 103 MMOL/L (ref 98–107)
CO2 BLD CALC-SCNC: 26 MMOL/L (ref 22–29)
CO2 BLD CALC-SCNC: 26 MMOL/L (ref 22–29)
CO2 SERPL-SCNC: 24 MMOL/L (ref 22–29)
CO2 SERPL-SCNC: 27 MMOL/L (ref 22–29)
CO2 SERPL-SCNC: 28 MMOL/L (ref 22–29)
COHB: 0.4 % (ref 0–1.5)
CREAT BLD-MCNC: 0.4 MG/DL (ref 0.5–1)
CREAT BLD-MCNC: 0.6 MG/DL (ref 0.5–1)
CREAT SERPL-MCNC: 0.4 MG/DL (ref 0.5–1)
CREAT SERPL-MCNC: 0.5 MG/DL (ref 0.5–1)
CREAT SERPL-MCNC: 0.5 MG/DL (ref 0.5–1)
CRITICAL: ABNORMAL
DATE ANALYZED: ABNORMAL
DATE OF COLLECTION: ABNORMAL
EGFR, POC: >90 ML/MIN/1.73M2
EGFR, POC: >90 ML/MIN/1.73M2
EKG ATRIAL RATE: 112 BPM
EKG P AXIS: 47 DEGREES
EKG P-R INTERVAL: 142 MS
EKG Q-T INTERVAL: 318 MS
EKG QRS DURATION: 86 MS
EKG QTC CALCULATION (BAZETT): 434 MS
EKG R AXIS: 42 DEGREES
EKG T AXIS: -48 DEGREES
EKG VENTRICULAR RATE: 112 BPM
EOSINOPHIL # BLD: 0 K/UL (ref 0.05–0.5)
EOSINOPHIL # BLD: 0.13 K/UL (ref 0.05–0.5)
EOSINOPHILS RELATIVE PERCENT: 0 % (ref 0–6)
EOSINOPHILS RELATIVE PERCENT: 1 % (ref 0–6)
ERYTHROCYTE [DISTWIDTH] IN BLOOD BY AUTOMATED COUNT: 13.6 % (ref 11.5–15)
ERYTHROCYTE [DISTWIDTH] IN BLOOD BY AUTOMATED COUNT: 13.8 % (ref 11.5–15)
FIO2: 100 %
GFR SERPL CREATININE-BSD FRML MDRD: >90 ML/MIN/1.73M2
GLUCOSE BLD-MCNC: 143 MG/DL (ref 74–99)
GLUCOSE BLD-MCNC: 162 MG/DL (ref 74–99)
GLUCOSE BLD-MCNC: 186 MG/DL (ref 74–99)
GLUCOSE BLD-MCNC: 219 MG/DL (ref 74–99)
GLUCOSE BLD-MCNC: 238 MG/DL (ref 74–99)
GLUCOSE BLD-MCNC: 263 MG/DL (ref 74–99)
GLUCOSE BLD-MCNC: 281 MG/DL (ref 74–99)
GLUCOSE SERPL-MCNC: 133 MG/DL (ref 74–99)
GLUCOSE SERPL-MCNC: 165 MG/DL (ref 74–99)
GLUCOSE SERPL-MCNC: 213 MG/DL (ref 74–99)
HCO3 VENOUS: 26.8 MMOL/L (ref 22–26)
HCO3: 22 MMOL/L (ref 22–26)
HCT VFR BLD AUTO: 22 % (ref 37–54)
HCT VFR BLD AUTO: 23 % (ref 37–54)
HCT VFR BLD AUTO: 23.8 % (ref 34–48)
HCT VFR BLD AUTO: 25.7 % (ref 34–48)
HGB BLD-MCNC: 7.9 G/DL (ref 11.5–15.5)
HGB BLD-MCNC: 8.5 G/DL (ref 11.5–15.5)
HHB: 0.9 % (ref 0–5)
IMM GRANULOCYTES # BLD AUTO: 0.27 K/UL (ref 0–0.58)
IMM GRANULOCYTES NFR BLD: 1 % (ref 0–5)
INR PPP: 1.2
INR PPP: 1.3
LAB: ABNORMAL
LYMPHOCYTES NFR BLD: 1.21 K/UL (ref 1.5–4)
LYMPHOCYTES NFR BLD: 1.85 K/UL (ref 1.5–4)
LYMPHOCYTES RELATIVE PERCENT: 5 % (ref 20–42)
LYMPHOCYTES RELATIVE PERCENT: 8 % (ref 20–42)
Lab: 2355
MAGNESIUM SERPL-MCNC: 1.9 MG/DL (ref 1.6–2.6)
MAGNESIUM SERPL-MCNC: 2 MG/DL (ref 1.6–2.6)
MAGNESIUM SERPL-MCNC: 2.4 MG/DL (ref 1.6–2.6)
MCH RBC QN AUTO: 30.3 PG (ref 26–35)
MCH RBC QN AUTO: 30.5 PG (ref 26–35)
MCHC RBC AUTO-ENTMCNC: 33.1 G/DL (ref 32–34.5)
MCHC RBC AUTO-ENTMCNC: 33.2 G/DL (ref 32–34.5)
MCV RBC AUTO: 91.2 FL (ref 80–99.9)
MCV RBC AUTO: 92.1 FL (ref 80–99.9)
METHB: 0.5 % (ref 0–1.5)
MODE: AC
MONOCYTES NFR BLD: 0.21 K/UL (ref 0.1–0.95)
MONOCYTES NFR BLD: 0.94 K/UL (ref 0.1–0.95)
MONOCYTES NFR BLD: 1 % (ref 2–12)
MONOCYTES NFR BLD: 4 % (ref 2–12)
MYELOCYTES ABSOLUTE COUNT: 0.21 K/UL
MYELOCYTES: 1 %
NEUTROPHILS NFR BLD: 89 % (ref 43–80)
NEUTROPHILS NFR BLD: 90 % (ref 43–80)
NEUTS SEG NFR BLD: 21.34 K/UL (ref 1.8–7.3)
NEUTS SEG NFR BLD: 21.94 K/UL (ref 1.8–7.3)
NUCLEATED RED BLOOD CELLS: 1 PER 100 WBC
O2 SAT, VEN: 53.3 %
O2 SATURATION: 99.1 % (ref 92–98.5)
O2HB: 98.2 % (ref 94–97)
OPERATOR ID: 2962
PATIENT TEMP: 37 C
PCO2, VEN: 49.4 MM HG
PCO2: 40.2 MMHG (ref 35–45)
PEEP/CPAP: 8 CMH2O
PFO2: 3.53 MMHG/%
PH BLOOD GAS: 7.36 (ref 7.35–7.45)
PH VENOUS: 7.34 (ref 7.35–7.45)
PHOSPHATE SERPL-MCNC: 0.8 MG/DL (ref 2.5–4.5)
PHOSPHATE SERPL-MCNC: 1.3 MG/DL (ref 2.5–4.5)
PHOSPHATE SERPL-MCNC: 2.4 MG/DL (ref 2.5–4.5)
PLATELET # BLD AUTO: 258 K/UL (ref 130–450)
PLATELET # BLD AUTO: 266 K/UL (ref 130–450)
PMV BLD AUTO: 10.3 FL (ref 7–12)
PMV BLD AUTO: 9.9 FL (ref 7–12)
PO2, VEN: 30.3 MM HG
PO2: 352.6 MMHG (ref 75–100)
POC ANION GAP: 12 MMOL/L (ref 7–16)
POC ANION GAP: 8 MMOL/L (ref 7–16)
POC HCO3: 26.9 MMOL/L (ref 22–26)
POC HEMOGLOBIN (CALC): 7.5 G/DL (ref 12.5–15.5)
POC HEMOGLOBIN (CALC): 7.8 G/DL (ref 12.5–15.5)
POC LACTIC ACID: 0.9 MMOL/L (ref 0.5–2.2)
POC LACTIC ACID: 1 MMOL/L (ref 0.5–2.2)
POC O2 SATURATION: 98.5 % (ref 92–98.5)
POC PCO2: 41.4 MM HG (ref 35–45)
POC PH: 7.42 (ref 7.35–7.45)
POC PO2: 113.3 MM HG (ref 80–100)
POSITIVE BASE EXCESS, ART: 2.2 MMOL/L (ref 0–3)
POSITIVE BASE EXCESS, VEN: 0.8 MMOL/L
POTASSIUM BLD-SCNC: 3.8 MMOL/L (ref 3.5–5)
POTASSIUM BLD-SCNC: 4.1 MMOL/L (ref 3.5–5)
POTASSIUM SERPL-SCNC: 3.7 MMOL/L (ref 3.5–5)
POTASSIUM SERPL-SCNC: 3.9 MMOL/L (ref 3.5–5)
POTASSIUM SERPL-SCNC: 4.6 MMOL/L (ref 3.5–5)
PROT SERPL-MCNC: 5.2 G/DL (ref 6.4–8.3)
PROTHROMBIN TIME: 13.3 SEC (ref 9.3–12.4)
PROTHROMBIN TIME: 14.7 SEC (ref 9.3–12.4)
RBC # BLD AUTO: 2.61 M/UL (ref 3.5–5.5)
RBC # BLD AUTO: 2.79 M/UL (ref 3.5–5.5)
RBC # BLD: ABNORMAL 10*6/UL
RI(T): 0.84
RR MECHANICAL: 14 B/MIN
SODIUM BLD-SCNC: 139 MMOL/L (ref 132–146)
SODIUM BLD-SCNC: 139 MMOL/L (ref 132–146)
SODIUM SERPL-SCNC: 136 MMOL/L (ref 132–146)
SODIUM SERPL-SCNC: 138 MMOL/L (ref 132–146)
SODIUM SERPL-SCNC: 138 MMOL/L (ref 132–146)
SOURCE, BLOOD GAS: ABNORMAL
THB: 13.1 G/DL (ref 11.5–16.5)
TIME ANALYZED: 2358
TRIGL SERPL-MCNC: 97 MG/DL
VT MECHANICAL: 350 ML
WBC OTHER # BLD: 23.6 K/UL (ref 4.5–11.5)
WBC OTHER # BLD: 24.6 K/UL (ref 4.5–11.5)

## 2024-04-18 PROCEDURE — 2580000003 HC RX 258: Performed by: STUDENT IN AN ORGANIZED HEALTH CARE EDUCATION/TRAINING PROGRAM

## 2024-04-18 PROCEDURE — 6370000000 HC RX 637 (ALT 250 FOR IP): Performed by: ANESTHESIOLOGIST ASSISTANT

## 2024-04-18 PROCEDURE — 87205 SMEAR GRAM STAIN: CPT

## 2024-04-18 PROCEDURE — 2500000003 HC RX 250 WO HCPCS

## 2024-04-18 PROCEDURE — 87206 SMEAR FLUORESCENT/ACID STAI: CPT

## 2024-04-18 PROCEDURE — 74018 RADEX ABDOMEN 1 VIEW: CPT

## 2024-04-18 PROCEDURE — 48140 PARTIAL REMOVAL OF PANCREAS: CPT | Performed by: STUDENT IN AN ORGANIZED HEALTH CARE EDUCATION/TRAINING PROGRAM

## 2024-04-18 PROCEDURE — 88309 TISSUE EXAM BY PATHOLOGIST: CPT

## 2024-04-18 PROCEDURE — 2500000003 HC RX 250 WO HCPCS: Performed by: STUDENT IN AN ORGANIZED HEALTH CARE EDUCATION/TRAINING PROGRAM

## 2024-04-18 PROCEDURE — 2500000003 HC RX 250 WO HCPCS: Performed by: ANESTHESIOLOGIST ASSISTANT

## 2024-04-18 PROCEDURE — 6360000002 HC RX W HCPCS: Performed by: ANESTHESIOLOGIST ASSISTANT

## 2024-04-18 PROCEDURE — 2580000003 HC RX 258: Performed by: SURGERY

## 2024-04-18 PROCEDURE — 93010 ELECTROCARDIOGRAM REPORT: CPT | Performed by: INTERNAL MEDICINE

## 2024-04-18 PROCEDURE — 6360000002 HC RX W HCPCS

## 2024-04-18 PROCEDURE — C9113 INJ PANTOPRAZOLE SODIUM, VIA: HCPCS | Performed by: STUDENT IN AN ORGANIZED HEALTH CARE EDUCATION/TRAINING PROGRAM

## 2024-04-18 PROCEDURE — 80053 COMPREHEN METABOLIC PANEL: CPT

## 2024-04-18 PROCEDURE — 6370000000 HC RX 637 (ALT 250 FOR IP)

## 2024-04-18 PROCEDURE — 3700000001 HC ADD 15 MINUTES (ANESTHESIA): Performed by: STUDENT IN AN ORGANIZED HEALTH CARE EDUCATION/TRAINING PROGRAM

## 2024-04-18 PROCEDURE — 82962 GLUCOSE BLOOD TEST: CPT

## 2024-04-18 PROCEDURE — 87070 CULTURE OTHR SPECIMN AEROBIC: CPT

## 2024-04-18 PROCEDURE — 87106 FUNGI IDENTIFICATION YEAST: CPT

## 2024-04-18 PROCEDURE — 80047 BASIC METABLC PNL IONIZED CA: CPT

## 2024-04-18 PROCEDURE — 6360000002 HC RX W HCPCS: Performed by: STUDENT IN AN ORGANIZED HEALTH CARE EDUCATION/TRAINING PROGRAM

## 2024-04-18 PROCEDURE — 87181 SC STD AGAR DILUTION PER AGT: CPT

## 2024-04-18 PROCEDURE — 85610 PROTHROMBIN TIME: CPT

## 2024-04-18 PROCEDURE — 87015 SPECIMEN INFECT AGNT CONCNTJ: CPT

## 2024-04-18 PROCEDURE — 6370000000 HC RX 637 (ALT 250 FOR IP): Performed by: STUDENT IN AN ORGANIZED HEALTH CARE EDUCATION/TRAINING PROGRAM

## 2024-04-18 PROCEDURE — 76998 US GUIDE INTRAOP: CPT | Performed by: STUDENT IN AN ORGANIZED HEALTH CARE EDUCATION/TRAINING PROGRAM

## 2024-04-18 PROCEDURE — 94002 VENT MGMT INPAT INIT DAY: CPT

## 2024-04-18 PROCEDURE — 87186 SC STD MICRODIL/AGAR DIL: CPT

## 2024-04-18 PROCEDURE — 82803 BLOOD GASES ANY COMBINATION: CPT

## 2024-04-18 PROCEDURE — 3700000000 HC ANESTHESIA ATTENDED CARE: Performed by: STUDENT IN AN ORGANIZED HEALTH CARE EDUCATION/TRAINING PROGRAM

## 2024-04-18 PROCEDURE — 36592 COLLECT BLOOD FROM PICC: CPT

## 2024-04-18 PROCEDURE — 7100000000 HC PACU RECOVERY - FIRST 15 MIN

## 2024-04-18 PROCEDURE — 74177 CT ABD & PELVIS W/CONTRAST: CPT

## 2024-04-18 PROCEDURE — 85576 BLOOD PLATELET AGGREGATION: CPT

## 2024-04-18 PROCEDURE — 6360000004 HC RX CONTRAST MEDICATION: Performed by: RADIOLOGY

## 2024-04-18 PROCEDURE — 94669 MECHANICAL CHEST WALL OSCILL: CPT

## 2024-04-18 PROCEDURE — 2580000003 HC RX 258

## 2024-04-18 PROCEDURE — P9016 RBC LEUKOCYTES REDUCED: HCPCS

## 2024-04-18 PROCEDURE — 3600000004 HC SURGERY LEVEL 4 BASE: Performed by: STUDENT IN AN ORGANIZED HEALTH CARE EDUCATION/TRAINING PROGRAM

## 2024-04-18 PROCEDURE — 84100 ASSAY OF PHOSPHORUS: CPT

## 2024-04-18 PROCEDURE — 83605 ASSAY OF LACTIC ACID: CPT

## 2024-04-18 PROCEDURE — 02HV33Z INSERTION OF INFUSION DEVICE INTO SUPERIOR VENA CAVA, PERCUTANEOUS APPROACH: ICD-10-PCS | Performed by: STUDENT IN AN ORGANIZED HEALTH CARE EDUCATION/TRAINING PROGRAM

## 2024-04-18 PROCEDURE — 87075 CULTR BACTERIA EXCEPT BLOOD: CPT

## 2024-04-18 PROCEDURE — 71045 X-RAY EXAM CHEST 1 VIEW: CPT

## 2024-04-18 PROCEDURE — 85390 FIBRINOLYSINS SCREEN I&R: CPT

## 2024-04-18 PROCEDURE — 7100000001 HC PACU RECOVERY - ADDTL 15 MIN

## 2024-04-18 PROCEDURE — 36556 INSERT NON-TUNNEL CV CATH: CPT

## 2024-04-18 PROCEDURE — 0FTG0ZZ RESECTION OF PANCREAS, OPEN APPROACH: ICD-10-PCS | Performed by: STUDENT IN AN ORGANIZED HEALTH CARE EDUCATION/TRAINING PROGRAM

## 2024-04-18 PROCEDURE — 2000000000 HC ICU R&B

## 2024-04-18 PROCEDURE — 88305 TISSUE EXAM BY PATHOLOGIST: CPT

## 2024-04-18 PROCEDURE — 85347 COAGULATION TIME ACTIVATED: CPT

## 2024-04-18 PROCEDURE — 07TP0ZZ RESECTION OF SPLEEN, OPEN APPROACH: ICD-10-PCS | Performed by: STUDENT IN AN ORGANIZED HEALTH CARE EDUCATION/TRAINING PROGRAM

## 2024-04-18 PROCEDURE — 83735 ASSAY OF MAGNESIUM: CPT

## 2024-04-18 PROCEDURE — 6360000002 HC RX W HCPCS: Performed by: SURGERY

## 2024-04-18 PROCEDURE — 84478 ASSAY OF TRIGLYCERIDES: CPT

## 2024-04-18 PROCEDURE — 87116 MYCOBACTERIA CULTURE: CPT

## 2024-04-18 PROCEDURE — 6370000000 HC RX 637 (ALT 250 FOR IP): Performed by: SURGERY

## 2024-04-18 PROCEDURE — 0DBU0ZZ EXCISION OF OMENTUM, OPEN APPROACH: ICD-10-PCS | Performed by: STUDENT IN AN ORGANIZED HEALTH CARE EDUCATION/TRAINING PROGRAM

## 2024-04-18 PROCEDURE — 82330 ASSAY OF CALCIUM: CPT

## 2024-04-18 PROCEDURE — 87102 FUNGUS ISOLATION CULTURE: CPT

## 2024-04-18 PROCEDURE — 36620 INSERTION CATHETER ARTERY: CPT | Performed by: ANESTHESIOLOGY

## 2024-04-18 PROCEDURE — 85014 HEMATOCRIT: CPT

## 2024-04-18 PROCEDURE — 85025 COMPLETE CBC W/AUTO DIFF WBC: CPT

## 2024-04-18 PROCEDURE — 2580000003 HC RX 258: Performed by: ANESTHESIOLOGIST ASSISTANT

## 2024-04-18 PROCEDURE — 80048 BASIC METABOLIC PNL TOTAL CA: CPT

## 2024-04-18 PROCEDURE — 82805 BLOOD GASES W/O2 SATURATION: CPT

## 2024-04-18 PROCEDURE — 3600000014 HC SURGERY LEVEL 4 ADDTL 15MIN: Performed by: STUDENT IN AN ORGANIZED HEALTH CARE EDUCATION/TRAINING PROGRAM

## 2024-04-18 PROCEDURE — 83036 HEMOGLOBIN GLYCOSYLATED A1C: CPT

## 2024-04-18 PROCEDURE — 5A1955Z RESPIRATORY VENTILATION, GREATER THAN 96 CONSECUTIVE HOURS: ICD-10-PCS | Performed by: STUDENT IN AN ORGANIZED HEALTH CARE EDUCATION/TRAINING PROGRAM

## 2024-04-18 PROCEDURE — 2709999900 HC NON-CHARGEABLE SUPPLY: Performed by: STUDENT IN AN ORGANIZED HEALTH CARE EDUCATION/TRAINING PROGRAM

## 2024-04-18 PROCEDURE — 2700000000 HC OXYGEN THERAPY PER DAY

## 2024-04-18 PROCEDURE — 99291 CRITICAL CARE FIRST HOUR: CPT | Performed by: SURGERY

## 2024-04-18 PROCEDURE — 85384 FIBRINOGEN ACTIVITY: CPT

## 2024-04-18 RX ORDER — SODIUM CHLORIDE, SODIUM LACTATE, POTASSIUM CHLORIDE, CALCIUM CHLORIDE 600; 310; 30; 20 MG/100ML; MG/100ML; MG/100ML; MG/100ML
INJECTION, SOLUTION INTRAVENOUS CONTINUOUS
Status: DISCONTINUED | OUTPATIENT
Start: 2024-04-18 | End: 2024-04-19

## 2024-04-18 RX ORDER — SODIUM CHLORIDE 9 MG/ML
INJECTION, SOLUTION INTRAVENOUS PRN
Status: DISCONTINUED | OUTPATIENT
Start: 2024-04-18 | End: 2024-04-18

## 2024-04-18 RX ORDER — INSULIN LISPRO 100 [IU]/ML
0-8 INJECTION, SOLUTION INTRAVENOUS; SUBCUTANEOUS EVERY 4 HOURS
Status: DISCONTINUED | OUTPATIENT
Start: 2024-04-18 | End: 2024-04-18

## 2024-04-18 RX ORDER — SODIUM CHLORIDE, SODIUM LACTATE, POTASSIUM CHLORIDE, CALCIUM CHLORIDE 600; 310; 30; 20 MG/100ML; MG/100ML; MG/100ML; MG/100ML
INJECTION, SOLUTION INTRAVENOUS CONTINUOUS PRN
Status: DISCONTINUED | OUTPATIENT
Start: 2024-04-18 | End: 2024-04-18 | Stop reason: SDUPTHER

## 2024-04-18 RX ORDER — FENTANYL CITRATE-0.9 % NACL/PF 20 MCG/2ML
50 SYRINGE (ML) INTRAVENOUS EVERY 30 MIN PRN
Status: DISCONTINUED | OUTPATIENT
Start: 2024-04-18 | End: 2024-04-28

## 2024-04-18 RX ORDER — LIDOCAINE HYDROCHLORIDE 20 MG/ML
INJECTION, SOLUTION INTRAVENOUS PRN
Status: DISCONTINUED | OUTPATIENT
Start: 2024-04-18 | End: 2024-04-18 | Stop reason: SDUPTHER

## 2024-04-18 RX ORDER — SCOLOPAMINE TRANSDERMAL SYSTEM 1 MG/1
1 PATCH, EXTENDED RELEASE TRANSDERMAL
Status: DISCONTINUED | OUTPATIENT
Start: 2024-04-18 | End: 2024-04-19

## 2024-04-18 RX ORDER — NOREPINEPHRINE BITARTRATE 0.06 MG/ML
1-100 INJECTION, SOLUTION INTRAVENOUS CONTINUOUS
Status: DISCONTINUED | OUTPATIENT
Start: 2024-04-18 | End: 2024-04-19

## 2024-04-18 RX ORDER — METOCLOPRAMIDE HYDROCHLORIDE 5 MG/ML
10 INJECTION INTRAMUSCULAR; INTRAVENOUS EVERY 8 HOURS
Status: DISCONTINUED | OUTPATIENT
Start: 2024-04-18 | End: 2024-05-01

## 2024-04-18 RX ORDER — MIDAZOLAM HYDROCHLORIDE 1 MG/ML
INJECTION INTRAMUSCULAR; INTRAVENOUS PRN
Status: DISCONTINUED | OUTPATIENT
Start: 2024-04-18 | End: 2024-04-18 | Stop reason: SDUPTHER

## 2024-04-18 RX ORDER — ACETAMINOPHEN 160 MG/5ML
650 LIQUID ORAL EVERY 6 HOURS SCHEDULED
Status: DISPENSED | OUTPATIENT
Start: 2024-04-19

## 2024-04-18 RX ORDER — SODIUM CHLORIDE 9 MG/ML
INJECTION, SOLUTION INTRAVENOUS CONTINUOUS PRN
Status: DISCONTINUED | OUTPATIENT
Start: 2024-04-18 | End: 2024-04-18 | Stop reason: SDUPTHER

## 2024-04-18 RX ORDER — FENTANYL CITRATE 50 UG/ML
INJECTION, SOLUTION INTRAMUSCULAR; INTRAVENOUS PRN
Status: DISCONTINUED | OUTPATIENT
Start: 2024-04-18 | End: 2024-04-18 | Stop reason: SDUPTHER

## 2024-04-18 RX ORDER — MAGNESIUM SULFATE IN WATER 40 MG/ML
2000 INJECTION, SOLUTION INTRAVENOUS ONCE
Status: COMPLETED | OUTPATIENT
Start: 2024-04-18 | End: 2024-04-18

## 2024-04-18 RX ORDER — FENTANYL CITRATE-0.9 % NACL/PF 10 MCG/ML
25-200 PLASTIC BAG, INJECTION (ML) INTRAVENOUS CONTINUOUS
Status: DISCONTINUED | OUTPATIENT
Start: 2024-04-18 | End: 2024-04-28

## 2024-04-18 RX ORDER — CHLORHEXIDINE GLUCONATE ORAL RINSE 1.2 MG/ML
15 SOLUTION DENTAL 2 TIMES DAILY
Status: DISPENSED | OUTPATIENT
Start: 2024-04-18

## 2024-04-18 RX ORDER — PROPOFOL 10 MG/ML
INJECTION, EMULSION INTRAVENOUS PRN
Status: DISCONTINUED | OUTPATIENT
Start: 2024-04-18 | End: 2024-04-18 | Stop reason: SDUPTHER

## 2024-04-18 RX ORDER — MINERAL OIL AND WHITE PETROLATUM 150; 830 MG/G; MG/G
OINTMENT OPHTHALMIC EVERY 4 HOURS
Status: DISPENSED | OUTPATIENT
Start: 2024-04-19

## 2024-04-18 RX ORDER — ROCURONIUM BROMIDE 10 MG/ML
INJECTION, SOLUTION INTRAVENOUS PRN
Status: DISCONTINUED | OUTPATIENT
Start: 2024-04-18 | End: 2024-04-18 | Stop reason: SDUPTHER

## 2024-04-18 RX ORDER — CALCIUM CHLORIDE 100 MG/ML
INJECTION INTRAVENOUS; INTRAVENTRICULAR PRN
Status: DISCONTINUED | OUTPATIENT
Start: 2024-04-18 | End: 2024-04-18 | Stop reason: SDUPTHER

## 2024-04-18 RX ADMIN — HYDROMORPHONE HYDROCHLORIDE 0.5 MG: 1 INJECTION, SOLUTION INTRAMUSCULAR; INTRAVENOUS; SUBCUTANEOUS at 01:50

## 2024-04-18 RX ADMIN — INSULIN LISPRO 1 UNITS: 100 INJECTION, SOLUTION INTRAVENOUS; SUBCUTANEOUS at 12:08

## 2024-04-18 RX ADMIN — ROCURONIUM BROMIDE 50 MG: 10 INJECTION, SOLUTION INTRAVENOUS at 22:21

## 2024-04-18 RX ADMIN — POTASSIUM PHOSPHATE, MONOBASIC AND POTASSIUM PHOSPHATE, DIBASIC 30 MMOL: 224; 236 INJECTION, SOLUTION, CONCENTRATE INTRAVENOUS at 14:50

## 2024-04-18 RX ADMIN — FENTANYL CITRATE 50 MCG: 50 INJECTION, SOLUTION INTRAMUSCULAR; INTRAVENOUS at 19:51

## 2024-04-18 RX ADMIN — SODIUM CHLORIDE, POTASSIUM CHLORIDE, SODIUM LACTATE AND CALCIUM CHLORIDE: 600; 310; 30; 20 INJECTION, SOLUTION INTRAVENOUS at 19:33

## 2024-04-18 RX ADMIN — SODIUM CHLORIDE 2 UNITS/HR: 9 INJECTION, SOLUTION INTRAVENOUS at 23:33

## 2024-04-18 RX ADMIN — PROPOFOL 30 MG: 10 INJECTION, EMULSION INTRAVENOUS at 19:58

## 2024-04-18 RX ADMIN — METOCLOPRAMIDE 10 MG: 5 INJECTION, SOLUTION INTRAMUSCULAR; INTRAVENOUS at 14:55

## 2024-04-18 RX ADMIN — ROCURONIUM BROMIDE 50 MG: 10 INJECTION, SOLUTION INTRAVENOUS at 19:38

## 2024-04-18 RX ADMIN — Medication 50 MCG/HR: at 23:28

## 2024-04-18 RX ADMIN — GABAPENTIN 200 MG: 100 CAPSULE ORAL at 14:55

## 2024-04-18 RX ADMIN — PIPERACILLIN AND TAZOBACTAM 3375 MG: 3; .375 INJECTION, POWDER, LYOPHILIZED, FOR SOLUTION INTRAVENOUS at 03:41

## 2024-04-18 RX ADMIN — ROCURONIUM BROMIDE 50 MG: 10 INJECTION, SOLUTION INTRAVENOUS at 20:32

## 2024-04-18 RX ADMIN — Medication 50 MCG: at 23:36

## 2024-04-18 RX ADMIN — HYDROMORPHONE HYDROCHLORIDE 0.5 MG: 1 INJECTION, SOLUTION INTRAMUSCULAR; INTRAVENOUS; SUBCUTANEOUS at 08:04

## 2024-04-18 RX ADMIN — POTASSIUM PHOSPHATE, MONOBASIC AND POTASSIUM PHOSPHATE, DIBASIC 30 MMOL: 224; 236 INJECTION, SOLUTION, CONCENTRATE INTRAVENOUS at 07:04

## 2024-04-18 RX ADMIN — SODIUM CHLORIDE: 9 INJECTION, SOLUTION INTRAVENOUS at 21:55

## 2024-04-18 RX ADMIN — SODIUM CHLORIDE 0.08 MCG/KG/MIN: 0.9 INJECTION, SOLUTION INTRAVENOUS at 22:02

## 2024-04-18 RX ADMIN — SODIUM CHLORIDE: 9 INJECTION, SOLUTION INTRAVENOUS at 19:52

## 2024-04-18 RX ADMIN — SODIUM CHLORIDE, PRESERVATIVE FREE 40 MG: 5 INJECTION INTRAVENOUS at 09:38

## 2024-04-18 RX ADMIN — OXYCODONE HYDROCHLORIDE 10 MG: 5 TABLET ORAL at 16:55

## 2024-04-18 RX ADMIN — LIDOCAINE HYDROCHLORIDE 100 MG: 20 INJECTION, SOLUTION INTRAVENOUS at 19:38

## 2024-04-18 RX ADMIN — OXYCODONE HYDROCHLORIDE 10 MG: 5 TABLET ORAL at 09:37

## 2024-04-18 RX ADMIN — HYDROMORPHONE HYDROCHLORIDE 0.5 MG: 1 INJECTION, SOLUTION INTRAMUSCULAR; INTRAVENOUS; SUBCUTANEOUS at 18:18

## 2024-04-18 RX ADMIN — MIDAZOLAM 2 MG: 1 INJECTION INTRAMUSCULAR; INTRAVENOUS at 22:43

## 2024-04-18 RX ADMIN — ACETAMINOPHEN 650 MG: 325 TABLET ORAL at 05:14

## 2024-04-18 RX ADMIN — PROPOFOL 30 MCG/KG/MIN: 10 INJECTION, EMULSION INTRAVENOUS at 22:47

## 2024-04-18 RX ADMIN — PIPERACILLIN AND TAZOBACTAM 3375 MG: 3; .375 INJECTION, POWDER, LYOPHILIZED, FOR SOLUTION INTRAVENOUS at 18:04

## 2024-04-18 RX ADMIN — GABAPENTIN 200 MG: 100 CAPSULE ORAL at 09:40

## 2024-04-18 RX ADMIN — ACETAMINOPHEN 650 MG: 325 TABLET ORAL at 11:46

## 2024-04-18 RX ADMIN — METHOCARBAMOL 1000 MG: 1000 INJECTION, SOLUTION INTRAMUSCULAR; INTRAVENOUS at 11:47

## 2024-04-18 RX ADMIN — HYDROMORPHONE HYDROCHLORIDE 0.5 MG: 1 INJECTION, SOLUTION INTRAMUSCULAR; INTRAVENOUS; SUBCUTANEOUS at 05:00

## 2024-04-18 RX ADMIN — PHENYLEPHRINE HYDROCHLORIDE 200 MCG: 10 INJECTION INTRAVENOUS at 22:23

## 2024-04-18 RX ADMIN — CALCIUM CHLORIDE INJECTION 0.5 G: 100 INJECTION, SOLUTION INTRAVENOUS at 22:05

## 2024-04-18 RX ADMIN — KETOTIFEN FUMARATE 1 DROP: 0.35 SOLUTION/ DROPS OPHTHALMIC at 09:40

## 2024-04-18 RX ADMIN — SODIUM CHLORIDE 2 UNITS/HR: 9 INJECTION, SOLUTION INTRAVENOUS at 22:15

## 2024-04-18 RX ADMIN — INSULIN LISPRO 1 UNITS: 100 INJECTION, SOLUTION INTRAVENOUS; SUBCUTANEOUS at 10:35

## 2024-04-18 RX ADMIN — PHENYLEPHRINE HYDROCHLORIDE 150 MCG: 10 INJECTION INTRAVENOUS at 22:00

## 2024-04-18 RX ADMIN — PROPOFOL 30 MG: 10 INJECTION, EMULSION INTRAVENOUS at 19:51

## 2024-04-18 RX ADMIN — KETAMINE HYDROCHLORIDE 0.2 MG/KG/HR: 100 INJECTION INTRAMUSCULAR; INTRAVENOUS at 23:21

## 2024-04-18 RX ADMIN — PHENYLEPHRINE HYDROCHLORIDE 150 MCG: 10 INJECTION INTRAVENOUS at 21:39

## 2024-04-18 RX ADMIN — METOCLOPRAMIDE 5 MG: 5 INJECTION, SOLUTION INTRAMUSCULAR; INTRAVENOUS at 06:33

## 2024-04-18 RX ADMIN — IOPAMIDOL 75 ML: 755 INJECTION, SOLUTION INTRAVENOUS at 08:15

## 2024-04-18 RX ADMIN — ONDANSETRON 4 MG: 2 INJECTION INTRAMUSCULAR; INTRAVENOUS at 09:38

## 2024-04-18 RX ADMIN — PHENYLEPHRINE HYDROCHLORIDE 41 MCG/MIN: 10 INJECTION INTRAVENOUS at 20:33

## 2024-04-18 RX ADMIN — PROPOFOL 60 MG: 10 INJECTION, EMULSION INTRAVENOUS at 19:38

## 2024-04-18 RX ADMIN — FENTANYL CITRATE 50 MCG: 50 INJECTION, SOLUTION INTRAMUSCULAR; INTRAVENOUS at 19:38

## 2024-04-18 RX ADMIN — HYDROMORPHONE HYDROCHLORIDE 0.5 MG: 1 INJECTION, SOLUTION INTRAMUSCULAR; INTRAVENOUS; SUBCUTANEOUS at 15:01

## 2024-04-18 RX ADMIN — OXYCODONE HYDROCHLORIDE 10 MG: 5 TABLET ORAL at 03:48

## 2024-04-18 RX ADMIN — MAGNESIUM SULFATE HEPTAHYDRATE 2000 MG: 40 INJECTION, SOLUTION INTRAVENOUS at 15:01

## 2024-04-18 RX ADMIN — ENOXAPARIN SODIUM 40 MG: 100 INJECTION SUBCUTANEOUS at 09:36

## 2024-04-18 RX ADMIN — AMLODIPINE BESYLATE 5 MG: 5 TABLET ORAL at 09:37

## 2024-04-18 RX ADMIN — ACETAMINOPHEN 650 MG: 325 TABLET ORAL at 00:06

## 2024-04-18 RX ADMIN — BUPROPION HYDROCHLORIDE 300 MG: 300 TABLET, FILM COATED, EXTENDED RELEASE ORAL at 09:37

## 2024-04-18 RX ADMIN — ONDANSETRON 4 MG: 2 INJECTION INTRAMUSCULAR; INTRAVENOUS at 01:55

## 2024-04-18 RX ADMIN — PIPERACILLIN AND TAZOBACTAM 3375 MG: 3; .375 INJECTION, POWDER, LYOPHILIZED, FOR SOLUTION INTRAVENOUS at 09:45

## 2024-04-18 RX ADMIN — CALCIUM GLUCONATE: 98 INJECTION, SOLUTION INTRAVENOUS at 18:01

## 2024-04-18 RX ADMIN — BENZOCAINE, BUTAMBEN, AND TETRACAINE HYDROCHLORIDE 1 SPRAY: .028; .004; .004 AEROSOL, SPRAY TOPICAL at 15:04

## 2024-04-18 ASSESSMENT — PAIN DESCRIPTION - PAIN TYPE
TYPE: SURGICAL PAIN;CHRONIC PAIN
TYPE: ACUTE PAIN;CHRONIC PAIN
TYPE: SURGICAL PAIN;CHRONIC PAIN;ACUTE PAIN
TYPE: ACUTE PAIN;CHRONIC PAIN;SURGICAL PAIN
TYPE: SURGICAL PAIN;CHRONIC PAIN
TYPE: CHRONIC PAIN;ACUTE PAIN;SURGICAL PAIN

## 2024-04-18 ASSESSMENT — PAIN - FUNCTIONAL ASSESSMENT
PAIN_FUNCTIONAL_ASSESSMENT: ACTIVITIES ARE NOT PREVENTED
PAIN_FUNCTIONAL_ASSESSMENT: ACTIVITIES ARE NOT PREVENTED
PAIN_FUNCTIONAL_ASSESSMENT: PREVENTS OR INTERFERES SOME ACTIVE ACTIVITIES AND ADLS
PAIN_FUNCTIONAL_ASSESSMENT: ACTIVITIES ARE NOT PREVENTED
PAIN_FUNCTIONAL_ASSESSMENT: PREVENTS OR INTERFERES SOME ACTIVE ACTIVITIES AND ADLS
PAIN_FUNCTIONAL_ASSESSMENT: PREVENTS OR INTERFERES WITH ALL ACTIVE AND SOME PASSIVE ACTIVITIES

## 2024-04-18 ASSESSMENT — PAIN DESCRIPTION - LOCATION
LOCATION: ABDOMEN;BACK
LOCATION: ABDOMEN
LOCATION: ABDOMEN;BACK

## 2024-04-18 ASSESSMENT — PAIN DESCRIPTION - DESCRIPTORS
DESCRIPTORS: ACHING;DISCOMFORT;SORE

## 2024-04-18 ASSESSMENT — PAIN SCALES - GENERAL
PAINLEVEL_OUTOF10: 10
PAINLEVEL_OUTOF10: 0
PAINLEVEL_OUTOF10: 8
PAINLEVEL_OUTOF10: 10
PAINLEVEL_OUTOF10: 8
PAINLEVEL_OUTOF10: 10

## 2024-04-18 ASSESSMENT — PAIN DESCRIPTION - ONSET
ONSET: ON-GOING

## 2024-04-18 ASSESSMENT — PAIN DESCRIPTION - ORIENTATION
ORIENTATION: MID;LOWER

## 2024-04-18 ASSESSMENT — PULMONARY FUNCTION TESTS: PIF_VALUE: 20

## 2024-04-18 ASSESSMENT — PAIN DESCRIPTION - FREQUENCY
FREQUENCY: CONTINUOUS

## 2024-04-18 NOTE — PROCEDURES
PROCEDURE NOTE  Date: 4/18/2024   Name: Leatha Willard  YOB: 1965    Central Line    Date/Time: 4/18/2024 3:03 AM    Performed by: Marleny Wray DO  Authorized by: Marleny Wray DO  Consent: Verbal consent obtained.  Risks and benefits: risks, benefits and alternatives were discussed  Consent given by: patient  Patient understanding: patient states understanding of the procedure being performed  Patient consent: the patient's understanding of the procedure matches consent given  Relevant documents: relevant documents present and verified  Required items: required blood products, implants, devices, and special equipment available  Patient identity confirmed: verbally with patient and arm band  Time out: Immediately prior to procedure a \"time out\" was called to verify the correct patient, procedure, equipment, support staff and site/side marked as required.  Indications: vascular access  Anesthesia: local infiltration    Anesthesia:  Local Anesthetic: lidocaine 1% without epinephrine  Anesthetic total: 5 mL    Sedation:  Patient sedated: no    Preparation: skin prepped with 2% chlorhexidine  Skin prep agent dried: skin prep agent completely dried prior to procedure  Sterile barriers: all five maximum sterile barriers used - cap, mask, sterile gown, sterile gloves, and large sterile sheet  Hand hygiene: hand hygiene performed prior to central venous catheter insertion  Location details: right internal jugular  Patient position: Trendelenburg  Catheter type: triple lumen  Catheter size: 7 Fr  Pre-procedure: landmarks identified  Ultrasound guidance: yes  Sterile ultrasound techniques: sterile gel and sterile probe covers were used  Number of attempts: 1  Successful placement: yes  Post-procedure: line sutured and dressing applied  Assessment: blood return through all ports, free fluid flow, placement verified by x-ray and no pneumothorax on x-ray  Patient tolerance: patient tolerated the procedure well

## 2024-04-18 NOTE — PROGRESS NOTES
HPB update:     Patient continues to decline over the course of the day. Spiked a fever today of 101 through zosyn. Still tachy now in 120s. Started having pancreatic fluid leaking from midline incision. 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 Discussed with the patient and her emergency contact her Ex  Paul, it is my recommendation that we go back to surgery to washout her abdomen and control the fistula with performing a completion pancreatectomy. AS discussed after surgery, she has a butter soft pancreas and a <1mm PD. Her fistula risk score was high and she was over 25% alonzo for a leak post op. Attempts at salvaging her PJ will be futile and she will recover faster with a completion pancreatectomy. We discussed that this means she will be come a type 1 diabetic overnight. That she will require insulin and pancreatic enzyme supplementation for life. However, if we were to simply drain her pancreas she would likely continue to smolder for months and a fistula this severe can lead to severe sepsis and even death. She and her ex  both state understanding and agree to proceed.       Norma Rogers MD     Hepatobiliary and Pancreatic Surgery

## 2024-04-18 NOTE — PROGRESS NOTES
Patient complaining of increasing pain and tenderness. RN notified critical care. Orders placed in chart.

## 2024-04-18 NOTE — PROGRESS NOTES
Physical Therapy  Physical Therapy Attempt    Name: Leatha Willard  : 1965  MRN: [id number]      Date of Service: 2024  Chart reviewed.  Spoke with RN - pt is not medically appropriate for skilled PT at this time  Will re-attempt as able.    Suraj Clark, PT, DPT  [id number]

## 2024-04-18 NOTE — ANESTHESIA PRE PROCEDURE
04/18/24 1455   • methocarbamol (ROBAXIN) 1,000 mg in sodium chloride 0.9 % 100 mL IVPB  1,000 mg IntraVENous Q8H Marissa Ramirez MD   Stopped at 04/18/24 1226   • PN-Adult  3-in-1 Central Line (Standard)   IntraVENous Continuous TPN Jesse Hood MD       • butamben-tetracaine-benzocaine (CETACAINE) spray 1 spray  1 spray Topical Q2H PRN Jesse Hood MD   1 spray at 04/18/24 1504   • insulin lispro (HUMALOG) injection vial 0-8 Units  0-8 Units SubCUTAneous Q4H Marissa Ramirez MD       • 0.9 % sodium chloride infusion   IntraVENous PRN Jesse Hood MD       • benzonatate (TESSALON) capsule 100 mg  100 mg Oral TID PRN Jesse Hood MD       • PN-Adult  3-in-1 Central Line (Standard)   IntraVENous Continuous TPN Jesse Hood MD 76 mL/hr at 04/18/24 1343 Rate Verify at 04/18/24 1343   • lidocaine 4 % external patch 1 patch  1 patch TransDERmal Daily Jesse Hood MD   1 patch at 04/18/24 0941   • oxyCODONE (ROXICODONE) immediate release tablet 5 mg  5 mg Oral Q4H PRN Marleny Wray DO        Or   • oxyCODONE (ROXICODONE) immediate release tablet 10 mg  10 mg Oral Q4H PRN Marleny Wray DO   10 mg at 04/18/24 1655   • HYDROmorphone (DILAUDID) injection 0.5 mg  0.5 mg IntraVENous Q3H PRN Marleny Wray DO   0.5 mg at 04/18/24 1501   • gabapentin (NEURONTIN) capsule 200 mg  200 mg Oral TID Marleny Wray DO   200 mg at 04/18/24 1455   • potassium bicarb-citric acid (EFFER-K) effervescent tablet 40 mEq  40 mEq Oral Once Marleny Wray DO       • acetaminophen (TYLENOL) tablet 650 mg  650 mg Oral 4 times per day Melani Davidson MD   650 mg at 04/18/24 1146   • enoxaparin (LOVENOX) injection 40 mg  40 mg SubCUTAneous Daily Melani Davidson MD   40 mg at 04/18/24 0936   • amLODIPine (NORVASC) tablet 5 mg  5 mg Oral QAM Melani Davidson MD   5 mg at 04/18/24 0937   • atorvastatin (LIPITOR) tablet 10 mg  10 mg Oral Nightly Melain Davidson MD   10 mg at 04/17/24 1943   • buPROPion (WELLBUTRIN XL) extended

## 2024-04-18 NOTE — ANESTHESIA PROCEDURE NOTES
Arterial Line:    An arterial line was placed using surface landmarks, in the procedure area for the following indication(s): continuous blood pressure monitoring and blood sampling needed.    A 20 gauge (size), 1 and 3/4 inch (length), Angiocath (type) catheter was placed, Seldinger technique used, into the right radial artery, secured by Tegaderm.  Anesthesia type: General    Events:  patient tolerated procedure well with no complications.  Anesthesiologist: Mya Young MD  Preanesthetic Checklist  Completed: patient identified, IV checked, site marked, risks and benefits discussed, surgical/procedural consents, equipment checked, pre-op evaluation, timeout performed, anesthesia consent given, oxygen available, monitors applied/VS acknowledged, fire risk safety assessment completed and verbalized and blood product R/B/A discussed and consented

## 2024-04-18 NOTE — PROGRESS NOTES
[facility]  PROGRESS NOTE  ATTENDING NOTE    CRITICAL CARE    CC:  pancreatic cyst      HPI  Leatha Willard is a 58 y.o. female who presents to the SICU for post-op monitoring following a whipple. The patient has been admitted to the hospital since 4/15/2024 who presents for evaluation of pancreatic cyst. She has a hx of arthritis and back pain s/p cervical fusion. She was being evaluated for her mid back pain with imaging and was found to have a large pancreatic cyst on CT. She then had an MRI/MRCP showing a large >10cm cyst in the pancreatic head extending to the transverse colon mesentery. She had an EUS with Dr. Hammond at [facility]. FNA and fluid analysis was consistent with a benign cyst. There were no suspicious features present. Patient underwent a Pylorus-preserving pancreaticoduodenectomy on 4/15 and was admitted to the ICU post-operatively.     Patient Active Problem List   Diagnosis    Recurrent major depressive disorder (HCC)    Essential hypertension    Hyperlipidemia    Prediabetes    Class 1 obesity due to excess calories without serious comorbidity with body mass index (BMI) of 32.0 to 32.9 in adult    HIREN (obstructive sleep apnea)    Pancreatic cyst    Colon polyps    Cyst of pancreas    Chest pain    Tobacco abuse-- quit 2 weeks ago    Abdominal pain    Serous cystadenoma       OVERNIGHT EVENTS:  Still nauseated; pulled out CVC overnight, replaced    HOSPITAL COURSE:  4/15--pylorus preserving Whipple for large benign pancreatic cyst  4/16--continued ICU d/t nausea  4/17--bobby replaced, pancreatic leak, boluses given, albumin given, JUSTINE, toradol stopped, CVC changed  4/18--CVC replaced, NGT repalced, scop patch, robaxin, d/c IVF    /89   Pulse (!) 122   Temp (!) 101.1 °F (38.4 °C) (Bladder)   Resp 27   Ht 1.626 m (5' 4\")   Wt 81.6 kg (180 lb)   SpO2 94%   BMI 30.90 kg/m²   Physical Exam  Constitutional:       Appearance: She is obese.   HENT:      Head:

## 2024-04-18 NOTE — PROGRESS NOTES
HEPATOBILIARY AND PANCREATIC SURGERY  PROGRESS NOTE  4/18/2024    CC: S/p Whippadriana    Subjective:  Patient bobby replaced. Phos low this morning. Pulled out TLC last night, since has been replaced. Tachycardic, in a moderate amount of abdominal pain.     Objective:  /76   Pulse (!) 113   Temp 100 °F (37.8 °C) (Oral)   Resp 21   Ht 1.626 m (5' 4\")   Wt 81.6 kg (180 lb)   SpO2 95%   BMI 30.90 kg/m²     GENERAL:  Lying in bed, responds to questions, appears uncomfortable.   HEAD: Normocephalic, atraumatic  EYES: No scleral icterus, pupils equal  LUNGS:  No increased work of breathing  CARDIOVASCULAR:  warm throughout, tachycardic  ABDOMEN:  Soft, minimally TTP around midline incision, which is covered with surgical glue. R GIULIANO w/ 30 cc and L GIULIANO 130 w/ brownish appearing output in the drains - on gauze, the fluid has fluorescence  EXTREMITIES: minimal edema   SKIN: warm and dry    CBC x3  Recent Labs     04/16/24 0412 04/17/24 0443 04/18/24  0344   WBC 21.2* 31.3* 24.6*   RBC 3.83 3.82 2.79*   HGB 11.3* 11.6 8.5*   HCT 35.1 35.2 25.7*   MCV 91.6 92.1 92.1   MCH 29.5 30.4 30.5   MCHC 32.2 33.0 33.1   RDW 13.9 14.0 13.6    361 258   MPV 9.4 9.7 10.3           CMP x3  Recent Labs     04/16/24 0412 04/17/24 0443 04/17/24 1939 04/18/24  0344    140 134 136   K 4.2 4.2 3.6 3.9    103 103 102   CO2 19* 22 25 24   BUN 12 24* 22* 18   CREATININE 0.7 1.1* 0.7 0.5   GLUCOSE 141* 149* 156* 133*   CALCIUM 8.2* 8.6 7.9* 8.1*   PROT 5.8* 5.9*  --  5.2*   BILITOT 0.5 0.6  --  0.5   ALKPHOS 89 111*  --  94   AST 77* 39*  --  30   * 103*  --  66*             Assessment/Plan:  58 y.o. female with symptomatic 13 cm serous cystadenoma of the head of pancreas s/p open pylorus-preserving whipple, cholecystectomy, portal lymphadenectomy, appendectomy 4/15. Plan to follow the high-risk whipple pathway given increased POPF risk score. Now with concern for early pancreatic leak.     - limited clears only

## 2024-04-18 NOTE — PLAN OF CARE
Problem: Discharge Planning  Goal: Discharge to home or other facility with appropriate resources  Outcome: Progressing     Problem: Skin/Tissue Integrity  Goal: Absence of new skin breakdown  Description: 1.  Monitor for areas of redness and/or skin breakdown  2.  Assess vascular access sites hourly  3.  Every 4-6 hours minimum:  Change oxygen saturation probe site  4.  Every 4-6 hours:  If on nasal continuous positive airway pressure, respiratory therapy assess nares and determine need for appliance change or resting period.  Outcome: Progressing     Problem: ABCDS Injury Assessment  Goal: Absence of physical injury  Outcome: Progressing     Problem: Respiratory - Adult  Goal: Achieves optimal ventilation and oxygenation  Outcome: Progressing     Problem: Cardiovascular - Adult  Goal: Maintains optimal cardiac output and hemodynamic stability  Outcome: Progressing  Goal: Absence of cardiac dysrhythmias or at baseline  Outcome: Progressing     Problem: Musculoskeletal - Adult  Goal: Return mobility to safest level of function  Outcome: Progressing  Goal: Maintain proper alignment of affected body part  Outcome: Progressing  Goal: Return ADL status to a safe level of function  Outcome: Progressing     Problem: Gastrointestinal - Adult  Goal: Minimal or absence of nausea and vomiting  Outcome: Progressing  Goal: Maintains or returns to baseline bowel function  Outcome: Progressing  Goal: Maintains adequate nutritional intake  Outcome: Progressing

## 2024-04-18 NOTE — CARE COORDINATION
4/18 Care Coordination: Pt remains in SICU. S/P Whipple. On IV TPN,IV Fluids, IV ATB.limited clears only - don't advance. Discharge plan is to return home with family. HAs no Hx of HHC or VALE. Is Agreeable to HHC if needed. And has no preference. CM/SW will continue to follow for discharge planning.   Vivek NIEVES,RN-CV-BC  [phone]

## 2024-04-18 NOTE — PROGRESS NOTES
OCCUPATIONAL THERAPY    Date:2024  Patient Name: Leatha Willard  MRN: [id number]  : 1965  Room: [address] [place] [address]A              Chart reviewed. Pt not medically appropriate for therapy at this time.  Will re-attempt at later time. Thank you for consult.    Lakesha Rider, OTR/L [id number]

## 2024-04-18 NOTE — PROGRESS NOTES
SURGICAL INTENSIVE CARE  PROGRESS NOTE    Date of admission:  4/15/2024  Reason for ICU transfer:  Post op monitoring after ipple       HOSPITAL COURSE:  4/18/24  - NGT placed, low phos, electrolytes Q6 hrs, on tpn.       PHYSICAL EXAM:  /89   Pulse (!) 129   Temp (!) 101.1 °F (38.4 °C) (Bladder)   Resp 28   Ht 1.626 m (5' 4\")   Wt 81.6 kg (180 lb)   SpO2 (!) 85%   BMI 30.90 kg/m²     GENERAL:  Critically ill appearing  HEAD:  Normocephalic, atraumatic.   EYES:   No scleral icterus. PERRLA.  LUNGS:  on NC  CARDIOVASCULAR: RR  ABDOMEN:  Soft, distended, tender. No guarding, rigidity, rebound. R & L Jps with pancreatic output.   EXTREMITIES:   MAEx4. Atraumatic. No LE edema.  SKIN:  Warm and dry  NEUROLOGIC:  intermittently confused    ASSESSMENT / PLAN:  Neuro:  Acute pain syndrome, continue lidocaine patch, oxycodone, dilaudid, robaxin. Delirium, continue re-direction measures.   CV: Tachycardia, improving with phosphate replacement, EKG with sinus tach. Invasive monitoring of hemodynamics.   Pulm: on NC, wean as able, IS, pulm hygiene   GI: Pancreatic cyst s/p whipple, now with leak, monitor drain output, CT scan obtained, continue reglan for DGE, insert NGT, minitor NGT output   Renal: JUSTINE resolving. Monitor UOP & Electrolytes.  Endocrine: ISS goal BG <180   MSK: No acute issues. PT/OT.  Heme: monitor cbc  ID: cont [email or web address]   ABX: zosyn  DVT proph:  scds  GI proph:  protonix   Glucose protocol: SSI  Ancillary consults: HPB  Family Update: As available    Disposition: Continue icu     Jesse Hood MD  Resident, PGY-3  4/18/2024  1:37 PM

## 2024-04-18 NOTE — PROGRESS NOTES
RN walked into room because patient was calling out and found patient central line removed. Area was assessed and pressure was held. No complications of bleeding or hematoma. Critical care notified. At bedside to place new central line.

## 2024-04-18 NOTE — FLOWSHEET NOTE
Pt removing NGT and attempting to remove/pull off other critical lines and tubes. Pt at high risk for self harm. Will initiate bilateral soft wrist restraints at this time for pt safety.

## 2024-04-19 ENCOUNTER — APPOINTMENT (OUTPATIENT)
Dept: GENERAL RADIOLOGY | Age: 59
DRG: 004 | End: 2024-04-19
Attending: STUDENT IN AN ORGANIZED HEALTH CARE EDUCATION/TRAINING PROGRAM
Payer: COMMERCIAL

## 2024-04-19 PROBLEM — E83.39 HYPOPHOSPHATEMIA: Status: ACTIVE | Noted: 2024-04-19

## 2024-04-19 PROBLEM — J96.01 ACUTE RESPIRATORY FAILURE WITH HYPOXIA (HCC): Status: ACTIVE | Noted: 2024-04-19

## 2024-04-19 PROBLEM — N17.9 AKI (ACUTE KIDNEY INJURY) (HCC): Status: ACTIVE | Noted: 2024-04-19

## 2024-04-19 LAB
AADO2: 168.4 MMHG
ALBUMIN SERPL-MCNC: 1.8 G/DL (ref 3.5–5.2)
ALBUMIN SERPL-MCNC: 2 G/DL (ref 3.5–5.2)
ALBUMIN SERPL-MCNC: 2 G/DL (ref 3.5–5.2)
ALBUMIN SERPL-MCNC: 2.5 G/DL (ref 3.5–5.2)
ALP SERPL-CCNC: 204 U/L (ref 35–104)
ALP SERPL-CCNC: 70 U/L (ref 35–104)
ALP SERPL-CCNC: 78 U/L (ref 35–104)
ALP SERPL-CCNC: 98 U/L (ref 35–104)
ALT SERPL-CCNC: 34 U/L (ref 0–32)
ALT SERPL-CCNC: 49 U/L (ref 0–32)
ALT SERPL-CCNC: 50 U/L (ref 0–32)
ALT SERPL-CCNC: 75 U/L (ref 0–32)
ANION GAP SERPL CALCULATED.3IONS-SCNC: 10 MMOL/L (ref 7–16)
ANION GAP SERPL CALCULATED.3IONS-SCNC: 8 MMOL/L (ref 7–16)
AST SERPL-CCNC: 22 U/L (ref 0–31)
AST SERPL-CCNC: 23 U/L (ref 0–31)
AST SERPL-CCNC: 29 U/L (ref 0–31)
AST SERPL-CCNC: 55 U/L (ref 0–31)
B.E.: -1.5 MMOL/L (ref -3–3)
BASOPHILS # BLD: 0 K/UL (ref 0–0.2)
BASOPHILS # BLD: 0 K/UL (ref 0–0.2)
BASOPHILS NFR BLD: 0 % (ref 0–2)
BASOPHILS NFR BLD: 0 % (ref 0–2)
BILIRUB SERPL-MCNC: 0.6 MG/DL (ref 0–1.2)
BILIRUB SERPL-MCNC: 1.1 MG/DL (ref 0–1.2)
BUN SERPL-MCNC: 16 MG/DL (ref 6–20)
BUN SERPL-MCNC: 20 MG/DL (ref 6–20)
BUN SERPL-MCNC: 22 MG/DL (ref 6–20)
BUN SERPL-MCNC: 24 MG/DL (ref 6–20)
CA-I BLD-SCNC: 1.11 MMOL/L (ref 1.15–1.33)
CA-I BLD-SCNC: 1.2 MMOL/L (ref 1.15–1.33)
CALCIUM SERPL-MCNC: 7.5 MG/DL (ref 8.6–10.2)
CALCIUM SERPL-MCNC: 7.6 MG/DL (ref 8.6–10.2)
CALCIUM SERPL-MCNC: 7.7 MG/DL (ref 8.6–10.2)
CALCIUM SERPL-MCNC: 8.1 MG/DL (ref 8.6–10.2)
CHLORIDE SERPL-SCNC: 103 MMOL/L (ref 98–107)
CHLORIDE SERPL-SCNC: 107 MMOL/L (ref 98–107)
CHLORIDE SERPL-SCNC: 107 MMOL/L (ref 98–107)
CHLORIDE SERPL-SCNC: 108 MMOL/L (ref 98–107)
CLOT ANGLE.KAOLIN INDUCED BLD RES TEG: 75.6 DEG (ref 53–70)
CO2 SERPL-SCNC: 22 MMOL/L (ref 22–29)
CO2 SERPL-SCNC: 23 MMOL/L (ref 22–29)
CO2 SERPL-SCNC: 24 MMOL/L (ref 22–29)
CO2 SERPL-SCNC: 26 MMOL/L (ref 22–29)
COHB: 0.3 % (ref 0–1.5)
CREAT SERPL-MCNC: 0.5 MG/DL (ref 0.5–1)
CREAT SERPL-MCNC: 0.6 MG/DL (ref 0.5–1)
CREAT SERPL-MCNC: 0.7 MG/DL (ref 0.5–1)
CREAT SERPL-MCNC: 0.7 MG/DL (ref 0.5–1)
CRITICAL: ABNORMAL
DATE ANALYZED: ABNORMAL
DATE OF COLLECTION: ABNORMAL
EOSINOPHIL # BLD: 0 K/UL (ref 0.05–0.5)
EOSINOPHIL # BLD: 0.23 K/UL (ref 0.05–0.5)
EOSINOPHILS RELATIVE PERCENT: 0 % (ref 0–6)
EOSINOPHILS RELATIVE PERCENT: 1 % (ref 0–6)
EPL-TEG: 0 % (ref 0–15)
ERYTHROCYTE [DISTWIDTH] IN BLOOD BY AUTOMATED COUNT: 13.5 % (ref 11.5–15)
ERYTHROCYTE [DISTWIDTH] IN BLOOD BY AUTOMATED COUNT: 14.1 % (ref 11.5–15)
FIO2: 60 %
G-TEG: 17.9 KDYN/CM2 (ref 4.5–11)
GFR SERPL CREATININE-BSD FRML MDRD: >90 ML/MIN/1.73M2
GLUCOSE BLD-MCNC: 106 MG/DL (ref 74–99)
GLUCOSE BLD-MCNC: 112 MG/DL (ref 74–99)
GLUCOSE BLD-MCNC: 130 MG/DL (ref 74–99)
GLUCOSE BLD-MCNC: 131 MG/DL (ref 74–99)
GLUCOSE BLD-MCNC: 139 MG/DL (ref 74–99)
GLUCOSE BLD-MCNC: 140 MG/DL (ref 74–99)
GLUCOSE BLD-MCNC: 141 MG/DL (ref 74–99)
GLUCOSE BLD-MCNC: 147 MG/DL (ref 74–99)
GLUCOSE BLD-MCNC: 149 MG/DL (ref 74–99)
GLUCOSE BLD-MCNC: 152 MG/DL (ref 74–99)
GLUCOSE BLD-MCNC: 156 MG/DL (ref 74–99)
GLUCOSE BLD-MCNC: 160 MG/DL (ref 74–99)
GLUCOSE BLD-MCNC: 184 MG/DL (ref 74–99)
GLUCOSE BLD-MCNC: 205 MG/DL (ref 74–99)
GLUCOSE BLD-MCNC: 225 MG/DL (ref 74–99)
GLUCOSE BLD-MCNC: 243 MG/DL (ref 74–99)
GLUCOSE BLD-MCNC: 253 MG/DL (ref 74–99)
GLUCOSE BLD-MCNC: 296 MG/DL (ref 74–99)
GLUCOSE BLD-MCNC: 313 MG/DL (ref 74–99)
GLUCOSE BLD-MCNC: 334 MG/DL (ref 74–99)
GLUCOSE BLD-MCNC: 349 MG/DL (ref 74–99)
GLUCOSE SERPL-MCNC: 144 MG/DL (ref 74–99)
GLUCOSE SERPL-MCNC: 152 MG/DL (ref 74–99)
GLUCOSE SERPL-MCNC: 214 MG/DL (ref 74–99)
GLUCOSE SERPL-MCNC: 351 MG/DL (ref 74–99)
HBA1C MFR BLD: 5.8 % (ref 4–5.6)
HCO3: 23.4 MMOL/L (ref 22–26)
HCT VFR BLD AUTO: 29.3 % (ref 34–48)
HCT VFR BLD AUTO: 38.8 % (ref 34–48)
HGB BLD-MCNC: 12.8 G/DL (ref 11.5–15.5)
HGB BLD-MCNC: 9.8 G/DL (ref 11.5–15.5)
HHB: 1.4 % (ref 0–5)
INR PPP: 1.2
KINETICS TEG: 1 MIN (ref 1–3)
LAB: ABNORMAL
LACTATE BLDV-SCNC: 1.4 MMOL/L (ref 0.5–2.2)
LY30 (LYSIS) TEG: 0 % (ref 0–8)
LYMPHOCYTES NFR BLD: 0.89 K/UL (ref 1.5–4)
LYMPHOCYTES NFR BLD: 2.3 K/UL (ref 1.5–4)
LYMPHOCYTES RELATIVE PERCENT: 4 % (ref 20–42)
LYMPHOCYTES RELATIVE PERCENT: 9 % (ref 20–42)
Lab: 525
MA (MAX CLOT) TEG: 78.2 MM (ref 50–70)
MAGNESIUM SERPL-MCNC: 2.1 MG/DL (ref 1.6–2.6)
MAGNESIUM SERPL-MCNC: 2.1 MG/DL (ref 1.6–2.6)
MCH RBC QN AUTO: 29.6 PG (ref 26–35)
MCH RBC QN AUTO: 29.8 PG (ref 26–35)
MCHC RBC AUTO-ENTMCNC: 33 G/DL (ref 32–34.5)
MCHC RBC AUTO-ENTMCNC: 33.4 G/DL (ref 32–34.5)
MCV RBC AUTO: 89.1 FL (ref 80–99.9)
MCV RBC AUTO: 89.8 FL (ref 80–99.9)
METAMYELOCYTES ABSOLUTE COUNT: 0.36 K/UL (ref 0–0.12)
METAMYELOCYTES ABSOLUTE COUNT: 0.46 K/UL (ref 0–0.12)
METAMYELOCYTES: 2 % (ref 0–1)
METAMYELOCYTES: 2 % (ref 0–1)
METHB: 0.5 % (ref 0–1.5)
MODE: AC
MONOCYTES NFR BLD: 0.89 K/UL (ref 0.1–0.95)
MONOCYTES NFR BLD: 1.38 K/UL (ref 0.1–0.95)
MONOCYTES NFR BLD: 4 % (ref 2–12)
MONOCYTES NFR BLD: 5 % (ref 2–12)
MYELOCYTES ABSOLUTE COUNT: 0.18 K/UL
MYELOCYTES ABSOLUTE COUNT: 0.23 K/UL
MYELOCYTES: 1 %
MYELOCYTES: 1 %
NEUTROPHILS NFR BLD: 83 % (ref 43–80)
NEUTROPHILS NFR BLD: 89 % (ref 43–80)
NEUTS SEG NFR BLD: 18.18 K/UL (ref 1.8–7.3)
NEUTS SEG NFR BLD: 22.1 K/UL (ref 1.8–7.3)
NUCLEATED RED BLOOD CELLS: 1 PER 100 WBC
O2 SATURATION: 98.6 % (ref 92–98.5)
O2HB: 97.8 % (ref 94–97)
OPERATOR ID: 2962
PATIENT TEMP: 37 C
PCO2: 40.3 MMHG (ref 35–45)
PEEP/CPAP: 8 CMH2O
PFO2: 3.34 MMHG/%
PH BLOOD GAS: 7.38 (ref 7.35–7.45)
PHOSPHATE SERPL-MCNC: 3.1 MG/DL (ref 2.5–4.5)
PHOSPHATE SERPL-MCNC: 3.2 MG/DL (ref 2.5–4.5)
PHOSPHATE SERPL-MCNC: 4.1 MG/DL (ref 2.5–4.5)
PHOSPHATE SERPL-MCNC: 4.3 MG/DL (ref 2.5–4.5)
PLATELET # BLD AUTO: 245 K/UL (ref 130–450)
PLATELET # BLD AUTO: 278 K/UL (ref 130–450)
PMV BLD AUTO: 10.3 FL (ref 7–12)
PMV BLD AUTO: 10.5 FL (ref 7–12)
PO2: 200.1 MMHG (ref 75–100)
POTASSIUM SERPL-SCNC: 4.1 MMOL/L (ref 3.5–5)
POTASSIUM SERPL-SCNC: 4.3 MMOL/L (ref 3.5–5)
POTASSIUM SERPL-SCNC: 4.7 MMOL/L (ref 3.5–5)
POTASSIUM SERPL-SCNC: 4.8 MMOL/L (ref 3.5–5)
PROT SERPL-MCNC: 3.8 G/DL (ref 6.4–8.3)
PROT SERPL-MCNC: 3.9 G/DL (ref 6.4–8.3)
PROT SERPL-MCNC: 4.5 G/DL (ref 6.4–8.3)
PROT SERPL-MCNC: 4.6 G/DL (ref 6.4–8.3)
PROTHROMBIN TIME: 13 SEC (ref 9.3–12.4)
RBC # BLD AUTO: 3.29 M/UL (ref 3.5–5.5)
RBC # BLD AUTO: 4.32 M/UL (ref 3.5–5.5)
RBC # BLD: ABNORMAL 10*6/UL
REACTION TIME TEG: 5.1 MIN (ref 5–10)
RI(T): 0.84
RR MECHANICAL: 14 B/MIN
SODIUM SERPL-SCNC: 133 MMOL/L (ref 132–146)
SODIUM SERPL-SCNC: 139 MMOL/L (ref 132–146)
SODIUM SERPL-SCNC: 141 MMOL/L (ref 132–146)
SODIUM SERPL-SCNC: 141 MMOL/L (ref 132–146)
SOURCE, BLOOD GAS: ABNORMAL
THB: 10.9 G/DL (ref 11.5–16.5)
TIME ANALYZED: 528
VT MECHANICAL: 350 ML
WBC OTHER # BLD: 20.5 K/UL (ref 4.5–11.5)
WBC OTHER # BLD: 26.7 K/UL (ref 4.5–11.5)

## 2024-04-19 PROCEDURE — 6360000002 HC RX W HCPCS: Performed by: STUDENT IN AN ORGANIZED HEALTH CARE EDUCATION/TRAINING PROGRAM

## 2024-04-19 PROCEDURE — 6370000000 HC RX 637 (ALT 250 FOR IP): Performed by: STUDENT IN AN ORGANIZED HEALTH CARE EDUCATION/TRAINING PROGRAM

## 2024-04-19 PROCEDURE — 84100 ASSAY OF PHOSPHORUS: CPT

## 2024-04-19 PROCEDURE — 82805 BLOOD GASES W/O2 SATURATION: CPT

## 2024-04-19 PROCEDURE — 86900 BLOOD TYPING SEROLOGIC ABO: CPT

## 2024-04-19 PROCEDURE — 86923 COMPATIBILITY TEST ELECTRIC: CPT

## 2024-04-19 PROCEDURE — 85610 PROTHROMBIN TIME: CPT

## 2024-04-19 PROCEDURE — 6360000002 HC RX W HCPCS: Performed by: SURGERY

## 2024-04-19 PROCEDURE — 94003 VENT MGMT INPAT SUBQ DAY: CPT

## 2024-04-19 PROCEDURE — 82330 ASSAY OF CALCIUM: CPT

## 2024-04-19 PROCEDURE — 2500000003 HC RX 250 WO HCPCS: Performed by: STUDENT IN AN ORGANIZED HEALTH CARE EDUCATION/TRAINING PROGRAM

## 2024-04-19 PROCEDURE — 2000000000 HC ICU R&B

## 2024-04-19 PROCEDURE — 99222 1ST HOSP IP/OBS MODERATE 55: CPT | Performed by: INTERNAL MEDICINE

## 2024-04-19 PROCEDURE — 2580000003 HC RX 258: Performed by: STUDENT IN AN ORGANIZED HEALTH CARE EDUCATION/TRAINING PROGRAM

## 2024-04-19 PROCEDURE — 71045 X-RAY EXAM CHEST 1 VIEW: CPT

## 2024-04-19 PROCEDURE — 04HY32Z INSERTION OF MONITORING DEVICE INTO LOWER ARTERY, PERCUTANEOUS APPROACH: ICD-10-PCS | Performed by: SURGERY

## 2024-04-19 PROCEDURE — 80053 COMPREHEN METABOLIC PANEL: CPT

## 2024-04-19 PROCEDURE — 37799 UNLISTED PX VASCULAR SURGERY: CPT

## 2024-04-19 PROCEDURE — P9047 ALBUMIN (HUMAN), 25%, 50ML: HCPCS | Performed by: STUDENT IN AN ORGANIZED HEALTH CARE EDUCATION/TRAINING PROGRAM

## 2024-04-19 PROCEDURE — 85025 COMPLETE CBC W/AUTO DIFF WBC: CPT

## 2024-04-19 PROCEDURE — 83735 ASSAY OF MAGNESIUM: CPT

## 2024-04-19 PROCEDURE — 86850 RBC ANTIBODY SCREEN: CPT

## 2024-04-19 PROCEDURE — 82962 GLUCOSE BLOOD TEST: CPT

## 2024-04-19 PROCEDURE — C9113 INJ PANTOPRAZOLE SODIUM, VIA: HCPCS | Performed by: STUDENT IN AN ORGANIZED HEALTH CARE EDUCATION/TRAINING PROGRAM

## 2024-04-19 PROCEDURE — 99291 CRITICAL CARE FIRST HOUR: CPT | Performed by: SURGERY

## 2024-04-19 PROCEDURE — 6370000000 HC RX 637 (ALT 250 FOR IP): Performed by: INTERNAL MEDICINE

## 2024-04-19 PROCEDURE — 86901 BLOOD TYPING SEROLOGIC RH(D): CPT

## 2024-04-19 PROCEDURE — 2500000003 HC RX 250 WO HCPCS

## 2024-04-19 PROCEDURE — 2580000003 HC RX 258

## 2024-04-19 PROCEDURE — 36620 INSERTION CATHETER ARTERY: CPT

## 2024-04-19 RX ORDER — ALBUMIN (HUMAN) 12.5 G/50ML
25 SOLUTION INTRAVENOUS ONCE
Status: COMPLETED | OUTPATIENT
Start: 2024-04-19 | End: 2024-04-19

## 2024-04-19 RX ORDER — NOREPINEPHRINE BITARTRATE 0.06 MG/ML
1-100 INJECTION, SOLUTION INTRAVENOUS CONTINUOUS
Status: DISCONTINUED | OUTPATIENT
Start: 2024-04-19 | End: 2024-04-23

## 2024-04-19 RX ORDER — INSULIN LISPRO 100 [IU]/ML
0-18 INJECTION, SOLUTION INTRAVENOUS; SUBCUTANEOUS EVERY 4 HOURS
Status: DISCONTINUED | OUTPATIENT
Start: 2024-04-19 | End: 2024-04-20

## 2024-04-19 RX ORDER — INSULIN GLARGINE 100 [IU]/ML
35 INJECTION, SOLUTION SUBCUTANEOUS NIGHTLY
Status: DISCONTINUED | OUTPATIENT
Start: 2024-04-19 | End: 2024-04-20

## 2024-04-19 RX ORDER — FUROSEMIDE 10 MG/ML
20 INJECTION INTRAMUSCULAR; INTRAVENOUS ONCE
Status: COMPLETED | OUTPATIENT
Start: 2024-04-19 | End: 2024-04-19

## 2024-04-19 RX ORDER — SODIUM CHLORIDE 0.9 % (FLUSH) 0.9 %
5-40 SYRINGE (ML) INJECTION 2 TIMES DAILY
Status: DISPENSED | OUTPATIENT
Start: 2024-04-19

## 2024-04-19 RX ORDER — SODIUM CHLORIDE, SODIUM LACTATE, POTASSIUM CHLORIDE, AND CALCIUM CHLORIDE .6; .31; .03; .02 G/100ML; G/100ML; G/100ML; G/100ML
1000 INJECTION, SOLUTION INTRAVENOUS ONCE
Status: COMPLETED | OUTPATIENT
Start: 2024-04-19 | End: 2024-04-19

## 2024-04-19 RX ORDER — SODIUM CHLORIDE, SODIUM LACTATE, POTASSIUM CHLORIDE, AND CALCIUM CHLORIDE .6; .31; .03; .02 G/100ML; G/100ML; G/100ML; G/100ML
500 INJECTION, SOLUTION INTRAVENOUS ONCE
Status: COMPLETED | OUTPATIENT
Start: 2024-04-19 | End: 2024-04-19

## 2024-04-19 RX ADMIN — 0.12% CHLORHEXIDINE GLUCONATE 15 ML: 1.2 RINSE ORAL at 08:35

## 2024-04-19 RX ADMIN — SODIUM CHLORIDE, PRESERVATIVE FREE 40 MG: 5 INJECTION INTRAVENOUS at 08:35

## 2024-04-19 RX ADMIN — Medication 5 MCG/MIN: at 01:30

## 2024-04-19 RX ADMIN — ACETAMINOPHEN 650 MG: 650 SOLUTION ORAL at 18:13

## 2024-04-19 RX ADMIN — SODIUM CHLORIDE, POTASSIUM CHLORIDE, SODIUM LACTATE AND CALCIUM CHLORIDE 1000 ML: 600; 310; 30; 20 INJECTION, SOLUTION INTRAVENOUS at 01:33

## 2024-04-19 RX ADMIN — KETOTIFEN FUMARATE 1 DROP: 0.35 SOLUTION/ DROPS OPHTHALMIC at 19:51

## 2024-04-19 RX ADMIN — PIPERACILLIN AND TAZOBACTAM 3375 MG: 3; .375 INJECTION, POWDER, LYOPHILIZED, FOR SOLUTION INTRAVENOUS at 18:21

## 2024-04-19 RX ADMIN — ATORVASTATIN CALCIUM 10 MG: 10 TABLET, FILM COATED ORAL at 19:49

## 2024-04-19 RX ADMIN — POLYVINYL ALCOHOL, POVIDONE 1 DROP: 14; 6 SOLUTION/ DROPS OPHTHALMIC at 12:20

## 2024-04-19 RX ADMIN — INSULIN LISPRO 3 UNITS: 100 INJECTION, SOLUTION INTRAVENOUS; SUBCUTANEOUS at 17:10

## 2024-04-19 RX ADMIN — SODIUM PHOSPHATE, MONOBASIC, MONOHYDRATE AND SODIUM PHOSPHATE, DIBASIC, ANHYDROUS 30 MMOL: 276; 142 INJECTION, SOLUTION INTRAVENOUS at 02:35

## 2024-04-19 RX ADMIN — POLYVINYL ALCOHOL, POVIDONE 1 DROP: 14; 6 SOLUTION/ DROPS OPHTHALMIC at 00:41

## 2024-04-19 RX ADMIN — ATORVASTATIN CALCIUM 10 MG: 10 TABLET, FILM COATED ORAL at 00:31

## 2024-04-19 RX ADMIN — 0.12% CHLORHEXIDINE GLUCONATE 15 ML: 1.2 RINSE ORAL at 19:49

## 2024-04-19 RX ADMIN — METOCLOPRAMIDE 10 MG: 5 INJECTION, SOLUTION INTRAMUSCULAR; INTRAVENOUS at 23:24

## 2024-04-19 RX ADMIN — ALBUMIN (HUMAN) 25 G: 0.25 INJECTION, SOLUTION INTRAVENOUS at 17:02

## 2024-04-19 RX ADMIN — METOCLOPRAMIDE 10 MG: 5 INJECTION, SOLUTION INTRAMUSCULAR; INTRAVENOUS at 00:31

## 2024-04-19 RX ADMIN — INSULIN LISPRO 3 UNITS: 100 INJECTION, SOLUTION INTRAVENOUS; SUBCUTANEOUS at 23:28

## 2024-04-19 RX ADMIN — INSULIN GLARGINE 35 UNITS: 100 INJECTION, SOLUTION SUBCUTANEOUS at 16:16

## 2024-04-19 RX ADMIN — SODIUM CHLORIDE, PRESERVATIVE FREE 10 ML: 5 INJECTION INTRAVENOUS at 19:50

## 2024-04-19 RX ADMIN — Medication 150 MCG/HR: at 18:59

## 2024-04-19 RX ADMIN — SODIUM CHLORIDE, POTASSIUM CHLORIDE, SODIUM LACTATE AND CALCIUM CHLORIDE 500 ML: 600; 310; 30; 20 INJECTION, SOLUTION INTRAVENOUS at 08:40

## 2024-04-19 RX ADMIN — METOCLOPRAMIDE 10 MG: 5 INJECTION, SOLUTION INTRAMUSCULAR; INTRAVENOUS at 06:20

## 2024-04-19 RX ADMIN — SODIUM CHLORIDE, POTASSIUM CHLORIDE, SODIUM LACTATE AND CALCIUM CHLORIDE: 600; 310; 30; 20 INJECTION, SOLUTION INTRAVENOUS at 00:28

## 2024-04-19 RX ADMIN — ACETAMINOPHEN 650 MG: 650 SOLUTION ORAL at 06:20

## 2024-04-19 RX ADMIN — WHITE PETROLATUM: .15; .85 OINTMENT OPHTHALMIC at 04:35

## 2024-04-19 RX ADMIN — SODIUM CHLORIDE, PRESERVATIVE FREE 10 ML: 5 INJECTION INTRAVENOUS at 08:36

## 2024-04-19 RX ADMIN — METOCLOPRAMIDE 10 MG: 5 INJECTION, SOLUTION INTRAMUSCULAR; INTRAVENOUS at 15:49

## 2024-04-19 RX ADMIN — DEXMEDETOMIDINE 0.2 MCG/KG/HR: 100 INJECTION, SOLUTION INTRAVENOUS at 00:21

## 2024-04-19 RX ADMIN — SODIUM CHLORIDE, PRESERVATIVE FREE 10 ML: 5 INJECTION INTRAVENOUS at 02:57

## 2024-04-19 RX ADMIN — KETOTIFEN FUMARATE 1 DROP: 0.35 SOLUTION/ DROPS OPHTHALMIC at 08:36

## 2024-04-19 RX ADMIN — Medication 75 MCG/HR: at 09:37

## 2024-04-19 RX ADMIN — FUROSEMIDE 20 MG: 10 INJECTION, SOLUTION INTRAMUSCULAR; INTRAVENOUS at 09:40

## 2024-04-19 RX ADMIN — ENOXAPARIN SODIUM 40 MG: 100 INJECTION SUBCUTANEOUS at 08:35

## 2024-04-19 RX ADMIN — Medication 2 MCG/MIN: at 17:07

## 2024-04-19 RX ADMIN — ACETAMINOPHEN 650 MG: 650 SOLUTION ORAL at 00:31

## 2024-04-19 RX ADMIN — GABAPENTIN 200 MG: 100 CAPSULE ORAL at 00:32

## 2024-04-19 RX ADMIN — ACETAMINOPHEN 650 MG: 650 SOLUTION ORAL at 12:20

## 2024-04-19 RX ADMIN — WHITE PETROLATUM: .15; .85 OINTMENT OPHTHALMIC at 19:51

## 2024-04-19 RX ADMIN — BUPROPION HYDROCHLORIDE 300 MG: 300 TABLET, FILM COATED, EXTENDED RELEASE ORAL at 08:35

## 2024-04-19 RX ADMIN — 0.12% CHLORHEXIDINE GLUCONATE 15 ML: 1.2 RINSE ORAL at 00:41

## 2024-04-19 RX ADMIN — POLYVINYL ALCOHOL, POVIDONE 1 DROP: 14; 6 SOLUTION/ DROPS OPHTHALMIC at 09:40

## 2024-04-19 RX ADMIN — POTASSIUM PHOSPHATE, MONOBASIC AND POTASSIUM PHOSPHATE, DIBASIC 30 MMOL: 224; 236 INJECTION, SOLUTION, CONCENTRATE INTRAVENOUS at 14:21

## 2024-04-19 RX ADMIN — CALCIUM GLUCONATE 4000 MG: 98 INJECTION, SOLUTION INTRAVENOUS at 02:42

## 2024-04-19 RX ADMIN — POLYVINYL ALCOHOL, POVIDONE 1 DROP: 14; 6 SOLUTION/ DROPS OPHTHALMIC at 15:49

## 2024-04-19 RX ADMIN — CALCIUM GLUCONATE: 98 INJECTION, SOLUTION INTRAVENOUS at 18:24

## 2024-04-19 RX ADMIN — PIPERACILLIN AND TAZOBACTAM 3375 MG: 3; .375 INJECTION, POWDER, LYOPHILIZED, FOR SOLUTION INTRAVENOUS at 02:47

## 2024-04-19 RX ADMIN — DEXMEDETOMIDINE 0.8 MCG/KG/HR: 100 INJECTION, SOLUTION INTRAVENOUS at 16:07

## 2024-04-19 RX ADMIN — KETOTIFEN FUMARATE 1 DROP: 0.35 SOLUTION/ DROPS OPHTHALMIC at 00:39

## 2024-04-19 RX ADMIN — WHITE PETROLATUM: .15; .85 OINTMENT OPHTHALMIC at 23:24

## 2024-04-19 RX ADMIN — ACETAMINOPHEN 650 MG: 650 SOLUTION ORAL at 23:24

## 2024-04-19 RX ADMIN — PIPERACILLIN AND TAZOBACTAM 3375 MG: 3; .375 INJECTION, POWDER, LYOPHILIZED, FOR SOLUTION INTRAVENOUS at 09:41

## 2024-04-19 ASSESSMENT — PULMONARY FUNCTION TESTS
PIF_VALUE: 17
PIF_VALUE: 17
PIF_VALUE: 26
PIF_VALUE: 32
PIF_VALUE: 25
PIF_VALUE: 21
PIF_VALUE: 17
PIF_VALUE: 15
PIF_VALUE: 17
PIF_VALUE: 13
PIF_VALUE: 17
PIF_VALUE: 20
PIF_VALUE: 17
PIF_VALUE: 17
PIF_VALUE: 18
PIF_VALUE: 16
PIF_VALUE: 17
PIF_VALUE: 20
PIF_VALUE: 19
PIF_VALUE: 19
PIF_VALUE: 17
PIF_VALUE: 24
PIF_VALUE: 17

## 2024-04-19 ASSESSMENT — PAIN SCALES - GENERAL
PAINLEVEL_OUTOF10: 0
PAINLEVEL_OUTOF10: 3
PAINLEVEL_OUTOF10: 0
PAINLEVEL_OUTOF10: 5
PAINLEVEL_OUTOF10: 5
PAINLEVEL_OUTOF10: 4
PAINLEVEL_OUTOF10: 4
PAINLEVEL_OUTOF10: 0
PAINLEVEL_OUTOF10: 6
PAINLEVEL_OUTOF10: 0
PAINLEVEL_OUTOF10: 7
PAINLEVEL_OUTOF10: 0
PAINLEVEL_OUTOF10: 0
PAINLEVEL_OUTOF10: 3
PAINLEVEL_OUTOF10: 0
PAINLEVEL_OUTOF10: 0
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
## 2024-04-19 NOTE — PROGRESS NOTES
04/19/24 1116   Weaning Parameters   Respiratory Rate Observed 21   Ve 8.3      RSBI 42   NIF -40   VC   (pt did not cooperate with this VC  manuever)   Schmitt Agitation Sedation Scale (RASS) -1     Spontaneous breathing parameters on tubing comp with oxygen and peep.

## 2024-04-19 NOTE — PROGRESS NOTES
Physical Therapy  Physical Therapy Attempt    Name: Leatha Willard  : 1965  MRN: [id number]      Date of Service: 2024  Chart reviewed.  Pt is not medically appropriate for skilled PT at this time.  Will re-attempt as able.    Suraj Clark, PT, DPT  [id number]

## 2024-04-19 NOTE — PROCEDURES
PROCEDURE NOTE  Date: 4/19/2024   Name: Leatha Willard  YOB: 1965    Insert Arterial Line    Date/Time: 4/19/2024 3:57 AM    Performed by: Marleny Wray DO  Authorized by: Marleny Wray DO  Consent: The procedure was performed in an emergent situation.  Required items: required blood products, implants, devices, and special equipment available  Patient identity confirmed: arm band  Time out: Immediately prior to procedure a \"time out\" was called to verify the correct patient, procedure, equipment, support staff and site/side marked as required.  Preparation: Patient was prepped and draped in the usual sterile fashion.  Indications: multiple ABGs, respiratory failure and hemodynamic monitoring  Location: left femoral  Anesthesia: local infiltration    Anesthesia:  Local Anesthetic: lidocaine 1% without epinephrine    Sedation:  Patient sedated: yes  Sedatives: see MAR for details  Analgesia: see MAR for details  Vitals: Vital signs were monitored during sedation.    Needle gauge: 20  Seldinger technique: Seldinger technique used  Number of attempts: 1  Post-procedure: line sutured and dressing applied  Post-procedure CMS: normal  Patient tolerance: patient tolerated the procedure well with no immediate complications  Comments: Dr. Ramos was available for the procedure.

## 2024-04-19 NOTE — PROGRESS NOTES
[facility]  PROGRESS NOTE  ATTENDING NOTE    CRITICAL CARE    CC:  pancreatic cyst      HPI  Leatha Willard is a 58 y.o. female who presents to the SICU for post-op monitoring following a whipple. The patient has been admitted to the hospital since 4/15/2024 who presents for evaluation of pancreatic cyst. She has a hx of arthritis and back pain s/p cervical fusion. She was being evaluated for her mid back pain with imaging and was found to have a large pancreatic cyst on CT. She then had an MRI/MRCP showing a large >10cm cyst in the pancreatic head extending to the transverse colon mesentery. She had an EUS with Dr. Hammond at [facility]. FNA and fluid analysis was consistent with a benign cyst. There were no suspicious features present. Patient underwent a Pylorus-preserving pancreaticoduodenectomy on 4/15 and was admitted to the ICU post-operatively.     Patient Active Problem List   Diagnosis    Recurrent major depressive disorder (HCC)    Essential hypertension    Hyperlipidemia    Prediabetes    Class 1 obesity due to excess calories without serious comorbidity with body mass index (BMI) of 32.0 to 32.9 in adult    HIREN (obstructive sleep apnea)    Pancreatic cyst    Colon polyps    Cyst of pancreas    Chest pain    Tobacco abuse-- quit 2 weeks ago    Abdominal pain    Serous cystadenoma    Pancreatic fistula    Sepsis with acute renal failure and septic shock (HCC)       OVERNIGHT EVENTS:  Pancreatic fluid leaked through midline incision--returned to OR for total pancreatectomy and splenectomy    HOSPITAL COURSE:  4/15--pylorus preserving Whipple for large benign pancreatic cyst  4/16--continued ICU d/t nausea  4/17--bobby replaced, pancreatic leak, boluses given, albumin given, JUSTINE, toradol stopped, CVC changed  4/18--CVC replaced, NGT repalced, scop patch, robaxin, d/c IVF; total pancreatectomy and splenectomy, insulin gtt  4/19--remains intubated, TPN, lasix    BP (!) 93/59   Pulse 86    Temp 99.9 °F (37.7 °C) (Axillary)   Resp 16   Ht 1.626 m (5' 4\")   Wt 91.6 kg (201 lb 14.4 oz)   SpO2 96%   BMI 34.66 kg/m²   Physical Exam  Constitutional:       Appearance: She is obese.   HENT:      Head: Normocephalic and atraumatic.      Nose: Nose normal.      Mouth/Throat:      Mouth: Mucous membranes are moist.      Pharynx: Oropharynx is clear.   Eyes:      Extraocular Movements: Extraocular movements intact.      Pupils: Pupils are equal, round, and reactive to light.   Cardiovascular:      Rate and Rhythm: Normal rate and regular rhythm.      Pulses: Normal pulses.      Heart sounds: Normal heart sounds.   Pulmonary:      Effort: Pulmonary effort is normal.      Breath sounds: Normal breath sounds.   Abdominal:      General: There is no distension.      Palpations: Abdomen is soft.      Tenderness: There is abdominal tenderness.      Comments: JPs--serosanguinous  Wound;  c/d/I--MAYCOL dressing in place   Musculoskeletal:         General: No tenderness or signs of injury.      Cervical back: Normal range of motion and neck supple.   Skin:     General: Skin is warm and dry.   Neurological:      General: No focal deficit present.      Mental Status: She is alert.   Psychiatric:         Mood and Affect: Mood normal.         Behavior: Behavior normal.         Thought Content: Thought content normal.         Judgment: Judgment normal.         Lines:    Bobby:  yes - Continue bobby catheter for managing strict I and Os in this critically ill patient.     Central line:  yes - 4/18  PICC:  no    I have reviewed the laboratory studies    CXR:  bilateral pleural effusions and pulmonary edema, tubes and lines in good position    ASSESSMENT/PLAN:   Neuro: acute pain syndrome-- toradol--stopped d/t JUSITNE; cass tylenol; fentanyl gtt  Depression--wellbutrin  Chronic back pain--lidocaine patches and heating pad; fentanyl gtt, robaxin  Cardio:  No active issues   Respiratory: respiratory insufficiency--IS, pulmonary

## 2024-04-19 NOTE — OP NOTE
A Andre retractor was placed.  We then were able to bluntly dissect the omentum away from the right upper quadrant and the liver where our 2 anastomoses would be present.  There was a significant amount of necrotic omentum covering the anastomoses.  There was also old infected hematoma as well.  Once we were able to identify the reconstructive limb, we traced the stapled end of the jejunum up towards the PJ.  We did see some bile spilling within the lesser sac below.  I was able to get my hand around the anastomosis with the round ligament flap beneath the PJ over the portal vein.  We could see that the anterior wall of the anastomosis had dehisced however it was not completely broken down.  Bile was exiting from the jejunum at the site of the anastomosis.  We then transected the anastomosis at the pancreas using cautery.  This  the pancreas from the jejunum.  The PJ stent was removed.  We oversewed the hole in the jejunum to control spillage.  We were able to then dissect down towards the hepaticojejunostomy within that same limb.  We then made a window in the mesentery of the jejunum and stapled the bowel just distal to l to the HJ using a TA 60 blue load stapler.  The mesentery was then transected with the LigaSure device.  The suture line of the jejunum which was now solely forming the HJ, was oversewn with 3-0 silk Lembert sutures.  Given how soft her pancreas was at her first operation, and how the pancreas appeared to be necrotic now, there was going to be no way that we could reconstruct this PJ.  I did discuss with the patient prior to surgery as well as her  the likelihood of performing a completion pancreatectomy.  It was evident at this point that this is what we needed to do.  We dissected the stump of the pancreas off of the portal vein and the SMV carefully.  Most of the dissection was previously done from her first operation.  We were able to encircle the splenic vein and placed a  in guarded condition.  All sponge and instrument counts were correct at the end of the procedure x 2.     Electronically signed by Norma Rogers MD on 4/18/2024 at 10:54 PM

## 2024-04-19 NOTE — PLAN OF CARE
Problem: Discharge Planning  Goal: Discharge to home or other facility with appropriate resources  Outcome: Progressing  Flowsheets (Taken 4/18/2024 2305)  Discharge to home or other facility with appropriate resources:   Identify barriers to discharge with patient and caregiver   Arrange for needed discharge resources and transportation as appropriate   Identify discharge learning needs (meds, wound care, etc)   Refer to discharge planning if patient needs post-hospital services based on physician order or complex needs related to functional status, cognitive ability or social support system     Problem: Skin/Tissue Integrity  Goal: Absence of new skin breakdown  Description: 1.  Monitor for areas of redness and/or skin breakdown  2.  Assess vascular access sites hourly  3.  Every 4-6 hours minimum:  Change oxygen saturation probe site  4.  Every 4-6 hours:  If on nasal continuous positive airway pressure, respiratory therapy assess nares and determine need for appliance change or resting period.  Outcome: Progressing     Problem: ABCDS Injury Assessment  Goal: Absence of physical injury  Outcome: Progressing     Problem: Respiratory - Adult  Goal: Achieves optimal ventilation and oxygenation  Outcome: Progressing  Flowsheets (Taken 4/18/2024 2305)  Achieves optimal ventilation and oxygenation:   Assess for changes in respiratory status   Assess for changes in mentation and behavior   Position to facilitate oxygenation and minimize respiratory effort   Oxygen supplementation based on oxygen saturation or arterial blood gases   Encourage broncho-pulmonary hygiene including cough, deep breathe, incentive spirometry   Assess and instruct to report shortness of breath or any respiratory difficulty   Assess the need for suctioning and aspirate as needed   Respiratory therapy support as indicated     Problem: Cardiovascular - Adult  Goal: Maintains optimal cardiac output and hemodynamic stability  Outcome:  applying the restraint   Evaluate the patient's condition at the time of restraint application   Inform patient/family regarding the reason for restraint   Every 2 hours: Monitor safety, psychosocial status, comfort, nutrition and hydration  Taken 4/18/2024 2312  Remains free of injury from restraints (restraint for interference with medical device):   Determine that other, less restrictive measures have been tried or would not be effective before applying the restraint   Evaluate the patient's condition at the time of restraint application   Inform patient/family regarding the reason for restraint   Every 2 hours: Monitor safety, psychosocial status, comfort, nutrition and hydration

## 2024-04-19 NOTE — PROGRESS NOTES
OCCUPATIONAL THERAPY    Date:2024  Patient Name: Leatha Willard  MRN: [id number]  : 1965  Room: [address] [place] [address]-A              Chart reviewed. Pt not medically appropriate for therapy at this time.  Will re-attempt at later time. Thank you for consult.    Lakesha Rider, OTR/L [id number]

## 2024-04-19 NOTE — FLOWSHEET NOTE
Pt reaches for and attempts to remove ETT and other critical medical equipment. Pt at high risk for self harm. Will continue bilateral soft wrist restraints at this time for pt safety.

## 2024-04-19 NOTE — FLOWSHEET NOTE
When unrestrained patient continues to pull at necessary lines and tubing such at ETT, Fung catheter, Central line, and NG tube. Patient unable to be verbally redirected at this time. Soft bilateral wrist restraints remain in place.

## 2024-04-19 NOTE — CARE COORDINATION
4/19 Care Coordination: Pt remains in SICU.Had to go back to OR last pm, takeback for total pancreatectomy, splenectomy.Remains on vent,IV sedation, IV Insulin drip and IV TPN. With Current status unclear on discharge needs.  Prior to OR yesterday discharge plan was home and C if was needed. Will now need to re evluate once off Vent. CM/SW will continue to follow for discharge planning.   Vivek NIEVES,RN-CV-BC  [phone]

## 2024-04-19 NOTE — PROGRESS NOTES
[facility]   Daily Progress Note      Patient's Name/Date of Birth: Leatha Willard / 1965    Date: April 19, 2024     Chief Complaint: whipple c/b need for takeback total pancreatectomy, splenectomy    Patient Active Problem List   Diagnosis    Recurrent major depressive disorder (HCC)    Essential hypertension    Hyperlipidemia    Prediabetes    Class 1 obesity due to excess calories without serious comorbidity with body mass index (BMI) of 32.0 to 32.9 in adult    HIREN (obstructive sleep apnea)    Pancreatic cyst    Colon polyps    Cyst of pancreas    Chest pain    Tobacco abuse-- quit 2 weeks ago    Abdominal pain    Serous cystadenoma    Pancreatic fistula    Sepsis with acute renal failure and septic shock (HCC)       Subjective: overnight went for completion pancreatectomy, splenectomy    Given 2U RBC during case    Came back to SICU intubated and sedated    This morning on fent/dex, tpn, LR, and insulin gtt    Drains ss    Objective:  /62   Pulse 88   Temp 99 °F (37.2 °C) (Axillary)   Resp 18   Ht 1.626 m (5' 4\")   Wt 81.6 kg (180 lb)   SpO2 98%   BMI 30.90 kg/m²   Labs:  Recent Labs     04/18/24  1750 04/18/24  2331 04/19/24  0521   WBC 23.6* 26.7* 20.5*   HGB 7.9* 12.8 9.8*   HCT 23.8* 38.8 29.3*     Recent Labs     04/18/24  1024 04/18/24  1508 04/18/24 2008 04/18/24  2156 04/18/24  2331    138  --   --  133   K 3.7 4.6  --   --  4.8    103  --   --  103   CO2 27 28  --   --  22   BUN 16 14  --   --  16   CREATININE 0.5 0.4* 0.6 0.4* 0.5     Recent Labs     04/17/24  0443 04/18/24  0344 04/18/24  2331   ALKPHOS 111* 94 98   * 66* 75*   AST 39* 30 55*   BILITOT 0.6 0.5 1.1         General appearance: critically ill appearing, surrounded by life support devices  Head: NCAT, PERRL, EOMI, pink conjunctiva, NG to LIWS w/ gastric appearing output in canister  Neck: supple, no masses  Lungs: symmetric chest rise, mechanically ventilated, 40% FiO2, 8 peep CPAP/PS  Heart: warm  throughout, MAPs greater than 65 off vasopressors  Abdomen: mildly distended, midline covered with MAYCOL dressing with adequate suction, bilateral GIULIANO drains w/ ss output (280mL LLQ, 170mL RLQ over 24 hrs)  Skin: trace edema throughout  Extremities: extremities normal, atraumatic, no cyanosis    Fung w/ yellow urine - clearing up it seems - recent UOPs 25 - 40cc's/hr      Assessment/Plan:  Leatha Willard is a 58 y.o. female POD 4 from open pylorus-preserving Whipple portal lymphadenectomy, appendectomy.  This was performed for symptomatic 13 cm serous cystadenoma of the head of the pancreas.  Course complicated by PJ disruption requiring takeback, PJ excision, splenectomy, completion pancreatectomy, omentectomy, washout.  Postop day 1 from this.    Mechanically ventilated and sedated in the ICU currently    During the case last night and received 2 units of packed red blood cells    Hemodynamically normal excluding slight tachycardia during exam this morning with maps greater than 65 off vasopressors    Urine output has slightly decreased with urine output per hour measuring 25 cc recently    On insulin drip    On antibiotics, cultures taken during the case last night    Plan for the following:    - Wean sedation as able  - Multimodal pain control  - Monitor hemodynamics  - Wean from ventilator as able -spontaneous breathing trial  - Trend CBC no indication for transfusion currently with hemoglobin 9.8 from 12.8 after 2 units RBCs during the case last   Night  - Keep crystalloid going for marginal urine outputs in addition to TPN  - PPI, scheduled Reglan for DGE prophylaxis  - Continue insulin drip - endocrinology consult   - Keep Fung, monitor urine output  - Monitor electrolytes goal K greater than 4, mag greater than 2, Phos greater than 3  - Continue Zosyn for now, follow-up intraoperative cultures  - Will need splenic vaccines prior to discharge  - Lovenox for DVT prophylaxis  - Home medications as

## 2024-04-20 ENCOUNTER — APPOINTMENT (OUTPATIENT)
Dept: GENERAL RADIOLOGY | Age: 59
DRG: 004 | End: 2024-04-20
Attending: STUDENT IN AN ORGANIZED HEALTH CARE EDUCATION/TRAINING PROGRAM
Payer: COMMERCIAL

## 2024-04-20 LAB
AADO2: 148.1 MMHG
ABO/RH: NORMAL
ALBUMIN SERPL-MCNC: 2.3 G/DL (ref 3.5–5.2)
ALBUMIN SERPL-MCNC: 2.4 G/DL (ref 3.5–5.2)
ALP SERPL-CCNC: 73 U/L (ref 35–104)
ALP SERPL-CCNC: 80 U/L (ref 35–104)
ALT SERPL-CCNC: 32 U/L (ref 0–32)
ALT SERPL-CCNC: 34 U/L (ref 0–32)
ANION GAP SERPL CALCULATED.3IONS-SCNC: 8 MMOL/L (ref 7–16)
ANION GAP SERPL CALCULATED.3IONS-SCNC: 8 MMOL/L (ref 7–16)
ANTIBODY SCREEN: NEGATIVE
ARM BAND NUMBER: NORMAL
AST SERPL-CCNC: 21 U/L (ref 0–31)
AST SERPL-CCNC: 23 U/L (ref 0–31)
B.E.: 2.5 MMOL/L (ref -3–3)
BASOPHILS # BLD: 0.16 K/UL (ref 0–0.2)
BASOPHILS NFR BLD: 1 % (ref 0–2)
BILIRUB SERPL-MCNC: 0.6 MG/DL (ref 0–1.2)
BILIRUB SERPL-MCNC: 0.6 MG/DL (ref 0–1.2)
BLOOD BANK BLOOD PRODUCT EXPIRATION DATE: NORMAL
BLOOD BANK BLOOD PRODUCT EXPIRATION DATE: NORMAL
BLOOD BANK DISPENSE STATUS: NORMAL
BLOOD BANK ISBT PRODUCT BLOOD TYPE: 9500
BLOOD BANK ISBT PRODUCT BLOOD TYPE: 9500
BLOOD BANK PRODUCT CODE: NORMAL
BLOOD BANK PRODUCT CODE: NORMAL
BLOOD BANK SAMPLE EXPIRATION: NORMAL
BLOOD BANK UNIT TYPE AND RH: NORMAL
BLOOD BANK UNIT TYPE AND RH: NORMAL
BPU ID: NORMAL
BUN SERPL-MCNC: 23 MG/DL (ref 6–20)
BUN SERPL-MCNC: 23 MG/DL (ref 6–20)
CA-I BLD-SCNC: 1.09 MMOL/L (ref 1.15–1.33)
CALCIUM SERPL-MCNC: 7.6 MG/DL (ref 8.6–10.2)
CALCIUM SERPL-MCNC: 7.7 MG/DL (ref 8.6–10.2)
CHLORIDE SERPL-SCNC: 105 MMOL/L (ref 98–107)
CHLORIDE SERPL-SCNC: 105 MMOL/L (ref 98–107)
CO2 SERPL-SCNC: 24 MMOL/L (ref 22–29)
CO2 SERPL-SCNC: 24 MMOL/L (ref 22–29)
COHB: 0.1 % (ref 0–1.5)
COMPONENT: NORMAL
CORTIS SERPL-MCNC: 10.2 UG/DL (ref 2.7–18.4)
CORTISOL COLLECTION INFO: NORMAL
CREAT SERPL-MCNC: 0.7 MG/DL (ref 0.5–1)
CREAT SERPL-MCNC: 0.7 MG/DL (ref 0.5–1)
CRITICAL: ABNORMAL
CROSSMATCH RESULT: NORMAL
DATE ANALYZED: ABNORMAL
DATE OF COLLECTION: ABNORMAL
EOSINOPHIL # BLD: 0.51 K/UL (ref 0.05–0.5)
EOSINOPHILS RELATIVE PERCENT: 2 % (ref 0–6)
ERYTHROCYTE [DISTWIDTH] IN BLOOD BY AUTOMATED COUNT: 14.4 % (ref 11.5–15)
FIO2: 40 %
GFR SERPL CREATININE-BSD FRML MDRD: >90 ML/MIN/1.73M2
GFR SERPL CREATININE-BSD FRML MDRD: >90 ML/MIN/1.73M2
GLUCOSE BLD-MCNC: 181 MG/DL (ref 74–99)
GLUCOSE BLD-MCNC: 198 MG/DL (ref 74–99)
GLUCOSE BLD-MCNC: 225 MG/DL (ref 74–99)
GLUCOSE BLD-MCNC: 225 MG/DL (ref 74–99)
GLUCOSE BLD-MCNC: 274 MG/DL (ref 74–99)
GLUCOSE SERPL-MCNC: 186 MG/DL (ref 74–99)
GLUCOSE SERPL-MCNC: 220 MG/DL (ref 74–99)
HCO3: 27.1 MMOL/L (ref 22–26)
HCT VFR BLD AUTO: 22.7 % (ref 34–48)
HGB BLD-MCNC: 7.7 G/DL (ref 11.5–15.5)
HHB: 5.7 % (ref 0–5)
IMM GRANULOCYTES # BLD AUTO: 2.94 K/UL (ref 0–0.58)
IMM GRANULOCYTES NFR BLD: 9 % (ref 0–5)
INR PPP: 1.2
LAB: ABNORMAL
LYMPHOCYTES NFR BLD: 1.96 K/UL (ref 1.5–4)
LYMPHOCYTES RELATIVE PERCENT: 6 % (ref 20–42)
Lab: 408
MAGNESIUM SERPL-MCNC: 1.9 MG/DL (ref 1.6–2.6)
MCH RBC QN AUTO: 30.3 PG (ref 26–35)
MCHC RBC AUTO-ENTMCNC: 33.9 G/DL (ref 32–34.5)
MCV RBC AUTO: 89.4 FL (ref 80–99.9)
METHB: 0.3 % (ref 0–1.5)
MODE: ABNORMAL
MONOCYTES NFR BLD: 3.04 K/UL (ref 0.1–0.95)
MONOCYTES NFR BLD: 9 % (ref 2–12)
NEUTROPHILS NFR BLD: 74 % (ref 43–80)
NEUTS SEG NFR BLD: 24.45 K/UL (ref 1.8–7.3)
O2 SATURATION: 94.3 % (ref 92–98.5)
O2HB: 93.9 % (ref 94–97)
OPERATOR ID: 2577
PATIENT TEMP: 37 C
PCO2: 42.5 MMHG (ref 35–45)
PEEP/CPAP: 8 CMH2O
PFO2: 1.95 MMHG/%
PH BLOOD GAS: 7.42 (ref 7.35–7.45)
PHOSPHATE SERPL-MCNC: 2.9 MG/DL (ref 2.5–4.5)
PHOSPHATE SERPL-MCNC: 3.5 MG/DL (ref 2.5–4.5)
PHOSPHATE SERPL-MCNC: 4.1 MG/DL (ref 2.5–4.5)
PLATELET # BLD AUTO: 264 K/UL (ref 130–450)
PMV BLD AUTO: 10.5 FL (ref 7–12)
PO2: 78.2 MMHG (ref 75–100)
POTASSIUM SERPL-SCNC: 4.5 MMOL/L (ref 3.5–5)
POTASSIUM SERPL-SCNC: 4.5 MMOL/L (ref 3.5–5)
PROT SERPL-MCNC: 4 G/DL (ref 6.4–8.3)
PROT SERPL-MCNC: 4.2 G/DL (ref 6.4–8.3)
PROTHROMBIN TIME: 12.9 SEC (ref 9.3–12.4)
PS: 8 CMH20
RBC # BLD AUTO: 2.54 M/UL (ref 3.5–5.5)
RI(T): 1.89
SODIUM SERPL-SCNC: 137 MMOL/L (ref 132–146)
SODIUM SERPL-SCNC: 137 MMOL/L (ref 132–146)
SOURCE, BLOOD GAS: ABNORMAL
THB: 8.6 G/DL (ref 11.5–16.5)
TIME ANALYZED: 413
TRANSFUSION STATUS: NORMAL
UNIT DIVISION: 0
UNIT ISSUE DATE/TIME: NORMAL
UNIT ISSUE DATE/TIME: NORMAL
WBC OTHER # BLD: 33.1 K/UL (ref 4.5–11.5)

## 2024-04-20 PROCEDURE — 82533 TOTAL CORTISOL: CPT

## 2024-04-20 PROCEDURE — 2500000003 HC RX 250 WO HCPCS: Performed by: STUDENT IN AN ORGANIZED HEALTH CARE EDUCATION/TRAINING PROGRAM

## 2024-04-20 PROCEDURE — 82805 BLOOD GASES W/O2 SATURATION: CPT

## 2024-04-20 PROCEDURE — 82962 GLUCOSE BLOOD TEST: CPT

## 2024-04-20 PROCEDURE — C9113 INJ PANTOPRAZOLE SODIUM, VIA: HCPCS | Performed by: STUDENT IN AN ORGANIZED HEALTH CARE EDUCATION/TRAINING PROGRAM

## 2024-04-20 PROCEDURE — 2580000003 HC RX 258: Performed by: STUDENT IN AN ORGANIZED HEALTH CARE EDUCATION/TRAINING PROGRAM

## 2024-04-20 PROCEDURE — P9047 ALBUMIN (HUMAN), 25%, 50ML: HCPCS | Performed by: SURGERY

## 2024-04-20 PROCEDURE — 2000000000 HC ICU R&B

## 2024-04-20 PROCEDURE — 6360000002 HC RX W HCPCS: Performed by: STUDENT IN AN ORGANIZED HEALTH CARE EDUCATION/TRAINING PROGRAM

## 2024-04-20 PROCEDURE — 84100 ASSAY OF PHOSPHORUS: CPT

## 2024-04-20 PROCEDURE — 2500000003 HC RX 250 WO HCPCS

## 2024-04-20 PROCEDURE — 6370000000 HC RX 637 (ALT 250 FOR IP): Performed by: STUDENT IN AN ORGANIZED HEALTH CARE EDUCATION/TRAINING PROGRAM

## 2024-04-20 PROCEDURE — P9047 ALBUMIN (HUMAN), 25%, 50ML: HCPCS | Performed by: STUDENT IN AN ORGANIZED HEALTH CARE EDUCATION/TRAINING PROGRAM

## 2024-04-20 PROCEDURE — 6370000000 HC RX 637 (ALT 250 FOR IP): Performed by: SURGERY

## 2024-04-20 PROCEDURE — 6360000002 HC RX W HCPCS

## 2024-04-20 PROCEDURE — 6360000002 HC RX W HCPCS: Performed by: SURGERY

## 2024-04-20 PROCEDURE — A4216 STERILE WATER/SALINE, 10 ML: HCPCS | Performed by: STUDENT IN AN ORGANIZED HEALTH CARE EDUCATION/TRAINING PROGRAM

## 2024-04-20 PROCEDURE — 85610 PROTHROMBIN TIME: CPT

## 2024-04-20 PROCEDURE — 99291 CRITICAL CARE FIRST HOUR: CPT | Performed by: SURGERY

## 2024-04-20 PROCEDURE — 37799 UNLISTED PX VASCULAR SURGERY: CPT

## 2024-04-20 PROCEDURE — 80053 COMPREHEN METABOLIC PANEL: CPT

## 2024-04-20 PROCEDURE — 6370000000 HC RX 637 (ALT 250 FOR IP): Performed by: INTERNAL MEDICINE

## 2024-04-20 PROCEDURE — 99232 SBSQ HOSP IP/OBS MODERATE 35: CPT | Performed by: INTERNAL MEDICINE

## 2024-04-20 PROCEDURE — 2580000003 HC RX 258

## 2024-04-20 PROCEDURE — 83735 ASSAY OF MAGNESIUM: CPT

## 2024-04-20 PROCEDURE — 71045 X-RAY EXAM CHEST 1 VIEW: CPT

## 2024-04-20 PROCEDURE — 94003 VENT MGMT INPAT SUBQ DAY: CPT

## 2024-04-20 PROCEDURE — 94640 AIRWAY INHALATION TREATMENT: CPT

## 2024-04-20 PROCEDURE — 82330 ASSAY OF CALCIUM: CPT

## 2024-04-20 PROCEDURE — 85025 COMPLETE CBC W/AUTO DIFF WBC: CPT

## 2024-04-20 RX ORDER — INSULIN LISPRO 100 [IU]/ML
0-24 INJECTION, SOLUTION INTRAVENOUS; SUBCUTANEOUS EVERY 4 HOURS
Status: DISCONTINUED | OUTPATIENT
Start: 2024-04-20 | End: 2024-04-21

## 2024-04-20 RX ORDER — ALBUMIN (HUMAN) 12.5 G/50ML
25 SOLUTION INTRAVENOUS ONCE
Status: COMPLETED | OUTPATIENT
Start: 2024-04-20 | End: 2024-04-20

## 2024-04-20 RX ORDER — FUROSEMIDE 10 MG/ML
20 INJECTION INTRAMUSCULAR; INTRAVENOUS 2 TIMES DAILY
Status: COMPLETED | OUTPATIENT
Start: 2024-04-20 | End: 2024-04-20

## 2024-04-20 RX ORDER — OXYCODONE HCL 5 MG/5 ML
10 SOLUTION, ORAL ORAL EVERY 4 HOURS
Status: DISCONTINUED | OUTPATIENT
Start: 2024-04-20 | End: 2024-04-30

## 2024-04-20 RX ORDER — ALBUMIN (HUMAN) 12.5 G/50ML
25 SOLUTION INTRAVENOUS EVERY 6 HOURS
Status: COMPLETED | OUTPATIENT
Start: 2024-04-20 | End: 2024-04-21

## 2024-04-20 RX ORDER — INSULIN GLARGINE 100 [IU]/ML
15 INJECTION, SOLUTION SUBCUTANEOUS NIGHTLY
Status: DISCONTINUED | OUTPATIENT
Start: 2024-04-20 | End: 2024-04-21

## 2024-04-20 RX ORDER — IPRATROPIUM BROMIDE AND ALBUTEROL SULFATE 2.5; .5 MG/3ML; MG/3ML
1 SOLUTION RESPIRATORY (INHALATION)
Status: DISPENSED | OUTPATIENT
Start: 2024-04-20

## 2024-04-20 RX ORDER — PROPOFOL 10 MG/ML
5-50 INJECTION, EMULSION INTRAVENOUS CONTINUOUS
Status: DISCONTINUED | OUTPATIENT
Start: 2024-04-20 | End: 2024-04-22

## 2024-04-20 RX ORDER — INSULIN GLARGINE 100 [IU]/ML
15 INJECTION, SOLUTION SUBCUTANEOUS EVERY MORNING
Status: DISCONTINUED | OUTPATIENT
Start: 2024-04-20 | End: 2024-04-24

## 2024-04-20 RX ORDER — PROPOFOL 10 MG/ML
INJECTION, EMULSION INTRAVENOUS
Status: COMPLETED
Start: 2024-04-20 | End: 2024-04-20

## 2024-04-20 RX ORDER — ALBUMIN (HUMAN) 12.5 G/50ML
25 SOLUTION INTRAVENOUS ONCE
Status: DISCONTINUED | OUTPATIENT
Start: 2024-04-20 | End: 2024-04-20

## 2024-04-20 RX ADMIN — POTASSIUM PHOSPHATE, MONOBASIC AND POTASSIUM PHOSPHATE, DIBASIC 30 MMOL: 224; 236 INJECTION, SOLUTION, CONCENTRATE INTRAVENOUS at 08:13

## 2024-04-20 RX ADMIN — OXYCODONE HYDROCHLORIDE 10 MG: 5 SOLUTION ORAL at 23:39

## 2024-04-20 RX ADMIN — METHOCARBAMOL 500 MG: 1000 INJECTION, SOLUTION INTRAMUSCULAR; INTRAVENOUS at 12:11

## 2024-04-20 RX ADMIN — METHOCARBAMOL 500 MG: 1000 INJECTION, SOLUTION INTRAMUSCULAR; INTRAVENOUS at 18:08

## 2024-04-20 RX ADMIN — SODIUM CHLORIDE, PRESERVATIVE FREE 10 ML: 5 INJECTION INTRAVENOUS at 20:02

## 2024-04-20 RX ADMIN — PIPERACILLIN AND TAZOBACTAM 3375 MG: 3; .375 INJECTION, POWDER, LYOPHILIZED, FOR SOLUTION INTRAVENOUS at 18:00

## 2024-04-20 RX ADMIN — 0.12% CHLORHEXIDINE GLUCONATE 15 ML: 1.2 RINSE ORAL at 20:02

## 2024-04-20 RX ADMIN — INSULIN LISPRO 8 UNITS: 100 INJECTION, SOLUTION INTRAVENOUS; SUBCUTANEOUS at 16:11

## 2024-04-20 RX ADMIN — Medication 150 MCG/HR: at 07:37

## 2024-04-20 RX ADMIN — ACETAMINOPHEN 650 MG: 650 SOLUTION ORAL at 17:37

## 2024-04-20 RX ADMIN — FUROSEMIDE 20 MG: 10 INJECTION, SOLUTION INTRAMUSCULAR; INTRAVENOUS at 11:24

## 2024-04-20 RX ADMIN — CALCIUM GLUCONATE: 98 INJECTION, SOLUTION INTRAVENOUS at 19:11

## 2024-04-20 RX ADMIN — POLYVINYL ALCOHOL, POVIDONE 1 DROP: 14; 6 SOLUTION/ DROPS OPHTHALMIC at 23:39

## 2024-04-20 RX ADMIN — INSULIN LISPRO 4 UNITS: 100 INJECTION, SOLUTION INTRAVENOUS; SUBCUTANEOUS at 20:18

## 2024-04-20 RX ADMIN — ENOXAPARIN SODIUM 40 MG: 100 INJECTION SUBCUTANEOUS at 08:30

## 2024-04-20 RX ADMIN — PROPOFOL 50 MCG/KG/MIN: 10 INJECTION, EMULSION INTRAVENOUS at 20:06

## 2024-04-20 RX ADMIN — OXYCODONE HYDROCHLORIDE 10 MG: 5 SOLUTION ORAL at 15:58

## 2024-04-20 RX ADMIN — Medication 50 MCG: at 08:54

## 2024-04-20 RX ADMIN — ALBUMIN (HUMAN) 25 G: 0.25 INJECTION, SOLUTION INTRAVENOUS at 08:33

## 2024-04-20 RX ADMIN — INSULIN LISPRO 12 UNITS: 100 INJECTION, SOLUTION INTRAVENOUS; SUBCUTANEOUS at 08:28

## 2024-04-20 RX ADMIN — ACETAMINOPHEN 650 MG: 650 SOLUTION ORAL at 23:39

## 2024-04-20 RX ADMIN — Medication 150 MCG/HR: at 13:25

## 2024-04-20 RX ADMIN — OXYCODONE HYDROCHLORIDE 10 MG: 5 SOLUTION ORAL at 20:01

## 2024-04-20 RX ADMIN — Medication 50 MCG: at 09:45

## 2024-04-20 RX ADMIN — KETOTIFEN FUMARATE 1 DROP: 0.35 SOLUTION/ DROPS OPHTHALMIC at 07:55

## 2024-04-20 RX ADMIN — ALBUMIN (HUMAN) 25 G: 0.25 INJECTION, SOLUTION INTRAVENOUS at 20:11

## 2024-04-20 RX ADMIN — Medication 150 MCG/HR: at 01:43

## 2024-04-20 RX ADMIN — ACETAMINOPHEN 650 MG: 650 SOLUTION ORAL at 12:06

## 2024-04-20 RX ADMIN — POLYVINYL ALCOHOL, POVIDONE 1 DROP: 14; 6 SOLUTION/ DROPS OPHTHALMIC at 07:54

## 2024-04-20 RX ADMIN — PIPERACILLIN AND TAZOBACTAM 3375 MG: 3; .375 INJECTION, POWDER, LYOPHILIZED, FOR SOLUTION INTRAVENOUS at 01:40

## 2024-04-20 RX ADMIN — CALCIUM GLUCONATE 4000 MG: 98 INJECTION, SOLUTION INTRAVENOUS at 08:43

## 2024-04-20 RX ADMIN — INSULIN LISPRO 4 UNITS: 100 INJECTION, SOLUTION INTRAVENOUS; SUBCUTANEOUS at 12:20

## 2024-04-20 RX ADMIN — KETOTIFEN FUMARATE 1 DROP: 0.35 SOLUTION/ DROPS OPHTHALMIC at 20:03

## 2024-04-20 RX ADMIN — Medication 50 MCG: at 12:23

## 2024-04-20 RX ADMIN — ALBUMIN (HUMAN) 25 G: 0.25 INJECTION, SOLUTION INTRAVENOUS at 14:29

## 2024-04-20 RX ADMIN — Medication 50 MCG: at 08:14

## 2024-04-20 RX ADMIN — Medication 50 MCG: at 07:24

## 2024-04-20 RX ADMIN — METOCLOPRAMIDE 10 MG: 5 INJECTION, SOLUTION INTRAMUSCULAR; INTRAVENOUS at 23:40

## 2024-04-20 RX ADMIN — IPRATROPIUM BROMIDE AND ALBUTEROL SULFATE 1 DOSE: .5; 2.5 SOLUTION RESPIRATORY (INHALATION) at 19:39

## 2024-04-20 RX ADMIN — ATORVASTATIN CALCIUM 10 MG: 10 TABLET, FILM COATED ORAL at 20:02

## 2024-04-20 RX ADMIN — PROPOFOL 5 MCG/KG/MIN: 10 INJECTION, EMULSION INTRAVENOUS at 10:55

## 2024-04-20 RX ADMIN — Medication 150 MCG/HR: at 20:32

## 2024-04-20 RX ADMIN — POLYVINYL ALCOHOL, POVIDONE 1 DROP: 14; 6 SOLUTION/ DROPS OPHTHALMIC at 17:37

## 2024-04-20 RX ADMIN — INSULIN LISPRO 8 UNITS: 100 INJECTION, SOLUTION INTRAVENOUS; SUBCUTANEOUS at 04:15

## 2024-04-20 RX ADMIN — INSULIN GLARGINE 15 UNITS: 100 INJECTION, SOLUTION SUBCUTANEOUS at 20:18

## 2024-04-20 RX ADMIN — METOCLOPRAMIDE 10 MG: 5 INJECTION, SOLUTION INTRAMUSCULAR; INTRAVENOUS at 06:17

## 2024-04-20 RX ADMIN — METOCLOPRAMIDE 10 MG: 5 INJECTION, SOLUTION INTRAMUSCULAR; INTRAVENOUS at 14:24

## 2024-04-20 RX ADMIN — DEXMEDETOMIDINE 1 MCG/KG/HR: 100 INJECTION, SOLUTION INTRAVENOUS at 06:16

## 2024-04-20 RX ADMIN — 0.12% CHLORHEXIDINE GLUCONATE 15 ML: 1.2 RINSE ORAL at 07:55

## 2024-04-20 RX ADMIN — PIPERACILLIN AND TAZOBACTAM 3375 MG: 3; .375 INJECTION, POWDER, LYOPHILIZED, FOR SOLUTION INTRAVENOUS at 11:18

## 2024-04-20 RX ADMIN — POLYVINYL ALCOHOL, POVIDONE 1 DROP: 14; 6 SOLUTION/ DROPS OPHTHALMIC at 20:02

## 2024-04-20 RX ADMIN — POLYVINYL ALCOHOL, POVIDONE 1 DROP: 14; 6 SOLUTION/ DROPS OPHTHALMIC at 13:27

## 2024-04-20 RX ADMIN — SODIUM CHLORIDE, PRESERVATIVE FREE 40 MG: 5 INJECTION INTRAVENOUS at 07:55

## 2024-04-20 RX ADMIN — FUROSEMIDE 20 MG: 10 INJECTION, SOLUTION INTRAMUSCULAR; INTRAVENOUS at 17:37

## 2024-04-20 RX ADMIN — POLYVINYL ALCOHOL, POVIDONE 1 DROP: 14; 6 SOLUTION/ DROPS OPHTHALMIC at 04:14

## 2024-04-20 RX ADMIN — PROPOFOL 50 MCG/KG/MIN: 10 INJECTION, EMULSION INTRAVENOUS at 15:49

## 2024-04-20 RX ADMIN — BUPROPION HYDROCHLORIDE 300 MG: 300 TABLET, FILM COATED, EXTENDED RELEASE ORAL at 07:57

## 2024-04-20 RX ADMIN — INSULIN GLARGINE 15 UNITS: 100 INJECTION, SOLUTION SUBCUTANEOUS at 11:12

## 2024-04-20 RX ADMIN — POTASSIUM PHOSPHATE, MONOBASIC AND POTASSIUM PHOSPHATE, DIBASIC 30 MMOL: 224; 236 INJECTION, SOLUTION, CONCENTRATE INTRAVENOUS at 02:28

## 2024-04-20 RX ADMIN — SODIUM CHLORIDE, PRESERVATIVE FREE 10 ML: 5 INJECTION INTRAVENOUS at 07:54

## 2024-04-20 RX ADMIN — Medication 50 MCG: at 06:34

## 2024-04-20 RX ADMIN — ACETAMINOPHEN 650 MG: 650 SOLUTION ORAL at 04:14

## 2024-04-20 ASSESSMENT — PULMONARY FUNCTION TESTS
PIF_VALUE: 32
PIF_VALUE: 17
PIF_VALUE: 38
PIF_VALUE: 18
PIF_VALUE: 17
PIF_VALUE: 17
PIF_VALUE: 26
PIF_VALUE: 17
PIF_VALUE: 16
PIF_VALUE: 17
PIF_VALUE: 16
PIF_VALUE: 33
PIF_VALUE: 17
PIF_VALUE: 38
PIF_VALUE: 17
PIF_VALUE: 32
PIF_VALUE: 17
PIF_VALUE: 17
PIF_VALUE: 34
PIF_VALUE: 17
PIF_VALUE: 30
PIF_VALUE: 28
PIF_VALUE: 17
PIF_VALUE: 17

## 2024-04-20 ASSESSMENT — PAIN SCALES - GENERAL
PAINLEVEL_OUTOF10: 6
PAINLEVEL_OUTOF10: 4
PAINLEVEL_OUTOF10: 4
PAINLEVEL_OUTOF10: 6
PAINLEVEL_OUTOF10: 0
PAINLEVEL_OUTOF10: 4
PAINLEVEL_OUTOF10: 0
PAINLEVEL_OUTOF10: 6
PAINLEVEL_OUTOF10: 0
PAINLEVEL_OUTOF10: 6
PAINLEVEL_OUTOF10: 6
PAINLEVEL_OUTOF10: 0
PAINLEVEL_OUTOF10: 6
PAINLEVEL_OUTOF10: 6
PAINLEVEL_OUTOF10: 2
PAINLEVEL_OUTOF10: 0
PAINLEVEL_OUTOF10: 2
PAINLEVEL_OUTOF10: 6

## 2024-04-20 ASSESSMENT — PAIN DESCRIPTION - ONSET
ONSET: ON-GOING

## 2024-04-20 ASSESSMENT — PAIN DESCRIPTION - DESCRIPTORS
DESCRIPTORS: PATIENT UNABLE TO DESCRIBE

## 2024-04-20 ASSESSMENT — PAIN DESCRIPTION - FREQUENCY
FREQUENCY: CONTINUOUS

## 2024-04-20 NOTE — PROGRESS NOTES
[facility]   Daily Progress Note      Patient's Name/Date of Birth: Leatha Willard / 1965    Date: April 20, 2024     Chief Complaint: whipple c/b need for takeback total pancreatectomy, splenectomy    Patient Active Problem List   Diagnosis    Recurrent major depressive disorder (HCC)    Essential hypertension    Hyperlipidemia    Prediabetes    Class 1 obesity due to excess calories without serious comorbidity with body mass index (BMI) of 32.0 to 32.9 in adult    HIREN (obstructive sleep apnea)    Pancreatic cyst    Colon polyps    Cyst of pancreas    Chest pain    Tobacco abuse-- quit 2 weeks ago    Abdominal pain    Serous cystadenoma    Pancreatic duct leak    Sepsis with acute renal failure and septic shock (HCC)    JUSTINE (acute kidney injury) (HCC)    Hypophosphatemia    Acute respiratory failure with hypoxia (HCC)       Subjective:   Remains intubated and sedated this AM. Issues with some agitation per nursing overnight. Remains on low dose levophed due to sedation medication. GIULIANO output decreasing.     Objective:  BP (!) 93/59   Pulse (!) 112   Temp 99.6 °F (37.6 °C) (Axillary)   Resp 14   Ht 1.626 m (5' 4\")   Wt 87.5 kg (192 lb 12.8 oz)   SpO2 96%   BMI 33.09 kg/m²   Labs:  Recent Labs     04/18/24  2331 04/19/24  0521 04/20/24  0410   WBC 26.7* 20.5* 33.1*   HGB 12.8 9.8* 7.7*   HCT 38.8 29.3* 22.7*     Recent Labs     04/19/24  1720 04/19/24  2327 04/20/24  0410    137 137   K 4.7 4.5 4.5    105 105   CO2 26 24 24   BUN 24* 23* 23*   CREATININE 0.7 0.7 0.7     Recent Labs     04/19/24  1720 04/19/24  2327 04/20/24  0410   ALKPHOS 204* 73 80   ALT 34* 32 34*   AST 29 21 23   BILITOT 0.6 0.6 0.6         General appearance: critically ill appearing, remains intubated  Head:NG to LIWS w green output.   Neck: supple, no masses  Lungs: symmetric chest rise, mechanically ventilated, 40% FiO2, 8 peep CPAP/PS  Heart: warm throughout,on levophed   Abdomen: mildly distended, midline covered with  as mentioned.    On pressure support, very agitated overnight. Pulm edema on cxr. Still on low dose levo. Drains without bile,   - Albumin/lasix today  - Endocrine on board, insulin in TPN  - Cont NG when extubated  - Reglan for DGE ppx  - trend leukocytosis, post splenectomy  - Continue Zosyn  - aggressive electrolyte replacements  - Extubate per ICU digression       Norma Rogers MD  04/20/24  8:47 AM

## 2024-04-20 NOTE — PROGRESS NOTES
ENDOCRINOLOGY INITIAL CONSULTATION NOTE      Date of admission: 4/15/2024  Date of service: 4/20/2024  Admitting physician: Norma Rogers MD   Primary Care Physician: Mekhi Escalona MD  Consultant physician: Jonathan Curiel MD     Reason for the consultation:  Post-pancreatectomy DM     History of Present Illness:  Leatha Willard is a 58 y.o. old female with PMH of cystic neoplasm of the head of pancreas, HTN, HLD and other listed below admitted to [facility] on 4/15/2024 for completion pancreatectomy. Endocrine service was consulted for diabetes management.    Subjective   I saw and examined the patient at bedside this morning.  No acute events overnight.  Still on TPN.    Point of care glucose monitoring   (Independently reviewed)   Recent Labs     04/19/24  1457 04/19/24  1552 04/19/24  1709 04/19/24  1811 04/19/24  1957 04/19/24  2324 04/20/24  0407 04/20/24  0823   POCGLU 152* 147* 160* 140* 130* 184* 225* 274*       Scheduled Meds:   insulin lispro  0-24 Units SubCUTAneous Q4H    calcium gluconate 4,000 mg in sodium chloride 0.9 % 100 mL IVPB  4,000 mg IntraVENous Once    methocarbamol (ROBAXIN) 500 mg in sodium chloride 0.9 % 100 mL IVPB  500 mg IntraVENous Q8H    albumin human 25%  25 g IntraVENous Q6H    furosemide  20 mg IntraVENous BID    insulin glargine  15 Units SubCUTAneous QAM    sodium chloride flush  5-40 mL IntraVENous BID    insulin glargine  35 Units SubCUTAneous Nightly    metoclopramide  10 mg IntraVENous q8h    acetaminophen  650 mg Oral 4 times per day    chlorhexidine  15 mL Mouth/Throat BID    Polyvinyl Alcohol-Povidone PF  1 drop Both Eyes Q4H    Or    artificial tears   Both Eyes Q4H    lidocaine  1 patch TransDERmal Daily    enoxaparin  40 mg SubCUTAneous Daily    atorvastatin  10 mg Oral Nightly    buPROPion  300 mg Oral QAM    ketotifen fumarate  1 drop Both Eyes BID    piperacillin-tazobactam  3,375 mg IntraVENous q8h    pantoprazole (PROTONIX) 40 mg in sodium chloride (PF) 0.9 % 10 mL  viscous less likely.  Clinical   correlation required.   2. Whipple procedure.   3. Interval development of fluid within the abdomen with several areas   appearing loculated, largest measuring 3.2 x 8.2 x 2.7 cm in gallbladder bed   and 2.2 x 11.4 x 3.8 cm anterior abdomen.   4. Interval placement of percutaneous drainage catheters.  Catheter on the   right has tip at more since pouch in does not reside within the fluid   collection.  Catheter entering left abdominal wall with tip in gallbladder   bed is does not reside in the dependent portion of the fluid collection.   5. Pancreatic duct drainage catheter.   6. Small right pleural effusion with bibasilar subsegmental atelectatic   change.   7. Bilateral abdominopelvic sidewall edema.   8. Sigmoid diverticulosis.         XR CHEST PORTABLE   Final Result   Right jugular central venous catheter tip with satisfactory positioning   overlying the proximal right atrium.         XR CHEST PORTABLE   Final Result   1. Right neck central venous catheter, tip in the expected location of the   SVC.   2. Patchy subpleural opacities in the inferolateral right upper lobe as well   as in the right lower lobe.   3. Faintly increased bilateral interstitial markings.         XR CHEST PORTABLE    (Results Pending)       Medical Records/Labs/Images review:   I personally reviewed and summarized previous records   All labs and imaging were reviewed independently     ASSESSMENT & PLAN   Leatha UNGER Sudheer, a 58 y.o.-old female seen today for inpatient diabetes management    Post pancreatectomy diabetes  She is currently on insulin drip at rate of 3.3 units/h  The patient is also on TPN   We recommend the following insulin regimen   Lantus 15 units twice daily  High-dose sliding scale with increment of 4u every 4 hours  Will add 27 units of regular insulin to the TPN bag to help with better diabetes control  Will be checking the glucose every 4 hours and adjust insulin

## 2024-04-20 NOTE — PLAN OF CARE
Problem: ABCDS Injury Assessment  Goal: Absence of physical injury  Outcome: Progressing     Problem: Respiratory - Adult  Goal: Achieves optimal ventilation and oxygenation  4/20/2024 0857 by Mylene Yap RN  Outcome: Not Progressing  4/20/2024 0045 by Xochitl Plummer RCP  Outcome: Progressing  4/19/2024 2034 by Xochitl Plummer RCP  Outcome: Progressing     Problem: Cardiovascular - Adult  Goal: Maintains optimal cardiac output and hemodynamic stability  Outcome: Not Progressing     Problem: Gastrointestinal - Adult  Goal: Maintains or returns to baseline bowel function  Outcome: Not Progressing     Problem: Gastrointestinal - Adult  Goal: Maintains adequate nutritional intake  Outcome: Not Progressing     Problem: Genitourinary - Adult  Goal: Absence of urinary retention  Outcome: Progressing  Goal: Urinary catheter remains patent  Outcome: Progressing     Problem: Metabolic/Fluid and Electrolytes - Adult  Goal: Electrolytes maintained within normal limits  Outcome: Not Progressing  Goal: Glucose maintained within prescribed range  Outcome: Not Progressing     Problem: Neurosensory - Adult  Goal: Achieves stable or improved neurological status  Outcome: Not Progressing  Goal: Achieves maximal functionality and self care  Outcome: Not Progressing     Problem: Chronic Conditions and Co-morbidities  Goal: Patient's chronic conditions and co-morbidity symptoms are monitored and maintained or improved  Outcome: Not Progressing     Problem: Pain  Goal: Verbalizes/displays adequate comfort level or baseline comfort level  4/20/2024 0857 by Mylene Yap RN  Outcome: Not Progressing  Flowsheets (Taken 4/20/2024 0800)  Verbalizes/displays adequate comfort level or baseline comfort level:   Encourage patient to monitor pain and request assistance   Administer analgesics based on type and severity of pain and evaluate response   Assess pain using appropriate pain scale   Implement non-pharmacological measures as

## 2024-04-20 NOTE — PLAN OF CARE
Problem: Discharge Planning  Goal: Discharge to home or other facility with appropriate resources  Outcome: Progressing  Flowsheets (Taken 4/19/2024 0800 by Alysa Doll, RN)  Discharge to home or other facility with appropriate resources: Identify barriers to discharge with patient and caregiver     Problem: Skin/Tissue Integrity  Goal: Absence of new skin breakdown  Description: 1.  Monitor for areas of redness and/or skin breakdown  2.  Assess vascular access sites hourly  3.  Every 4-6 hours minimum:  Change oxygen saturation probe site  4.  Every 4-6 hours:  If on nasal continuous positive airway pressure, respiratory therapy assess nares and determine need for appliance change or resting period.  4/19/2024 2005 by Alley Walters, RN  Outcome: Progressing     Problem: Safety - Medical Restraint  Goal: Remains free of injury from restraints (Restraint for Interference with Medical Device)  Description: INTERVENTIONS:  1. Determine that other, less restrictive measures have been tried or would not be effective before applying the restraint  2. Evaluate the patient's condition at the time of restraint application  3. Inform patient/family regarding the reason for restraint  4. Q2H: Monitor safety, psychosocial status, comfort, nutrition and hydration  4/19/2024 2005 by Alley Waletrs, RN  Outcome: Progressing  Flowsheets (Taken 4/19/2024 1152 by Alysa Doll, RN)  Remains free of injury from restraints (restraint for interference with medical device):   Determine that other, less restrictive measures have been tried or would not be effective before applying the restraint   Evaluate the patient's condition at the time of restraint application   Inform patient/family regarding the reason for restraint   Every 2 hours: Monitor safety, psychosocial status, comfort, nutrition and hydration

## 2024-04-20 NOTE — FLOWSHEET NOTE
Patient intermittently agitated, continued attempts to reach for lines/tubes present. Bilateral soft wrist restraints remain in place for safety and line/tube protection.

## 2024-04-20 NOTE — PLAN OF CARE
Problem: Respiratory - Adult  Goal: Achieves optimal ventilation and oxygenation  4/20/2024 1038 by Manolo Dee RCP  Outcome: Progressing

## 2024-04-20 NOTE — PROGRESS NOTES
DAILY VENTILATOR WEANING ASSESSMENT PERFORMED    P/FIO2 Ratio =195          (<100= do not Wean)                  Cs =   20                       (<32= Instability)  Plat. Pressure = 24  MV =6.89  RSBI =    Instabilities:       Cardiovascular =       CNS =       Respiratory =       Metabolic =2    Parameters    no    Wean per protocol  no    Ask Physician for a weaning plan yes    Additional Comments:     Performed by Manolo Dee RCP RRT      Reference Table:    Cardiovascular     CNS      1. Mean BP less than or equal to 75   1. Neuromuscular blockade  2. Heart Rate greater than 130   2. RASS of -3, -4, -5  3. Myocardial Ischemia    3. RASS of +3, +4  4. Mechanical Assist Device    4. ICP greater than 15 or             Intracranial Hypertension         Respiratory      Metabolic  1. PEEP equal to or greater than 10cm/H20  1.Temp. (8hrs) less than 95 or > 103  2. Respiratory Rate greater than 35   2. WBC < 5000 or > 18843  3. Minute Volume greater than 15L  4. pH less than 7.30  5. Deteriorating chest X-ray

## 2024-04-20 NOTE — PLAN OF CARE
Problem: Respiratory - Adult  Goal: Achieves optimal ventilation and oxygenation  4/20/2024 1948 by Xochitl Plummer RCP  Outcome: Progressing

## 2024-04-20 NOTE — FLOWSHEET NOTE
Patient intubated and continues to pull at lines and tubes upon agitation. Verbal redirection unsuccessful at this time. Bilateral soft wrist restraints continued for patient safety.

## 2024-04-20 NOTE — PLAN OF CARE
regarding the reason for restraint  4. Q2H: Monitor safety, psychosocial status, comfort, nutrition and hydration  4/20/2024 1615 by Mylene Yap RN  Outcome: Progressing  Flowsheets  Taken 4/20/2024 1400  Remains free of injury from restraints (restraint for interference with medical device): Every 2 hours: Monitor safety, psychosocial status, comfort, nutrition and hydration  Taken 4/20/2024 1200  Remains free of injury from restraints (restraint for interference with medical device): Every 2 hours: Monitor safety, psychosocial status, comfort, nutrition and hydration  Taken 4/20/2024 1052  Remains free of injury from restraints (restraint for interference with medical device): Every 2 hours: Monitor safety, psychosocial status, comfort, nutrition and hydration  Taken 4/20/2024 1000  Remains free of injury from restraints (restraint for interference with medical device): Every 2 hours: Monitor safety, psychosocial status, comfort, nutrition and hydration  4/20/2024 0857 by Mylene Yap RN  Outcome: Progressing  Flowsheets (Taken 4/20/2024 0800)  Remains free of injury from restraints (restraint for interference with medical device): Every 2 hours: Monitor safety, psychosocial status, comfort, nutrition and hydration     Problem: Respiratory - Adult  Goal: Achieves optimal ventilation and oxygenation  4/20/2024 1615 by Mylene Yap RN  Outcome: Not Progressing  4/20/2024 1038 by Manolo Dee RCP  Outcome: Progressing  4/20/2024 0857 by Mylene Yap RN  Outcome: Not Progressing  Flowsheets (Taken 4/20/2024 0800)  Achieves optimal ventilation and oxygenation:   Assess for changes in respiratory status   Assess for changes in mentation and behavior   Position to facilitate oxygenation and minimize respiratory effort   Oxygen supplementation based on oxygen saturation or arterial blood gases   Encourage broncho-pulmonary hygiene including cough, deep breathe, incentive spirometry   Assess the need for  scale   Administer analgesics based on type and severity of pain and evaluate response   Implement non-pharmacological measures as appropriate and evaluate response  Taken 4/20/2024 1300  Verbalizes/displays adequate comfort level or baseline comfort level:   Assess pain using appropriate pain scale   Administer analgesics based on type and severity of pain and evaluate response   Implement non-pharmacological measures as appropriate and evaluate response  Taken 4/20/2024 1200  Verbalizes/displays adequate comfort level or baseline comfort level:   Assess pain using appropriate pain scale   Administer analgesics based on type and severity of pain and evaluate response   Implement non-pharmacological measures as appropriate and evaluate response  Taken 4/20/2024 1100  Verbalizes/displays adequate comfort level or baseline comfort level:   Assess pain using appropriate pain scale   Administer analgesics based on type and severity of pain and evaluate response   Implement non-pharmacological measures as appropriate and evaluate response  Taken 4/20/2024 1052  Verbalizes/displays adequate comfort level or baseline comfort level:   Assess pain using appropriate pain scale   Administer analgesics based on type and severity of pain and evaluate response   Implement non-pharmacological measures as appropriate and evaluate response  Taken 4/20/2024 1000  Verbalizes/displays adequate comfort level or baseline comfort level:   Assess pain using appropriate pain scale   Administer analgesics based on type and severity of pain and evaluate response   Implement non-pharmacological measures as appropriate and evaluate response  Taken 4/20/2024 0900  Verbalizes/displays adequate comfort level or baseline comfort level:   Assess pain using appropriate pain scale   Administer analgesics based on type and severity of pain and evaluate response   Implement non-pharmacological measures as appropriate and evaluate response  4/20/2024

## 2024-04-20 NOTE — PLAN OF CARE
Problem: Respiratory - Adult  Goal: Achieves optimal ventilation and oxygenation  4/19/2024 2034 by Xochitl Plummer RCP  Outcome: Progressing

## 2024-04-20 NOTE — PLAN OF CARE
Problem: Respiratory - Adult  Goal: Achieves optimal ventilation and oxygenation  4/20/2024 0045 by Xochitl Plummer RCP  Outcome: Progressing

## 2024-04-20 NOTE — FLOWSHEET NOTE
Patient intubated and continues to pull at lines and tubes upon agitation. Verbal redirection unsuccessful at this time. Bilateral soft wrist restraints continued for patient safety.   the pt is 1y male presenting with a wet cast. dad states the pt's cast got wet today and ortho instructed them to come in. pt is awake and alert. cap refill less than 2 seconds on affected extremity.  no swelling noted. pt able to move toes. NKDA. no pmhx. IUTD. no recent travel. apical pulse correlates with HR.

## 2024-04-20 NOTE — FLOWSHEET NOTE
Patient continuously agitated, attempts to reach for all lines/tubes. No evidence of learning present at this time, bilateral soft wrist restraints remain in place for safety and line/tube protection.

## 2024-04-20 NOTE — PROGRESS NOTES
[facility]  PROGRESS NOTE  ATTENDING NOTE    CRITICAL CARE    CC:  pancreatic cyst      HPI  Leatha Willard is a 58 y.o. female who presents to the SICU for post-op monitoring following a whipple. The patient has been admitted to the hospital since 4/15/2024 who presents for evaluation of pancreatic cyst. She has a hx of arthritis and back pain s/p cervical fusion. She was being evaluated for her mid back pain with imaging and was found to have a large pancreatic cyst on CT. She then had an MRI/MRCP showing a large >10cm cyst in the pancreatic head extending to the transverse colon mesentery. She had an EUS with Dr. Hammond at [facility]. FNA and fluid analysis was consistent with a benign cyst. There were no suspicious features present. Patient underwent a Pylorus-preserving pancreaticoduodenectomy on 4/15 and was admitted to the ICU post-operatively.     Patient Active Problem List   Diagnosis    Recurrent major depressive disorder (HCC)    Essential hypertension    Hyperlipidemia    Prediabetes    Class 1 obesity due to excess calories without serious comorbidity with body mass index (BMI) of 32.0 to 32.9 in adult    HIREN (obstructive sleep apnea)    Pancreatic cyst    Colon polyps    Cyst of pancreas    Chest pain    Tobacco abuse-- quit 2 weeks ago    Abdominal pain    Serous cystadenoma    Pancreatic duct leak    Sepsis with acute renal failure and septic shock (HCC)    JUSTINE (acute kidney injury) (HCC)    Hypophosphatemia    Acute respiratory failure with hypoxia (HCC)       OVERNIGHT EVENTS:  On levophed, inc WBC    HOSPITAL COURSE:  4/15--pylorus preserving Whipple for large benign pancreatic cyst  4/16--continued ICU d/t nausea  4/17--bobby replaced, pancreatic leak, boluses given, albumin given, JUSTINE, toradol stopped, CVC changed  4/18--CVC replaced, NGT repalced, scop patch, robaxin, d/c IVF; total pancreatectomy and splenectomy, insulin gtt  4/19--remains intubated, TPN,  robaxin  Depression--wellbutrin  Chronic back pain--lidocaine patches and heating pad; fentanyl gtt, robaxin  Cardio:  No active issues   Respiratory: respiratory insufficiency--IS, pulmonary toilet  Pulmonary edema--lasix   GI:  pancreatic cyst--s/p PP Whipple--FTWP, HPB following  Pancreatic leak--NPO, TPN, antibiotics, HPB following, s/p total pancreatectomy with splenectomy  FEN: hypocalcemia   Renal:  JUSTINE--resolved, c/w bobby for now  Heme:  acute blood loss anemia--monitor  Endocrine:  DM--endocrine c/s, insulin gtt  ID: pancreatic leak--c/w antibiotics  S/p splenectomy--vaccines prior to d/c      DVT/GI ppx:  lovenox, PPI    CC TIME:  I spent 39min managing this patients critical issues which are a constant threat to life excluding time teaching and performing procedures.      Marissa Ramirez MD, MSc, FACS  4/20/2024  3:02 PM

## 2024-04-21 ENCOUNTER — APPOINTMENT (OUTPATIENT)
Dept: GENERAL RADIOLOGY | Age: 59
DRG: 004 | End: 2024-04-21
Attending: STUDENT IN AN ORGANIZED HEALTH CARE EDUCATION/TRAINING PROGRAM
Payer: COMMERCIAL

## 2024-04-21 LAB
AADO2: 130.5 MMHG
AADO2: 220 MMHG
AADO2: 234.5 MMHG
AADO2: 252.1 MMHG
AADO2: 252.2 MMHG
ALBUMIN SERPL-MCNC: 3.6 G/DL (ref 3.5–5.2)
ALP SERPL-CCNC: 103 U/L (ref 35–104)
ALT SERPL-CCNC: 23 U/L (ref 0–32)
ANION GAP SERPL CALCULATED.3IONS-SCNC: 11 MMOL/L (ref 7–16)
AST SERPL-CCNC: 23 U/L (ref 0–31)
B.E.: 1.8 MMOL/L (ref -3–3)
B.E.: 3.2 MMOL/L (ref -3–3)
B.E.: 5 MMOL/L (ref -3–3)
B.E.: 5.3 MMOL/L (ref -3–3)
B.E.: 5.5 MMOL/L (ref -3–3)
BASOPHILS # BLD: 0 K/UL (ref 0–0.2)
BASOPHILS NFR BLD: 0 % (ref 0–2)
BILIRUB SERPL-MCNC: 0.9 MG/DL (ref 0–1.2)
BUN SERPL-MCNC: 19 MG/DL (ref 6–20)
CA-I BLD-SCNC: 1.1 MMOL/L (ref 1.15–1.33)
CALCIUM SERPL-MCNC: 8.4 MG/DL (ref 8.6–10.2)
CHLORIDE SERPL-SCNC: 101 MMOL/L (ref 98–107)
CO2 SERPL-SCNC: 28 MMOL/L (ref 22–29)
COHB: 0.2 % (ref 0–1.5)
COHB: 0.3 % (ref 0–1.5)
CREAT SERPL-MCNC: 0.8 MG/DL (ref 0.5–1)
CRITICAL: ABNORMAL
DATE ANALYZED: ABNORMAL
DATE OF COLLECTION: ABNORMAL
EOSINOPHIL # BLD: 0.55 K/UL (ref 0.05–0.5)
EOSINOPHILS RELATIVE PERCENT: 2 % (ref 0–6)
ERYTHROCYTE [DISTWIDTH] IN BLOOD BY AUTOMATED COUNT: 14.8 % (ref 11.5–15)
FIO2: 40 %
FIO2: 55 %
GFR SERPL CREATININE-BSD FRML MDRD: >90 ML/MIN/1.73M2
GLUCOSE BLD-MCNC: 180 MG/DL (ref 74–99)
GLUCOSE BLD-MCNC: 190 MG/DL (ref 74–99)
GLUCOSE BLD-MCNC: 193 MG/DL (ref 74–99)
GLUCOSE BLD-MCNC: 219 MG/DL (ref 74–99)
GLUCOSE BLD-MCNC: 237 MG/DL (ref 74–99)
GLUCOSE BLD-MCNC: 248 MG/DL (ref 74–99)
GLUCOSE BLD-MCNC: 252 MG/DL (ref 74–99)
GLUCOSE SERPL-MCNC: 185 MG/DL (ref 74–99)
HCO3: 28.2 MMOL/L (ref 22–26)
HCO3: 29.5 MMOL/L (ref 22–26)
HCO3: 30.7 MMOL/L (ref 22–26)
HCO3: 30.8 MMOL/L (ref 22–26)
HCO3: 31.9 MMOL/L (ref 22–26)
HCT VFR BLD AUTO: 19.7 % (ref 34–48)
HCT VFR BLD AUTO: 23.4 % (ref 34–48)
HGB BLD-MCNC: 6.3 G/DL (ref 11.5–15.5)
HGB BLD-MCNC: 7.6 G/DL (ref 11.5–15.5)
HHB: 3.9 % (ref 0–5)
HHB: 5.3 % (ref 0–5)
HHB: 6.8 % (ref 0–5)
HHB: 7.1 % (ref 0–5)
HHB: 7.6 % (ref 0–5)
INR PPP: 1.2
LAB: ABNORMAL
LYMPHOCYTES NFR BLD: 1.93 K/UL (ref 1.5–4)
LYMPHOCYTES RELATIVE PERCENT: 6 % (ref 20–42)
Lab: 1000
Lab: 1530
Lab: 2045
Lab: 2155
Lab: 424
MAGNESIUM SERPL-MCNC: 2 MG/DL (ref 1.6–2.6)
MCH RBC QN AUTO: 30.3 PG (ref 26–35)
MCHC RBC AUTO-ENTMCNC: 32 G/DL (ref 32–34.5)
MCV RBC AUTO: 94.7 FL (ref 80–99.9)
METAMYELOCYTES ABSOLUTE COUNT: 0.55 K/UL (ref 0–0.12)
METAMYELOCYTES: 2 % (ref 0–1)
METHB: 0.3 % (ref 0–1.5)
METHB: 0.4 % (ref 0–1.5)
METHB: 0.4 % (ref 0–1.5)
METHB: 0.5 % (ref 0–1.5)
METHB: 0.5 % (ref 0–1.5)
MICROORGANISM SPEC CULT: ABNORMAL
MICROORGANISM SPEC CULT: NORMAL
MICROORGANISM SPEC CULT: NORMAL
MICROORGANISM/AGENT SPEC: ABNORMAL
MODE: ABNORMAL
MODE: ABNORMAL
MODE: AC
MONOCYTES NFR BLD: 1.38 K/UL (ref 0.1–0.95)
MONOCYTES NFR BLD: 4 % (ref 2–12)
MYELOCYTES ABSOLUTE COUNT: 0.55 K/UL
MYELOCYTES: 2 %
NEUTROPHILS NFR BLD: 83 % (ref 43–80)
NEUTS SEG NFR BLD: 26.4 K/UL (ref 1.8–7.3)
NUCLEATED RED BLOOD CELLS: 1 PER 100 WBC
O2 SATURATION: 92.3 % (ref 92–98.5)
O2 SATURATION: 92.8 % (ref 92–98.5)
O2 SATURATION: 93.2 % (ref 92–98.5)
O2 SATURATION: 94.7 % (ref 92–98.5)
O2 SATURATION: 96.1 % (ref 92–98.5)
O2HB: 91.6 % (ref 94–97)
O2HB: 92.2 % (ref 94–97)
O2HB: 92.7 % (ref 94–97)
O2HB: 93.9 % (ref 94–97)
O2HB: 95.4 % (ref 94–97)
OPERATOR ID: 1868
OPERATOR ID: 1868
OPERATOR ID: 2577
PATIENT TEMP: 37 C
PCO2: 50.4 MMHG (ref 35–45)
PCO2: 53.4 MMHG (ref 35–45)
PCO2: 54.4 MMHG (ref 35–45)
PCO2: 54.9 MMHG (ref 35–45)
PCO2: 59.8 MMHG (ref 35–45)
PEEP/CPAP: 8 CMH2O
PFO2: 1.21 MMHG/%
PFO2: 1.28 MMHG/%
PFO2: 1.51 MMHG/%
PFO2: 1.78 MMHG/%
PFO2: 1.9 MMHG/%
PH BLOOD GAS: 7.33 (ref 7.35–7.45)
PH BLOOD GAS: 7.34 (ref 7.35–7.45)
PH BLOOD GAS: 7.35 (ref 7.35–7.45)
PH BLOOD GAS: 7.38 (ref 7.35–7.45)
PH BLOOD GAS: 7.4 (ref 7.35–7.45)
PHOSPHATE SERPL-MCNC: 4.4 MG/DL (ref 2.5–4.5)
PHOSPHATE SERPL-MCNC: 4.7 MG/DL (ref 2.5–4.5)
PLATELET # BLD AUTO: 264 K/UL (ref 130–450)
PMV BLD AUTO: 10.6 FL (ref 7–12)
PO2: 66.8 MMHG (ref 75–100)
PO2: 70.2 MMHG (ref 75–100)
PO2: 75.9 MMHG (ref 75–100)
PO2: 82.8 MMHG (ref 75–100)
PO2: 97.9 MMHG (ref 75–100)
POTASSIUM SERPL-SCNC: 3.9 MMOL/L (ref 3.5–5)
PROMYELOCYTES ABSOLUTE COUNT: 0.55 K/UL
PROMYELOCYTES: 2 %
PROT SERPL-MCNC: 5.2 G/DL (ref 6.4–8.3)
PROTHROMBIN TIME: 13.5 SEC (ref 9.3–12.4)
RBC # BLD AUTO: 2.08 M/UL (ref 3.5–5.5)
RBC # BLD: ABNORMAL 10*6/UL
RI(T): 1.72
RI(T): 2.25
RI(T): 2.83
RI(T): 3.59
RI(T): 3.78
RR MECHANICAL: 14 B/MIN
RR MECHANICAL: 16 B/MIN
RR MECHANICAL: 20 B/MIN
RR MECHANICAL: 23 B/MIN
RR MECHANICAL: 23 B/MIN
SODIUM SERPL-SCNC: 140 MMOL/L (ref 132–146)
SOURCE, BLOOD GAS: ABNORMAL
SPECIMEN DESCRIPTION: ABNORMAL
SPECIMEN DESCRIPTION: ABNORMAL
SPECIMEN DESCRIPTION: NORMAL
SPECIMEN DESCRIPTION: NORMAL
THB: 7.2 G/DL (ref 11.5–16.5)
THB: 8 G/DL (ref 11.5–16.5)
THB: 8.5 G/DL (ref 11.5–16.5)
THB: 8.5 G/DL (ref 11.5–16.5)
THB: 8.6 G/DL (ref 11.5–16.5)
TIME ANALYZED: 1011
TIME ANALYZED: 1543
TIME ANALYZED: 2050
TIME ANALYZED: 2201
TIME ANALYZED: 435
TSH SERPL DL<=0.05 MIU/L-ACNC: 0.6 UIU/ML (ref 0.27–4.2)
VT MECHANICAL: 315 ML
VT MECHANICAL: 315 ML
VT MECHANICAL: 350 ML
WBC OTHER # BLD: 31.9 K/UL (ref 4.5–11.5)

## 2024-04-21 PROCEDURE — 2580000003 HC RX 258

## 2024-04-21 PROCEDURE — 82962 GLUCOSE BLOOD TEST: CPT

## 2024-04-21 PROCEDURE — 71045 X-RAY EXAM CHEST 1 VIEW: CPT

## 2024-04-21 PROCEDURE — 2580000003 HC RX 258: Performed by: STUDENT IN AN ORGANIZED HEALTH CARE EDUCATION/TRAINING PROGRAM

## 2024-04-21 PROCEDURE — 99291 CRITICAL CARE FIRST HOUR: CPT | Performed by: SURGERY

## 2024-04-21 PROCEDURE — 2000000000 HC ICU R&B

## 2024-04-21 PROCEDURE — 6370000000 HC RX 637 (ALT 250 FOR IP): Performed by: INTERNAL MEDICINE

## 2024-04-21 PROCEDURE — 84443 ASSAY THYROID STIM HORMONE: CPT

## 2024-04-21 PROCEDURE — 83735 ASSAY OF MAGNESIUM: CPT

## 2024-04-21 PROCEDURE — 36415 COLL VENOUS BLD VENIPUNCTURE: CPT

## 2024-04-21 PROCEDURE — 85014 HEMATOCRIT: CPT

## 2024-04-21 PROCEDURE — 2500000003 HC RX 250 WO HCPCS: Performed by: SURGERY

## 2024-04-21 PROCEDURE — 6370000000 HC RX 637 (ALT 250 FOR IP): Performed by: STUDENT IN AN ORGANIZED HEALTH CARE EDUCATION/TRAINING PROGRAM

## 2024-04-21 PROCEDURE — 94003 VENT MGMT INPAT SUBQ DAY: CPT

## 2024-04-21 PROCEDURE — 6360000002 HC RX W HCPCS: Performed by: STUDENT IN AN ORGANIZED HEALTH CARE EDUCATION/TRAINING PROGRAM

## 2024-04-21 PROCEDURE — 6370000000 HC RX 637 (ALT 250 FOR IP): Performed by: SURGERY

## 2024-04-21 PROCEDURE — 2580000003 HC RX 258: Performed by: INTERNAL MEDICINE

## 2024-04-21 PROCEDURE — 2500000003 HC RX 250 WO HCPCS: Performed by: STUDENT IN AN ORGANIZED HEALTH CARE EDUCATION/TRAINING PROGRAM

## 2024-04-21 PROCEDURE — 85025 COMPLETE CBC W/AUTO DIFF WBC: CPT

## 2024-04-21 PROCEDURE — 84100 ASSAY OF PHOSPHORUS: CPT

## 2024-04-21 PROCEDURE — 85610 PROTHROMBIN TIME: CPT

## 2024-04-21 PROCEDURE — A4216 STERILE WATER/SALINE, 10 ML: HCPCS | Performed by: STUDENT IN AN ORGANIZED HEALTH CARE EDUCATION/TRAINING PROGRAM

## 2024-04-21 PROCEDURE — 82330 ASSAY OF CALCIUM: CPT

## 2024-04-21 PROCEDURE — 36430 TRANSFUSION BLD/BLD COMPNT: CPT

## 2024-04-21 PROCEDURE — C9113 INJ PANTOPRAZOLE SODIUM, VIA: HCPCS | Performed by: STUDENT IN AN ORGANIZED HEALTH CARE EDUCATION/TRAINING PROGRAM

## 2024-04-21 PROCEDURE — 87040 BLOOD CULTURE FOR BACTERIA: CPT

## 2024-04-21 PROCEDURE — P9047 ALBUMIN (HUMAN), 25%, 50ML: HCPCS | Performed by: SURGERY

## 2024-04-21 PROCEDURE — 85018 HEMOGLOBIN: CPT

## 2024-04-21 PROCEDURE — 2580000003 HC RX 258: Performed by: SURGERY

## 2024-04-21 PROCEDURE — 6360000002 HC RX W HCPCS: Performed by: SURGERY

## 2024-04-21 PROCEDURE — 80053 COMPREHEN METABOLIC PANEL: CPT

## 2024-04-21 PROCEDURE — 30233N1 TRANSFUSION OF NONAUTOLOGOUS RED BLOOD CELLS INTO PERIPHERAL VEIN, PERCUTANEOUS APPROACH: ICD-10-PCS | Performed by: STUDENT IN AN ORGANIZED HEALTH CARE EDUCATION/TRAINING PROGRAM

## 2024-04-21 PROCEDURE — 82805 BLOOD GASES W/O2 SATURATION: CPT

## 2024-04-21 PROCEDURE — 94640 AIRWAY INHALATION TREATMENT: CPT

## 2024-04-21 PROCEDURE — 6360000002 HC RX W HCPCS: Performed by: INTERNAL MEDICINE

## 2024-04-21 PROCEDURE — P9016 RBC LEUKOCYTES REDUCED: HCPCS

## 2024-04-21 PROCEDURE — 37799 UNLISTED PX VASCULAR SURGERY: CPT

## 2024-04-21 RX ORDER — DIMETHICONE, OXYBENZONE, AND PADIMATE O 2; 2.5; 6.6 G/100G; G/100G; G/100G
STICK TOPICAL PRN
Status: DISPENSED | OUTPATIENT
Start: 2024-04-21

## 2024-04-21 RX ORDER — INSULIN LISPRO 100 [IU]/ML
0-30 INJECTION, SOLUTION INTRAVENOUS; SUBCUTANEOUS EVERY 4 HOURS
Status: DISCONTINUED | OUTPATIENT
Start: 2024-04-21 | End: 2024-04-22

## 2024-04-21 RX ORDER — METHOCARBAMOL 500 MG/1
500 TABLET, FILM COATED ORAL 4 TIMES DAILY
Status: DISCONTINUED | OUTPATIENT
Start: 2024-04-21 | End: 2024-05-01

## 2024-04-21 RX ORDER — GABAPENTIN 300 MG/1
300 CAPSULE ORAL NIGHTLY
Status: DISCONTINUED | OUTPATIENT
Start: 2024-04-21 | End: 2024-05-01

## 2024-04-21 RX ORDER — SODIUM CHLORIDE 9 MG/ML
INJECTION, SOLUTION INTRAVENOUS PRN
Status: ACTIVE | OUTPATIENT
Start: 2024-04-21

## 2024-04-21 RX ORDER — FUROSEMIDE 10 MG/ML
20 INJECTION INTRAMUSCULAR; INTRAVENOUS ONCE
Status: COMPLETED | OUTPATIENT
Start: 2024-04-21 | End: 2024-04-21

## 2024-04-21 RX ORDER — INSULIN GLARGINE 100 [IU]/ML
20 INJECTION, SOLUTION SUBCUTANEOUS NIGHTLY
Status: DISCONTINUED | OUTPATIENT
Start: 2024-04-21 | End: 2024-04-23

## 2024-04-21 RX ORDER — INSULIN LISPRO 100 [IU]/ML
4 INJECTION, SOLUTION INTRAVENOUS; SUBCUTANEOUS ONCE
Status: COMPLETED | OUTPATIENT
Start: 2024-04-21 | End: 2024-04-21

## 2024-04-21 RX ORDER — DIVALPROEX SODIUM 125 MG/1
125 CAPSULE, COATED PELLETS ORAL EVERY 8 HOURS SCHEDULED
Status: DISCONTINUED | OUTPATIENT
Start: 2024-04-21 | End: 2024-04-25

## 2024-04-21 RX ORDER — BISACODYL 10 MG
10 SUPPOSITORY, RECTAL RECTAL DAILY
Status: DISCONTINUED | OUTPATIENT
Start: 2024-04-21 | End: 2024-04-30

## 2024-04-21 RX ADMIN — METOCLOPRAMIDE 10 MG: 5 INJECTION, SOLUTION INTRAMUSCULAR; INTRAVENOUS at 15:42

## 2024-04-21 RX ADMIN — ACETAMINOPHEN 650 MG: 650 SOLUTION ORAL at 11:59

## 2024-04-21 RX ADMIN — POLYVINYL ALCOHOL, POVIDONE 1 DROP: 14; 6 SOLUTION/ DROPS OPHTHALMIC at 08:22

## 2024-04-21 RX ADMIN — INSULIN GLARGINE 15 UNITS: 100 INJECTION, SOLUTION SUBCUTANEOUS at 08:38

## 2024-04-21 RX ADMIN — METOCLOPRAMIDE 10 MG: 5 INJECTION, SOLUTION INTRAMUSCULAR; INTRAVENOUS at 06:13

## 2024-04-21 RX ADMIN — PIPERACILLIN AND TAZOBACTAM 3375 MG: 3; .375 INJECTION, POWDER, LYOPHILIZED, FOR SOLUTION INTRAVENOUS at 18:13

## 2024-04-21 RX ADMIN — INSULIN LISPRO 5 UNITS: 100 INJECTION, SOLUTION INTRAVENOUS; SUBCUTANEOUS at 13:21

## 2024-04-21 RX ADMIN — METHOCARBAMOL 500 MG: 500 TABLET ORAL at 16:53

## 2024-04-21 RX ADMIN — PROPOFOL 50 MCG/KG/MIN: 10 INJECTION, EMULSION INTRAVENOUS at 12:40

## 2024-04-21 RX ADMIN — INSULIN LISPRO 15 UNITS: 100 INJECTION, SOLUTION INTRAVENOUS; SUBCUTANEOUS at 17:19

## 2024-04-21 RX ADMIN — POLYVINYL ALCOHOL, POVIDONE 1 DROP: 14; 6 SOLUTION/ DROPS OPHTHALMIC at 16:52

## 2024-04-21 RX ADMIN — IPRATROPIUM BROMIDE AND ALBUTEROL SULFATE 1 DOSE: .5; 2.5 SOLUTION RESPIRATORY (INHALATION) at 16:21

## 2024-04-21 RX ADMIN — METHOCARBAMOL 500 MG: 1000 INJECTION, SOLUTION INTRAMUSCULAR; INTRAVENOUS at 02:43

## 2024-04-21 RX ADMIN — PROPOFOL 50 MCG/KG/MIN: 10 INJECTION, EMULSION INTRAVENOUS at 08:42

## 2024-04-21 RX ADMIN — Medication 150 MCG/HR: at 10:39

## 2024-04-21 RX ADMIN — WATER 50 MG: 1 INJECTION INTRAMUSCULAR; INTRAVENOUS; SUBCUTANEOUS at 14:35

## 2024-04-21 RX ADMIN — PROPOFOL 50 MCG/KG/MIN: 10 INJECTION, EMULSION INTRAVENOUS at 20:48

## 2024-04-21 RX ADMIN — OXYCODONE HYDROCHLORIDE 10 MG: 5 SOLUTION ORAL at 23:02

## 2024-04-21 RX ADMIN — INSULIN LISPRO 8 UNITS: 100 INJECTION, SOLUTION INTRAVENOUS; SUBCUTANEOUS at 00:45

## 2024-04-21 RX ADMIN — SODIUM CHLORIDE, PRESERVATIVE FREE 10 ML: 5 INJECTION INTRAVENOUS at 08:22

## 2024-04-21 RX ADMIN — METHOCARBAMOL 500 MG: 500 TABLET ORAL at 20:09

## 2024-04-21 RX ADMIN — BUPROPION HYDROCHLORIDE 300 MG: 300 TABLET, FILM COATED, EXTENDED RELEASE ORAL at 08:23

## 2024-04-21 RX ADMIN — INSULIN LISPRO 10 UNITS: 100 INJECTION, SOLUTION INTRAVENOUS; SUBCUTANEOUS at 23:06

## 2024-04-21 RX ADMIN — KETOTIFEN FUMARATE 1 DROP: 0.35 SOLUTION/ DROPS OPHTHALMIC at 20:11

## 2024-04-21 RX ADMIN — ENOXAPARIN SODIUM 40 MG: 100 INJECTION SUBCUTANEOUS at 08:39

## 2024-04-21 RX ADMIN — OXYCODONE HYDROCHLORIDE 10 MG: 5 SOLUTION ORAL at 08:38

## 2024-04-21 RX ADMIN — WATER 50 MG: 1 INJECTION INTRAMUSCULAR; INTRAVENOUS; SUBCUTANEOUS at 08:36

## 2024-04-21 RX ADMIN — Medication 150 MCG/HR: at 18:07

## 2024-04-21 RX ADMIN — WHITE PETROLATUM: .15; .85 OINTMENT OPHTHALMIC at 20:11

## 2024-04-21 RX ADMIN — INSULIN LISPRO 4 UNITS: 100 INJECTION, SOLUTION INTRAVENOUS; SUBCUTANEOUS at 20:24

## 2024-04-21 RX ADMIN — METOCLOPRAMIDE 10 MG: 5 INJECTION, SOLUTION INTRAMUSCULAR; INTRAVENOUS at 22:40

## 2024-04-21 RX ADMIN — GABAPENTIN 300 MG: 300 CAPSULE ORAL at 20:10

## 2024-04-21 RX ADMIN — ALBUMIN (HUMAN) 25 G: 0.25 INJECTION, SOLUTION INTRAVENOUS at 01:14

## 2024-04-21 RX ADMIN — IPRATROPIUM BROMIDE AND ALBUTEROL SULFATE 1 DOSE: .5; 2.5 SOLUTION RESPIRATORY (INHALATION) at 06:18

## 2024-04-21 RX ADMIN — POLYVINYL ALCOHOL, POVIDONE 1 DROP: 14; 6 SOLUTION/ DROPS OPHTHALMIC at 03:32

## 2024-04-21 RX ADMIN — 0.12% CHLORHEXIDINE GLUCONATE 15 ML: 1.2 RINSE ORAL at 20:10

## 2024-04-21 RX ADMIN — INSULIN GLARGINE 20 UNITS: 100 INJECTION, SOLUTION SUBCUTANEOUS at 20:24

## 2024-04-21 RX ADMIN — ACETAMINOPHEN 650 MG: 650 SOLUTION ORAL at 23:02

## 2024-04-21 RX ADMIN — OXYCODONE HYDROCHLORIDE 10 MG: 5 SOLUTION ORAL at 03:32

## 2024-04-21 RX ADMIN — KETOTIFEN FUMARATE 1 DROP: 0.35 SOLUTION/ DROPS OPHTHALMIC at 08:24

## 2024-04-21 RX ADMIN — PROPOFOL 50 MCG/KG/MIN: 10 INJECTION, EMULSION INTRAVENOUS at 17:04

## 2024-04-21 RX ADMIN — PROPOFOL 50 MCG/KG/MIN: 10 INJECTION, EMULSION INTRAVENOUS at 22:39

## 2024-04-21 RX ADMIN — ATORVASTATIN CALCIUM 10 MG: 10 TABLET, FILM COATED ORAL at 20:10

## 2024-04-21 RX ADMIN — PROPOFOL 50 MCG/KG/MIN: 10 INJECTION, EMULSION INTRAVENOUS at 04:46

## 2024-04-21 RX ADMIN — IPRATROPIUM BROMIDE AND ALBUTEROL SULFATE 1 DOSE: .5; 2.5 SOLUTION RESPIRATORY (INHALATION) at 11:36

## 2024-04-21 RX ADMIN — 0.12% CHLORHEXIDINE GLUCONATE 15 ML: 1.2 RINSE ORAL at 08:23

## 2024-04-21 RX ADMIN — BISACODYL 10 MG: 10 SUPPOSITORY RECTAL at 09:26

## 2024-04-21 RX ADMIN — PROPOFOL 50 MCG/KG/MIN: 10 INJECTION, EMULSION INTRAVENOUS at 00:00

## 2024-04-21 RX ADMIN — Medication 150 MCG/HR: at 03:21

## 2024-04-21 RX ADMIN — DIVALPROEX SODIUM 125 MG: 125 CAPSULE ORAL at 14:20

## 2024-04-21 RX ADMIN — METHOCARBAMOL 500 MG: 500 TABLET ORAL at 11:59

## 2024-04-21 RX ADMIN — MICAFUNGIN SODIUM 100 MG: 100 INJECTION, POWDER, LYOPHILIZED, FOR SOLUTION INTRAVENOUS at 17:07

## 2024-04-21 RX ADMIN — INSULIN LISPRO 4 UNITS: 100 INJECTION, SOLUTION INTRAVENOUS; SUBCUTANEOUS at 10:35

## 2024-04-21 RX ADMIN — IPRATROPIUM BROMIDE AND ALBUTEROL SULFATE 1 DOSE: .5; 2.5 SOLUTION RESPIRATORY (INHALATION) at 20:00

## 2024-04-21 RX ADMIN — POLYVINYL ALCOHOL, POVIDONE 1 DROP: 14; 6 SOLUTION/ DROPS OPHTHALMIC at 13:30

## 2024-04-21 RX ADMIN — CALCIUM GLUCONATE: 98 INJECTION, SOLUTION INTRAVENOUS at 18:25

## 2024-04-21 RX ADMIN — SODIUM CHLORIDE, PRESERVATIVE FREE 40 MG: 5 INJECTION INTRAVENOUS at 08:37

## 2024-04-21 RX ADMIN — FUROSEMIDE 20 MG: 10 INJECTION, SOLUTION INTRAMUSCULAR; INTRAVENOUS at 09:26

## 2024-04-21 RX ADMIN — ACETAMINOPHEN 650 MG: 650 SOLUTION ORAL at 16:53

## 2024-04-21 RX ADMIN — DIVALPROEX SODIUM 125 MG: 125 CAPSULE ORAL at 20:09

## 2024-04-21 RX ADMIN — ALBUMIN (HUMAN) 25 G: 0.25 INJECTION, SOLUTION INTRAVENOUS at 08:39

## 2024-04-21 RX ADMIN — PIPERACILLIN AND TAZOBACTAM 3375 MG: 3; .375 INJECTION, POWDER, LYOPHILIZED, FOR SOLUTION INTRAVENOUS at 01:10

## 2024-04-21 RX ADMIN — INSULIN LISPRO 4 UNITS: 100 INJECTION, SOLUTION INTRAVENOUS; SUBCUTANEOUS at 04:45

## 2024-04-21 RX ADMIN — OXYCODONE HYDROCHLORIDE 10 MG: 5 SOLUTION ORAL at 11:58

## 2024-04-21 RX ADMIN — SODIUM CHLORIDE, PRESERVATIVE FREE 10 ML: 5 INJECTION INTRAVENOUS at 20:10

## 2024-04-21 RX ADMIN — PIPERACILLIN AND TAZOBACTAM 3375 MG: 3; .375 INJECTION, POWDER, LYOPHILIZED, FOR SOLUTION INTRAVENOUS at 10:35

## 2024-04-21 RX ADMIN — OXYCODONE HYDROCHLORIDE 10 MG: 5 SOLUTION ORAL at 15:44

## 2024-04-21 RX ADMIN — WATER 50 MG: 1 INJECTION INTRAMUSCULAR; INTRAVENOUS; SUBCUTANEOUS at 20:10

## 2024-04-21 RX ADMIN — ACETAMINOPHEN 650 MG: 650 SOLUTION ORAL at 04:45

## 2024-04-21 RX ADMIN — OXYCODONE HYDROCHLORIDE 10 MG: 5 SOLUTION ORAL at 20:09

## 2024-04-21 ASSESSMENT — PULMONARY FUNCTION TESTS
PIF_VALUE: 32
PIF_VALUE: 34
PIF_VALUE: 31
PIF_VALUE: 33
PIF_VALUE: 31
PIF_VALUE: 34
PIF_VALUE: 31
PIF_VALUE: 43
PIF_VALUE: 33
PIF_VALUE: 33
PIF_VALUE: 38
PIF_VALUE: 34
PIF_VALUE: 32
PIF_VALUE: 37
PIF_VALUE: 40
PIF_VALUE: 36
PIF_VALUE: 37
PIF_VALUE: 34
PIF_VALUE: 43
PIF_VALUE: 35
PIF_VALUE: 34
PIF_VALUE: 31
PIF_VALUE: 43
PIF_VALUE: 36
PIF_VALUE: 34
PIF_VALUE: 30
PIF_VALUE: 36
PIF_VALUE: 34
PIF_VALUE: 37
PIF_VALUE: 33
PIF_VALUE: 38
PIF_VALUE: 32
PIF_VALUE: 31
PIF_VALUE: 36

## 2024-04-21 ASSESSMENT — PAIN SCALES - GENERAL
PAINLEVEL_OUTOF10: 0
PAINLEVEL_OUTOF10: 4
PAINLEVEL_OUTOF10: 0
PAINLEVEL_OUTOF10: 4
PAINLEVEL_OUTOF10: 0
PAINLEVEL_OUTOF10: 4
PAINLEVEL_OUTOF10: 0
PAINLEVEL_OUTOF10: 4
PAINLEVEL_OUTOF10: 0
PAINLEVEL_OUTOF10: 0
PAINLEVEL_OUTOF10: 4
PAINLEVEL_OUTOF10: 0
PAINLEVEL_OUTOF10: 4
PAINLEVEL_OUTOF10: 4
PAINLEVEL_OUTOF10: 0
PAINLEVEL_OUTOF10: 4
PAINLEVEL_OUTOF10: 0
PAINLEVEL_OUTOF10: 0

## 2024-04-21 NOTE — PROGRESS NOTES
[facility] [facility]  PROGRESS NOTE  ATTENDING NOTE    CRITICAL CARE    CC:  pancreatic cyst      HPI  Leatha Willard is a 58 y.o. female who presents to the SICU for post-op monitoring following a whipple. The patient has been admitted to the hospital since 4/15/2024 who presents for evaluation of pancreatic cyst. She has a hx of arthritis and back pain s/p cervical fusion. She was being evaluated for her mid back pain with imaging and was found to have a large pancreatic cyst on CT. She then had an MRI/MRCP showing a large >10cm cyst in the pancreatic head extending to the transverse colon mesentery. She had an EUS with Dr. Hammond at [facility]. FNA and fluid analysis was consistent with a benign cyst. There were no suspicious features present. Patient underwent a Pylorus-preserving pancreaticoduodenectomy on 4/15 and was admitted to the ICU post-operatively.     Patient Active Problem List   Diagnosis    Recurrent major depressive disorder (HCC)    Essential hypertension    Hyperlipidemia    Prediabetes    Class 1 obesity due to excess calories without serious comorbidity with body mass index (BMI) of 32.0 to 32.9 in adult    HIREN (obstructive sleep apnea)    Pancreatic cyst    Colon polyps    Cyst of pancreas    Chest pain    Tobacco abuse-- quit 2 weeks ago    Abdominal pain    Serous cystadenoma    Pancreatic duct leak    Sepsis with acute renal failure and septic shock (HCC)    JUSTINE (acute kidney injury) (HCC)    Hypophosphatemia    Acute respiratory failure with hypoxia (HCC)       OVERNIGHT EVENTS:  Continued on cortisol    HOSPITAL COURSE:  4/15--pylorus preserving Whipple for large benign pancreatic cyst  4/16--continued ICU d/t nausea  4/17--bobby replaced, pancreatic leak, boluses given, albumin given, JUSTINE, toradol stopped, CVC changed  4/18--CVC replaced, NGT repalced, scop patch, robaxin, d/c IVF; total pancreatectomy and splenectomy, insulin gtt  4/19--remains intubated, TPN,

## 2024-04-21 NOTE — PLAN OF CARE
Problem: Respiratory - Adult  Goal: Achieves optimal ventilation and oxygenation  4/21/2024 0227 by Xochitl Plummer RCP  Outcome: Progressing

## 2024-04-21 NOTE — PLAN OF CARE
Problem: Respiratory - Adult  Goal: Achieves optimal ventilation and oxygenation  4/21/2024 0921 by Manolo Dee RCP  Outcome: Progressing
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
## 2024-04-21 NOTE — PLAN OF CARE
Problem: Discharge Planning  Goal: Discharge to home or other facility with appropriate resources  4/21/2024 0912 by Maricel Herrera RN  Outcome: Progressing  4/20/2024 2028 by Alley Walters, RN  Outcome: Progressing     Problem: Skin/Tissue Integrity  Goal: Absence of new skin breakdown  4/21/2024 0912 by Maricel Herrera RN  Outcome: Progressing  4/20/2024 2028 by Alley Walters RN  Outcome: Progressing     Problem: ABCDS Injury Assessment  Goal: Absence of physical injury  Outcome: Progressing     Problem: Respiratory - Adult  Goal: Achieves optimal ventilation and oxygenation  4/21/2024 0912 by Maricel Herrera RN  Outcome: Progressing  4/21/2024 0227 by Xochitl Plummer RCP  Outcome: Progressing  4/20/2024 1948 by Xochitl Plummer RCP  Outcome: Progressing     Problem: Cardiovascular - Adult  Goal: Maintains optimal cardiac output and hemodynamic stability  Outcome: Progressing     Problem: Musculoskeletal - Adult  Goal: Return mobility to safest level of function  Outcome: Progressing     Problem: Gastrointestinal - Adult  Goal: Minimal or absence of nausea and vomiting  Outcome: Progressing     Problem: Genitourinary - Adult  Goal: Absence of urinary retention  Outcome: Progressing     Problem: Infection - Adult  Goal: Absence of infection at discharge  Outcome: Progressing     Problem: Hematologic - Adult  Goal: Maintains hematologic stability  Outcome: Progressing     Problem: Neurosensory - Adult  Goal: Achieves stable or improved neurological status  Outcome: Progressing     Problem: Skin/Tissue Integrity - Adult  Goal: Skin integrity remains intact  Outcome: Progressing  Flowsheets (Taken 4/20/2024 2029 by Alley Walters, RN)  Skin Integrity Remains Intact:   Monitor for areas of redness and/or skin breakdown   Assess vascular access sites hourly   Every 4-6 hours minimum: Change oxygen saturation probe site   Every 4-6 hours: If on nasal continuous positive airway pressure, respiratory therapy assesses  Nutrition Deficit:  Goal: Optimize nutritional status  Outcome: Progressing     Problem: Safety - Medical Restraint  Goal: Remains free of injury from restraints (Restraint for Interference with Medical Device)  4/21/2024 0912 by Maricel Herrera RN  Outcome: Progressing  Flowsheets  Taken 4/21/2024 0600 by Alley Walters RN  Remains free of injury from restraints (restraint for interference with medical device): Every 2 hours: Monitor safety, psychosocial status, comfort, nutrition and hydration  Taken 4/21/2024 0400 by Alley Walters RN  Remains free of injury from restraints (restraint for interference with medical device): Every 2 hours: Monitor safety, psychosocial status, comfort, nutrition and hydration  Taken 4/21/2024 0200 by Alley Walters RN  Remains free of injury from restraints (restraint for interference with medical device): Every 2 hours: Monitor safety, psychosocial status, comfort, nutrition and hydration  Taken 4/21/2024 0000 by Alley Walters RN  Remains free of injury from restraints (restraint for interference with medical device): Every 2 hours: Monitor safety, psychosocial status, comfort, nutrition and hydration  Taken 4/20/2024 2200 by Alley Walters RN  Remains free of injury from restraints (restraint for interference with medical device): Every 2 hours: Monitor safety, psychosocial status, comfort, nutrition and hydration  4/20/2024 2028 by Alley Walters RN  Outcome: Progressing  Flowsheets  Taken 4/20/2024 2000 by Alley Walters RN  Remains free of injury from restraints (restraint for interference with medical device): Every 2 hours: Monitor safety, psychosocial status, comfort, nutrition and hydration  Taken 4/20/2024 1800 by Mylene Yap RN  Remains free of injury from restraints (restraint for interference with medical device): Every 2 hours: Monitor safety, psychosocial status, comfort, nutrition and hydration

## 2024-04-21 NOTE — PLAN OF CARE
RN  Outcome: Not Progressing  4/21/2024 0912 by Maricel Herrera RN  Outcome: Progressing  Goal: Achieves maximal functionality and self care  Outcome: Not Progressing     Problem: Pain  Goal: Verbalizes/displays adequate comfort level or baseline comfort level  4/21/2024 0949 by Mylene Yap RN  Outcome: Not Progressing  4/21/2024 0912 by Maricel Herrera RN  Outcome: Progressing  Flowsheets (Taken 4/21/2024 0800 by Mylene Yap RN)  Verbalizes/displays adequate comfort level or baseline comfort level:   Assess pain using appropriate pain scale   Administer analgesics based on type and severity of pain and evaluate response   Implement non-pharmacological measures as appropriate and evaluate response  4/20/2024 2028 by Alley Walters RN  Outcome: Progressing  Flowsheets  Taken 4/20/2024 1900 by Mylene Yap RN  Verbalizes/displays adequate comfort level or baseline comfort level:   Assess pain using appropriate pain scale   Administer analgesics based on type and severity of pain and evaluate response   Implement non-pharmacological measures as appropriate and evaluate response  Taken 4/20/2024 1800 by Mylene Yap RN  Verbalizes/displays adequate comfort level or baseline comfort level:   Assess pain using appropriate pain scale   Administer analgesics based on type and severity of pain and evaluate response   Implement non-pharmacological measures as appropriate and evaluate response  Taken 4/20/2024 1700 by Mylene Yap RN  Verbalizes/displays adequate comfort level or baseline comfort level:   Assess pain using appropriate pain scale   Administer analgesics based on type and severity of pain and evaluate response   Implement non-pharmacological measures as appropriate and evaluate response     Problem: Safety - Adult  Goal: Free from fall injury  4/21/2024 0949 by Mylene Yap RN  Outcome: Progressing  4/21/2024 0912 by Maricel Herrera RN  Outcome: Progressing  4/20/2024 2028 by  RN)  Verbalizes/displays adequate comfort level or baseline comfort level:   Assess pain using appropriate pain scale   Administer analgesics based on type and severity of pain and evaluate response   Implement non-pharmacological measures as appropriate and evaluate response  4/20/2024 2028 by Alley Walters RN  Outcome: Progressing  Flowsheets  Taken 4/20/2024 1900 by Mylene Yap RN  Verbalizes/displays adequate comfort level or baseline comfort level:   Assess pain using appropriate pain scale   Administer analgesics based on type and severity of pain and evaluate response   Implement non-pharmacological measures as appropriate and evaluate response  Taken 4/20/2024 1800 by Mylene Yap RN  Verbalizes/displays adequate comfort level or baseline comfort level:   Assess pain using appropriate pain scale   Administer analgesics based on type and severity of pain and evaluate response   Implement non-pharmacological measures as appropriate and evaluate response  Taken 4/20/2024 1700 by Mylene Yap RN  Verbalizes/displays adequate comfort level or baseline comfort level:   Assess pain using appropriate pain scale   Administer analgesics based on type and severity of pain and evaluate response   Implement non-pharmacological measures as appropriate and evaluate response     Problem: Neurosensory - Adult  Goal: Achieves stable or improved neurological status  4/21/2024 0949 by Mylene Yap RN  Outcome: Not Progressing  4/21/2024 0912 by Maricel Herrera RN  Outcome: Progressing  Goal: Achieves maximal functionality and self care  Outcome: Not Progressing     Problem: Nutrition Deficit:  Goal: Optimize nutritional status  4/21/2024 0949 by Mylene Yap RN  Outcome: Not Progressing  4/21/2024 0912 by Maricel Herrera RN  Outcome: Progressing

## 2024-04-21 NOTE — PLAN OF CARE
Problem: Discharge Planning  Goal: Discharge to home or other facility with appropriate resources  Outcome: Progressing     Problem: Skin/Tissue Integrity  Goal: Absence of new skin breakdown  Description: 1.  Monitor for areas of redness and/or skin breakdown  2.  Assess vascular access sites hourly  3.  Every 4-6 hours minimum:  Change oxygen saturation probe site  4.  Every 4-6 hours:  If on nasal continuous positive airway pressure, respiratory therapy assess nares and determine need for appliance change or resting period.  Outcome: Progressing     Problem: Pain  Goal: Verbalizes/displays adequate comfort level or baseline comfort level  4/20/2024 2028 by Alley Walters RN  Outcome: Progressing  Flowsheets  Taken 4/20/2024 1900 by Mylene Yap RN  Verbalizes/displays adequate comfort level or baseline comfort level:   Assess pain using appropriate pain scale   Administer analgesics based on type and severity of pain and evaluate response   Implement non-pharmacological measures as appropriate and evaluate response  Taken 4/20/2024 1800 by Mylene Yap RN  Verbalizes/displays adequate comfort level or baseline comfort level:   Assess pain using appropriate pain scale   Administer analgesics based on type and severity of pain and evaluate response   Implement non-pharmacological measures as appropriate and evaluate response  Taken 4/20/2024 1700 by Mylene Yap RN  Verbalizes/displays adequate comfort level or baseline comfort level:   Assess pain using appropriate pain scale   Administer analgesics based on type and severity of pain and evaluate response   Implement non-pharmacological measures as appropriate and evaluate response     Problem: Safety - Adult  Goal: Free from fall injury  4/20/2024 2028 by Alley Walters RN  Outcome: Progressing     Problem: Safety - Medical Restraint  Goal: Remains free of injury from restraints (Restraint for Interference with Medical Device)  Description:

## 2024-04-21 NOTE — PROGRESS NOTES
[facility] Surgery   Daily Progress Note      Patient's Name/Date of Birth: Leatha Willard / 1965    Date: April 21, 2024     Chief Complaint: whipple c/b need for takeback total pancreatectomy, splenectomy    Patient Active Problem List   Diagnosis    Recurrent major depressive disorder (HCC)    Essential hypertension    Hyperlipidemia    Prediabetes    Class 1 obesity due to excess calories without serious comorbidity with body mass index (BMI) of 32.0 to 32.9 in adult    HIREN (obstructive sleep apnea)    Pancreatic cyst    Colon polyps    Cyst of pancreas    Chest pain    Tobacco abuse-- quit 2 weeks ago    Abdominal pain    Serous cystadenoma    Pancreatic duct leak    Sepsis with acute renal failure and septic shock (HCC)    JUSTINE (acute kidney injury) (HCC)    Hypophosphatemia    Acute respiratory failure with hypoxia (HCC)       Subjective:   Issues with agitation throughout the day. Remains intubated and on levophed.     Objective:  BP (!) 117/55   Pulse (!) 104   Temp 99 °F (37.2 °C) (Bladder)   Resp 14   Ht 1.626 m (5' 4\")   Wt 88.7 kg (195 lb 9.6 oz)   SpO2 96%   BMI 33.57 kg/m²   Labs:  Recent Labs     04/19/24 0521 04/20/24  0410 04/21/24  0426   WBC 20.5* 33.1* 31.9*   HGB 9.8* 7.7* 6.3*   HCT 29.3* 22.7* 19.7*     Recent Labs     04/19/24 2327 04/20/24  0410 04/21/24  0426    137 140   K 4.5 4.5 3.9    105 101   CO2 24 24 28   BUN 23* 23* 19   CREATININE 0.7 0.7 0.8     Recent Labs     04/19/24 2327 04/20/24  0410 04/21/24  0426   ALKPHOS 73 80 103   ALT 32 34* 23   AST 21 23 23   BILITOT 0.6 0.6 0.9         General appearance: critically ill appearing, remains intubated  Head:NG to LIWS w green output.   Neck: supple, no masses  Lungs: symmetric chest rise, mechanically ventilated, 40% FiO2, 8 peep CPAP/PS  Heart: warm throughout,on levophed   Abdomen: mildly distended, midline covered with MAYCOL dressing with adequate suction, bilateral GIULIANO drains w/ gray-SS output  Skin: trace edema  Hgb 6.3.  - transfusing PRBC followed by lasix  - Continue diuresis throughout the day, needs more fluid off prior to extubating  - Continue zosyn, OR cultures NGTD  - Insulin in TPN, endocrine on board s/p total pancreatectomy  - Continue TPN, NG decompression  - Reglan for DGE ppx  - Monitor drains, residual pancreatic fluid draining, no bile  - Will likely scan again early next week.   - DVT ppx  - Final path pending.     Norma Rogers MD  04/21/24  9:13 AM

## 2024-04-21 NOTE — FLOWSHEET NOTE
Patient continues with attempts at reaching for lines/tubes. No evidence of learning present, bilateral soft wrist restraints remain in place for safety and line/tube protection.

## 2024-04-21 NOTE — FLOWSHEET NOTE
Patient intermittently agitated, attempts made at reaching for lines/tubes. No evidence of learning present at this time, bilateral soft wrist restraints remain in place for safety and line/tube protection.

## 2024-04-21 NOTE — PLAN OF CARE
Problem: ABCDS Injury Assessment  Goal: Absence of physical injury  4/21/2024 1635 by Mylene Yap RN  Outcome: Progressing  Flowsheets (Taken 4/21/2024 1600)  Absence of Physical Injury: Implement safety measures based on patient assessment  4/21/2024 0949 by Mylene Yap RN  Outcome: Progressing  4/21/2024 0912 by Maricel Herrera RN  Outcome: Progressing  Flowsheets (Taken 4/21/2024 0800 by Mylene Yap RN)  Absence of Physical Injury: Implement safety measures based on patient assessment     Problem: Respiratory - Adult  Goal: Achieves optimal ventilation and oxygenation  4/21/2024 1635 by Mylene Yap RN  Outcome: Not Progressing  Flowsheets (Taken 4/21/2024 1600)  Achieves optimal ventilation and oxygenation:   Assess for changes in respiratory status   Assess for changes in mentation and behavior   Position to facilitate oxygenation and minimize respiratory effort   Oxygen supplementation based on oxygen saturation or arterial blood gases   Assess the need for suctioning and aspirate as needed   Respiratory therapy support as indicated  4/21/2024 0949 by Mylene Yap RN  Outcome: Not Progressing  4/21/2024 0921 by Manolo Dee RCP  Outcome: Progressing  4/21/2024 0912 by Maricel Herrera RN  Outcome: Progressing  Flowsheets (Taken 4/21/2024 0800 by Mylene Yap RN)  Achieves optimal ventilation and oxygenation:   Assess for changes in respiratory status   Assess for changes in mentation and behavior   Position to facilitate oxygenation and minimize respiratory effort   Oxygen supplementation based on oxygen saturation or arterial blood gases   Assess the need for suctioning and aspirate as needed   Respiratory therapy support as indicated     Problem: Cardiovascular - Adult  Goal: Maintains optimal cardiac output and hemodynamic stability  4/21/2024 1635 by Mylene Yap RN  Outcome: Progressing  Flowsheets (Taken 4/21/2024 1600)  Maintains optimal cardiac output and hemodynamic

## 2024-04-21 NOTE — PROGRESS NOTES
ENDOCRINOLOGY PROGRESS NOTE      Date of admission: 4/15/2024  Date of service: 4/21/2024  Admitting physician: Norma Rogers MD   Primary Care Physician: Mekhi Escalona MD  Consultant physician: Jnoathan Curiel MD     Reason for the consultation:  Post-pancreatectomy DM     History of Present Illness:  Leatha Willard is a 58 y.o. old female with PMH of cystic neoplasm of the head of pancreas, HTN, HLD and other listed below admitted to [facility] on 4/15/2024 for completion pancreatectomy. Endocrine service was consulted for diabetes management.    Subjective   I saw and examined the patient at bedside this morning.  No acute events overnight.  Started on steroids.    Point of care glucose monitoring   (Independently reviewed)   Recent Labs     04/20/24  0407 04/20/24  0823 04/20/24  1217 04/20/24  1607 04/20/24  2013 04/21/24  0103 04/21/24  0424 04/21/24  0848   POCGLU 225* 274* 181* 225* 198* 248* 193* 180*       Scheduled Meds:   hydrocortisone sodium succinate PF (SOLU-CORTEF) 50 mg in sterile water 2 mL injection  50 mg IntraVENous Q6H    divalproex  125 mg Oral 3 times per day    gabapentin  300 mg Oral Nightly    methocarbamol  500 mg Oral 4x Daily    insulin lispro  0-30 Units SubCUTAneous Q4H    bisacodyl  10 mg Rectal Daily    insulin glargine  15 Units SubCUTAneous QAM    insulin glargine  15 Units SubCUTAneous Nightly    oxyCODONE  10 mg Oral Q4H    ipratropium 0.5 mg-albuterol 2.5 mg  1 Dose Inhalation Q4H WA RT    sodium chloride flush  5-40 mL IntraVENous BID    metoclopramide  10 mg IntraVENous q8h    acetaminophen  650 mg Oral 4 times per day    chlorhexidine  15 mL Mouth/Throat BID    Polyvinyl Alcohol-Povidone PF  1 drop Both Eyes Q4H    Or    artificial tears   Both Eyes Q4H    lidocaine  1 patch TransDERmal Daily    enoxaparin  40 mg SubCUTAneous Daily    atorvastatin  10 mg Oral Nightly    buPROPion  300 mg Oral QAM    ketotifen fumarate  1 drop Both Eyes BID    piperacillin-tazobactam  3,375 mg

## 2024-04-22 ENCOUNTER — APPOINTMENT (OUTPATIENT)
Dept: GENERAL RADIOLOGY | Age: 59
DRG: 004 | End: 2024-04-22
Attending: STUDENT IN AN ORGANIZED HEALTH CARE EDUCATION/TRAINING PROGRAM
Payer: COMMERCIAL

## 2024-04-22 PROBLEM — Z78.9 ON TOTAL PARENTERAL NUTRITION: Status: ACTIVE | Noted: 2024-04-22

## 2024-04-22 PROBLEM — E13.9 POST-PANCREATECTOMY DIABETES (HCC): Status: ACTIVE | Noted: 2024-04-22

## 2024-04-22 PROBLEM — Z90.410 POST-PANCREATECTOMY DIABETES (HCC): Status: ACTIVE | Noted: 2024-04-22

## 2024-04-22 PROBLEM — E89.1 POST-PANCREATECTOMY DIABETES (HCC): Status: ACTIVE | Noted: 2024-04-22

## 2024-04-22 LAB
AADO2: 183.2 MMHG
AADO2: 219.3 MMHG
AADO2: 231.2 MMHG
AADO2: 243.8 MMHG
ABO/RH: NORMAL
ALBUMIN SERPL-MCNC: 3.4 G/DL (ref 3.5–5.2)
ALP SERPL-CCNC: 144 U/L (ref 35–104)
ALT SERPL-CCNC: 23 U/L (ref 0–32)
ANION GAP SERPL CALCULATED.3IONS-SCNC: 13 MMOL/L (ref 7–16)
ANION GAP SERPL CALCULATED.3IONS-SCNC: 9 MMOL/L (ref 7–16)
ANTIBODY SCREEN: NEGATIVE
ARM BAND NUMBER: NORMAL
AST SERPL-CCNC: 23 U/L (ref 0–31)
B.E.: 5.7 MMOL/L (ref -3–3)
B.E.: 6.4 MMOL/L (ref -3–3)
B.E.: 6.5 MMOL/L (ref -3–3)
B.E.: 8.6 MMOL/L (ref -3–3)
BASOPHILS # BLD: 0 K/UL (ref 0–0.2)
BASOPHILS NFR BLD: 0 % (ref 0–2)
BILIRUB SERPL-MCNC: 0.7 MG/DL (ref 0–1.2)
BLOOD BANK BLOOD PRODUCT EXPIRATION DATE: NORMAL
BLOOD BANK DISPENSE STATUS: NORMAL
BLOOD BANK ISBT PRODUCT BLOOD TYPE: 9500
BLOOD BANK PRODUCT CODE: NORMAL
BLOOD BANK SAMPLE EXPIRATION: NORMAL
BLOOD BANK UNIT TYPE AND RH: NORMAL
BPU ID: NORMAL
BUN SERPL-MCNC: 19 MG/DL (ref 6–20)
BUN SERPL-MCNC: 23 MG/DL (ref 6–20)
CA-I BLD-SCNC: 1.13 MMOL/L (ref 1.15–1.33)
CALCIUM SERPL-MCNC: 8.6 MG/DL (ref 8.6–10.2)
CALCIUM SERPL-MCNC: 8.7 MG/DL (ref 8.6–10.2)
CHLORIDE SERPL-SCNC: 100 MMOL/L (ref 98–107)
CHLORIDE SERPL-SCNC: 101 MMOL/L (ref 98–107)
CO2 SERPL-SCNC: 30 MMOL/L (ref 22–29)
CO2 SERPL-SCNC: 33 MMOL/L (ref 22–29)
COHB: 0.2 % (ref 0–1.5)
COHB: 0.3 % (ref 0–1.5)
COMPONENT: NORMAL
CREAT SERPL-MCNC: 0.7 MG/DL (ref 0.5–1)
CREAT SERPL-MCNC: 0.8 MG/DL (ref 0.5–1)
CRITICAL: ABNORMAL
CROSSMATCH RESULT: NORMAL
DATE ANALYZED: ABNORMAL
DATE OF COLLECTION: ABNORMAL
EOSINOPHIL # BLD: 0 K/UL (ref 0.05–0.5)
EOSINOPHILS RELATIVE PERCENT: 0 % (ref 0–6)
ERYTHROCYTE [DISTWIDTH] IN BLOOD BY AUTOMATED COUNT: 15.3 % (ref 11.5–15)
FIO2: 50 %
FIO2: 55 %
FIO2: 60 %
FIO2: 60 %
GFR SERPL CREATININE-BSD FRML MDRD: 84 ML/MIN/1.73M2
GFR SERPL CREATININE-BSD FRML MDRD: >90 ML/MIN/1.73M2
GLUCOSE BLD-MCNC: 110 MG/DL (ref 74–99)
GLUCOSE BLD-MCNC: 141 MG/DL (ref 74–99)
GLUCOSE BLD-MCNC: 146 MG/DL (ref 74–99)
GLUCOSE BLD-MCNC: 173 MG/DL (ref 74–99)
GLUCOSE BLD-MCNC: 236 MG/DL (ref 74–99)
GLUCOSE SERPL-MCNC: 158 MG/DL (ref 74–99)
GLUCOSE SERPL-MCNC: 213 MG/DL (ref 74–99)
HCO3: 32.6 MMOL/L (ref 22–26)
HCO3: 33.3 MMOL/L (ref 22–26)
HCO3: 33.3 MMOL/L (ref 22–26)
HCO3: 34.5 MMOL/L (ref 22–26)
HCT VFR BLD AUTO: 23.1 % (ref 34–48)
HGB BLD-MCNC: 7.4 G/DL (ref 11.5–15.5)
HHB: 2.5 % (ref 0–5)
HHB: 3.2 % (ref 0–5)
HHB: 3.7 % (ref 0–5)
HHB: 4.6 % (ref 0–5)
INR PPP: 1.1
LAB: ABNORMAL
LYMPHOCYTES NFR BLD: 0.34 K/UL (ref 1.5–4)
LYMPHOCYTES RELATIVE PERCENT: 1 % (ref 20–42)
Lab: 1210
Lab: 1735
Lab: 440
Lab: 600
MAGNESIUM SERPL-MCNC: 2.2 MG/DL (ref 1.6–2.6)
MCH RBC QN AUTO: 30.1 PG (ref 26–35)
MCHC RBC AUTO-ENTMCNC: 32 G/DL (ref 32–34.5)
MCV RBC AUTO: 93.9 FL (ref 80–99.9)
METHB: 0.4 % (ref 0–1.5)
METHB: 0.7 % (ref 0–1.5)
MODE: AC
MONOCYTES NFR BLD: 0.34 K/UL (ref 0.1–0.95)
MONOCYTES NFR BLD: 1 % (ref 2–12)
MYELOCYTES ABSOLUTE COUNT: 0.34 K/UL
MYELOCYTES: 1 %
NEUTROPHILS NFR BLD: 96 % (ref 43–80)
NEUTS SEG NFR BLD: 37.78 K/UL (ref 1.8–7.3)
NUCLEATED RED BLOOD CELLS: 4 PER 100 WBC
O2 SATURATION: 95.4 % (ref 92–98.5)
O2 SATURATION: 96.3 % (ref 92–98.5)
O2 SATURATION: 96.8 % (ref 92–98.5)
O2 SATURATION: 97.5 % (ref 92–98.5)
O2HB: 94.7 % (ref 94–97)
O2HB: 95.6 % (ref 94–97)
O2HB: 96.2 % (ref 94–97)
O2HB: 96.5 % (ref 94–97)
OPERATOR ID: 2577
OPERATOR ID: 2577
OPERATOR ID: ABNORMAL
OPERATOR ID: ABNORMAL
PATIENT TEMP: 37 C
PCO2: 56.8 MMHG (ref 35–45)
PCO2: 63.3 MMHG (ref 35–45)
PCO2: 63.3 MMHG (ref 35–45)
PCO2: 63.9 MMHG (ref 35–45)
PEEP/CPAP: 8 CMH2O
PFO2: 1.61 MMHG/%
PFO2: 1.66 MMHG/%
PFO2: 1.86 MMHG/%
PFO2: 1.94 MMHG/%
PH BLOOD GAS: 7.33 (ref 7.35–7.45)
PH BLOOD GAS: 7.33 (ref 7.35–7.45)
PH BLOOD GAS: 7.34 (ref 7.35–7.45)
PH BLOOD GAS: 7.4 (ref 7.35–7.45)
PHOSPHATE SERPL-MCNC: 4.8 MG/DL (ref 2.5–4.5)
PHOSPHATE SERPL-MCNC: 4.8 MG/DL (ref 2.5–4.5)
PLATELET # BLD AUTO: 318 K/UL (ref 130–450)
PMV BLD AUTO: 10.8 FL (ref 7–12)
PO2: 111.3 MMHG (ref 75–100)
PO2: 88.7 MMHG (ref 75–100)
PO2: 96.9 MMHG (ref 75–100)
PO2: 99.4 MMHG (ref 75–100)
POTASSIUM SERPL-SCNC: 4.5 MMOL/L (ref 3.5–5)
POTASSIUM SERPL-SCNC: 4.7 MMOL/L (ref 3.5–5)
PROMYELOCYTES ABSOLUTE COUNT: 0.69 K/UL
PROMYELOCYTES: 2 %
PROT SERPL-MCNC: 5.3 G/DL (ref 6.4–8.3)
PROTHROMBIN TIME: 11.8 SEC (ref 9.3–12.4)
RBC # BLD AUTO: 2.46 M/UL (ref 3.5–5.5)
RBC # BLD: ABNORMAL 10*6/UL
RBC # BLD: ABNORMAL 10*6/UL
RI(T): 1.89
RI(T): 2.08
RI(T): 2.45
RI(T): 2.47
RR MECHANICAL: 16 B/MIN
SODIUM SERPL-SCNC: 142 MMOL/L (ref 132–146)
SODIUM SERPL-SCNC: 144 MMOL/L (ref 132–146)
SOURCE, BLOOD GAS: ABNORMAL
THB: 8.2 G/DL (ref 11.5–16.5)
THB: 8.3 G/DL (ref 11.5–16.5)
THB: 8.3 G/DL (ref 11.5–16.5)
THB: 8.4 G/DL (ref 11.5–16.5)
TIME ANALYZED: 1213
TIME ANALYZED: 1742
TIME ANALYZED: 443
TIME ANALYZED: 605
TRANSFUSION STATUS: NORMAL
UNIT DIVISION: 0
UNIT ISSUE DATE/TIME: NORMAL
VT MECHANICAL: 350 ML
WBC OTHER # BLD: 39.5 K/UL (ref 4.5–11.5)

## 2024-04-22 PROCEDURE — 2580000003 HC RX 258

## 2024-04-22 PROCEDURE — 71045 X-RAY EXAM CHEST 1 VIEW: CPT

## 2024-04-22 PROCEDURE — 6370000000 HC RX 637 (ALT 250 FOR IP): Performed by: STUDENT IN AN ORGANIZED HEALTH CARE EDUCATION/TRAINING PROGRAM

## 2024-04-22 PROCEDURE — 94640 AIRWAY INHALATION TREATMENT: CPT

## 2024-04-22 PROCEDURE — 83735 ASSAY OF MAGNESIUM: CPT

## 2024-04-22 PROCEDURE — 2580000003 HC RX 258: Performed by: STUDENT IN AN ORGANIZED HEALTH CARE EDUCATION/TRAINING PROGRAM

## 2024-04-22 PROCEDURE — 6360000002 HC RX W HCPCS: Performed by: STUDENT IN AN ORGANIZED HEALTH CARE EDUCATION/TRAINING PROGRAM

## 2024-04-22 PROCEDURE — 94003 VENT MGMT INPAT SUBQ DAY: CPT

## 2024-04-22 PROCEDURE — 85610 PROTHROMBIN TIME: CPT

## 2024-04-22 PROCEDURE — 82805 BLOOD GASES W/O2 SATURATION: CPT

## 2024-04-22 PROCEDURE — 2500000003 HC RX 250 WO HCPCS: Performed by: STUDENT IN AN ORGANIZED HEALTH CARE EDUCATION/TRAINING PROGRAM

## 2024-04-22 PROCEDURE — 2000000000 HC ICU R&B

## 2024-04-22 PROCEDURE — 80048 BASIC METABOLIC PNL TOTAL CA: CPT

## 2024-04-22 PROCEDURE — 84100 ASSAY OF PHOSPHORUS: CPT

## 2024-04-22 PROCEDURE — 6360000002 HC RX W HCPCS: Performed by: INTERNAL MEDICINE

## 2024-04-22 PROCEDURE — 37799 UNLISTED PX VASCULAR SURGERY: CPT

## 2024-04-22 PROCEDURE — 99232 SBSQ HOSP IP/OBS MODERATE 35: CPT | Performed by: INTERNAL MEDICINE

## 2024-04-22 PROCEDURE — 80053 COMPREHEN METABOLIC PANEL: CPT

## 2024-04-22 PROCEDURE — 99291 CRITICAL CARE FIRST HOUR: CPT | Performed by: SURGERY

## 2024-04-22 PROCEDURE — 2500000003 HC RX 250 WO HCPCS

## 2024-04-22 PROCEDURE — 2580000003 HC RX 258: Performed by: INTERNAL MEDICINE

## 2024-04-22 PROCEDURE — 6370000000 HC RX 637 (ALT 250 FOR IP): Performed by: SURGERY

## 2024-04-22 PROCEDURE — 6360000002 HC RX W HCPCS

## 2024-04-22 PROCEDURE — 6370000000 HC RX 637 (ALT 250 FOR IP)

## 2024-04-22 PROCEDURE — 82962 GLUCOSE BLOOD TEST: CPT

## 2024-04-22 PROCEDURE — 85025 COMPLETE CBC W/AUTO DIFF WBC: CPT

## 2024-04-22 PROCEDURE — 6370000000 HC RX 637 (ALT 250 FOR IP): Performed by: INTERNAL MEDICINE

## 2024-04-22 PROCEDURE — 82330 ASSAY OF CALCIUM: CPT

## 2024-04-22 PROCEDURE — C9113 INJ PANTOPRAZOLE SODIUM, VIA: HCPCS | Performed by: STUDENT IN AN ORGANIZED HEALTH CARE EDUCATION/TRAINING PROGRAM

## 2024-04-22 RX ORDER — CALCIUM GLUCONATE 10 MG/ML
1000 INJECTION, SOLUTION INTRAVENOUS ONCE
Status: COMPLETED | OUTPATIENT
Start: 2024-04-22 | End: 2024-04-22

## 2024-04-22 RX ORDER — INSULIN LISPRO 100 [IU]/ML
0-18 INJECTION, SOLUTION INTRAVENOUS; SUBCUTANEOUS EVERY 4 HOURS
Status: DISCONTINUED | OUTPATIENT
Start: 2024-04-23 | End: 2024-04-23

## 2024-04-22 RX ORDER — PROPOFOL 10 MG/ML
5-50 INJECTION, EMULSION INTRAVENOUS CONTINUOUS
Status: DISCONTINUED | OUTPATIENT
Start: 2024-04-22 | End: 2024-04-28

## 2024-04-22 RX ORDER — DIMETHICONE, OXYBENZONE, AND PADIMATE O 2; 2.5; 6.6 G/100G; G/100G; G/100G
STICK TOPICAL PRN
Status: DISCONTINUED | OUTPATIENT
Start: 2024-04-22 | End: 2024-04-22

## 2024-04-22 RX ORDER — FUROSEMIDE 10 MG/ML
20 INJECTION INTRAMUSCULAR; INTRAVENOUS ONCE
Status: COMPLETED | OUTPATIENT
Start: 2024-04-22 | End: 2024-04-22

## 2024-04-22 RX ADMIN — OXYCODONE HYDROCHLORIDE 10 MG: 5 SOLUTION ORAL at 12:07

## 2024-04-22 RX ADMIN — METOCLOPRAMIDE 10 MG: 5 INJECTION, SOLUTION INTRAMUSCULAR; INTRAVENOUS at 06:09

## 2024-04-22 RX ADMIN — Medication 150 MCG/HR: at 15:08

## 2024-04-22 RX ADMIN — CALCIUM GLUCONATE 1000 MG: 10 INJECTION, SOLUTION INTRAVENOUS at 08:31

## 2024-04-22 RX ADMIN — POLYVINYL ALCOHOL, POVIDONE 1 DROP: 14; 6 SOLUTION/ DROPS OPHTHALMIC at 12:08

## 2024-04-22 RX ADMIN — OXYCODONE HYDROCHLORIDE 10 MG: 5 SOLUTION ORAL at 16:30

## 2024-04-22 RX ADMIN — PROPOFOL 40 MCG/KG/MIN: 10 INJECTION, EMULSION INTRAVENOUS at 09:53

## 2024-04-22 RX ADMIN — PIPERACILLIN AND TAZOBACTAM 3375 MG: 3; .375 INJECTION, POWDER, LYOPHILIZED, FOR SOLUTION INTRAVENOUS at 10:00

## 2024-04-22 RX ADMIN — METHOCARBAMOL 500 MG: 500 TABLET ORAL at 08:25

## 2024-04-22 RX ADMIN — CALCIUM GLUCONATE: 98 INJECTION, SOLUTION INTRAVENOUS at 17:48

## 2024-04-22 RX ADMIN — WATER 50 MG: 1 INJECTION INTRAMUSCULAR; INTRAVENOUS; SUBCUTANEOUS at 20:13

## 2024-04-22 RX ADMIN — ACETAMINOPHEN 650 MG: 650 SOLUTION ORAL at 04:56

## 2024-04-22 RX ADMIN — WHITE PETROLATUM: .15; .85 OINTMENT OPHTHALMIC at 04:57

## 2024-04-22 RX ADMIN — OXYCODONE HYDROCHLORIDE 10 MG: 5 SOLUTION ORAL at 20:14

## 2024-04-22 RX ADMIN — GABAPENTIN 300 MG: 300 CAPSULE ORAL at 20:13

## 2024-04-22 RX ADMIN — FUROSEMIDE 20 MG: 10 INJECTION, SOLUTION INTRAMUSCULAR; INTRAVENOUS at 17:40

## 2024-04-22 RX ADMIN — PROPOFOL 50 MCG/KG/MIN: 10 INJECTION, EMULSION INTRAVENOUS at 00:36

## 2024-04-22 RX ADMIN — POLYVINYL ALCOHOL, POVIDONE 1 DROP: 14; 6 SOLUTION/ DROPS OPHTHALMIC at 16:30

## 2024-04-22 RX ADMIN — INSULIN GLARGINE 20 UNITS: 100 INJECTION, SOLUTION SUBCUTANEOUS at 20:14

## 2024-04-22 RX ADMIN — IPRATROPIUM BROMIDE AND ALBUTEROL SULFATE 1 DOSE: .5; 2.5 SOLUTION RESPIRATORY (INHALATION) at 19:49

## 2024-04-22 RX ADMIN — PROPOFOL 50 MCG/KG/MIN: 10 INJECTION, EMULSION INTRAVENOUS at 02:56

## 2024-04-22 RX ADMIN — PIPERACILLIN AND TAZOBACTAM 3375 MG: 3; .375 INJECTION, POWDER, LYOPHILIZED, FOR SOLUTION INTRAVENOUS at 17:44

## 2024-04-22 RX ADMIN — Medication 150 MCG/HR: at 02:29

## 2024-04-22 RX ADMIN — WHITE PETROLATUM: .15; .85 OINTMENT OPHTHALMIC at 20:13

## 2024-04-22 RX ADMIN — METHOCARBAMOL 500 MG: 500 TABLET ORAL at 12:08

## 2024-04-22 RX ADMIN — PROPOFOL 40 MCG/KG/MIN: 10 INJECTION, EMULSION INTRAVENOUS at 14:24

## 2024-04-22 RX ADMIN — 0.12% CHLORHEXIDINE GLUCONATE 15 ML: 1.2 RINSE ORAL at 20:14

## 2024-04-22 RX ADMIN — MICAFUNGIN SODIUM 100 MG: 100 INJECTION, POWDER, LYOPHILIZED, FOR SOLUTION INTRAVENOUS at 16:31

## 2024-04-22 RX ADMIN — 0.12% CHLORHEXIDINE GLUCONATE 15 ML: 1.2 RINSE ORAL at 08:25

## 2024-04-22 RX ADMIN — Medication 150 MCG/HR: at 08:24

## 2024-04-22 RX ADMIN — DIVALPROEX SODIUM 125 MG: 125 CAPSULE ORAL at 20:13

## 2024-04-22 RX ADMIN — ENOXAPARIN SODIUM 40 MG: 100 INJECTION SUBCUTANEOUS at 08:25

## 2024-04-22 RX ADMIN — METOCLOPRAMIDE 10 MG: 5 INJECTION, SOLUTION INTRAMUSCULAR; INTRAVENOUS at 22:02

## 2024-04-22 RX ADMIN — DIVALPROEX SODIUM 125 MG: 125 CAPSULE ORAL at 14:17

## 2024-04-22 RX ADMIN — INSULIN LISPRO 10 UNITS: 100 INJECTION, SOLUTION INTRAVENOUS; SUBCUTANEOUS at 04:57

## 2024-04-22 RX ADMIN — Medication 175 MCG/HR: at 22:09

## 2024-04-22 RX ADMIN — IPRATROPIUM BROMIDE AND ALBUTEROL SULFATE 1 DOSE: .5; 2.5 SOLUTION RESPIRATORY (INHALATION) at 08:56

## 2024-04-22 RX ADMIN — WATER 50 MG: 1 INJECTION INTRAMUSCULAR; INTRAVENOUS; SUBCUTANEOUS at 14:16

## 2024-04-22 RX ADMIN — PROPOFOL 45 MCG/KG/MIN: 10 INJECTION, EMULSION INTRAVENOUS at 22:08

## 2024-04-22 RX ADMIN — IPRATROPIUM BROMIDE AND ALBUTEROL SULFATE 1 DOSE: .5; 2.5 SOLUTION RESPIRATORY (INHALATION) at 16:48

## 2024-04-22 RX ADMIN — KETOTIFEN FUMARATE 1 DROP: 0.35 SOLUTION/ DROPS OPHTHALMIC at 08:27

## 2024-04-22 RX ADMIN — SODIUM CHLORIDE, PRESERVATIVE FREE 40 MG: 5 INJECTION INTRAVENOUS at 08:25

## 2024-04-22 RX ADMIN — PROPOFOL 40 MCG/KG/MIN: 10 INJECTION, EMULSION INTRAVENOUS at 17:51

## 2024-04-22 RX ADMIN — PIPERACILLIN AND TAZOBACTAM 3375 MG: 3; .375 INJECTION, POWDER, LYOPHILIZED, FOR SOLUTION INTRAVENOUS at 02:26

## 2024-04-22 RX ADMIN — POLYVINYL ALCOHOL, POVIDONE 1 DROP: 14; 6 SOLUTION/ DROPS OPHTHALMIC at 08:25

## 2024-04-22 RX ADMIN — DIVALPROEX SODIUM 125 MG: 125 CAPSULE ORAL at 04:58

## 2024-04-22 RX ADMIN — ACETAMINOPHEN 650 MG: 650 SOLUTION ORAL at 12:08

## 2024-04-22 RX ADMIN — PROPOFOL 50 MCG/KG/MIN: 10 INJECTION, EMULSION INTRAVENOUS at 05:09

## 2024-04-22 RX ADMIN — SODIUM CHLORIDE, PRESERVATIVE FREE 10 ML: 5 INJECTION INTRAVENOUS at 20:14

## 2024-04-22 RX ADMIN — ATORVASTATIN CALCIUM 10 MG: 10 TABLET, FILM COATED ORAL at 20:13

## 2024-04-22 RX ADMIN — METHOCARBAMOL 500 MG: 500 TABLET ORAL at 16:30

## 2024-04-22 RX ADMIN — ACETAMINOPHEN 650 MG: 650 SOLUTION ORAL at 17:40

## 2024-04-22 RX ADMIN — SODIUM CHLORIDE, PRESERVATIVE FREE 10 ML: 5 INJECTION INTRAVENOUS at 08:26

## 2024-04-22 RX ADMIN — METOCLOPRAMIDE 10 MG: 5 INJECTION, SOLUTION INTRAMUSCULAR; INTRAVENOUS at 14:17

## 2024-04-22 RX ADMIN — METHOCARBAMOL 500 MG: 500 TABLET ORAL at 20:13

## 2024-04-22 RX ADMIN — OXYCODONE HYDROCHLORIDE 10 MG: 5 SOLUTION ORAL at 08:25

## 2024-04-22 RX ADMIN — OXYCODONE HYDROCHLORIDE 10 MG: 5 SOLUTION ORAL at 04:56

## 2024-04-22 RX ADMIN — WHITE PETROLATUM: .15; .85 OINTMENT OPHTHALMIC at 01:15

## 2024-04-22 RX ADMIN — INSULIN LISPRO 5 UNITS: 100 INJECTION, SOLUTION INTRAVENOUS; SUBCUTANEOUS at 08:29

## 2024-04-22 RX ADMIN — INSULIN GLARGINE 15 UNITS: 100 INJECTION, SOLUTION SUBCUTANEOUS at 08:25

## 2024-04-22 RX ADMIN — KETOTIFEN FUMARATE 1 DROP: 0.35 SOLUTION/ DROPS OPHTHALMIC at 20:14

## 2024-04-22 RX ADMIN — WATER 50 MG: 1 INJECTION INTRAMUSCULAR; INTRAVENOUS; SUBCUTANEOUS at 02:30

## 2024-04-22 RX ADMIN — WATER 50 MG: 1 INJECTION INTRAMUSCULAR; INTRAVENOUS; SUBCUTANEOUS at 08:25

## 2024-04-22 RX ADMIN — IPRATROPIUM BROMIDE AND ALBUTEROL SULFATE 1 DOSE: .5; 2.5 SOLUTION RESPIRATORY (INHALATION) at 12:38

## 2024-04-22 RX ADMIN — BISACODYL 10 MG: 10 SUPPOSITORY RECTAL at 08:25

## 2024-04-22 RX ADMIN — FUROSEMIDE 20 MG: 10 INJECTION, SOLUTION INTRAMUSCULAR; INTRAVENOUS at 12:07

## 2024-04-22 ASSESSMENT — PULMONARY FUNCTION TESTS
PIF_VALUE: 33
PIF_VALUE: 31
PIF_VALUE: 35
PIF_VALUE: 32
PIF_VALUE: 39
PIF_VALUE: 37
PIF_VALUE: 33
PIF_VALUE: 33
PIF_VALUE: 34
PIF_VALUE: 32
PIF_VALUE: 35
PIF_VALUE: 36
PIF_VALUE: 33
PIF_VALUE: 31
PIF_VALUE: 38
PIF_VALUE: 34
PIF_VALUE: 35
PIF_VALUE: 32
PIF_VALUE: 31
PIF_VALUE: 32
PIF_VALUE: 31
PIF_VALUE: 34
PIF_VALUE: 34
PIF_VALUE: 33
PIF_VALUE: 33
PIF_VALUE: 35
PIF_VALUE: 35
PIF_VALUE: 31

## 2024-04-22 ASSESSMENT — PAIN SCALES - GENERAL
PAINLEVEL_OUTOF10: 0
PAINLEVEL_OUTOF10: 0
PAINLEVEL_OUTOF10: 3
PAINLEVEL_OUTOF10: 6
PAINLEVEL_OUTOF10: 6
PAINLEVEL_OUTOF10: 0

## 2024-04-22 NOTE — CARE COORDINATION
4/22 Care Coordination: Pt remains in SICU, POD#7 Whipple ,s/p takeback to OR for completion pancreatectomy   Remains on the Vent. On IV sedation, IV TPN.With Current status unclear on discharge needs.   CM/SW will continue to follow for discharge planning.   Vivek SUMMERSN,RN--BC  [phone]

## 2024-04-22 NOTE — PROGRESS NOTES
Comprehensive Nutrition Assessment    Type and Reason for Visit:  Reassess    Nutrition Recommendations/Plan:   Modify Parenteral Nutrition to prevent overfeeding with current propofol providing an additional 612 lipid kcals/day     Recommend: Central Custom 3-in-1 @ 45.8 ml/hr; 1100 ml TV to provide 70 gm AA, 118.5 gm dextrose, 22 gm lipid, 900 kcals  provides 1512 total kcals and meets 100% estimated calorie needs & ~88% estimated protein needs       Malnutrition Assessment:  Malnutrition Status:  At risk for malnutrition (Comment) (04/22/24 1130)    Context:  Acute Illness     Findings of the 6 clinical characteristics of malnutrition:  Energy Intake:  Mild decrease in energy intake (Comment) (prior to TPN)  Weight Loss:  Unable to assess (large fluctuations 2/2 fluids)     Body Fat Loss:  No significant body fat loss     Muscle Mass Loss:  No significant muscle mass loss    Fluid Accumulation:  No significant fluid accumulation     Strength:  Not Performed    Nutrition Assessment:    Pt remains at risk now s/p takeback completion pancreatectomy, splenectomy, omentectomy. Admit 2/2 13cm pancreatic cyst s/p open pylorus preserving whipple, cholecystectomy, portal lymphadenectomy, appendectomy 4/15. TPN initiated w/ ongoing need. PMHx CAD, prediabetes. Noted post pancreatectomy DM. Will provide updated TPN recs to prevent overfeeding with current sedation. Will continue to monitor.    Nutrition Related Findings:    pt intubated/sedated, NGT, GIULIANO drains x2, absent BS, abd distention, +1-2 edema, gross anasarca, +I/Os 11.5L, hyperglycemia, hyperphosphatemia, TG WNL 4/18     Wound Type: Surgical Incision (midline)       Current Nutrition Intake & Therapies:    Average Meal Intake: NPO     Diet NPO Exceptions are: Sips of Water with Meds  PN-Adult  3-in-1 Central Line (Standard)  PN-Adult  3-in-1 Central Line (Standard)  Current Parenteral Nutrition Orders:  Type and Formula: 3-in-1 Standard   Duration:

## 2024-04-22 NOTE — PROGRESS NOTES
Physical Therapy  Physical Therapy Attempt    Name: Leahta Willard  : 1965  MRN: [id number]      Date of Service: 2024  Chart reviewed.  Spoke with RN - pt is not appropriate for skilled PT at this time.  Will re-attempt as able.    Suraj Clark, PT, DPT  [id number]

## 2024-04-22 NOTE — PROGRESS NOTES
[facility] PROGRESS NOTE      Chief complaint: Follow-up of septic shock/Candida albicans and glabrata infection/venous peritonitis associated with anastomosis leak    The patient is a 58 y.o. female with history of DM, hypertension, hyperlipidemia, cervical fusion, admitted on 04/15 for symptomatic pancreatic serous cystadenoma abutting many branches of the SMV and SMA prompting pylorus-preserving pancreaticoduodenectomy, placement of biliary stent, portal lymphadenectomy, round ligament and omental pedicle flap harvest, appendectomy on 04/15 during which no clinical findings of infection were noted. Postop course was complicated by intermittent fever since 04/18 up to 101.5 F and pancreatic fistula, prompting reopening recent laparotomy, completion pancreatectomy, splenectomy, omentectomy on 04/18 during which gush of a large volume of dark succus and infected fluid on opening, old infected hematoma, significant amount of necrotic omentum covering the anastomoses, dehiscence of anterior wall anastomosis with bile exiting the site of anastomosis at the jejunum, bile spilling within the lesser sac, necrotic pancreas were noted.  Tissue Gram stain and culture showed few polymorphonuclear leukocytes, no epithelial cells, no organisms, rare growth of Candida albicans, light growth of Candida glabrata, no anaerobes. Piperacillin-tazobactam was started on since admission.     Subjective: Patient was seen and examined.She remains in critical condition, intubated and sedated, off vasopressor support.      Objective:  BP (!) 116/55   Pulse 84   Temp 98.6 °F (37 °C) (Bladder)   Resp 16   Ht 1.626 m (5' 4\")   Wt 96.8 kg (213 lb 6.4 oz)   SpO2 97%   BMI 36.63 kg/m²   Constitutional: Intubated, no MV dyssynchrony  Respiratory: Clear breath sounds, no crackles, no wheezes  Cardiovascular: Regular rate and rhythm, no murmurs  Gastrointestinal: Bowel sounds present, soft, nontender  Skin: Warm and dry, no active

## 2024-04-22 NOTE — FLOWSHEET NOTE
While patient's arms are free she attempts to pull at ETT and other lines.  Verbal redirection unsuccessful at this time.  Restraints continued to promote patient's safety and will continue to monitor.

## 2024-04-22 NOTE — PROGRESS NOTES
[facility] Surgery   Daily Progress Note      Patient's Name/Date of Birth: Leatha Willard / 1965    Date: April 22, 2024     Chief Complaint: s/p open whipple, Grade C POPF s/p takeback to OR for completion pancreatectomy    Patient Active Problem List   Diagnosis    Recurrent major depressive disorder (HCC)    Essential hypertension    Hyperlipidemia    Prediabetes    Class 1 obesity due to excess calories without serious comorbidity with body mass index (BMI) of 32.0 to 32.9 in adult    HIREN (obstructive sleep apnea)    Pancreatic cyst    Colon polyps    Cyst of pancreas    Chest pain    Tobacco abuse-- quit 2 weeks ago    Abdominal pain    Serous cystadenoma    Pancreatic duct leak    Sepsis with acute renal failure and septic shock (HCC)    JUSTINE (acute kidney injury) (HCC)    Hypophosphatemia    Acute respiratory failure with hypoxia (HCC)       Subjective: Remains intubated and sedated. Currently restrained bilateral wrists. Fent and propofol. On 1 mcg/min of levophed with MAPs > 65mmHg. TPN running.     Afebrile overnight.     Given 20mg IV lasix yesterday. Continues to put out greater than adequate UOP - last few hours 100mL, 100mL, 75mL.    Given suppository yesterday - no bm.    GIULIANO drains still w/ seropurulent drainage.    Received x1U RBC yesterday for 6.3 - appropriate response to 7.6.      Objective:  BP (!) 116/55   Pulse 80   Temp 98.4 °F (36.9 °C) (Bladder)   Resp 16   Ht 1.626 m (5' 4\")   Wt 88.7 kg (195 lb 9.6 oz)   SpO2 98%   BMI 33.57 kg/m²   Labs:  Recent Labs     04/20/24 0410 04/21/24 0426 04/21/24  0849   WBC 33.1* 31.9*  --    HGB 7.7* 6.3* 7.6*   HCT 22.7* 19.7* 23.4*     Recent Labs     04/19/24 2327 04/20/24 0410 04/21/24 0426    137 140   K 4.5 4.5 3.9    105 101   CO2 24 24 28   BUN 23* 23* 19   CREATININE 0.7 0.7 0.8     Recent Labs     04/19/24 2327 04/20/24 0410 04/21/24 0426   ALKPHOS 73 80 103   ALT 32 34* 23   AST 21 23 23   BILITOT 0.6 0.6 0.9  hyperglycemia s/p total pancreatectomy     Daily labs - Hgb stable today, WBC up..... Monitor electrolytes - K > 4, Mg > 2, Phos > 3  If WBC continues to climb tomorrow will consider repeating CT scan of abdomen and pelvis    DVT ppx w/ lovenox    Awaiting final path          Jaron Fitzgerald MD  PGY-5 General Surgery Resident      Patient underwent a completion pancreatectomy due to grade C postoperative pancreatic fistula.  Patient drains currently appear to be old necrotic pancreatic fluid  Appreciate ICU team's care.  Patient is currently off Levophed  Will defer diuresis to the ICU team.  Appreciate endocrinology's help with her now type 1 diabetes.  Candida is currently growing from her ascitic fluid.    Electronically signed by Ortiz Haddad MD on 4/22/2024 at 3:40 PM

## 2024-04-22 NOTE — PLAN OF CARE
Problem: Respiratory - Adult  Goal: Achieves optimal ventilation and oxygenation  4/22/2024 0234 by Xochitl Plummer RCP  Outcome: Progressing

## 2024-04-22 NOTE — PLAN OF CARE
Problem: Respiratory - Adult  Goal: Achieves optimal ventilation and oxygenation  4/22/2024 1957 by Xochitl Plummer RCP  Outcome: Progressing

## 2024-04-22 NOTE — PROGRESS NOTES
[facility]  SURGICAL INTENSIVE CARE UNIT (SICU)  ATTENDING PHYSICIAN CRITICAL CARE PROGRESS NOTE     I have personally examined the patient, personally reviewed the record, and personally discussed the case with the resident. I have personally reviewed all relevant labs and imaging data. I am actively managing this patient's medications.  Please refer to the resident's note. I agree with the assessment and plan with the following corrections/additions. The following summarizes my clinical findings and independent assessment.     CC: critical care management after pancreaticoduodenectomy    Hospital Course/Overnight Events:  4/15--pylorus preserving Whipple for large benign pancreatic cyst  4/16--continued ICU d/t nausea  4/17--bobby replaced, pancreatic leak, boluses given, albumin given, JUSTINE, toradol stopped, CVC changed  4/18--CVC replaced, NGT repalced, scop patch, robaxin, d/c IVF; total pancreatectomy and splenectomy, insulin gtt  4/19--remains intubated, TPN, lasix  4/20--albumin x 6 with lasix, robaxin restarted  4/21--low cortisol--steroids started; lasix, 1U PRBC, vent adjustment for inc plateau pressure  4/22--some air trapping on vent overnight    Medications   Scheduled Meds:    furosemide  20 mg IntraVENous Once    hydrocortisone sodium succinate PF (SOLU-CORTEF) 50 mg in sterile water 2 mL injection  50 mg IntraVENous Q6H    divalproex  125 mg Oral 3 times per day    gabapentin  300 mg Oral Nightly    methocarbamol  500 mg Oral 4x Daily    insulin lispro  0-30 Units SubCUTAneous Q4H    bisacodyl  10 mg Rectal Daily    micafungin  100 mg IntraVENous Daily    insulin glargine  20 Units SubCUTAneous Nightly    insulin glargine  15 Units SubCUTAneous QAM    oxyCODONE  10 mg Oral Q4H    ipratropium 0.5 mg-albuterol 2.5 mg  1 Dose Inhalation Q4H WA RT    sodium chloride flush  5-40 mL IntraVENous BID    metoclopramide  10 mg IntraVENous q8h    acetaminophen  650 mg Oral 4 times per day     pancreas 10/25/2023    Colon polyps 09/20/2023    Pancreatic cyst 08/23/2023    HIREN (obstructive sleep apnea) 04/03/2023    Prediabetes 12/14/2021    Hyperlipidemia 09/15/2021    Recurrent major depressive disorder (HCC) 02/03/2020    Essential hypertension 02/03/2020       S/p pylorus sparing Whipple  S/p total pancreatectomy and splenectomy after leak  Acute resp failure--cont mech vent support; wean as able  Hypoalbuminemia--cont TPN  Acute blood loss anemia--monitor H/H  Electrolyte imbalance (hypocalcemia/hyperphosphatemia)--correct as able  Hyperglycemia--SSI/lantus  Leukocytosis--repeat scan 4/23 if no improvement  ++fluid balance--diurese as able  Zosyn #8; micafungin #2  Relative adrenal insuff--cont steroids  DVT risk--PCDS/lovenox    I am actively managing this pt's meds and the risk for hemodynamic/respiratory/metabolic deterioration.  Requires ongoing ICU care.    Maicol Gonzalez MD, FACS  4/22/2024  11:54 AM    Critical care time exclusive of teaching and procedures = 40 minutes

## 2024-04-22 NOTE — ACP (ADVANCE CARE PLANNING)
Advance Care Planning   Healthcare Decision Maker:    Primary Decision Maker: Paul Willard - Child - [phone]    Click here to complete Healthcare Decision Makers including selection of the Healthcare Decision Maker Relationship (ie \"Primary\").

## 2024-04-22 NOTE — ANESTHESIA POSTPROCEDURE EVALUATION
Department of Anesthesiology  Postprocedure Note    Patient: Leatha Willard  MRN: [id number]  YOB: 1965  Date of evaluation: 4/18/2024    Procedure Summary       Date: 04/18/24 Room / Location: [address]    Anesthesia Start: 1933 Anesthesia Stop: 2309    Procedure: Exploratory Laparotomy, Abdominal Washout, Completion of Pancreatectomy, Splenectomy, Omentectomy (Abdomen) Diagnosis:       Pancreatic fistula      (Pancreatic fistula [K86.89])    Surgeons: Norma Rogers MD Responsible Provider: Mya Young MD    Anesthesia Type: General ASA Status: 4 - Emergent            Anesthesia Type: General    Jewel Phase I: Jewel Score: 4    Jewel Phase II: Jewel Score: 8    Anesthesia Post Evaluation    Patient location during evaluation: ICU  Patient participation: complete - patient cannot participate  Level of consciousness: sedated and ventilated  Pain score: 0  Airway patency: patent  Nausea & Vomiting: no nausea  Cardiovascular status: hemodynamically stable  Respiratory status: ventilator and intubated  Hydration status: stable        No notable events documented.

## 2024-04-22 NOTE — PLAN OF CARE
deterioration, or improvement     Problem: Pain  Goal: Verbalizes/displays adequate comfort level or baseline comfort level  Outcome: Progressing     Problem: Safety - Adult  Goal: Free from fall injury  Outcome: Progressing     Problem: Safety - Medical Restraint  Goal: Remains free of injury from restraints (Restraint for Interference with Medical Device)  Description: INTERVENTIONS:  1. Determine that other, less restrictive measures have been tried or would not be effective before applying the restraint  2. Evaluate the patient's condition at the time of restraint application  3. Inform patient/family regarding the reason for restraint  4. Q2H: Monitor safety, psychosocial status, comfort, nutrition and hydration  Outcome: Progressing  Flowsheets  Taken 4/22/2024 0800 by Faith Saldivar RN  Remains free of injury from restraints (restraint for interference with medical device): Every 2 hours: Monitor safety, psychosocial status, comfort, nutrition and hydration  Taken 4/22/2024 0600 by Alley Walters RN  Remains free of injury from restraints (restraint for interference with medical device): Every 2 hours: Monitor safety, psychosocial status, comfort, nutrition and hydration  Taken 4/22/2024 0400 by Alley Walters RN  Remains free of injury from restraints (restraint for interference with medical device): Every 2 hours: Monitor safety, psychosocial status, comfort, nutrition and hydration  Taken 4/22/2024 0200 by Alley Walters RN  Remains free of injury from restraints (restraint for interference with medical device): Every 2 hours: Monitor safety, psychosocial status, comfort, nutrition and hydration  Taken 4/22/2024 0000 by Alley Walters RN  Remains free of injury from restraints (restraint for interference with medical device): Every 2 hours: Monitor safety, psychosocial status, comfort, nutrition and hydration  Taken 4/21/2024 2200 by Alley Walters RN  Remains free of injury from restraints

## 2024-04-22 NOTE — PLAN OF CARE
Problem: Respiratory - Adult  Goal: Achieves optimal ventilation and oxygenation  4/21/2024 2012 by Xochitl Plummer RCP  Outcome: Progressing

## 2024-04-22 NOTE — PLAN OF CARE
Progressing  Flowsheets  Taken 4/21/2024 0900 by Mylene Yap RN  Verbalizes/displays adequate comfort level or baseline comfort level:   Assess pain using appropriate pain scale   Administer analgesics based on type and severity of pain and evaluate response   Implement non-pharmacological measures as appropriate and evaluate response  Taken 4/21/2024 0800 by Mylene Yap RN  Verbalizes/displays adequate comfort level or baseline comfort level:   Assess pain using appropriate pain scale   Administer analgesics based on type and severity of pain and evaluate response   Implement non-pharmacological measures as appropriate and evaluate response     Problem: Neurosensory - Adult  Goal: Achieves stable or improved neurological status  4/21/2024 1635 by Mylene Yap RN  Outcome: Not Progressing  Flowsheets (Taken 4/21/2024 1600)  Achieves stable or improved neurological status:   Assess for and report changes in neurological status   Maintain blood pressure and fluid volume within ordered parameters to optimize cerebral perfusion and minimize risk of hemorrhage   Monitor temperature, glucose, and sodium. Initiate appropriate interventions as ordered  4/21/2024 0949 by Mylene Yap RN  Outcome: Not Progressing  4/21/2024 0912 by Maricel Herrera RN  Outcome: Progressing  Flowsheets (Taken 4/21/2024 0800 by Mylene Yap RN)  Achieves stable or improved neurological status:   Assess for and report changes in neurological status   Maintain blood pressure and fluid volume within ordered parameters to optimize cerebral perfusion and minimize risk of hemorrhage   Monitor temperature, glucose, and sodium. Initiate appropriate interventions as ordered  Goal: Achieves maximal functionality and self care  4/21/2024 1635 by Mylene Yap RN  Outcome: Not Progressing  4/21/2024 0949 by Mylene Yap RN  Outcome: Not Progressing     Problem: Nutrition Deficit:  Goal: Optimize nutritional status  4/21/2024  1635 by Mylene Yap RN  Outcome: Not Progressing  4/21/2024 0949 by Mylene Yap RN  Outcome: Not Progressing  4/21/2024 0912 by Maricel Herrera RN  Outcome: Progressing

## 2024-04-22 NOTE — PROGRESS NOTES
04/21/24 2105   Patient Observation   Pulse 94   SpO2 96 %   Vent Information   Vent Mode AC/PRVC   Ventilator Settings   FiO2  (S)  55 %   Insp Time (sec) (S)  0.7 sec   Vt (Set, mL) (S)  350 mL   Resp Rate (Set) (S)  16 bpm   PEEP/CPAP (cmH2O) 8   Vent Patient Data (Readings)   Vt (Measured) 5 mL   Peak Inspiratory Pressure (cmH2O) 33 cmH2O   Rate Measured 18 br/min   Minute Volume (L/min) 5.08 Liters   Mean Airway Pressure (cmH2O) 14 cmH20   Plateau Pressure (cm H2O) 23 cm H2O   Driving Pressure 15   I:E Ratio 3.30:1   I Time/ I Time % 0 s   Vent Alarm Settings   High Pressure (cmH2O) 50 cmH2O     Changed per order

## 2024-04-22 NOTE — PROGRESS NOTES
OCCUPATIONAL THERAPY    Date:2024  Patient Name: Leatha Willard  MRN: [id number]  : 1965  Room: [address] [place] [address]-A              Chart reviewed. Pt on hold; not appropriate for therapy at this time.  Will re-attempt at later time. Thank you for consult.    Lakesha Rider, OTR/L [id number]

## 2024-04-22 NOTE — PROGRESS NOTES
Surgical Intensive Care Unit   Daily Progress Note     Patient's name:  Leatha Willard  Age/Gender: 58 y.o. female  Date of Admission: 4/15/2024  5:37 AM  Length of Stay: 7    *Reason for ICU:       HPI:   Leatha Willard is a 58 y.o. female who presents to the SICU for post-op monitoring following a whipple. The patient has been admitted to the hospital since 4/15/2024 who presents for evaluation of pancreatic cyst. She has a hx of arthritis and back pain s/p cervical fusion. She was being evaluated for her mid back pain with imaging and was found to have a large pancreatic cyst on CT. She then had an MRI/MRCP showing a large >10cm cyst in the pancreatic head extending to the transverse colon mesentery. She had an EUS with Dr. Hammond at [facility]. FNA and fluid analysis was consistent with a benign cyst. There were no suspicious features present. Patient underwent a Pylorus-preserving pancreaticoduodenectomy on 4/15 and was admitted to the ICU post-operatively.  On  she underwent a complete pancreatectomy and splenectomy secondary to a worsening pancreatic leak.     *Overnight Events:     No acute issues  Requiring 1 mcg of levophed     Problem List:   Patient Active Problem List   Diagnosis    Recurrent major depressive disorder (HCC)    Essential hypertension    Hyperlipidemia    Prediabetes    Class 1 obesity due to excess calories without serious comorbidity with body mass index (BMI) of 32.0 to 32.9 in adult    HIREN (obstructive sleep apnea)    Pancreatic cyst    Colon polyps    Cyst of pancreas    Chest pain    Tobacco abuse-- quit 2 weeks ago    Abdominal pain    Serous cystadenoma    Pancreatic duct leak    Sepsis with acute renal failure and septic shock (HCC)    JUSTINE (acute kidney injury) (HCC)    Hypophosphatemia    Acute respiratory failure with hypoxia (HCC)       Surgical/Interventional Procedures:       *Average, Min, and Max for last 24 hours Vitals:  Temp:  Temp  Av °F (37.2 °C)  Min: 98.4 °F  (36.9 °C)  Max: 99.5 °F (37.5 °C)  RR: Resp  Av.1  Min: 16  Max: 25  HR: Pulse  Av.6  Min: 80  Max: 116  BP:  No data recorded.  ; No data recorded.    SpO2: SpO2  Av.9 %  Min: 91 %  Max: 100 %        *I/O:  Urine output (cc/kg/hr): 2,550 (1.2 cc/kg/hr)   Bowel movements: 0  Fluid balance: +[id number]  Body weight: 88.7    *Lines:   R IJ CVC   L FEM ART LINE     *Tubes:   Bobby   ETT   OG     *Drains:   LLQ bulb drain  -   RLQ bulb drain  - 10    *Physical Exam:   BP (!) 116/55   Pulse 85   Temp 98.8 °F (37.1 °C) (Bladder)   Resp 16   Ht 1.626 m (5' 4\")   Wt 96.8 kg (213 lb 6.4 oz)   SpO2 98%   BMI 36.63 kg/m²           CONSTITUTIONAL: no acute distress, lying in hospital bed  HEENT: Normocephalic, atraumatic. No scleral icterus.  PULMONARY: no rhonchi/rales/wheezes, no use of accessory muscles   Vent Settings: Additional Respiratory Assessments  Pulse: 85  Respirations: 16  SpO2: 98 %  End Tidal CO2/tcpCO2: 38 (%)  Humidification Source: Heated wire  Humidification Temp: 36.9  Circuit Condensation: Drained  CARDIOVASCULAR: S1 S2, regular rate, regular rhythm, no murmur/gallop/rub  ABDOMEN: soft, nontender, nondistended, nontympanic, no masses, no organomegaly, normal bowel sounds  RENAL: bobby to gravity, clear yellow urine  MUSCULOSKELETAL: moves all extremities purposefully, 5/5 strength   SKIN/EXTREMITIES: no rashes/ecchymosis, no edema/clubbing, warm/dry, good capillary refill   NEUROLOGIC: GCS 3T, PERRLA, oriented x 4        *Labs:    K  4.5  Creatinine 0.7  Mag 2.2  Glucose 213  Phosphorus 4.8  WBC 39k , 31k yesterday   Hemoglobin 7.4 , 7.6 yesterday   CBC     Lab Results   Component Value Date/Time    WBC 39.5 2024 04:21 AM    RBC 2.46 2024 04:21 AM    HGB 7.4 2024 04:21 AM    HCT 23.1 2024 04:21 AM     2024 04:21 AM    MCV 93.9 2024 04:21 AM    MCH 30.1 2024 04:21 AM    MCHC 32.0 2024 04:21 AM    RDW 15.3

## 2024-04-23 ENCOUNTER — APPOINTMENT (OUTPATIENT)
Dept: CT IMAGING | Age: 59
DRG: 004 | End: 2024-04-23
Attending: STUDENT IN AN ORGANIZED HEALTH CARE EDUCATION/TRAINING PROGRAM
Payer: COMMERCIAL

## 2024-04-23 ENCOUNTER — APPOINTMENT (OUTPATIENT)
Dept: GENERAL RADIOLOGY | Age: 59
DRG: 004 | End: 2024-04-23
Attending: STUDENT IN AN ORGANIZED HEALTH CARE EDUCATION/TRAINING PROGRAM
Payer: COMMERCIAL

## 2024-04-23 LAB
AADO2: 130.3 MMHG
AADO2: 180.2 MMHG
AADO2: 183.5 MMHG
AADO2: 188.9 MMHG
ALBUMIN SERPL-MCNC: 3.2 G/DL (ref 3.5–5.2)
ALP SERPL-CCNC: 182 U/L (ref 35–104)
ALT SERPL-CCNC: 25 U/L (ref 0–32)
ANION GAP SERPL CALCULATED.3IONS-SCNC: 15 MMOL/L (ref 7–16)
AST SERPL-CCNC: 31 U/L (ref 0–31)
B.E.: 10.1 MMOL/L (ref -3–3)
B.E.: 10.2 MMOL/L (ref -3–3)
B.E.: 14.8 MMOL/L (ref -3–3)
B.E.: 8.8 MMOL/L (ref -3–3)
BASOPHILS # BLD: 0.28 K/UL (ref 0–0.2)
BASOPHILS NFR BLD: 1 % (ref 0–2)
BILIRUB SERPL-MCNC: 0.6 MG/DL (ref 0–1.2)
BUN SERPL-MCNC: 28 MG/DL (ref 6–20)
CA-I BLD-SCNC: 1.08 MMOL/L (ref 1.15–1.33)
CALCIUM SERPL-MCNC: 8.7 MG/DL (ref 8.6–10.2)
CHLORIDE SERPL-SCNC: 100 MMOL/L (ref 98–107)
CO2 SERPL-SCNC: 30 MMOL/L (ref 22–29)
COHB: 0 % (ref 0–1.5)
COHB: 0.3 % (ref 0–1.5)
CREAT SERPL-MCNC: 0.9 MG/DL (ref 0.5–1)
CRITICAL: ABNORMAL
DATE ANALYZED: ABNORMAL
DATE OF COLLECTION: ABNORMAL
EOSINOPHIL # BLD: 0.57 K/UL (ref 0.05–0.5)
EOSINOPHILS RELATIVE PERCENT: 2 % (ref 0–6)
ERYTHROCYTE [DISTWIDTH] IN BLOOD BY AUTOMATED COUNT: 14.9 % (ref 11.5–15)
FIO2: 40 %
FIO2: 50 %
GFR SERPL CREATININE-BSD FRML MDRD: 76 ML/MIN/1.73M2
GLUCOSE BLD-MCNC: 100 MG/DL (ref 74–99)
GLUCOSE BLD-MCNC: 103 MG/DL (ref 74–99)
GLUCOSE BLD-MCNC: 115 MG/DL (ref 74–99)
GLUCOSE BLD-MCNC: 134 MG/DL (ref 74–99)
GLUCOSE BLD-MCNC: 59 MG/DL (ref 74–99)
GLUCOSE BLD-MCNC: 83 MG/DL (ref 74–99)
GLUCOSE BLD-MCNC: 87 MG/DL (ref 74–99)
GLUCOSE SERPL-MCNC: 94 MG/DL (ref 74–99)
HCO3: 33.3 MMOL/L (ref 22–26)
HCO3: 35.3 MMOL/L (ref 22–26)
HCO3: 36 MMOL/L (ref 22–26)
HCO3: 40.1 MMOL/L (ref 22–26)
HCT VFR BLD AUTO: 22.9 % (ref 34–48)
HGB BLD-MCNC: 7.3 G/DL (ref 11.5–15.5)
HHB: 3.2 % (ref 0–5)
HHB: 3.2 % (ref 0–5)
HHB: 3.5 % (ref 0–5)
HHB: 5.3 % (ref 0–5)
INR PPP: 1
LAB: ABNORMAL
LYMPHOCYTES NFR BLD: 1.13 K/UL (ref 1.5–4)
LYMPHOCYTES RELATIVE PERCENT: 4 % (ref 20–42)
Lab: 1136
Lab: 1820
Lab: 5
Lab: 600
MAGNESIUM SERPL-MCNC: 2.3 MG/DL (ref 1.6–2.6)
MCH RBC QN AUTO: 29.6 PG (ref 26–35)
MCHC RBC AUTO-ENTMCNC: 31.9 G/DL (ref 32–34.5)
MCV RBC AUTO: 92.7 FL (ref 80–99.9)
METAMYELOCYTES ABSOLUTE COUNT: 0.57 K/UL (ref 0–0.12)
METAMYELOCYTES: 2 % (ref 0–1)
METHB: 0.3 % (ref 0–1.5)
METHB: 0.3 % (ref 0–1.5)
METHB: 0.4 % (ref 0–1.5)
METHB: 0.5 % (ref 0–1.5)
MODE: AC
MONOCYTES NFR BLD: 1.13 K/UL (ref 0.1–0.95)
MONOCYTES NFR BLD: 4 % (ref 2–12)
MYELOCYTES ABSOLUTE COUNT: 0.57 K/UL
MYELOCYTES: 2 %
NEUTROPHILS NFR BLD: 87 % (ref 43–80)
NEUTS SEG NFR BLD: 27.16 K/UL (ref 1.8–7.3)
NUCLEATED RED BLOOD CELLS: 6 PER 100 WBC
O2 SATURATION: 94.7 % (ref 92–98.5)
O2 SATURATION: 96.5 % (ref 92–98.5)
O2 SATURATION: 96.8 % (ref 92–98.5)
O2 SATURATION: 96.8 % (ref 92–98.5)
O2HB: 94.1 % (ref 94–97)
O2HB: 96 % (ref 94–97)
O2HB: 96.1 % (ref 94–97)
O2HB: 96.2 % (ref 94–97)
OPERATOR ID: 2577
OPERATOR ID: 2577
OPERATOR ID: ABNORMAL
OPERATOR ID: ABNORMAL
PATIENT TEMP: 37 C
PCO2: 46.4 MMHG (ref 35–45)
PCO2: 52.8 MMHG (ref 35–45)
PCO2: 55.4 MMHG (ref 35–45)
PCO2: 56.9 MMHG (ref 35–45)
PEEP/CPAP: 8 CMH2O
PFO2: 1.93 MMHG/%
PFO2: 2.03 MMHG/%
PFO2: 2.06 MMHG/%
PFO2: 2.09 MMHG/%
PH BLOOD GAS: 7.42 (ref 7.35–7.45)
PH BLOOD GAS: 7.44 (ref 7.35–7.45)
PH BLOOD GAS: 7.47 (ref 7.35–7.45)
PH BLOOD GAS: 7.48 (ref 7.35–7.45)
PHOSPHATE SERPL-MCNC: 4.8 MG/DL (ref 2.5–4.5)
PLATELET # BLD AUTO: 421 K/UL (ref 130–450)
PMV BLD AUTO: 10.7 FL (ref 7–12)
PO2: 102.9 MMHG (ref 75–100)
PO2: 104.4 MMHG (ref 75–100)
PO2: 81.2 MMHG (ref 75–100)
PO2: 96.5 MMHG (ref 75–100)
POTASSIUM SERPL-SCNC: 4.3 MMOL/L (ref 3.5–5)
PROT SERPL-MCNC: 5.4 G/DL (ref 6.4–8.3)
PROTHROMBIN TIME: 11.3 SEC (ref 9.3–12.4)
RBC # BLD AUTO: 2.47 M/UL (ref 3.5–5.5)
RBC # BLD: ABNORMAL 10*6/UL
RI(T): 1.6
RI(T): 1.73
RI(T): 1.84
RI(T): 1.9
RR MECHANICAL: 16 B/MIN
SODIUM SERPL-SCNC: 145 MMOL/L (ref 132–146)
SOURCE, BLOOD GAS: ABNORMAL
SURGICAL PATHOLOGY REPORT: NORMAL
THB: 7.7 G/DL (ref 11.5–16.5)
THB: 8.2 G/DL (ref 11.5–16.5)
THB: 8.6 G/DL (ref 11.5–16.5)
THB: 8.8 G/DL (ref 11.5–16.5)
TIME ANALYZED: 1138
TIME ANALYZED: 1835
TIME ANALYZED: 602
TIME ANALYZED: 9
VT MECHANICAL: 350 ML
WBC # BLD: ABNORMAL 10*3/UL
WBC OTHER # BLD: 31.4 K/UL (ref 4.5–11.5)

## 2024-04-23 PROCEDURE — 6370000000 HC RX 637 (ALT 250 FOR IP): Performed by: STUDENT IN AN ORGANIZED HEALTH CARE EDUCATION/TRAINING PROGRAM

## 2024-04-23 PROCEDURE — 2500000003 HC RX 250 WO HCPCS: Performed by: STUDENT IN AN ORGANIZED HEALTH CARE EDUCATION/TRAINING PROGRAM

## 2024-04-23 PROCEDURE — 2580000003 HC RX 258: Performed by: STUDENT IN AN ORGANIZED HEALTH CARE EDUCATION/TRAINING PROGRAM

## 2024-04-23 PROCEDURE — C9113 INJ PANTOPRAZOLE SODIUM, VIA: HCPCS | Performed by: STUDENT IN AN ORGANIZED HEALTH CARE EDUCATION/TRAINING PROGRAM

## 2024-04-23 PROCEDURE — 85610 PROTHROMBIN TIME: CPT

## 2024-04-23 PROCEDURE — 6360000002 HC RX W HCPCS: Performed by: STUDENT IN AN ORGANIZED HEALTH CARE EDUCATION/TRAINING PROGRAM

## 2024-04-23 PROCEDURE — 2000000000 HC ICU R&B

## 2024-04-23 PROCEDURE — 99232 SBSQ HOSP IP/OBS MODERATE 35: CPT | Performed by: INTERNAL MEDICINE

## 2024-04-23 PROCEDURE — 83735 ASSAY OF MAGNESIUM: CPT

## 2024-04-23 PROCEDURE — 80053 COMPREHEN METABOLIC PANEL: CPT

## 2024-04-23 PROCEDURE — 82962 GLUCOSE BLOOD TEST: CPT

## 2024-04-23 PROCEDURE — 2580000003 HC RX 258: Performed by: INTERNAL MEDICINE

## 2024-04-23 PROCEDURE — 6370000000 HC RX 637 (ALT 250 FOR IP): Performed by: SURGERY

## 2024-04-23 PROCEDURE — 37799 UNLISTED PX VASCULAR SURGERY: CPT

## 2024-04-23 PROCEDURE — 84100 ASSAY OF PHOSPHORUS: CPT

## 2024-04-23 PROCEDURE — 99291 CRITICAL CARE FIRST HOUR: CPT | Performed by: SURGERY

## 2024-04-23 PROCEDURE — 74177 CT ABD & PELVIS W/CONTRAST: CPT

## 2024-04-23 PROCEDURE — 6360000004 HC RX CONTRAST MEDICATION: Performed by: RADIOLOGY

## 2024-04-23 PROCEDURE — 82805 BLOOD GASES W/O2 SATURATION: CPT

## 2024-04-23 PROCEDURE — 82330 ASSAY OF CALCIUM: CPT

## 2024-04-23 PROCEDURE — 2580000003 HC RX 258

## 2024-04-23 PROCEDURE — 94003 VENT MGMT INPAT SUBQ DAY: CPT

## 2024-04-23 PROCEDURE — 71045 X-RAY EXAM CHEST 1 VIEW: CPT

## 2024-04-23 PROCEDURE — 94640 AIRWAY INHALATION TREATMENT: CPT

## 2024-04-23 PROCEDURE — 85025 COMPLETE CBC W/AUTO DIFF WBC: CPT

## 2024-04-23 PROCEDURE — 6370000000 HC RX 637 (ALT 250 FOR IP): Performed by: INTERNAL MEDICINE

## 2024-04-23 PROCEDURE — 6360000002 HC RX W HCPCS: Performed by: INTERNAL MEDICINE

## 2024-04-23 RX ORDER — FUROSEMIDE 10 MG/ML
40 INJECTION INTRAMUSCULAR; INTRAVENOUS ONCE
Status: COMPLETED | OUTPATIENT
Start: 2024-04-23 | End: 2024-04-23

## 2024-04-23 RX ORDER — INSULIN LISPRO 100 [IU]/ML
0-12 INJECTION, SOLUTION INTRAVENOUS; SUBCUTANEOUS EVERY 4 HOURS
Status: DISCONTINUED | OUTPATIENT
Start: 2024-04-23 | End: 2024-04-24

## 2024-04-23 RX ADMIN — WATER 50 MG: 1 INJECTION INTRAMUSCULAR; INTRAVENOUS; SUBCUTANEOUS at 07:47

## 2024-04-23 RX ADMIN — PROPOFOL 35 MCG/KG/MIN: 10 INJECTION, EMULSION INTRAVENOUS at 06:07

## 2024-04-23 RX ADMIN — 0.12% CHLORHEXIDINE GLUCONATE 15 ML: 1.2 RINSE ORAL at 07:47

## 2024-04-23 RX ADMIN — Medication 50 MCG: at 08:24

## 2024-04-23 RX ADMIN — IOPAMIDOL 75 ML: 755 INJECTION, SOLUTION INTRAVENOUS at 08:29

## 2024-04-23 RX ADMIN — WHITE PETROLATUM: .15; .85 OINTMENT OPHTHALMIC at 20:03

## 2024-04-23 RX ADMIN — METHOCARBAMOL 500 MG: 500 TABLET ORAL at 16:13

## 2024-04-23 RX ADMIN — METHOCARBAMOL 500 MG: 500 TABLET ORAL at 20:03

## 2024-04-23 RX ADMIN — KETOTIFEN FUMARATE 1 DROP: 0.35 SOLUTION/ DROPS OPHTHALMIC at 08:11

## 2024-04-23 RX ADMIN — MICAFUNGIN SODIUM 100 MG: 100 INJECTION, POWDER, LYOPHILIZED, FOR SOLUTION INTRAVENOUS at 16:15

## 2024-04-23 RX ADMIN — OXYCODONE HYDROCHLORIDE 10 MG: 5 SOLUTION ORAL at 04:59

## 2024-04-23 RX ADMIN — Medication 150 MCG/HR: at 18:17

## 2024-04-23 RX ADMIN — POLYVINYL ALCOHOL, POVIDONE 1 DROP: 14; 6 SOLUTION/ DROPS OPHTHALMIC at 05:00

## 2024-04-23 RX ADMIN — METHOCARBAMOL 500 MG: 500 TABLET ORAL at 11:42

## 2024-04-23 RX ADMIN — PROPOFOL 35 MCG/KG/MIN: 10 INJECTION, EMULSION INTRAVENOUS at 15:10

## 2024-04-23 RX ADMIN — DIVALPROEX SODIUM 125 MG: 125 CAPSULE ORAL at 21:42

## 2024-04-23 RX ADMIN — POLYVINYL ALCOHOL, POVIDONE 1 DROP: 14; 6 SOLUTION/ DROPS OPHTHALMIC at 15:35

## 2024-04-23 RX ADMIN — OXYCODONE HYDROCHLORIDE 10 MG: 5 SOLUTION ORAL at 16:13

## 2024-04-23 RX ADMIN — Medication: at 20:04

## 2024-04-23 RX ADMIN — Medication 50 MCG: at 08:54

## 2024-04-23 RX ADMIN — OXYCODONE HYDROCHLORIDE 10 MG: 5 SOLUTION ORAL at 11:42

## 2024-04-23 RX ADMIN — DIVALPROEX SODIUM 125 MG: 125 CAPSULE ORAL at 13:35

## 2024-04-23 RX ADMIN — PROPOFOL 45 MCG/KG/MIN: 10 INJECTION, EMULSION INTRAVENOUS at 02:27

## 2024-04-23 RX ADMIN — POLYVINYL ALCOHOL, POVIDONE 1 DROP: 14; 6 SOLUTION/ DROPS OPHTHALMIC at 07:47

## 2024-04-23 RX ADMIN — ACETAMINOPHEN 650 MG: 650 SOLUTION ORAL at 00:08

## 2024-04-23 RX ADMIN — PROPOFOL 35 MCG/KG/MIN: 10 INJECTION, EMULSION INTRAVENOUS at 06:54

## 2024-04-23 RX ADMIN — PIPERACILLIN AND TAZOBACTAM 3375 MG: 3; .375 INJECTION, POWDER, LYOPHILIZED, FOR SOLUTION INTRAVENOUS at 09:23

## 2024-04-23 RX ADMIN — IPRATROPIUM BROMIDE AND ALBUTEROL SULFATE 1 DOSE: .5; 2.5 SOLUTION RESPIRATORY (INHALATION) at 11:36

## 2024-04-23 RX ADMIN — Medication 50 MCG: at 06:10

## 2024-04-23 RX ADMIN — PROPOFOL 35 MCG/KG/MIN: 10 INJECTION, EMULSION INTRAVENOUS at 10:45

## 2024-04-23 RX ADMIN — KETOTIFEN FUMARATE 1 DROP: 0.35 SOLUTION/ DROPS OPHTHALMIC at 20:03

## 2024-04-23 RX ADMIN — GABAPENTIN 300 MG: 300 CAPSULE ORAL at 20:09

## 2024-04-23 RX ADMIN — PROPOFOL 40 MCG/KG/MIN: 10 INJECTION, EMULSION INTRAVENOUS at 23:19

## 2024-04-23 RX ADMIN — POLYVINYL ALCOHOL, POVIDONE 1 DROP: 14; 6 SOLUTION/ DROPS OPHTHALMIC at 10:54

## 2024-04-23 RX ADMIN — OXYCODONE HYDROCHLORIDE 10 MG: 5 SOLUTION ORAL at 00:08

## 2024-04-23 RX ADMIN — ENOXAPARIN SODIUM 40 MG: 100 INJECTION SUBCUTANEOUS at 08:53

## 2024-04-23 RX ADMIN — WATER 50 MG: 1 INJECTION INTRAMUSCULAR; INTRAVENOUS; SUBCUTANEOUS at 20:03

## 2024-04-23 RX ADMIN — Medication 150 MCG/HR: at 05:27

## 2024-04-23 RX ADMIN — DIVALPROEX SODIUM 125 MG: 125 CAPSULE ORAL at 05:33

## 2024-04-23 RX ADMIN — ACETAMINOPHEN 650 MG: 650 SOLUTION ORAL at 11:42

## 2024-04-23 RX ADMIN — BISACODYL 10 MG: 10 SUPPOSITORY RECTAL at 08:11

## 2024-04-23 RX ADMIN — SODIUM CHLORIDE, PRESERVATIVE FREE 10 ML: 5 INJECTION INTRAVENOUS at 08:12

## 2024-04-23 RX ADMIN — ATORVASTATIN CALCIUM 10 MG: 10 TABLET, FILM COATED ORAL at 20:03

## 2024-04-23 RX ADMIN — SODIUM CHLORIDE, PRESERVATIVE FREE 10 ML: 5 INJECTION INTRAVENOUS at 20:09

## 2024-04-23 RX ADMIN — 0.12% CHLORHEXIDINE GLUCONATE 15 ML: 1.2 RINSE ORAL at 20:03

## 2024-04-23 RX ADMIN — CALCIUM GLUCONATE: 98 INJECTION, SOLUTION INTRAVENOUS at 17:46

## 2024-04-23 RX ADMIN — WATER 50 MG: 1 INJECTION INTRAMUSCULAR; INTRAVENOUS; SUBCUTANEOUS at 02:24

## 2024-04-23 RX ADMIN — SODIUM CHLORIDE, PRESERVATIVE FREE 40 MG: 5 INJECTION INTRAVENOUS at 08:12

## 2024-04-23 RX ADMIN — ACETAMINOPHEN 650 MG: 650 SOLUTION ORAL at 17:30

## 2024-04-23 RX ADMIN — POLYVINYL ALCOHOL, POVIDONE 1 DROP: 14; 6 SOLUTION/ DROPS OPHTHALMIC at 00:08

## 2024-04-23 RX ADMIN — METOCLOPRAMIDE 10 MG: 5 INJECTION, SOLUTION INTRAMUSCULAR; INTRAVENOUS at 13:35

## 2024-04-23 RX ADMIN — PROPOFOL 40 MCG/KG/MIN: 10 INJECTION, EMULSION INTRAVENOUS at 18:58

## 2024-04-23 RX ADMIN — METOCLOPRAMIDE 10 MG: 5 INJECTION, SOLUTION INTRAMUSCULAR; INTRAVENOUS at 21:42

## 2024-04-23 RX ADMIN — DEXTROSE MONOHYDRATE 125 ML: 100 INJECTION, SOLUTION INTRAVENOUS at 20:00

## 2024-04-23 RX ADMIN — ACETAMINOPHEN 650 MG: 650 SOLUTION ORAL at 04:59

## 2024-04-23 RX ADMIN — IOPAMIDOL 18 ML: 755 INJECTION, SOLUTION INTRAVENOUS at 08:28

## 2024-04-23 RX ADMIN — INSULIN GLARGINE 15 UNITS: 100 INJECTION, SOLUTION SUBCUTANEOUS at 08:18

## 2024-04-23 RX ADMIN — Medication 150 MCG/HR: at 11:30

## 2024-04-23 RX ADMIN — IPRATROPIUM BROMIDE AND ALBUTEROL SULFATE 1 DOSE: .5; 2.5 SOLUTION RESPIRATORY (INHALATION) at 06:33

## 2024-04-23 RX ADMIN — PIPERACILLIN AND TAZOBACTAM 3375 MG: 3; .375 INJECTION, POWDER, LYOPHILIZED, FOR SOLUTION INTRAVENOUS at 17:29

## 2024-04-23 RX ADMIN — METOCLOPRAMIDE 10 MG: 5 INJECTION, SOLUTION INTRAMUSCULAR; INTRAVENOUS at 05:00

## 2024-04-23 RX ADMIN — OXYCODONE HYDROCHLORIDE 10 MG: 5 SOLUTION ORAL at 20:03

## 2024-04-23 RX ADMIN — CALCIUM GLUCONATE 4000 MG: 98 INJECTION, SOLUTION INTRAVENOUS at 08:53

## 2024-04-23 RX ADMIN — FUROSEMIDE 40 MG: 10 INJECTION, SOLUTION INTRAMUSCULAR; INTRAVENOUS at 09:09

## 2024-04-23 RX ADMIN — IPRATROPIUM BROMIDE AND ALBUTEROL SULFATE 1 DOSE: .5; 2.5 SOLUTION RESPIRATORY (INHALATION) at 15:38

## 2024-04-23 RX ADMIN — PIPERACILLIN AND TAZOBACTAM 3375 MG: 3; .375 INJECTION, POWDER, LYOPHILIZED, FOR SOLUTION INTRAVENOUS at 02:23

## 2024-04-23 RX ADMIN — IPRATROPIUM BROMIDE AND ALBUTEROL SULFATE 1 DOSE: .5; 2.5 SOLUTION RESPIRATORY (INHALATION) at 20:18

## 2024-04-23 ASSESSMENT — PULMONARY FUNCTION TESTS
PIF_VALUE: 32
PIF_VALUE: 36
PIF_VALUE: 29
PIF_VALUE: 34
PIF_VALUE: 35
PIF_VALUE: 34
PIF_VALUE: 33
PIF_VALUE: 32
PIF_VALUE: 38
PIF_VALUE: 34
PIF_VALUE: 32
PIF_VALUE: 33
PIF_VALUE: 33
PIF_VALUE: 38
PIF_VALUE: 32
PIF_VALUE: 36
PIF_VALUE: 32
PIF_VALUE: 33
PIF_VALUE: 33
PIF_VALUE: 32
PIF_VALUE: 32
PIF_VALUE: 29
PIF_VALUE: 33
PIF_VALUE: 38
PIF_VALUE: 38
PIF_VALUE: 36
PIF_VALUE: 38
PIF_VALUE: 32
PIF_VALUE: 35
PIF_VALUE: 32
PIF_VALUE: 31
PIF_VALUE: 33
PIF_VALUE: 35
PIF_VALUE: 37
PIF_VALUE: 36
PIF_VALUE: 30
PIF_VALUE: 32
PIF_VALUE: 32
PIF_VALUE: 30
PIF_VALUE: 32
PIF_VALUE: 32

## 2024-04-23 ASSESSMENT — PAIN SCALES - GENERAL
PAINLEVEL_OUTOF10: 3
PAINLEVEL_OUTOF10: 2
PAINLEVEL_OUTOF10: 0
PAINLEVEL_OUTOF10: 3
PAINLEVEL_OUTOF10: 4
PAINLEVEL_OUTOF10: 2
PAINLEVEL_OUTOF10: 4
PAINLEVEL_OUTOF10: 0
PAINLEVEL_OUTOF10: 2
PAINLEVEL_OUTOF10: 0

## 2024-04-23 NOTE — PROGRESS NOTES
Surgical Intensive Care Unit   Daily Progress Note     Patient's name:  Leatha Willard  Age/Gender: 58 y.o. female  Date of Admission: 4/15/2024  5:37 AM  Length of Stay: 8    *Reason for ICU:   Complete pancreatectomy, splenectomy     HPI:   Leatha Willard is a 58 y.o. female who presents to the SICU for post-op monitoring following a whipple. The patient has been admitted to the hospital since 4/15/2024 who presents for evaluation of pancreatic cyst. She has a hx of arthritis and back pain s/p cervical fusion. She was being evaluated for her mid back pain with imaging and was found to have a large pancreatic cyst on CT. She then had an MRI/MRCP showing a large >10cm cyst in the pancreatic head extending to the transverse colon mesentery. She had an EUS with Dr. Hammond at [facility]. FNA and fluid analysis was consistent with a benign cyst. There were no suspicious features present. Patient underwent a Pylorus-preserving pancreaticoduodenectomy on 4/15 and was admitted to the ICU post-operatively.  On 4/18 she underwent a complete pancreatectomy and splenectomy secondary to a worsening pancreatic leak.     *Overnight Events:     No acute issues  Weaned off of levophed  Continues on propofol and fentanyl while intubated   400 ml out of OG overnight      Problem List:   Patient Active Problem List   Diagnosis    Recurrent major depressive disorder (HCC)    Essential hypertension    Hyperlipidemia    Prediabetes    Class 1 obesity due to excess calories without serious comorbidity with body mass index (BMI) of 32.0 to 32.9 in adult    HIREN (obstructive sleep apnea)    Pancreatic cyst    Colon polyps    Cyst of pancreas    Chest pain    Tobacco abuse-- quit 2 weeks ago    Abdominal pain    Serous cystadenoma    Pancreatic duct leak    Sepsis with acute renal failure and septic shock (HCC)    JUSTINE (acute kidney injury) (HCC)    Hypophosphatemia    Acute respiratory failure with hypoxia (HCC)    Post-pancreatectomy diabetes

## 2024-04-23 NOTE — PROGRESS NOTES
[facility]  SURGICAL INTENSIVE CARE UNIT (SICU)  ATTENDING PHYSICIAN CRITICAL CARE PROGRESS NOTE     I have personally examined the patient, personally reviewed the record, and personally discussed the case with the resident. I have personally reviewed all relevant labs and imaging data. I am actively managing this patient's medications.  Please refer to the resident's note. I agree with the assessment and plan with the following corrections/additions. The following summarizes my clinical findings and independent assessment.     CC: critical care management after pancreaticoduodenectomy    Hospital Course/Overnight Events:  4/15--pylorus preserving Whipple for large benign pancreatic cyst  4/16--continued ICU d/t nausea  4/17--bobby replaced, pancreatic leak, boluses given, albumin given, JUSTINE, toradol stopped, CVC changed  4/18--CVC replaced, NGT repalced, scop patch, robaxin, d/c IVF; total pancreatectomy and splenectomy, insulin gtt  4/19--remains intubated, TPN, lasix  4/20--albumin x 6 with lasix, robaxin restarted  4/21--low cortisol--steroids started; lasix, 1U PRBC, vent adjustment for inc plateau pressure  4/22--some air trapping on vent overnight  4/23--copious dark mucoid drainage from incision    Medications   Scheduled Meds:    calcium gluconate 4,000 mg in sodium chloride 0.9 % 100 mL IVPB  4,000 mg IntraVENous Once    insulin lispro  0-18 Units SubCUTAneous Q4H    hydrocortisone sodium succinate PF (SOLU-CORTEF) 50 mg in sterile water 2 mL injection  50 mg IntraVENous Q6H    divalproex  125 mg Oral 3 times per day    gabapentin  300 mg Oral Nightly    methocarbamol  500 mg Oral 4x Daily    bisacodyl  10 mg Rectal Daily    micafungin  100 mg IntraVENous Daily    insulin glargine  20 Units SubCUTAneous Nightly    insulin glargine  15 Units SubCUTAneous QAM    oxyCODONE  10 mg Oral Q4H    ipratropium 0.5 mg-albuterol 2.5 mg  1 Dose Inhalation Q4H WA RT    sodium chloride flush  5-40 mL       Tenderness: There is no abdominal tenderness.      Comments: Left GIULIANO with dark serous drainage  Right GIULIANO with minimal bile tinged serous drainage  Midline incision with copious dark mucoid drainage   Skin:     General: Skin is warm and dry.      Coloration: Skin is not jaundiced.         Labs/Imaging/Diagnostics   Labs:  personally reviewed  CBC:  Recent Labs     04/21/24 0426 04/21/24  0849 04/22/24  0421 04/23/24  0344   WBC 31.9*  --  39.5* 31.4*   RBC 2.08*  --  2.46* 2.47*   HGB 6.3* 7.6* 7.4* 7.3*   HCT 19.7* 23.4* 23.1* 22.9*   MCV 94.7  --  93.9 92.7   RDW 14.8  --  15.3* 14.9     --  318 421       CHEMISTRIES:  Recent Labs     04/21/24  0426 04/21/24  1722 04/22/24  0421 04/22/24  1735 04/23/24  0344     --  144 142 145   K 3.9  --  4.5 4.7 4.3     --  101 100 100   CO2 28  --  30* 33* 30*   BUN 19  --  19 23* 28*   CREATININE 0.8  --  0.7 0.8 0.9   GLUCOSE 185*  --  213* 158* 94   PHOS 4.4   < > 4.8* 4.8* 4.8*   MG 2.0  --  2.2  --  2.3    < > = values in this interval not displayed.       PT/INR:  Recent Labs     04/21/24 0426 04/22/24  0421 04/23/24  0344   PROTIME 13.5* 11.8 11.3   INR 1.2 1.1 1.0       APTT:No results for input(s): \"APTT\" in the last 72 hours.  LIVER PROFILE:  Recent Labs     04/21/24 0426 04/22/24  0421 04/23/24  0344   AST 23 23 31   ALT 23 23 25   BILITOT 0.9 0.7 0.6   ALKPHOS 103 144* 182*         Imaging Last 24 Hours:  personally reviewed/interpreted--bilateral infiltrates      Patient Active Problem List    Diagnosis Date Noted    Class 1 obesity due to excess calories without serious comorbidity with body mass index (BMI) of 32.0 to 32.9 in adult 07/13/2022    Post-pancreatectomy diabetes (HCC) 04/22/2024    On total parenteral nutrition 04/22/2024    JUSTINE (acute kidney injury) ([facility]) 04/19/2024    Hypophosphatemia 04/19/2024    Acute respiratory failure with hypoxia ([facility]) 04/19/2024    Pancreatic duct leak 04/18/2024    Sepsis with acute renal failure

## 2024-04-23 NOTE — PLAN OF CARE
Problem: Discharge Planning  Goal: Discharge to home or other facility with appropriate resources  Outcome: Not Progressing     Problem: Musculoskeletal - Adult  Goal: Return mobility to safest level of function  Outcome: Not Progressing  Goal: Return ADL status to a safe level of function  Outcome: Not Progressing     Problem: Gastrointestinal - Adult  Goal: Maintains or returns to baseline bowel function  Outcome: Not Progressing  Goal: Maintains adequate nutritional intake  Outcome: Not Progressing     Problem: Infection - Adult  Goal: Absence of infection at discharge  Outcome: Not Progressing  Goal: Absence of infection during hospitalization  Outcome: Not Progressing     Problem: Neurosensory - Adult  Goal: Achieves stable or improved neurological status  Outcome: Not Progressing  Goal: Achieves maximal functionality and self care  Outcome: Not Progressing     Problem: Skin/Tissue Integrity - Adult  Goal: Skin integrity remains intact  Outcome: Not Progressing  Goal: Incisions, wounds, or drain sites healing without S/S of infection  Outcome: Not Progressing     Problem: Nutrition Deficit:  Goal: Optimize nutritional status  Outcome: Not Progressing     Problem: Discharge Planning  Goal: Discharge to home or other facility with appropriate resources  Outcome: Not Progressing     Problem: Musculoskeletal - Adult  Goal: Return mobility to safest level of function  Outcome: Not Progressing  Goal: Return ADL status to a safe level of function  Outcome: Not Progressing     Problem: Gastrointestinal - Adult  Goal: Maintains or returns to baseline bowel function  Outcome: Not Progressing  Goal: Maintains adequate nutritional intake  Outcome: Not Progressing     Problem: Infection - Adult  Goal: Absence of infection at discharge  Outcome: Not Progressing  Goal: Absence of infection during hospitalization  Outcome: Not Progressing     Problem: Neurosensory - Adult  Goal: Achieves stable or improved neurological  status  Outcome: Not Progressing  Goal: Achieves maximal functionality and self care  Outcome: Not Progressing     Problem: Skin/Tissue Integrity - Adult  Goal: Skin integrity remains intact  Outcome: Not Progressing  Goal: Incisions, wounds, or drain sites healing without S/S of infection  Outcome: Not Progressing     Problem: Nutrition Deficit:  Goal: Optimize nutritional status  Outcome: Not Progressing

## 2024-04-23 NOTE — FLOWSHEET NOTE
Intubated/sedated/unable to follow verbal direction. Easily agitated with stimulation.Impulsive, reaches for tubes and lines critical to patient care when restraints removed for repositioning.

## 2024-04-23 NOTE — PROGRESS NOTES
ENDOCRINOLOGY PROGRESS NOTE      Date of admission: 4/15/2024  Date of service: 4/22/2024  Admitting physician: Norma Rogers MD   Primary Care Physician: Mekhi Escalona MD  Consultant physician: Jonathan Curiel MD     Reason for the consultation:  Post-pancreatectomy DM     History of Present Illness:  Leatha Willard is a 58 y.o. old female with PMH of cystic neoplasm of the head of pancreas, HTN, HLD and other listed below admitted to [facility] on 4/15/2024 for completion pancreatectomy. Endocrine service was consulted for diabetes management.    Subjective   I saw and examined the patient at bedside this afternoon.  No acute events overnight.      Point of care glucose monitoring   (Independently reviewed)   Recent Labs     04/21/24  1317 04/21/24  1716 04/21/24  2021 04/21/24  2304 04/22/24  0426 04/22/24  0828 04/22/24  1211 04/22/24  1629   POCGLU 190* 252* 219* 237* 236* 173* 110* 141*       Scheduled Meds:   hydrocortisone sodium succinate PF (SOLU-CORTEF) 50 mg in sterile water 2 mL injection  50 mg IntraVENous Q6H    divalproex  125 mg Oral 3 times per day    gabapentin  300 mg Oral Nightly    methocarbamol  500 mg Oral 4x Daily    insulin lispro  0-30 Units SubCUTAneous Q4H    bisacodyl  10 mg Rectal Daily    micafungin  100 mg IntraVENous Daily    insulin glargine  20 Units SubCUTAneous Nightly    insulin glargine  15 Units SubCUTAneous QAM    oxyCODONE  10 mg Oral Q4H    ipratropium 0.5 mg-albuterol 2.5 mg  1 Dose Inhalation Q4H WA RT    sodium chloride flush  5-40 mL IntraVENous BID    metoclopramide  10 mg IntraVENous q8h    acetaminophen  650 mg Oral 4 times per day    chlorhexidine  15 mL Mouth/Throat BID    Polyvinyl Alcohol-Povidone PF  1 drop Both Eyes Q4H    Or    artificial tears   Both Eyes Q4H    lidocaine  1 patch TransDERmal Daily    enoxaparin  40 mg SubCUTAneous Daily    atorvastatin  10 mg Oral Nightly    buPROPion  300 mg Oral QAM    ketotifen fumarate  1 drop Both Eyes BID

## 2024-04-23 NOTE — PROGRESS NOTES
[facility] PROGRESS NOTE      Chief complaint: Follow-up of septic shock/Candida albicans and glabrata infection/venous peritonitis associated with anastomosis leak    The patient is a 58 y.o. female with history of DM, hypertension, hyperlipidemia, cervical fusion, admitted on 04/15 for symptomatic pancreatic serous cystadenoma abutting many branches of the SMV and SMA prompting pylorus-preserving pancreaticoduodenectomy, placement of biliary stent, portal lymphadenectomy, round ligament and omental pedicle flap harvest, appendectomy on 04/15 during which no clinical findings of infection were noted. Postop course was complicated by intermittent fever since 04/18 up to 101.5 F and pancreatic fistula, prompting reopening recent laparotomy, completion pancreatectomy, splenectomy, omentectomy on 04/18 during which gush of a large volume of dark succus and infected fluid on opening, old infected hematoma, significant amount of necrotic omentum covering the anastomoses, dehiscence of anterior wall anastomosis with bile exiting the site of anastomosis at the jejunum, bile spilling within the lesser sac, necrotic pancreas were noted.  Tissue Gram stain and culture showed few polymorphonuclear leukocytes, no epithelial cells, no organisms, rare growth of Candida albicans, light growth of Candida glabrata, no anaerobes. Piperacillin-tazobactam was started on since admission.  Repeat CT abdomen and pelvis on 04/23 showed interval decrease in size in periportal right upper quadrant fluid collection without resolution (now measuring 3.6 x 1.7 cm as compared to previously 6.7 x 2.4 cm), new 6 x 4 x 3 cm right retroperitoneal fluid collection between superior mesenteric artery and inferior vena cava, new spindle-shaped fluid collection in subdiaphragmatic anterior left upper quadrant of the abdomen measuring 7 x 3 x 3 cm, increasing small amount of ascites and bilateral pleural effusions.    Subjective: Patient was seen and

## 2024-04-23 NOTE — PLAN OF CARE
Problem: Respiratory - Adult  Goal: Achieves optimal ventilation and oxygenation  4/23/2024 0129 by Xochitl Plummer RCP  Outcome: Progressing

## 2024-04-23 NOTE — PROGRESS NOTES
P Surgery   Daily Progress Note      Patient's Name/Date of Birth: Leatha Willard / 1965    Date: April 23, 2024     Chief Complaint: s/p open whipple, Grade C POPF s/p takeback to OR for completion pancreatectomy    Patient Active Problem List   Diagnosis    Recurrent major depressive disorder (HCC)    Essential hypertension    Hyperlipidemia    Prediabetes    Class 1 obesity due to excess calories without serious comorbidity with body mass index (BMI) of 32.0 to 32.9 in adult    HIREN (obstructive sleep apnea)    Pancreatic cyst    Colon polyps    Cyst of pancreas    Chest pain    Tobacco abuse-- quit 2 weeks ago    Abdominal pain    Serous cystadenoma    Pancreatic duct leak    Sepsis with acute renal failure and septic shock (HCC)    JUSTINE (acute kidney injury) (HCC)    Hypophosphatemia    Acute respiratory failure with hypoxia (HCC)    Post-pancreatectomy diabetes (HCC)    On total parenteral nutrition       Subjective: Remains intubated and sedated. Currently restrained bilateral wrists. Fent and propofol. Off of vasopressors this AM    Afebrile overnight.     Received lasix 20mg IV x 2 yesterday. I/O about net even     GIULIANO drains with minimal output. Midline MAYCOL saturated this AM and removed with appox 200cc thick, brown fluid expressed through her midline.       Objective:  BP (!) 109/52   Pulse 95   Temp 98.4 °F (36.9 °C) (Bladder)   Resp 16   Ht 1.626 m (5' 4\")   Wt 94.5 kg (208 lb 6.4 oz)   SpO2 100%   BMI 35.77 kg/m²   Labs:  Recent Labs     04/21/24  0426 04/21/24  0849 04/22/24  0421 04/23/24  0344   WBC 31.9*  --  39.5* 31.4*   HGB 6.3* 7.6* 7.4* 7.3*   HCT 19.7* 23.4* 23.1* 22.9*     Recent Labs     04/22/24  0421 04/22/24  1735 04/23/24  0344    142 145   K 4.5 4.7 4.3    100 100   CO2 30* 33* 30*   BUN 19 23* 28*   CREATININE 0.7 0.8 0.9     Recent Labs     04/21/24  0426 04/22/24  0421 04/23/24  0344   ALKPHOS 103 144* 182*   ALT 23 23 25   AST 23 23 31   BILITOT 0.9 0.7 0.6

## 2024-04-23 NOTE — PROGRESS NOTES
Physical Therapy  Physical Therapy Attempt    Name: Leatha Willard  : 1965  MRN: [id number]      Date of Service: 2024  Chart reviewed.  Pt is not medically appropriate for skilled PT at this time.  Will re-attempt as able.    Suraj Clark, PT, DPT  [id number]

## 2024-04-24 ENCOUNTER — APPOINTMENT (OUTPATIENT)
Dept: GENERAL RADIOLOGY | Age: 59
DRG: 004 | End: 2024-04-24
Attending: STUDENT IN AN ORGANIZED HEALTH CARE EDUCATION/TRAINING PROGRAM
Payer: COMMERCIAL

## 2024-04-24 LAB
AADO2: 117.6 MMHG
AADO2: 125.1 MMHG
AADO2: 127.6 MMHG
AADO2: 180.6 MMHG
AADO2: 206.7 MMHG
ALBUMIN SERPL-MCNC: 2.9 G/DL (ref 3.5–5.2)
ALP SERPL-CCNC: 177 U/L (ref 35–104)
ALT SERPL-CCNC: 23 U/L (ref 0–32)
ANION GAP SERPL CALCULATED.3IONS-SCNC: 17 MMOL/L (ref 7–16)
ANION GAP SERPL CALCULATED.3IONS-SCNC: 8 MMOL/L (ref 7–16)
AST SERPL-CCNC: 31 U/L (ref 0–31)
B.E.: 13.4 MMOL/L (ref -3–3)
B.E.: 14.4 MMOL/L (ref -3–3)
B.E.: 16.2 MMOL/L (ref -3–3)
B.E.: 9.8 MMOL/L (ref -3–3)
B.E.: 9.9 MMOL/L (ref -3–3)
BASOPHILS # BLD: 0 K/UL (ref 0–0.2)
BASOPHILS NFR BLD: 0 % (ref 0–2)
BILIRUB SERPL-MCNC: 0.4 MG/DL (ref 0–1.2)
BUN SERPL-MCNC: 21 MG/DL (ref 6–20)
BUN SERPL-MCNC: 25 MG/DL (ref 6–20)
CA-I BLD-SCNC: 1.08 MMOL/L (ref 1.15–1.33)
CALCIUM SERPL-MCNC: 7.9 MG/DL (ref 8.6–10.2)
CALCIUM SERPL-MCNC: 8.3 MG/DL (ref 8.6–10.2)
CHLORIDE SERPL-SCNC: 98 MMOL/L (ref 98–107)
CHLORIDE SERPL-SCNC: 98 MMOL/L (ref 98–107)
CO2 SERPL-SCNC: 33 MMOL/L (ref 22–29)
CO2 SERPL-SCNC: 36 MMOL/L (ref 22–29)
COHB: 0.2 % (ref 0–1.5)
COHB: 0.3 % (ref 0–1.5)
CREAT SERPL-MCNC: 0.6 MG/DL (ref 0.5–1)
CREAT SERPL-MCNC: 0.7 MG/DL (ref 0.5–1)
CRITICAL: ABNORMAL
DATE ANALYZED: ABNORMAL
DATE OF COLLECTION: ABNORMAL
EOSINOPHIL # BLD: 0.63 K/UL (ref 0.05–0.5)
EOSINOPHILS RELATIVE PERCENT: 3 % (ref 0–6)
ERYTHROCYTE [DISTWIDTH] IN BLOOD BY AUTOMATED COUNT: 14.4 % (ref 11.5–15)
FIO2: 40 %
FIO2: 50 %
FIO2: 50 %
GFR SERPL CREATININE-BSD FRML MDRD: >90 ML/MIN/1.73M2
GFR SERPL CREATININE-BSD FRML MDRD: >90 ML/MIN/1.73M2
GLUCOSE BLD-MCNC: 103 MG/DL (ref 74–99)
GLUCOSE BLD-MCNC: 107 MG/DL (ref 74–99)
GLUCOSE BLD-MCNC: 113 MG/DL (ref 74–99)
GLUCOSE BLD-MCNC: 120 MG/DL (ref 74–99)
GLUCOSE BLD-MCNC: 129 MG/DL (ref 74–99)
GLUCOSE BLD-MCNC: 154 MG/DL (ref 74–99)
GLUCOSE BLD-MCNC: 62 MG/DL (ref 74–99)
GLUCOSE BLD-MCNC: 69 MG/DL (ref 74–99)
GLUCOSE BLD-MCNC: 75 MG/DL (ref 74–99)
GLUCOSE BLD-MCNC: 92 MG/DL (ref 74–99)
GLUCOSE SERPL-MCNC: 141 MG/DL (ref 74–99)
GLUCOSE SERPL-MCNC: 61 MG/DL (ref 74–99)
HCO3: 34.3 MMOL/L (ref 22–26)
HCO3: 34.4 MMOL/L (ref 22–26)
HCO3: 38.2 MMOL/L (ref 22–26)
HCO3: 38.4 MMOL/L (ref 22–26)
HCO3: 41.1 MMOL/L (ref 22–26)
HCT VFR BLD AUTO: 22.1 % (ref 34–48)
HGB BLD-MCNC: 7 G/DL (ref 11.5–15.5)
HHB: 2.7 % (ref 0–5)
HHB: 3.1 % (ref 0–5)
HHB: 3.6 % (ref 0–5)
HHB: 4.4 % (ref 0–5)
HHB: 5.5 % (ref 0–5)
INR PPP: 1
LAB: ABNORMAL
LYMPHOCYTES NFR BLD: 0.84 K/UL (ref 1.5–4)
LYMPHOCYTES RELATIVE PERCENT: 4 % (ref 20–42)
Lab: 100
Lab: 1150
Lab: 1815
Lab: 2340
Lab: 509
MAGNESIUM SERPL-MCNC: 2.4 MG/DL (ref 1.6–2.6)
MCH RBC QN AUTO: 29.2 PG (ref 26–35)
MCHC RBC AUTO-ENTMCNC: 31.7 G/DL (ref 32–34.5)
MCV RBC AUTO: 92.1 FL (ref 80–99.9)
METHB: 0.2 % (ref 0–1.5)
METHB: 0.2 % (ref 0–1.5)
METHB: 0.4 % (ref 0–1.5)
MODE: AC
MONOCYTES NFR BLD: 0.21 K/UL (ref 0.1–0.95)
MONOCYTES NFR BLD: 1 % (ref 2–12)
MYELOCYTES ABSOLUTE COUNT: 0.42 K/UL
MYELOCYTES: 2 %
NEUTROPHILS NFR BLD: 91 % (ref 43–80)
NEUTS SEG NFR BLD: 21.69 K/UL (ref 1.8–7.3)
NUCLEATED RED BLOOD CELLS: 22 PER 100 WBC
O2 SATURATION: 94.5 % (ref 92–98.5)
O2 SATURATION: 95.6 % (ref 92–98.5)
O2 SATURATION: 96.4 % (ref 92–98.5)
O2 SATURATION: 96.9 % (ref 92–98.5)
O2 SATURATION: 97.3 % (ref 92–98.5)
O2HB: 94.1 % (ref 94–97)
O2HB: 95.1 % (ref 94–97)
O2HB: 95.7 % (ref 94–97)
O2HB: 96.2 % (ref 94–97)
O2HB: 96.6 % (ref 94–97)
OPERATOR ID: 1210
OPERATOR ID: 2577
OPERATOR ID: 2577
OPERATOR ID: 914
OPERATOR ID: ABNORMAL
PATIENT TEMP: 37 C
PCO2: 46.4 MMHG (ref 35–45)
PCO2: 46.5 MMHG (ref 35–45)
PCO2: 47.6 MMHG (ref 35–45)
PCO2: 50.3 MMHG (ref 35–45)
PCO2: 53.1 MMHG (ref 35–45)
PEEP/CPAP: 5 CMH2O
PEEP/CPAP: 8 CMH2O
PFO2: 1.61 MMHG/%
PFO2: 2.16 MMHG/%
PFO2: 2.22 MMHG/%
PFO2: 2.42 MMHG/%
PFO2: 2.57 MMHG/%
PH BLOOD GAS: 7.48 (ref 7.35–7.45)
PH BLOOD GAS: 7.49 (ref 7.35–7.45)
PH BLOOD GAS: 7.5 (ref 7.35–7.45)
PH BLOOD GAS: 7.51 (ref 7.35–7.45)
PH BLOOD GAS: 7.54 (ref 7.35–7.45)
PHOSPHATE SERPL-MCNC: 3.6 MG/DL (ref 2.5–4.5)
PHOSPHATE SERPL-MCNC: 4.6 MG/DL (ref 2.5–4.5)
PLATELET # BLD AUTO: 495 K/UL (ref 130–450)
PMV BLD AUTO: 10.7 FL (ref 7–12)
PO2: 102.9 MMHG (ref 75–100)
PO2: 111.1 MMHG (ref 75–100)
PO2: 80.7 MMHG (ref 75–100)
PO2: 86.5 MMHG (ref 75–100)
PO2: 96.7 MMHG (ref 75–100)
POTASSIUM SERPL-SCNC: 3.1 MMOL/L (ref 3.5–5)
POTASSIUM SERPL-SCNC: 3.36 MMOL/L (ref 3.5–5)
POTASSIUM SERPL-SCNC: 3.5 MMOL/L (ref 3.5–5)
PROT SERPL-MCNC: 5.1 G/DL (ref 6.4–8.3)
PROTHROMBIN TIME: 11.3 SEC (ref 9.3–12.4)
RBC # BLD AUTO: 2.4 M/UL (ref 3.5–5.5)
RBC # BLD: ABNORMAL 10*6/UL
RI(T): 1.14
RI(T): 1.29
RI(T): 1.48
RI(T): 1.63
RI(T): 2.56
RR MECHANICAL: 16 B/MIN
SODIUM SERPL-SCNC: 142 MMOL/L (ref 132–146)
SODIUM SERPL-SCNC: 148 MMOL/L (ref 132–146)
SOURCE, BLOOD GAS: ABNORMAL
THB: 8 G/DL (ref 11.5–16.5)
THB: 9 G/DL (ref 11.5–16.5)
THB: 9 G/DL (ref 11.5–16.5)
THB: 9.4 G/DL (ref 11.5–16.5)
THB: 9.6 G/DL (ref 11.5–16.5)
TIME ANALYZED: 108
TIME ANALYZED: 1159
TIME ANALYZED: 1817
TIME ANALYZED: 2347
TIME ANALYZED: 515
VT MECHANICAL: 350 ML
WBC OTHER # BLD: 23.8 K/UL (ref 4.5–11.5)

## 2024-04-24 PROCEDURE — 84132 ASSAY OF SERUM POTASSIUM: CPT

## 2024-04-24 PROCEDURE — 6360000002 HC RX W HCPCS

## 2024-04-24 PROCEDURE — 6370000000 HC RX 637 (ALT 250 FOR IP): Performed by: STUDENT IN AN ORGANIZED HEALTH CARE EDUCATION/TRAINING PROGRAM

## 2024-04-24 PROCEDURE — 94640 AIRWAY INHALATION TREATMENT: CPT

## 2024-04-24 PROCEDURE — 6370000000 HC RX 637 (ALT 250 FOR IP)

## 2024-04-24 PROCEDURE — 94003 VENT MGMT INPAT SUBQ DAY: CPT

## 2024-04-24 PROCEDURE — 2500000003 HC RX 250 WO HCPCS: Performed by: STUDENT IN AN ORGANIZED HEALTH CARE EDUCATION/TRAINING PROGRAM

## 2024-04-24 PROCEDURE — 6370000000 HC RX 637 (ALT 250 FOR IP): Performed by: INTERNAL MEDICINE

## 2024-04-24 PROCEDURE — 37799 UNLISTED PX VASCULAR SURGERY: CPT

## 2024-04-24 PROCEDURE — 99291 CRITICAL CARE FIRST HOUR: CPT | Performed by: SURGERY

## 2024-04-24 PROCEDURE — 2500000003 HC RX 250 WO HCPCS

## 2024-04-24 PROCEDURE — C9113 INJ PANTOPRAZOLE SODIUM, VIA: HCPCS | Performed by: STUDENT IN AN ORGANIZED HEALTH CARE EDUCATION/TRAINING PROGRAM

## 2024-04-24 PROCEDURE — 80053 COMPREHEN METABOLIC PANEL: CPT

## 2024-04-24 PROCEDURE — 84100 ASSAY OF PHOSPHORUS: CPT

## 2024-04-24 PROCEDURE — 6360000002 HC RX W HCPCS: Performed by: STUDENT IN AN ORGANIZED HEALTH CARE EDUCATION/TRAINING PROGRAM

## 2024-04-24 PROCEDURE — 85025 COMPLETE CBC W/AUTO DIFF WBC: CPT

## 2024-04-24 PROCEDURE — 2580000003 HC RX 258

## 2024-04-24 PROCEDURE — 2580000003 HC RX 258: Performed by: INTERNAL MEDICINE

## 2024-04-24 PROCEDURE — 6360000002 HC RX W HCPCS: Performed by: INTERNAL MEDICINE

## 2024-04-24 PROCEDURE — 82962 GLUCOSE BLOOD TEST: CPT

## 2024-04-24 PROCEDURE — 2000000000 HC ICU R&B

## 2024-04-24 PROCEDURE — 83735 ASSAY OF MAGNESIUM: CPT

## 2024-04-24 PROCEDURE — 85610 PROTHROMBIN TIME: CPT

## 2024-04-24 PROCEDURE — 2580000003 HC RX 258: Performed by: STUDENT IN AN ORGANIZED HEALTH CARE EDUCATION/TRAINING PROGRAM

## 2024-04-24 PROCEDURE — 82330 ASSAY OF CALCIUM: CPT

## 2024-04-24 PROCEDURE — C1751 CATH, INF, PER/CENT/MIDLINE: HCPCS

## 2024-04-24 PROCEDURE — 71045 X-RAY EXAM CHEST 1 VIEW: CPT

## 2024-04-24 PROCEDURE — 82805 BLOOD GASES W/O2 SATURATION: CPT

## 2024-04-24 PROCEDURE — 6370000000 HC RX 637 (ALT 250 FOR IP): Performed by: SURGERY

## 2024-04-24 PROCEDURE — 80048 BASIC METABOLIC PNL TOTAL CA: CPT

## 2024-04-24 PROCEDURE — 76937 US GUIDE VASCULAR ACCESS: CPT

## 2024-04-24 PROCEDURE — 99254 IP/OBS CNSLTJ NEW/EST MOD 60: CPT | Performed by: NURSE PRACTITIONER

## 2024-04-24 RX ORDER — INSULIN LISPRO 100 [IU]/ML
0-12 INJECTION, SOLUTION INTRAVENOUS; SUBCUTANEOUS EVERY 6 HOURS
Status: DISCONTINUED | OUTPATIENT
Start: 2024-04-24 | End: 2024-04-28

## 2024-04-24 RX ORDER — MIDAZOLAM HYDROCHLORIDE 2 MG/2ML
2 INJECTION, SOLUTION INTRAMUSCULAR; INTRAVENOUS
Status: DISCONTINUED | OUTPATIENT
Start: 2024-04-24 | End: 2024-04-30

## 2024-04-24 RX ORDER — SODIUM CHLORIDE 9 MG/ML
INJECTION, SOLUTION INTRAVENOUS PRN
Status: ACTIVE | OUTPATIENT
Start: 2024-04-24 | End: 2024-04-25

## 2024-04-24 RX ORDER — ACETAZOLAMIDE 250 MG/1
500 TABLET ORAL 2 TIMES DAILY
Status: DISCONTINUED | OUTPATIENT
Start: 2024-04-24 | End: 2024-04-25

## 2024-04-24 RX ORDER — LORAZEPAM 1 MG/1
1 TABLET ORAL EVERY 4 HOURS
Status: DISCONTINUED | OUTPATIENT
Start: 2024-04-24 | End: 2024-04-28

## 2024-04-24 RX ORDER — POLYETHYLENE GLYCOL 3350 17 G/17G
17 POWDER, FOR SOLUTION ORAL DAILY
Status: DISCONTINUED | OUTPATIENT
Start: 2024-04-24 | End: 2024-04-30

## 2024-04-24 RX ORDER — HYDRALAZINE HYDROCHLORIDE 20 MG/ML
10 INJECTION INTRAMUSCULAR; INTRAVENOUS EVERY 30 MIN PRN
Status: DISPENSED | OUTPATIENT
Start: 2024-04-24

## 2024-04-24 RX ORDER — SENNA AND DOCUSATE SODIUM 50; 8.6 MG/1; MG/1
2 TABLET, FILM COATED ORAL DAILY PRN
Status: DISCONTINUED | OUTPATIENT
Start: 2024-04-24 | End: 2024-04-24

## 2024-04-24 RX ORDER — SENNA AND DOCUSATE SODIUM 50; 8.6 MG/1; MG/1
2 TABLET, FILM COATED ORAL DAILY
Status: DISPENSED | OUTPATIENT
Start: 2024-04-25

## 2024-04-24 RX ORDER — INSULIN GLARGINE 100 [IU]/ML
10 INJECTION, SOLUTION SUBCUTANEOUS NIGHTLY
Status: DISCONTINUED | OUTPATIENT
Start: 2024-04-24 | End: 2024-04-25

## 2024-04-24 RX ORDER — LABETALOL HYDROCHLORIDE 5 MG/ML
10 INJECTION, SOLUTION INTRAVENOUS EVERY 30 MIN PRN
Status: DISPENSED | OUTPATIENT
Start: 2024-04-24

## 2024-04-24 RX ORDER — FUROSEMIDE 10 MG/ML
40 INJECTION INTRAMUSCULAR; INTRAVENOUS 2 TIMES DAILY
Status: COMPLETED | OUTPATIENT
Start: 2024-04-24 | End: 2024-04-24

## 2024-04-24 RX ORDER — POTASSIUM CHLORIDE 29.8 MG/ML
20 INJECTION INTRAVENOUS
Status: COMPLETED | OUTPATIENT
Start: 2024-04-24 | End: 2024-04-25

## 2024-04-24 RX ADMIN — ATORVASTATIN CALCIUM 10 MG: 10 TABLET, FILM COATED ORAL at 20:05

## 2024-04-24 RX ADMIN — MIDAZOLAM HYDROCHLORIDE 2 MG: 1 INJECTION, SOLUTION INTRAMUSCULAR; INTRAVENOUS at 18:46

## 2024-04-24 RX ADMIN — Medication 50 MCG: at 19:13

## 2024-04-24 RX ADMIN — GABAPENTIN 300 MG: 300 CAPSULE ORAL at 20:05

## 2024-04-24 RX ADMIN — PIPERACILLIN AND TAZOBACTAM 3375 MG: 3; .375 INJECTION, POWDER, LYOPHILIZED, FOR SOLUTION INTRAVENOUS at 01:36

## 2024-04-24 RX ADMIN — IPRATROPIUM BROMIDE AND ALBUTEROL SULFATE 1 DOSE: .5; 2.5 SOLUTION RESPIRATORY (INHALATION) at 12:04

## 2024-04-24 RX ADMIN — LABETALOL HYDROCHLORIDE 10 MG: 5 INJECTION, SOLUTION INTRAVENOUS at 16:08

## 2024-04-24 RX ADMIN — OXYCODONE HYDROCHLORIDE 10 MG: 5 SOLUTION ORAL at 16:08

## 2024-04-24 RX ADMIN — LORAZEPAM 1 MG: 1 TABLET ORAL at 12:07

## 2024-04-24 RX ADMIN — Medication 50 MCG/HR: at 20:02

## 2024-04-24 RX ADMIN — DEXTROSE MONOHYDRATE 125 ML: 100 INJECTION, SOLUTION INTRAVENOUS at 00:36

## 2024-04-24 RX ADMIN — ACETAMINOPHEN 650 MG: 650 SOLUTION ORAL at 00:30

## 2024-04-24 RX ADMIN — OXYCODONE HYDROCHLORIDE 10 MG: 5 SOLUTION ORAL at 23:32

## 2024-04-24 RX ADMIN — LORAZEPAM 1 MG: 1 TABLET ORAL at 16:08

## 2024-04-24 RX ADMIN — MIDAZOLAM HYDROCHLORIDE 2 MG: 1 INJECTION, SOLUTION INTRAMUSCULAR; INTRAVENOUS at 21:59

## 2024-04-24 RX ADMIN — METHOCARBAMOL 500 MG: 500 TABLET ORAL at 20:05

## 2024-04-24 RX ADMIN — MIDAZOLAM HYDROCHLORIDE 2 MG: 1 INJECTION, SOLUTION INTRAMUSCULAR; INTRAVENOUS at 15:19

## 2024-04-24 RX ADMIN — DIVALPROEX SODIUM 125 MG: 125 CAPSULE ORAL at 05:19

## 2024-04-24 RX ADMIN — Medication 50 MCG: at 21:35

## 2024-04-24 RX ADMIN — BISACODYL 10 MG: 10 SUPPOSITORY RECTAL at 08:10

## 2024-04-24 RX ADMIN — MIDAZOLAM HYDROCHLORIDE 2 MG: 1 INJECTION, SOLUTION INTRAMUSCULAR; INTRAVENOUS at 20:31

## 2024-04-24 RX ADMIN — WHITE PETROLATUM: .15; .85 OINTMENT OPHTHALMIC at 23:32

## 2024-04-24 RX ADMIN — 0.12% CHLORHEXIDINE GLUCONATE 15 ML: 1.2 RINSE ORAL at 20:07

## 2024-04-24 RX ADMIN — FUROSEMIDE 40 MG: 10 INJECTION, SOLUTION INTRAMUSCULAR; INTRAVENOUS at 18:14

## 2024-04-24 RX ADMIN — POLYVINYL ALCOHOL, POVIDONE 1 DROP: 14; 6 SOLUTION/ DROPS OPHTHALMIC at 12:07

## 2024-04-24 RX ADMIN — Medication 150 MCG/HR: at 01:31

## 2024-04-24 RX ADMIN — POLYVINYL ALCOHOL, POVIDONE 1 DROP: 14; 6 SOLUTION/ DROPS OPHTHALMIC at 16:08

## 2024-04-24 RX ADMIN — OXYCODONE HYDROCHLORIDE 10 MG: 5 SOLUTION ORAL at 04:14

## 2024-04-24 RX ADMIN — LORAZEPAM 1 MG: 1 TABLET ORAL at 08:10

## 2024-04-24 RX ADMIN — OXYCODONE HYDROCHLORIDE 10 MG: 5 SOLUTION ORAL at 20:04

## 2024-04-24 RX ADMIN — CALCIUM GLUCONATE: 98 INJECTION, SOLUTION INTRAVENOUS at 18:19

## 2024-04-24 RX ADMIN — ACETAMINOPHEN 650 MG: 650 SOLUTION ORAL at 05:19

## 2024-04-24 RX ADMIN — POTASSIUM BICARBONATE 40 MEQ: 782 TABLET, EFFERVESCENT ORAL at 08:10

## 2024-04-24 RX ADMIN — WATER 50 MG: 1 INJECTION INTRAMUSCULAR; INTRAVENOUS; SUBCUTANEOUS at 20:04

## 2024-04-24 RX ADMIN — METOCLOPRAMIDE 10 MG: 5 INJECTION, SOLUTION INTRAMUSCULAR; INTRAVENOUS at 05:20

## 2024-04-24 RX ADMIN — Medication 50 MCG: at 01:35

## 2024-04-24 RX ADMIN — IPRATROPIUM BROMIDE AND ALBUTEROL SULFATE 1 DOSE: .5; 2.5 SOLUTION RESPIRATORY (INHALATION) at 17:00

## 2024-04-24 RX ADMIN — WHITE PETROLATUM: .15; .85 OINTMENT OPHTHALMIC at 20:06

## 2024-04-24 RX ADMIN — POTASSIUM CHLORIDE 20 MEQ: 29.8 INJECTION, SOLUTION INTRAVENOUS at 21:45

## 2024-04-24 RX ADMIN — ACETAMINOPHEN 650 MG: 650 SOLUTION ORAL at 12:07

## 2024-04-24 RX ADMIN — METOCLOPRAMIDE 10 MG: 5 INJECTION, SOLUTION INTRAMUSCULAR; INTRAVENOUS at 15:08

## 2024-04-24 RX ADMIN — SODIUM CHLORIDE, PRESERVATIVE FREE 40 MG: 5 INJECTION INTRAVENOUS at 08:10

## 2024-04-24 RX ADMIN — Medication 50 MCG: at 16:08

## 2024-04-24 RX ADMIN — Medication 50 MCG: at 22:59

## 2024-04-24 RX ADMIN — POTASSIUM CHLORIDE 20 MEQ: 29.8 INJECTION, SOLUTION INTRAVENOUS at 22:59

## 2024-04-24 RX ADMIN — Medication: at 16:47

## 2024-04-24 RX ADMIN — METHOCARBAMOL 500 MG: 500 TABLET ORAL at 08:10

## 2024-04-24 RX ADMIN — POTASSIUM PHOSPHATE, MONOBASIC AND POTASSIUM PHOSPHATE, DIBASIC 30 MMOL: 224; 236 INJECTION, SOLUTION, CONCENTRATE INTRAVENOUS at 11:14

## 2024-04-24 RX ADMIN — POTASSIUM CHLORIDE 20 MEQ: 29.8 INJECTION, SOLUTION INTRAVENOUS at 21:04

## 2024-04-24 RX ADMIN — Medication: at 20:04

## 2024-04-24 RX ADMIN — OXYCODONE HYDROCHLORIDE 10 MG: 5 SOLUTION ORAL at 12:07

## 2024-04-24 RX ADMIN — METHOCARBAMOL 500 MG: 500 TABLET ORAL at 16:08

## 2024-04-24 RX ADMIN — SODIUM CHLORIDE: 9 INJECTION, SOLUTION INTRAVENOUS at 16:34

## 2024-04-24 RX ADMIN — METHOCARBAMOL 500 MG: 500 TABLET ORAL at 12:07

## 2024-04-24 RX ADMIN — LABETALOL HYDROCHLORIDE 10 MG: 5 INJECTION, SOLUTION INTRAVENOUS at 21:27

## 2024-04-24 RX ADMIN — KETOTIFEN FUMARATE 1 DROP: 0.35 SOLUTION/ DROPS OPHTHALMIC at 08:13

## 2024-04-24 RX ADMIN — SODIUM CHLORIDE, PRESERVATIVE FREE 10 ML: 5 INJECTION INTRAVENOUS at 20:04

## 2024-04-24 RX ADMIN — LORAZEPAM 1 MG: 1 TABLET ORAL at 20:05

## 2024-04-24 RX ADMIN — DIVALPROEX SODIUM 125 MG: 125 CAPSULE ORAL at 15:08

## 2024-04-24 RX ADMIN — ACETAMINOPHEN 650 MG: 650 SOLUTION ORAL at 23:32

## 2024-04-24 RX ADMIN — MIDAZOLAM HYDROCHLORIDE 2 MG: 1 INJECTION, SOLUTION INTRAMUSCULAR; INTRAVENOUS at 16:47

## 2024-04-24 RX ADMIN — PIPERACILLIN AND TAZOBACTAM 3375 MG: 3; .375 INJECTION, POWDER, LYOPHILIZED, FOR SOLUTION INTRAVENOUS at 10:55

## 2024-04-24 RX ADMIN — LABETALOL HYDROCHLORIDE 10 MG: 5 INJECTION, SOLUTION INTRAVENOUS at 19:53

## 2024-04-24 RX ADMIN — MICAFUNGIN SODIUM 100 MG: 100 INJECTION, POWDER, LYOPHILIZED, FOR SOLUTION INTRAVENOUS at 16:37

## 2024-04-24 RX ADMIN — FUROSEMIDE 40 MG: 10 INJECTION, SOLUTION INTRAMUSCULAR; INTRAVENOUS at 08:10

## 2024-04-24 RX ADMIN — KETOTIFEN FUMARATE 1 DROP: 0.35 SOLUTION/ DROPS OPHTHALMIC at 20:06

## 2024-04-24 RX ADMIN — POLYETHYLENE GLYCOL 3350 17 G: 17 POWDER, FOR SOLUTION ORAL at 08:38

## 2024-04-24 RX ADMIN — Medication 100 MCG/HR: at 08:06

## 2024-04-24 RX ADMIN — METOCLOPRAMIDE 10 MG: 5 INJECTION, SOLUTION INTRAMUSCULAR; INTRAVENOUS at 21:27

## 2024-04-24 RX ADMIN — SENNOSIDES AND DOCUSATE SODIUM 2 TABLET: 8.6; 5 TABLET ORAL at 08:37

## 2024-04-24 RX ADMIN — WATER 50 MG: 1 INJECTION INTRAMUSCULAR; INTRAVENOUS; SUBCUTANEOUS at 08:10

## 2024-04-24 RX ADMIN — OXYCODONE HYDROCHLORIDE 10 MG: 5 SOLUTION ORAL at 00:30

## 2024-04-24 RX ADMIN — POLYVINYL ALCOHOL, POVIDONE 1 DROP: 14; 6 SOLUTION/ DROPS OPHTHALMIC at 00:30

## 2024-04-24 RX ADMIN — CALCIUM GLUCONATE 2000 MG: 98 INJECTION, SOLUTION INTRAVENOUS at 08:08

## 2024-04-24 RX ADMIN — POLYVINYL ALCOHOL, POVIDONE 1 DROP: 14; 6 SOLUTION/ DROPS OPHTHALMIC at 08:10

## 2024-04-24 RX ADMIN — ENOXAPARIN SODIUM 40 MG: 100 INJECTION SUBCUTANEOUS at 08:10

## 2024-04-24 RX ADMIN — INSULIN GLARGINE 10 UNITS: 100 INJECTION, SOLUTION SUBCUTANEOUS at 20:03

## 2024-04-24 RX ADMIN — DEXTROSE MONOHYDRATE 125 ML: 100 INJECTION, SOLUTION INTRAVENOUS at 05:07

## 2024-04-24 RX ADMIN — LORAZEPAM 1 MG: 1 TABLET ORAL at 23:32

## 2024-04-24 RX ADMIN — IPRATROPIUM BROMIDE AND ALBUTEROL SULFATE 1 DOSE: .5; 2.5 SOLUTION RESPIRATORY (INHALATION) at 06:58

## 2024-04-24 RX ADMIN — ACETAZOLAMIDE 500 MG: 250 TABLET ORAL at 20:05

## 2024-04-24 RX ADMIN — 0.12% CHLORHEXIDINE GLUCONATE 15 ML: 1.2 RINSE ORAL at 08:13

## 2024-04-24 RX ADMIN — OXYCODONE HYDROCHLORIDE 10 MG: 5 SOLUTION ORAL at 08:12

## 2024-04-24 RX ADMIN — PROPOFOL 40 MCG/KG/MIN: 10 INJECTION, EMULSION INTRAVENOUS at 08:07

## 2024-04-24 RX ADMIN — PIPERACILLIN AND TAZOBACTAM 3375 MG: 3; .375 INJECTION, POWDER, LYOPHILIZED, FOR SOLUTION INTRAVENOUS at 18:35

## 2024-04-24 RX ADMIN — SODIUM CHLORIDE, PRESERVATIVE FREE 10 ML: 5 INJECTION INTRAVENOUS at 08:11

## 2024-04-24 RX ADMIN — POLYVINYL ALCOHOL, POVIDONE 1 DROP: 14; 6 SOLUTION/ DROPS OPHTHALMIC at 04:14

## 2024-04-24 RX ADMIN — ACETAZOLAMIDE 500 MG: 250 TABLET ORAL at 08:49

## 2024-04-24 RX ADMIN — ACETAMINOPHEN 650 MG: 650 SOLUTION ORAL at 18:14

## 2024-04-24 RX ADMIN — IPRATROPIUM BROMIDE AND ALBUTEROL SULFATE 1 DOSE: .5; 2.5 SOLUTION RESPIRATORY (INHALATION) at 21:06

## 2024-04-24 RX ADMIN — PROPOFOL 40 MCG/KG/MIN: 10 INJECTION, EMULSION INTRAVENOUS at 04:44

## 2024-04-24 RX ADMIN — INSULIN LISPRO 2 UNITS: 100 INJECTION, SOLUTION INTRAVENOUS; SUBCUTANEOUS at 18:15

## 2024-04-24 ASSESSMENT — PAIN SCALES - GENERAL
PAINLEVEL_OUTOF10: 0
PAINLEVEL_OUTOF10: 6
PAINLEVEL_OUTOF10: 0
PAINLEVEL_OUTOF10: 5
PAINLEVEL_OUTOF10: 0
PAINLEVEL_OUTOF10: 7
PAINLEVEL_OUTOF10: 4
PAINLEVEL_OUTOF10: 0
PAINLEVEL_OUTOF10: 0
PAINLEVEL_OUTOF10: 5
PAINLEVEL_OUTOF10: 0

## 2024-04-24 ASSESSMENT — PULMONARY FUNCTION TESTS
PIF_VALUE: 22
PIF_VALUE: 26
PIF_VALUE: 26
PIF_VALUE: 24
PIF_VALUE: 29
PIF_VALUE: 30
PIF_VALUE: 32
PIF_VALUE: 30
PIF_VALUE: 21
PIF_VALUE: 30
PIF_VALUE: 30
PIF_VALUE: 28
PIF_VALUE: 22
PIF_VALUE: 25
PIF_VALUE: 27
PIF_VALUE: 21
PIF_VALUE: 28
PIF_VALUE: 31
PIF_VALUE: 31
PIF_VALUE: 29
PIF_VALUE: 29
PIF_VALUE: 26
PIF_VALUE: 20

## 2024-04-24 NOTE — PROGRESS NOTES
ENDOCRINOLOGY PROGRESS NOTE      Date of admission: 4/15/2024  Date of service: 4/23/2024  Admitting physician: Norma Rogers MD   Primary Care Physician: Mekhi Escalona MD  Consultant physician: Jonathan Curiel MD     Reason for the consultation:  Post-pancreatectomy DM     History of Present Illness:  Leatha Willard is a 58 y.o. old female with PMH of cystic neoplasm of the head of pancreas, HTN, HLD and other listed below admitted to [facility] on 4/15/2024 for completion pancreatectomy. Endocrine service was consulted for diabetes management.    Subjective   Seen at bedside this afternoon.  Intubated sedated, No acute events overnight.      Point of care glucose monitoring   (Independently reviewed)   Recent Labs     04/22/24  1211 04/22/24  1629 04/22/24 2008 04/23/24  0007 04/23/24  0351 04/23/24  0736 04/23/24  1136 04/23/24  1536   POCGLU 110* 141* 146* 134* 103* 115* 87 83       Scheduled Meds:   hydrocortisone sodium succinate PF (SOLU-CORTEF) 50 mg in sterile water 2 mL injection  50 mg IntraVENous Q12H    Followed by    [START ON 4/25/2024] hydrocortisone sodium succinate PF (SOLU-CORTEF) 25 mg in sterile water 2 mL injection  25 mg IntraVENous Q12H    Followed by    [START ON 4/27/2024] hydrocortisone sodium succinate PF (SOLU-CORTEF) 25 mg in sterile water 2 mL injection  25 mg IntraVENous Daily    insulin lispro  0-12 Units SubCUTAneous Q4H    divalproex  125 mg Oral 3 times per day    gabapentin  300 mg Oral Nightly    methocarbamol  500 mg Oral 4x Daily    bisacodyl  10 mg Rectal Daily    micafungin  100 mg IntraVENous Daily    insulin glargine  15 Units SubCUTAneous QAM    oxyCODONE  10 mg Oral Q4H    ipratropium 0.5 mg-albuterol 2.5 mg  1 Dose Inhalation Q4H WA RT    sodium chloride flush  5-40 mL IntraVENous BID    metoclopramide  10 mg IntraVENous q8h    acetaminophen  650 mg Oral 4 times per day    chlorhexidine  15 mL Mouth/Throat BID    Polyvinyl Alcohol-Povidone PF  1 drop Both Eyes Q4H    Or

## 2024-04-24 NOTE — PROGRESS NOTES
Nutrition Note    Consult Re: for appropriate formula selection of TF d/t pt. has documented milk related compound allergy on file. Chart reviewed: Pt. Remains on TPN w/ NG to LIWS. Noted possible plans to start trickle tube feeds/weaning TPN; GI consulted for possible PEG-J placement. Pt. Most recently assessed by RD on  (see RD note  for full assessment)    *Playto standard formula 1.0 is most appropriate formula d/t pt. milk related compound allergy and Playto 1.5 peptide based formula currently out of stock. Will provide trickle TF recs w/ consideration for current vent settings and kcals provided by propofol at current rate (providing an additional 612 lipid kcals/day). Please consult RD as needed for updated/ goal recs.     Trickle TF recs: Playto Standard 1.0 Formula @10ml/hr:  Will provide: 240ml TV, 78 kcal, (690kcal total w/ kcal/d provided by propofol), 4g pro, 168 ml free water.       Current TPN: [employer] 3-in-1 @ 45.8 ml/hr; 1100 ml TV  Providin gm AA, 118.5 gm dextrose, 22 gm lipid, 900 kcals (1512 total kcals w/ kcal from propofol).   Regimen is meeting 100% estimated calorie needs w/ additional kcal provided/d from propofol & ~88% estimated protein needs      Estimated Daily Nutrient Needs:  Energy Requirements Based On: Formula  Weight Used for Energy Requirements: Admission  Energy (kcal/day): PS3B (1567): 1500-1600kcal  Weight Used for Protein Requirements: Ideal  Protein (g/day):  (1.5-1.8 gm/kg IBW)     Fluid (ml/day): per critical care    Electronically signed by Deepak Miller RD on 24 at 10:11 AM EDT    Contact: ext [phone]

## 2024-04-24 NOTE — PROGRESS NOTES
Data:  I personally reviewed the following labs:   Recent Labs     04/22/24  0421 04/23/24  0344 04/24/24  0405   WBC 39.5* 31.4* 23.8*   RBC 2.46* 2.47* 2.40*   HGB 7.4* 7.3* 7.0*   HCT 23.1* 22.9* 22.1*   MCV 93.9 92.7 92.1   MCH 30.1 29.6 29.2   MCHC 32.0 31.9* 31.7*   RDW 15.3* 14.9 14.4    421 495*   MPV 10.8 10.7 10.7     Recent Labs     04/22/24  0421 04/22/24  1735 04/23/24  0344 04/24/24  0100 04/24/24  0405 04/24/24  1810      < > 145  --  148* 142   K 4.5   < > 4.3 3.36* 3.5 3.1*      < > 100  --  98 98   CO2 30*   < > 30*  --  33* 36*   BUN 19   < > 28*  --  25* 21*   CREATININE 0.7   < > 0.9  --  0.7 0.6   GLUCOSE 213*   < > 94  --  61* 141*   CALCIUM 8.7   < > 8.7  --  8.3* 7.9*   PROT 5.3*  --  5.4*  --  5.1*  --    BILITOT 0.7  --  0.6  --  0.4  --    ALKPHOS 144*  --  182*  --  177*  --    AST 23  --  31  --  31  --    ALT 23  --  25  --  23  --     < > = values in this interval not displayed.     No results found for: \"BHYDRXBUT\"  Lab Results   Component Value Date/Time    LABA1C 5.8 04/18/2024 11:31 PM    LABA1C 5.3 04/16/2024 04:12 AM    LABA1C 5.8 02/22/2024 11:57 AM     Lab Results   Component Value Date/Time    TSH 0.60 04/21/2024 04:26 AM     Lab Results   Component Value Date/Time    LABA1C 5.8 04/18/2024 11:31 PM    GLUCOSE 141 04/24/2024 06:10 PM    LABCREA 157.3 04/17/2024 02:54 PM     Lab Results   Component Value Date/Time    TRIG 97 04/18/2024 03:44 AM    HDL 56 02/22/2024 11:57 AM    LDLCALC 103 07/25/2022 07:16 AM    CHOL 201 02/22/2024 11:57 AM    CHOL 167 07/25/2022 07:16 AM       Blood culture   No results found for: \"BC\"    Radiology:  XR CHEST PORTABLE   Final Result   Stable chest as above with no new focal parenchymal opacification present.   Lines and tubes are stable.         CT ABDOMEN PELVIS W IV CONTRAST Additional Contrast? Oral   Final Result   Interval decrease in size in the periportal right upper quadrant fluid   collection, without resolution.       New 6 x 4 x 3 cm right retroperitoneal fluid collection between the superior   mesenteric artery and inferior vena cava.      New spindle shaped fluid collection in the subdiaphragmatic anterior left   upper quadrant of the abdomen measuring 7 x 3 x 3 cm.      Increase in small amount of ascites and bilateral pleural effusions.      Percutaneous straighter catheter placements, as above.         XR CHEST PORTABLE   Final Result   Stable chest.         XR CHEST PORTABLE   Final Result   No significant interval change.         XR CHEST PORTABLE   Final Result   1. No significant interval change.   2. Cardiomegaly.   3. Bilateral pulmonary opacifications scattered and predominately   interstitial predominant similar to prior.         XR CHEST PORTABLE   Final Result   Limited study.  No interval change         XR CHEST PORTABLE   Final Result   1. Endotracheal tube now lower in position approximately 1.5 cm above the   level of the mariana in low position directed towards the right mainstem   bronchus of close attention or monitoring at this site versus repositioning.   2. Bilateral pulmonary opacifications have increased from prior along with   low lung volumes may be mixed hypoventilatory findings.         XR CHEST PORTABLE   Final Result   1. Mild patchy bilateral interstitial lung infiltrates, appear improved.   2. Supporting lines and tubes in satisfactory position.         XR ABDOMEN FOR NG/OG/NE TUBE PLACEMENT   Final Result   1. Enteric tube tip and side hole superimposing the stomach.   2. Bibasilar lung opacities.         XR ABDOMEN FOR NG/OG/NE TUBE PLACEMENT   Final Result   1. Enteric tube tip and side hole superimposing the stomach.   2. Scattered atelectasis and interstitial opacities in the visualized lung   bases.         CT ABDOMEN PELVIS W IV CONTRAST Additional Contrast? None   Final Result   1. 1. Pneumoperitoneum.  This finding may be secondary to postsurgical or   postprocedural change.  Acute

## 2024-04-24 NOTE — PROGRESS NOTES
Chg double lumen picc Placement 4/24/2024    Product number: [id number]   Lot Number: [id number]      Ultrasound: yes   Left Cephalic vein:                Upper Arm Circumference: (CM) 34cm    Size:(FR)/GUAGE 5.5fr/40cm    Exposed Length: (CM) 4cm    Internal Length: (CM) 36cm   Cut: (CM) 15cm   Vein Measurement: 0.58cm              Lidocaine Given: yes-given in picc kit  Palma Constantino RN  4/24/2024  2:35 PM    promise

## 2024-04-24 NOTE — CARE COORDINATION
Transition of care update. S/P Whipple and pancreatotomy, and splenectomy.  Patient is currently vented and sedated. Patient is on IV Zosyn q 8 hours and Micafungin q 8 hours, TPN, and IV lasix bid.  Per oncology note today, will start trickle tube feeds to start weaning TPN. Per note, Dr Rogers asked  Dr. Hahn  for PEG-J placement, plan for possible Friday. Discussed with ICU patient may need tracheostomy if unable to wean from vent. Call to [facility] to follow patient for d/c if needed. Await therapy evals when medically able , to assist with d/c planning. Spoke with Paul regarding plan with [facility] if needed, and he is in agreement to have them follow her if needed. CM will follow.  Electronically signed by Faheem Wheatley RN on 4/24/2024 at 3:47 PM

## 2024-04-24 NOTE — PROGRESS NOTES
[facility] Surgery   Daily Progress Note      Patient's Name/Date of Birth: Leatha Willard / 1965    Date: April 24, 2024     Chief Complaint: s/p open whipple, Grade C POPF s/p takeback to OR for completion pancreatectomy    Patient Active Problem List   Diagnosis    Recurrent major depressive disorder (HCC)    Essential hypertension    Hyperlipidemia    Prediabetes    Class 1 obesity due to excess calories without serious comorbidity with body mass index (BMI) of 32.0 to 32.9 in adult    HIREN (obstructive sleep apnea)    Pancreatic cyst    Colon polyps    Cyst of pancreas    Chest pain    Tobacco abuse-- quit 2 weeks ago    Abdominal pain    Serous cystadenoma    Pancreatic duct leak    Sepsis with acute renal failure and septic shock (HCC)    JUSTINE (acute kidney injury) (HCC)    Hypophosphatemia    Acute respiratory failure with hypoxia (HCC)    Post-pancreatectomy diabetes (HCC)    On total parenteral nutrition       Subjective: Obtained CT scan of the abdomen pelvis with IV and p.o. contrast yesterday which showed no actionable drainable fluid collections or evidence of enteric leak.    Sedated. Fentanyl and propofol infusions. Mechanically ventilated on minimal settings (40%, 8 peep).     Afebrile overnight. L GIULIANO putting out more than R (30cc versus 10cc / 24 hours) and both w/ yellowish brown output.    Received lasix 40mg IV x 1 yesterday with good response. 3L UOP/24 hours     Scant dark mucoid drainage from midline overnight. No evidence of bowel function yet.    Objective:  BP (!) 146/63   Pulse 83   Temp 99.5 °F (37.5 °C) (Bladder)   Resp 16   Ht 1.626 m (5' 4\")   Wt 94.7 kg (208 lb 11.2 oz)   SpO2 94%   BMI 35.82 kg/m²   Labs:  Recent Labs     04/22/24  0421 04/23/24  0344 04/24/24  0405   WBC 39.5* 31.4* 23.8*   HGB 7.4* 7.3* 7.0*   HCT 23.1* 22.9* 22.1*     Recent Labs     04/22/24  1735 04/23/24  0344 04/24/24  0100 04/24/24  0405    145  --  148*   K 4.7 4.3 3.36* 3.5    100  --  98

## 2024-04-24 NOTE — PROGRESS NOTES
[facility]  SURGICAL INTENSIVE CARE UNIT (SICU)  ATTENDING PHYSICIAN CRITICAL CARE PROGRESS NOTE     I have personally examined the patient, personally reviewed the record, and personally discussed the case with the resident. I have personally reviewed all relevant labs and imaging data. I am actively managing this patient's medications.  Please refer to the resident's note. I agree with the assessment and plan with the following corrections/additions. The following summarizes my clinical findings and independent assessment.     CC: critical care management after pancreaticoduodenectomy    Hospital Course/Overnight Events:  4/15--pylorus preserving Whipple for large benign pancreatic cyst  4/16--continued ICU d/t nausea  4/17--bobby replaced, pancreatic leak, boluses given, albumin given, JUSTINE, toradol stopped, CVC changed  4/18--CVC replaced, NGT repalced, scop patch, robaxin, d/c IVF; total pancreatectomy and splenectomy, insulin gtt  4/19--remains intubated, TPN, lasix  4/20--albumin x 6 with lasix, robaxin restarted  4/21--low cortisol--steroids started; lasix, 1U PRBC, vent adjustment for inc plateau pressure  4/22--some air trapping on vent overnight  4/23--copious dark mucoid drainage from incision  4/24--had CT abd/pelvis yesterday; still with ongoing drainage from incision    Medications   Scheduled Meds:    calcium gluconate  2,000 mg IntraVENous Once    potassium bicarb-citric acid  40 mEq Oral Once    hydrocortisone sodium succinate PF (SOLU-CORTEF) 50 mg in sterile water 2 mL injection  50 mg IntraVENous Q12H    Followed by    [START ON 4/25/2024] hydrocortisone sodium succinate PF (SOLU-CORTEF) 25 mg in sterile water 2 mL injection  25 mg IntraVENous Q12H    Followed by    [START ON 4/27/2024] hydrocortisone sodium succinate PF (SOLU-CORTEF) 25 mg in sterile water 2 mL injection  25 mg IntraVENous Daily    insulin lispro  0-12 Units SubCUTAneous Q4H    divalproex  125 mg Oral 3 times

## 2024-04-24 NOTE — PROGRESS NOTES
[facility] PROGRESS NOTE      Chief complaint: Follow-up of septic shock/Candida albicans and glabrata infection/venous peritonitis associated with anastomosis leak    The patient is a 58 y.o. female with history of DM, hypertension, hyperlipidemia, cervical fusion, admitted on 04/15 for symptomatic pancreatic serous cystadenoma abutting many branches of the SMV and SMA prompting pylorus-preserving pancreaticoduodenectomy, placement of biliary stent, portal lymphadenectomy, round ligament and omental pedicle flap harvest, appendectomy on 04/15 during which no clinical findings of infection were noted. Postop course was complicated by intermittent fever since 04/18 up to 101.5 F and pancreatic fistula, prompting reopening recent laparotomy, completion pancreatectomy, splenectomy, omentectomy on 04/18 during which gush of a large volume of dark succus and infected fluid on opening, old infected hematoma, significant amount of necrotic omentum covering the anastomoses, dehiscence of anterior wall anastomosis with bile exiting the site of anastomosis at the jejunum, bile spilling within the lesser sac, necrotic pancreas were noted.  Tissue Gram stain and culture showed few polymorphonuclear leukocytes, no epithelial cells, no organisms, rare growth of Candida albicans, light growth of Candida glabrata, no anaerobes. Piperacillin-tazobactam was started on since admission.  She was noted to have incision wound drainage, prompting repeat CT abdomen and pelvis on 04/23 showing interval decrease in size in periportal right upper quadrant fluid collection without resolution (now measuring 3.6 x 1.7 cm as compared to previously 6.7 x 2.4 cm), new 6 x 4 x 3 cm right retroperitoneal fluid collection between superior mesenteric artery and inferior vena cava, new spindle-shaped fluid collection in subdiaphragmatic anterior left upper quadrant of the abdomen measuring 7 x 3 x 3 cm, increasing small amount of ascites and bilateral

## 2024-04-24 NOTE — PROGRESS NOTES
OCCUPATIONAL THERAPY    Date:2024  Patient Name: Leatha Willard  MRN: [id number]  : 1965  Room: [address] [place] [address]-A              Chart reviewed. Pt not medically appropriate for therapy. Will discontinue OT order at this time. Please re-order when pt able to participate. Thank you for consult.    Lakesha Rider, OTR/L [id number]

## 2024-04-24 NOTE — PROGRESS NOTES
Physical Therapy  Physical Therapy Attempt    Name: Leatha Willard  : 1965  MRN: [id number]      Date of Service: 2024  Chart reviewed.  Spoke with RN - pt is still not medically appropriate for skilled PT.  Will discontinue PT order after multiple attempts.  Please re-consult when pt can actively participate in skilled interventions.     Suraj Clark, PT, DPT  [id number]

## 2024-04-24 NOTE — PLAN OF CARE
Problem: Respiratory - Adult  Goal: Achieves optimal ventilation and oxygenation  4/24/2024 0221 by Xochitl Plmumer RCP  Outcome: Progressing

## 2024-04-24 NOTE — PROGRESS NOTES
Surgical Intensive Care Unit   Daily Progress Note     Patient's name:  Leatha Willard  Age/Gender: 58 y.o. female  Date of Admission: 4/15/2024  5:37 AM  Length of Stay: 9    *Reason for ICU:   Complete pancreatectomy, splenectomy     HPI:   Leatha Willard is a 58 y.o. female who presents to the SICU for post-op monitoring following a whipple. The patient has been admitted to the hospital since 4/15/2024 who presents for evaluation of pancreatic cyst. She has a hx of arthritis and back pain s/p cervical fusion. She was being evaluated for her mid back pain with imaging and was found to have a large pancreatic cyst on CT. She then had an MRI/MRCP showing a large >10cm cyst in the pancreatic head extending to the transverse colon mesentery. She had an EUS with Dr. Hammond at [facility]. FNA and fluid analysis was consistent with a benign cyst. There were no suspicious features present. Patient underwent a Pylorus-preserving pancreaticoduodenectomy on 4/15 and was admitted to the ICU post-operatively.  On 4/18 she underwent a complete pancreatectomy and splenectomy secondary to a worsening pancreatic leak.     *Overnight Events:   Several episodes of hypoglycemia, received D10 bolus and insulin held   Continues to have drainage from midline incision       Problem List:   Patient Active Problem List   Diagnosis    Recurrent major depressive disorder (HCC)    Essential hypertension    Hyperlipidemia    Prediabetes    Class 1 obesity due to excess calories without serious comorbidity with body mass index (BMI) of 32.0 to 32.9 in adult    HIREN (obstructive sleep apnea)    Pancreatic cyst    Colon polyps    Cyst of pancreas    Chest pain    Tobacco abuse-- quit 2 weeks ago    Abdominal pain    Serous cystadenoma    Pancreatic duct leak    Sepsis with acute renal failure and septic shock (HCC)    JUSTINE (acute kidney injury) (HCC)    Hypophosphatemia    Acute respiratory failure with hypoxia (HCC)    Post-pancreatectomy diabetes  chlorhexidine  15 mL Mouth/Throat BID    Polyvinyl Alcohol-Povidone PF  1 drop Both Eyes Q4H    Or    artificial tears   Both Eyes Q4H    lidocaine  1 patch TransDERmal Daily    enoxaparin  40 mg SubCUTAneous Daily    atorvastatin  10 mg Oral Nightly    buPROPion  300 mg Oral QAM    ketotifen fumarate  1 drop Both Eyes BID    piperacillin-tazobactam  3,375 mg IntraVENous q8h    pantoprazole (PROTONIX) 40 mg in sodium chloride (PF) 0.9 % 10 mL injection  40 mg IntraVENous Daily     sodium chloride, medicated lip balm, fentaNYL **AND** fentaNYL, benzonatate, glucose, dextrose bolus **OR** dextrose bolus, glucagon (rDNA), dextrose, ondansetron    Home Medications  Medications Prior to Admission: traMADol (ULTRAM) 50 MG tablet, Take 1 tablet by mouth every 6 hours as needed for Pain.  docusate sodium (COLACE) 100 MG capsule, Take 1 capsule by mouth every morning  atorvastatin (LIPITOR) 10 MG tablet, take 1 tablet by mouth once daily  nicotine (NICODERM CQ) 14 MG/24HR, Place 1 patch onto the skin daily  [DISCONTINUED] Multiple Vitamins-Minerals (THERAPEUTIC MULTIVITAMIN-MINERALS) tablet, Take 1 tablet by mouth daily (Patient not taking: Reported on 4/10/2024)  amLODIPine (NORVASC) 5 MG tablet, Take 1 tablet by mouth daily (Patient taking differently: Take 1 tablet by mouth every morning)  gabapentin (NEURONTIN) 300 MG capsule, Take 1 capsule by mouth nightly for 90 days.  olopatadine (PATADAY) 0.2 % SOLN ophthalmic solution, Place 1 drop into both eyes daily as needed (dry eyes)  buPROPion (WELLBUTRIN XL) 300 MG extended release tablet, Take 1 tablet by mouth every morning     ASSESSMENT / PLAN:    Neuro: acute pain syndrome--   tylenol; fentanyl gtt, robaxin, roxicodone   Depression--wellbutrin  Agitation--depakote started  Intubated and sedated  Fentanyl, propofol  Chronic back pain   Lidocaine patches  Cardio:   Hypotension   Levophed drip - 1 mcg  Solu-cortef started 4/21 - weaning  Respiratory: respiratory

## 2024-04-24 NOTE — PLAN OF CARE
Problem: Skin/Tissue Integrity  Goal: Absence of new skin breakdown  Description: 1.  Monitor for areas of redness and/or skin breakdown  2.  Assess vascular access sites hourly  3.  Every 4-6 hours minimum:  Change oxygen saturation probe site  4.  Every 4-6 hours:  If on nasal continuous positive airway pressure, respiratory therapy assess nares and determine need for appliance change or resting period.  4/24/2024 0954 by Faith Saldivar, RN  Outcome: Progressing  4/23/2024 2027 by Yisel Arndt  Outcome: Progressing     Problem: ABCDS Injury Assessment  Goal: Absence of physical injury  4/24/2024 0954 by Faith Saldivar, RN  Outcome: Progressing  4/23/2024 2027 by Yisel Arndt  Outcome: Progressing  Flowsheets (Taken 4/23/2024 2024)  Absence of Physical Injury: Implement safety measures based on patient assessment     Problem: Respiratory - Adult  Goal: Achieves optimal ventilation and oxygenation  4/24/2024 0954 by Faith Saldivar, RN  Outcome: Progressing  Flowsheets (Taken 4/24/2024 0800)  Achieves optimal ventilation and oxygenation:   Assess for changes in respiratory status   Assess for changes in mentation and behavior  4/24/2024 0221 by Xochitl Plummer RCP  Outcome: Progressing  4/23/2024 2027 by Yisel Arndt  Outcome: Progressing  Flowsheets (Taken 4/23/2024 2000)  Achieves optimal ventilation and oxygenation:   Assess for changes in respiratory status   Assess for changes in mentation and behavior   Position to facilitate oxygenation and minimize respiratory effort   Oxygen supplementation based on oxygen saturation or arterial blood gases   Encourage broncho-pulmonary hygiene including cough, deep breathe, incentive spirometry   Assess the need for suctioning and aspirate as needed   Assess and instruct to report shortness of breath or any respiratory difficulty   Respiratory therapy support as indicated     Problem: Gastrointestinal - Adult  Goal: Minimal or absence of nausea and  RN  Outcome: Progressing  Flowsheets (Taken 4/24/2024 0755)  Remains free of injury from restraints (restraint for interference with medical device): Every 2 hours: Monitor safety, psychosocial status, comfort, nutrition and hydration  4/23/2024 2027 by Yisel Arndt  Outcome: Progressing  Flowsheets (Taken 4/23/2024 2000)  Remains free of injury from restraints (restraint for interference with medical device):   Determine that other, less restrictive measures have been tried or would not be effective before applying the restraint   Evaluate the patient's condition at the time of restraint application   Inform patient/family regarding the reason for restraint   Every 2 hours: Monitor safety, psychosocial status, comfort, nutrition and hydration     Problem: Gastrointestinal - Adult  Goal: Maintains or returns to baseline bowel function  4/24/2024 0954 by Faith Saldivar, RN  Outcome: Progressing  Flowsheets (Taken 4/24/2024 0800)  Maintains or returns to baseline bowel function: Assess bowel function  4/23/2024 2027 by Yisel Arndt  Outcome: Not Progressing     Problem: Neurosensory - Adult  Goal: Achieves stable or improved neurological status  Outcome: Not Progressing  Goal: Achieves maximal functionality and self care  Outcome: Not Progressing

## 2024-04-24 NOTE — CONSULTS
Oral, QAM, Jesse Hood MD, 300 mg at 04/21/24 0823    ketotifen fumarate (ZADITOR) 0.035 % ophthalmic solution 1 drop, 1 drop, Both Eyes, BID, Jesse Hood MD, 1 drop at 04/24/24 0813    piperacillin-tazobactam (ZOSYN) 3,375 mg in sodium chloride 0.9 % 50 mL IVPB ([id number]), 3,375 mg, IntraVENous, q8h, Jesse Hood MD, Stopped at 04/24/24 0536    glucose chewable tablet 16 g, 4 tablet, Oral, PRN, Jesse Hood MD    dextrose bolus 10% 125 mL, 125 mL, IntraVENous, PRN, Stopped at 04/24/24 0515 **OR** dextrose bolus 10% 250 mL, 250 mL, IntraVENous, PRN, Jesse Hood MD    glucagon injection 1 mg, 1 mg, SubCUTAneous, PRN, Jesse Hood MD    dextrose 10 % infusion, , IntraVENous, Continuous PRN, Jesse Hood MD    pantoprazole (PROTONIX) 40 mg in sodium chloride (PF) 0.9 % 10 mL injection, 40 mg, IntraVENous, Daily, Jesse Hood MD, 40 mg at 04/24/24 0810    ondansetron (ZOFRAN) injection 4 mg, 4 mg, IntraVENous, Q6H PRN, Jesse Hood MD, 4 mg at 04/18/24 0938     Allergies:  Latex, Milk-related compounds, Lisinopril, Morphine, and Seasonal    ROS:  Aside from what was mentioned in the past medical history and history of present illness, essentially unremarkable, all others are negative.      Recent Labs     04/22/24  0421 04/23/24  0344 04/24/24  0405   INR 1.1 1.0 1.0   ALT 23 25 23   AST 23 31 31   ALKPHOS 144* 182* 177*   BILITOT 0.7 0.6 0.4       Lab Results   Component Value Date    WBC 23.8 (H) 04/24/2024    HGB 7.0 (L) 04/24/2024    HCT 22.1 (L) 04/24/2024     (H) 04/24/2024     (H) 04/24/2024    K 3.5 04/24/2024    CL 98 04/24/2024    CREATININE 0.7 04/24/2024    BUN 25 (H) 04/24/2024    CO2 33 (H) 04/24/2024    GLUCOSE 61 (L) 04/24/2024    INR 1.0 04/24/2024    TSH 0.60 04/21/2024    LABA1C 5.8 (H) 04/18/2024         PHYSICAL EXAM:  BP (!) 146/63   Pulse 88   Temp 99.3 °F (37.4 °C)   Resp 16   Ht 1.626 m (5' 4\")   Wt 94.7 kg (208 lb 11.2 oz)   SpO2 95%   BMI 35.82 kg/m²

## 2024-04-25 ENCOUNTER — APPOINTMENT (OUTPATIENT)
Dept: GENERAL RADIOLOGY | Age: 59
DRG: 004 | End: 2024-04-25
Attending: STUDENT IN AN ORGANIZED HEALTH CARE EDUCATION/TRAINING PROGRAM
Payer: COMMERCIAL

## 2024-04-25 ENCOUNTER — APPOINTMENT (OUTPATIENT)
Dept: CT IMAGING | Age: 59
DRG: 004 | End: 2024-04-25
Attending: STUDENT IN AN ORGANIZED HEALTH CARE EDUCATION/TRAINING PROGRAM
Payer: COMMERCIAL

## 2024-04-25 PROBLEM — E87.6 HYPOKALEMIA: Status: ACTIVE | Noted: 2024-04-25

## 2024-04-25 PROBLEM — E83.51 HYPOCALCEMIA: Status: ACTIVE | Noted: 2024-04-25

## 2024-04-25 PROBLEM — D62 ACUTE BLOOD LOSS ANEMIA: Status: ACTIVE | Noted: 2024-04-25

## 2024-04-25 PROBLEM — E88.09 HYPOALBUMINEMIA: Status: ACTIVE | Noted: 2024-04-25

## 2024-04-25 PROBLEM — E87.8 ELECTROLYTE IMBALANCE: Status: ACTIVE | Noted: 2024-04-25

## 2024-04-25 LAB
AADO2: 142.2 MMHG
ALBUMIN SERPL-MCNC: 2.8 G/DL (ref 3.5–5.2)
ALP SERPL-CCNC: 166 U/L (ref 35–104)
ALT SERPL-CCNC: 21 U/L (ref 0–32)
ANION GAP SERPL CALCULATED.3IONS-SCNC: 15 MMOL/L (ref 7–16)
AST SERPL-CCNC: 31 U/L (ref 0–31)
B.E.: 9.9 MMOL/L (ref -3–3)
BASOPHILS # BLD: 0.25 K/UL (ref 0–0.2)
BASOPHILS NFR BLD: 1 % (ref 0–2)
BILIRUB SERPL-MCNC: 0.6 MG/DL (ref 0–1.2)
BUN SERPL-MCNC: 21 MG/DL (ref 6–20)
CA-I BLD-SCNC: 1.1 MMOL/L (ref 1.15–1.33)
CALCIUM SERPL-MCNC: 8 MG/DL (ref 8.6–10.2)
CHLORIDE SERPL-SCNC: 100 MMOL/L (ref 98–107)
CO2 SERPL-SCNC: 29 MMOL/L (ref 22–29)
COHB: 0.1 % (ref 0–1.5)
CREAT SERPL-MCNC: 0.6 MG/DL (ref 0.5–1)
CRITICAL: ABNORMAL
DATE ANALYZED: ABNORMAL
DATE OF COLLECTION: ABNORMAL
EOSINOPHIL # BLD: 0.99 K/UL (ref 0.05–0.5)
EOSINOPHILS RELATIVE PERCENT: 4 % (ref 0–6)
ERYTHROCYTE [DISTWIDTH] IN BLOOD BY AUTOMATED COUNT: 14.1 % (ref 11.5–15)
FIO2: 40 %
GFR SERPL CREATININE-BSD FRML MDRD: >90 ML/MIN/1.73M2
GLUCOSE BLD-MCNC: 149 MG/DL (ref 74–99)
GLUCOSE BLD-MCNC: 165 MG/DL (ref 74–99)
GLUCOSE BLD-MCNC: 195 MG/DL (ref 74–99)
GLUCOSE BLD-MCNC: 212 MG/DL (ref 74–99)
GLUCOSE BLD-MCNC: 216 MG/DL (ref 74–99)
GLUCOSE SERPL-MCNC: 152 MG/DL (ref 74–99)
HCO3: 33.6 MMOL/L (ref 22–26)
HCT VFR BLD AUTO: 26.4 % (ref 34–48)
HGB BLD-MCNC: 8.2 G/DL (ref 11.5–15.5)
HHB: 3.7 % (ref 0–5)
INR PPP: 1.1
LAB: ABNORMAL
LYMPHOCYTES NFR BLD: 2.72 K/UL (ref 1.5–4)
LYMPHOCYTES RELATIVE PERCENT: 10 % (ref 20–42)
Lab: 520
MAGNESIUM SERPL-MCNC: 2.2 MG/DL (ref 1.6–2.6)
MCH RBC QN AUTO: 28.7 PG (ref 26–35)
MCHC RBC AUTO-ENTMCNC: 31.1 G/DL (ref 32–34.5)
MCV RBC AUTO: 92.3 FL (ref 80–99.9)
METAMYELOCYTES ABSOLUTE COUNT: 0.25 K/UL (ref 0–0.12)
METAMYELOCYTES: 1 % (ref 0–1)
METHB: 0.4 % (ref 0–1.5)
MICROORGANISM SPEC CULT: ABNORMAL
MICROORGANISM SPEC CULT: ABNORMAL
MICROORGANISM/AGENT SPEC: ABNORMAL
MODE: AC
MONOCYTES NFR BLD: 0.74 K/UL (ref 0.1–0.95)
MONOCYTES NFR BLD: 3 % (ref 2–12)
MYELOCYTES ABSOLUTE COUNT: 0.74 K/UL
MYELOCYTES: 3 %
NEUTROPHILS NFR BLD: 80 % (ref 43–80)
NEUTS SEG NFR BLD: 22.72 K/UL (ref 1.8–7.3)
NUCLEATED RED BLOOD CELLS: 4 PER 100 WBC
O2 SATURATION: 96.3 % (ref 92–98.5)
O2HB: 95.8 % (ref 94–97)
OPERATOR ID: 914
PATIENT TEMP: 37 C
PCO2: 41.6 MMHG (ref 35–45)
PEEP/CPAP: 8 CMH2O
PFO2: 2.13 MMHG/%
PH BLOOD GAS: 7.53 (ref 7.35–7.45)
PHOSPHATE SERPL-MCNC: 3.4 MG/DL (ref 2.5–4.5)
PLATELET # BLD AUTO: 638 K/UL (ref 130–450)
PMV BLD AUTO: 10.8 FL (ref 7–12)
PO2: 85.2 MMHG (ref 75–100)
POTASSIUM SERPL-SCNC: 3 MMOL/L (ref 3.5–5)
PROT SERPL-MCNC: 5.5 G/DL (ref 6.4–8.3)
PROTHROMBIN TIME: 11.8 SEC (ref 9.3–12.4)
RBC # BLD AUTO: 2.86 M/UL (ref 3.5–5.5)
RBC # BLD: ABNORMAL 10*6/UL
RI(T): 1.67
RR MECHANICAL: 16 B/MIN
SODIUM SERPL-SCNC: 144 MMOL/L (ref 132–146)
SOURCE, BLOOD GAS: ABNORMAL
SPECIMEN DESCRIPTION: ABNORMAL
SURGICAL PATHOLOGY REPORT: NORMAL
THB: 9.5 G/DL (ref 11.5–16.5)
TIME ANALYZED: 526
VT MECHANICAL: 350 ML
WBC # BLD: ABNORMAL 10*3/UL
WBC OTHER # BLD: 28.4 K/UL (ref 4.5–11.5)

## 2024-04-25 PROCEDURE — 6360000004 HC RX CONTRAST MEDICATION: Performed by: RADIOLOGY

## 2024-04-25 PROCEDURE — 6360000002 HC RX W HCPCS: Performed by: INTERNAL MEDICINE

## 2024-04-25 PROCEDURE — 6370000000 HC RX 637 (ALT 250 FOR IP)

## 2024-04-25 PROCEDURE — 87040 BLOOD CULTURE FOR BACTERIA: CPT

## 2024-04-25 PROCEDURE — 99231 SBSQ HOSP IP/OBS SF/LOW 25: CPT | Performed by: NURSE PRACTITIONER

## 2024-04-25 PROCEDURE — 36415 COLL VENOUS BLD VENIPUNCTURE: CPT

## 2024-04-25 PROCEDURE — 82805 BLOOD GASES W/O2 SATURATION: CPT

## 2024-04-25 PROCEDURE — 6370000000 HC RX 637 (ALT 250 FOR IP): Performed by: STUDENT IN AN ORGANIZED HEALTH CARE EDUCATION/TRAINING PROGRAM

## 2024-04-25 PROCEDURE — 37799 UNLISTED PX VASCULAR SURGERY: CPT

## 2024-04-25 PROCEDURE — 2580000003 HC RX 258: Performed by: INTERNAL MEDICINE

## 2024-04-25 PROCEDURE — 87070 CULTURE OTHR SPECIMN AEROBIC: CPT

## 2024-04-25 PROCEDURE — 82330 ASSAY OF CALCIUM: CPT

## 2024-04-25 PROCEDURE — 87077 CULTURE AEROBIC IDENTIFY: CPT

## 2024-04-25 PROCEDURE — 2500000003 HC RX 250 WO HCPCS

## 2024-04-25 PROCEDURE — 99291 CRITICAL CARE FIRST HOUR: CPT | Performed by: SURGERY

## 2024-04-25 PROCEDURE — 2000000000 HC ICU R&B

## 2024-04-25 PROCEDURE — 6370000000 HC RX 637 (ALT 250 FOR IP): Performed by: INTERNAL MEDICINE

## 2024-04-25 PROCEDURE — 6360000002 HC RX W HCPCS

## 2024-04-25 PROCEDURE — C9113 INJ PANTOPRAZOLE SODIUM, VIA: HCPCS | Performed by: STUDENT IN AN ORGANIZED HEALTH CARE EDUCATION/TRAINING PROGRAM

## 2024-04-25 PROCEDURE — 6360000002 HC RX W HCPCS: Performed by: STUDENT IN AN ORGANIZED HEALTH CARE EDUCATION/TRAINING PROGRAM

## 2024-04-25 PROCEDURE — 99232 SBSQ HOSP IP/OBS MODERATE 35: CPT | Performed by: INTERNAL MEDICINE

## 2024-04-25 PROCEDURE — 2580000003 HC RX 258

## 2024-04-25 PROCEDURE — 82962 GLUCOSE BLOOD TEST: CPT

## 2024-04-25 PROCEDURE — 6370000000 HC RX 637 (ALT 250 FOR IP): Performed by: SURGERY

## 2024-04-25 PROCEDURE — 87075 CULTR BACTERIA EXCEPT BLOOD: CPT

## 2024-04-25 PROCEDURE — 85610 PROTHROMBIN TIME: CPT

## 2024-04-25 PROCEDURE — 85025 COMPLETE CBC W/AUTO DIFF WBC: CPT

## 2024-04-25 PROCEDURE — 94003 VENT MGMT INPAT SUBQ DAY: CPT

## 2024-04-25 PROCEDURE — 74177 CT ABD & PELVIS W/CONTRAST: CPT

## 2024-04-25 PROCEDURE — 2500000003 HC RX 250 WO HCPCS: Performed by: STUDENT IN AN ORGANIZED HEALTH CARE EDUCATION/TRAINING PROGRAM

## 2024-04-25 PROCEDURE — 87205 SMEAR GRAM STAIN: CPT

## 2024-04-25 PROCEDURE — 84100 ASSAY OF PHOSPHORUS: CPT

## 2024-04-25 PROCEDURE — 83735 ASSAY OF MAGNESIUM: CPT

## 2024-04-25 PROCEDURE — 94640 AIRWAY INHALATION TREATMENT: CPT

## 2024-04-25 PROCEDURE — 71045 X-RAY EXAM CHEST 1 VIEW: CPT

## 2024-04-25 PROCEDURE — 2580000003 HC RX 258: Performed by: STUDENT IN AN ORGANIZED HEALTH CARE EDUCATION/TRAINING PROGRAM

## 2024-04-25 PROCEDURE — 80053 COMPREHEN METABOLIC PANEL: CPT

## 2024-04-25 RX ORDER — ACETAZOLAMIDE 250 MG/1
500 TABLET ORAL DAILY
Status: DISPENSED | OUTPATIENT
Start: 2024-04-25 | End: 2024-04-26

## 2024-04-25 RX ORDER — VALPROIC ACID 250 MG/5ML
250 SOLUTION ORAL EVERY 8 HOURS SCHEDULED
Status: DISCONTINUED | OUTPATIENT
Start: 2024-04-25 | End: 2024-05-02

## 2024-04-25 RX ORDER — POTASSIUM CHLORIDE 29.8 MG/ML
20 INJECTION INTRAVENOUS
Status: COMPLETED | OUTPATIENT
Start: 2024-04-25 | End: 2024-04-25

## 2024-04-25 RX ORDER — FUROSEMIDE 10 MG/ML
40 INJECTION INTRAMUSCULAR; INTRAVENOUS ONCE
Status: COMPLETED | OUTPATIENT
Start: 2024-04-25 | End: 2024-04-25

## 2024-04-25 RX ORDER — INSULIN GLARGINE 100 [IU]/ML
12 INJECTION, SOLUTION SUBCUTANEOUS NIGHTLY
Status: DISCONTINUED | OUTPATIENT
Start: 2024-04-25 | End: 2024-04-28

## 2024-04-25 RX ORDER — BUPROPION HYDROCHLORIDE 100 MG/1
100 TABLET ORAL 3 TIMES DAILY
Status: DISPENSED | OUTPATIENT
Start: 2024-04-25

## 2024-04-25 RX ORDER — ACETAZOLAMIDE 250 MG/1
500 TABLET ORAL DAILY
Status: DISCONTINUED | OUTPATIENT
Start: 2024-04-25 | End: 2024-04-25

## 2024-04-25 RX ADMIN — BUPROPION HYDROCHLORIDE 100 MG: 100 TABLET, FILM COATED ORAL at 13:55

## 2024-04-25 RX ADMIN — Medication 50 MCG: at 04:20

## 2024-04-25 RX ADMIN — Medication 50 MCG: at 10:41

## 2024-04-25 RX ADMIN — METHOCARBAMOL 500 MG: 500 TABLET ORAL at 08:06

## 2024-04-25 RX ADMIN — SODIUM CHLORIDE, PRESERVATIVE FREE 10 ML: 5 INJECTION INTRAVENOUS at 19:48

## 2024-04-25 RX ADMIN — MIDAZOLAM HYDROCHLORIDE 2 MG: 1 INJECTION, SOLUTION INTRAMUSCULAR; INTRAVENOUS at 02:12

## 2024-04-25 RX ADMIN — Medication 50 MCG: at 03:26

## 2024-04-25 RX ADMIN — ONDANSETRON 4 MG: 2 INJECTION INTRAMUSCULAR; INTRAVENOUS at 05:48

## 2024-04-25 RX ADMIN — CALCIUM GLUCONATE: 98 INJECTION, SOLUTION INTRAVENOUS at 18:35

## 2024-04-25 RX ADMIN — Medication 50 MCG: at 01:08

## 2024-04-25 RX ADMIN — BUPROPION HYDROCHLORIDE 100 MG: 100 TABLET, FILM COATED ORAL at 19:48

## 2024-04-25 RX ADMIN — METOCLOPRAMIDE 10 MG: 5 INJECTION, SOLUTION INTRAMUSCULAR; INTRAVENOUS at 05:27

## 2024-04-25 RX ADMIN — SODIUM CHLORIDE, PRESERVATIVE FREE 10 ML: 5 INJECTION INTRAVENOUS at 08:05

## 2024-04-25 RX ADMIN — KETOTIFEN FUMARATE 1 DROP: 0.35 SOLUTION/ DROPS OPHTHALMIC at 19:48

## 2024-04-25 RX ADMIN — MICAFUNGIN SODIUM 100 MG: 100 INJECTION, POWDER, LYOPHILIZED, FOR SOLUTION INTRAVENOUS at 17:29

## 2024-04-25 RX ADMIN — PIPERACILLIN AND TAZOBACTAM 3375 MG: 3; .375 INJECTION, POWDER, LYOPHILIZED, FOR SOLUTION INTRAVENOUS at 02:12

## 2024-04-25 RX ADMIN — METOCLOPRAMIDE 10 MG: 5 INJECTION, SOLUTION INTRAMUSCULAR; INTRAVENOUS at 13:55

## 2024-04-25 RX ADMIN — METHOCARBAMOL 500 MG: 500 TABLET ORAL at 16:24

## 2024-04-25 RX ADMIN — PIPERACILLIN AND TAZOBACTAM 3375 MG: 3; .375 INJECTION, POWDER, LYOPHILIZED, FOR SOLUTION INTRAVENOUS at 10:07

## 2024-04-25 RX ADMIN — 0.12% CHLORHEXIDINE GLUCONATE 15 ML: 1.2 RINSE ORAL at 19:48

## 2024-04-25 RX ADMIN — MEROPENEM 1000 MG: 1 INJECTION, POWDER, FOR SOLUTION INTRAVENOUS at 10:29

## 2024-04-25 RX ADMIN — POLYVINYL ALCOHOL, POVIDONE 1 DROP: 14; 6 SOLUTION/ DROPS OPHTHALMIC at 08:06

## 2024-04-25 RX ADMIN — IPRATROPIUM BROMIDE AND ALBUTEROL SULFATE 1 DOSE: .5; 2.5 SOLUTION RESPIRATORY (INHALATION) at 16:15

## 2024-04-25 RX ADMIN — KETOTIFEN FUMARATE 1 DROP: 0.35 SOLUTION/ DROPS OPHTHALMIC at 08:07

## 2024-04-25 RX ADMIN — METHOCARBAMOL 500 MG: 500 TABLET ORAL at 19:48

## 2024-04-25 RX ADMIN — METHOCARBAMOL 500 MG: 500 TABLET ORAL at 12:21

## 2024-04-25 RX ADMIN — OXYCODONE HYDROCHLORIDE 10 MG: 5 SOLUTION ORAL at 03:58

## 2024-04-25 RX ADMIN — PROPOFOL 20 MCG/KG/MIN: 10 INJECTION, EMULSION INTRAVENOUS at 22:52

## 2024-04-25 RX ADMIN — LABETALOL HYDROCHLORIDE 10 MG: 5 INJECTION, SOLUTION INTRAVENOUS at 05:29

## 2024-04-25 RX ADMIN — POLYVINYL ALCOHOL, POVIDONE 1 DROP: 14; 6 SOLUTION/ DROPS OPHTHALMIC at 22:55

## 2024-04-25 RX ADMIN — INSULIN LISPRO 2 UNITS: 100 INJECTION, SOLUTION INTRAVENOUS; SUBCUTANEOUS at 23:00

## 2024-04-25 RX ADMIN — LORAZEPAM 1 MG: 1 TABLET ORAL at 08:06

## 2024-04-25 RX ADMIN — PROPOFOL 20 MCG/KG/MIN: 10 INJECTION, EMULSION INTRAVENOUS at 05:46

## 2024-04-25 RX ADMIN — WATER 25 MG: 1 INJECTION INTRAMUSCULAR; INTRAVENOUS; SUBCUTANEOUS at 19:47

## 2024-04-25 RX ADMIN — OXYCODONE HYDROCHLORIDE 10 MG: 5 SOLUTION ORAL at 12:21

## 2024-04-25 RX ADMIN — INSULIN GLARGINE 12 UNITS: 100 INJECTION, SOLUTION SUBCUTANEOUS at 19:58

## 2024-04-25 RX ADMIN — VALPROIC ACID 250 MG: 250 SOLUTION ORAL at 22:00

## 2024-04-25 RX ADMIN — OXYCODONE HYDROCHLORIDE 10 MG: 5 SOLUTION ORAL at 08:06

## 2024-04-25 RX ADMIN — LORAZEPAM 1 MG: 1 TABLET ORAL at 19:48

## 2024-04-25 RX ADMIN — POLYVINYL ALCOHOL, POVIDONE 1 DROP: 14; 6 SOLUTION/ DROPS OPHTHALMIC at 12:21

## 2024-04-25 RX ADMIN — SENNOSIDES AND DOCUSATE SODIUM 2 TABLET: 50; 8.6 TABLET ORAL at 08:06

## 2024-04-25 RX ADMIN — LORAZEPAM 1 MG: 1 TABLET ORAL at 12:21

## 2024-04-25 RX ADMIN — ACETAMINOPHEN 650 MG: 650 SOLUTION ORAL at 05:27

## 2024-04-25 RX ADMIN — Medication 50 MCG/HR: at 10:05

## 2024-04-25 RX ADMIN — POTASSIUM CHLORIDE 20 MEQ: 29.8 INJECTION, SOLUTION INTRAVENOUS at 08:05

## 2024-04-25 RX ADMIN — MIDAZOLAM HYDROCHLORIDE 2 MG: 1 INJECTION, SOLUTION INTRAMUSCULAR; INTRAVENOUS at 04:49

## 2024-04-25 RX ADMIN — LORAZEPAM 1 MG: 1 TABLET ORAL at 22:54

## 2024-04-25 RX ADMIN — 0.12% CHLORHEXIDINE GLUCONATE 15 ML: 1.2 RINSE ORAL at 08:06

## 2024-04-25 RX ADMIN — ACETAMINOPHEN 650 MG: 650 SOLUTION ORAL at 22:52

## 2024-04-25 RX ADMIN — POLYETHYLENE GLYCOL 3350 17 G: 17 POWDER, FOR SOLUTION ORAL at 08:06

## 2024-04-25 RX ADMIN — Medication 50 MCG: at 04:50

## 2024-04-25 RX ADMIN — FUROSEMIDE 40 MG: 10 INJECTION, SOLUTION INTRAMUSCULAR; INTRAVENOUS at 13:55

## 2024-04-25 RX ADMIN — INSULIN LISPRO 2 UNITS: 100 INJECTION, SOLUTION INTRAVENOUS; SUBCUTANEOUS at 12:19

## 2024-04-25 RX ADMIN — HYDRALAZINE HYDROCHLORIDE 10 MG: 20 INJECTION INTRAMUSCULAR; INTRAVENOUS at 03:46

## 2024-04-25 RX ADMIN — OXYCODONE HYDROCHLORIDE 10 MG: 5 SOLUTION ORAL at 22:54

## 2024-04-25 RX ADMIN — MIDAZOLAM HYDROCHLORIDE 2 MG: 1 INJECTION, SOLUTION INTRAMUSCULAR; INTRAVENOUS at 03:56

## 2024-04-25 RX ADMIN — LORAZEPAM 1 MG: 1 TABLET ORAL at 16:24

## 2024-04-25 RX ADMIN — ACETAZOLAMIDE 500 MG: 250 TABLET ORAL at 08:07

## 2024-04-25 RX ADMIN — IOPAMIDOL 75 ML: 755 INJECTION, SOLUTION INTRAVENOUS at 10:42

## 2024-04-25 RX ADMIN — Medication 50 MCG: at 09:32

## 2024-04-25 RX ADMIN — ATORVASTATIN CALCIUM 10 MG: 10 TABLET, FILM COATED ORAL at 19:47

## 2024-04-25 RX ADMIN — OXYCODONE HYDROCHLORIDE 10 MG: 5 SOLUTION ORAL at 16:24

## 2024-04-25 RX ADMIN — ENOXAPARIN SODIUM 40 MG: 100 INJECTION SUBCUTANEOUS at 08:06

## 2024-04-25 RX ADMIN — POLYVINYL ALCOHOL, POVIDONE 1 DROP: 14; 6 SOLUTION/ DROPS OPHTHALMIC at 04:05

## 2024-04-25 RX ADMIN — POLYVINYL ALCOHOL, POVIDONE 1 DROP: 14; 6 SOLUTION/ DROPS OPHTHALMIC at 19:47

## 2024-04-25 RX ADMIN — ACETAMINOPHEN 650 MG: 650 SOLUTION ORAL at 12:21

## 2024-04-25 RX ADMIN — POTASSIUM CHLORIDE 20 MEQ: 29.8 INJECTION, SOLUTION INTRAVENOUS at 06:59

## 2024-04-25 RX ADMIN — LABETALOL HYDROCHLORIDE 10 MG: 5 INJECTION, SOLUTION INTRAVENOUS at 04:46

## 2024-04-25 RX ADMIN — OXYCODONE HYDROCHLORIDE 10 MG: 5 SOLUTION ORAL at 19:47

## 2024-04-25 RX ADMIN — POTASSIUM CHLORIDE 20 MEQ: 29.8 INJECTION, SOLUTION INTRAVENOUS at 09:06

## 2024-04-25 RX ADMIN — LABETALOL HYDROCHLORIDE 10 MG: 5 INJECTION, SOLUTION INTRAVENOUS at 08:34

## 2024-04-25 RX ADMIN — BISACODYL 10 MG: 10 SUPPOSITORY RECTAL at 08:06

## 2024-04-25 RX ADMIN — LABETALOL HYDROCHLORIDE 10 MG: 5 INJECTION, SOLUTION INTRAVENOUS at 03:27

## 2024-04-25 RX ADMIN — LORAZEPAM 1 MG: 1 TABLET ORAL at 03:59

## 2024-04-25 RX ADMIN — POLYVINYL ALCOHOL, POVIDONE 1 DROP: 14; 6 SOLUTION/ DROPS OPHTHALMIC at 16:24

## 2024-04-25 RX ADMIN — POTASSIUM BICARBONATE 40 MEQ: 782 TABLET, EFFERVESCENT ORAL at 06:14

## 2024-04-25 RX ADMIN — DIVALPROEX SODIUM 125 MG: 125 CAPSULE ORAL at 05:27

## 2024-04-25 RX ADMIN — VALPROIC ACID 250 MG: 250 SOLUTION ORAL at 13:55

## 2024-04-25 RX ADMIN — POTASSIUM CHLORIDE 20 MEQ: 29.8 INJECTION, SOLUTION INTRAVENOUS at 06:16

## 2024-04-25 RX ADMIN — IPRATROPIUM BROMIDE AND ALBUTEROL SULFATE 1 DOSE: .5; 2.5 SOLUTION RESPIRATORY (INHALATION) at 11:37

## 2024-04-25 RX ADMIN — SODIUM CHLORIDE, PRESERVATIVE FREE 40 MG: 5 INJECTION INTRAVENOUS at 08:07

## 2024-04-25 RX ADMIN — IPRATROPIUM BROMIDE AND ALBUTEROL SULFATE 1 DOSE: .5; 2.5 SOLUTION RESPIRATORY (INHALATION) at 08:53

## 2024-04-25 RX ADMIN — PROPOFOL 20 MCG/KG/MIN: 10 INJECTION, EMULSION INTRAVENOUS at 12:59

## 2024-04-25 RX ADMIN — ACETAMINOPHEN 650 MG: 650 SOLUTION ORAL at 16:24

## 2024-04-25 RX ADMIN — INSULIN LISPRO 4 UNITS: 100 INJECTION, SOLUTION INTRAVENOUS; SUBCUTANEOUS at 18:44

## 2024-04-25 RX ADMIN — MEROPENEM 1000 MG: 1 INJECTION, POWDER, FOR SOLUTION INTRAVENOUS at 18:40

## 2024-04-25 RX ADMIN — GABAPENTIN 300 MG: 300 CAPSULE ORAL at 19:48

## 2024-04-25 RX ADMIN — IPRATROPIUM BROMIDE AND ALBUTEROL SULFATE 1 DOSE: .5; 2.5 SOLUTION RESPIRATORY (INHALATION) at 19:34

## 2024-04-25 RX ADMIN — METOCLOPRAMIDE 10 MG: 5 INJECTION, SOLUTION INTRAMUSCULAR; INTRAVENOUS at 22:00

## 2024-04-25 RX ADMIN — WATER 50 MG: 1 INJECTION INTRAMUSCULAR; INTRAVENOUS; SUBCUTANEOUS at 08:05

## 2024-04-25 ASSESSMENT — PULMONARY FUNCTION TESTS
PIF_VALUE: 34
PIF_VALUE: 32
PIF_VALUE: 29
PIF_VALUE: 19
PIF_VALUE: 27
PIF_VALUE: 24
PIF_VALUE: 31
PIF_VALUE: 26
PIF_VALUE: 19
PIF_VALUE: 30
PIF_VALUE: 25
PIF_VALUE: 27
PIF_VALUE: 18
PIF_VALUE: 24
PIF_VALUE: 19
PIF_VALUE: 32
PIF_VALUE: 18
PIF_VALUE: 19
PIF_VALUE: 24
PIF_VALUE: 18
PIF_VALUE: 50
PIF_VALUE: 31
PIF_VALUE: 22
PIF_VALUE: 18
PIF_VALUE: 23
PIF_VALUE: 21
PIF_VALUE: 23
PIF_VALUE: 18
PIF_VALUE: 30
PIF_VALUE: 26
PIF_VALUE: 25
PIF_VALUE: 18
PIF_VALUE: 22
PIF_VALUE: 19
PIF_VALUE: 19
PIF_VALUE: 25

## 2024-04-25 ASSESSMENT — PAIN SCALES - GENERAL
PAINLEVEL_OUTOF10: 5
PAINLEVEL_OUTOF10: 0
PAINLEVEL_OUTOF10: 0
PAINLEVEL_OUTOF10: 5
PAINLEVEL_OUTOF10: 8
PAINLEVEL_OUTOF10: 5
PAINLEVEL_OUTOF10: 5
PAINLEVEL_OUTOF10: 8
PAINLEVEL_OUTOF10: 6
PAINLEVEL_OUTOF10: 8
PAINLEVEL_OUTOF10: 0
PAINLEVEL_OUTOF10: 0
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
## 2024-04-25 NOTE — PLAN OF CARE
Problem: Skin/Tissue Integrity  Goal: Absence of new skin breakdown  Description: 1.  Monitor for areas of redness and/or skin breakdown  2.  Assess vascular access sites hourly  3.  Every 4-6 hours minimum:  Change oxygen saturation probe site  4.  Every 4-6 hours:  If on nasal continuous positive airway pressure, respiratory therapy assess nares and determine need for appliance change or resting period.  4/25/2024 1020 by Faith Saldivar RN  Outcome: Progressing  4/24/2024 2140 by Yisel Arndt  Outcome: Progressing     Problem: ABCDS Injury Assessment  Goal: Absence of physical injury  Outcome: Progressing  Flowsheets (Taken 4/25/2024 1019)  Absence of Physical Injury: Implement safety measures based on patient assessment     Problem: Respiratory - Adult  Goal: Achieves optimal ventilation and oxygenation  4/25/2024 1020 by Faith Saldivar, RN  Outcome: Progressing  4/25/2024 0923 by Manolo Dee RCP  Outcome: Progressing  4/24/2024 2140 by Yisel Arndt  Outcome: Progressing  Flowsheets (Taken 4/24/2024 2000)  Achieves optimal ventilation and oxygenation:   Assess for changes in respiratory status   Assess for changes in mentation and behavior   Position to facilitate oxygenation and minimize respiratory effort   Oxygen supplementation based on oxygen saturation or arterial blood gases   Encourage broncho-pulmonary hygiene including cough, deep breathe, incentive spirometry   Assess the need for suctioning and aspirate as needed   Assess and instruct to report shortness of breath or any respiratory difficulty   Respiratory therapy support as indicated     Problem: Cardiovascular - Adult  Goal: Maintains optimal cardiac output and hemodynamic stability  4/25/2024 1020 by Faith Saldivar, RN  Outcome: Progressing  Flowsheets (Taken 4/25/2024 0800)  Maintains optimal cardiac output and hemodynamic stability:   Monitor blood pressure and heart rate   Monitor urine output and notify Licensed Independent

## 2024-04-25 NOTE — PLAN OF CARE
2300-pt agitated/restless, RASS 1+, CPOT 5, tachypnic, and hypertensive requiring prn sedation/analgesia and antihypertensives. Resolution with prn medication administration but periods of rest vs restlessness increasing. SROC notified. No new orders at this time    0428-pt agitated/restless, RASS 2+ CPOT 8, increasing frequency of fentany/versed prns and antihypetensive prns with diminishing return and shortened periods of relief to agitation. Pt fighting against ventilator and chewing at ETT. Attempts to verbally reorient/redirect/soothe patient unsuccessful. SROC notified, no new orders.    0445-Pt remains agitated RASS 2+, CPOT 8, despite recent prn sedation/analgesia administration. Continues to be hypertensive despite prn administration of both hydralazine/labetalol in setting of agitation with systolic pressures 190-200s. Pt still gnawing on ETT and attempting to push tube with tongue/gagging. Peak pressures in 50s and RR mid 30s. Now febrile 37.9C. SROC notified of unchanging condition and lack of sedation/analgesia prns, 2mg versed and 50mg ordered early per SROC.    0513-SROC at bedside, patient still restless in bed and fighting against ventilator/gagging on ETT. Persistent hypertension with SBPs 180-200s. No new orders.    0542-Pt extremely agitated, restless in bed, RASS 3+ CPOT 8, fighting vent peak pressures again in 50s RR mid 30s, hypertension with highest pressure seen from mary 260/128 and sustaining near this pressure. SROC for day&nightshift notified, propofol to be restarted.

## 2024-04-25 NOTE — PROGRESS NOTES
ENDOCRINOLOGY PROGRESS NOTE      Date of admission: 4/15/2024  Date of service: 4/25/2024  Admitting physician: Norma Rogers MD   Primary Care Physician: Mekhi Escalona MD  Consultant physician: Jonathan Curiel MD     Reason for the consultation:  Post-pancreatectomy DM     History of Present Illness:  Leatha Willard is a 58 y.o. old female with PMH of cystic neoplasm of the head of pancreas, HTN, HLD and other listed below admitted to [facility] on 4/15/2024 for completion pancreatectomy. Endocrine service was consulted for diabetes management.    Subjective   Seen at bedside this afternoon. Still On TPN, tube feeds , BS improving     Point of care glucose monitoring   (Independently reviewed)   Recent Labs     04/24/24  0804 04/24/24  1149 04/24/24  1812 04/24/24  1957 04/24/24  2340 04/25/24  0522 04/25/24  1215 04/25/24  1841   POCGLU 113* 92 154* 129* 107* 149* 195* 216*       Scheduled Meds:   meropenem  1,000 mg IntraVENous Q8H    valproic acid  250 mg Oral 3 times per day    acetaZOLAMIDE  500 mg Oral Daily    buPROPion  100 mg Oral TID    insulin glargine  12 Units SubCUTAneous Nightly    LORazepam  1 mg Oral Q4H    polyethylene glycol  17 g Oral Daily    sennosides-docusate sodium  2 tablet Oral Daily    insulin lispro  0-12 Units SubCUTAneous Q6H    hydrocortisone sodium succinate PF (SOLU-CORTEF) 25 mg in sterile water 2 mL injection  25 mg IntraVENous Q12H    Followed by    [START ON 4/27/2024] hydrocortisone sodium succinate PF (SOLU-CORTEF) 25 mg in sterile water 2 mL injection  25 mg IntraVENous Daily    gabapentin  300 mg Oral Nightly    methocarbamol  500 mg Oral 4x Daily    bisacodyl  10 mg Rectal Daily    micafungin  100 mg IntraVENous Daily    oxyCODONE  10 mg Oral Q4H    ipratropium 0.5 mg-albuterol 2.5 mg  1 Dose Inhalation Q4H WA RT    sodium chloride flush  5-40 mL IntraVENous BID    metoclopramide  10 mg IntraVENous q8h    acetaminophen  650 mg Oral 4 times per day    chlorhexidine  15 mL  insulin 12 units into the TPN bag  Will be checking the glucose every 4 hours and adjust insulin accordingly    Steroid-induced hyperglycemia  Adjusted insulin regimen as per above  We will closely monitor glucose level every 4 hours and adjust insulin accordingly    Pancreatic mass s/p whipple   The patient s/p Pylorus-preserving pancreaticoduodenectomy to remove a 15 cm pancreatic cystic lesion on 4/15/2024.she had pancreatic leak after the surgery and underwent completion pancreatectomy on 4/18/2024.   Management per surgery service   Will closely monitor BS with you and adjust the insulin if needed     Interdisciplinary plan for communication with healthcare providers:   Consult recommendations were discussed with the Primary Service/Nursing staff    Thank you for allowing us to participate in the care of this patient. Please do not hesitate to contact us with any additional questions.     Jonathan Curiel MD  Endocrinologist, [facility]   [address] [place] [id number]   Phone: [phone]  Fax: [phone]  --------------------------------------------  An electronic signature was used to authenticate this note. Jonathan Curiel MD on 4/25/2024 at 7:32 PM

## 2024-04-25 NOTE — PROGRESS NOTES
[facility] Surgery   Daily Progress Note      Patient's Name/Date of Birth: Leatha Willard / 1965    Date: April 25, 2024     Chief Complaint: s/p open whipple, Grade C POPF s/p takeback to OR for completion pancreatectomy    Patient Active Problem List   Diagnosis    Recurrent major depressive disorder (HCC)    Essential hypertension    Hyperlipidemia    Prediabetes    Class 1 obesity due to excess calories without serious comorbidity with body mass index (BMI) of 32.0 to 32.9 in adult    HIREN (obstructive sleep apnea)    Pancreatic cyst    Colon polyps    Cyst of pancreas    Chest pain    Tobacco abuse-- quit 2 weeks ago    Abdominal pain    Serous cystadenoma    Pancreatic fistula    Sepsis with acute renal failure and septic shock (HCC)    JUSTINE (acute kidney injury) (HCC)    Hypophosphatemia    Acute respiratory failure with hypoxia (HCC)    Post-pancreatectomy diabetes (HCC)    On total parenteral nutrition       Subjective:   Sedated this morning. Patient extremely restless overnight requiring frequent PRN medications requiring propofol gtt to be resumed.   She did have a bowel movement but 1 large emesis and tube feeds were discontinued and NG hooked back to suction with 1L out since.   Remains on minimal ventilator settings.   Still with significant gelatinous output from midline   Was diuresed with Diamox and Lasix x 2 each yesterday with great response. Net - 4L yesterday   Tmax 100F.   GIULIANO with little to no output       Objective:  BP (!) 172/76   Pulse 85   Temp (!) 100.6 °F (38.1 °C) (Bladder)   Resp 18   Ht 1.626 m (5' 4\")   Wt 92.4 kg (203 lb 12.8 oz)   SpO2 98%   BMI 34.98 kg/m²   Labs:  Recent Labs     04/23/24  0344 04/24/24  0405 04/25/24  0421   WBC 31.4* 23.8* 28.4*   HGB 7.3* 7.0* 8.2*   HCT 22.9* 22.1* 26.4*     Recent Labs     04/24/24  0405 04/24/24  1810 04/25/24  0421   * 142 144   K 3.5 3.1* 3.0*   CL 98 98 100   CO2 33* 36* 29   BUN 25* 21* 21*   CREATININE 0.7 0.6 0.6     Recent

## 2024-04-25 NOTE — PLAN OF CARE
Problem: Respiratory - Adult  Goal: Achieves optimal ventilation and oxygenation  4/25/2024 0923 by Manolo Dee RCP  Outcome: Progressing

## 2024-04-25 NOTE — PROGRESS NOTES
[facility]  SURGICAL INTENSIVE CARE UNIT (SICU)  ATTENDING PHYSICIAN CRITICAL CARE PROGRESS NOTE     I have personally examined the patient, personally reviewed the record, and personally discussed the case with the resident. I have personally reviewed all relevant labs and imaging data. I am actively managing this patient's medications.  Please refer to the resident's note. I agree with the assessment and plan with the following corrections/additions. The following summarizes my clinical findings and independent assessment.     CC: critical care management after pancreaticoduodenectomy    Hospital Course/Overnight Events:  4/15--pylorus preserving Whipple for large benign pancreatic cyst  4/16--continued ICU d/t nausea  4/17--bobby replaced, pancreatic leak, boluses given, albumin given, JUSTINE, toradol stopped, CVC changed  4/18--CVC replaced, NGT repalced, scop patch, robaxin, d/c IVF; total pancreatectomy and splenectomy, insulin gtt  4/19--remains intubated, TPN, lasix  4/20--albumin x 6 with lasix, robaxin restarted  4/21--low cortisol--steroids started; lasix, 1U PRBC, vent adjustment for inc plateau pressure  4/22--some air trapping on vent overnight  4/23--copious dark mucoid drainage from incision  4/24--had CT abd/pelvis yesterday; still with ongoing drainage from incision  4/25--some agitation overnight; still with midline incision drainage; febrile (Tmax 100.6)    Medications   Scheduled Meds:    meropenem  1,000 mg IntraVENous Q8H    acetaZOLAMIDE  500 mg Oral BID    LORazepam  1 mg Oral Q4H    polyethylene glycol  17 g Oral Daily    sennosides-docusate sodium  2 tablet Oral Daily    insulin lispro  0-12 Units SubCUTAneous Q6H    insulin glargine  10 Units SubCUTAneous Nightly    hydrocortisone sodium succinate PF (SOLU-CORTEF) 25 mg in sterile water 2 mL injection  25 mg IntraVENous Q12H    Followed by    [START ON 4/27/2024] hydrocortisone sodium succinate PF (SOLU-CORTEF) 25 mg in  Post-pancreatectomy diabetes (HCC) 04/22/2024    On total parenteral nutrition 04/22/2024    JUSTINE (acute kidney injury) ([facility]) 04/19/2024    Hypophosphatemia 04/19/2024    Acute respiratory failure with hypoxia ([facility]) 04/19/2024    Pancreatic fistula 04/18/2024    Sepsis with acute renal failure and septic shock ([facility]) 04/18/2024    Serous cystadenoma 03/01/2024    Chest pain 02/22/2024    Tobacco abuse-- quit 2 weeks ago 02/22/2024    Abdominal pain 02/22/2024    Cyst of pancreas 10/25/2023    Colon polyps 09/20/2023    Pancreatic cyst 08/23/2023    IHREN (obstructive sleep apnea) 04/03/2023    Prediabetes 12/14/2021    Hyperlipidemia 09/15/2021    Recurrent major depressive disorder ([facility]) 02/03/2020    Essential hypertension 02/03/2020       S/p pylorus sparing Whipple  S/p total pancreatectomy and splenectomy after leak  Acute resp failure--cont mech vent support; wean as able; attempt spont breathing trial  Hypoalbuminemia--cont TPN  Acute blood loss anemia--monitor H/H  Electrolyte imbalance (hypokalemia/hypocalcemia)--correct as able  Hyperglycemia--SSI; hold lantus  Leukocytosis (worsening)--cont to monitor  ++fluid balance--diurese as able; diamox  Meropenem #1; micafungin #5  Relative adrenal insuff--cont steroid wean  DVT risk--PCDS/lovenox    Discussed with/care coordinated with Dr. Rogers    I am actively managing this pt's meds and the risk for hemodynamic/respiratory/metabolic deterioration.  Requires ongoing ICU care.    Maicol Gonzalez MD, FACS  4/25/2024  1:11 PM    Critical care time exclusive of teaching and procedures = 37 minutes

## 2024-04-26 ENCOUNTER — APPOINTMENT (OUTPATIENT)
Dept: GENERAL RADIOLOGY | Age: 59
DRG: 004 | End: 2024-04-26
Attending: STUDENT IN AN ORGANIZED HEALTH CARE EDUCATION/TRAINING PROGRAM
Payer: COMMERCIAL

## 2024-04-26 LAB
AADO2: 121.7 MMHG
ALBUMIN SERPL-MCNC: 2.6 G/DL (ref 3.5–5.2)
ALP SERPL-CCNC: 145 U/L (ref 35–104)
ALT SERPL-CCNC: 17 U/L (ref 0–32)
ANION GAP SERPL CALCULATED.3IONS-SCNC: 13 MMOL/L (ref 7–16)
AST SERPL-CCNC: 25 U/L (ref 0–31)
B.E.: 6.6 MMOL/L (ref -3–3)
BASOPHILS # BLD: 0.22 K/UL (ref 0–0.2)
BASOPHILS NFR BLD: 1 % (ref 0–2)
BILIRUB SERPL-MCNC: 0.4 MG/DL (ref 0–1.2)
BUN SERPL-MCNC: 27 MG/DL (ref 6–20)
CA-I BLD-SCNC: 1.12 MMOL/L (ref 1.15–1.33)
CALCIUM SERPL-MCNC: 7.9 MG/DL (ref 8.6–10.2)
CHLORIDE SERPL-SCNC: 107 MMOL/L (ref 98–107)
CO2 SERPL-SCNC: 27 MMOL/L (ref 22–29)
COHB: 0.3 % (ref 0–1.5)
CREAT SERPL-MCNC: 0.6 MG/DL (ref 0.5–1)
CRITICAL: ABNORMAL
DATE ANALYZED: ABNORMAL
DATE OF COLLECTION: ABNORMAL
EOSINOPHIL # BLD: 0.67 K/UL (ref 0.05–0.5)
EOSINOPHILS RELATIVE PERCENT: 3 % (ref 0–6)
ERYTHROCYTE [DISTWIDTH] IN BLOOD BY AUTOMATED COUNT: 14.4 % (ref 11.5–15)
FIO2: 40 %
GFR SERPL CREATININE-BSD FRML MDRD: >90 ML/MIN/1.73M2
GLUCOSE BLD-MCNC: 134 MG/DL (ref 74–99)
GLUCOSE BLD-MCNC: 183 MG/DL (ref 74–99)
GLUCOSE BLD-MCNC: 187 MG/DL (ref 74–99)
GLUCOSE BLD-MCNC: 187 MG/DL (ref 74–99)
GLUCOSE SERPL-MCNC: 185 MG/DL (ref 74–99)
HCO3: 31.1 MMOL/L (ref 22–26)
HCT VFR BLD AUTO: 25.2 % (ref 34–48)
HGB BLD-MCNC: 7.7 G/DL (ref 11.5–15.5)
HHB: 2.9 % (ref 0–5)
INR PPP: 1.1
LAB: ABNORMAL
LYMPHOCYTES NFR BLD: 0.9 K/UL (ref 1.5–4)
LYMPHOCYTES RELATIVE PERCENT: 4 % (ref 20–42)
Lab: 508
MAGNESIUM SERPL-MCNC: 2.5 MG/DL (ref 1.6–2.6)
MCH RBC QN AUTO: 29.1 PG (ref 26–35)
MCHC RBC AUTO-ENTMCNC: 30.6 G/DL (ref 32–34.5)
MCV RBC AUTO: 95.1 FL (ref 80–99.9)
METHB: 0.4 % (ref 0–1.5)
MICROORGANISM SPEC CULT: NORMAL
MICROORGANISM/AGENT SPEC: NORMAL
MODE: ABNORMAL
MONOCYTES NFR BLD: 0.45 K/UL (ref 0.1–0.95)
MONOCYTES NFR BLD: 2 % (ref 2–12)
MYELOCYTES ABSOLUTE COUNT: 1.35 K/UL
MYELOCYTES: 6 %
NEUTROPHILS NFR BLD: 86 % (ref 43–80)
NEUTS SEG NFR BLD: 21.11 K/UL (ref 1.8–7.3)
O2 SATURATION: 97.1 % (ref 92–98.5)
O2HB: 96.4 % (ref 94–97)
OPERATOR ID: 914
PATIENT TEMP: 37 C
PCO2: 44.4 MMHG (ref 35–45)
PEEP/CPAP: 8 CMH2O
PFO2: 2.56 MMHG/%
PH BLOOD GAS: 7.46 (ref 7.35–7.45)
PHOSPHATE SERPL-MCNC: 3.1 MG/DL (ref 2.5–4.5)
PLATELET # BLD AUTO: 738 K/UL (ref 130–450)
PMV BLD AUTO: 11.1 FL (ref 7–12)
PO2: 102.4 MMHG (ref 75–100)
POTASSIUM SERPL-SCNC: 3.3 MMOL/L (ref 3.5–5)
PROT SERPL-MCNC: 5.4 G/DL (ref 6.4–8.3)
PROTHROMBIN TIME: 12.3 SEC (ref 9.3–12.4)
PS: 10 CMH20
RBC # BLD AUTO: 2.65 M/UL (ref 3.5–5.5)
RBC # BLD: ABNORMAL 10*6/UL
RI(T): 1.19
SEND OUT REPORT: NORMAL
SERVICE CMNT-IMP: NORMAL
SERVICE CMNT-IMP: NORMAL
SODIUM SERPL-SCNC: 147 MMOL/L (ref 132–146)
SOURCE, BLOOD GAS: ABNORMAL
SPECIMEN DESCRIPTION: NORMAL
TEST NAME: NORMAL
THB: 9.2 G/DL (ref 11.5–16.5)
TIME ANALYZED: 512
WBC # BLD: ABNORMAL 10*3/UL
WBC OTHER # BLD: 24.7 K/UL (ref 4.5–11.5)

## 2024-04-26 PROCEDURE — 82805 BLOOD GASES W/O2 SATURATION: CPT

## 2024-04-26 PROCEDURE — 94003 VENT MGMT INPAT SUBQ DAY: CPT

## 2024-04-26 PROCEDURE — 2580000003 HC RX 258: Performed by: STUDENT IN AN ORGANIZED HEALTH CARE EDUCATION/TRAINING PROGRAM

## 2024-04-26 PROCEDURE — 82962 GLUCOSE BLOOD TEST: CPT

## 2024-04-26 PROCEDURE — 6370000000 HC RX 637 (ALT 250 FOR IP): Performed by: STUDENT IN AN ORGANIZED HEALTH CARE EDUCATION/TRAINING PROGRAM

## 2024-04-26 PROCEDURE — 6360000002 HC RX W HCPCS: Performed by: STUDENT IN AN ORGANIZED HEALTH CARE EDUCATION/TRAINING PROGRAM

## 2024-04-26 PROCEDURE — 2500000003 HC RX 250 WO HCPCS

## 2024-04-26 PROCEDURE — 71045 X-RAY EXAM CHEST 1 VIEW: CPT

## 2024-04-26 PROCEDURE — A4216 STERILE WATER/SALINE, 10 ML: HCPCS | Performed by: STUDENT IN AN ORGANIZED HEALTH CARE EDUCATION/TRAINING PROGRAM

## 2024-04-26 PROCEDURE — 6370000000 HC RX 637 (ALT 250 FOR IP)

## 2024-04-26 PROCEDURE — 6370000000 HC RX 637 (ALT 250 FOR IP): Performed by: INTERNAL MEDICINE

## 2024-04-26 PROCEDURE — 6360000002 HC RX W HCPCS: Performed by: INTERNAL MEDICINE

## 2024-04-26 PROCEDURE — 6360000002 HC RX W HCPCS

## 2024-04-26 PROCEDURE — 85610 PROTHROMBIN TIME: CPT

## 2024-04-26 PROCEDURE — 84100 ASSAY OF PHOSPHORUS: CPT

## 2024-04-26 PROCEDURE — 99291 CRITICAL CARE FIRST HOUR: CPT | Performed by: SURGERY

## 2024-04-26 PROCEDURE — 80053 COMPREHEN METABOLIC PANEL: CPT

## 2024-04-26 PROCEDURE — 2000000000 HC ICU R&B

## 2024-04-26 PROCEDURE — C9113 INJ PANTOPRAZOLE SODIUM, VIA: HCPCS | Performed by: STUDENT IN AN ORGANIZED HEALTH CARE EDUCATION/TRAINING PROGRAM

## 2024-04-26 PROCEDURE — 99232 SBSQ HOSP IP/OBS MODERATE 35: CPT | Performed by: INTERNAL MEDICINE

## 2024-04-26 PROCEDURE — 37799 UNLISTED PX VASCULAR SURGERY: CPT

## 2024-04-26 PROCEDURE — 6370000000 HC RX 637 (ALT 250 FOR IP): Performed by: SURGERY

## 2024-04-26 PROCEDURE — 2580000003 HC RX 258

## 2024-04-26 PROCEDURE — 82330 ASSAY OF CALCIUM: CPT

## 2024-04-26 PROCEDURE — 85025 COMPLETE CBC W/AUTO DIFF WBC: CPT

## 2024-04-26 PROCEDURE — 94640 AIRWAY INHALATION TREATMENT: CPT

## 2024-04-26 PROCEDURE — 83735 ASSAY OF MAGNESIUM: CPT

## 2024-04-26 PROCEDURE — 2580000003 HC RX 258: Performed by: INTERNAL MEDICINE

## 2024-04-26 PROCEDURE — 2500000003 HC RX 250 WO HCPCS: Performed by: STUDENT IN AN ORGANIZED HEALTH CARE EDUCATION/TRAINING PROGRAM

## 2024-04-26 RX ORDER — CALCIUM GLUCONATE 10 MG/ML
1000 INJECTION, SOLUTION INTRAVENOUS ONCE
Status: COMPLETED | OUTPATIENT
Start: 2024-04-26 | End: 2024-04-26

## 2024-04-26 RX ORDER — POTASSIUM CHLORIDE 29.8 MG/ML
20 INJECTION INTRAVENOUS
Status: COMPLETED | OUTPATIENT
Start: 2024-04-26 | End: 2024-04-26

## 2024-04-26 RX ORDER — OCTREOTIDE ACETATE 50 UG/ML
50 INJECTION, SOLUTION INTRAVENOUS; SUBCUTANEOUS EVERY 8 HOURS
Status: DISPENSED | OUTPATIENT
Start: 2024-04-26

## 2024-04-26 RX ADMIN — VALPROIC ACID 250 MG: 250 SOLUTION ORAL at 22:10

## 2024-04-26 RX ADMIN — OCTREOTIDE ACETATE 50 MCG: 50 INJECTION, SOLUTION INTRAVENOUS; SUBCUTANEOUS at 08:20

## 2024-04-26 RX ADMIN — METHOCARBAMOL 500 MG: 500 TABLET ORAL at 11:45

## 2024-04-26 RX ADMIN — INSULIN LISPRO 2 UNITS: 100 INJECTION, SOLUTION INTRAVENOUS; SUBCUTANEOUS at 05:14

## 2024-04-26 RX ADMIN — MEROPENEM 1000 MG: 1 INJECTION, POWDER, FOR SOLUTION INTRAVENOUS at 18:04

## 2024-04-26 RX ADMIN — OXYCODONE HYDROCHLORIDE 10 MG: 5 SOLUTION ORAL at 11:45

## 2024-04-26 RX ADMIN — POLYVINYL ALCOHOL, POVIDONE 1 DROP: 14; 6 SOLUTION/ DROPS OPHTHALMIC at 08:20

## 2024-04-26 RX ADMIN — OCTREOTIDE ACETATE 50 MCG: 50 INJECTION, SOLUTION INTRAVENOUS; SUBCUTANEOUS at 16:59

## 2024-04-26 RX ADMIN — IPRATROPIUM BROMIDE AND ALBUTEROL SULFATE 1 DOSE: .5; 2.5 SOLUTION RESPIRATORY (INHALATION) at 16:41

## 2024-04-26 RX ADMIN — 0.12% CHLORHEXIDINE GLUCONATE 15 ML: 1.2 RINSE ORAL at 08:21

## 2024-04-26 RX ADMIN — BUPROPION HYDROCHLORIDE 100 MG: 100 TABLET, FILM COATED ORAL at 08:23

## 2024-04-26 RX ADMIN — OXYCODONE HYDROCHLORIDE 10 MG: 5 SOLUTION ORAL at 08:20

## 2024-04-26 RX ADMIN — GABAPENTIN 300 MG: 300 CAPSULE ORAL at 20:53

## 2024-04-26 RX ADMIN — VALPROIC ACID 250 MG: 250 SOLUTION ORAL at 05:14

## 2024-04-26 RX ADMIN — LORAZEPAM 1 MG: 1 TABLET ORAL at 08:20

## 2024-04-26 RX ADMIN — MICAFUNGIN SODIUM 100 MG: 100 INJECTION, POWDER, LYOPHILIZED, FOR SOLUTION INTRAVENOUS at 17:01

## 2024-04-26 RX ADMIN — LABETALOL HYDROCHLORIDE 10 MG: 5 INJECTION, SOLUTION INTRAVENOUS at 11:45

## 2024-04-26 RX ADMIN — IPRATROPIUM BROMIDE AND ALBUTEROL SULFATE 1 DOSE: .5; 2.5 SOLUTION RESPIRATORY (INHALATION) at 12:07

## 2024-04-26 RX ADMIN — POLYVINYL ALCOHOL, POVIDONE 1 DROP: 14; 6 SOLUTION/ DROPS OPHTHALMIC at 23:52

## 2024-04-26 RX ADMIN — IPRATROPIUM BROMIDE AND ALBUTEROL SULFATE 1 DOSE: .5; 2.5 SOLUTION RESPIRATORY (INHALATION) at 08:40

## 2024-04-26 RX ADMIN — ATORVASTATIN CALCIUM 10 MG: 10 TABLET, FILM COATED ORAL at 20:22

## 2024-04-26 RX ADMIN — INSULIN GLARGINE 12 UNITS: 100 INJECTION, SOLUTION SUBCUTANEOUS at 21:00

## 2024-04-26 RX ADMIN — CALCIUM GLUCONATE 1000 MG: 10 INJECTION, SOLUTION INTRAVENOUS at 08:40

## 2024-04-26 RX ADMIN — Medication 50 MCG: at 16:57

## 2024-04-26 RX ADMIN — INSULIN LISPRO 2 UNITS: 100 INJECTION, SOLUTION INTRAVENOUS; SUBCUTANEOUS at 11:51

## 2024-04-26 RX ADMIN — IPRATROPIUM BROMIDE AND ALBUTEROL SULFATE 1 DOSE: .5; 2.5 SOLUTION RESPIRATORY (INHALATION) at 21:09

## 2024-04-26 RX ADMIN — BISACODYL 10 MG: 10 SUPPOSITORY RECTAL at 08:21

## 2024-04-26 RX ADMIN — SODIUM CHLORIDE, PRESERVATIVE FREE 40 MG: 5 INJECTION INTRAVENOUS at 08:21

## 2024-04-26 RX ADMIN — METHOCARBAMOL 500 MG: 500 TABLET ORAL at 20:54

## 2024-04-26 RX ADMIN — POTASSIUM CHLORIDE 20 MEQ: 29.8 INJECTION, SOLUTION INTRAVENOUS at 10:21

## 2024-04-26 RX ADMIN — KETOTIFEN FUMARATE 1 DROP: 0.35 SOLUTION/ DROPS OPHTHALMIC at 08:24

## 2024-04-26 RX ADMIN — SODIUM CHLORIDE, PRESERVATIVE FREE 10 ML: 5 INJECTION INTRAVENOUS at 20:55

## 2024-04-26 RX ADMIN — 0.12% CHLORHEXIDINE GLUCONATE 15 ML: 1.2 RINSE ORAL at 20:53

## 2024-04-26 RX ADMIN — KETOTIFEN FUMARATE 1 DROP: 0.35 SOLUTION/ DROPS OPHTHALMIC at 20:55

## 2024-04-26 RX ADMIN — POLYVINYL ALCOHOL, POVIDONE 1 DROP: 14; 6 SOLUTION/ DROPS OPHTHALMIC at 03:04

## 2024-04-26 RX ADMIN — METOCLOPRAMIDE 10 MG: 5 INJECTION, SOLUTION INTRAMUSCULAR; INTRAVENOUS at 14:15

## 2024-04-26 RX ADMIN — OXYCODONE HYDROCHLORIDE 10 MG: 5 SOLUTION ORAL at 16:58

## 2024-04-26 RX ADMIN — VALPROIC ACID 250 MG: 250 SOLUTION ORAL at 14:15

## 2024-04-26 RX ADMIN — POLYVINYL ALCOHOL, POVIDONE 1 DROP: 14; 6 SOLUTION/ DROPS OPHTHALMIC at 16:59

## 2024-04-26 RX ADMIN — Medication 50 MCG/HR: at 05:19

## 2024-04-26 RX ADMIN — LORAZEPAM 1 MG: 1 TABLET ORAL at 11:45

## 2024-04-26 RX ADMIN — CALCIUM GLUCONATE: 98 INJECTION, SOLUTION INTRAVENOUS at 18:07

## 2024-04-26 RX ADMIN — INSULIN LISPRO 2 UNITS: 100 INJECTION, SOLUTION INTRAVENOUS; SUBCUTANEOUS at 17:08

## 2024-04-26 RX ADMIN — POTASSIUM PHOSPHATE, MONOBASIC AND POTASSIUM PHOSPHATE, DIBASIC 20 MMOL: 224; 236 INJECTION, SOLUTION, CONCENTRATE INTRAVENOUS at 08:35

## 2024-04-26 RX ADMIN — WATER 25 MG: 1 INJECTION INTRAMUSCULAR; INTRAVENOUS; SUBCUTANEOUS at 08:21

## 2024-04-26 RX ADMIN — ACETAMINOPHEN 650 MG: 650 SOLUTION ORAL at 16:59

## 2024-04-26 RX ADMIN — POTASSIUM CHLORIDE 20 MEQ: 29.8 INJECTION, SOLUTION INTRAVENOUS at 09:30

## 2024-04-26 RX ADMIN — LORAZEPAM 1 MG: 1 TABLET ORAL at 20:54

## 2024-04-26 RX ADMIN — OXYCODONE HYDROCHLORIDE 10 MG: 5 SOLUTION ORAL at 03:04

## 2024-04-26 RX ADMIN — METHOCARBAMOL 500 MG: 500 TABLET ORAL at 16:59

## 2024-04-26 RX ADMIN — METOCLOPRAMIDE 10 MG: 5 INJECTION, SOLUTION INTRAMUSCULAR; INTRAVENOUS at 20:56

## 2024-04-26 RX ADMIN — SENNOSIDES AND DOCUSATE SODIUM 2 TABLET: 50; 8.6 TABLET ORAL at 08:21

## 2024-04-26 RX ADMIN — POLYETHYLENE GLYCOL 3350 17 G: 17 POWDER, FOR SOLUTION ORAL at 08:20

## 2024-04-26 RX ADMIN — BUPROPION HYDROCHLORIDE 100 MG: 100 TABLET, FILM COATED ORAL at 14:15

## 2024-04-26 RX ADMIN — PROPOFOL 20 MCG/KG/MIN: 10 INJECTION, EMULSION INTRAVENOUS at 15:12

## 2024-04-26 RX ADMIN — ACETAMINOPHEN 650 MG: 650 SOLUTION ORAL at 05:14

## 2024-04-26 RX ADMIN — PROPOFOL 20 MCG/KG/MIN: 10 INJECTION, EMULSION INTRAVENOUS at 05:18

## 2024-04-26 RX ADMIN — LORAZEPAM 1 MG: 1 TABLET ORAL at 16:59

## 2024-04-26 RX ADMIN — LORAZEPAM 1 MG: 1 TABLET ORAL at 03:04

## 2024-04-26 RX ADMIN — PROPOFOL 19.97 MCG/KG/MIN: 10 INJECTION, EMULSION INTRAVENOUS at 23:50

## 2024-04-26 RX ADMIN — METHOCARBAMOL 500 MG: 500 TABLET ORAL at 08:20

## 2024-04-26 RX ADMIN — POTASSIUM CHLORIDE 20 MEQ: 29.8 INJECTION, SOLUTION INTRAVENOUS at 08:46

## 2024-04-26 RX ADMIN — METOCLOPRAMIDE 10 MG: 5 INJECTION, SOLUTION INTRAMUSCULAR; INTRAVENOUS at 05:14

## 2024-04-26 RX ADMIN — WATER 25 MG: 1 INJECTION INTRAMUSCULAR; INTRAVENOUS; SUBCUTANEOUS at 20:53

## 2024-04-26 RX ADMIN — ACETAMINOPHEN 650 MG: 650 SOLUTION ORAL at 11:45

## 2024-04-26 RX ADMIN — POLYVINYL ALCOHOL, POVIDONE 1 DROP: 14; 6 SOLUTION/ DROPS OPHTHALMIC at 11:46

## 2024-04-26 RX ADMIN — BUPROPION HYDROCHLORIDE 100 MG: 100 TABLET, FILM COATED ORAL at 20:53

## 2024-04-26 RX ADMIN — MEROPENEM 1000 MG: 1 INJECTION, POWDER, FOR SOLUTION INTRAVENOUS at 02:28

## 2024-04-26 RX ADMIN — POLYVINYL ALCOHOL, POVIDONE 1 DROP: 14; 6 SOLUTION/ DROPS OPHTHALMIC at 20:55

## 2024-04-26 RX ADMIN — MEROPENEM 1000 MG: 1 INJECTION, POWDER, FOR SOLUTION INTRAVENOUS at 10:47

## 2024-04-26 RX ADMIN — OXYCODONE HYDROCHLORIDE 10 MG: 5 SOLUTION ORAL at 20:55

## 2024-04-26 ASSESSMENT — PULMONARY FUNCTION TESTS
PIF_VALUE: 18
PIF_VALUE: 19
PIF_VALUE: 19
PIF_VALUE: 18
PIF_VALUE: 19
PIF_VALUE: 19
PIF_VALUE: 18
PIF_VALUE: 19
PIF_VALUE: 19
PIF_VALUE: 18
PIF_VALUE: 18
PIF_VALUE: 19
PIF_VALUE: 19
PIF_VALUE: 18
PIF_VALUE: 19
PIF_VALUE: 19
PIF_VALUE: 18
PIF_VALUE: 19
PIF_VALUE: 18
PIF_VALUE: 19

## 2024-04-26 ASSESSMENT — PAIN SCALES - GENERAL
PAINLEVEL_OUTOF10: 0
PAINLEVEL_OUTOF10: 3

## 2024-04-26 ASSESSMENT — PAIN - FUNCTIONAL ASSESSMENT: PAIN_FUNCTIONAL_ASSESSMENT: ACTIVITIES ARE NOT PREVENTED

## 2024-04-26 NOTE — PROGRESS NOTES
Unable to perform PEG-J today due to limitation in anesthesia staffing after hours.    Will look into schedule and determine when placement can be performed.

## 2024-04-26 NOTE — PROGRESS NOTES
[facility]   Daily Progress Note      Patient's Name/Date of Birth: Leatha Willard / 1965    Date: April 26, 2024     Chief Complaint: s/p open whipple, Grade C POPF s/p takeback to OR for completion pancreatectomy    Patient Active Problem List   Diagnosis    Recurrent major depressive disorder (HCC)    Essential hypertension    Hyperlipidemia    Prediabetes    Class 1 obesity due to excess calories without serious comorbidity with body mass index (BMI) of 32.0 to 32.9 in adult    HIREN (obstructive sleep apnea)    Pancreatic cyst    Colon polyps    Cyst of pancreas    Chest pain    Tobacco abuse-- quit 2 weeks ago    Abdominal pain    Serous cystadenoma    Pancreatic fistula    Sepsis with acute renal failure and septic shock (HCC)    JUSTINE (acute kidney injury) (HCC)    Hypophosphatemia    Acute respiratory failure with hypoxia (HCC)    Post-pancreatectomy diabetes (HCC)    On total parenteral nutrition    Hypoalbuminemia    Electrolyte imbalance    Hypocalcemia    Acute blood loss anemia    Hypokalemia       Subjective:   On PS since yesterday, adequate sats  Forced diuresis yesterday w/ good response  Remains sedated  Continued midline drainage - dressing being changed frequently  Low grade temp - Tmax 100.4F over last 24 hours  X1 bm yesterday morning but none since  Jps w/ minimal output        Objective:  BP (!) 163/76   Pulse 80   Temp 99.9 °F (37.7 °C) (Bladder)   Resp 19   Ht 1.626 m (5' 4\")   Wt 93.2 kg (205 lb 8 oz)   SpO2 99%   BMI 35.27 kg/m²   Labs:  Recent Labs     04/24/24  0405 04/25/24  0421 04/26/24  0501   WBC 23.8* 28.4* 24.7*   HGB 7.0* 8.2* 7.7*   HCT 22.1* 26.4* 25.2*       Recent Labs     04/24/24  1810 04/25/24  0421 04/26/24  0501    144 147*   K 3.1* 3.0* 3.3*   CL 98 100 107   CO2 36* 29 27   BUN 21* 21* 27*   CREATININE 0.6 0.6 0.6       Recent Labs     04/24/24  0405 04/25/24  0421 04/26/24  0501   ALKPHOS 177* 166* 145*   ALT 23 21 17   AST 31 31 25   BILITOT 0.4 0.6

## 2024-04-26 NOTE — PROGRESS NOTES
ENDOCRINOLOGY PROGRESS NOTE      Date of admission: 4/15/2024  Date of service: 4/26/2024  Admitting physician: Norma Rogers MD   Primary Care Physician: Mekhi Escalona MD  Consultant physician: Jonathan Curiel MD     Reason for the consultation:  Post-pancreatectomy DM     History of Present Illness:  Leatha Willard is a 58 y.o. old female with PMH of cystic neoplasm of the head of pancreas, HTN, HLD and other listed below admitted to [facility] on 4/15/2024 for completion pancreatectomy. Endocrine service was consulted for diabetes management.    Subjective   I saw and examined the patient at bedside this afternoon.  Patient still intubated sedated    Point of care glucose monitoring   (Independently reviewed)   Recent Labs     04/25/24  0522 04/25/24  1215 04/25/24  1841 04/25/24  1953 04/25/24  2259 04/26/24  0505 04/26/24  1149 04/26/24  1706   POCGLU 149* 195* 216* 212* 165* 187* 183* 187*       Scheduled Meds:   octreotide  50 mcg IntraVENous Q8H    meropenem  1,000 mg IntraVENous Q8H    valproic acid  250 mg Oral 3 times per day    buPROPion  100 mg Oral TID    insulin glargine  12 Units SubCUTAneous Nightly    LORazepam  1 mg Oral Q4H    polyethylene glycol  17 g Oral Daily    sennosides-docusate sodium  2 tablet Oral Daily    insulin lispro  0-12 Units SubCUTAneous Q6H    hydrocortisone sodium succinate PF (SOLU-CORTEF) 25 mg in sterile water 2 mL injection  25 mg IntraVENous Q12H    Followed by    [START ON 4/27/2024] hydrocortisone sodium succinate PF (SOLU-CORTEF) 25 mg in sterile water 2 mL injection  25 mg IntraVENous Daily    gabapentin  300 mg Oral Nightly    methocarbamol  500 mg Oral 4x Daily    bisacodyl  10 mg Rectal Daily    micafungin  100 mg IntraVENous Daily    oxyCODONE  10 mg Oral Q4H    ipratropium 0.5 mg-albuterol 2.5 mg  1 Dose Inhalation Q4H WA RT    sodium chloride flush  5-40 mL IntraVENous BID    metoclopramide  10 mg IntraVENous q8h    acetaminophen  650 mg Oral 4 times per day     chlorhexidine  15 mL Mouth/Throat BID    Polyvinyl Alcohol-Povidone PF  1 drop Both Eyes Q4H    Or    artificial tears   Both Eyes Q4H    lidocaine  1 patch TransDERmal Daily    enoxaparin  40 mg SubCUTAneous Daily    atorvastatin  10 mg Oral Nightly    ketotifen fumarate  1 drop Both Eyes BID    pantoprazole (PROTONIX) 40 mg in sodium chloride (PF) 0.9 % 10 mL injection  40 mg IntraVENous Daily       PRN Meds:   midazolam, 2 mg, Q1H PRN  hydrALAZINE, 10 mg, Q30 Min PRN  labetalol, 10 mg, Q30 Min PRN  sodium chloride, , PRN  medicated lip balm, , PRN  fentaNYL, 50 mcg, Q30 Min PRN  benzonatate, 100 mg, TID PRN  glucose, 4 tablet, PRN  dextrose bolus, 125 mL, PRN   Or  dextrose bolus, 250 mL, PRN  glucagon (rDNA), 1 mg, PRN  dextrose, , Continuous PRN  ondansetron, 4 mg, Q6H PRN      Continuous Infusions:   PN-Adult  3-in-1 Central Line (Custom)      propofol 20 mcg/kg/min (04/26/24 1512)    sodium chloride      fentaNYL 50 mcg/hr (04/26/24 1405)    dextrose         Review of Systems  Non-Obtainable    OBJECTIVE    BP (!) 161/80   Pulse 88   Temp 99.3 °F (37.4 °C) (Bladder)   Resp 22   Ht 1.626 m (5' 4\")   Wt 93.2 kg (205 lb 8 oz)   SpO2 98%   BMI 35.27 kg/m²     Intake/Output Summary (Last 24 hours) at 4/26/2024 1759  Last data filed at 4/26/2024 1700  Gross per 24 hour   Intake 2636.4 ml   Output 2150 ml   Net 486.4 ml       Physical examination:  General: Intubated  HEENT: normocephalic non traumatic, no exophthalmos   Neck: supple  Pulm: good equal air entry no added sounds  CVS: S1 + S2  Abd: Covered with surgical dressing  Skin: warm, no lesions, no rash. No open wounds, no ulcers   Neuro: The patient is intubated     Review of Laboratory Data:  I personally reviewed the following labs:   Recent Labs     04/24/24  0405 04/25/24  0421 04/26/24  0501   WBC 23.8* 28.4* 24.7*   RBC 2.40* 2.86* 2.65*   HGB 7.0* 8.2* 7.7*   HCT 22.1* 26.4* 25.2*   MCV 92.1 92.3 95.1   MCH 29.2 28.7 29.1   MCHC 31.7* 31.1*

## 2024-04-26 NOTE — PROGRESS NOTES
[facility]  SURGICAL INTENSIVE CARE UNIT (SICU)  ATTENDING PHYSICIAN CRITICAL CARE PROGRESS NOTE     I have personally examined the patient, personally reviewed the record, and personally discussed the case with the resident. I have personally reviewed all relevant labs and imaging data. I am actively managing this patient's medications.  Please refer to the resident's note. I agree with the assessment and plan with the following corrections/additions. The following summarizes my clinical findings and independent assessment.     CC: critical care management after pancreaticoduodenectomy    Hospital Course/Overnight Events:  4/15--pylorus preserving Whipple for large benign pancreatic cyst  4/16--continued ICU d/t nausea  4/17--bobby replaced, pancreatic leak, boluses given, albumin given, JUSTINE, toradol stopped, CVC changed  4/18--CVC replaced, NGT repalced, scop patch, robaxin, d/c IVF; total pancreatectomy and splenectomy, insulin gtt  4/19--remains intubated, TPN, lasix  4/20--albumin x 6 with lasix, robaxin restarted  4/21--low cortisol--steroids started; lasix, 1U PRBC, vent adjustment for inc plateau pressure  4/22--some air trapping on vent overnight  4/23--copious dark mucoid drainage from incision  4/24--had CT abd/pelvis yesterday; still with ongoing drainage from incision  4/25--some agitation overnight; still with midline incision drainage; febrile (Tmax 100.6)  4/26--re-scanned yesterday--unchanged; lalo spont breathing trial; for PEG-J today    Medications   Scheduled Meds:    potassium chloride  20 mEq IntraVENous Q1H    calcium gluconate  1,000 mg IntraVENous Once    potassium phosphate IVPB (PERIPHERAL LINE)  20 mmol IntraVENous Once    octreotide  50 mcg IntraVENous Q8H    meropenem  1,000 mg IntraVENous Q8H    valproic acid  250 mg Oral 3 times per day    buPROPion  100 mg Oral TID    insulin glargine  12 Units SubCUTAneous Nightly    LORazepam  1 mg Oral Q4H    polyethylene  glycol  17 g Oral Daily    sennosides-docusate sodium  2 tablet Oral Daily    insulin lispro  0-12 Units SubCUTAneous Q6H    hydrocortisone sodium succinate PF (SOLU-CORTEF) 25 mg in sterile water 2 mL injection  25 mg IntraVENous Q12H    Followed by    [START ON 2024] hydrocortisone sodium succinate PF (SOLU-CORTEF) 25 mg in sterile water 2 mL injection  25 mg IntraVENous Daily    gabapentin  300 mg Oral Nightly    methocarbamol  500 mg Oral 4x Daily    bisacodyl  10 mg Rectal Daily    micafungin  100 mg IntraVENous Daily    oxyCODONE  10 mg Oral Q4H    ipratropium 0.5 mg-albuterol 2.5 mg  1 Dose Inhalation Q4H WA RT    sodium chloride flush  5-40 mL IntraVENous BID    metoclopramide  10 mg IntraVENous q8h    acetaminophen  650 mg Oral 4 times per day    chlorhexidine  15 mL Mouth/Throat BID    Polyvinyl Alcohol-Povidone PF  1 drop Both Eyes Q4H    Or    artificial tears   Both Eyes Q4H    lidocaine  1 patch TransDERmal Daily    enoxaparin  40 mg SubCUTAneous Daily    atorvastatin  10 mg Oral Nightly    ketotifen fumarate  1 drop Both Eyes BID    pantoprazole (PROTONIX) 40 mg in sodium chloride (PF) 0.9 % 10 mL injection  40 mg IntraVENous Daily     Continuous Infusions:    PN-Adult  3-in-1 Central Line (Custom) 45.8 mL/hr at 24    propofol 20 mcg/kg/min (24)    sodium chloride      fentaNYL 50 mcg/hr (24)    dextrose       PRN Meds: midazolam, hydrALAZINE, labetalol, sodium chloride, medicated lip balm, fentaNYL **AND** fentaNYL, benzonatate, glucose, dextrose bolus **OR** dextrose bolus, glucagon (rDNA), dextrose, ondansetron    Physical Examination      Vitals:  BP (!) 163/76   Pulse 78   Temp 99.3 °F (37.4 °C) (Bladder)   Resp 18   Ht 1.626 m (5' 4\")   Wt 93.2 kg (205 lb 8 oz)   SpO2 97%   BMI 35.27 kg/m²   Temp (24hrs), Av.6 °F (37.6 °C), Min:99 °F (37.2 °C), Max:100.4 °F (38 °C)      I/O (24Hr):    Intake/Output Summary (Last 24 hours) at 2024 0915  Last

## 2024-04-26 NOTE — PLAN OF CARE
Problem: Discharge Planning  Goal: Discharge to home or other facility with appropriate resources  Outcome: Progressing     Problem: Skin/Tissue Integrity  Goal: Absence of new skin breakdown  Description: 1.  Monitor for areas of redness and/or skin breakdown  2.  Assess vascular access sites hourly  3.  Every 4-6 hours minimum:  Change oxygen saturation probe site  4.  Every 4-6 hours:  If on nasal continuous positive airway pressure, respiratory therapy assess nares and determine need for appliance change or resting period.  4/25/2024 2011 by Alely Walters, RN  Outcome: Progressing     Problem: Pain  Goal: Verbalizes/displays adequate comfort level or baseline comfort level  4/25/2024 2011 by Alley Walters, RN  Outcome: Progressing     Problem: Safety - Adult  Goal: Free from fall injury  4/25/2024 2011 by Alley Walters, RN  Outcome: Progressing     Problem: Safety - Medical Restraint  Goal: Remains free of injury from restraints (Restraint for Interference with Medical Device)  Description: INTERVENTIONS:  1. Determine that other, less restrictive measures have been tried or would not be effective before applying the restraint  2. Evaluate the patient's condition at the time of restraint application  3. Inform patient/family regarding the reason for restraint  4. Q2H: Monitor safety, psychosocial status, comfort, nutrition and hydration  4/25/2024 2011 by Alley Walters, RN  Outcome: Progressing

## 2024-04-26 NOTE — PROGRESS NOTES
Comprehensive Nutrition Assessment    Type and Reason for Visit:  Reassess    Nutrition Recommendations/Plan:     Continue NPO  Modify Parenteral Nutrition to better meet est needs w/ propofol rate decrease:     Custom 3-in-1 (1400 ml tv @58.3 ml/hr)   to provide 100 gm AA, 205 gm dextrose, 30 gm lipid, & 1400 kcals  Propofol providing additional 306 kcals at current rate    Recommend TG draw w/ ongoing sedation  Monitor/ replace electrolytes prn, current hypokalemia       Malnutrition Assessment:  Malnutrition Status:  At risk for malnutrition  (04/26/24 1256)    Context:  Acute Illness     Findings of the 6 clinical characteristics of malnutrition:  Energy Intake:  75% or less of estimated energy requirements for 7 or more days (averate)  Weight Loss:  Unable to assess (large fluid shifts/ wt fluctuations)     Body Fat Loss:  No significant body fat loss     Muscle Mass Loss:  No significant muscle mass loss    Fluid Accumulation:  No significant fluid accumulation     Strength:  Not Performed    Nutrition Assessment:    Pt remains at risk d/t ongoing need for SICU care & PN support. Admit w/ large pancreatic cyst s/p open pylorus preserving whipple 4/15 complicated by worsening pancreatic leak requrining takeback pancreatectomy/ splenectomy 4/18. Noted gelatinous output from midline. PMHx CAD, Back pain/ cervical fusion, & prediabetes (now post pancreatectomy DM). Trickle feeds initiated however noted intolerance/ large emesis. Plans for PEG-J today. TPN running, will provide updated full dose recs & monitor.    Nutrition Related Findings:    Pt remains intubated/ sedated, MAP WNL, +I/O's 4L, +1/+2 edema, hypoactive BS, closed suction drain x2, NGT LIS, emesis, TPN running, hypernatremia, hypokalemia     Wound Type: Multiple, Surgical Incision       Current Nutrition Intake & Therapies:    Average Meal Intake: NPO  Current Parenteral Nutrition Orders:  Type and Formula: 3-in-1 Custom   Duration:

## 2024-04-26 NOTE — CARE COORDINATION
4/26 Care Coordination: Pt remains kin SICU, S/P Birgit 4/15. Pt remains on Vent, IV Sedation. for PEG-J today. On IV TPN, IV ATB's Cont GIULIANO's. With Current status unclear on discharge needs. Prior to pt being intubated her plan was to return home. She was independent and cares for her handicapped daughter. [facility] is following back door. Per ICU Team pt lalo PS, may try to Extubate.   CM/SW will continue to follow for discharge planning.   Vivek SUMMERSN,RN--BC  [phone]

## 2024-04-26 NOTE — FLOWSHEET NOTE
Patient intubated and sedated becoming agitated and restless when awake attempting to reach for ETT and lines/tubes for critical care management. Will continue use of bilateral soft wrist restraints at this time. Will continue to assess and monitor for earliest removal.

## 2024-04-26 NOTE — PROGRESS NOTES
insufficiency   Intubated   ARDS-keep plateau pressure <30, Vt turned down and RR increased  Cough   Tessalon pearls   Duonebs   GI:  pancreatic cyst--s/p PP Whipple--FTWP, HPB following  Pancreatic leak--NPO, TPN, antibiotics, HPB following, s/p total pancreatectomy with splenectomy  Constipation--start dulcolax supp  Drainage from incision site noted 4/23  HPB following   FEN: hypocalcemia   Renal:    JUSTINE--resolved, c/w bobby for now  Diuretics  acetazolamide   Heme:    acute blood loss anemia--monitor, transfuse 1U PRBC on 4/21  Endocrine:    DM--endocrine c/s, insulin   Endo following   ID: pancreatic leak + positive surgical site cultures   Meropenem + micafungin   S/p splenectomy--vaccines prior to d/c  ID following       Pain/Analgesia: fentanyl, tylenol  Bowel regimen:reglan, dulcolax, glycolax, senokot  GI proph: protonix   Glucose protocol: BGL <180   Diet: PN-Adult  3-in-1 Central Line (Custom)  Diet NPO Exceptions are: Sips of Water with Meds  DVT proph: lovenox 40mg sq  Seizure proph: none  Mouth/eye care: per patient  Bobby: yes  Ancillary consults: ID, endocrine   Family Update: as possible  CODE Status: Full Code    Dispo: ICU    Electronically signed by Jose Laboy PGY-2, DO 4/26/2024  9:19 AM

## 2024-04-26 NOTE — PROGRESS NOTES
04/26/2024 05:01 AM    CO2 27 04/26/2024 05:01 AM    BUN 27 04/26/2024 05:01 AM    CREATININE 0.6 04/26/2024 05:01 AM    CREATININE 0.6 04/25/2024 04:21 AM    CREATININE 0.6 04/24/2024 06:10 PM    GFRAA >60 07/25/2022 07:16 AM    LABGLOM >90 04/26/2024 05:01 AM    GLUCOSE 185 04/26/2024 05:01 AM    PROT 5.4 04/26/2024 05:01 AM    CALCIUM 7.9 04/26/2024 05:01 AM    BILITOT 0.4 04/26/2024 05:01 AM    ALKPHOS 145 04/26/2024 05:01 AM    AST 25 04/26/2024 05:01 AM    ALT 17 04/26/2024 05:01 AM       Radiology: CT abdomen and pelvis (04/25): Postsurgical changes from total pancreatectomy.  Interval decrease in size of   left subphrenic collection.  Stable retroperitoneal/aortocaval collection.   No significant interval change in appearance of upper abdominal mesenteric   inflammation and fatty stranding.       Microbiology:     Specimen: Tissue Updated: 04/21/24 1138      Specimen Description .ASCITIC FLUID    Direct Exam Swab collections are low-yield and rarely indicated. Generally, the specimen volume when collected by swab is small, reducing the probability of isolating organisms: many organisms adhere to the fibers of the swab, which reduces the opportunity or recovering organisms. Interpret results with caution.     FEW Polymorphonuclear leukocytes     NO EPITHELIAL CELLS     NO ORGANISMS SEEN    Culture CANDIDA ALBICANS Identification by MALDI-TOF LIGHT GROWTH Organism sent to Reference laboratory for susceptibility testing Abnormal      CANDIDA GLABRATA Identification by MALDI-TOF RARE GROWTH Organism sent to Reference laboratory for susceptibility testing Abnormal          Assessment:  Septic shock/fever/leukocytosis, secondary to surgical site Candida albicans and glabrata infection/bilious peritonitis associated with partial dehiscence of pancreaticojejunostomy anastomosis s/p reopening recent laparotomy, completion pancreatectomy, splenectomy, omentectomy on 04/18.  Symptomatic pancreatic serous  cystadenoma, s/p pylorus-preserving pancreaticoduodenectomy, placement of biliary stent, portal lymphadenectomy, round ligament and omental pedicle flap harvest, appendectomy on 04/15  Leukocytosis, on steroids  Need for vaccination    Recommendations:  Continue micafungin 100 mg q24h.  Continue meropenem 1g q8h.  Follow up blood, respiratory, tissue and wound cultures.  Follow up Surgery recommendations.  Post splenectomy vaccination to be given prior to discharge or whenever patient is afebrile and clinically improved:  - pneumococcal vaccine ([id number]) IM   - meningococcal polysaccharide vaccine (Menveo) IM   - Group B meningococcal vaccine-Bexsero   - HIB polysaccharide vaccine (ActHIB) IM   - Flu vaccine   Follow up in 2 months with [facility] ([phone]) or with primary care to get booster doses for:  1) meningococcal polysaccharide vaccine (Menveo) - second dose   2)Group B meningococcal vaccine-Bexsero second dose  Update the following vaccinations in 5 years:  - meningococcal vaccine booster every 5 years.  Continue local wound care.  Continue supportive care.     Thank you for involving me in the care of Leatha Willard. ID will continue to follow. Please do not hesitate to call for any questions or concerns.    Electronically signed by Kandi Giang MD on 4/26/2024 at 10:18 AM

## 2024-04-27 ENCOUNTER — APPOINTMENT (OUTPATIENT)
Dept: GENERAL RADIOLOGY | Age: 59
DRG: 004 | End: 2024-04-27
Attending: STUDENT IN AN ORGANIZED HEALTH CARE EDUCATION/TRAINING PROGRAM
Payer: COMMERCIAL

## 2024-04-27 LAB
AADO2: 129.8 MMHG
ALBUMIN SERPL-MCNC: 2.7 G/DL (ref 3.5–5.2)
ALP SERPL-CCNC: 146 U/L (ref 35–104)
ALT SERPL-CCNC: 15 U/L (ref 0–32)
ANION GAP SERPL CALCULATED.3IONS-SCNC: 10 MMOL/L (ref 7–16)
ANION GAP SERPL CALCULATED.3IONS-SCNC: 10 MMOL/L (ref 7–16)
AST SERPL-CCNC: 24 U/L (ref 0–31)
B.E.: 3.3 MMOL/L (ref -3–3)
BASOPHILS # BLD: 0.18 K/UL (ref 0–0.2)
BASOPHILS NFR BLD: 1 % (ref 0–2)
BILIRUB SERPL-MCNC: 0.3 MG/DL (ref 0–1.2)
BUN SERPL-MCNC: 33 MG/DL (ref 6–20)
BUN SERPL-MCNC: 34 MG/DL (ref 6–20)
CA-I BLD-SCNC: 1.12 MMOL/L (ref 1.15–1.33)
CALCIUM SERPL-MCNC: 7.8 MG/DL (ref 8.6–10.2)
CALCIUM SERPL-MCNC: 7.9 MG/DL (ref 8.6–10.2)
CHLORIDE SERPL-SCNC: 108 MMOL/L (ref 98–107)
CHLORIDE SERPL-SCNC: 108 MMOL/L (ref 98–107)
CO2 SERPL-SCNC: 26 MMOL/L (ref 22–29)
CO2 SERPL-SCNC: 29 MMOL/L (ref 22–29)
COHB: 0.2 % (ref 0–1.5)
CREAT SERPL-MCNC: 0.5 MG/DL (ref 0.5–1)
CREAT SERPL-MCNC: 0.5 MG/DL (ref 0.5–1)
CRITICAL: ABNORMAL
DATE ANALYZED: ABNORMAL
DATE OF COLLECTION: ABNORMAL
EOSINOPHIL # BLD: 0.36 K/UL (ref 0.05–0.5)
EOSINOPHILS RELATIVE PERCENT: 2 % (ref 0–6)
ERYTHROCYTE [DISTWIDTH] IN BLOOD BY AUTOMATED COUNT: 14.5 % (ref 11.5–15)
FIO2: 40 %
GFR SERPL CREATININE-BSD FRML MDRD: >90 ML/MIN/1.73M2
GFR SERPL CREATININE-BSD FRML MDRD: >90 ML/MIN/1.73M2
GLUCOSE BLD-MCNC: 175 MG/DL (ref 74–99)
GLUCOSE BLD-MCNC: 181 MG/DL (ref 74–99)
GLUCOSE BLD-MCNC: 215 MG/DL (ref 74–99)
GLUCOSE BLD-MCNC: 224 MG/DL (ref 74–99)
GLUCOSE BLD-MCNC: 225 MG/DL (ref 74–99)
GLUCOSE BLD-MCNC: 250 MG/DL (ref 74–99)
GLUCOSE SERPL-MCNC: 173 MG/DL (ref 74–99)
GLUCOSE SERPL-MCNC: 222 MG/DL (ref 74–99)
HCO3: 27.5 MMOL/L (ref 22–26)
HCT VFR BLD AUTO: 25.1 % (ref 34–48)
HGB BLD-MCNC: 7.7 G/DL (ref 11.5–15.5)
HHB: 3.2 % (ref 0–5)
INR PPP: 1.1
LAB: ABNORMAL
LYMPHOCYTES NFR BLD: 1.25 K/UL (ref 1.5–4)
LYMPHOCYTES RELATIVE PERCENT: 6 % (ref 20–42)
Lab: 515
MAGNESIUM SERPL-MCNC: 2.5 MG/DL (ref 1.6–2.6)
MCH RBC QN AUTO: 29.6 PG (ref 26–35)
MCHC RBC AUTO-ENTMCNC: 30.7 G/DL (ref 32–34.5)
MCV RBC AUTO: 96.5 FL (ref 80–99.9)
METAMYELOCYTES ABSOLUTE COUNT: 0.18 K/UL (ref 0–0.12)
METAMYELOCYTES: 1 % (ref 0–1)
METHB: 0.3 % (ref 0–1.5)
MICROORGANISM SPEC CULT: ABNORMAL
MICROORGANISM SPEC CULT: ABNORMAL
MICROORGANISM SPEC CULT: NORMAL
MICROORGANISM/AGENT SPEC: ABNORMAL
MODE: ABNORMAL
MONOCYTES NFR BLD: 0.36 K/UL (ref 0.1–0.95)
MONOCYTES NFR BLD: 2 % (ref 2–12)
MYELOCYTES ABSOLUTE COUNT: 0.36 K/UL
MYELOCYTES: 2 %
NEUTROPHILS NFR BLD: 87 % (ref 43–80)
NEUTS SEG NFR BLD: 17.43 K/UL (ref 1.8–7.3)
NUCLEATED RED BLOOD CELLS: 2 PER 100 WBC
O2 SATURATION: 96.8 % (ref 92–98.5)
O2HB: 96.3 % (ref 94–97)
OPERATOR ID: 8219
PATIENT TEMP: 37 C
PCO2: 40.1 MMHG (ref 35–45)
PEEP/CPAP: 8 CMH2O
PFO2: 2.48 MMHG/%
PH BLOOD GAS: 7.45 (ref 7.35–7.45)
PHOSPHATE SERPL-MCNC: 3.1 MG/DL (ref 2.5–4.5)
PLATELET # BLD AUTO: 895 K/UL (ref 130–450)
PMV BLD AUTO: 10.9 FL (ref 7–12)
PO2: 99.3 MMHG (ref 75–100)
POTASSIUM SERPL-SCNC: 3.7 MMOL/L (ref 3.5–5)
POTASSIUM SERPL-SCNC: 4.3 MMOL/L (ref 3.5–5)
PROT SERPL-MCNC: 5.7 G/DL (ref 6.4–8.3)
PROTHROMBIN TIME: 12 SEC (ref 9.3–12.4)
PS: 10 CMH20
RBC # BLD AUTO: 2.6 M/UL (ref 3.5–5.5)
RBC # BLD: ABNORMAL 10*6/UL
RI(T): 1.31
SEND OUT REPORT: NORMAL
SODIUM SERPL-SCNC: 144 MMOL/L (ref 132–146)
SODIUM SERPL-SCNC: 147 MMOL/L (ref 132–146)
SOURCE, BLOOD GAS: ABNORMAL
SPECIMEN DESCRIPTION: ABNORMAL
SPECIMEN DESCRIPTION: NORMAL
TEST NAME: NORMAL
THB: 8.7 G/DL (ref 11.5–16.5)
TIME ANALYZED: 521
TRIGL SERPL-MCNC: 220 MG/DL
WBC # BLD: ABNORMAL 10*3/UL
WBC OTHER # BLD: 20.1 K/UL (ref 4.5–11.5)

## 2024-04-27 PROCEDURE — 85025 COMPLETE CBC W/AUTO DIFF WBC: CPT

## 2024-04-27 PROCEDURE — 6370000000 HC RX 637 (ALT 250 FOR IP): Performed by: STUDENT IN AN ORGANIZED HEALTH CARE EDUCATION/TRAINING PROGRAM

## 2024-04-27 PROCEDURE — 2580000003 HC RX 258: Performed by: STUDENT IN AN ORGANIZED HEALTH CARE EDUCATION/TRAINING PROGRAM

## 2024-04-27 PROCEDURE — 99232 SBSQ HOSP IP/OBS MODERATE 35: CPT | Performed by: INTERNAL MEDICINE

## 2024-04-27 PROCEDURE — 6360000002 HC RX W HCPCS: Performed by: STUDENT IN AN ORGANIZED HEALTH CARE EDUCATION/TRAINING PROGRAM

## 2024-04-27 PROCEDURE — 80053 COMPREHEN METABOLIC PANEL: CPT

## 2024-04-27 PROCEDURE — 94003 VENT MGMT INPAT SUBQ DAY: CPT

## 2024-04-27 PROCEDURE — C9113 INJ PANTOPRAZOLE SODIUM, VIA: HCPCS | Performed by: STUDENT IN AN ORGANIZED HEALTH CARE EDUCATION/TRAINING PROGRAM

## 2024-04-27 PROCEDURE — 6360000002 HC RX W HCPCS: Performed by: INTERNAL MEDICINE

## 2024-04-27 PROCEDURE — 6370000000 HC RX 637 (ALT 250 FOR IP): Performed by: INTERNAL MEDICINE

## 2024-04-27 PROCEDURE — 2500000003 HC RX 250 WO HCPCS: Performed by: STUDENT IN AN ORGANIZED HEALTH CARE EDUCATION/TRAINING PROGRAM

## 2024-04-27 PROCEDURE — 84100 ASSAY OF PHOSPHORUS: CPT

## 2024-04-27 PROCEDURE — 84478 ASSAY OF TRIGLYCERIDES: CPT

## 2024-04-27 PROCEDURE — 71045 X-RAY EXAM CHEST 1 VIEW: CPT

## 2024-04-27 PROCEDURE — 2000000000 HC ICU R&B

## 2024-04-27 PROCEDURE — 85610 PROTHROMBIN TIME: CPT

## 2024-04-27 PROCEDURE — 37799 UNLISTED PX VASCULAR SURGERY: CPT

## 2024-04-27 PROCEDURE — 6370000000 HC RX 637 (ALT 250 FOR IP): Performed by: SURGERY

## 2024-04-27 PROCEDURE — 99291 CRITICAL CARE FIRST HOUR: CPT | Performed by: SURGERY

## 2024-04-27 PROCEDURE — 2580000003 HC RX 258

## 2024-04-27 PROCEDURE — 82962 GLUCOSE BLOOD TEST: CPT

## 2024-04-27 PROCEDURE — 94640 AIRWAY INHALATION TREATMENT: CPT

## 2024-04-27 PROCEDURE — 2580000003 HC RX 258: Performed by: INTERNAL MEDICINE

## 2024-04-27 PROCEDURE — A4216 STERILE WATER/SALINE, 10 ML: HCPCS | Performed by: STUDENT IN AN ORGANIZED HEALTH CARE EDUCATION/TRAINING PROGRAM

## 2024-04-27 PROCEDURE — 82805 BLOOD GASES W/O2 SATURATION: CPT

## 2024-04-27 PROCEDURE — 83735 ASSAY OF MAGNESIUM: CPT

## 2024-04-27 PROCEDURE — 80048 BASIC METABOLIC PNL TOTAL CA: CPT

## 2024-04-27 PROCEDURE — 82330 ASSAY OF CALCIUM: CPT

## 2024-04-27 PROCEDURE — 31500 INSERT EMERGENCY AIRWAY: CPT

## 2024-04-27 RX ORDER — FUROSEMIDE 10 MG/ML
40 INJECTION INTRAMUSCULAR; INTRAVENOUS ONCE
Status: COMPLETED | OUTPATIENT
Start: 2024-04-27 | End: 2024-04-27

## 2024-04-27 RX ADMIN — METOCLOPRAMIDE 10 MG: 5 INJECTION, SOLUTION INTRAMUSCULAR; INTRAVENOUS at 20:29

## 2024-04-27 RX ADMIN — BUPROPION HYDROCHLORIDE 100 MG: 100 TABLET, FILM COATED ORAL at 20:19

## 2024-04-27 RX ADMIN — WATER 25 MG: 1 INJECTION INTRAMUSCULAR; INTRAVENOUS; SUBCUTANEOUS at 08:22

## 2024-04-27 RX ADMIN — BUPROPION HYDROCHLORIDE 100 MG: 100 TABLET, FILM COATED ORAL at 09:30

## 2024-04-27 RX ADMIN — GABAPENTIN 300 MG: 300 CAPSULE ORAL at 20:30

## 2024-04-27 RX ADMIN — ACETAMINOPHEN 650 MG: 650 SOLUTION ORAL at 18:04

## 2024-04-27 RX ADMIN — IPRATROPIUM BROMIDE AND ALBUTEROL SULFATE 1 DOSE: .5; 2.5 SOLUTION RESPIRATORY (INHALATION) at 11:17

## 2024-04-27 RX ADMIN — INSULIN GLARGINE 12 UNITS: 100 INJECTION, SOLUTION SUBCUTANEOUS at 20:31

## 2024-04-27 RX ADMIN — METHOCARBAMOL 500 MG: 500 TABLET ORAL at 15:32

## 2024-04-27 RX ADMIN — INSULIN LISPRO 2 UNITS: 100 INJECTION, SOLUTION INTRAVENOUS; SUBCUTANEOUS at 05:23

## 2024-04-27 RX ADMIN — POTASSIUM BICARBONATE 40 MEQ: 782 TABLET, EFFERVESCENT ORAL at 15:32

## 2024-04-27 RX ADMIN — KETOTIFEN FUMARATE 1 DROP: 0.35 SOLUTION/ DROPS OPHTHALMIC at 20:20

## 2024-04-27 RX ADMIN — OCTREOTIDE ACETATE 50 MCG: 50 INJECTION, SOLUTION INTRAVENOUS; SUBCUTANEOUS at 16:49

## 2024-04-27 RX ADMIN — POLYVINYL ALCOHOL, POVIDONE 1 DROP: 14; 6 SOLUTION/ DROPS OPHTHALMIC at 08:21

## 2024-04-27 RX ADMIN — SODIUM CHLORIDE, PRESERVATIVE FREE 40 MG: 5 INJECTION INTRAVENOUS at 08:21

## 2024-04-27 RX ADMIN — OXYCODONE HYDROCHLORIDE 10 MG: 5 SOLUTION ORAL at 22:47

## 2024-04-27 RX ADMIN — OXYCODONE HYDROCHLORIDE 10 MG: 5 SOLUTION ORAL at 11:48

## 2024-04-27 RX ADMIN — MEROPENEM 1000 MG: 1 INJECTION, POWDER, FOR SOLUTION INTRAVENOUS at 09:42

## 2024-04-27 RX ADMIN — POLYVINYL ALCOHOL, POVIDONE 1 DROP: 14; 6 SOLUTION/ DROPS OPHTHALMIC at 02:32

## 2024-04-27 RX ADMIN — MEROPENEM 1000 MG: 1 INJECTION, POWDER, FOR SOLUTION INTRAVENOUS at 02:32

## 2024-04-27 RX ADMIN — ACETAMINOPHEN 650 MG: 650 SOLUTION ORAL at 04:54

## 2024-04-27 RX ADMIN — LORAZEPAM 1 MG: 1 TABLET ORAL at 08:21

## 2024-04-27 RX ADMIN — OXYCODONE HYDROCHLORIDE 10 MG: 5 SOLUTION ORAL at 00:01

## 2024-04-27 RX ADMIN — OCTREOTIDE ACETATE 50 MCG: 50 INJECTION, SOLUTION INTRAVENOUS; SUBCUTANEOUS at 09:29

## 2024-04-27 RX ADMIN — MEROPENEM 1000 MG: 1 INJECTION, POWDER, FOR SOLUTION INTRAVENOUS at 18:04

## 2024-04-27 RX ADMIN — VALPROIC ACID 250 MG: 250 SOLUTION ORAL at 21:50

## 2024-04-27 RX ADMIN — INSULIN LISPRO 4 UNITS: 100 INJECTION, SOLUTION INTRAVENOUS; SUBCUTANEOUS at 22:53

## 2024-04-27 RX ADMIN — SODIUM CHLORIDE, PRESERVATIVE FREE 10 ML: 5 INJECTION INTRAVENOUS at 20:21

## 2024-04-27 RX ADMIN — ENOXAPARIN SODIUM 40 MG: 100 INJECTION SUBCUTANEOUS at 08:22

## 2024-04-27 RX ADMIN — POLYVINYL ALCOHOL, POVIDONE 1 DROP: 14; 6 SOLUTION/ DROPS OPHTHALMIC at 14:24

## 2024-04-27 RX ADMIN — ATORVASTATIN CALCIUM 10 MG: 10 TABLET, FILM COATED ORAL at 20:18

## 2024-04-27 RX ADMIN — LORAZEPAM 1 MG: 1 TABLET ORAL at 22:47

## 2024-04-27 RX ADMIN — KETOTIFEN FUMARATE 1 DROP: 0.35 SOLUTION/ DROPS OPHTHALMIC at 08:30

## 2024-04-27 RX ADMIN — LORAZEPAM 1 MG: 1 TABLET ORAL at 15:32

## 2024-04-27 RX ADMIN — POLYETHYLENE GLYCOL 3350 17 G: 17 POWDER, FOR SOLUTION ORAL at 08:21

## 2024-04-27 RX ADMIN — PROPOFOL 19.97 MCG/KG/MIN: 10 INJECTION, EMULSION INTRAVENOUS at 07:01

## 2024-04-27 RX ADMIN — FUROSEMIDE 40 MG: 10 INJECTION, SOLUTION INTRAMUSCULAR; INTRAVENOUS at 09:39

## 2024-04-27 RX ADMIN — POLYVINYL ALCOHOL, POVIDONE 1 DROP: 14; 6 SOLUTION/ DROPS OPHTHALMIC at 20:20

## 2024-04-27 RX ADMIN — INSULIN LISPRO 2 UNITS: 100 INJECTION, SOLUTION INTRAVENOUS; SUBCUTANEOUS at 00:48

## 2024-04-27 RX ADMIN — Medication 50 MCG/HR: at 00:17

## 2024-04-27 RX ADMIN — VALPROIC ACID 250 MG: 250 SOLUTION ORAL at 14:24

## 2024-04-27 RX ADMIN — INSULIN LISPRO 4 UNITS: 100 INJECTION, SOLUTION INTRAVENOUS; SUBCUTANEOUS at 11:58

## 2024-04-27 RX ADMIN — MICAFUNGIN SODIUM 100 MG: 100 INJECTION, POWDER, LYOPHILIZED, FOR SOLUTION INTRAVENOUS at 16:53

## 2024-04-27 RX ADMIN — IPRATROPIUM BROMIDE AND ALBUTEROL SULFATE 1 DOSE: .5; 2.5 SOLUTION RESPIRATORY (INHALATION) at 15:44

## 2024-04-27 RX ADMIN — LORAZEPAM 1 MG: 1 TABLET ORAL at 20:18

## 2024-04-27 RX ADMIN — IPRATROPIUM BROMIDE AND ALBUTEROL SULFATE 1 DOSE: .5; 2.5 SOLUTION RESPIRATORY (INHALATION) at 08:27

## 2024-04-27 RX ADMIN — ACETAMINOPHEN 650 MG: 650 SOLUTION ORAL at 11:48

## 2024-04-27 RX ADMIN — MIDAZOLAM HYDROCHLORIDE 2 MG: 1 INJECTION, SOLUTION INTRAMUSCULAR; INTRAVENOUS at 03:35

## 2024-04-27 RX ADMIN — LORAZEPAM 1 MG: 1 TABLET ORAL at 02:38

## 2024-04-27 RX ADMIN — WATER 25 MG: 1 INJECTION INTRAMUSCULAR; INTRAVENOUS; SUBCUTANEOUS at 20:17

## 2024-04-27 RX ADMIN — METOCLOPRAMIDE 10 MG: 5 INJECTION, SOLUTION INTRAMUSCULAR; INTRAVENOUS at 04:55

## 2024-04-27 RX ADMIN — ACETAMINOPHEN 650 MG: 650 SOLUTION ORAL at 00:00

## 2024-04-27 RX ADMIN — LORAZEPAM 1 MG: 1 TABLET ORAL at 00:04

## 2024-04-27 RX ADMIN — OXYCODONE HYDROCHLORIDE 10 MG: 5 SOLUTION ORAL at 02:38

## 2024-04-27 RX ADMIN — POLYVINYL ALCOHOL, POVIDONE 1 DROP: 14; 6 SOLUTION/ DROPS OPHTHALMIC at 11:48

## 2024-04-27 RX ADMIN — LORAZEPAM 1 MG: 1 TABLET ORAL at 11:49

## 2024-04-27 RX ADMIN — 0.12% CHLORHEXIDINE GLUCONATE 15 ML: 1.2 RINSE ORAL at 20:10

## 2024-04-27 RX ADMIN — BUPROPION HYDROCHLORIDE 100 MG: 100 TABLET, FILM COATED ORAL at 14:25

## 2024-04-27 RX ADMIN — OXYCODONE HYDROCHLORIDE 10 MG: 5 SOLUTION ORAL at 08:21

## 2024-04-27 RX ADMIN — METHOCARBAMOL 500 MG: 500 TABLET ORAL at 11:49

## 2024-04-27 RX ADMIN — INSULIN LISPRO 4 UNITS: 100 INJECTION, SOLUTION INTRAVENOUS; SUBCUTANEOUS at 18:26

## 2024-04-27 RX ADMIN — OXYCODONE HYDROCHLORIDE 10 MG: 5 SOLUTION ORAL at 15:32

## 2024-04-27 RX ADMIN — OCTREOTIDE ACETATE 50 MCG: 50 INJECTION, SOLUTION INTRAVENOUS; SUBCUTANEOUS at 00:06

## 2024-04-27 RX ADMIN — POLYVINYL ALCOHOL, POVIDONE 1 DROP: 14; 6 SOLUTION/ DROPS OPHTHALMIC at 22:44

## 2024-04-27 RX ADMIN — BISACODYL 10 MG: 10 SUPPOSITORY RECTAL at 08:21

## 2024-04-27 RX ADMIN — METOCLOPRAMIDE 10 MG: 5 INJECTION, SOLUTION INTRAMUSCULAR; INTRAVENOUS at 14:24

## 2024-04-27 RX ADMIN — VALPROIC ACID 250 MG: 250 SOLUTION ORAL at 05:24

## 2024-04-27 RX ADMIN — PROPOFOL 20 MCG/KG/MIN: 10 INJECTION, EMULSION INTRAVENOUS at 15:40

## 2024-04-27 RX ADMIN — CALCIUM GLUCONATE: 98 INJECTION, SOLUTION INTRAVENOUS at 18:16

## 2024-04-27 RX ADMIN — METHOCARBAMOL 500 MG: 500 TABLET ORAL at 20:18

## 2024-04-27 RX ADMIN — IPRATROPIUM BROMIDE AND ALBUTEROL SULFATE 1 DOSE: .5; 2.5 SOLUTION RESPIRATORY (INHALATION) at 19:47

## 2024-04-27 RX ADMIN — METHOCARBAMOL 500 MG: 500 TABLET ORAL at 08:21

## 2024-04-27 RX ADMIN — ACETAMINOPHEN 650 MG: 650 SOLUTION ORAL at 22:46

## 2024-04-27 RX ADMIN — OXYCODONE HYDROCHLORIDE 10 MG: 5 SOLUTION ORAL at 20:20

## 2024-04-27 RX ADMIN — SENNOSIDES AND DOCUSATE SODIUM 2 TABLET: 50; 8.6 TABLET ORAL at 08:21

## 2024-04-27 RX ADMIN — 0.12% CHLORHEXIDINE GLUCONATE 15 ML: 1.2 RINSE ORAL at 08:25

## 2024-04-27 RX ADMIN — SODIUM CHLORIDE, PRESERVATIVE FREE 10 ML: 5 INJECTION INTRAVENOUS at 08:25

## 2024-04-27 ASSESSMENT — PAIN SCALES - GENERAL
PAINLEVEL_OUTOF10: 0

## 2024-04-27 ASSESSMENT — PULMONARY FUNCTION TESTS
PIF_VALUE: 19
PIF_VALUE: 25
PIF_VALUE: 18
PIF_VALUE: 19
PIF_VALUE: 26
PIF_VALUE: 19
PIF_VALUE: 26
PIF_VALUE: 19
PIF_VALUE: 25
PIF_VALUE: 19
PIF_VALUE: 18
PIF_VALUE: 24
PIF_VALUE: 18
PIF_VALUE: 18
PIF_VALUE: 19
PIF_VALUE: 18
PIF_VALUE: 18
PIF_VALUE: 19
PIF_VALUE: 25
PIF_VALUE: 27
PIF_VALUE: 26
PIF_VALUE: 25
PIF_VALUE: 19
PIF_VALUE: 19
PIF_VALUE: 25

## 2024-04-27 ASSESSMENT — PAIN - FUNCTIONAL ASSESSMENT
PAIN_FUNCTIONAL_ASSESSMENT: ACTIVITIES ARE NOT PREVENTED

## 2024-04-27 NOTE — PROGRESS NOTES
[facility] PROGRESS NOTE      Covering for Dr Giang       Chief complaint: Follow-up of septic shock/Candida albicans and glabrata infection/venous peritonitis associated with anastomosis leak    The patient is a 58 y.o. female with history of DM, hypertension, hyperlipidemia, cervical fusion, admitted on 04/15 for symptomatic pancreatic serous cystadenoma abutting many branches of the SMV and SMA prompting pylorus-preserving pancreaticoduodenectomy, placement of biliary stent, portal lymphadenectomy, round ligament and omental pedicle flap harvest, appendectomy on 04/15 during which no clinical findings of infection were noted. Postop course was complicated by intermittent fever since 04/18 up to 101.5 F and pancreatic fistula, prompting reopening recent laparotomy, completion pancreatectomy, splenectomy, omentectomy on 04/18 during which gush of a large volume of dark succus and infected fluid on opening, old infected hematoma, significant amount of necrotic omentum covering the anastomoses, dehiscence of anterior wall anastomosis with bile exiting the site of anastomosis at the jejunum, bile spilling within the lesser sac, necrotic pancreas were noted.  Tissue Gram stain and culture showed few polymorphonuclear leukocytes, no epithelial cells, no organisms, rare growth of Candida albicans, light growth of Candida glabrata, no anaerobes. Piperacillin-tazobactam was started on since admission.  She was noted to have incision wound drainage, prompting repeat CT abdomen and pelvis on 04/23 showing interval decrease in size in periportal right upper quadrant fluid collection without resolution (now measuring 3.6 x 1.7 cm as compared to previously 6.7 x 2.4 cm), new 6 x 4 x 3 cm right retroperitoneal fluid collection between superior mesenteric artery and inferior vena cava, new spindle-shaped fluid collection in subdiaphragmatic anterior left upper quadrant of the abdomen measuring 7 x 3 x 3 cm, increasing small amount of

## 2024-04-27 NOTE — PLAN OF CARE
Problem: Discharge Planning  Goal: Discharge to home or other facility with appropriate resources  4/26/2024 2130 by Margot Jon RN  Outcome: Progressing  4/26/2024 1817 by Patricia Bautista RN  Outcome: Not Progressing     Problem: Skin/Tissue Integrity  Goal: Absence of new skin breakdown  Description: 1.  Monitor for areas of redness and/or skin breakdown  2.  Assess vascular access sites hourly  3.  Every 4-6 hours minimum:  Change oxygen saturation probe site  4.  Every 4-6 hours:  If on nasal continuous positive airway pressure, respiratory therapy assess nares and determine need for appliance change or resting period.  4/26/2024 2130 by Margot Jon RN  Outcome: Progressing  4/26/2024 1817 by Patricia Bautista RN  Outcome: Progressing     Problem: ABCDS Injury Assessment  Goal: Absence of physical injury  4/26/2024 2130 by Margot Jon RN  Outcome: Progressing  4/26/2024 1817 by Patricia Bautista RN  Outcome: Progressing     Problem: Respiratory - Adult  Goal: Achieves optimal ventilation and oxygenation  4/26/2024 2130 by Margot Jon RN  Outcome: Progressing  4/26/2024 1817 by Patricia Bautista RN  Outcome: Progressing     Problem: Cardiovascular - Adult  Goal: Maintains optimal cardiac output and hemodynamic stability  4/26/2024 2130 by Margot Jon RN  Outcome: Progressing  4/26/2024 1817 by Patricia Bautista RN  Outcome: Progressing  Goal: Absence of cardiac dysrhythmias or at baseline  4/26/2024 2130 by Margot Jon RN  Outcome: Progressing  4/26/2024 1817 by Patricia Bautista RN  Outcome: Progressing     Problem: Musculoskeletal - Adult  Goal: Return mobility to safest level of function  4/26/2024 2130 by Margot Jon RN  Outcome: Progressing  4/26/2024 1817 by Patricia Bautista RN  Outcome: Not Progressing  Goal: Maintain proper alignment of affected body part  Outcome: Progressing  Goal: Return ADL status to a safe level of function  4/26/2024 2130 by Margot Jon RN  Outcome: Progressing  4/26/2024 1817 by

## 2024-04-27 NOTE — PROGRESS NOTES
[facility]   Daily Progress Note      Patient's Name/Date of Birth: Leatha Willard / 1965    Date: April 27, 2024     Chief Complaint: s/p open whipple, Grade C POPF s/p takeback to OR for completion pancreatectomy    Patient Active Problem List   Diagnosis    Recurrent major depressive disorder (HCC)    Essential hypertension    Hyperlipidemia    Prediabetes    Class 1 obesity due to excess calories without serious comorbidity with body mass index (BMI) of 32.0 to 32.9 in adult    HIREN (obstructive sleep apnea)    Pancreatic cyst    Colon polyps    Cyst of pancreas    Chest pain    Tobacco abuse-- quit 2 weeks ago    Abdominal pain    Serous cystadenoma    Pancreatic fistula    Sepsis with acute renal failure and septic shock (HCC)    JUSTINE (acute kidney injury) (HCC)    Hypophosphatemia    Acute respiratory failure with hypoxia (HCC)    Post-pancreatectomy diabetes (HCC)    On total parenteral nutrition    Hypoalbuminemia    Electrolyte imbalance    Hypocalcemia    Acute blood loss anemia    Hypokalemia       Subjective:   Still on PS with adequate sats   Remains sedated  Continued midline drainage - dressing being changed frequently  Low grade temp - Tmax 100.4F again last night   Jps w/ minimal output        Objective:  BP (!) 161/80   Pulse 85   Temp 100 °F (37.8 °C) (Bladder)   Resp 21   Ht 1.626 m (5' 4\")   Wt 93.2 kg (205 lb 7.5 oz)   SpO2 98%   BMI 35.27 kg/m²   Labs:  Recent Labs     04/25/24 0421 04/26/24  0501 04/27/24  0504   WBC 28.4* 24.7* 20.1*   HGB 8.2* 7.7* 7.7*   HCT 26.4* 25.2* 25.1*       Recent Labs     04/25/24 0421 04/26/24  0501 04/27/24  0504    147* 144   K 3.0* 3.3* 4.3    107 108*   CO2 29 27 26   BUN 21* 27* 33*   CREATININE 0.6 0.6 0.5       Recent Labs     04/25/24 0421 04/26/24  0501 04/27/24  0504   ALKPHOS 166* 145* 146*   ALT 21 17 15   AST 31 25 24   BILITOT 0.6 0.4 0.3       Weights:  4/15 - 76.2 kg  ....  4/21 - 88.7 kg  4/22 - 96.8 kg  4/23 - 94.5  kg  4/24 - 94.7 kg  4/25 - 92.4 kg  4/26 - 93.2 kg    General appearance: sedated, surrounded by life support devices, less anarsarca compared to prior  Head: NCAT, PERRL, anicteric sclera, NG present to LIWS w/ bilious output in cannister, ETT present  Neck: supple, no masses  Lungs: symmetric chest rise, mechanically ventilated PS 40% FiO2 8 peep  Heart: warm throughout, MAPs on arterial line >65mmHg  Abdomen: soft, mild distension, no tympany, midline with continued mucoid brown fluid; staples present, erythema a little worse at the middle upper portion of midline which is where most of the fluid can be expressed from - skin there in between staples is a little  but no evidence of fascial dehiscence or evisceration; minimal output from drains w/ brownish output  Skin: previous GIULIANO drain incisions caudad to current GIULIANO drains w/ minimal exudative drainage - dressing changed this AM, midline as above  Extremities: less dependent edema    : bobby w/ yellow urine    Assessment/Plan:  Leatha Willard is a 58 y.o. female POD 11 from open pylorus-preserving Whipple portal lymphadenectomy, appendectomy for symptomatic 13cm serous cystadenoma of the head of the pancreas. Now POD 8 from PJ excision, completion pancreatectomy, splenectomy, omentectomy and washout for PJ disruption.    Continued drainage from midline  Jps w/ minimal output  Continued low grade temps    CT scan yesterday reviewed - no large fluid collections appreciated    Continue to change midline dressing frequently  ID recommendations - post splenectomy vax 4/28  Multimodal analgesia - wean sedation as able  SAT/SBT - poss need for trach  NG to LIWS  Continue ppi   Continue TPN  Endocrine recommendations  Continue GIULIANO drains  DVT chemoppx  Continue bobby  Continue critical care   PEG-J with Dr. Hhan when anesthesia staff available       Kerry Mcdowell DO  PGY-1 General Surgery Resident    Patient is slowly being weaned from the ventilator was on

## 2024-04-27 NOTE — PLAN OF CARE
Problem: Discharge Planning  Goal: Discharge to home or other facility with appropriate resources  4/27/2024 0042 by Margot Jon RN  Outcome: Progressing  4/26/2024 2130 by Margot Jon RN  Outcome: Progressing  4/26/2024 1817 by Patricia Bautista RN  Outcome: Not Progressing     Problem: Skin/Tissue Integrity  Goal: Absence of new skin breakdown  Description: 1.  Monitor for areas of redness and/or skin breakdown  2.  Assess vascular access sites hourly  3.  Every 4-6 hours minimum:  Change oxygen saturation probe site  4.  Every 4-6 hours:  If on nasal continuous positive airway pressure, respiratory therapy assess nares and determine need for appliance change or resting period.  4/27/2024 0042 by Margot Jon RN  Outcome: Progressing  4/26/2024 2130 by Margot Jon RN  Outcome: Progressing  4/26/2024 1817 by Patricia Bautista RN  Outcome: Progressing     Problem: ABCDS Injury Assessment  Goal: Absence of physical injury  4/27/2024 0042 by Margot Jon RN  Outcome: Progressing  4/26/2024 2130 by Margot Jon RN  Outcome: Progressing  4/26/2024 1817 by Patricia Bautista RN  Outcome: Progressing     Problem: Respiratory - Adult  Goal: Achieves optimal ventilation and oxygenation  4/27/2024 0042 by Margot Jon RN  Outcome: Progressing  4/26/2024 2130 by Margot Jon RN  Outcome: Progressing  4/26/2024 1817 by Patricia Bautista RN  Outcome: Progressing     Problem: Cardiovascular - Adult  Goal: Maintains optimal cardiac output and hemodynamic stability  4/27/2024 0042 by Margot Jon RN  Outcome: Progressing  4/26/2024 2130 by Margot Jon RN  Outcome: Progressing  4/26/2024 1817 by Patricia Bautista RN  Outcome: Progressing  Goal: Absence of cardiac dysrhythmias or at baseline  4/27/2024 0042 by Margot Jon RN  Outcome: Progressing  4/26/2024 2130 by Margot Jon RN  Outcome: Progressing  4/26/2024 1817 by Patricia Bautista RN  Outcome: Progressing     Problem: Musculoskeletal - Adult  Goal: Return mobility to

## 2024-04-27 NOTE — PROGRESS NOTES
Placed back on AC, pt tachypneic and having low tidal volumes.    04/27/24 1544   Patient Observation   Pulse 95   SpO2 97 %   Vent Information   Vent Mode (S)  AC/VC   Ventilator Settings   FiO2  40 %   Vt (Set, mL) 350 mL   Resp Rate (Set) 16 bpm   PEEP/CPAP (cmH2O) 8   Peak Inspiratory Flow (Set) 55 L/sec   Vent Patient Data (Readings)   Vt (Measured) 363 mL   Peak Inspiratory Pressure (cmH2O) 27 cmH2O   Rate Measured 26 br/min   Minute Volume (L/min) 9.55 Liters   Peak Inspiratory Flow (lpm) 55 L/sec   Mean Airway Pressure (cmH2O) 14 cmH20   Plateau Pressure (cm H2O) 23 cm H2O   Driving Pressure 15   I:E Ratio 1:2.30   Flow Sensitivity 2 L/min   Static Compliance (L/cm H2O) 23   Backup Apnea On   Backup Rate 16 Breaths Per Minute   Backup Vt 350   Backup I Time 20   Vent Alarm Settings   High Pressure (cmH2O) 40 cmH2O   Low Minute Volume (lpm) 4 L/min   High Minute Volume (lpm) 16 L/min   Low Exhaled Vt (ml) 250 mL   High Exhaled Vt (ml) 600 mL   RR High (bpm) 30 br/min   Apnea (secs) 20 secs   Additional Respiratoray Assessments   Humidification Source Heated wire   Humidification Temp 37.9   Circuit Condensation Drained   Ambu Bag With Mask At Bedside Yes   Treatment   $Treatment Type $Inhaled Therapy/Meds

## 2024-04-27 NOTE — PROGRESS NOTES
ENDOCRINOLOGY PROGRESS NOTE      Date of admission: 4/15/2024  Date of service: 4/27/2024  Admitting physician: Norma Rogers MD   Primary Care Physician: Mekhi Escalona MD  Consultant physician: Jonathan Curiel MD     Reason for the consultation:  Post-pancreatectomy DM     History of Present Illness:  Leatha Willard is a 58 y.o. old female with PMH of cystic neoplasm of the head of pancreas, HTN, HLD and other listed below admitted to [facility] on 4/15/2024 for completion pancreatectomy. Endocrine service was consulted for diabetes management.    Subjective   I saw and examined the patient at bedside this AM.  Patient still intubated sedated, no acute events     Point of care glucose monitoring   (Independently reviewed)   Recent Labs     04/25/24  1953 04/25/24  2259 04/26/24  0505 04/26/24  1149 04/26/24  1706 04/26/24  2057 04/27/24  0045 04/27/24  0512   POCGLU 212* 165* 187* 183* 187* 134* 175* 181*       Scheduled Meds:   octreotide  50 mcg IntraVENous Q8H    meropenem  1,000 mg IntraVENous Q8H    valproic acid  250 mg Oral 3 times per day    buPROPion  100 mg Oral TID    insulin glargine  12 Units SubCUTAneous Nightly    LORazepam  1 mg Oral Q4H    polyethylene glycol  17 g Oral Daily    sennosides-docusate sodium  2 tablet Oral Daily    insulin lispro  0-12 Units SubCUTAneous Q6H    hydrocortisone sodium succinate PF (SOLU-CORTEF) 25 mg in sterile water 2 mL injection  25 mg IntraVENous Q12H    Followed by    hydrocortisone sodium succinate PF (SOLU-CORTEF) 25 mg in sterile water 2 mL injection  25 mg IntraVENous Daily    gabapentin  300 mg Oral Nightly    methocarbamol  500 mg Oral 4x Daily    bisacodyl  10 mg Rectal Daily    micafungin  100 mg IntraVENous Daily    oxyCODONE  10 mg Oral Q4H    ipratropium 0.5 mg-albuterol 2.5 mg  1 Dose Inhalation Q4H WA RT    sodium chloride flush  5-40 mL IntraVENous BID    metoclopramide  10 mg IntraVENous q8h    acetaminophen  650 mg Oral 4 times per day    chlorhexidine

## 2024-04-27 NOTE — FLOWSHEET NOTE
Pt will reach for lines, tubes, and equipment and fails to redirect to repeated verbal commands. Restraints secured for patient safety.

## 2024-04-27 NOTE — FLOWSHEET NOTE
Patient remains at risk to self and others. Despite verbal redirection, patient continues to reach and pull on lines and tubes. Patient remains in bilateral soft wrist restraints for their safety.

## 2024-04-27 NOTE — PROGRESS NOTES
[facility]  SURGICAL INTENSIVE CARE UNIT (SICU)  ATTENDING PHYSICIAN CRITICAL CARE PROGRESS NOTE     I have personally examined the patient, personally reviewed the record, and personally discussed the case with the resident. I have personally reviewed all relevant labs and imaging data. I am actively managing this patient's medications.  Please refer to the resident's note. I agree with the assessment and plan with the following corrections/additions. The following summarizes my clinical findings and independent assessment.     CC: critical care management after pancreaticoduodenectomy    Hospital Course/Overnight Events:  4/15--pylorus preserving Whipple for large benign pancreatic cyst  4/16--continued ICU d/t nausea  4/17--bobby replaced, pancreatic leak, boluses given, albumin given, JUSTINE, toradol stopped, CVC changed  4/18--CVC replaced, NGT repalced, scop patch, robaxin, d/c IVF; total pancreatectomy and splenectomy, insulin gtt  4/19--remains intubated, TPN, lasix  4/20--albumin x 6 with lasix, robaxin restarted  4/21--low cortisol--steroids started; lasix, 1U PRBC, vent adjustment for inc plateau pressure  4/22--some air trapping on vent overnight  4/23--copious dark mucoid drainage from incision  4/24--had CT abd/pelvis yesterday; still with ongoing drainage from incision  4/25--some agitation overnight; still with midline incision drainage; febrile (Tmax 100.6)  4/26--re-scanned yesterday--unchanged; lalo spont breathing trial; for PEG-J today  4/27--PEG-J not placed yesterday    Medications   Scheduled Meds:    octreotide  50 mcg IntraVENous Q8H    meropenem  1,000 mg IntraVENous Q8H    valproic acid  250 mg Oral 3 times per day    buPROPion  100 mg Oral TID    insulin glargine  12 Units SubCUTAneous Nightly    LORazepam  1 mg Oral Q4H    polyethylene glycol  17 g Oral Daily    sennosides-docusate sodium  2 tablet Oral Daily    insulin lispro  0-12 Units SubCUTAneous Q6H     hydrocortisone sodium succinate PF (SOLU-CORTEF) 25 mg in sterile water 2 mL injection  25 mg IntraVENous Daily    gabapentin  300 mg Oral Nightly    methocarbamol  500 mg Oral 4x Daily    bisacodyl  10 mg Rectal Daily    micafungin  100 mg IntraVENous Daily    oxyCODONE  10 mg Oral Q4H    ipratropium 0.5 mg-albuterol 2.5 mg  1 Dose Inhalation Q4H WA RT    sodium chloride flush  5-40 mL IntraVENous BID    metoclopramide  10 mg IntraVENous q8h    acetaminophen  650 mg Oral 4 times per day    chlorhexidine  15 mL Mouth/Throat BID    Polyvinyl Alcohol-Povidone PF  1 drop Both Eyes Q4H    Or    artificial tears   Both Eyes Q4H    lidocaine  1 patch TransDERmal Daily    enoxaparin  40 mg SubCUTAneous Daily    atorvastatin  10 mg Oral Nightly    ketotifen fumarate  1 drop Both Eyes BID    pantoprazole (PROTONIX) 40 mg in sodium chloride (PF) 0.9 % 10 mL injection  40 mg IntraVENous Daily     Continuous Infusions:    PN-Adult  3-in-1 Central Line (Custom)      PN-Adult  3-in-1 Central Line (Custom) 45.8 mL/hr at 24 0600    propofol 19.972 mcg/kg/min (24 0701)    sodium chloride      fentaNYL 50 mcg/hr (24 0600)    dextrose       PRN Meds: midazolam, hydrALAZINE, labetalol, sodium chloride, medicated lip balm, fentaNYL **AND** fentaNYL, benzonatate, glucose, dextrose bolus **OR** dextrose bolus, glucagon (rDNA), dextrose, ondansetron    Physical Examination      Vitals:  BP (!) 161/80   Pulse 80   Temp 100.2 °F (37.9 °C) (Bladder)   Resp 22   Ht 1.626 m (5' 4\")   Wt 93.2 kg (205 lb 7.5 oz)   SpO2 98%   BMI 35.27 kg/m²   Temp (24hrs), Av.6 °F (37.6 °C), Min:99 °F (37.2 °C), Max:100.4 °F (38 °C)      I/O (24Hr):    Intake/Output Summary (Last 24 hours) at 2024 0923  Last data filed at 2024 0800  Gross per 24 hour   Intake 2562.9 ml   Output 2890 ml   Net -327.1 ml         Physical Exam  Constitutional:       Comments: Intubated; sedated; narcotized   Cardiovascular:      Rate and Rhythm:

## 2024-04-28 ENCOUNTER — APPOINTMENT (OUTPATIENT)
Dept: CT IMAGING | Age: 59
DRG: 004 | End: 2024-04-28
Attending: STUDENT IN AN ORGANIZED HEALTH CARE EDUCATION/TRAINING PROGRAM
Payer: COMMERCIAL

## 2024-04-28 ENCOUNTER — APPOINTMENT (OUTPATIENT)
Dept: GENERAL RADIOLOGY | Age: 59
DRG: 004 | End: 2024-04-28
Attending: STUDENT IN AN ORGANIZED HEALTH CARE EDUCATION/TRAINING PROGRAM
Payer: COMMERCIAL

## 2024-04-28 LAB
AADO2: 122 MMHG
ALBUMIN SERPL-MCNC: 2.5 G/DL (ref 3.5–5.2)
ALP SERPL-CCNC: 157 U/L (ref 35–104)
ALT SERPL-CCNC: 23 U/L (ref 0–32)
ANION GAP SERPL CALCULATED.3IONS-SCNC: 13 MMOL/L (ref 7–16)
AST SERPL-CCNC: 32 U/L (ref 0–31)
B.E.: 5.5 MMOL/L (ref -3–3)
BASOPHILS # BLD: 0.63 K/UL (ref 0–0.2)
BASOPHILS NFR BLD: 3 % (ref 0–2)
BILIRUB SERPL-MCNC: 0.3 MG/DL (ref 0–1.2)
BUN SERPL-MCNC: 35 MG/DL (ref 6–20)
CA-I BLD-SCNC: 1.1 MMOL/L (ref 1.15–1.33)
CALCIUM SERPL-MCNC: 7.8 MG/DL (ref 8.6–10.2)
CHLORIDE SERPL-SCNC: 105 MMOL/L (ref 98–107)
CO2 SERPL-SCNC: 27 MMOL/L (ref 22–29)
COHB: 0 % (ref 0–1.5)
CREAT SERPL-MCNC: 0.5 MG/DL (ref 0.5–1)
CRITICAL: ABNORMAL
DATE ANALYZED: ABNORMAL
DATE OF COLLECTION: ABNORMAL
EOSINOPHIL # BLD: 0.42 K/UL (ref 0.05–0.5)
EOSINOPHILS RELATIVE PERCENT: 2 % (ref 0–6)
ERYTHROCYTE [DISTWIDTH] IN BLOOD BY AUTOMATED COUNT: 14.5 % (ref 11.5–15)
FIO2: 40 %
GFR SERPL CREATININE-BSD FRML MDRD: >90 ML/MIN/1.73M2
GLUCOSE BLD-MCNC: 217 MG/DL (ref 74–99)
GLUCOSE BLD-MCNC: 232 MG/DL (ref 74–99)
GLUCOSE BLD-MCNC: 249 MG/DL (ref 74–99)
GLUCOSE BLD-MCNC: 277 MG/DL (ref 74–99)
GLUCOSE SERPL-MCNC: 244 MG/DL (ref 74–99)
HCO3: 29.3 MMOL/L (ref 22–26)
HCT VFR BLD AUTO: 26.2 % (ref 34–48)
HGB BLD-MCNC: 7.9 G/DL (ref 11.5–15.5)
HHB: 2.5 % (ref 0–5)
INR PPP: 1.1
LAB: ABNORMAL
LYMPHOCYTES NFR BLD: 0.42 K/UL (ref 1.5–4)
LYMPHOCYTES RELATIVE PERCENT: 2 % (ref 20–42)
Lab: 435
MAGNESIUM SERPL-MCNC: 2.5 MG/DL (ref 1.6–2.6)
MCH RBC QN AUTO: 29.3 PG (ref 26–35)
MCHC RBC AUTO-ENTMCNC: 30.2 G/DL (ref 32–34.5)
MCV RBC AUTO: 97 FL (ref 80–99.9)
METHB: 0.2 % (ref 0–1.5)
MODE: AC
MONOCYTES NFR BLD: 1.27 K/UL (ref 0.1–0.95)
MONOCYTES NFR BLD: 5 % (ref 2–12)
MYELOCYTES ABSOLUTE COUNT: 0.42 K/UL
MYELOCYTES: 2 %
NEUTROPHILS NFR BLD: 87 % (ref 43–80)
NEUTS SEG NFR BLD: 20.53 K/UL (ref 1.8–7.3)
NUCLEATED RED BLOOD CELLS: 4 PER 100 WBC
O2 SATURATION: 97.5 % (ref 92–98.5)
O2HB: 97.3 % (ref 94–97)
OPERATOR ID: 7221
PATIENT TEMP: 37 C
PCO2: 39.6 MMHG (ref 35–45)
PEEP/CPAP: 8 CMH2O
PFO2: 2.69 MMHG/%
PH BLOOD GAS: 7.49 (ref 7.35–7.45)
PHOSPHATE SERPL-MCNC: 2.5 MG/DL (ref 2.5–4.5)
PLATELET # BLD AUTO: 980 K/UL (ref 130–450)
PMV BLD AUTO: 10.9 FL (ref 7–12)
PO2: 107.7 MMHG (ref 75–100)
POTASSIUM SERPL-SCNC: 4.5 MMOL/L (ref 3.5–5)
PROT SERPL-MCNC: 5.9 G/DL (ref 6.4–8.3)
PROTHROMBIN TIME: 12.1 SEC (ref 9.3–12.4)
RBC # BLD AUTO: 2.7 M/UL (ref 3.5–5.5)
RBC # BLD: ABNORMAL 10*6/UL
RI(T): 1.13
RR MECHANICAL: 16 B/MIN
SODIUM SERPL-SCNC: 145 MMOL/L (ref 132–146)
SOURCE, BLOOD GAS: ABNORMAL
THB: 8.9 G/DL (ref 11.5–16.5)
TIME ANALYZED: 437
VT MECHANICAL: 350 ML
WBC OTHER # BLD: 23.7 K/UL (ref 4.5–11.5)

## 2024-04-28 PROCEDURE — 6360000002 HC RX W HCPCS: Performed by: STUDENT IN AN ORGANIZED HEALTH CARE EDUCATION/TRAINING PROGRAM

## 2024-04-28 PROCEDURE — 82330 ASSAY OF CALCIUM: CPT

## 2024-04-28 PROCEDURE — 6360000002 HC RX W HCPCS: Performed by: INTERNAL MEDICINE

## 2024-04-28 PROCEDURE — 94003 VENT MGMT INPAT SUBQ DAY: CPT

## 2024-04-28 PROCEDURE — 80053 COMPREHEN METABOLIC PANEL: CPT

## 2024-04-28 PROCEDURE — 70496 CT ANGIOGRAPHY HEAD: CPT

## 2024-04-28 PROCEDURE — 2580000003 HC RX 258: Performed by: STUDENT IN AN ORGANIZED HEALTH CARE EDUCATION/TRAINING PROGRAM

## 2024-04-28 PROCEDURE — 84100 ASSAY OF PHOSPHORUS: CPT

## 2024-04-28 PROCEDURE — 99291 CRITICAL CARE FIRST HOUR: CPT | Performed by: SURGERY

## 2024-04-28 PROCEDURE — 6370000000 HC RX 637 (ALT 250 FOR IP): Performed by: STUDENT IN AN ORGANIZED HEALTH CARE EDUCATION/TRAINING PROGRAM

## 2024-04-28 PROCEDURE — 70450 CT HEAD/BRAIN W/O DYE: CPT

## 2024-04-28 PROCEDURE — 6370000000 HC RX 637 (ALT 250 FOR IP): Performed by: SURGERY

## 2024-04-28 PROCEDURE — 99232 SBSQ HOSP IP/OBS MODERATE 35: CPT | Performed by: INTERNAL MEDICINE

## 2024-04-28 PROCEDURE — 83735 ASSAY OF MAGNESIUM: CPT

## 2024-04-28 PROCEDURE — 2580000003 HC RX 258

## 2024-04-28 PROCEDURE — 6360000004 HC RX CONTRAST MEDICATION: Performed by: RADIOLOGY

## 2024-04-28 PROCEDURE — 71045 X-RAY EXAM CHEST 1 VIEW: CPT

## 2024-04-28 PROCEDURE — 2500000003 HC RX 250 WO HCPCS

## 2024-04-28 PROCEDURE — 70498 CT ANGIOGRAPHY NECK: CPT

## 2024-04-28 PROCEDURE — 0042T CT BRAIN PERFUSION: CPT

## 2024-04-28 PROCEDURE — 6370000000 HC RX 637 (ALT 250 FOR IP): Performed by: INTERNAL MEDICINE

## 2024-04-28 PROCEDURE — 85025 COMPLETE CBC W/AUTO DIFF WBC: CPT

## 2024-04-28 PROCEDURE — 2580000003 HC RX 258: Performed by: INTERNAL MEDICINE

## 2024-04-28 PROCEDURE — 82805 BLOOD GASES W/O2 SATURATION: CPT

## 2024-04-28 PROCEDURE — 2000000000 HC ICU R&B

## 2024-04-28 PROCEDURE — C9113 INJ PANTOPRAZOLE SODIUM, VIA: HCPCS | Performed by: STUDENT IN AN ORGANIZED HEALTH CARE EDUCATION/TRAINING PROGRAM

## 2024-04-28 PROCEDURE — 6370000000 HC RX 637 (ALT 250 FOR IP)

## 2024-04-28 PROCEDURE — 94640 AIRWAY INHALATION TREATMENT: CPT

## 2024-04-28 PROCEDURE — 37799 UNLISTED PX VASCULAR SURGERY: CPT

## 2024-04-28 PROCEDURE — 6360000002 HC RX W HCPCS

## 2024-04-28 PROCEDURE — 85610 PROTHROMBIN TIME: CPT

## 2024-04-28 PROCEDURE — 82962 GLUCOSE BLOOD TEST: CPT

## 2024-04-28 RX ORDER — INSULIN LISPRO 100 [IU]/ML
0-18 INJECTION, SOLUTION INTRAVENOUS; SUBCUTANEOUS EVERY 4 HOURS
Status: DISCONTINUED | OUTPATIENT
Start: 2024-04-28 | End: 2024-05-04

## 2024-04-28 RX ORDER — INSULIN GLARGINE 100 [IU]/ML
18 INJECTION, SOLUTION SUBCUTANEOUS NIGHTLY
Status: DISCONTINUED | OUTPATIENT
Start: 2024-04-28 | End: 2024-05-03

## 2024-04-28 RX ORDER — INSULIN LISPRO 100 [IU]/ML
0-12 INJECTION, SOLUTION INTRAVENOUS; SUBCUTANEOUS EVERY 4 HOURS
Status: DISCONTINUED | OUTPATIENT
Start: 2024-04-28 | End: 2024-04-28

## 2024-04-28 RX ORDER — HYDROMORPHONE HYDROCHLORIDE 1 MG/ML
1 INJECTION, SOLUTION INTRAMUSCULAR; INTRAVENOUS; SUBCUTANEOUS
Status: DISCONTINUED | OUTPATIENT
Start: 2024-04-28 | End: 2024-04-30

## 2024-04-28 RX ORDER — FUROSEMIDE 10 MG/ML
40 INJECTION INTRAMUSCULAR; INTRAVENOUS ONCE
Status: COMPLETED | OUTPATIENT
Start: 2024-04-28 | End: 2024-04-28

## 2024-04-28 RX ADMIN — POLYVINYL ALCOHOL, POVIDONE 1 DROP: 14; 6 SOLUTION/ DROPS OPHTHALMIC at 16:20

## 2024-04-28 RX ADMIN — LABETALOL HYDROCHLORIDE 10 MG: 5 INJECTION, SOLUTION INTRAVENOUS at 08:10

## 2024-04-28 RX ADMIN — INSULIN LISPRO 6 UNITS: 100 INJECTION, SOLUTION INTRAVENOUS; SUBCUTANEOUS at 12:15

## 2024-04-28 RX ADMIN — INSULIN LISPRO 4 UNITS: 100 INJECTION, SOLUTION INTRAVENOUS; SUBCUTANEOUS at 04:43

## 2024-04-28 RX ADMIN — INSULIN LISPRO 4 UNITS: 100 INJECTION, SOLUTION INTRAVENOUS; SUBCUTANEOUS at 16:25

## 2024-04-28 RX ADMIN — WATER 25 MG: 1 INJECTION INTRAMUSCULAR; INTRAVENOUS; SUBCUTANEOUS at 08:16

## 2024-04-28 RX ADMIN — METHOCARBAMOL 500 MG: 500 TABLET ORAL at 16:20

## 2024-04-28 RX ADMIN — BUPROPION HYDROCHLORIDE 100 MG: 100 TABLET, FILM COATED ORAL at 14:14

## 2024-04-28 RX ADMIN — METHOCARBAMOL 500 MG: 500 TABLET ORAL at 20:52

## 2024-04-28 RX ADMIN — INSULIN GLARGINE 18 UNITS: 100 INJECTION, SOLUTION SUBCUTANEOUS at 20:51

## 2024-04-28 RX ADMIN — METHOCARBAMOL 500 MG: 500 TABLET ORAL at 08:17

## 2024-04-28 RX ADMIN — POLYETHYLENE GLYCOL 3350 17 G: 17 POWDER, FOR SOLUTION ORAL at 08:16

## 2024-04-28 RX ADMIN — ENOXAPARIN SODIUM 40 MG: 100 INJECTION SUBCUTANEOUS at 08:16

## 2024-04-28 RX ADMIN — 0.12% CHLORHEXIDINE GLUCONATE 15 ML: 1.2 RINSE ORAL at 20:51

## 2024-04-28 RX ADMIN — OXYCODONE HYDROCHLORIDE 10 MG: 5 SOLUTION ORAL at 20:53

## 2024-04-28 RX ADMIN — SODIUM CHLORIDE, PRESERVATIVE FREE 40 MG: 5 INJECTION INTRAVENOUS at 08:16

## 2024-04-28 RX ADMIN — KETOTIFEN FUMARATE 1 DROP: 0.35 SOLUTION/ DROPS OPHTHALMIC at 20:54

## 2024-04-28 RX ADMIN — FUROSEMIDE 40 MG: 10 INJECTION, SOLUTION INTRAMUSCULAR; INTRAVENOUS at 08:16

## 2024-04-28 RX ADMIN — BUPROPION HYDROCHLORIDE 100 MG: 100 TABLET, FILM COATED ORAL at 08:16

## 2024-04-28 RX ADMIN — OCTREOTIDE ACETATE 50 MCG: 50 INJECTION, SOLUTION INTRAVENOUS; SUBCUTANEOUS at 00:32

## 2024-04-28 RX ADMIN — SODIUM CHLORIDE, PRESERVATIVE FREE 10 ML: 5 INJECTION INTRAVENOUS at 08:17

## 2024-04-28 RX ADMIN — IPRATROPIUM BROMIDE AND ALBUTEROL SULFATE 1 DOSE: .5; 2.5 SOLUTION RESPIRATORY (INHALATION) at 07:49

## 2024-04-28 RX ADMIN — POLYVINYL ALCOHOL, POVIDONE 1 DROP: 14; 6 SOLUTION/ DROPS OPHTHALMIC at 04:28

## 2024-04-28 RX ADMIN — MEROPENEM 1000 MG: 1 INJECTION, POWDER, FOR SOLUTION INTRAVENOUS at 10:34

## 2024-04-28 RX ADMIN — PROPOFOL 14.98 MCG/KG/MIN: 10 INJECTION, EMULSION INTRAVENOUS at 04:19

## 2024-04-28 RX ADMIN — OXYCODONE HYDROCHLORIDE 10 MG: 5 SOLUTION ORAL at 08:16

## 2024-04-28 RX ADMIN — IPRATROPIUM BROMIDE AND ALBUTEROL SULFATE 1 DOSE: .5; 2.5 SOLUTION RESPIRATORY (INHALATION) at 11:03

## 2024-04-28 RX ADMIN — VALPROIC ACID 250 MG: 250 SOLUTION ORAL at 14:14

## 2024-04-28 RX ADMIN — ACETAMINOPHEN 650 MG: 650 SOLUTION ORAL at 12:14

## 2024-04-28 RX ADMIN — ACETAMINOPHEN 650 MG: 650 SOLUTION ORAL at 17:34

## 2024-04-28 RX ADMIN — OCTREOTIDE ACETATE 50 MCG: 50 INJECTION, SOLUTION INTRAVENOUS; SUBCUTANEOUS at 08:18

## 2024-04-28 RX ADMIN — OCTREOTIDE ACETATE 50 MCG: 50 INJECTION, SOLUTION INTRAVENOUS; SUBCUTANEOUS at 16:25

## 2024-04-28 RX ADMIN — MEROPENEM 1000 MG: 1 INJECTION, POWDER, FOR SOLUTION INTRAVENOUS at 02:29

## 2024-04-28 RX ADMIN — POLYVINYL ALCOHOL, POVIDONE 1 DROP: 14; 6 SOLUTION/ DROPS OPHTHALMIC at 20:51

## 2024-04-28 RX ADMIN — GABAPENTIN 300 MG: 300 CAPSULE ORAL at 20:52

## 2024-04-28 RX ADMIN — MEROPENEM 1000 MG: 1 INJECTION, POWDER, FOR SOLUTION INTRAVENOUS at 17:48

## 2024-04-28 RX ADMIN — SODIUM CHLORIDE, PRESERVATIVE FREE 10 ML: 5 INJECTION INTRAVENOUS at 20:53

## 2024-04-28 RX ADMIN — LORAZEPAM 1 MG: 1 TABLET ORAL at 04:44

## 2024-04-28 RX ADMIN — CALCIUM GLUCONATE: 98 INJECTION, SOLUTION INTRAVENOUS at 18:25

## 2024-04-28 RX ADMIN — 0.12% CHLORHEXIDINE GLUCONATE 15 ML: 1.2 RINSE ORAL at 08:17

## 2024-04-28 RX ADMIN — BUPROPION HYDROCHLORIDE 100 MG: 100 TABLET, FILM COATED ORAL at 20:54

## 2024-04-28 RX ADMIN — BISACODYL 10 MG: 10 SUPPOSITORY RECTAL at 08:16

## 2024-04-28 RX ADMIN — INSULIN LISPRO 6 UNITS: 100 INJECTION, SOLUTION INTRAVENOUS; SUBCUTANEOUS at 20:52

## 2024-04-28 RX ADMIN — IPRATROPIUM BROMIDE AND ALBUTEROL SULFATE 1 DOSE: .5; 2.5 SOLUTION RESPIRATORY (INHALATION) at 20:12

## 2024-04-28 RX ADMIN — METOCLOPRAMIDE 10 MG: 5 INJECTION, SOLUTION INTRAMUSCULAR; INTRAVENOUS at 20:53

## 2024-04-28 RX ADMIN — OXYCODONE HYDROCHLORIDE 10 MG: 5 SOLUTION ORAL at 12:14

## 2024-04-28 RX ADMIN — METHOCARBAMOL 500 MG: 500 TABLET ORAL at 12:14

## 2024-04-28 RX ADMIN — VALPROIC ACID 250 MG: 250 SOLUTION ORAL at 06:30

## 2024-04-28 RX ADMIN — POTASSIUM PHOSPHATE, MONOBASIC AND POTASSIUM PHOSPHATE, DIBASIC 30 MMOL: 224; 236 INJECTION, SOLUTION, CONCENTRATE INTRAVENOUS at 09:16

## 2024-04-28 RX ADMIN — OXYCODONE HYDROCHLORIDE 10 MG: 5 SOLUTION ORAL at 04:44

## 2024-04-28 RX ADMIN — SENNOSIDES AND DOCUSATE SODIUM 2 TABLET: 50; 8.6 TABLET ORAL at 08:16

## 2024-04-28 RX ADMIN — KETOTIFEN FUMARATE 1 DROP: 0.35 SOLUTION/ DROPS OPHTHALMIC at 08:18

## 2024-04-28 RX ADMIN — OXYCODONE HYDROCHLORIDE 10 MG: 5 SOLUTION ORAL at 16:20

## 2024-04-28 RX ADMIN — ACETAMINOPHEN 650 MG: 650 SOLUTION ORAL at 04:43

## 2024-04-28 RX ADMIN — LABETALOL HYDROCHLORIDE 10 MG: 5 INJECTION, SOLUTION INTRAVENOUS at 22:21

## 2024-04-28 RX ADMIN — METOCLOPRAMIDE 10 MG: 5 INJECTION, SOLUTION INTRAMUSCULAR; INTRAVENOUS at 04:46

## 2024-04-28 RX ADMIN — MICAFUNGIN SODIUM 100 MG: 100 INJECTION, POWDER, LYOPHILIZED, FOR SOLUTION INTRAVENOUS at 16:28

## 2024-04-28 RX ADMIN — POLYVINYL ALCOHOL, POVIDONE 1 DROP: 14; 6 SOLUTION/ DROPS OPHTHALMIC at 08:16

## 2024-04-28 RX ADMIN — IPRATROPIUM BROMIDE AND ALBUTEROL SULFATE 1 DOSE: .5; 2.5 SOLUTION RESPIRATORY (INHALATION) at 15:32

## 2024-04-28 RX ADMIN — POLYVINYL ALCOHOL, POVIDONE 1 DROP: 14; 6 SOLUTION/ DROPS OPHTHALMIC at 12:15

## 2024-04-28 RX ADMIN — IOPAMIDOL 100 ML: 755 INJECTION, SOLUTION INTRAVENOUS at 14:57

## 2024-04-28 RX ADMIN — VALPROIC ACID 250 MG: 250 SOLUTION ORAL at 22:19

## 2024-04-28 RX ADMIN — ATORVASTATIN CALCIUM 10 MG: 10 TABLET, FILM COATED ORAL at 20:52

## 2024-04-28 RX ADMIN — METOCLOPRAMIDE 10 MG: 5 INJECTION, SOLUTION INTRAMUSCULAR; INTRAVENOUS at 14:14

## 2024-04-28 ASSESSMENT — PULMONARY FUNCTION TESTS
PIF_VALUE: 21
PIF_VALUE: 20
PIF_VALUE: 27
PIF_VALUE: 20
PIF_VALUE: 18
PIF_VALUE: 20
PIF_VALUE: 25
PIF_VALUE: 18
PIF_VALUE: 25
PIF_VALUE: 20
PIF_VALUE: 26
PIF_VALUE: 27
PIF_VALUE: 26
PIF_VALUE: 20
PIF_VALUE: 20
PIF_VALUE: 27
PIF_VALUE: 20
PIF_VALUE: 18
PIF_VALUE: 20
PIF_VALUE: 20
PIF_VALUE: 26
PIF_VALUE: 20
PIF_VALUE: 19
PIF_VALUE: 24
PIF_VALUE: 26

## 2024-04-28 ASSESSMENT — PAIN SCALES - GENERAL
PAINLEVEL_OUTOF10: 0

## 2024-04-28 ASSESSMENT — PAIN - FUNCTIONAL ASSESSMENT: PAIN_FUNCTIONAL_ASSESSMENT: ACTIVITIES ARE NOT PREVENTED

## 2024-04-28 NOTE — FLOWSHEET NOTE
Pt will reach toward lines, tubes, and equipment and fails to redirect to repeated verbal commands. Restraints secured for patient safety.

## 2024-04-28 NOTE — PLAN OF CARE
Problem: Discharge Planning  Goal: Discharge to home or other facility with appropriate resources  4/27/2024 0854 by Nellie Alatorre RN  Outcome: Progressing  Flowsheets (Taken 4/27/2024 0800)  Discharge to home or other facility with appropriate resources:   Identify barriers to discharge with patient and caregiver   Arrange for needed discharge resources and transportation as appropriate     Problem: Skin/Tissue Integrity  Goal: Absence of new skin breakdown  Description: 1.  Monitor for areas of redness and/or skin breakdown  2.  Assess vascular access sites hourly  3.  Every 4-6 hours minimum:  Change oxygen saturation probe site  4.  Every 4-6 hours:  If on nasal continuous positive airway pressure, respiratory therapy assess nares and determine need for appliance change or resting period.  4/27/2024 0854 by Nellie Alatorre RN  Outcome: Progressing     Problem: ABCDS Injury Assessment  Goal: Absence of physical injury  4/27/2024 0854 by Nellie Alatorre RN  Outcome: Progressing     Problem: Respiratory - Adult  Goal: Achieves optimal ventilation and oxygenation  4/27/2024 0854 by Nellie Alatorre RN  Outcome: Progressing  Flowsheets (Taken 4/27/2024 0800)  Achieves optimal ventilation and oxygenation:   Assess for changes in respiratory status   Assess for changes in mentation and behavior   Position to facilitate oxygenation and minimize respiratory effort   Oxygen supplementation based on oxygen saturation or arterial blood gases     Problem: Cardiovascular - Adult  Goal: Maintains optimal cardiac output and hemodynamic stability  4/27/2024 0854 by Nellie Alatorre RN  Outcome: Progressing  Flowsheets (Taken 4/27/2024 0800)  Maintains optimal cardiac output and hemodynamic stability:   Monitor blood pressure and heart rate   Assess for signs of decreased cardiac output   Monitor urine output and notify Licensed Independent Practitioner for values outside of normal range  Goal: Absence of  vascular access sites hourly   Monitor for areas of redness and/or skin breakdown  Goal: Incisions, wounds, or drain sites healing without S/S of infection  4/27/2024 0854 by Nellie Alatorre RN  Outcome: Progressing  Flowsheets (Taken 4/27/2024 0800)  Incisions, Wounds, or Drain Sites Healing Without Sign and Symptoms of Infection:   ADMISSION and DAILY: Assess and document risk factors for pressure ulcer development   TWICE DAILY: Assess and document skin integrity  Goal: Oral mucous membranes remain intact  4/27/2024 0854 by Nellie Alatorre RN  Outcome: Progressing  Flowsheets (Taken 4/27/2024 0800)  Oral Mucous Membranes Remain Intact: Assess oral mucosa and hygiene practices     Problem: Chronic Conditions and Co-morbidities  Goal: Patient's chronic conditions and co-morbidity symptoms are monitored and maintained or improved  4/27/2024 0854 by Nellie Alatorre RN  Outcome: Progressing  Flowsheets (Taken 4/27/2024 0800)  Care Plan - Patient's Chronic Conditions and Co-Morbidity Symptoms are Monitored and Maintained or Improved:   Monitor and assess patient's chronic conditions and comorbid symptoms for stability, deterioration, or improvement   Collaborate with multidisciplinary team to address chronic and comorbid conditions and prevent exacerbation or deterioration     Problem: Pain  Goal: Verbalizes/displays adequate comfort level or baseline comfort level  4/27/2024 0854 by Nellie Alatorre RN  Outcome: Progressing  Flowsheets (Taken 4/27/2024 0800)  Verbalizes/displays adequate comfort level or baseline comfort level:   Administer analgesics based on type and severity of pain and evaluate response   Assess pain using appropriate pain scale   Implement non-pharmacological measures as appropriate and evaluate response     Problem: Safety - Adult  Goal: Free from fall injury  4/27/2024 2118 by Margot Jon, RN  Outcome: Progressing  4/27/2024 0854 by Nellie Alatorre RN  Outcome:

## 2024-04-28 NOTE — PLAN OF CARE
Problem: Neurosensory - Adult  Goal: Achieves stable or improved neurological status  Outcome: Not Progressing  Flowsheets (Taken 4/28/2024 0800)  Achieves stable or improved neurological status:   Maintain blood pressure and fluid volume within ordered parameters to optimize cerebral perfusion and minimize risk of hemorrhage   Monitor temperature, glucose, and sodium. Initiate appropriate interventions as ordered  Goal: Achieves maximal functionality and self care  Outcome: Not Progressing  Flowsheets (Taken 4/28/2024 0800)  Achieves maximal functionality and self care: Monitor swallowing and airway patency with patient fatigue and changes in neurological status     Problem: Skin/Tissue Integrity - Adult  Goal: Incisions, wounds, or drain sites healing without S/S of infection  Outcome: Not Progressing  Flowsheets (Taken 4/28/2024 0800)  Incisions, Wounds, or Drain Sites Healing Without Sign and Symptoms of Infection: TWICE DAILY: Assess and document skin integrity

## 2024-04-28 NOTE — PROGRESS NOTES
Message left stating date and time of appointment.   [facility] Surgery   Daily Progress Note      Patient's Name/Date of Birth: Leatha Willard / 1965    Date: April 28, 2024     Chief Complaint: s/p open whipple, Grade C POPF s/p takeback to OR for completion pancreatectomy    Patient Active Problem List   Diagnosis    Recurrent major depressive disorder (HCC)    Essential hypertension    Hyperlipidemia    Prediabetes    Class 1 obesity due to excess calories without serious comorbidity with body mass index (BMI) of 32.0 to 32.9 in adult    HIREN (obstructive sleep apnea)    Pancreatic cyst    Colon polyps    Cyst of pancreas    Chest pain    Tobacco abuse-- quit 2 weeks ago    Abdominal pain    Serous cystadenoma    Pancreatic fistula    Sepsis with acute renal failure and septic shock (HCC)    JUSTINE (acute kidney injury) (HCC)    Hypophosphatemia    Acute respiratory failure with hypoxia (HCC)    Post-pancreatectomy diabetes (HCC)    On total parenteral nutrition    Hypoalbuminemia    Electrolyte imbalance    Hypocalcemia    Acute blood loss anemia    Hypokalemia       Subjective:   No acute issues overnight. Sedation paused this morning. On low vent settling, trial SBT today. Unable to do PEG J over weekend due to scheduling issues.   No drainage from right drain, L drain with pancreatic material in drain. Good UOP        Objective:  /61   Pulse 89   Temp 99.4 °F (37.4 °C) (Bladder)   Resp 18   Ht 1.626 m (5' 4\")   Wt 93.2 kg (205 lb 7.5 oz)   SpO2 98%   BMI 35.27 kg/m²   Labs:  Recent Labs     04/26/24  0501 04/27/24  0504 04/28/24  0427   WBC 24.7* 20.1* 23.7*   HGB 7.7* 7.7* 7.9*   HCT 25.2* 25.1* 26.2*       Recent Labs     04/26/24  0501 04/27/24  0504 04/27/24  1153   * 144 147*   K 3.3* 4.3 3.7    108* 108*   CO2 27 26 29   BUN 27* 33* 34*   CREATININE 0.6 0.5 0.5       Recent Labs     04/26/24  0501 04/27/24  0504   ALKPHOS 145* 146*   ALT 17 15   AST 25 24   BILITOT 0.4 0.3       Weights:  4/15 - 76.2 kg  ....  4/21 - 88.7 kg  4/22 -  AM    Patient is being weaned off of sedation currently.  Hopeful extubation today  Remove right GIULIANO drain  Keep left GIULIANO drain in place that is putting out tan-colored fluid  Midline has lymphatic drainage without evidence of erythema  Continue dressing changes  PEG-J at some point  Continue TPN    Electronically signed by Ortiz Haddad MD on 4/28/2024 at 9:05 AM

## 2024-04-28 NOTE — PROGRESS NOTES
Spontaneous Parameters performed on PS 5    VT = 223 ml  f = 27  B/M  Ve = 6.03 L/M  NIF = -23  cmH2O  VC = N/A   RSBI = 121      Performed by Carmen Coronado RCP

## 2024-04-28 NOTE — PROGRESS NOTES
ENDOCRINOLOGY PROGRESS NOTE      Date of admission: 4/15/2024  Date of service: 4/28/2024  Admitting physician: Norma Rogers MD   Primary Care Physician: Mekhi Escalona MD  Consultant physician: Jonathan Curiel MD     Reason for the consultation:  Post-pancreatectomy DM     History of Present Illness:  Leatha Willard is a 58 y.o. old female with PMH of cystic neoplasm of the head of pancreas, HTN, HLD and other listed below admitted to [facility] on 4/15/2024 for completion pancreatectomy. Endocrine service was consulted for diabetes management.    Subjective   Glucose level above goal today.  No hypoglycemia.  Point of care glucose monitoring   (Independently reviewed)   Recent Labs     04/27/24  0512 04/27/24  1154 04/27/24  1824 04/27/24  2022 04/27/24  2249 04/28/24  0435 04/28/24  1213 04/28/24  1624   POCGLU 181* 215* 250* 224* 225* 249* 277* 232*       Scheduled Meds:   insulin glargine  18 Units SubCUTAneous Nightly    insulin lispro  0-18 Units SubCUTAneous Q4H    octreotide  50 mcg IntraVENous Q8H    meropenem  1,000 mg IntraVENous Q8H    valproic acid  250 mg Oral 3 times per day    buPROPion  100 mg Oral TID    polyethylene glycol  17 g Oral Daily    sennosides-docusate sodium  2 tablet Oral Daily    gabapentin  300 mg Oral Nightly    methocarbamol  500 mg Oral 4x Daily    bisacodyl  10 mg Rectal Daily    micafungin  100 mg IntraVENous Daily    oxyCODONE  10 mg Oral Q4H    ipratropium 0.5 mg-albuterol 2.5 mg  1 Dose Inhalation Q4H WA RT    sodium chloride flush  5-40 mL IntraVENous BID    metoclopramide  10 mg IntraVENous q8h    acetaminophen  650 mg Oral 4 times per day    chlorhexidine  15 mL Mouth/Throat BID    Polyvinyl Alcohol-Povidone PF  1 drop Both Eyes Q4H    Or    artificial tears   Both Eyes Q4H    lidocaine  1 patch TransDERmal Daily    enoxaparin  40 mg SubCUTAneous Daily    atorvastatin  10 mg Oral Nightly    ketotifen fumarate  1 drop Both Eyes BID    pantoprazole (PROTONIX) 40 mg in sodium

## 2024-04-28 NOTE — PLAN OF CARE
Problem: Safety - Medical Restraint  Goal: Remains free of injury from restraints (Restraint for Interference with Medical Device)  Description: INTERVENTIONS:  1. Determine that other, less restrictive measures have been tried or would not be effective before applying the restraint  2. Evaluate the patient's condition at the time of restraint application  3. Inform patient/family regarding the reason for restraint  4. Q2H: Monitor safety, psychosocial status, comfort, nutrition and hydration  4/28/2024 1622 by Faith Saldivar RN  Outcome: Completed  4/28/2024 1108 by Faith Saldivar RN  Outcome: Progressing  Flowsheets  Taken 4/28/2024 0755 by Faith Saldivar RN  Remains free of injury from restraints (restraint for interference with medical device): Every 2 hours: Monitor safety, psychosocial status, comfort, nutrition and hydration  Taken 4/28/2024 0600 by Margot Jon RN  Remains free of injury from restraints (restraint for interference with medical device): Every 2 hours: Monitor safety, psychosocial status, comfort, nutrition and hydration  Taken 4/28/2024 0400 by Margot Jon RN  Remains free of injury from restraints (restraint for interference with medical device): Every 2 hours: Monitor safety, psychosocial status, comfort, nutrition and hydration  Taken 4/28/2024 0200 by Margot Jon RN  Remains free of injury from restraints (restraint for interference with medical device): Every 2 hours: Monitor safety, psychosocial status, comfort, nutrition and hydration  Taken 4/28/2024 0000 by Margot Jon RN  Remains free of injury from restraints (restraint for interference with medical device): Every 2 hours: Monitor safety, psychosocial status, comfort, nutrition and hydration  Taken 4/27/2024 2200 by Margot Jon RN  Remains free of injury from restraints (restraint for interference with medical device): Every 2 hours: Monitor safety, psychosocial status, comfort, nutrition and hydration      Problem: Neurosensory - Adult  Goal: Achieves stable or improved neurological status  Outcome: Not Progressing  Flowsheets (Taken 4/28/2024 0800)  Achieves stable or improved neurological status:   Maintain blood pressure and fluid volume within ordered parameters to optimize cerebral perfusion and minimize risk of hemorrhage   Monitor temperature, glucose, and sodium. Initiate appropriate interventions as ordered  Goal: Achieves maximal functionality and self care  Outcome: Not Progressing  Flowsheets (Taken 4/28/2024 0800)  Achieves maximal functionality and self care: Monitor swallowing and airway patency with patient fatigue and changes in neurological status     Problem: Skin/Tissue Integrity - Adult  Goal: Incisions, wounds, or drain sites healing without S/S of infection  Outcome: Not Progressing  Flowsheets (Taken 4/28/2024 0800)  Incisions, Wounds, or Drain Sites Healing Without Sign and Symptoms of Infection: TWICE DAILY: Assess and document skin integrity

## 2024-04-28 NOTE — PROGRESS NOTES
[facility]  SURGICAL INTENSIVE CARE UNIT (SICU)  ATTENDING PHYSICIAN CRITICAL CARE PROGRESS NOTE     I have personally examined the patient, personally reviewed the record, and personally discussed the case with the resident. I have personally reviewed all relevant labs and imaging data. I am actively managing this patient's medications.  Please refer to the resident's note. I agree with the assessment and plan with the following corrections/additions. The following summarizes my clinical findings and independent assessment.     CC: critical care management after pancreaticoduodenectomy    Hospital Course/Overnight Events:  4/15--pylorus preserving Whipple for large benign pancreatic cyst  4/16--continued ICU d/t nausea  4/17--bobby replaced, pancreatic leak, boluses given, albumin given, JUSTINE, toradol stopped, CVC changed  4/18--CVC replaced, NGT repalced, scop patch, robaxin, d/c IVF; total pancreatectomy and splenectomy, insulin gtt  4/19--remains intubated, TPN, lasix  4/20--albumin x 6 with lasix, robaxin restarted  4/21--low cortisol--steroids started; lasix, 1U PRBC, vent adjustment for inc plateau pressure  4/22--some air trapping on vent overnight  4/23--copious dark mucoid drainage from incision  4/24--had CT abd/pelvis yesterday; still with ongoing drainage from incision  4/25--some agitation overnight; still with midline incision drainage; febrile (Tmax 100.6)  4/26--re-scanned yesterday--unchanged; lalo spont breathing trial; for PEG-J today  4/27--PEG-J not placed yesterday  4/28--ongoing abd wound drainage    Medications   Scheduled Meds:    potassium phosphate IVPB (CENTRAL LINE)  30 mmol IntraVENous Once    octreotide  50 mcg IntraVENous Q8H    meropenem  1,000 mg IntraVENous Q8H    valproic acid  250 mg Oral 3 times per day    buPROPion  100 mg Oral TID    insulin glargine  12 Units SubCUTAneous Nightly    LORazepam  1 mg Oral Q4H    polyethylene glycol  17 g Oral Daily     04/22/2024    On total parenteral nutrition 04/22/2024    JUSTINE (acute kidney injury) (HCC) 04/19/2024    Hypophosphatemia 04/19/2024    Acute respiratory failure with hypoxia ([facility]) 04/19/2024    Pancreatic fistula 04/18/2024    Sepsis with acute renal failure and septic shock ([facility]) 04/18/2024    Serous cystadenoma 03/01/2024    Chest pain 02/22/2024    Tobacco abuse-- quit 2 weeks ago 02/22/2024    Abdominal pain 02/22/2024    Cyst of pancreas 10/25/2023    Colon polyps 09/20/2023    Pancreatic cyst 08/23/2023    HIREN (obstructive sleep apnea) 04/03/2023    Prediabetes 12/14/2021    Hyperlipidemia 09/15/2021    Recurrent major depressive disorder (HCC) 02/03/2020    Essential hypertension 02/03/2020       S/p pylorus sparing Whipple  S/p total pancreatectomy and splenectomy after leak  Acute resp failure--cont mech vent support; wean as able; cont spont breathing trial as tolerated--may need trach for airway protection  Hypoalbuminemia--cont TPN; for PEG-J today  Acute blood loss anemia--monitor H/H  Electrolyte imbalance (hypocalcemia)--correct as able  Hyperglycemia--SSI  Azotemia--monitor BUN/Cr/UO  Leukocytosis (improved)--cont to monitor  ++fluid balance--diurese as able; diamox  Meropenem #4; micafungin #8  Cont Octreotide--monitor drainage from abdomen  Relative adrenal insuff--cont steroid wean  DVT risk--PCDS/lovenox    Discussed with/care coordinated with Dr. Haddad    I am actively managing this pt's meds and the risk for hemodynamic/respiratory/metabolic deterioration.  Requires ongoing ICU care.    Maicol Gonzalez MD, FACS  4/28/2024  9:20 AM    Critical care time exclusive of teaching and procedures = 37 minutes

## 2024-04-28 NOTE — PROGRESS NOTES
[facility] PROGRESS NOTE      Covering for Dr Giang       Chief complaint: Follow-up of septic shock/Candida albicans and glabrata infection/venous peritonitis associated with anastomosis leak    The patient is a 58 y.o. female with history of DM, hypertension, hyperlipidemia, cervical fusion, admitted on 04/15 for symptomatic pancreatic serous cystadenoma abutting many branches of the SMV and SMA prompting pylorus-preserving pancreaticoduodenectomy, placement of biliary stent, portal lymphadenectomy, round ligament and omental pedicle flap harvest, appendectomy on 04/15 during which no clinical findings of infection were noted. Postop course was complicated by intermittent fever since 04/18 up to 101.5 F and pancreatic fistula, prompting reopening recent laparotomy, completion pancreatectomy, splenectomy, omentectomy on 04/18 during which gush of a large volume of dark succus and infected fluid on opening, old infected hematoma, significant amount of necrotic omentum covering the anastomoses, dehiscence of anterior wall anastomosis with bile exiting the site of anastomosis at the jejunum, bile spilling within the lesser sac, necrotic pancreas were noted.  Tissue Gram stain and culture showed few polymorphonuclear leukocytes, no epithelial cells, no organisms, rare growth of Candida albicans, light growth of Candida glabrata, no anaerobes. Piperacillin-tazobactam was started on since admission.  She was noted to have incision wound drainage, prompting repeat CT abdomen and pelvis on 04/23 showing interval decrease in size in periportal right upper quadrant fluid collection without resolution (now measuring 3.6 x 1.7 cm as compared to previously 6.7 x 2.4 cm), new 6 x 4 x 3 cm right retroperitoneal fluid collection between superior mesenteric artery and inferior vena cava, new spindle-shaped fluid collection in subdiaphragmatic anterior left upper quadrant of the abdomen measuring 7 x 3 x 3 cm, increasing small amount of  Brigtete albicans and glabrata infection/bilious peritonitis associated with partial dehiscence of pancreaticojejunostomy anastomosis s/p reopening recent laparotomy, completion pancreatectomy, splenectomy, omentectomy on 04/18.  Symptomatic pancreatic serous cystadenoma, s/p pylorus-preserving pancreaticoduodenectomy, placement of biliary stent, portal lymphadenectomy, round ligament and omental pedicle flap harvest, appendectomy on 04/15  Leukocytosis / fever/ sepsis       Recommendations:    Continue micafungin 100 mg q24h.  Piperacillin-tazobactam 3.375 g every 8 hours to finish at least 2 weeks from 04/18.  CBC with diff     Post splenectomy vaccination to be given prior to discharge:  - pneumococcal vaccine ([id number]) IM   - meningococcal polysaccharide vaccine (Menveo) IM   - Group B meningococcal vaccine-Bexsero   - HIB polysaccharide vaccine (ActHIB) IM   - Flu vaccine   Follow up in 2 months with [facility] ([phone]) or with primary care to get booster doses for:  1) meningococcal polysaccharide vaccine (Menveo) - second dose   2)Group B meningococcal vaccine-Bexsero second dose  Update the following vaccinations in 5 years:  - meningococcal vaccine booster every 5 years.  Continue local wound care.  Continue supportive care.         Electronically signed by Rick Julio MD on 4/28/2024 at 11:41 AM

## 2024-04-28 NOTE — PLAN OF CARE
Problem: Safety - Medical Restraint  Goal: Remains free of injury from restraints (Restraint for Interference with Medical Device)  Description: INTERVENTIONS:  1. Determine that other, less restrictive measures have been tried or would not be effective before applying the restraint  2. Evaluate the patient's condition at the time of restraint application  3. Inform patient/family regarding the reason for restraint  4. Q2H: Monitor safety, psychosocial status, comfort, nutrition and hydration  4/27/2024 2119 by Margot Jon RN  Outcome: Progressing  4/27/2024 2118 by Margot Jon RN  Outcome: Progressing  Flowsheets (Taken 4/27/2024 1040 by Nellie Alatorre RN)  Remains free of injury from restraints (restraint for interference with medical device):   Determine that other, less restrictive measures have been tried or would not be effective before applying the restraint   Every 2 hours: Monitor safety, psychosocial status, comfort, nutrition and hydration   Inform patient/family regarding the reason for restraint   Evaluate the patient's condition at the time of restraint application  4/27/2024 0854 by Nellie Alatorre RN  Outcome: Progressing  Flowsheets  Taken 4/27/2024 0600 by Margot Jon RN  Remains free of injury from restraints (restraint for interference with medical device): Every 2 hours: Monitor safety, psychosocial status, comfort, nutrition and hydration  Taken 4/27/2024 0400 by Margot Jon RN  Remains free of injury from restraints (restraint for interference with medical device): Every 2 hours: Monitor safety, psychosocial status, comfort, nutrition and hydration  Taken 4/27/2024 0200 by Margot Jon RN  Remains free of injury from restraints (restraint for interference with medical device): Every 2 hours: Monitor safety, psychosocial status, comfort, nutrition and hydration

## 2024-04-29 ENCOUNTER — APPOINTMENT (OUTPATIENT)
Dept: GENERAL RADIOLOGY | Age: 59
DRG: 004 | End: 2024-04-29
Attending: STUDENT IN AN ORGANIZED HEALTH CARE EDUCATION/TRAINING PROGRAM
Payer: COMMERCIAL

## 2024-04-29 PROBLEM — Z01.812 PRE-OPERATIVE LABORATORY EXAMINATION: Status: ACTIVE | Noted: 2022-04-14

## 2024-04-29 LAB
AADO2: 135.6 MMHG
ALBUMIN SERPL-MCNC: 2.6 G/DL (ref 3.5–5.2)
ALP SERPL-CCNC: 159 U/L (ref 35–104)
ALT SERPL-CCNC: 26 U/L (ref 0–32)
ANION GAP SERPL CALCULATED.3IONS-SCNC: 8 MMOL/L (ref 7–16)
AST SERPL-CCNC: 30 U/L (ref 0–31)
B.E.: 6.4 MMOL/L (ref -3–3)
BASOPHILS # BLD: 0 K/UL (ref 0–0.2)
BASOPHILS NFR BLD: 0 % (ref 0–2)
BILIRUB SERPL-MCNC: 0.3 MG/DL (ref 0–1.2)
BUN SERPL-MCNC: 27 MG/DL (ref 6–20)
CA-I BLD-SCNC: 1.09 MMOL/L (ref 1.15–1.33)
CALCIUM SERPL-MCNC: 8 MG/DL (ref 8.6–10.2)
CHLORIDE SERPL-SCNC: 109 MMOL/L (ref 98–107)
CO2 SERPL-SCNC: 31 MMOL/L (ref 22–29)
COHB: 0.3 % (ref 0–1.5)
CREAT SERPL-MCNC: 0.4 MG/DL (ref 0.5–1)
CRITICAL: ABNORMAL
DATE ANALYZED: ABNORMAL
DATE OF COLLECTION: ABNORMAL
EOSINOPHIL # BLD: 0.81 K/UL (ref 0.05–0.5)
EOSINOPHILS RELATIVE PERCENT: 4 % (ref 0–6)
ERYTHROCYTE [DISTWIDTH] IN BLOOD BY AUTOMATED COUNT: 14.6 % (ref 11.5–15)
FIO2: 40 %
GFR SERPL CREATININE-BSD FRML MDRD: >90 ML/MIN/1.73M2
GLUCOSE BLD-MCNC: 148 MG/DL (ref 74–99)
GLUCOSE BLD-MCNC: 150 MG/DL (ref 74–99)
GLUCOSE BLD-MCNC: 168 MG/DL (ref 74–99)
GLUCOSE BLD-MCNC: 184 MG/DL (ref 74–99)
GLUCOSE BLD-MCNC: 189 MG/DL (ref 74–99)
GLUCOSE BLD-MCNC: 208 MG/DL (ref 74–99)
GLUCOSE SERPL-MCNC: 142 MG/DL (ref 74–99)
HCO3: 29.2 MMOL/L (ref 22–26)
HCT VFR BLD AUTO: 25.7 % (ref 34–48)
HGB BLD-MCNC: 7.8 G/DL (ref 11.5–15.5)
HHB: 2.9 % (ref 0–5)
INR PPP: 1.1
LAB: ABNORMAL
LYMPHOCYTES NFR BLD: 1.82 K/UL (ref 1.5–4)
LYMPHOCYTES RELATIVE PERCENT: 8 % (ref 20–42)
Lab: 505
MAGNESIUM SERPL-MCNC: 2.2 MG/DL (ref 1.6–2.6)
MCH RBC QN AUTO: 29.2 PG (ref 26–35)
MCHC RBC AUTO-ENTMCNC: 30.4 G/DL (ref 32–34.5)
MCV RBC AUTO: 96.3 FL (ref 80–99.9)
METAMYELOCYTES ABSOLUTE COUNT: 0.41 K/UL (ref 0–0.12)
METAMYELOCYTES: 2 % (ref 0–1)
METHB: 0.5 % (ref 0–1.5)
MODE: AC
MONOCYTES NFR BLD: 1.82 K/UL (ref 0.1–0.95)
MONOCYTES NFR BLD: 8 % (ref 2–12)
MYELOCYTES ABSOLUTE COUNT: 0.81 K/UL
MYELOCYTES: 4 %
NEUTROPHILS NFR BLD: 76 % (ref 43–80)
NEUTS SEG NFR BLD: 17.63 K/UL (ref 1.8–7.3)
NUCLEATED RED BLOOD CELLS: 3 PER 100 WBC
O2 SATURATION: 97.1 % (ref 92–98.5)
O2HB: 96.3 % (ref 94–97)
OPERATOR ID: 7221
PATIENT TEMP: 37 C
PCO2: 34.9 MMHG (ref 35–45)
PEEP/CPAP: 8 CMH2O
PFO2: 2.49 MMHG/%
PH BLOOD GAS: 7.54 (ref 7.35–7.45)
PHOSPHATE SERPL-MCNC: 2.2 MG/DL (ref 2.5–4.5)
PLATELET, FLUORESCENCE: 1079 K/UL (ref 130–450)
PMV BLD AUTO: 10.6 FL (ref 7–12)
PO2: 99.5 MMHG (ref 75–100)
POTASSIUM SERPL-SCNC: 4 MMOL/L (ref 3.5–5)
PROT SERPL-MCNC: 6.2 G/DL (ref 6.4–8.3)
PROTHROMBIN TIME: 12 SEC (ref 9.3–12.4)
RBC # BLD AUTO: 2.67 M/UL (ref 3.5–5.5)
RBC # BLD: ABNORMAL 10*6/UL
RBC # BLD: ABNORMAL 10*6/UL
RI(T): 1.36
RR MECHANICAL: 16 B/MIN
SODIUM SERPL-SCNC: 148 MMOL/L (ref 132–146)
SOURCE, BLOOD GAS: ABNORMAL
THB: 9.1 G/DL (ref 11.5–16.5)
TIME ANALYZED: 509
VT MECHANICAL: 350 ML
WBC OTHER # BLD: 23.3 K/UL (ref 4.5–11.5)

## 2024-04-29 PROCEDURE — 2580000003 HC RX 258: Performed by: STUDENT IN AN ORGANIZED HEALTH CARE EDUCATION/TRAINING PROGRAM

## 2024-04-29 PROCEDURE — 99231 SBSQ HOSP IP/OBS SF/LOW 25: CPT | Performed by: NURSE PRACTITIONER

## 2024-04-29 PROCEDURE — 6360000002 HC RX W HCPCS: Performed by: INTERNAL MEDICINE

## 2024-04-29 PROCEDURE — 6370000000 HC RX 637 (ALT 250 FOR IP): Performed by: STUDENT IN AN ORGANIZED HEALTH CARE EDUCATION/TRAINING PROGRAM

## 2024-04-29 PROCEDURE — 2580000003 HC RX 258

## 2024-04-29 PROCEDURE — 6370000000 HC RX 637 (ALT 250 FOR IP): Performed by: INTERNAL MEDICINE

## 2024-04-29 PROCEDURE — 99291 CRITICAL CARE FIRST HOUR: CPT | Performed by: STUDENT IN AN ORGANIZED HEALTH CARE EDUCATION/TRAINING PROGRAM

## 2024-04-29 PROCEDURE — 82805 BLOOD GASES W/O2 SATURATION: CPT

## 2024-04-29 PROCEDURE — 2000000000 HC ICU R&B

## 2024-04-29 PROCEDURE — C9113 INJ PANTOPRAZOLE SODIUM, VIA: HCPCS | Performed by: STUDENT IN AN ORGANIZED HEALTH CARE EDUCATION/TRAINING PROGRAM

## 2024-04-29 PROCEDURE — 85025 COMPLETE CBC W/AUTO DIFF WBC: CPT

## 2024-04-29 PROCEDURE — 80053 COMPREHEN METABOLIC PANEL: CPT

## 2024-04-29 PROCEDURE — 6370000000 HC RX 637 (ALT 250 FOR IP)

## 2024-04-29 PROCEDURE — 6360000002 HC RX W HCPCS: Performed by: STUDENT IN AN ORGANIZED HEALTH CARE EDUCATION/TRAINING PROGRAM

## 2024-04-29 PROCEDURE — 2580000003 HC RX 258: Performed by: INTERNAL MEDICINE

## 2024-04-29 PROCEDURE — 83735 ASSAY OF MAGNESIUM: CPT

## 2024-04-29 PROCEDURE — 85610 PROTHROMBIN TIME: CPT

## 2024-04-29 PROCEDURE — 94640 AIRWAY INHALATION TREATMENT: CPT

## 2024-04-29 PROCEDURE — 84100 ASSAY OF PHOSPHORUS: CPT

## 2024-04-29 PROCEDURE — 94003 VENT MGMT INPAT SUBQ DAY: CPT

## 2024-04-29 PROCEDURE — 6370000000 HC RX 637 (ALT 250 FOR IP): Performed by: SURGERY

## 2024-04-29 PROCEDURE — 6360000002 HC RX W HCPCS

## 2024-04-29 PROCEDURE — 82962 GLUCOSE BLOOD TEST: CPT

## 2024-04-29 PROCEDURE — 71045 X-RAY EXAM CHEST 1 VIEW: CPT

## 2024-04-29 PROCEDURE — 82330 ASSAY OF CALCIUM: CPT

## 2024-04-29 PROCEDURE — 2500000003 HC RX 250 WO HCPCS

## 2024-04-29 PROCEDURE — 37799 UNLISTED PX VASCULAR SURGERY: CPT

## 2024-04-29 RX ADMIN — ACETAMINOPHEN 650 MG: 650 SOLUTION ORAL at 05:14

## 2024-04-29 RX ADMIN — ACETAMINOPHEN 650 MG: 650 SOLUTION ORAL at 12:23

## 2024-04-29 RX ADMIN — KETOTIFEN FUMARATE 1 DROP: 0.35 SOLUTION/ DROPS OPHTHALMIC at 20:23

## 2024-04-29 RX ADMIN — MICAFUNGIN SODIUM 100 MG: 100 INJECTION, POWDER, LYOPHILIZED, FOR SOLUTION INTRAVENOUS at 16:22

## 2024-04-29 RX ADMIN — BUPROPION HYDROCHLORIDE 100 MG: 100 TABLET, FILM COATED ORAL at 20:23

## 2024-04-29 RX ADMIN — CALCIUM GLUCONATE: 98 INJECTION, SOLUTION INTRAVENOUS at 17:38

## 2024-04-29 RX ADMIN — METHOCARBAMOL 500 MG: 500 TABLET ORAL at 12:23

## 2024-04-29 RX ADMIN — ENOXAPARIN SODIUM 40 MG: 100 INJECTION SUBCUTANEOUS at 08:07

## 2024-04-29 RX ADMIN — OXYCODONE HYDROCHLORIDE 10 MG: 5 SOLUTION ORAL at 12:23

## 2024-04-29 RX ADMIN — WHITE PETROLATUM: .15; .85 OINTMENT OPHTHALMIC at 20:23

## 2024-04-29 RX ADMIN — POLYVINYL ALCOHOL, POVIDONE 1 DROP: 14; 6 SOLUTION/ DROPS OPHTHALMIC at 16:15

## 2024-04-29 RX ADMIN — IPRATROPIUM BROMIDE AND ALBUTEROL SULFATE 1 DOSE: .5; 2.5 SOLUTION RESPIRATORY (INHALATION) at 11:24

## 2024-04-29 RX ADMIN — METHOCARBAMOL 500 MG: 500 TABLET ORAL at 08:07

## 2024-04-29 RX ADMIN — VALPROIC ACID 250 MG: 250 SOLUTION ORAL at 13:51

## 2024-04-29 RX ADMIN — CALCIUM GLUCONATE 2000 MG: 98 INJECTION, SOLUTION INTRAVENOUS at 13:52

## 2024-04-29 RX ADMIN — OXYCODONE HYDROCHLORIDE 10 MG: 5 SOLUTION ORAL at 00:13

## 2024-04-29 RX ADMIN — HYDRALAZINE HYDROCHLORIDE 10 MG: 20 INJECTION INTRAMUSCULAR; INTRAVENOUS at 20:20

## 2024-04-29 RX ADMIN — SODIUM CHLORIDE, PRESERVATIVE FREE 10 ML: 5 INJECTION INTRAVENOUS at 08:06

## 2024-04-29 RX ADMIN — METOCLOPRAMIDE 10 MG: 5 INJECTION, SOLUTION INTRAMUSCULAR; INTRAVENOUS at 13:51

## 2024-04-29 RX ADMIN — OCTREOTIDE ACETATE 50 MCG: 50 INJECTION, SOLUTION INTRAVENOUS; SUBCUTANEOUS at 02:51

## 2024-04-29 RX ADMIN — VALPROIC ACID 250 MG: 250 SOLUTION ORAL at 20:21

## 2024-04-29 RX ADMIN — LABETALOL HYDROCHLORIDE 10 MG: 5 INJECTION, SOLUTION INTRAVENOUS at 07:05

## 2024-04-29 RX ADMIN — ATORVASTATIN CALCIUM 10 MG: 10 TABLET, FILM COATED ORAL at 20:21

## 2024-04-29 RX ADMIN — POTASSIUM PHOSPHATE, MONOBASIC AND POTASSIUM PHOSPHATE, DIBASIC 30 MMOL: 224; 236 INJECTION, SOLUTION, CONCENTRATE INTRAVENOUS at 09:35

## 2024-04-29 RX ADMIN — METHOCARBAMOL 500 MG: 500 TABLET ORAL at 20:21

## 2024-04-29 RX ADMIN — SENNOSIDES AND DOCUSATE SODIUM 2 TABLET: 50; 8.6 TABLET ORAL at 08:07

## 2024-04-29 RX ADMIN — OCTREOTIDE ACETATE 50 MCG: 50 INJECTION, SOLUTION INTRAVENOUS; SUBCUTANEOUS at 16:19

## 2024-04-29 RX ADMIN — OXYCODONE HYDROCHLORIDE 10 MG: 5 SOLUTION ORAL at 20:20

## 2024-04-29 RX ADMIN — POLYVINYL ALCOHOL, POVIDONE 1 DROP: 14; 6 SOLUTION/ DROPS OPHTHALMIC at 00:14

## 2024-04-29 RX ADMIN — OCTREOTIDE ACETATE 50 MCG: 50 INJECTION, SOLUTION INTRAVENOUS; SUBCUTANEOUS at 08:18

## 2024-04-29 RX ADMIN — VALPROIC ACID 250 MG: 250 SOLUTION ORAL at 07:05

## 2024-04-29 RX ADMIN — METHOCARBAMOL 500 MG: 500 TABLET ORAL at 16:16

## 2024-04-29 RX ADMIN — OXYCODONE HYDROCHLORIDE 10 MG: 5 SOLUTION ORAL at 05:14

## 2024-04-29 RX ADMIN — 0.12% CHLORHEXIDINE GLUCONATE 15 ML: 1.2 RINSE ORAL at 20:21

## 2024-04-29 RX ADMIN — INSULIN LISPRO 3 UNITS: 100 INJECTION, SOLUTION INTRAVENOUS; SUBCUTANEOUS at 00:24

## 2024-04-29 RX ADMIN — OXYCODONE HYDROCHLORIDE 10 MG: 5 SOLUTION ORAL at 08:06

## 2024-04-29 RX ADMIN — MEROPENEM 1000 MG: 1 INJECTION, POWDER, FOR SOLUTION INTRAVENOUS at 10:14

## 2024-04-29 RX ADMIN — CALCIUM GLUCONATE: 98 INJECTION, SOLUTION INTRAVENOUS at 18:31

## 2024-04-29 RX ADMIN — BUPROPION HYDROCHLORIDE 100 MG: 100 TABLET, FILM COATED ORAL at 13:51

## 2024-04-29 RX ADMIN — POLYVINYL ALCOHOL, POVIDONE 1 DROP: 14; 6 SOLUTION/ DROPS OPHTHALMIC at 08:06

## 2024-04-29 RX ADMIN — 0.12% CHLORHEXIDINE GLUCONATE 15 ML: 1.2 RINSE ORAL at 08:06

## 2024-04-29 RX ADMIN — ACETAMINOPHEN 650 MG: 650 SOLUTION ORAL at 00:13

## 2024-04-29 RX ADMIN — BUPROPION HYDROCHLORIDE 100 MG: 100 TABLET, FILM COATED ORAL at 08:06

## 2024-04-29 RX ADMIN — KETOTIFEN FUMARATE 1 DROP: 0.35 SOLUTION/ DROPS OPHTHALMIC at 08:06

## 2024-04-29 RX ADMIN — INSULIN LISPRO 3 UNITS: 100 INJECTION, SOLUTION INTRAVENOUS; SUBCUTANEOUS at 12:22

## 2024-04-29 RX ADMIN — INSULIN LISPRO 3 UNITS: 100 INJECTION, SOLUTION INTRAVENOUS; SUBCUTANEOUS at 16:16

## 2024-04-29 RX ADMIN — LABETALOL HYDROCHLORIDE 10 MG: 5 INJECTION, SOLUTION INTRAVENOUS at 05:15

## 2024-04-29 RX ADMIN — IPRATROPIUM BROMIDE AND ALBUTEROL SULFATE 1 DOSE: .5; 2.5 SOLUTION RESPIRATORY (INHALATION) at 19:15

## 2024-04-29 RX ADMIN — MEROPENEM 1000 MG: 1 INJECTION, POWDER, FOR SOLUTION INTRAVENOUS at 02:51

## 2024-04-29 RX ADMIN — INSULIN GLARGINE 18 UNITS: 100 INJECTION, SOLUTION SUBCUTANEOUS at 20:21

## 2024-04-29 RX ADMIN — Medication 250 MG: at 16:16

## 2024-04-29 RX ADMIN — SODIUM CHLORIDE, PRESERVATIVE FREE 40 MG: 5 INJECTION INTRAVENOUS at 08:06

## 2024-04-29 RX ADMIN — Medication 250 MG: at 12:23

## 2024-04-29 RX ADMIN — IPRATROPIUM BROMIDE AND ALBUTEROL SULFATE 1 DOSE: .5; 2.5 SOLUTION RESPIRATORY (INHALATION) at 07:57

## 2024-04-29 RX ADMIN — METOCLOPRAMIDE 10 MG: 5 INJECTION, SOLUTION INTRAMUSCULAR; INTRAVENOUS at 05:15

## 2024-04-29 RX ADMIN — Medication 250 MG: at 20:21

## 2024-04-29 RX ADMIN — METOCLOPRAMIDE 10 MG: 5 INJECTION, SOLUTION INTRAMUSCULAR; INTRAVENOUS at 20:21

## 2024-04-29 RX ADMIN — INSULIN LISPRO 6 UNITS: 100 INJECTION, SOLUTION INTRAVENOUS; SUBCUTANEOUS at 20:44

## 2024-04-29 RX ADMIN — SODIUM CHLORIDE, PRESERVATIVE FREE 10 ML: 5 INJECTION INTRAVENOUS at 20:24

## 2024-04-29 RX ADMIN — INSULIN LISPRO 3 UNITS: 100 INJECTION, SOLUTION INTRAVENOUS; SUBCUTANEOUS at 08:11

## 2024-04-29 RX ADMIN — OXYCODONE HYDROCHLORIDE 10 MG: 5 SOLUTION ORAL at 16:16

## 2024-04-29 RX ADMIN — POLYVINYL ALCOHOL, POVIDONE 1 DROP: 14; 6 SOLUTION/ DROPS OPHTHALMIC at 05:15

## 2024-04-29 RX ADMIN — POLYETHYLENE GLYCOL 3350 17 G: 17 POWDER, FOR SOLUTION ORAL at 08:07

## 2024-04-29 RX ADMIN — MEROPENEM 1000 MG: 1 INJECTION, POWDER, FOR SOLUTION INTRAVENOUS at 17:47

## 2024-04-29 RX ADMIN — ACETAMINOPHEN 650 MG: 650 SOLUTION ORAL at 16:16

## 2024-04-29 RX ADMIN — POLYVINYL ALCOHOL, POVIDONE 1 DROP: 14; 6 SOLUTION/ DROPS OPHTHALMIC at 12:22

## 2024-04-29 RX ADMIN — GABAPENTIN 300 MG: 300 CAPSULE ORAL at 20:20

## 2024-04-29 RX ADMIN — IPRATROPIUM BROMIDE AND ALBUTEROL SULFATE 1 DOSE: .5; 2.5 SOLUTION RESPIRATORY (INHALATION) at 16:35

## 2024-04-29 ASSESSMENT — PULMONARY FUNCTION TESTS
PIF_VALUE: 30
PIF_VALUE: 32
PIF_VALUE: 28
PIF_VALUE: 32
PIF_VALUE: 30
PIF_VALUE: 28
PIF_VALUE: 32
PIF_VALUE: 31
PIF_VALUE: 20
PIF_VALUE: 30
PIF_VALUE: 31
PIF_VALUE: 35
PIF_VALUE: 29
PIF_VALUE: 29
PIF_VALUE: 28
PIF_VALUE: 28
PIF_VALUE: 34
PIF_VALUE: 20
PIF_VALUE: 30
PIF_VALUE: 26
PIF_VALUE: 20
PIF_VALUE: 34
PIF_VALUE: 20
PIF_VALUE: 31
PIF_VALUE: 25
PIF_VALUE: 32
PIF_VALUE: 30
PIF_VALUE: 31
PIF_VALUE: 34
PIF_VALUE: 29

## 2024-04-29 ASSESSMENT — PAIN SCALES - GENERAL
PAINLEVEL_OUTOF10: 0

## 2024-04-29 NOTE — PROGRESS NOTES
04/29/24 0757   Patient Observation   Pulse 85   Respirations 19   SpO2 98 %   Vent Information   Ventilator ID [id number]   Vent Mode AC/VC   Ventilator Settings   FiO2  40 %   Vt (Set, mL) 350 mL   Resp Rate (Set) 16 bpm   PEEP/CPAP (cmH2O) 8   Peak Inspiratory Flow (Set) 55 L/sec   Vent Patient Data (Readings)   Vt (Measured) 351 mL   Peak Inspiratory Pressure (cmH2O) 30 cmH2O   Rate Measured 17 br/min   Minute Volume (L/min) 6.09 Liters   Peak Inspiratory Flow (lpm) 55 L/sec   Mean Airway Pressure (cmH2O) 12 cmH20   Plateau Pressure (cm H2O) 20 cm H2O   Driving Pressure 12   I:E Ratio 1:4.40   Flow Sensitivity 2 L/min   PEEP Intrinsic (cm H2O) 0.2 cm H2O   Static Compliance (L/cm H2O) 29   Backup Apnea On   Backup Rate 16 Breaths Per Minute   Backup Vt 350   Vent Alarm Settings   High Pressure (cmH2O) 40 cmH2O   Low Minute Volume (lpm) 4 L/min   High Minute Volume (lpm) 16 L/min   Low Exhaled Vt (ml) 250 mL   High Exhaled Vt (ml) 600 mL   RR High (bpm) 35 br/min   Apnea (secs) 20 secs   Additional Respiratoray Assessments   Humidification Source Heated wire   Humidification Temp 35.9   Ambu Bag With Mask At Bedside Yes   ETT    Placement Date/Time: 04/18/24 1939   Present on Admission/Arrival: No  Placed By: (c) In surgery;Licensed provider  Placement Verified By: Auscultation;Capnometry;Direct visualization  Preoxygenation: Yes  Mask Ventilation: Mask ventilation not attemp...   Secured At 23 cm   Measured From Lips   ETT Placement Left   Secured By Commercial tube sanon   Site Assessment Dry

## 2024-04-29 NOTE — PLAN OF CARE
and monitor for signs and symptoms of infection   Monitor lab/diagnostic results   Monitor all insertion sites i.e., indwelling lines, tubes and drains   Monitor endotracheal (as able) and nasal secretions for changes in amount and color  Goal: Absence of fever/infection during anticipated neutropenic period  Recent Flowsheet Documentation  Taken 4/29/2024 0800 by Faith Saldivar, RN  Absence of fever/infection during anticipated neutropenic period: Monitor white blood cell count     Problem: Metabolic/Fluid and Electrolytes - Adult  Goal: Electrolytes maintained within normal limits  Outcome: Progressing  Flowsheets (Taken 4/29/2024 0800)  Electrolytes maintained within normal limits: Monitor labs and assess patient for signs and symptoms of electrolyte imbalances  Goal: Hemodynamic stability and optimal renal function maintained  Outcome: Progressing  Flowsheets (Taken 4/29/2024 0800)  Hemodynamic stability and optimal renal function maintained:   Monitor labs and assess for signs and symptoms of volume excess or deficit   Monitor intake, output and patient weight  Goal: Glucose maintained within prescribed range  Outcome: Progressing  Flowsheets (Taken 4/29/2024 0800)  Glucose maintained within prescribed range: Monitor blood glucose as ordered     Problem: Hematologic - Adult  Goal: Maintains hematologic stability  4/29/2024 1010 by Faith Saldivar, RN  Outcome: Progressing  Flowsheets (Taken 4/29/2024 0800)  Maintains hematologic stability: Assess for signs and symptoms of bleeding or hemorrhage  4/28/2024 2209 by Deirdre Bautista, RN  Outcome: Progressing     Problem: Neurosensory - Adult  Goal: Achieves stable or improved neurological status  Outcome: Not Progressing  Flowsheets (Taken 4/29/2024 0800)  Achieves stable or improved neurological status:   Maintain blood pressure and fluid volume within ordered parameters to optimize cerebral perfusion and minimize risk of hemorrhage   Monitor temperature, glucose,  Monitor swallowing and airway patency with patient fatigue and changes in neurological status

## 2024-04-29 NOTE — PROGRESS NOTES
Surgical Intensive Care Unit   Daily Progress Note     Patient's name:  Leatha Willard  Age/Gender: 58 y.o. female  Date of Admission: 4/15/2024  5:37 AM  Length of Stay: 14    *Reason for ICU:   Complete pancreatectomy, splenectomy     HPI:   Leatha Willard is a 58 y.o. female who presents to the SICU for post-op monitoring following a whipple. The patient has been admitted to the hospital since 4/15/2024 who presents for evaluation of pancreatic cyst. She has a hx of arthritis and back pain s/p cervical fusion. She was being evaluated for her mid back pain with imaging and was found to have a large pancreatic cyst on CT. She then had an MRI/MRCP showing a large >10cm cyst in the pancreatic head extending to the transverse colon mesentery. She had an EUS with Dr. Hammond at [facility]. FNA and fluid analysis was consistent with a benign cyst. There were no suspicious features present. Patient underwent a Pylorus-preserving pancreaticoduodenectomy on 4/15 and was admitted to the ICU post-operatively.  On 4/18 she underwent a complete pancreatectomy and splenectomy secondary to a worsening pancreatic leak.     *Overnight Events:     Copious brown mucoid drainage from midline incision and previous GIULIANO drain site.     Moves to pain  No sedation, intubated day 12     Problem List:   Patient Active Problem List   Diagnosis    Recurrent major depressive disorder (HCC)    Essential hypertension    Hyperlipidemia    Prediabetes    Class 1 obesity due to excess calories without serious comorbidity with body mass index (BMI) of 32.0 to 32.9 in adult    HIREN (obstructive sleep apnea)    Pancreatic cyst    Colon polyps    Cyst of pancreas    Chest pain    Tobacco abuse-- quit 2 weeks ago    Abdominal pain    Serous cystadenoma    Pancreatic fistula    Sepsis with acute renal failure and septic shock (HCC)    JUSTINE (acute kidney injury) (HCC)    Hypophosphatemia    Acute respiratory failure with hypoxia (HCC)    Post-pancreatectomy  following   Octreotide started   RLQ drain removed 4/28  PEG tube placement is pending scheduling   FEN:   Replenishing any electrolyte abnormalities   Renal:    JUSTINE--resolved, c/w bobby for now  Heme:    acute blood loss anemia--monitor  Hemoglobin, platelets stable   Endocrine:    DM--  Insulin   Endo following   ID: pancreatic leak + positive surgical site cultures   Meropenem + micafungin   S/p splenectomy--vaccines prior to d/c  ID following       Pain/Analgesia: fentanyl, tylenol  Bowel regimen:reglan, dulcolax, glycolax, senokot  GI proph: protonix   Glucose protocol: BGL <180   Diet: Diet NPO Exceptions are: Sips of Water with Meds  PN-Adult  3-in-1 Central Line (Custom)  DVT proph: lovenox 40mg sq  Seizure proph: none  Mouth/eye care: per patient  Bobby: yes  Ancillary consults: ID, endocrine   Family Update: as possible  CODE Status: Full Code    Dispo: ICU    Electronically signed by Jose Laboy PGY-2, DO 4/29/2024  8:32 AM

## 2024-04-29 NOTE — PROGRESS NOTES
Spontaneous Parameters performed    VT = 311 ml  f = 22  B/M  Ve = 6.78 L/M  NIF = -27  cmH2O  VC = 170 mL  RSBI = 70      Performed by Xin pereyra RCP

## 2024-04-29 NOTE — PROGRESS NOTES
[facility]   Daily Progress Note      Patient's Name/Date of Birth: Leatha Willard / 1965    Date: April 29, 2024     Chief Complaint: s/p open whipple, Grade C POPF s/p takeback to OR for completion pancreatectomy    Patient Active Problem List   Diagnosis    Recurrent major depressive disorder (HCC)    Essential hypertension    Hyperlipidemia    Prediabetes    Class 1 obesity due to excess calories without serious comorbidity with body mass index (BMI) of 32.0 to 32.9 in adult    HIREN (obstructive sleep apnea)    Pancreatic cyst    Colon polyps    Cyst of pancreas    Chest pain    Tobacco abuse-- quit 2 weeks ago    Abdominal pain    Serous cystadenoma    Pancreatic fistula    Sepsis with acute renal failure and septic shock (HCC)    JUSTINE (acute kidney injury) (HCC)    Hypophosphatemia    Acute respiratory failure with hypoxia (HCC)    Post-pancreatectomy diabetes (HCC)    On total parenteral nutrition    Hypoalbuminemia    Electrolyte imbalance    Hypocalcemia    Acute blood loss anemia    Hypokalemia       Subjective:   Remains off of sedation. Not following commands. Still having large amount of thick gel like drainage from midline.      Objective:  BP (!) 167/81   Pulse 84   Temp (!) 100.6 °F (38.1 °C) (Bladder)   Resp 18   Ht 1.626 m (5' 4\")   Wt 93.2 kg (205 lb 7.5 oz)   SpO2 97%   BMI 35.27 kg/m²   Labs:  Recent Labs     04/27/24  0504 04/28/24 0427 04/29/24  0458   WBC 20.1* 23.7* 23.3*   HGB 7.7* 7.9* 7.8*   HCT 25.1* 26.2* 25.7*     Recent Labs     04/27/24  1153 04/28/24  0427 04/29/24  0458   * 145 148*   K 3.7 4.5 4.0   * 105 109*   CO2 29 27 31*   BUN 34* 35* 27*   CREATININE 0.5 0.5 0.4*     Recent Labs     04/27/24  0504 04/28/24  0427 04/29/24  0458   ALKPHOS 146* 157* 159*   ALT 15 23 26   AST 24 32* 30   BILITOT 0.3 0.3 0.3     Weights:  4/15 - 76.2 kg  ....  4/21 - 88.7 kg  4/22 - 96.8 kg  4/23 - 94.5 kg  4/24 - 94.7 kg  4/25 - 92.4 kg  4/26 - 93.2 kg    General

## 2024-04-29 NOTE — PROGRESS NOTES
Patient placed on spontaneous breathing trial at this time.  PS of 12, FiO2 40%, PEEP 12.  Patient is tolerating well.

## 2024-04-29 NOTE — PROGRESS NOTES
Called family and gave update regarding patient's clinical status. All questions answered.     -Jesse Hood MD PGY 3

## 2024-04-29 NOTE — PLAN OF CARE
Problem: Respiratory - Adult  Goal: Achieves optimal ventilation and oxygenation  4/29/2024 0841 by Xin Aquino RCP  Outcome: Progressing  4/28/2024 2209 by Deirdre Bautista RN  Outcome: Progressing
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
## 2024-04-29 NOTE — CARE COORDINATION
4/29 Care Coordination: Pt remains in SICU. S/P Total pancreatectomy and splenectomy.Remains on vent, IV sedation is off. IV TPN, NPO, NG LIS.IV ATB. PS trial today. With Current status unclear on discharge needs. Prior to pt being intubated her plan was to return home. She was independent and cares for her handicapped daughter. [facility] is following back door.    CM/SW will continue to follow for discharge planning.   Vivek NIEVES,RN--BC  [phone]

## 2024-04-30 ENCOUNTER — APPOINTMENT (OUTPATIENT)
Dept: GENERAL RADIOLOGY | Age: 59
DRG: 004 | End: 2024-04-30
Attending: STUDENT IN AN ORGANIZED HEALTH CARE EDUCATION/TRAINING PROGRAM
Payer: COMMERCIAL

## 2024-04-30 ENCOUNTER — APPOINTMENT (OUTPATIENT)
Dept: CT IMAGING | Age: 59
DRG: 004 | End: 2024-04-30
Attending: STUDENT IN AN ORGANIZED HEALTH CARE EDUCATION/TRAINING PROGRAM
Payer: COMMERCIAL

## 2024-04-30 LAB
AADO2: 106.8 MMHG
ALBUMIN SERPL-MCNC: 2.7 G/DL (ref 3.5–5.2)
ALP SERPL-CCNC: 158 U/L (ref 35–104)
ALT SERPL-CCNC: 24 U/L (ref 0–32)
ANION GAP SERPL CALCULATED.3IONS-SCNC: 11 MMOL/L (ref 7–16)
AST SERPL-CCNC: 27 U/L (ref 0–31)
B.E.: 6.4 MMOL/L (ref -3–3)
BASOPHILS # BLD: 0.2 K/UL (ref 0–0.2)
BASOPHILS NFR BLD: 1 % (ref 0–2)
BILIRUB SERPL-MCNC: 0.3 MG/DL (ref 0–1.2)
BUN SERPL-MCNC: 18 MG/DL (ref 6–20)
CA-I BLD-SCNC: 1.11 MMOL/L (ref 1.15–1.33)
CALCIUM SERPL-MCNC: 8 MG/DL (ref 8.6–10.2)
CHLORIDE SERPL-SCNC: 108 MMOL/L (ref 98–107)
CO2 SERPL-SCNC: 27 MMOL/L (ref 22–29)
COHB: 0.3 % (ref 0–1.5)
CREAT SERPL-MCNC: 0.4 MG/DL (ref 0.5–1)
CRITICAL: ABNORMAL
DATE ANALYZED: ABNORMAL
DATE OF COLLECTION: ABNORMAL
EOSINOPHIL # BLD: 0.39 K/UL (ref 0.05–0.5)
EOSINOPHILS RELATIVE PERCENT: 2 % (ref 0–6)
ERYTHROCYTE [DISTWIDTH] IN BLOOD BY AUTOMATED COUNT: 14.7 % (ref 11.5–15)
FIO2: 40 %
GFR SERPL CREATININE-BSD FRML MDRD: >90 ML/MIN/1.73M2
GLUCOSE BLD-MCNC: 115 MG/DL (ref 74–99)
GLUCOSE BLD-MCNC: 127 MG/DL (ref 74–99)
GLUCOSE BLD-MCNC: 130 MG/DL (ref 74–99)
GLUCOSE BLD-MCNC: 149 MG/DL (ref 74–99)
GLUCOSE BLD-MCNC: 167 MG/DL (ref 74–99)
GLUCOSE BLD-MCNC: 170 MG/DL (ref 74–99)
GLUCOSE BLD-MCNC: 202 MG/DL (ref 74–99)
GLUCOSE SERPL-MCNC: 108 MG/DL (ref 74–99)
HCO3: 28.9 MMOL/L (ref 22–26)
HCT VFR BLD AUTO: 27.5 % (ref 34–48)
HGB BLD-MCNC: 8.3 G/DL (ref 11.5–15.5)
HHB: 1.6 % (ref 0–5)
INR PPP: 1.1
LAB: ABNORMAL
LYMPHOCYTES NFR BLD: 2.57 K/UL (ref 1.5–4)
LYMPHOCYTES RELATIVE PERCENT: 11 % (ref 20–42)
Lab: 535
MAGNESIUM SERPL-MCNC: 2 MG/DL (ref 1.6–2.6)
MCH RBC QN AUTO: 28.8 PG (ref 26–35)
MCHC RBC AUTO-ENTMCNC: 30.2 G/DL (ref 32–34.5)
MCV RBC AUTO: 95.5 FL (ref 80–99.9)
METAMYELOCYTES ABSOLUTE COUNT: 0.2 K/UL (ref 0–0.12)
METAMYELOCYTES: 1 % (ref 0–1)
METHB: 0.3 % (ref 0–1.5)
MICROORGANISM SPEC CULT: NORMAL
MICROORGANISM SPEC CULT: NORMAL
MODE: AC
MONOCYTES NFR BLD: 10 % (ref 2–12)
MONOCYTES NFR BLD: 2.17 K/UL (ref 0.1–0.95)
NEUTROPHILS NFR BLD: 76 % (ref 43–80)
NEUTS SEG NFR BLD: 17.17 K/UL (ref 1.8–7.3)
NUCLEATED RED BLOOD CELLS: 2 PER 100 WBC
O2 SATURATION: 98.4 % (ref 92–98.5)
O2HB: 97.8 % (ref 94–97)
OPERATOR ID: ABNORMAL
PATIENT TEMP: 37 C
PCO2: 33.5 MMHG (ref 35–45)
PEEP/CPAP: 8 CMH2O
PFO2: 3.25 MMHG/%
PH BLOOD GAS: 7.55 (ref 7.35–7.45)
PHOSPHATE SERPL-MCNC: 2.7 MG/DL (ref 2.5–4.5)
PLATELET, FLUORESCENCE: 1080 K/UL (ref 130–450)
PMV BLD AUTO: 10.4 FL (ref 7–12)
PO2: 129.9 MMHG (ref 75–100)
POTASSIUM SERPL-SCNC: 3.6 MMOL/L (ref 3.5–5)
PROT SERPL-MCNC: 6.4 G/DL (ref 6.4–8.3)
PROTHROMBIN TIME: 12 SEC (ref 9.3–12.4)
RBC # BLD AUTO: 2.88 M/UL (ref 3.5–5.5)
RBC # BLD: ABNORMAL 10*6/UL
RBC # BLD: ABNORMAL 10*6/UL
RI(T): 0.82
RR MECHANICAL: 16 B/MIN
SERVICE CMNT-IMP: NORMAL
SERVICE CMNT-IMP: NORMAL
SODIUM SERPL-SCNC: 146 MMOL/L (ref 132–146)
SOURCE, BLOOD GAS: ABNORMAL
SPECIMEN DESCRIPTION: NORMAL
SPECIMEN DESCRIPTION: NORMAL
THB: 9.7 G/DL (ref 11.5–16.5)
TIME ANALYZED: 552
VT MECHANICAL: 350 ML
WBC OTHER # BLD: 22.7 K/UL (ref 4.5–11.5)

## 2024-04-30 PROCEDURE — 6360000002 HC RX W HCPCS: Performed by: STUDENT IN AN ORGANIZED HEALTH CARE EDUCATION/TRAINING PROGRAM

## 2024-04-30 PROCEDURE — 2580000003 HC RX 258

## 2024-04-30 PROCEDURE — 83735 ASSAY OF MAGNESIUM: CPT

## 2024-04-30 PROCEDURE — 85025 COMPLETE CBC W/AUTO DIFF WBC: CPT

## 2024-04-30 PROCEDURE — 74177 CT ABD & PELVIS W/CONTRAST: CPT

## 2024-04-30 PROCEDURE — 84100 ASSAY OF PHOSPHORUS: CPT

## 2024-04-30 PROCEDURE — 99291 CRITICAL CARE FIRST HOUR: CPT | Performed by: STUDENT IN AN ORGANIZED HEALTH CARE EDUCATION/TRAINING PROGRAM

## 2024-04-30 PROCEDURE — 6360000004 HC RX CONTRAST MEDICATION: Performed by: RADIOLOGY

## 2024-04-30 PROCEDURE — 6370000000 HC RX 637 (ALT 250 FOR IP): Performed by: STUDENT IN AN ORGANIZED HEALTH CARE EDUCATION/TRAINING PROGRAM

## 2024-04-30 PROCEDURE — 71045 X-RAY EXAM CHEST 1 VIEW: CPT

## 2024-04-30 PROCEDURE — 6370000000 HC RX 637 (ALT 250 FOR IP): Performed by: SURGERY

## 2024-04-30 PROCEDURE — 2500000003 HC RX 250 WO HCPCS

## 2024-04-30 PROCEDURE — 6360000002 HC RX W HCPCS: Performed by: INTERNAL MEDICINE

## 2024-04-30 PROCEDURE — 94003 VENT MGMT INPAT SUBQ DAY: CPT

## 2024-04-30 PROCEDURE — C9113 INJ PANTOPRAZOLE SODIUM, VIA: HCPCS | Performed by: STUDENT IN AN ORGANIZED HEALTH CARE EDUCATION/TRAINING PROGRAM

## 2024-04-30 PROCEDURE — 2580000003 HC RX 258: Performed by: INTERNAL MEDICINE

## 2024-04-30 PROCEDURE — 6360000002 HC RX W HCPCS

## 2024-04-30 PROCEDURE — 82330 ASSAY OF CALCIUM: CPT

## 2024-04-30 PROCEDURE — 37799 UNLISTED PX VASCULAR SURGERY: CPT

## 2024-04-30 PROCEDURE — 2720000010 HC SURG SUPPLY STERILE: Performed by: STUDENT IN AN ORGANIZED HEALTH CARE EDUCATION/TRAINING PROGRAM

## 2024-04-30 PROCEDURE — 80053 COMPREHEN METABOLIC PANEL: CPT

## 2024-04-30 PROCEDURE — 6370000000 HC RX 637 (ALT 250 FOR IP): Performed by: INTERNAL MEDICINE

## 2024-04-30 PROCEDURE — 82962 GLUCOSE BLOOD TEST: CPT

## 2024-04-30 PROCEDURE — 85610 PROTHROMBIN TIME: CPT

## 2024-04-30 PROCEDURE — 3609013300 HC EGD TUBE PLACEMENT: Performed by: STUDENT IN AN ORGANIZED HEALTH CARE EDUCATION/TRAINING PROGRAM

## 2024-04-30 PROCEDURE — 82805 BLOOD GASES W/O2 SATURATION: CPT

## 2024-04-30 PROCEDURE — 99232 SBSQ HOSP IP/OBS MODERATE 35: CPT | Performed by: INTERNAL MEDICINE

## 2024-04-30 PROCEDURE — 2580000003 HC RX 258: Performed by: STUDENT IN AN ORGANIZED HEALTH CARE EDUCATION/TRAINING PROGRAM

## 2024-04-30 PROCEDURE — 2709999900 HC NON-CHARGEABLE SUPPLY: Performed by: STUDENT IN AN ORGANIZED HEALTH CARE EDUCATION/TRAINING PROGRAM

## 2024-04-30 PROCEDURE — 94640 AIRWAY INHALATION TREATMENT: CPT

## 2024-04-30 PROCEDURE — 6370000000 HC RX 637 (ALT 250 FOR IP)

## 2024-04-30 PROCEDURE — 0DH63UZ INSERTION OF FEEDING DEVICE INTO STOMACH, PERCUTANEOUS APPROACH: ICD-10-PCS | Performed by: STUDENT IN AN ORGANIZED HEALTH CARE EDUCATION/TRAINING PROGRAM

## 2024-04-30 PROCEDURE — 2000000000 HC ICU R&B

## 2024-04-30 DEVICE — JEJUNAL FEEDING SET
Type: IMPLANTABLE DEVICE | Site: JEJUNUM | Status: FUNCTIONAL
Brand: PEG 24

## 2024-04-30 RX ORDER — FENTANYL CITRATE 50 UG/ML
INJECTION, SOLUTION INTRAMUSCULAR; INTRAVENOUS
Status: COMPLETED
Start: 2024-04-30 | End: 2024-04-30

## 2024-04-30 RX ORDER — FENTANYL CITRATE 50 UG/ML
50 INJECTION, SOLUTION INTRAMUSCULAR; INTRAVENOUS ONCE
Status: COMPLETED | OUTPATIENT
Start: 2024-04-30 | End: 2024-04-30

## 2024-04-30 RX ORDER — FENTANYL CITRATE 50 UG/ML
200 INJECTION, SOLUTION INTRAMUSCULAR; INTRAVENOUS
Status: DISCONTINUED | OUTPATIENT
Start: 2024-05-01 | End: 2024-05-05

## 2024-04-30 RX ORDER — FENTANYL CITRATE 50 UG/ML
200 INJECTION, SOLUTION INTRAMUSCULAR; INTRAVENOUS
Status: COMPLETED | OUTPATIENT
Start: 2024-05-01 | End: 2024-05-01

## 2024-04-30 RX ORDER — FENTANYL CITRATE 50 UG/ML
200 INJECTION, SOLUTION INTRAMUSCULAR; INTRAVENOUS ONCE
Status: COMPLETED | OUTPATIENT
Start: 2024-04-30 | End: 2024-04-30

## 2024-04-30 RX ORDER — MIDAZOLAM HYDROCHLORIDE 1 MG/ML
INJECTION INTRAMUSCULAR; INTRAVENOUS
Status: COMPLETED
Start: 2024-04-30 | End: 2024-04-30

## 2024-04-30 RX ORDER — MIDAZOLAM HYDROCHLORIDE 2 MG/2ML
4 INJECTION, SOLUTION INTRAMUSCULAR; INTRAVENOUS ONCE
Status: COMPLETED | OUTPATIENT
Start: 2024-04-30 | End: 2024-04-30

## 2024-04-30 RX ORDER — ROCURONIUM BROMIDE 10 MG/ML
0.6 INJECTION, SOLUTION INTRAVENOUS
Status: DISCONTINUED | OUTPATIENT
Start: 2024-05-01 | End: 2024-05-05

## 2024-04-30 RX ORDER — OXYCODONE HCL 5 MG/5 ML
10 SOLUTION, ORAL ORAL EVERY 6 HOURS
Status: DISPENSED | OUTPATIENT
Start: 2024-04-30

## 2024-04-30 RX ORDER — MIDAZOLAM HYDROCHLORIDE 2 MG/2ML
2 INJECTION, SOLUTION INTRAMUSCULAR; INTRAVENOUS ONCE
Status: COMPLETED | OUTPATIENT
Start: 2024-04-30 | End: 2024-04-30

## 2024-04-30 RX ORDER — MIDAZOLAM HYDROCHLORIDE 2 MG/2ML
4 INJECTION, SOLUTION INTRAMUSCULAR; INTRAVENOUS
Status: COMPLETED | OUTPATIENT
Start: 2024-05-01 | End: 2024-05-01

## 2024-04-30 RX ORDER — CALCIUM GLUCONATE 10 MG/ML
1000 INJECTION, SOLUTION INTRAVENOUS ONCE
Status: COMPLETED | OUTPATIENT
Start: 2024-04-30 | End: 2024-04-30

## 2024-04-30 RX ADMIN — WHITE PETROLATUM: .15; .85 OINTMENT OPHTHALMIC at 00:48

## 2024-04-30 RX ADMIN — IPRATROPIUM BROMIDE AND ALBUTEROL SULFATE 1 DOSE: .5; 2.5 SOLUTION RESPIRATORY (INHALATION) at 16:24

## 2024-04-30 RX ADMIN — POLYVINYL ALCOHOL, POVIDONE 1 DROP: 14; 6 SOLUTION/ DROPS OPHTHALMIC at 15:13

## 2024-04-30 RX ADMIN — ACETAMINOPHEN 650 MG: 650 SOLUTION ORAL at 00:47

## 2024-04-30 RX ADMIN — VALPROIC ACID 250 MG: 250 SOLUTION ORAL at 13:46

## 2024-04-30 RX ADMIN — MEROPENEM 1000 MG: 1 INJECTION, POWDER, FOR SOLUTION INTRAVENOUS at 10:52

## 2024-04-30 RX ADMIN — IOPAMIDOL 75 ML: 755 INJECTION, SOLUTION INTRAVENOUS at 10:31

## 2024-04-30 RX ADMIN — POLYVINYL ALCOHOL, POVIDONE 1 DROP: 14; 6 SOLUTION/ DROPS OPHTHALMIC at 08:14

## 2024-04-30 RX ADMIN — METHOCARBAMOL 500 MG: 500 TABLET ORAL at 15:30

## 2024-04-30 RX ADMIN — METHOCARBAMOL 500 MG: 500 TABLET ORAL at 11:54

## 2024-04-30 RX ADMIN — KETOTIFEN FUMARATE 1 DROP: 0.35 SOLUTION/ DROPS OPHTHALMIC at 21:17

## 2024-04-30 RX ADMIN — OXYCODONE HYDROCHLORIDE 10 MG: 5 SOLUTION ORAL at 15:30

## 2024-04-30 RX ADMIN — ACETAMINOPHEN 650 MG: 650 SOLUTION ORAL at 11:53

## 2024-04-30 RX ADMIN — INSULIN LISPRO 6 UNITS: 100 INJECTION, SOLUTION INTRAVENOUS; SUBCUTANEOUS at 15:27

## 2024-04-30 RX ADMIN — BUPROPION HYDROCHLORIDE 100 MG: 100 TABLET, FILM COATED ORAL at 21:17

## 2024-04-30 RX ADMIN — MIDAZOLAM 2 MG: 1 INJECTION INTRAMUSCULAR; INTRAVENOUS at 17:22

## 2024-04-30 RX ADMIN — WHITE PETROLATUM: .15; .85 OINTMENT OPHTHALMIC at 05:48

## 2024-04-30 RX ADMIN — OCTREOTIDE ACETATE 50 MCG: 50 INJECTION, SOLUTION INTRAVENOUS; SUBCUTANEOUS at 00:47

## 2024-04-30 RX ADMIN — OCTREOTIDE ACETATE 50 MCG: 50 INJECTION, SOLUTION INTRAVENOUS; SUBCUTANEOUS at 15:27

## 2024-04-30 RX ADMIN — VALPROIC ACID 250 MG: 250 SOLUTION ORAL at 05:48

## 2024-04-30 RX ADMIN — OXYCODONE HYDROCHLORIDE 10 MG: 5 SOLUTION ORAL at 21:15

## 2024-04-30 RX ADMIN — INSULIN GLARGINE 18 UNITS: 100 INJECTION, SOLUTION SUBCUTANEOUS at 21:16

## 2024-04-30 RX ADMIN — ATORVASTATIN CALCIUM 10 MG: 10 TABLET, FILM COATED ORAL at 21:16

## 2024-04-30 RX ADMIN — POLYVINYL ALCOHOL, POVIDONE 1 DROP: 14; 6 SOLUTION/ DROPS OPHTHALMIC at 23:45

## 2024-04-30 RX ADMIN — OXYCODONE HYDROCHLORIDE 10 MG: 5 SOLUTION ORAL at 00:47

## 2024-04-30 RX ADMIN — INSULIN LISPRO 3 UNITS: 100 INJECTION, SOLUTION INTRAVENOUS; SUBCUTANEOUS at 00:46

## 2024-04-30 RX ADMIN — FENTANYL CITRATE 200 MCG: 50 INJECTION INTRAMUSCULAR; INTRAVENOUS at 17:17

## 2024-04-30 RX ADMIN — MIDAZOLAM HYDROCHLORIDE 4 MG: 2 INJECTION, SOLUTION INTRAMUSCULAR; INTRAVENOUS at 16:45

## 2024-04-30 RX ADMIN — VALPROIC ACID 250 MG: 250 SOLUTION ORAL at 22:30

## 2024-04-30 RX ADMIN — MIDAZOLAM HYDROCHLORIDE 2 MG: 2 INJECTION, SOLUTION INTRAMUSCULAR; INTRAVENOUS at 17:22

## 2024-04-30 RX ADMIN — METHOCARBAMOL 500 MG: 500 TABLET ORAL at 21:16

## 2024-04-30 RX ADMIN — METOCLOPRAMIDE 10 MG: 5 INJECTION, SOLUTION INTRAMUSCULAR; INTRAVENOUS at 13:25

## 2024-04-30 RX ADMIN — ACETAMINOPHEN 650 MG: 650 SOLUTION ORAL at 23:48

## 2024-04-30 RX ADMIN — ACETAMINOPHEN 650 MG: 650 SOLUTION ORAL at 18:38

## 2024-04-30 RX ADMIN — IPRATROPIUM BROMIDE AND ALBUTEROL SULFATE 1 DOSE: .5; 2.5 SOLUTION RESPIRATORY (INHALATION) at 20:09

## 2024-04-30 RX ADMIN — OXYCODONE HYDROCHLORIDE 10 MG: 5 SOLUTION ORAL at 11:54

## 2024-04-30 RX ADMIN — INSULIN LISPRO 3 UNITS: 100 INJECTION, SOLUTION INTRAVENOUS; SUBCUTANEOUS at 23:51

## 2024-04-30 RX ADMIN — CALCIUM GLUCONATE 1000 MG: 10 INJECTION, SOLUTION INTRAVENOUS at 08:48

## 2024-04-30 RX ADMIN — SODIUM CHLORIDE, PRESERVATIVE FREE 40 MG: 5 INJECTION INTRAVENOUS at 08:43

## 2024-04-30 RX ADMIN — SODIUM CHLORIDE, PRESERVATIVE FREE 10 ML: 5 INJECTION INTRAVENOUS at 21:18

## 2024-04-30 RX ADMIN — SODIUM CHLORIDE, PRESERVATIVE FREE 10 ML: 5 INJECTION INTRAVENOUS at 08:44

## 2024-04-30 RX ADMIN — POLYVINYL ALCOHOL, POVIDONE 1 DROP: 14; 6 SOLUTION/ DROPS OPHTHALMIC at 20:21

## 2024-04-30 RX ADMIN — ACETAMINOPHEN 650 MG: 650 SOLUTION ORAL at 05:48

## 2024-04-30 RX ADMIN — IPRATROPIUM BROMIDE AND ALBUTEROL SULFATE 1 DOSE: .5; 2.5 SOLUTION RESPIRATORY (INHALATION) at 13:27

## 2024-04-30 RX ADMIN — OXYCODONE HYDROCHLORIDE 10 MG: 5 SOLUTION ORAL at 08:43

## 2024-04-30 RX ADMIN — MIDAZOLAM 4 MG: 1 INJECTION INTRAMUSCULAR; INTRAVENOUS at 16:45

## 2024-04-30 RX ADMIN — ENOXAPARIN SODIUM 40 MG: 100 INJECTION SUBCUTANEOUS at 08:45

## 2024-04-30 RX ADMIN — FENTANYL CITRATE 50 MCG: 50 INJECTION, SOLUTION INTRAMUSCULAR; INTRAVENOUS at 17:21

## 2024-04-30 RX ADMIN — GABAPENTIN 300 MG: 300 CAPSULE ORAL at 21:16

## 2024-04-30 RX ADMIN — SENNOSIDES AND DOCUSATE SODIUM 2 TABLET: 50; 8.6 TABLET ORAL at 11:14

## 2024-04-30 RX ADMIN — POTASSIUM BICARBONATE 40 MEQ: 782 TABLET, EFFERVESCENT ORAL at 08:43

## 2024-04-30 RX ADMIN — Medication 250 MG: at 08:43

## 2024-04-30 RX ADMIN — POLYVINYL ALCOHOL, POVIDONE 1 DROP: 14; 6 SOLUTION/ DROPS OPHTHALMIC at 11:15

## 2024-04-30 RX ADMIN — BUPROPION HYDROCHLORIDE 100 MG: 100 TABLET, FILM COATED ORAL at 08:45

## 2024-04-30 RX ADMIN — OXYCODONE HYDROCHLORIDE 10 MG: 5 SOLUTION ORAL at 05:48

## 2024-04-30 RX ADMIN — KETOTIFEN FUMARATE 1 DROP: 0.35 SOLUTION/ DROPS OPHTHALMIC at 08:18

## 2024-04-30 RX ADMIN — BUPROPION HYDROCHLORIDE 100 MG: 100 TABLET, FILM COATED ORAL at 13:46

## 2024-04-30 RX ADMIN — 0.12% CHLORHEXIDINE GLUCONATE 15 ML: 1.2 RINSE ORAL at 21:15

## 2024-04-30 RX ADMIN — FENTANYL CITRATE 200 MCG: 50 INJECTION, SOLUTION INTRAMUSCULAR; INTRAVENOUS at 17:17

## 2024-04-30 RX ADMIN — OCTREOTIDE ACETATE 50 MCG: 50 INJECTION, SOLUTION INTRAVENOUS; SUBCUTANEOUS at 23:47

## 2024-04-30 RX ADMIN — MEROPENEM 1000 MG: 1 INJECTION, POWDER, FOR SOLUTION INTRAVENOUS at 18:29

## 2024-04-30 RX ADMIN — 0.12% CHLORHEXIDINE GLUCONATE 15 ML: 1.2 RINSE ORAL at 08:14

## 2024-04-30 RX ADMIN — METHOCARBAMOL 500 MG: 500 TABLET ORAL at 08:43

## 2024-04-30 RX ADMIN — LABETALOL HYDROCHLORIDE 10 MG: 5 INJECTION, SOLUTION INTRAVENOUS at 09:00

## 2024-04-30 RX ADMIN — FENTANYL CITRATE 50 MCG: 50 INJECTION INTRAMUSCULAR; INTRAVENOUS at 17:21

## 2024-04-30 RX ADMIN — MEROPENEM 1000 MG: 1 INJECTION, POWDER, FOR SOLUTION INTRAVENOUS at 02:05

## 2024-04-30 RX ADMIN — IPRATROPIUM BROMIDE AND ALBUTEROL SULFATE 1 DOSE: .5; 2.5 SOLUTION RESPIRATORY (INHALATION) at 10:08

## 2024-04-30 RX ADMIN — CALCIUM GLUCONATE: 98 INJECTION, SOLUTION INTRAVENOUS at 17:38

## 2024-04-30 RX ADMIN — OCTREOTIDE ACETATE 50 MCG: 50 INJECTION, SOLUTION INTRAVENOUS; SUBCUTANEOUS at 08:43

## 2024-04-30 RX ADMIN — METOCLOPRAMIDE 10 MG: 5 INJECTION, SOLUTION INTRAMUSCULAR; INTRAVENOUS at 05:48

## 2024-04-30 RX ADMIN — MICAFUNGIN SODIUM 100 MG: 100 INJECTION, POWDER, LYOPHILIZED, FOR SOLUTION INTRAVENOUS at 17:11

## 2024-04-30 RX ADMIN — METOCLOPRAMIDE 10 MG: 5 INJECTION, SOLUTION INTRAMUSCULAR; INTRAVENOUS at 21:16

## 2024-04-30 ASSESSMENT — PULMONARY FUNCTION TESTS
PIF_VALUE: 30
PIF_VALUE: 32
PIF_VALUE: 30
PIF_VALUE: 23
PIF_VALUE: 24
PIF_VALUE: 30
PIF_VALUE: 28
PIF_VALUE: 30
PIF_VALUE: 26
PIF_VALUE: 24
PIF_VALUE: 36
PIF_VALUE: 26
PIF_VALUE: 33
PIF_VALUE: 28
PIF_VALUE: 28
PIF_VALUE: 32
PIF_VALUE: 31
PIF_VALUE: 22
PIF_VALUE: 29
PIF_VALUE: 30
PIF_VALUE: 27
PIF_VALUE: 26
PIF_VALUE: 28
PIF_VALUE: 25
PIF_VALUE: 26
PIF_VALUE: 29
PIF_VALUE: 31
PIF_VALUE: 34
PIF_VALUE: 27
PIF_VALUE: 28
PIF_VALUE: 27
PIF_VALUE: 27
PIF_VALUE: 26
PIF_VALUE: 25
PIF_VALUE: 28

## 2024-04-30 ASSESSMENT — PAIN SCALES - GENERAL
PAINLEVEL_OUTOF10: 3
PAINLEVEL_OUTOF10: 0

## 2024-04-30 NOTE — PROGRESS NOTES
ENDOCRINOLOGY PROGRESS NOTE      Date of admission: 4/15/2024  Date of service: 4/29/2024  Admitting physician: Norma Rogers MD   Primary Care Physician: Mekhi Escalona MD  Consultant physician: Jonathan Curiel MD     Reason for the consultation:  Post-pancreatectomy DM     History of Present Illness:  Leatha Willard is a 58 y.o. old female with PMH of cystic neoplasm of the head of pancreas, HTN, HLD and other listed below admitted to [facility] on 4/15/2024 for completion pancreatectomy. Endocrine service was consulted for diabetes management.    Subjective   I saw and examined the patient at bedside this afternoon, still intubated sedated.  On TPN.  Most glucose level in range.  No hypoglycemia    Point of care glucose monitoring   (Independently reviewed)   Recent Labs     04/28/24  1213 04/28/24  1624 04/28/24  2048 04/29/24  0019 04/29/24  0507 04/29/24  0810 04/29/24  1221 04/29/24  1615   POCGLU 277* 232* 217* 184* 148* 189* 150* 168*       Scheduled Meds:   potassium & sodium phosphates  1 packet Oral 4x Daily    insulin glargine  18 Units SubCUTAneous Nightly    insulin lispro  0-18 Units SubCUTAneous Q4H    octreotide  50 mcg IntraVENous Q8H    meropenem  1,000 mg IntraVENous Q8H    valproic acid  250 mg Oral 3 times per day    buPROPion  100 mg Oral TID    polyethylene glycol  17 g Oral Daily    sennosides-docusate sodium  2 tablet Oral Daily    gabapentin  300 mg Oral Nightly    methocarbamol  500 mg Oral 4x Daily    bisacodyl  10 mg Rectal Daily    micafungin  100 mg IntraVENous Daily    oxyCODONE  10 mg Oral Q4H    ipratropium 0.5 mg-albuterol 2.5 mg  1 Dose Inhalation Q4H WA RT    sodium chloride flush  5-40 mL IntraVENous BID    metoclopramide  10 mg IntraVENous q8h    acetaminophen  650 mg Oral 4 times per day    chlorhexidine  15 mL Mouth/Throat BID    Polyvinyl Alcohol-Povidone PF  1 drop Both Eyes Q4H    Or    artificial tears   Both Eyes Q4H    lidocaine  1 patch TransDERmal Daily    enoxaparin  40

## 2024-04-30 NOTE — CARE COORDINATION
4/30 Care Coordination:Pt remains in SICU s/p Birgit. Plan for PEGJ today. Remains on Vent, per ICU team will need Trach. On no sedation, cont on IV TPN. NG to LIWS. [facility] is following. With Current status unclear on discharge needs.  CM/SW will continue to follow for discharge planning.   Vivek NIEVES,RN--BC  [phone]

## 2024-04-30 NOTE — BRIEF OP NOTE
Brief Postoperative Note      Patient: Leatha Willard  YOB: 1965  MRN: [id number]    Date of Procedure: 4/30/2024    Pre-Op Diagnosis Codes:     * Failure to thrive in adult [R62.7]    Post-Op Diagnosis: Same.       Procedure(s):  ESOPHAGOGASTRODUODENOSCOPY PERCUTANEOUS ENDOSCOPIC GASTROSTOMY TUBE INSERTION- PEG J    Surgeon(s):  Ayan Hahn MD    Assistant:  * No surgical staff found *    Anesthesia: None    Estimated Blood Loss (mL): less than 50     Complications: None    Specimens:   * No specimens in log *    Implants:  * No implants in log *      Drains:   Closed/Suction Drain LLQ Bulb (Active)   Site Description Unable to view 04/30/24 1000   Dressing Status Clean, dry & intact 04/30/24 1000   Drainage Appearance Brown;Thick;Cloudy 04/30/24 0800   Drain Status Compressed;To bulb suction 04/30/24 0800   Output (ml) 5 ml 04/30/24 1400       Urinary Catheter 04/17/24 Fung-Temperature (Active)   $ Urethral catheter insertion $ Not inserted for procedure 04/17/24 1400   Catheter Indications Urinary retention (acute or chronic), continuous bladder irrigation or bladder outlet obstruction 04/30/24 0800   Site Assessment Pink;Swelling;No urethral drainage 04/30/24 0800   Urine Color Yellow 04/30/24 0800   Urine Appearance Sediment 04/30/24 0800   Collection Container Standard 04/30/24 0800   Securement Method Securing device (Describe) 04/30/24 0800   Catheter Care  Soap and water 04/30/24 0800   Catheter Best Practices  Drainage tube clipped to bed;Catheter secured to thigh;Tamper seal intact;Bag below bladder;Bag not on floor;Lack of dependent loop in tubing;Drainage bag less than half full 04/30/24 0800   Status Patent;Draining 04/30/24 0800   Output (mL) 75 mL 04/30/24 1800       [REMOVED] Closed/Suction Drain Right RLQ Bulb (Removed)   Site Description Leaking at site 04/18/24 0800   Dressing Status Intact 04/18/24 0800   Drainage Appearance Green;Thin 04/18/24 1200   Drain Status To bulb suction

## 2024-04-30 NOTE — PROGRESS NOTES
[facility] PROGRESS NOTE      Chief complaint: Follow-up of septic shock/Candida albicans and glabrata infection/venous peritonitis associated with anastomosis leak    The patient is a 58 y.o. female with history of DM, hypertension, hyperlipidemia, cervical fusion, admitted on 04/15 for symptomatic pancreatic serous cystadenoma abutting many branches of the SMV and SMA prompting pylorus-preserving pancreaticoduodenectomy, placement of biliary stent, portal lymphadenectomy, round ligament and omental pedicle flap harvest, appendectomy on 04/15 during which no clinical findings of infection were noted. Postop course was complicated by intermittent fever since 04/18 up to 101.5 F and pancreatic fistula, prompting reopening recent laparotomy, completion pancreatectomy, splenectomy, omentectomy on 04/18 during which gush of a large volume of dark succus and infected fluid on opening, old infected hematoma, significant amount of necrotic omentum covering the anastomoses, dehiscence of anterior wall anastomosis with bile exiting the site of anastomosis at the jejunum, bile spilling within the lesser sac, necrotic pancreas were noted.  Tissue Gram stain and culture showed few polymorphonuclear leukocytes, no epithelial cells, no organisms, rare growth of Candida albicans, light growth of Candida glabrata, no anaerobes. Piperacillin-tazobactam was started on since admission.  She was noted to have incision wound drainage, prompting repeat CT abdomen and pelvis on 04/23 showing interval decrease in size in periportal right upper quadrant fluid collection without resolution (now measuring 3.6 x 1.7 cm as compared to previously 6.7 x 2.4 cm), new 6 x 4 x 3 cm right retroperitoneal fluid collection between superior mesenteric artery and inferior vena cava, new spindle-shaped fluid collection in subdiaphragmatic anterior left upper quadrant of the abdomen measuring 7 x 3 x 3 cm, increasing small amount of ascites and bilateral  Surgery recommendations.  Post splenectomy vaccination to be given prior to discharge or whenever patient is afebrile and clinically improved:  - pneumococcal vaccine ([id number]) IM   - meningococcal polysaccharide vaccine (Menveo) IM   - Group B meningococcal vaccine-Bexsero   - HIB polysaccharide vaccine (ActHIB) IM   - Flu vaccine   Follow up in 2 months with [facility] ([phone]) or with primary care to get booster doses for:  1) meningococcal polysaccharide vaccine (Menveo) - second dose   2)Group B meningococcal vaccine-Bexsero second dose  Update the following vaccinations in 5 years:  - meningococcal vaccine booster every 5 years.  Continue local wound care.  Continue supportive care.     Thank you for involving me in the care of Leatha Willard. ID will continue to follow. Please do not hesitate to call for any questions or concerns.    Electronically signed by Kandi Giang MD on 4/30/2024 at 9:51 AM

## 2024-04-30 NOTE — PLAN OF CARE
Adult  Goal: Achieves stable or improved neurological status  Outcome: Not Progressing  Goal: Achieves maximal functionality and self care  Outcome: Not Progressing     Problem: Skin/Tissue Integrity - Adult  Goal: Skin integrity remains intact  Outcome: Not Progressing  Goal: Incisions, wounds, or drain sites healing without S/S of infection  Outcome: Not Progressing     Problem: Nutrition Deficit:  Goal: Optimize nutritional status  Outcome: Not Progressing

## 2024-04-30 NOTE — PROGRESS NOTES
Surgical Intensive Care Unit   Daily Progress Note     Patient's name:  Leatha Willard  Age/Gender: 58 y.o. female  Date of Admission: 4/15/2024  5:37 AM  Length of Stay: 15    *Reason for ICU:   Complete pancreatectomy, splenectomy     HPI:   Leatha Willard is a 58 y.o. female who presents to the SICU for post-op monitoring following a whipple. The patient has been admitted to the hospital since 4/15/2024 who presents for evaluation of pancreatic cyst. She has a hx of arthritis and back pain s/p cervical fusion. She was being evaluated for her mid back pain with imaging and was found to have a large pancreatic cyst on CT. She then had an MRI/MRCP showing a large >10cm cyst in the pancreatic head extending to the transverse colon mesentery. Patient underwent a Pylorus-preserving pancreaticoduodenectomy on 4/15 and was admitted to the ICU post-operatively.  On 4/18 she underwent a complete pancreatectomy and splenectomy secondary to a worsening pancreatic leak.     *Overnight Events:     Copious brown mucoid drainage from midline incision and previous GIULIANO drain site.     Moves to pain  No sedation, intubated day 13    PEGJ today     Problem List:   Patient Active Problem List   Diagnosis    Recurrent major depressive disorder (HCC)    Essential hypertension    Hyperlipidemia    Prediabetes    Pre-operative laboratory examination    Class 1 obesity due to excess calories without serious comorbidity with body mass index (BMI) of 32.0 to 32.9 in adult    HIREN (obstructive sleep apnea)    Pancreatic cyst    Colon polyps    Cyst of pancreas    Chest pain    Tobacco abuse-- quit 2 weeks ago    Abdominal pain    Serous cystadenoma    Pancreatic fistula    Sepsis with acute renal failure and septic shock (HCC)    JUSTINE (acute kidney injury) (HCC)    Hypophosphatemia    Acute respiratory failure with hypoxia (HCC)    Post-pancreatectomy diabetes (HCC)    On total parenteral nutrition    Hypoalbuminemia    Electrolyte imbalance     40 mg IntraVENous Daily     HYDROmorphone **OR** HYDROmorphone, midazolam, hydrALAZINE, labetalol, sodium chloride, medicated lip balm, benzonatate, glucose, dextrose bolus **OR** dextrose bolus, glucagon (rDNA), dextrose, ondansetron    Home Medications  Medications Prior to Admission: traMADol (ULTRAM) 50 MG tablet, Take 1 tablet by mouth every 6 hours as needed for Pain.  docusate sodium (COLACE) 100 MG capsule, Take 1 capsule by mouth every morning  atorvastatin (LIPITOR) 10 MG tablet, take 1 tablet by mouth once daily  nicotine (NICODERM CQ) 14 MG/24HR, Place 1 patch onto the skin daily  [DISCONTINUED] Multiple Vitamins-Minerals (THERAPEUTIC MULTIVITAMIN-MINERALS) tablet, Take 1 tablet by mouth daily (Patient not taking: Reported on 4/10/2024)  amLODIPine (NORVASC) 5 MG tablet, Take 1 tablet by mouth daily (Patient taking differently: Take 1 tablet by mouth every morning)  gabapentin (NEURONTIN) 300 MG capsule, Take 1 capsule by mouth nightly for 90 days.  olopatadine (PATADAY) 0.2 % SOLN ophthalmic solution, Place 1 drop into both eyes daily as needed (dry eyes)  buPROPion (WELLBUTRIN XL) 300 MG extended release tablet, Take 1 tablet by mouth every morning     ASSESSMENT / PLAN:    Neuro: acute pain syndrome--   tylenol; fentanyl gtt, robaxin, Roxicodone, Neurontin   Depression--wellbutrin  Agitation--depakene  Sedation is off  Pt localizes to pain, otherwise does not follow commands or open eyes   Cardio:   Hypotension   Resolved   Solu-omidef completed 4/29  Respiratory:   Respiratory insufficiency   Intubated  Continue SBT  If no improvement, consider tracheostomy   GI:  pancreatic cyst--s/p PP Whipple--FTWP, HPB following  Pancreatic leak--NPO, TPN, antibiotics, HPB following, s/p total pancreatectomy with splenectomy  Constipation--start dulcolax supp  Diet - TPN   Drainage from incision site continues   HPB following   Octreotide started   RLQ drain removed 4/28  PEGJ tube scheduled for today 4/30

## 2024-04-30 NOTE — PROGRESS NOTES
[facility] Surgery   Daily Progress Note      Patient's Name/Date of Birth: Letaha Willard / 1965    Date: April 30, 2024     Chief Complaint: s/p open whipple, Grade C POPF s/p takeback to OR for completion pancreatectomy    Patient Active Problem List   Diagnosis    Recurrent major depressive disorder (HCC)    Essential hypertension    Hyperlipidemia    Prediabetes    Pre-operative laboratory examination    Class 1 obesity due to excess calories without serious comorbidity with body mass index (BMI) of 32.0 to 32.9 in adult    HIREN (obstructive sleep apnea)    Pancreatic cyst    Colon polyps    Cyst of pancreas    Chest pain    Tobacco abuse-- quit 2 weeks ago    Abdominal pain    Serous cystadenoma    Pancreatic fistula    Sepsis with acute renal failure and septic shock (HCC)    JUSTINE (acute kidney injury) (HCC)    Hypophosphatemia    Acute respiratory failure with hypoxia (HCC)    Post-pancreatectomy diabetes (HCC)    On total parenteral nutrition    Hypoalbuminemia    Electrolyte imbalance    Hypocalcemia    Acute blood loss anemia    Hypokalemia       Subjective:   Opens eyes but still not following commands, purposeful movements. Still draining form midline. No fevers overnight. WBC downtrending.      Objective:  BP (!) 141/71   Pulse 75   Temp 98.1 °F (36.7 °C) (Bladder)   Resp 18   Ht 1.626 m (5' 4\")   Wt 93.7 kg (206 lb 9.1 oz)   SpO2 92%   BMI 35.46 kg/m²   Labs:  Recent Labs     04/28/24 0427 04/29/24  0458 04/30/24  0521   WBC 23.7* 23.3* 22.7*   HGB 7.9* 7.8* 8.3*   HCT 26.2* 25.7* 27.5*       Recent Labs     04/28/24 0427 04/29/24  0458 04/30/24  0521    148* 146   K 4.5 4.0 3.6    109* 108*   CO2 27 31* 27   BUN 35* 27* 18   CREATININE 0.5 0.4* 0.4*       Recent Labs     04/28/24 0427 04/29/24  0458 04/30/24  0521   ALKPHOS 157* 159* 158*   ALT 23 26 24   AST 32* 30 27   BILITOT 0.3 0.3 0.3           General appearance: sedated, surrounded by life support devices, less anarsarca

## 2024-05-01 ENCOUNTER — APPOINTMENT (OUTPATIENT)
Dept: GENERAL RADIOLOGY | Age: 59
DRG: 004 | End: 2024-05-01
Attending: STUDENT IN AN ORGANIZED HEALTH CARE EDUCATION/TRAINING PROGRAM
Payer: COMMERCIAL

## 2024-05-01 LAB
AADO2: 98.1 MMHG
ABO + RH BLD: NORMAL
ALBUMIN SERPL-MCNC: 2.4 G/DL (ref 3.5–5.2)
ALP SERPL-CCNC: 130 U/L (ref 35–104)
ALT SERPL-CCNC: 19 U/L (ref 0–32)
ANION GAP SERPL CALCULATED.3IONS-SCNC: 14 MMOL/L (ref 7–16)
ARM BAND NUMBER: NORMAL
AST SERPL-CCNC: 22 U/L (ref 0–31)
B.E.: 5.9 MMOL/L (ref -3–3)
BASOPHILS # BLD: 0.35 K/UL (ref 0–0.2)
BASOPHILS NFR BLD: 2 % (ref 0–2)
BILIRUB SERPL-MCNC: 0.3 MG/DL (ref 0–1.2)
BLOOD BANK SAMPLE EXPIRATION: NORMAL
BLOOD GROUP ANTIBODIES SERPL: NEGATIVE
BUN SERPL-MCNC: 21 MG/DL (ref 6–20)
CA-I BLD-SCNC: 1.12 MMOL/L (ref 1.15–1.33)
CALCIUM SERPL-MCNC: 7.6 MG/DL (ref 8.6–10.2)
CHLORIDE SERPL-SCNC: 104 MMOL/L (ref 98–107)
CO2 SERPL-SCNC: 26 MMOL/L (ref 22–29)
COHB: 0.3 % (ref 0–1.5)
CREAT SERPL-MCNC: 0.4 MG/DL (ref 0.5–1)
CRITICAL: ABNORMAL
DATE ANALYZED: ABNORMAL
DATE OF COLLECTION: ABNORMAL
EOSINOPHIL # BLD: 1.05 K/UL (ref 0.05–0.5)
EOSINOPHILS RELATIVE PERCENT: 5 % (ref 0–6)
ERYTHROCYTE [DISTWIDTH] IN BLOOD BY AUTOMATED COUNT: 14.6 % (ref 11.5–15)
FIO2: 35 %
GFR, ESTIMATED: >90 ML/MIN/1.73M2
GLUCOSE BLD-MCNC: 119 MG/DL (ref 74–99)
GLUCOSE BLD-MCNC: 127 MG/DL (ref 74–99)
GLUCOSE BLD-MCNC: 134 MG/DL (ref 74–99)
GLUCOSE BLD-MCNC: 167 MG/DL (ref 74–99)
GLUCOSE BLD-MCNC: 188 MG/DL (ref 74–99)
GLUCOSE SERPL-MCNC: 137 MG/DL (ref 74–99)
HCO3: 29.3 MMOL/L (ref 22–26)
HCT VFR BLD AUTO: 24.4 % (ref 34–48)
HGB BLD-MCNC: 7.5 G/DL (ref 11.5–15.5)
HHB: 2.6 % (ref 0–5)
INR PPP: 1.1
LAB: ABNORMAL
LYMPHOCYTES NFR BLD: 1.93 K/UL (ref 1.5–4)
LYMPHOCYTES RELATIVE PERCENT: 10 % (ref 20–42)
Lab: 420
MAGNESIUM SERPL-MCNC: 2.1 MG/DL (ref 1.6–2.6)
MCH RBC QN AUTO: 29.8 PG (ref 26–35)
MCHC RBC AUTO-ENTMCNC: 30.7 G/DL (ref 32–34.5)
MCV RBC AUTO: 96.8 FL (ref 80–99.9)
METHB: 0.2 % (ref 0–1.5)
MODE: AC
MONOCYTES NFR BLD: 0.88 K/UL (ref 0.1–0.95)
MONOCYTES NFR BLD: 5 % (ref 2–12)
MYELOCYTES ABSOLUTE COUNT: 0.53 K/UL
MYELOCYTES: 3 %
NEUTROPHILS NFR BLD: 75 % (ref 43–80)
NEUTS SEG NFR BLD: 14.58 K/UL (ref 1.8–7.3)
NUCLEATED RED BLOOD CELLS: 5 PER 100 WBC
O2 SATURATION: 97.4 % (ref 92–98.5)
O2HB: 96.9 % (ref 94–97)
OPERATOR ID: 3342
PATIENT TEMP: 37 C
PCO2: 37.3 MMHG (ref 35–45)
PEEP/CPAP: 8 CMH2O
PFO2: 2.84 MMHG/%
PH BLOOD GAS: 7.51 (ref 7.35–7.45)
PHOSPHATE SERPL-MCNC: 2.6 MG/DL (ref 2.5–4.5)
PLATELET # BLD AUTO: 977 K/UL (ref 130–450)
PMV BLD AUTO: 10.4 FL (ref 7–12)
PO2: 99.3 MMHG (ref 75–100)
POTASSIUM SERPL-SCNC: 4 MMOL/L (ref 3.5–5)
PROMYELOCYTES ABSOLUTE COUNT: 0.18 K/UL
PROMYELOCYTES: 1 %
PROT SERPL-MCNC: 5.9 G/DL (ref 6.4–8.3)
PROTHROMBIN TIME: 12.3 SEC (ref 9.3–12.4)
RBC # BLD AUTO: 2.52 M/UL (ref 3.5–5.5)
RBC # BLD: ABNORMAL 10*6/UL
RI(T): 0.99
RR MECHANICAL: 16 B/MIN
SODIUM SERPL-SCNC: 144 MMOL/L (ref 132–146)
SOURCE, BLOOD GAS: ABNORMAL
THB: 8.7 G/DL (ref 11.5–16.5)
TIME ANALYZED: 434
VT MECHANICAL: 350 ML
WBC OTHER # BLD: 19.5 K/UL (ref 4.5–11.5)

## 2024-05-01 PROCEDURE — 6370000000 HC RX 637 (ALT 250 FOR IP): Performed by: STUDENT IN AN ORGANIZED HEALTH CARE EDUCATION/TRAINING PROGRAM

## 2024-05-01 PROCEDURE — 6370000000 HC RX 637 (ALT 250 FOR IP)

## 2024-05-01 PROCEDURE — 99291 CRITICAL CARE FIRST HOUR: CPT | Performed by: STUDENT IN AN ORGANIZED HEALTH CARE EDUCATION/TRAINING PROGRAM

## 2024-05-01 PROCEDURE — 83735 ASSAY OF MAGNESIUM: CPT

## 2024-05-01 PROCEDURE — C9113 INJ PANTOPRAZOLE SODIUM, VIA: HCPCS | Performed by: STUDENT IN AN ORGANIZED HEALTH CARE EDUCATION/TRAINING PROGRAM

## 2024-05-01 PROCEDURE — 99152 MOD SED SAME PHYS/QHP 5/>YRS: CPT | Performed by: STUDENT IN AN ORGANIZED HEALTH CARE EDUCATION/TRAINING PROGRAM

## 2024-05-01 PROCEDURE — 85610 PROTHROMBIN TIME: CPT

## 2024-05-01 PROCEDURE — 6370000000 HC RX 637 (ALT 250 FOR IP): Performed by: INTERNAL MEDICINE

## 2024-05-01 PROCEDURE — A4216 STERILE WATER/SALINE, 10 ML: HCPCS | Performed by: STUDENT IN AN ORGANIZED HEALTH CARE EDUCATION/TRAINING PROGRAM

## 2024-05-01 PROCEDURE — 82330 ASSAY OF CALCIUM: CPT

## 2024-05-01 PROCEDURE — 2580000003 HC RX 258: Performed by: INTERNAL MEDICINE

## 2024-05-01 PROCEDURE — 2580000003 HC RX 258

## 2024-05-01 PROCEDURE — 2500000003 HC RX 250 WO HCPCS

## 2024-05-01 PROCEDURE — 0B978ZZ DRAINAGE OF LEFT MAIN BRONCHUS, VIA NATURAL OR ARTIFICIAL OPENING ENDOSCOPIC: ICD-10-PCS | Performed by: STUDENT IN AN ORGANIZED HEALTH CARE EDUCATION/TRAINING PROGRAM

## 2024-05-01 PROCEDURE — 85025 COMPLETE CBC W/AUTO DIFF WBC: CPT

## 2024-05-01 PROCEDURE — 84100 ASSAY OF PHOSPHORUS: CPT

## 2024-05-01 PROCEDURE — 2580000003 HC RX 258: Performed by: STUDENT IN AN ORGANIZED HEALTH CARE EDUCATION/TRAINING PROGRAM

## 2024-05-01 PROCEDURE — 6360000002 HC RX W HCPCS: Performed by: INTERNAL MEDICINE

## 2024-05-01 PROCEDURE — 2000000000 HC ICU R&B

## 2024-05-01 PROCEDURE — 6360000002 HC RX W HCPCS: Performed by: STUDENT IN AN ORGANIZED HEALTH CARE EDUCATION/TRAINING PROGRAM

## 2024-05-01 PROCEDURE — 82805 BLOOD GASES W/O2 SATURATION: CPT

## 2024-05-01 PROCEDURE — 6360000002 HC RX W HCPCS

## 2024-05-01 PROCEDURE — 86900 BLOOD TYPING SEROLOGIC ABO: CPT

## 2024-05-01 PROCEDURE — 94003 VENT MGMT INPAT SUBQ DAY: CPT

## 2024-05-01 PROCEDURE — 99232 SBSQ HOSP IP/OBS MODERATE 35: CPT | Performed by: INTERNAL MEDICINE

## 2024-05-01 PROCEDURE — 99153 MOD SED SAME PHYS/QHP EA: CPT | Performed by: STUDENT IN AN ORGANIZED HEALTH CARE EDUCATION/TRAINING PROGRAM

## 2024-05-01 PROCEDURE — 31600 PLANNED TRACHEOSTOMY: CPT | Performed by: STUDENT IN AN ORGANIZED HEALTH CARE EDUCATION/TRAINING PROGRAM

## 2024-05-01 PROCEDURE — 31645 BRNCHSC W/THER ASPIR 1ST: CPT | Performed by: STUDENT IN AN ORGANIZED HEALTH CARE EDUCATION/TRAINING PROGRAM

## 2024-05-01 PROCEDURE — 86850 RBC ANTIBODY SCREEN: CPT

## 2024-05-01 PROCEDURE — 71045 X-RAY EXAM CHEST 1 VIEW: CPT

## 2024-05-01 PROCEDURE — 0B113F4 BYPASS TRACHEA TO CUTANEOUS WITH TRACHEOSTOMY DEVICE, PERCUTANEOUS APPROACH: ICD-10-PCS | Performed by: STUDENT IN AN ORGANIZED HEALTH CARE EDUCATION/TRAINING PROGRAM

## 2024-05-01 PROCEDURE — 86901 BLOOD TYPING SEROLOGIC RH(D): CPT

## 2024-05-01 PROCEDURE — 37799 UNLISTED PX VASCULAR SURGERY: CPT

## 2024-05-01 PROCEDURE — 2709999900 HC NON-CHARGEABLE SUPPLY: Performed by: STUDENT IN AN ORGANIZED HEALTH CARE EDUCATION/TRAINING PROGRAM

## 2024-05-01 PROCEDURE — 94640 AIRWAY INHALATION TREATMENT: CPT

## 2024-05-01 PROCEDURE — 82962 GLUCOSE BLOOD TEST: CPT

## 2024-05-01 PROCEDURE — 99231 SBSQ HOSP IP/OBS SF/LOW 25: CPT | Performed by: NURSE PRACTITIONER

## 2024-05-01 PROCEDURE — 80053 COMPREHEN METABOLIC PANEL: CPT

## 2024-05-01 PROCEDURE — 0B938ZZ DRAINAGE OF RIGHT MAIN BRONCHUS, VIA NATURAL OR ARTIFICIAL OPENING ENDOSCOPIC: ICD-10-PCS | Performed by: STUDENT IN AN ORGANIZED HEALTH CARE EDUCATION/TRAINING PROGRAM

## 2024-05-01 RX ORDER — LIDOCAINE HYDROCHLORIDE 10 MG/ML
INJECTION, SOLUTION INFILTRATION; PERINEURAL
Status: DISPENSED
Start: 2024-05-01 | End: 2024-05-01

## 2024-05-01 RX ORDER — CALCIUM GLUCONATE 10 MG/ML
1000 INJECTION, SOLUTION INTRAVENOUS ONCE
Status: COMPLETED | OUTPATIENT
Start: 2024-05-01 | End: 2024-05-01

## 2024-05-01 RX ORDER — LIDOCAINE HYDROCHLORIDE AND EPINEPHRINE 10; 10 MG/ML; UG/ML
INJECTION, SOLUTION INFILTRATION; PERINEURAL
Status: COMPLETED
Start: 2024-05-01 | End: 2024-05-01

## 2024-05-01 RX ORDER — FENTANYL CITRATE 50 UG/ML
100 INJECTION, SOLUTION INTRAMUSCULAR; INTRAVENOUS ONCE
Status: COMPLETED | OUTPATIENT
Start: 2024-05-01 | End: 2024-05-01

## 2024-05-01 RX ADMIN — ACETAMINOPHEN 650 MG: 650 SOLUTION ORAL at 16:42

## 2024-05-01 RX ADMIN — METHOCARBAMOL 500 MG: 500 TABLET ORAL at 08:27

## 2024-05-01 RX ADMIN — KETOTIFEN FUMARATE 1 DROP: 0.35 SOLUTION/ DROPS OPHTHALMIC at 20:15

## 2024-05-01 RX ADMIN — INSULIN LISPRO 3 UNITS: 100 INJECTION, SOLUTION INTRAVENOUS; SUBCUTANEOUS at 08:21

## 2024-05-01 RX ADMIN — OXYCODONE HYDROCHLORIDE 10 MG: 5 SOLUTION ORAL at 20:20

## 2024-05-01 RX ADMIN — POLYVINYL ALCOHOL, POVIDONE 1 DROP: 14; 6 SOLUTION/ DROPS OPHTHALMIC at 20:15

## 2024-05-01 RX ADMIN — MEROPENEM 1000 MG: 1 INJECTION, POWDER, FOR SOLUTION INTRAVENOUS at 18:08

## 2024-05-01 RX ADMIN — ATORVASTATIN CALCIUM 10 MG: 10 TABLET, FILM COATED ORAL at 20:19

## 2024-05-01 RX ADMIN — POLYVINYL ALCOHOL, POVIDONE 1 DROP: 14; 6 SOLUTION/ DROPS OPHTHALMIC at 11:36

## 2024-05-01 RX ADMIN — INSULIN GLARGINE 18 UNITS: 100 INJECTION, SOLUTION SUBCUTANEOUS at 20:30

## 2024-05-01 RX ADMIN — BUPROPION HYDROCHLORIDE 100 MG: 100 TABLET, FILM COATED ORAL at 14:20

## 2024-05-01 RX ADMIN — OCTREOTIDE ACETATE 50 MCG: 50 INJECTION, SOLUTION INTRAVENOUS; SUBCUTANEOUS at 09:02

## 2024-05-01 RX ADMIN — MEROPENEM 1000 MG: 1 INJECTION, POWDER, FOR SOLUTION INTRAVENOUS at 09:19

## 2024-05-01 RX ADMIN — IPRATROPIUM BROMIDE AND ALBUTEROL SULFATE 1 DOSE: .5; 2.5 SOLUTION RESPIRATORY (INHALATION) at 19:51

## 2024-05-01 RX ADMIN — POLYVINYL ALCOHOL, POVIDONE 1 DROP: 14; 6 SOLUTION/ DROPS OPHTHALMIC at 09:27

## 2024-05-01 RX ADMIN — OXYCODONE HYDROCHLORIDE 10 MG: 5 SOLUTION ORAL at 03:33

## 2024-05-01 RX ADMIN — OXYCODONE HYDROCHLORIDE 10 MG: 5 SOLUTION ORAL at 08:27

## 2024-05-01 RX ADMIN — BUPROPION HYDROCHLORIDE 100 MG: 100 TABLET, FILM COATED ORAL at 08:28

## 2024-05-01 RX ADMIN — ACETAMINOPHEN 650 MG: 650 SOLUTION ORAL at 22:04

## 2024-05-01 RX ADMIN — VALPROIC ACID 250 MG: 250 SOLUTION ORAL at 22:03

## 2024-05-01 RX ADMIN — ACETAMINOPHEN 650 MG: 650 SOLUTION ORAL at 11:37

## 2024-05-01 RX ADMIN — POLYVINYL ALCOHOL, POVIDONE 1 DROP: 14; 6 SOLUTION/ DROPS OPHTHALMIC at 16:20

## 2024-05-01 RX ADMIN — POLYVINYL ALCOHOL, POVIDONE 1 DROP: 14; 6 SOLUTION/ DROPS OPHTHALMIC at 22:04

## 2024-05-01 RX ADMIN — VALPROIC ACID 250 MG: 250 SOLUTION ORAL at 05:30

## 2024-05-01 RX ADMIN — INSULIN LISPRO 3 UNITS: 100 INJECTION, SOLUTION INTRAVENOUS; SUBCUTANEOUS at 16:56

## 2024-05-01 RX ADMIN — ACETAMINOPHEN 650 MG: 650 SOLUTION ORAL at 04:10

## 2024-05-01 RX ADMIN — SENNOSIDES AND DOCUSATE SODIUM 2 TABLET: 50; 8.6 TABLET ORAL at 08:28

## 2024-05-01 RX ADMIN — CALCIUM GLUCONATE 1000 MG: 10 INJECTION, SOLUTION INTRAVENOUS at 06:14

## 2024-05-01 RX ADMIN — VALPROIC ACID 250 MG: 250 SOLUTION ORAL at 14:20

## 2024-05-01 RX ADMIN — OXYCODONE HYDROCHLORIDE 10 MG: 5 SOLUTION ORAL at 16:42

## 2024-05-01 RX ADMIN — FENTANYL CITRATE 200 MCG: 50 INJECTION INTRAMUSCULAR; INTRAVENOUS at 07:27

## 2024-05-01 RX ADMIN — MEROPENEM 1000 MG: 1 INJECTION, POWDER, FOR SOLUTION INTRAVENOUS at 02:38

## 2024-05-01 RX ADMIN — IPRATROPIUM BROMIDE AND ALBUTEROL SULFATE 1 DOSE: .5; 2.5 SOLUTION RESPIRATORY (INHALATION) at 12:28

## 2024-05-01 RX ADMIN — SODIUM CHLORIDE, PRESERVATIVE FREE 10 ML: 5 INJECTION INTRAVENOUS at 20:20

## 2024-05-01 RX ADMIN — FENTANYL CITRATE 100 MCG: 50 INJECTION INTRAMUSCULAR; INTRAVENOUS at 07:37

## 2024-05-01 RX ADMIN — OCTREOTIDE ACETATE 50 MCG: 50 INJECTION, SOLUTION INTRAVENOUS; SUBCUTANEOUS at 16:20

## 2024-05-01 RX ADMIN — SODIUM CHLORIDE, PRESERVATIVE FREE 40 MG: 5 INJECTION INTRAVENOUS at 08:27

## 2024-05-01 RX ADMIN — BUPROPION HYDROCHLORIDE 100 MG: 100 TABLET, FILM COATED ORAL at 20:19

## 2024-05-01 RX ADMIN — ROCURONIUM BROMIDE 56 MG: 10 INJECTION, SOLUTION INTRAVENOUS at 07:27

## 2024-05-01 RX ADMIN — MICAFUNGIN SODIUM 100 MG: 100 INJECTION, POWDER, LYOPHILIZED, FOR SOLUTION INTRAVENOUS at 16:42

## 2024-05-01 RX ADMIN — POLYVINYL ALCOHOL, POVIDONE 1 DROP: 14; 6 SOLUTION/ DROPS OPHTHALMIC at 03:29

## 2024-05-01 RX ADMIN — MIDAZOLAM 2 MG: 1 INJECTION INTRAMUSCULAR; INTRAVENOUS at 07:27

## 2024-05-01 RX ADMIN — 0.12% CHLORHEXIDINE GLUCONATE 15 ML: 1.2 RINSE ORAL at 20:15

## 2024-05-01 RX ADMIN — CALCIUM GLUCONATE: 98 INJECTION, SOLUTION INTRAVENOUS at 18:07

## 2024-05-01 RX ADMIN — IPRATROPIUM BROMIDE AND ALBUTEROL SULFATE 1 DOSE: .5; 2.5 SOLUTION RESPIRATORY (INHALATION) at 09:10

## 2024-05-01 RX ADMIN — SODIUM PHOSPHATE, MONOBASIC, MONOHYDRATE AND SODIUM PHOSPHATE, DIBASIC, ANHYDROUS 15 MMOL: 142; 276 INJECTION, SOLUTION INTRAVENOUS at 13:53

## 2024-05-01 RX ADMIN — METOCLOPRAMIDE 10 MG: 5 INJECTION, SOLUTION INTRAMUSCULAR; INTRAVENOUS at 04:11

## 2024-05-01 RX ADMIN — 0.12% CHLORHEXIDINE GLUCONATE 15 ML: 1.2 RINSE ORAL at 08:27

## 2024-05-01 RX ADMIN — LIDOCAINE HYDROCHLORIDE AND EPINEPHRINE: 10; 10 INJECTION, SOLUTION INFILTRATION; PERINEURAL at 08:29

## 2024-05-01 RX ADMIN — IPRATROPIUM BROMIDE AND ALBUTEROL SULFATE 1 DOSE: .5; 2.5 SOLUTION RESPIRATORY (INHALATION) at 16:27

## 2024-05-01 RX ADMIN — KETOTIFEN FUMARATE 1 DROP: 0.35 SOLUTION/ DROPS OPHTHALMIC at 08:28

## 2024-05-01 ASSESSMENT — PULMONARY FUNCTION TESTS
PIF_VALUE: 17
PIF_VALUE: 18
PIF_VALUE: 16
PIF_VALUE: 21
PIF_VALUE: 21
PIF_VALUE: 18
PIF_VALUE: 20
PIF_VALUE: 17
PIF_VALUE: 19
PIF_VALUE: 20
PIF_VALUE: 28
PIF_VALUE: 21
PIF_VALUE: 17
PIF_VALUE: 21
PIF_VALUE: 17
PIF_VALUE: 20
PIF_VALUE: 23
PIF_VALUE: 18
PIF_VALUE: 17
PIF_VALUE: 25
PIF_VALUE: 29
PIF_VALUE: 18
PIF_VALUE: 19
PIF_VALUE: 24
PIF_VALUE: 20

## 2024-05-01 ASSESSMENT — PAIN SCALES - GENERAL
PAINLEVEL_OUTOF10: 0

## 2024-05-01 NOTE — PLAN OF CARE
Problem: Discharge Planning  Goal: Discharge to home or other facility with appropriate resources  Outcome: Progressing     Problem: Skin/Tissue Integrity  Goal: Absence of new skin breakdown  Description: 1.  Monitor for areas of redness and/or skin breakdown  2.  Assess vascular access sites hourly  3.  Every 4-6 hours minimum:  Change oxygen saturation probe site  4.  Every 4-6 hours:  If on nasal continuous positive airway pressure, respiratory therapy assess nares and determine need for appliance change or resting period.  Outcome: Progressing     Problem: ABCDS Injury Assessment  Goal: Absence of physical injury  Outcome: Progressing  Flowsheets (Taken 5/1/2024 1224)  Absence of Physical Injury: Implement safety measures based on patient assessment     Problem: Respiratory - Adult  Goal: Achieves optimal ventilation and oxygenation  Outcome: Progressing     Problem: Cardiovascular - Adult  Goal: Maintains optimal cardiac output and hemodynamic stability  Outcome: Progressing  Goal: Absence of cardiac dysrhythmias or at baseline  Outcome: Progressing     Problem: Musculoskeletal - Adult  Goal: Return mobility to safest level of function  Outcome: Progressing  Goal: Return ADL status to a safe level of function  Outcome: Progressing     Problem: Gastrointestinal - Adult  Goal: Minimal or absence of nausea and vomiting  Outcome: Progressing  Goal: Maintains or returns to baseline bowel function  Outcome: Progressing  Goal: Maintains adequate nutritional intake  Outcome: Progressing     Problem: Genitourinary - Adult  Goal: Absence of urinary retention  Outcome: Progressing  Goal: Urinary catheter remains patent  Outcome: Progressing     Problem: Infection - Adult  Goal: Absence of infection at discharge  Outcome: Progressing  Goal: Absence of infection during hospitalization  Outcome: Progressing     Problem: Metabolic/Fluid and Electrolytes - Adult  Goal: Electrolytes maintained within normal limits  Outcome:  Progressing  Flowsheets (Taken 5/1/2024 1024 by Stacie Guillen, RD, LD)  Nutrient intake appropriate for improving, restoring, or maintaining nutritional needs: Recommend, monitor, and adjust tube feedings and TPN/PPN based on assessed needs  5/1/2024 0038 by Sandy Prieto, RN  Outcome: Progressing

## 2024-05-01 NOTE — PLAN OF CARE
Problem: Chronic Conditions and Co-morbidities  Goal: Patient's chronic conditions and co-morbidity symptoms are monitored and maintained or improved  5/1/2024 0038 by Sandy Prieto RN  Outcome: Progressing    Problem: Safety - Adult  Goal: Free from fall injury  5/1/2024 0038 by Sandy Prieto, RN  Outcome: Progressing    Problem: Nutrition Deficit:  Goal: Optimize nutritional status  5/1/2024 0038 by Sandy Prieto RN  Outcome: Progressing

## 2024-05-01 NOTE — PROGRESS NOTES
ENDOCRINOLOGY PROGRESS NOTE      Date of admission: 4/15/2024  Date of service: 4/30/2024  Admitting physician: Norma Rogers MD   Primary Care Physician: Mekhi Escalona MD  Consultant physician: Jonathan Curiel MD     Reason for the consultation:  Post-pancreatectomy DM     History of Present Illness:  Leatha Willard is a 58 y.o. old female with PMH of cystic neoplasm of the head of pancreas, HTN, HLD and other listed below admitted to [facility] on 4/15/2024 for completion pancreatectomy. Endocrine service was consulted for diabetes management.    Subjective   The patient was seen and examined at bedside this afternoon.  No acute events overnight.  Still intubated on TPN    Point of care glucose monitoring   (Independently reviewed)   Recent Labs     04/29/24  1221 04/29/24  1615 04/29/24  2042 04/30/24  0026 04/30/24  0505 04/30/24  0813 04/30/24  1122 04/30/24  1515   POCGLU 150* 168* 208* 170* 115* 127* 149* 202*       Scheduled Meds:   [START ON 5/1/2024] fentanNYL  200 mcg IntraVENous 60 Min Pre-Op    [START ON 5/1/2024] midazolam  4 mg IntraVENous 60 Min Pre-Op    [START ON 5/1/2024] rocuronium  0.6 mg/kg IntraVENous 60 Min Pre-Op    oxyCODONE  10 mg Oral Q6H    [START ON 5/1/2024] fentanNYL  200 mcg IntraVENous On Call    insulin glargine  18 Units SubCUTAneous Nightly    insulin lispro  0-18 Units SubCUTAneous Q4H    octreotide  50 mcg IntraVENous Q8H    meropenem  1,000 mg IntraVENous Q8H    valproic acid  250 mg Oral 3 times per day    buPROPion  100 mg Oral TID    sennosides-docusate sodium  2 tablet Oral Daily    gabapentin  300 mg Oral Nightly    methocarbamol  500 mg Oral 4x Daily    micafungin  100 mg IntraVENous Daily    ipratropium 0.5 mg-albuterol 2.5 mg  1 Dose Inhalation Q4H WA RT    sodium chloride flush  5-40 mL IntraVENous BID    metoclopramide  10 mg IntraVENous q8h    acetaminophen  650 mg Oral 4 times per day    chlorhexidine  15 mL Mouth/Throat BID    Polyvinyl Alcohol-Povidone PF  1 drop

## 2024-05-01 NOTE — PROGRESS NOTES
[facility]  SURGICAL INTENSIVE CARE UNIT    CRITICAL CARE ATTENDING PROGRESS NOTE    I have examined the patient, reviewed the record, and discussed the case with the APN/  Resident. I have reviewed all relevant labs and imaging data.    Please refer to the  APN/ resident's note. I agree with the  assessment and plan with the following corrections/ additions. The following summarizes my clinical findings and independent assessment.     Patient's name:  Leatha UNGER White  Age/Gender: 58 y.o. female  Date of Admission: 4/15/2024  5:37 AM    *Reason for ICU:   Complete pancreatectomy, splenectomy     24hr events: percutaneous trach 5/1.  Tube feeds paused early this morning for vomiting and abdominal distention.  Began having bilious output from the midline wound early this morning.  CT scan obtained this morning and showed no specific findings.      EXAM:  /61   Pulse 85   Temp 100.4 °F (38 °C) (Bladder)   Resp 18   Ht 1.626 m (5' 4\")   Wt 86 kg (189 lb 11.2 oz)   SpO2 100%   BMI 32.56 kg/m²     Neuro:   Respiratory  CARDIOVASCULAR: S1 S2, regular rate, regular rhythm, no murmur/gallop/rub  ABDOMEN: soft, mild distention, gelatinous bilious drainage from midline incision site without dehiscence, nontympanic, no masses GIULIANO drain on left minimal purulent appearing drainage  RENAL: bobby to gravity, clear yellow urine  MUSCULOSKELETAL: moves all extremities purposefully, 5/5 strength   SKIN/EXTREMITIES: no rashes/ecchymosis, some UE swelling/edema b/l, warm/dry, good capillary refill   NEUROLOGIC: GCS 3,1T, 4, PERRLA, oriented x 4    I/O: 2.3/2: 280cc +  GIULIANO: 5cc  UOP: 1.9L    LABS/ IMAGING:  -I personally reviewed the patient's Labs from 5/2/2024  CBC:   Lab Results   Component Value Date/Time    WBC 23.4 05/02/2024 04:26 AM    RBC 2.81 05/02/2024 04:26 AM    HGB 8.3 05/02/2024 04:26 AM    HCT 27.0 05/02/2024 04:26 AM    MCV 96.1 05/02/2024 04:26 AM    MCH 29.5 05/02/2024 04:26 AM    MCHC 30.7

## 2024-05-01 NOTE — PROGRESS NOTES
P Surgery   Daily Progress Note      Patient's Name/Date of Birth: Leatha Willard / 1965    Date: May 1, 2024     Chief Complaint: s/p open whipple, Grade C POPF s/p takeback to OR for completion pancreatectomy    Patient Active Problem List   Diagnosis    Recurrent major depressive disorder (HCC)    Essential hypertension    Hyperlipidemia    Prediabetes    Pre-operative laboratory examination    Class 1 obesity due to excess calories without serious comorbidity with body mass index (BMI) of 32.0 to 32.9 in adult    HIREN (obstructive sleep apnea)    Pancreatic cyst    Colon polyps    Cyst of pancreas    Chest pain    Tobacco abuse-- quit 2 weeks ago    Abdominal pain    Serous cystadenoma    Pancreatic fistula    Sepsis with acute renal failure and septic shock (HCC)    JUSTINE (acute kidney injury) (HCC)    Hypophosphatemia    Acute respiratory failure with hypoxia (HCC)    Post-pancreatectomy diabetes (HCC)    On total parenteral nutrition    Hypoalbuminemia    Electrolyte imbalance    Hypocalcemia    Acute blood loss anemia    Hypokalemia       Subjective:   Does not follow commands  Underwent PEG-J yesterday  G portion to gravity when not being used for medications  Plan for tracheostomy today  On minimal vent settings  Drainage from midline - changing dressing as needed  Having bowel movements    Objective:  BP (!) 140/67   Pulse 78   Temp 98.8 °F (37.1 °C) (Bladder)   Resp 19   Ht 1.626 m (5' 4\")   Wt 88.2 kg (194 lb 6.4 oz)   SpO2 97%   BMI 33.37 kg/m²   Labs:  Recent Labs     04/29/24 0458 04/30/24  0521 05/01/24  0415   WBC 23.3* 22.7* 19.5*   HGB 7.8* 8.3* 7.5*   HCT 25.7* 27.5* 24.4*       Recent Labs     04/29/24 0458 04/30/24  0521 05/01/24  0415   * 146 144   K 4.0 3.6 4.0   * 108* 104   CO2 31* 27 26   BUN 27* 18 21*   CREATININE 0.4* 0.4* 0.4*       Recent Labs     04/29/24 0458 04/30/24  0521 05/01/24  0415   ALKPHOS 159* 158* 130*   ALT 26 24 19   AST 30 27 22   BILITOT 0.3

## 2024-05-01 NOTE — PROGRESS NOTES
Comprehensive Nutrition Assessment    Type and Reason for Visit:  Reassess, Consult (TF Recs)    Nutrition Recommendations/Plan:     Continue NPO    Modify Parenteral Nutrition to better meet est needs now that propofol d/c:   Custom 3-in-1 (1300 ml tv, @ 54.1 ml/hr)   to provide 100 gm AA, 177 gm dextrose, 27 gm lipid, & 1300 total kcals.     Trickle TF @ 15 ml/hr will provide additional 27 gm protein & 432 kcals  Peptide based formula most appropriate for optimal GI tolerance     Regimen meets 100% est calorie & protein needs         Malnutrition Assessment:  Malnutrition Status:  At risk for malnutrition (05/01/24 1105)    Context:  Acute Illness     Findings of the 6 clinical characteristics of malnutrition:  Energy Intake:  50% or less of estimated energy requirements for 5 or more days (average)  Weight Loss:  Unable to assess (large fluctuations/ fluid shifts)     Body Fat Loss:  No significant body fat loss     Muscle Mass Loss:  No significant muscle mass loss    Fluid Accumulation:  No significant fluid accumulation    Strength:  Not Performed    Nutrition Assessment:    Pt remains at risk d/t ongoing need for SICU care & PN support now s/p PEG-J 4/30 & trach today. Admit w/ large pancreatic cyst s/p open pylorus preserving whipple 4/15 complicated by worsening pancreatic leak requrining takeback pancreatectomy/ splenectomy 4/18. PMHx CAD, Back pain/ cervical fusion, & prediabetes (now post pancreatectomy DM). TPN continued. Plans to trial trickle feeds. Will provide updated recs & monitor.    Nutrition Related Findings:    vent via trach, MAP WNL, I/O's -5L, +1/+2 edema, hypoactive BS s/p whipple, abd distention, MLI w/ drainage, closed suction PEG-J (J port clamped/ Gport to gravity), TPN; K/Phos/Mag WNL Wound Type: Multiple, Surgical Incision       Current Nutrition Intake & Therapies:    Average Meal Intake: NPO     Current Tube Feeding (TF) Orders:  Feeding Route: PEG/J (G port open to gravity, J  Biochemical Data, Nutrition Focused Physical Findings, Skin, Weight, GI Status, Fluid Status or Edema, Nausea or Vomiting, Hemodynamic Status    Discharge Planning:    Too soon to determine     Stacie Guillen RD, LD  Contact: Ext [phone]

## 2024-05-01 NOTE — CARE COORDINATION
5/1 Care Coordination: Remains in SICU, s/p open whipple, s/p takeback to OR for completion pancreatectomy. Had Peg-J yesterday, Trach today. Cont TPN, will start trickle feeds today. With Current status unclear on discharge needs.  Select following. CM/SW will continue to follow for discharge planning.   Vivek NIEVES,RN--BC  [phone]

## 2024-05-01 NOTE — PROGRESS NOTES
Surgical Intensive Care Unit   Daily Progress Note     Patient's name:  Leatha Willard  Age/Gender: 58 y.o. female  Date of Admission: 4/15/2024  5:37 AM  Length of Stay: 16    *Reason for ICU:   Complete pancreatectomy, splenectomy     HPI:   Leatha Willard is a 58 y.o. female who presents to the SICU for post-op monitoring following a whipple. The patient has been admitted to the hospital since 4/15/2024 who presents for evaluation of pancreatic cyst. She has a hx of arthritis and back pain s/p cervical fusion. She was being evaluated for her mid back pain with imaging and was found to have a large pancreatic cyst on CT. She then had an MRI/MRCP showing a large >10cm cyst in the pancreatic head extending to the transverse colon mesentery. Patient underwent a Pylorus-preserving pancreaticoduodenectomy on 4/15 and was admitted to the ICU post-operatively.  On 4/18 she underwent a complete pancreatectomy and splenectomy secondary to a worsening pancreatic leak.     *Overnight Events:     PEG tube placed yesterday evening   Opens eyes spontaneously, failing SBT       PEGJ today     Problem List:   Patient Active Problem List   Diagnosis    Recurrent major depressive disorder (HCC)    Essential hypertension    Hyperlipidemia    Prediabetes    Pre-operative laboratory examination    Class 1 obesity due to excess calories without serious comorbidity with body mass index (BMI) of 32.0 to 32.9 in adult    HIREN (obstructive sleep apnea)    Pancreatic cyst    Colon polyps    Cyst of pancreas    Chest pain    Tobacco abuse-- quit 2 weeks ago    Abdominal pain    Serous cystadenoma    Pancreatic fistula    Sepsis with acute renal failure and septic shock (HCC)    JUSTINE (acute kidney injury) (HCC)    Hypophosphatemia    Acute respiratory failure with hypoxia (HCC)    Post-pancreatectomy diabetes (HCC)    On total parenteral nutrition    Hypoalbuminemia    Electrolyte imbalance    Hypocalcemia    Acute blood loss anemia     Inhalation Q4H WA RT    sodium chloride flush  5-40 mL IntraVENous BID    metoclopramide  10 mg IntraVENous q8h    acetaminophen  650 mg Oral 4 times per day    chlorhexidine  15 mL Mouth/Throat BID    Polyvinyl Alcohol-Povidone PF  1 drop Both Eyes Q4H    Or    artificial tears   Both Eyes Q4H    lidocaine  1 patch TransDERmal Daily    [Held by provider] enoxaparin  40 mg SubCUTAneous Daily    atorvastatin  10 mg Oral Nightly    ketotifen fumarate  1 drop Both Eyes BID    pantoprazole (PROTONIX) 40 mg in sodium chloride (PF) 0.9 % 10 mL injection  40 mg IntraVENous Daily     hydrALAZINE, labetalol, sodium chloride, medicated lip balm, benzonatate, glucose, dextrose bolus **OR** dextrose bolus, glucagon (rDNA), dextrose, ondansetron    Home Medications  Medications Prior to Admission: traMADol (ULTRAM) 50 MG tablet, Take 1 tablet by mouth every 6 hours as needed for Pain.  docusate sodium (COLACE) 100 MG capsule, Take 1 capsule by mouth every morning  atorvastatin (LIPITOR) 10 MG tablet, take 1 tablet by mouth once daily  nicotine (NICODERM CQ) 14 MG/24HR, Place 1 patch onto the skin daily  [DISCONTINUED] Multiple Vitamins-Minerals (THERAPEUTIC MULTIVITAMIN-MINERALS) tablet, Take 1 tablet by mouth daily (Patient not taking: Reported on 4/10/2024)  amLODIPine (NORVASC) 5 MG tablet, Take 1 tablet by mouth daily (Patient taking differently: Take 1 tablet by mouth every morning)  gabapentin (NEURONTIN) 300 MG capsule, Take 1 capsule by mouth nightly for 90 days.  olopatadine (PATADAY) 0.2 % SOLN ophthalmic solution, Place 1 drop into both eyes daily as needed (dry eyes)  buPROPion (WELLBUTRIN XL) 300 MG extended release tablet, Take 1 tablet by mouth every morning     ASSESSMENT / PLAN:    Neuro: acute pain syndrome--   tylenol; fentanyl push PRN, robaxin, Roxicodone, Neurontin   Depression--wellbutrin  Agitation--depakene  Sedation is off  Opens eyes, does not follow commands  Cardio:   Hemodynamically stable

## 2024-05-01 NOTE — PROGRESS NOTES
05/01/24 0908   Patient Observation   Pulse 97   SpO2 99 %   Breath Sounds   Right Upper Lobe Rhonchi   Right Middle Lobe Rhonchi   Right Lower Lobe Rhonchi   Left Upper Lobe Rhonchi   Left Lower Lobe Rhonchi   Vent Information   Ventilator -31   Vent Mode AC/VC   Ventilator Settings   FiO2  60 %   Vt (Set, mL) 350 mL   Resp Rate (Set) 16 bpm   PEEP/CPAP (cmH2O) 8   Peak Inspiratory Flow (Set) 55 L/sec   Vent Patient Data (Readings)   Vt (Measured) 377 mL   Peak Inspiratory Pressure (cmH2O) 16 cmH2O   Rate Measured 23 br/min   Minute Volume (L/min) 8.14 Liters   Mean Airway Pressure (cmH2O) 9 cmH20   Plateau Pressure (cm H2O) 18 cm H2O   Driving Pressure 10   I:E Ratio 1:2.50   Static Compliance (L/cm H2O) 33   Vent Alarm Settings   High Pressure (cmH2O) 50 cmH2O   Low Minute Volume (lpm) 4 L/min   High Minute Volume (lpm) 16 L/min   Low Exhaled Vt (ml) 250 mL   High Exhaled Vt (ml) 600 mL   RR High (bpm) 35 br/min   Apnea (secs) 20 secs   Additional Respiratoray Assessments   Humidification Source Heated wire   Humidification Temp 36.6   Circuit Condensation Drained   Ambu Bag With Mask At Bedside Yes   Surgical Airway (Trach) 05/01/24 Breezy Cuffed   Placement Date/Time: 05/01/24 0742   Placed By: (c)   Placement Verified By: Capnometry  Surgical Airway Type: Tracheostomy  Brand: Breezy  Style: Cuffed  Size: 8   Status Secured   Site Assessment Bleeding   Ties Assessment Dry;Intact;Secure   Ambu Bag With Mask at Bedside Yes

## 2024-05-01 NOTE — PROGRESS NOTES
(BLISTEX/CARMEX) stick, , Topical, PRN, Magali Fernandez MD, Given at 04/24/24 2004    micafungin (MYCAMINE) 100 mg in sodium chloride 0.9 % 100 mL IVPB (mini-bag), 100 mg, IntraVENous, Daily, Kandi Giang MD, Stopped at 04/30/24 1811    ipratropium 0.5 mg-albuterol 2.5 mg (DUONEB) nebulizer solution 1 Dose, 1 Dose, Inhalation, Q4H WA RT, Jesse Hood MD, 1 Dose at 05/01/24 1228    sodium chloride flush 0.9 % injection 5-40 mL, 5-40 mL, IntraVENous, BID, Marleny Wray DO, 10 mL at 04/30/24 2118    acetaminophen (TYLENOL) 160 MG/5ML solution 650 mg, 650 mg, Oral, 4 times per day, Michael Shaffer MD, 650 mg at 05/01/24 1137    chlorhexidine (PERIDEX) 0.12 % solution 15 mL, 15 mL, Mouth/Throat, BID, Michael Shaffer MD, 15 mL at 05/01/24 0827    Polyvinyl Alcohol-Povidone PF (REFRESH) 1.4-0.6 % ophthalmic solution 1 drop, 1 drop, Both Eyes, Q4H, 1 drop at 05/01/24 1136 **OR** lubrifresh P.M. (artificial tears) ophthalmic ointment, , Both Eyes, Q4H, Michael Shaffer MD, Given at 04/30/24 0548    benzonatate (TESSALON) capsule 100 mg, 100 mg, Oral, TID PRN, Michael Shaffer MD    lidocaine 4 % external patch 1 patch, 1 patch, TransDERmal, Daily, Michael Shaffer MD, 1 patch at 05/01/24 0827    enoxaparin (LOVENOX) injection 40 mg, 40 mg, SubCUTAneous, Daily, Jesse Hood MD, 40 mg at 04/30/24 0845    atorvastatin (LIPITOR) tablet 10 mg, 10 mg, Oral, Nightly, Jesse Hood MD, 10 mg at 04/30/24 2116    ketotifen fumarate (ZADITOR) 0.035 % ophthalmic solution 1 drop, 1 drop, Both Eyes, BID, Jesse Hood MD, 1 drop at 05/01/24 0828    glucose chewable tablet 16 g, 4 tablet, Oral, PRN, Jesse Hood MD    dextrose bolus 10% 125 mL, 125 mL, IntraVENous, PRN, Stopped at 04/24/24 0515 **OR** dextrose bolus 10% 250 mL, 250 mL, IntraVENous, PRN, Jesse Hood MD    glucagon injection 1 mg, 1 mg, SubCUTAneous, PRN, Jesse Hood MD    dextrose 10 % infusion, , IntraVENous, Continuous PRN, Erwin,  MD Jesse    pantoprazole (PROTONIX) 40 mg in sodium chloride (PF) 0.9 % 10 mL injection, 40 mg, IntraVENous, Daily, Jesse Hood MD, 40 mg at 05/01/24 0827    ondansetron (ZOFRAN) injection 4 mg, 4 mg, IntraVENous, Q6H PRN, Jesse Hood MD, 4 mg at 04/25/24 0548     Allergies:  Latex, Milk-related compounds, Lisinopril, Morphine, and Seasonal    ROS:  Aside from what was mentioned in the past medical history and history of present illness, essentially unremarkable, all others are negative.      Recent Labs     04/29/24  0458 04/30/24  0521 05/01/24  0415   INR 1.1 1.1 1.1   ALT 26 24 19   AST 30 27 22   ALKPHOS 159* 158* 130*   BILITOT 0.3 0.3 0.3       Lab Results   Component Value Date    WBC 19.5 (H) 05/01/2024    HGB 7.5 (L) 05/01/2024    HCT 24.4 (L) 05/01/2024     (H) 05/01/2024     05/01/2024    K 4.0 05/01/2024     05/01/2024    CREATININE 0.4 (L) 05/01/2024    BUN 21 (H) 05/01/2024    CO2 26 05/01/2024    GLUCOSE 137 (H) 05/01/2024    INR 1.1 05/01/2024    TSH 0.60 04/21/2024    LABA1C 5.8 (H) 04/18/2024         PHYSICAL EXAM:  BP (!) 160/79   Pulse 82   Temp 98.6 °F (37 °C) (Bladder)   Resp 18   Ht 1.626 m (5' 4\")   Wt 88.2 kg (194 lb 6.4 oz)   SpO2 100%   BMI 33.37 kg/m²   Physical Exam:  General: Overall ill-appearing, intubated, sedated  HEENT: PERRLA, EOMI, Anicteric sclera, MMM, no rhinorrhea  Cards: RRR, no LE edema  Resp: Breathing comfortably on room air, good air movement, no use of accessory muscles, no audible wheezing  Abdomen:  Left GIULIANO with dark serous drainage  Right GIULIANO with minimal bile tinged serous drainage  Midline incision with copious dark mucoid drainage   Extremities: Moves all extremities, no effusions or bruising.  Skin: No rashes or jaundice  Neuro: pt sedated      Greater than or equal to 25 minutes was spent providing face-to-face patient care, including:  and coordinating care, reviewing the chart, labs, and diagnostics, as well as medical

## 2024-05-02 ENCOUNTER — APPOINTMENT (OUTPATIENT)
Dept: GENERAL RADIOLOGY | Age: 59
DRG: 004 | End: 2024-05-02
Attending: STUDENT IN AN ORGANIZED HEALTH CARE EDUCATION/TRAINING PROGRAM
Payer: COMMERCIAL

## 2024-05-02 ENCOUNTER — APPOINTMENT (OUTPATIENT)
Dept: CT IMAGING | Age: 59
DRG: 004 | End: 2024-05-02
Attending: STUDENT IN AN ORGANIZED HEALTH CARE EDUCATION/TRAINING PROGRAM
Payer: COMMERCIAL

## 2024-05-02 LAB
AADO2: 127.8 MMHG
ALBUMIN SERPL-MCNC: 2.6 G/DL (ref 3.5–5.2)
ALP SERPL-CCNC: 133 U/L (ref 35–104)
ALT SERPL-CCNC: 18 U/L (ref 0–32)
ANION GAP SERPL CALCULATED.3IONS-SCNC: 15 MMOL/L (ref 7–16)
AST SERPL-CCNC: 25 U/L (ref 0–31)
B.E.: 7.8 MMOL/L (ref -3–3)
BASOPHILS # BLD: 0.41 K/UL (ref 0–0.2)
BASOPHILS NFR BLD: 2 % (ref 0–2)
BILIRUB SERPL-MCNC: 0.3 MG/DL (ref 0–1.2)
BUN SERPL-MCNC: 17 MG/DL (ref 6–20)
CA-I BLD-SCNC: 1.1 MMOL/L (ref 1.15–1.33)
CALCIUM SERPL-MCNC: 7.8 MG/DL (ref 8.6–10.2)
CHLORIDE SERPL-SCNC: 100 MMOL/L (ref 98–107)
CO2 SERPL-SCNC: 27 MMOL/L (ref 22–29)
COHB: 0.3 % (ref 0–1.5)
CREAT SERPL-MCNC: 0.3 MG/DL (ref 0.5–1)
CRITICAL: ABNORMAL
DATE ANALYZED: ABNORMAL
DATE OF COLLECTION: ABNORMAL
EOSINOPHIL # BLD: 0.82 K/UL (ref 0.05–0.5)
EOSINOPHILS RELATIVE PERCENT: 4 % (ref 0–6)
ERYTHROCYTE [DISTWIDTH] IN BLOOD BY AUTOMATED COUNT: 14.8 % (ref 11.5–15)
FIO2: 40 %
GFR, ESTIMATED: >90 ML/MIN/1.73M2
GLUCOSE BLD-MCNC: 125 MG/DL (ref 74–99)
GLUCOSE BLD-MCNC: 142 MG/DL (ref 74–99)
GLUCOSE BLD-MCNC: 142 MG/DL (ref 74–99)
GLUCOSE BLD-MCNC: 160 MG/DL (ref 74–99)
GLUCOSE BLD-MCNC: 167 MG/DL (ref 74–99)
GLUCOSE BLD-MCNC: 179 MG/DL (ref 74–99)
GLUCOSE SERPL-MCNC: 160 MG/DL (ref 74–99)
HCO3: 30.7 MMOL/L (ref 22–26)
HCT VFR BLD AUTO: 27 % (ref 34–48)
HGB BLD-MCNC: 8.3 G/DL (ref 11.5–15.5)
HHB: 2.3 % (ref 0–5)
INR PPP: 1.1
LAB: ABNORMAL
LYMPHOCYTES NFR BLD: 0.62 K/UL (ref 1.5–4)
LYMPHOCYTES RELATIVE PERCENT: 3 % (ref 20–42)
Lab: 435
MAGNESIUM SERPL-MCNC: 2.1 MG/DL (ref 1.6–2.6)
MCH RBC QN AUTO: 29.5 PG (ref 26–35)
MCHC RBC AUTO-ENTMCNC: 30.7 G/DL (ref 32–34.5)
MCV RBC AUTO: 96.1 FL (ref 80–99.9)
METAMYELOCYTES ABSOLUTE COUNT: 0.21 K/UL (ref 0–0.12)
METAMYELOCYTES: 1 % (ref 0–1)
METHB: 0.1 % (ref 0–1.5)
MODE: AC
MONOCYTES NFR BLD: 0.62 K/UL (ref 0.1–0.95)
MONOCYTES NFR BLD: 3 % (ref 2–12)
MYELOCYTES ABSOLUTE COUNT: 0.41 K/UL
MYELOCYTES: 2 %
NEUTROPHILS NFR BLD: 87 % (ref 43–80)
NEUTS SEG NFR BLD: 20.32 K/UL (ref 1.8–7.3)
NUCLEATED RED BLOOD CELLS: 2 PER 100 WBC
O2 SATURATION: 97.7 % (ref 92–98.5)
O2HB: 97.3 % (ref 94–97)
OPERATOR ID: 7296
PATIENT TEMP: 37 C
PCO2: 36.5 MMHG (ref 35–45)
PEEP/CPAP: 8 CMH2O
PFO2: 2.63 MMHG/%
PH BLOOD GAS: 7.54 (ref 7.35–7.45)
PHOSPHATE SERPL-MCNC: 2.4 MG/DL (ref 2.5–4.5)
PLATELET, FLUORESCENCE: 1058 K/UL (ref 130–450)
PMV BLD AUTO: 10.6 FL (ref 7–12)
PO2: 105.4 MMHG (ref 75–100)
POTASSIUM SERPL-SCNC: 3.6 MMOL/L (ref 3.5–5)
PROT SERPL-MCNC: 6.3 G/DL (ref 6.4–8.3)
PROTHROMBIN TIME: 12 SEC (ref 9.3–12.4)
RBC # BLD AUTO: 2.81 M/UL (ref 3.5–5.5)
RBC # BLD: ABNORMAL 10*6/UL
RI(T): 1.21
RR MECHANICAL: 16 B/MIN
SODIUM SERPL-SCNC: 142 MMOL/L (ref 132–146)
SOURCE, BLOOD GAS: ABNORMAL
THB: 9.5 G/DL (ref 11.5–16.5)
TIME ANALYZED: 439
VT MECHANICAL: 350 ML
WBC OTHER # BLD: 23.4 K/UL (ref 4.5–11.5)

## 2024-05-02 PROCEDURE — 82962 GLUCOSE BLOOD TEST: CPT

## 2024-05-02 PROCEDURE — 2580000003 HC RX 258: Performed by: INTERNAL MEDICINE

## 2024-05-02 PROCEDURE — 6360000002 HC RX W HCPCS

## 2024-05-02 PROCEDURE — 2000000000 HC ICU R&B

## 2024-05-02 PROCEDURE — 84100 ASSAY OF PHOSPHORUS: CPT

## 2024-05-02 PROCEDURE — 94640 AIRWAY INHALATION TREATMENT: CPT

## 2024-05-02 PROCEDURE — 2580000003 HC RX 258: Performed by: STUDENT IN AN ORGANIZED HEALTH CARE EDUCATION/TRAINING PROGRAM

## 2024-05-02 PROCEDURE — 2500000003 HC RX 250 WO HCPCS

## 2024-05-02 PROCEDURE — 99291 CRITICAL CARE FIRST HOUR: CPT | Performed by: STUDENT IN AN ORGANIZED HEALTH CARE EDUCATION/TRAINING PROGRAM

## 2024-05-02 PROCEDURE — 6360000002 HC RX W HCPCS: Performed by: STUDENT IN AN ORGANIZED HEALTH CARE EDUCATION/TRAINING PROGRAM

## 2024-05-02 PROCEDURE — 82805 BLOOD GASES W/O2 SATURATION: CPT

## 2024-05-02 PROCEDURE — C9113 INJ PANTOPRAZOLE SODIUM, VIA: HCPCS | Performed by: STUDENT IN AN ORGANIZED HEALTH CARE EDUCATION/TRAINING PROGRAM

## 2024-05-02 PROCEDURE — 6370000000 HC RX 637 (ALT 250 FOR IP): Performed by: STUDENT IN AN ORGANIZED HEALTH CARE EDUCATION/TRAINING PROGRAM

## 2024-05-02 PROCEDURE — 80053 COMPREHEN METABOLIC PANEL: CPT

## 2024-05-02 PROCEDURE — 74018 RADEX ABDOMEN 1 VIEW: CPT

## 2024-05-02 PROCEDURE — 83735 ASSAY OF MAGNESIUM: CPT

## 2024-05-02 PROCEDURE — 2580000003 HC RX 258

## 2024-05-02 PROCEDURE — 74177 CT ABD & PELVIS W/CONTRAST: CPT

## 2024-05-02 PROCEDURE — 82330 ASSAY OF CALCIUM: CPT

## 2024-05-02 PROCEDURE — 99231 SBSQ HOSP IP/OBS SF/LOW 25: CPT | Performed by: NURSE PRACTITIONER

## 2024-05-02 PROCEDURE — 85025 COMPLETE CBC W/AUTO DIFF WBC: CPT

## 2024-05-02 PROCEDURE — 94003 VENT MGMT INPAT SUBQ DAY: CPT

## 2024-05-02 PROCEDURE — 99232 SBSQ HOSP IP/OBS MODERATE 35: CPT | Performed by: INTERNAL MEDICINE

## 2024-05-02 PROCEDURE — 6360000004 HC RX CONTRAST MEDICATION: Performed by: RADIOLOGY

## 2024-05-02 PROCEDURE — 71045 X-RAY EXAM CHEST 1 VIEW: CPT

## 2024-05-02 PROCEDURE — 6370000000 HC RX 637 (ALT 250 FOR IP): Performed by: INTERNAL MEDICINE

## 2024-05-02 PROCEDURE — 6370000000 HC RX 637 (ALT 250 FOR IP)

## 2024-05-02 PROCEDURE — 6360000002 HC RX W HCPCS: Performed by: INTERNAL MEDICINE

## 2024-05-02 PROCEDURE — 85610 PROTHROMBIN TIME: CPT

## 2024-05-02 PROCEDURE — 37799 UNLISTED PX VASCULAR SURGERY: CPT

## 2024-05-02 RX ORDER — METOCLOPRAMIDE HYDROCHLORIDE 5 MG/ML
10 INJECTION INTRAMUSCULAR; INTRAVENOUS EVERY 6 HOURS
Status: DISPENSED | OUTPATIENT
Start: 2024-05-02

## 2024-05-02 RX ORDER — VALPROIC ACID 250 MG/5ML
250 SOLUTION ORAL EVERY 12 HOURS SCHEDULED
Status: DISCONTINUED | OUTPATIENT
Start: 2024-05-02 | End: 2024-05-04

## 2024-05-02 RX ADMIN — OCTREOTIDE ACETATE 50 MCG: 50 INJECTION, SOLUTION INTRAVENOUS; SUBCUTANEOUS at 08:36

## 2024-05-02 RX ADMIN — ACETAMINOPHEN 650 MG: 650 SOLUTION ORAL at 12:21

## 2024-05-02 RX ADMIN — OXYCODONE HYDROCHLORIDE 10 MG: 5 SOLUTION ORAL at 09:29

## 2024-05-02 RX ADMIN — 0.12% CHLORHEXIDINE GLUCONATE 15 ML: 1.2 RINSE ORAL at 08:32

## 2024-05-02 RX ADMIN — INSULIN GLARGINE 18 UNITS: 100 INJECTION, SOLUTION SUBCUTANEOUS at 20:53

## 2024-05-02 RX ADMIN — ONDANSETRON 4 MG: 2 INJECTION INTRAMUSCULAR; INTRAVENOUS at 04:23

## 2024-05-02 RX ADMIN — INSULIN LISPRO 3 UNITS: 100 INJECTION, SOLUTION INTRAVENOUS; SUBCUTANEOUS at 20:53

## 2024-05-02 RX ADMIN — IOPAMIDOL 75 ML: 755 INJECTION, SOLUTION INTRAVENOUS at 08:52

## 2024-05-02 RX ADMIN — METOCLOPRAMIDE 10 MG: 5 INJECTION, SOLUTION INTRAMUSCULAR; INTRAVENOUS at 20:54

## 2024-05-02 RX ADMIN — POLYVINYL ALCOHOL, POVIDONE 1 DROP: 14; 6 SOLUTION/ DROPS OPHTHALMIC at 04:21

## 2024-05-02 RX ADMIN — MEROPENEM 1000 MG: 1 INJECTION, POWDER, FOR SOLUTION INTRAVENOUS at 09:31

## 2024-05-02 RX ADMIN — POLYVINYL ALCOHOL, POVIDONE 1 DROP: 14; 6 SOLUTION/ DROPS OPHTHALMIC at 07:48

## 2024-05-02 RX ADMIN — ATORVASTATIN CALCIUM 10 MG: 10 TABLET, FILM COATED ORAL at 20:53

## 2024-05-02 RX ADMIN — CALCIUM GLUCONATE 2000 MG: 98 INJECTION, SOLUTION INTRAVENOUS at 14:14

## 2024-05-02 RX ADMIN — VALPROIC ACID 250 MG: 250 SOLUTION ORAL at 12:21

## 2024-05-02 RX ADMIN — ACETAMINOPHEN 650 MG: 650 SOLUTION ORAL at 05:26

## 2024-05-02 RX ADMIN — VALPROIC ACID 250 MG: 250 SOLUTION ORAL at 05:30

## 2024-05-02 RX ADMIN — MICAFUNGIN SODIUM 100 MG: 100 INJECTION, POWDER, LYOPHILIZED, FOR SOLUTION INTRAVENOUS at 16:25

## 2024-05-02 RX ADMIN — OXYCODONE HYDROCHLORIDE 10 MG: 5 SOLUTION ORAL at 01:58

## 2024-05-02 RX ADMIN — SODIUM CHLORIDE, PRESERVATIVE FREE 40 MG: 5 INJECTION INTRAVENOUS at 08:36

## 2024-05-02 RX ADMIN — VALPROIC ACID 250 MG: 250 SOLUTION ORAL at 20:54

## 2024-05-02 RX ADMIN — OCTREOTIDE ACETATE 50 MCG: 50 INJECTION, SOLUTION INTRAVENOUS; SUBCUTANEOUS at 00:05

## 2024-05-02 RX ADMIN — SODIUM CHLORIDE, PRESERVATIVE FREE 10 ML: 5 INJECTION INTRAVENOUS at 20:54

## 2024-05-02 RX ADMIN — BUPROPION HYDROCHLORIDE 100 MG: 100 TABLET, FILM COATED ORAL at 20:54

## 2024-05-02 RX ADMIN — CALCIUM GLUCONATE: 98 INJECTION, SOLUTION INTRAVENOUS at 17:41

## 2024-05-02 RX ADMIN — SENNOSIDES AND DOCUSATE SODIUM 2 TABLET: 50; 8.6 TABLET ORAL at 09:27

## 2024-05-02 RX ADMIN — IPRATROPIUM BROMIDE AND ALBUTEROL SULFATE 1 DOSE: .5; 2.5 SOLUTION RESPIRATORY (INHALATION) at 12:14

## 2024-05-02 RX ADMIN — POLYVINYL ALCOHOL, POVIDONE 1 DROP: 14; 6 SOLUTION/ DROPS OPHTHALMIC at 12:21

## 2024-05-02 RX ADMIN — KETOTIFEN FUMARATE 1 DROP: 0.35 SOLUTION/ DROPS OPHTHALMIC at 20:48

## 2024-05-02 RX ADMIN — IPRATROPIUM BROMIDE AND ALBUTEROL SULFATE 1 DOSE: .5; 2.5 SOLUTION RESPIRATORY (INHALATION) at 19:50

## 2024-05-02 RX ADMIN — ENOXAPARIN SODIUM 40 MG: 100 INJECTION SUBCUTANEOUS at 09:27

## 2024-05-02 RX ADMIN — IOPAMIDOL 18 ML: 755 INJECTION, SOLUTION INTRAVENOUS at 08:51

## 2024-05-02 RX ADMIN — OXYCODONE HYDROCHLORIDE 10 MG: 5 SOLUTION ORAL at 20:58

## 2024-05-02 RX ADMIN — MEROPENEM 1000 MG: 1 INJECTION, POWDER, FOR SOLUTION INTRAVENOUS at 22:15

## 2024-05-02 RX ADMIN — METOCLOPRAMIDE 10 MG: 5 INJECTION, SOLUTION INTRAMUSCULAR; INTRAVENOUS at 08:39

## 2024-05-02 RX ADMIN — INSULIN LISPRO 3 UNITS: 100 INJECTION, SOLUTION INTRAVENOUS; SUBCUTANEOUS at 12:20

## 2024-05-02 RX ADMIN — 0.12% CHLORHEXIDINE GLUCONATE 15 ML: 1.2 RINSE ORAL at 20:49

## 2024-05-02 RX ADMIN — OCTREOTIDE ACETATE 50 MCG: 50 INJECTION, SOLUTION INTRAVENOUS; SUBCUTANEOUS at 15:46

## 2024-05-02 RX ADMIN — LABETALOL HYDROCHLORIDE 10 MG: 5 INJECTION, SOLUTION INTRAVENOUS at 03:05

## 2024-05-02 RX ADMIN — LABETALOL HYDROCHLORIDE 10 MG: 5 INJECTION, SOLUTION INTRAVENOUS at 01:06

## 2024-05-02 RX ADMIN — POLYVINYL ALCOHOL, POVIDONE 1 DROP: 14; 6 SOLUTION/ DROPS OPHTHALMIC at 15:20

## 2024-05-02 RX ADMIN — BUPROPION HYDROCHLORIDE 100 MG: 100 TABLET, FILM COATED ORAL at 09:27

## 2024-05-02 RX ADMIN — METOCLOPRAMIDE 10 MG: 5 INJECTION, SOLUTION INTRAMUSCULAR; INTRAVENOUS at 12:52

## 2024-05-02 RX ADMIN — POTASSIUM BICARBONATE 40 MEQ: 782 TABLET, EFFERVESCENT ORAL at 12:56

## 2024-05-02 RX ADMIN — LABETALOL HYDROCHLORIDE 10 MG: 5 INJECTION, SOLUTION INTRAVENOUS at 05:01

## 2024-05-02 RX ADMIN — KETOTIFEN FUMARATE 1 DROP: 0.35 SOLUTION/ DROPS OPHTHALMIC at 08:32

## 2024-05-02 RX ADMIN — MEROPENEM 1000 MG: 1 INJECTION, POWDER, FOR SOLUTION INTRAVENOUS at 01:32

## 2024-05-02 RX ADMIN — ACETAMINOPHEN 650 MG: 650 SOLUTION ORAL at 17:03

## 2024-05-02 RX ADMIN — SODIUM PHOSPHATE, MONOBASIC, MONOHYDRATE AND SODIUM PHOSPHATE, DIBASIC, ANHYDROUS 30 MMOL: 142; 276 INJECTION, SOLUTION INTRAVENOUS at 13:28

## 2024-05-02 RX ADMIN — IPRATROPIUM BROMIDE AND ALBUTEROL SULFATE 1 DOSE: .5; 2.5 SOLUTION RESPIRATORY (INHALATION) at 16:39

## 2024-05-02 RX ADMIN — OXYCODONE HYDROCHLORIDE 10 MG: 5 SOLUTION ORAL at 15:46

## 2024-05-02 RX ADMIN — POLYVINYL ALCOHOL, POVIDONE 1 DROP: 14; 6 SOLUTION/ DROPS OPHTHALMIC at 20:48

## 2024-05-02 RX ADMIN — IPRATROPIUM BROMIDE AND ALBUTEROL SULFATE 1 DOSE: .5; 2.5 SOLUTION RESPIRATORY (INHALATION) at 08:32

## 2024-05-02 RX ADMIN — INSULIN LISPRO 3 UNITS: 100 INJECTION, SOLUTION INTRAVENOUS; SUBCUTANEOUS at 04:22

## 2024-05-02 RX ADMIN — BUPROPION HYDROCHLORIDE 100 MG: 100 TABLET, FILM COATED ORAL at 12:29

## 2024-05-02 ASSESSMENT — PAIN SCALES - GENERAL
PAINLEVEL_OUTOF10: 0
PAINLEVEL_OUTOF10: 5
PAINLEVEL_OUTOF10: 0

## 2024-05-02 ASSESSMENT — PULMONARY FUNCTION TESTS
PIF_VALUE: 21
PIF_VALUE: 16
PIF_VALUE: 21
PIF_VALUE: 18
PIF_VALUE: 18
PIF_VALUE: 21
PIF_VALUE: 21
PIF_VALUE: 18
PIF_VALUE: 28
PIF_VALUE: 18
PIF_VALUE: 21
PIF_VALUE: 24
PIF_VALUE: 17
PIF_VALUE: 18
PIF_VALUE: 24
PIF_VALUE: 24
PIF_VALUE: 17
PIF_VALUE: 18
PIF_VALUE: 22
PIF_VALUE: 17
PIF_VALUE: 18
PIF_VALUE: 23
PIF_VALUE: 23
PIF_VALUE: 18
PIF_VALUE: 22
PIF_VALUE: 17
PIF_VALUE: 17
PIF_VALUE: 18

## 2024-05-02 NOTE — PROGRESS NOTES
ENDOCRINOLOGY PROGRESS NOTE      Date of admission: 4/15/2024  Date of service: 5/1/2024  Admitting physician: Norma Rogers MD   Primary Care Physician: Mekhi Escalona MD  Consultant physician: Jonathan Curiel MD     Reason for the consultation:  Post-pancreatectomy DM     History of Present Illness:  Leatha Willard is a 58 y.o. old female with PMH of cystic neoplasm of the head of pancreas, HTN, HLD and other listed below admitted to [facility] on 4/15/2024 for completion pancreatectomy. Endocrine service was consulted for diabetes management.    Subjective   I saw and examined the patient at bedside this afternoon.  Status post trach placement this morning.  Still intubated.  On TPN and started tube feeds this morning    Point of care glucose monitoring   (Independently reviewed)   Recent Labs     04/30/24  1122 04/30/24  1515 04/30/24  2020 04/30/24  2344 05/01/24  0330 05/01/24  0820 05/01/24  1205 05/01/24  1654   POCGLU 149* 202* 130* 167* 127* 167* 119* 188*       Scheduled Meds:   fentanNYL  200 mcg IntraVENous 60 Min Pre-Op    rocuronium  0.6 mg/kg IntraVENous 60 Min Pre-Op    oxyCODONE  10 mg Oral Q6H    insulin glargine  18 Units SubCUTAneous Nightly    insulin lispro  0-18 Units SubCUTAneous Q4H    octreotide  50 mcg IntraVENous Q8H    meropenem  1,000 mg IntraVENous Q8H    valproic acid  250 mg Oral 3 times per day    buPROPion  100 mg Oral TID    sennosides-docusate sodium  2 tablet Oral Daily    micafungin  100 mg IntraVENous Daily    ipratropium 0.5 mg-albuterol 2.5 mg  1 Dose Inhalation Q4H WA RT    sodium chloride flush  5-40 mL IntraVENous BID    acetaminophen  650 mg Oral 4 times per day    chlorhexidine  15 mL Mouth/Throat BID    Polyvinyl Alcohol-Povidone PF  1 drop Both Eyes Q4H    Or    artificial tears   Both Eyes Q4H    lidocaine  1 patch TransDERmal Daily    enoxaparin  40 mg SubCUTAneous Daily    atorvastatin  10 mg Oral Nightly    ketotifen fumarate  1 drop Both Eyes BID    pantoprazole

## 2024-05-02 NOTE — PROGRESS NOTES
Dr. Wray and Dr. Fitzgerald made aware of new green drainage and large amt of brown/pink, milky drainage from abdominal wound. Dressing taken down and residents at bedside. No new orders at this time.

## 2024-05-02 NOTE — PROGRESS NOTES
Patient had large projectile brown emesis x3. Dr. Wray at beside. Tube feeds stopped for now. G tube to gravity. Zofran given.

## 2024-05-02 NOTE — PROGRESS NOTES
05/02/24 0835   Patient Observation   Pulse 85   SpO2 100 %   Vent Information   Vent Mode CPAP/PS   Ventilator Settings   FiO2  40 %   PEEP/CPAP (cmH2O) 5   Pressure Support (cm H2O) 12 cm H2O   Vent Patient Data (Readings)   Vt (Measured) 304 mL   Peak Inspiratory Pressure (cmH2O) 18 cmH2O   Rate Measured 20 br/min   Minute Volume (L/min) 5.65 Liters   Mean Airway Pressure (cmH2O) 9 cmH20   Plateau Pressure (cm H2O) 20 cm H2O   Driving Pressure 15   I:E Ratio 1:2.40   Flow Sensitivity 2 L/min   Backup Apnea On   Backup Rate 16 Breaths Per Minute   Backup Vt 350   Vent Alarm Settings   High Pressure (cmH2O) 50 cmH2O   Low Minute Volume (lpm) 4 L/min   Low Exhaled Vt (ml) 250 mL   RR High (bpm) 35 br/min   Apnea (secs) 20 secs

## 2024-05-02 NOTE — PLAN OF CARE
Problem: Discharge Planning  Goal: Discharge to home or other facility with appropriate resources  Outcome: Not Progressing     Problem: Musculoskeletal - Adult  Goal: Return mobility to safest level of function  5/2/2024 1017 by Leatha Mays RN  Outcome: Not Progressing  5/1/2024 2236 by Sandy Prieto RN  Outcome: Progressing  Goal: Return ADL status to a safe level of function  Outcome: Not Progressing     Problem: Gastrointestinal - Adult  Goal: Minimal or absence of nausea and vomiting  Outcome: Not Progressing  Goal: Maintains or returns to baseline bowel function  Outcome: Not Progressing     Problem: Infection - Adult  Goal: Absence of infection at discharge  Outcome: Not Progressing  Goal: Absence of infection during hospitalization  Outcome: Not Progressing     Problem: Neurosensory - Adult  Goal: Achieves stable or improved neurological status  Outcome: Not Progressing  Goal: Achieves maximal functionality and self care  Outcome: Not Progressing     Problem: Skin/Tissue Integrity - Adult  Goal: Skin integrity remains intact  Outcome: Not Progressing  Goal: Incisions, wounds, or drain sites healing without S/S of infection  Outcome: Not Progressing     Problem: Nutrition Deficit:  Goal: Optimize nutritional status  Outcome: Not Progressing     Problem: Discharge Planning  Goal: Discharge to home or other facility with appropriate resources  Outcome: Not Progressing     Problem: Musculoskeletal - Adult  Goal: Return mobility to safest level of function  5/2/2024 1017 by Leatha Mays RN  Outcome: Not Progressing  5/1/2024 2236 by Sandy Prieto RN  Outcome: Progressing  Goal: Return ADL status to a safe level of function  Outcome: Not Progressing     Problem: Gastrointestinal - Adult  Goal: Minimal or absence of nausea and vomiting  Outcome: Not Progressing  Goal: Maintains or returns to baseline bowel function  Outcome: Not Progressing     Problem: Infection - Adult  Goal: Absence of

## 2024-05-02 NOTE — PLAN OF CARE
Problem: Skin/Tissue Integrity  Goal: Absence of new skin breakdown  Description: 1.  Monitor for areas of redness and/or skin breakdown  2.  Assess vascular access sites hourly  3.  Every 4-6 hours minimum:  Change oxygen saturation probe site  4.  Every 4-6 hours:  If on nasal continuous positive airway pressure, respiratory therapy assess nares and determine need for appliance change or resting period.  5/1/2024 2236 by Sandy Prieto RN  Outcome: Progressing    Problem: Cardiovascular - Adult  Goal: Maintains optimal cardiac output and hemodynamic stability  5/1/2024 2236 by Sandy Prieto RN  Outcome: Progressing  5/1/2024 1229 by Cristiane Choudhary RN  Outcome: Progressing     Problem: Musculoskeletal - Adult  Goal: Return mobility to safest level of function  5/1/2024 2236 by Sandy Prieto RN  Outcome: Progressing   Problem: ABCDS Injury Assessment  Goal: Absence of physical injury  5/1/2024 2236 by Sandy Prieto RN  Outcome: Progressing  Problem: Respiratory - Adult  Goal: Achieves optimal ventilation and oxygenation  5/1/2024 2236 by Sandy Prieto RN  Outcome: Progressing

## 2024-05-02 NOTE — PROGRESS NOTES
\[facility]\"" Surgery   Daily Progress Note      Patient's Name/Date of Birth: Leatha Willard / 1965    Date: May 2, 2024     Chief Complaint: s/p open whipple, Grade C POPF s/p takeback to OR for completion pancreatectomy    Patient Active Problem List   Diagnosis    Recurrent major depressive disorder (HCC)    Essential hypertension    Hyperlipidemia    Prediabetes    Pre-operative laboratory examination    Class 1 obesity due to excess calories without serious comorbidity with body mass index (BMI) of 32.0 to 32.9 in adult    HIREN (obstructive sleep apnea)    Pancreatic cyst    Colon polyps    Cyst of pancreas    Chest pain    Tobacco abuse-- quit 2 weeks ago    Abdominal pain    Serous cystadenoma    Pancreatic fistula    Sepsis with acute renal failure and septic shock (HCC)    JUSTINE (acute kidney injury) (HCC)    Hypophosphatemia    Acute respiratory failure with hypoxia (HCC)    Post-pancreatectomy diabetes (HCC)    On total parenteral nutrition    Hypoalbuminemia    Electrolyte imbalance    Hypocalcemia    Acute blood loss anemia    Hypokalemia       Subjective:   Perc trach yesterday  Large amount of emesis this morning x3  Large amount of drainage from midline as result  Midline drainage w/ bilious appearance now  Some skin dehiscence    TF held  Low grade temps - 100.9F  X1 bm        Objective:  /66   Pulse 78   Temp (!) 100.6 °F (38.1 °C) (Bladder)   Resp 19   Ht 1.626 m (5' 4\")   Wt 86 kg (189 lb 11.2 oz)   SpO2 100%   BMI 32.56 kg/m²   Labs:  Recent Labs     04/30/24 0521 05/01/24  0415   WBC 22.7* 19.5*   HGB 8.3* 7.5*   HCT 27.5* 24.4*       Recent Labs     04/30/24  0521 05/01/24  0415 05/02/24  0426    144 142   K 3.6 4.0 3.6   * 104 100   CO2 27 26 27   BUN 18 21* 17   CREATININE 0.4* 0.4* 0.3*       Recent Labs     04/30/24 0521 05/01/24  0415 05/02/24  0426   ALKPHOS 158* 130* 133*   ALT 24 19 18   AST 27 22 25   BILITOT 0.3 0.3 0.3           General appearance: lying in bed,  Yes

## 2024-05-02 NOTE — PROGRESS NOTES
05/02/24 1636   Patient Observation   Pulse 59   Respirations 15   SpO2 100 %   Breath Sounds   Right Upper Lobe Diminished   Left Upper Lobe Diminished   Vent Information   Ventilator -31   Vent Mode CPAP/PS   Ventilator Settings   FiO2  40 %   PEEP/CPAP (cmH2O) 5   Pressure Support (cm H2O) 12 cm H2O   Vent Patient Data (Readings)   Vt (Measured) 450 mL   Peak Inspiratory Pressure (cmH2O) 17 cmH2O   Rate Measured 14 br/min   Minute Volume (L/min) 5.49 Liters   Mean Airway Pressure (cmH2O) 7 cmH20   Plateau Pressure (cm H2O) 16 cm H2O   Driving Pressure 11   I:E Ratio 1:3.30   Static Compliance (L/cm H2O) 41   Vent Alarm Settings   High Pressure (cmH2O) 50 cmH2O   Low Minute Volume (lpm) 4 L/min   High Minute Volume (lpm) 16 L/min   Low Exhaled Vt (ml) 250 mL   High Exhaled Vt (ml) 600 mL   RR High (bpm) 35 br/min   Apnea (secs) 20 secs   Additional Respiratoray Assessments   Humidification Source Heated wire   Humidification Temp 35.7   Circuit Condensation Drained   Ambu Bag With Mask At Bedside Yes   Surgical Airway (Trach) 05/01/24 Bernadetteley Cuffed   Placement Date/Time: 05/01/24 0742   Placed By: (c)   Placement Verified By: Capnometry  Surgical Airway Type: Tracheostomy  Brand: Breezy  Style: Cuffed  Size: 8   Status Secured   Site Assessment Dry   Ties Assessment Clean;Dry;Intact;Secure

## 2024-05-02 NOTE — PROGRESS NOTES
[facility]   Gastroenterology, Hepatology, &  Advanced Endoscopy    Reason for consult: PEG-J tube placement  ASSESSMENT AND PLAN:    -PEG-J tube placed  4/30/24  - Not following commands  - Vent  - GIULIANO draining  -  TPN currently on hold  -Staff state emesis x3 this am, continue to monitor post Peg-J.  - Staff state Peg- J functioning well.  - S/P total pancreatectomy and splenectomy with insulin gtt  -S/p pylorus sparing open Whipple   - Pt remains intubated, sedated,restrained  - Increased restlessness attempting to pull as ETT when not restrained.  - INR 1.1  -Hgb  8.2  - PLT  638    Post TRACHEOTOMY PERCUTANEOUS BRONCHOSCOPY  Bronchoscopy with bronchial levage 5/1/24      HISTORY OF PRESENT ILLNESS:    Leatha Willard is a 58 y.o. female who presented to the SICU for post-op monitoring following a whipple. The patient has been admitted to the hospital since 4/15/2024 who presents for evaluation of pancreatic cyst. She has a hx of arthritis and back pain s/p cervical fusion. She was being evaluated for her mid back pain with imaging and was found to have a large pancreatic cyst on CT. She then had an MRI/MRCP showing a large >10cm cyst in the pancreatic head extending to the transverse colon mesentery. She had an EUS with Dr. Hammond at [facility]. FNA and fluid analysis was consistent with a benign cyst. There were no suspicious features present. Patient underwent a Pylorus-preserving pancreaticoduodenectomy on 4/15 and was admitted to the ICU post-operatively.  On 4/18 she underwent a complete pancreatectomy and splenectomy secondary to a worsening pancreatic leak.     I/O (24Hr):     Intake/Output Summary (Last 24 hours) at 4/24/2024 0748  Last data filed at 4/24/2024 0703      Gross per 24 hour   Intake 3492.77 ml   Output 4595 ml   Net -1102.23 ml         Past Medical History:        Diagnosis Date    Cervical pain 04/14/2022    Colon polyps     found on colonoscopy 09/20/23    Depression     Diabetes 1.5,  metoclopramide (REGLAN) injection 10 mg, 10 mg, IntraVENous, Q6H, Norma Rogers MD, 10 mg at 05/02/24 0839    PN-Adult  3-in-1 Central Line (Custom), , IntraVENous, Continuous TPN, Sarah Davey DO, Last Rate: 45.8 mL/hr at 05/02/24 0600, Rate Verify at 05/02/24 0600    fentaNYL (SUBLIMAZE) injection 200 mcg, 200 mcg, IntraVENous, 60 Min Pre-Op, Sarah Davey DO    rocuronium (ZEMURON) injection 56 mg, 0.6 mg/kg, IntraVENous, 60 Min Pre-Op, Sarah Davey DO, 56 mg at 05/01/24 0727    oxyCODONE (ROXICODONE) 5 MG/5ML solution 10 mg, 10 mg, Oral, Q6H, Jesse Hood MD, 10 mg at 05/02/24 0929    insulin glargine (LANTUS) injection vial 18 Units, 18 Units, SubCUTAneous, Nightly, Jonathan Curiel MD, 18 Units at 05/01/24 2030    insulin lispro (HUMALOG) injection vial 0-18 Units, 0-18 Units, SubCUTAneous, Q4H, Jonathan Curiel MD, 3 Units at 05/02/24 0422    octreotide (SANDOSTATIN) injection 50 mcg, 50 mcg, IntraVENous, Q8H, Jesse Hood MD, 50 mcg at 05/02/24 0836    [COMPLETED] meropenem (MERREM) 1,000 mg in sodium chloride 0.9 % 100 mL IVPB ([id number]), 1,000 mg, IntraVENous, Once, Stopped at 04/25/24 1059 **FOLLOWED BY** meropenem (MERREM) 1,000 mg in sodium chloride 0.9 % 100 mL IVPB ([id number]), 1,000 mg, IntraVENous, Q8H, Kandi Giang MD, Last Rate: 33.3 mL/hr at 05/02/24 0931, 1,000 mg at 05/02/24 0931    valproic acid (DEPAKENE) 250 MG/5ML oral solution 250 mg, 250 mg, Oral, 3 times per day, Jesse Hood MD, 250 mg at 05/02/24 0530    buPROPion (WELLBUTRIN) tablet 100 mg, 100 mg, Oral, TID, Jesse Hood MD, 100 mg at 05/02/24 0927    sennosides-docusate sodium (SENOKOT-S) 8.6-50 MG tablet 2 tablet, 2 tablet, Oral, Daily, Jesse Hood MD, 2 tablet at 05/02/24 0927    hydrALAZINE (APRESOLINE) injection 10 mg, 10 mg, IntraVENous, Q30 Min PRN, Jesse Hood MD, 10 mg at 04/29/24 2020    labetalol (NORMODYNE;TRANDATE) injection 10 mg, 10 mg, IntraVENous, Q30 Min PRN, Erwin

## 2024-05-02 NOTE — PROGRESS NOTES
1g q8h.  Follow up Surgery recommendations.  Post splenectomy vaccination to be given prior to discharge or whenever patient is afebrile and clinically improved:  - pneumococcal vaccine ([id number]) IM   - meningococcal polysaccharide vaccine (Menveo) IM   - Group B meningococcal vaccine-Bexsero   - HIB polysaccharide vaccine (ActHIB) IM   - Flu vaccine   [facility] ([phone]) or with primary care to get booster doses for:  1) meningococcal polysaccharide vaccine (Menveo) - second dose   2)Group B meningococcal vaccine-Bexsero second dose  Update the following vaccinations in 5 years:  - meningococcal vaccine booster every 5 years.  Continue local wound care.  Continue supportive care.     Thank you for involving me in the care of Leatha Willard. ID will continue to follow. Please do not hesitate to call for any questions or concerns.    Electronically signed by Kandi Giang MD on 5/2/2024 at 9:23 AM

## 2024-05-03 ENCOUNTER — APPOINTMENT (OUTPATIENT)
Dept: GENERAL RADIOLOGY | Age: 59
DRG: 004 | End: 2024-05-03
Attending: STUDENT IN AN ORGANIZED HEALTH CARE EDUCATION/TRAINING PROGRAM
Payer: COMMERCIAL

## 2024-05-03 LAB
AADO2: 135.1 MMHG
ALBUMIN SERPL-MCNC: 2.5 G/DL (ref 3.5–5.2)
ALP SERPL-CCNC: 154 U/L (ref 35–104)
ALT SERPL-CCNC: 24 U/L (ref 0–32)
ANION GAP SERPL CALCULATED.3IONS-SCNC: 11 MMOL/L (ref 7–16)
AST SERPL-CCNC: 32 U/L (ref 0–31)
B.E.: 6.4 MMOL/L (ref -3–3)
BASOPHILS # BLD: 0 K/UL (ref 0–0.2)
BASOPHILS NFR BLD: 0 % (ref 0–2)
BILIRUB SERPL-MCNC: 0.3 MG/DL (ref 0–1.2)
BUN SERPL-MCNC: 17 MG/DL (ref 6–20)
CA-I BLD-SCNC: 1.14 MMOL/L (ref 1.15–1.33)
CALCIUM SERPL-MCNC: 8 MG/DL (ref 8.6–10.2)
CHLORIDE SERPL-SCNC: 102 MMOL/L (ref 98–107)
CO2 SERPL-SCNC: 27 MMOL/L (ref 22–29)
COHB: 0.1 % (ref 0–1.5)
CREAT SERPL-MCNC: 0.4 MG/DL (ref 0.5–1)
CRITICAL: ABNORMAL
DATE ANALYZED: ABNORMAL
DATE OF COLLECTION: ABNORMAL
EOSINOPHIL # BLD: 3.01 K/UL (ref 0.05–0.5)
EOSINOPHILS RELATIVE PERCENT: 13 % (ref 0–6)
ERYTHROCYTE [DISTWIDTH] IN BLOOD BY AUTOMATED COUNT: 15.4 % (ref 11.5–15)
FIO2: 40 %
GFR, ESTIMATED: >90 ML/MIN/1.73M2
GLUCOSE BLD-MCNC: 120 MG/DL (ref 74–99)
GLUCOSE BLD-MCNC: 124 MG/DL (ref 74–99)
GLUCOSE BLD-MCNC: 124 MG/DL (ref 74–99)
GLUCOSE BLD-MCNC: 125 MG/DL (ref 74–99)
GLUCOSE BLD-MCNC: 144 MG/DL (ref 74–99)
GLUCOSE BLD-MCNC: 182 MG/DL (ref 74–99)
GLUCOSE SERPL-MCNC: 109 MG/DL (ref 74–99)
HCO3: 29.4 MMOL/L (ref 22–26)
HCT VFR BLD AUTO: 25.6 % (ref 34–48)
HGB BLD-MCNC: 7.8 G/DL (ref 11.5–15.5)
HHB: 2.2 % (ref 0–5)
INR PPP: 1.1
LAB: ABNORMAL
LYMPHOCYTES NFR BLD: 2.41 K/UL (ref 1.5–4)
LYMPHOCYTES RELATIVE PERCENT: 10 % (ref 20–42)
Lab: 425
MAGNESIUM SERPL-MCNC: 2.1 MG/DL (ref 1.6–2.6)
MCH RBC QN AUTO: 29.2 PG (ref 26–35)
MCHC RBC AUTO-ENTMCNC: 30.5 G/DL (ref 32–34.5)
MCV RBC AUTO: 95.9 FL (ref 80–99.9)
METAMYELOCYTES ABSOLUTE COUNT: 0.2 K/UL (ref 0–0.12)
METAMYELOCYTES: 1 % (ref 0–1)
METHB: 0.5 % (ref 0–1.5)
MODE: ABNORMAL
MONOCYTES NFR BLD: 2.01 K/UL (ref 0.1–0.95)
MONOCYTES NFR BLD: 9 % (ref 2–12)
MYELOCYTES ABSOLUTE COUNT: 0.4 K/UL
MYELOCYTES: 2 %
NEUTROPHILS NFR BLD: 65 % (ref 43–80)
NEUTS SEG NFR BLD: 15.07 K/UL (ref 1.8–7.3)
NUCLEATED RED BLOOD CELLS: 3 PER 100 WBC
O2 SATURATION: 97.8 % (ref 92–98.5)
O2HB: 97.2 % (ref 94–97)
OPERATOR ID: 2577
PATIENT TEMP: 37 C
PCO2: 35.7 MMHG (ref 35–45)
PEEP/CPAP: 5 CMH2O
PFO2: 2.47 MMHG/%
PH BLOOD GAS: 7.53 (ref 7.35–7.45)
PHOSPHATE SERPL-MCNC: 2.6 MG/DL (ref 2.5–4.5)
PLATELET # BLD AUTO: 950 K/UL (ref 130–450)
PMV BLD AUTO: 10.4 FL (ref 7–12)
PO2: 99 MMHG (ref 75–100)
POTASSIUM SERPL-SCNC: 3.9 MMOL/L (ref 3.5–5)
PROT SERPL-MCNC: 6.2 G/DL (ref 6.4–8.3)
PROTHROMBIN TIME: 12.2 SEC (ref 9.3–12.4)
PS: 12 CMH20
RBC # BLD AUTO: 2.67 M/UL (ref 3.5–5.5)
RBC # BLD: ABNORMAL 10*6/UL
RI(T): 1.37
SODIUM SERPL-SCNC: 140 MMOL/L (ref 132–146)
SOURCE, BLOOD GAS: ABNORMAL
THB: 9 G/DL (ref 11.5–16.5)
TIME ANALYZED: 430
WBC OTHER # BLD: 23.1 K/UL (ref 4.5–11.5)

## 2024-05-03 PROCEDURE — 2580000003 HC RX 258: Performed by: STUDENT IN AN ORGANIZED HEALTH CARE EDUCATION/TRAINING PROGRAM

## 2024-05-03 PROCEDURE — 6370000000 HC RX 637 (ALT 250 FOR IP): Performed by: STUDENT IN AN ORGANIZED HEALTH CARE EDUCATION/TRAINING PROGRAM

## 2024-05-03 PROCEDURE — 99291 CRITICAL CARE FIRST HOUR: CPT | Performed by: STUDENT IN AN ORGANIZED HEALTH CARE EDUCATION/TRAINING PROGRAM

## 2024-05-03 PROCEDURE — G0009 ADMIN PNEUMOCOCCAL VACCINE: HCPCS | Performed by: INTERNAL MEDICINE

## 2024-05-03 PROCEDURE — 99232 SBSQ HOSP IP/OBS MODERATE 35: CPT | Performed by: INTERNAL MEDICINE

## 2024-05-03 PROCEDURE — 6360000002 HC RX W HCPCS: Performed by: STUDENT IN AN ORGANIZED HEALTH CARE EDUCATION/TRAINING PROGRAM

## 2024-05-03 PROCEDURE — 2500000003 HC RX 250 WO HCPCS: Performed by: STUDENT IN AN ORGANIZED HEALTH CARE EDUCATION/TRAINING PROGRAM

## 2024-05-03 PROCEDURE — 2500000003 HC RX 250 WO HCPCS

## 2024-05-03 PROCEDURE — 90647 HIB PRP-OMP VACC 3 DOSE IM: CPT | Performed by: INTERNAL MEDICINE

## 2024-05-03 PROCEDURE — 90471 IMMUNIZATION ADMIN: CPT | Performed by: INTERNAL MEDICINE

## 2024-05-03 PROCEDURE — 85610 PROTHROMBIN TIME: CPT

## 2024-05-03 PROCEDURE — 94003 VENT MGMT INPAT SUBQ DAY: CPT

## 2024-05-03 PROCEDURE — 99231 SBSQ HOSP IP/OBS SF/LOW 25: CPT | Performed by: NURSE PRACTITIONER

## 2024-05-03 PROCEDURE — 71045 X-RAY EXAM CHEST 1 VIEW: CPT

## 2024-05-03 PROCEDURE — 94640 AIRWAY INHALATION TREATMENT: CPT

## 2024-05-03 PROCEDURE — 90472 IMMUNIZATION ADMIN EACH ADD: CPT | Performed by: INTERNAL MEDICINE

## 2024-05-03 PROCEDURE — 80053 COMPREHEN METABOLIC PANEL: CPT

## 2024-05-03 PROCEDURE — 83735 ASSAY OF MAGNESIUM: CPT

## 2024-05-03 PROCEDURE — 6370000000 HC RX 637 (ALT 250 FOR IP)

## 2024-05-03 PROCEDURE — 85025 COMPLETE CBC W/AUTO DIFF WBC: CPT

## 2024-05-03 PROCEDURE — 2580000003 HC RX 258

## 2024-05-03 PROCEDURE — 90620 MENB-4C VACCINE IM: CPT | Performed by: INTERNAL MEDICINE

## 2024-05-03 PROCEDURE — 82330 ASSAY OF CALCIUM: CPT

## 2024-05-03 PROCEDURE — 2000000000 HC ICU R&B

## 2024-05-03 PROCEDURE — 6360000002 HC RX W HCPCS: Performed by: INTERNAL MEDICINE

## 2024-05-03 PROCEDURE — 37799 UNLISTED PX VASCULAR SURGERY: CPT

## 2024-05-03 PROCEDURE — 90734 MENACWYD/MENACWYCRM VACC IM: CPT | Performed by: INTERNAL MEDICINE

## 2024-05-03 PROCEDURE — 2580000003 HC RX 258: Performed by: INTERNAL MEDICINE

## 2024-05-03 PROCEDURE — 82805 BLOOD GASES W/O2 SATURATION: CPT

## 2024-05-03 PROCEDURE — C9113 INJ PANTOPRAZOLE SODIUM, VIA: HCPCS | Performed by: STUDENT IN AN ORGANIZED HEALTH CARE EDUCATION/TRAINING PROGRAM

## 2024-05-03 PROCEDURE — 90677 PCV20 VACCINE IM: CPT | Performed by: INTERNAL MEDICINE

## 2024-05-03 PROCEDURE — 6370000000 HC RX 637 (ALT 250 FOR IP): Performed by: INTERNAL MEDICINE

## 2024-05-03 PROCEDURE — 82962 GLUCOSE BLOOD TEST: CPT

## 2024-05-03 PROCEDURE — 84100 ASSAY OF PHOSPHORUS: CPT

## 2024-05-03 RX ORDER — INSULIN GLARGINE 100 [IU]/ML
16 INJECTION, SOLUTION SUBCUTANEOUS NIGHTLY
Status: DISCONTINUED | OUTPATIENT
Start: 2024-05-03 | End: 2024-05-04

## 2024-05-03 RX ORDER — CALCIUM GLUCONATE 10 MG/ML
1000 INJECTION, SOLUTION INTRAVENOUS ONCE
Status: COMPLETED | OUTPATIENT
Start: 2024-05-03 | End: 2024-05-03

## 2024-05-03 RX ADMIN — KETOTIFEN FUMARATE 1 DROP: 0.35 SOLUTION/ DROPS OPHTHALMIC at 08:15

## 2024-05-03 RX ADMIN — IPRATROPIUM BROMIDE AND ALBUTEROL SULFATE 1 DOSE: .5; 2.5 SOLUTION RESPIRATORY (INHALATION) at 15:45

## 2024-05-03 RX ADMIN — INSULIN LISPRO 3 UNITS: 100 INJECTION, SOLUTION INTRAVENOUS; SUBCUTANEOUS at 20:19

## 2024-05-03 RX ADMIN — 0.12% CHLORHEXIDINE GLUCONATE 15 ML: 1.2 RINSE ORAL at 07:59

## 2024-05-03 RX ADMIN — Medication 250 MG: at 07:58

## 2024-05-03 RX ADMIN — NEISSERIA MENINGITIDIS SEROGROUP B NHBA FUSION PROTEIN ANTIGEN, NEISSERIA MENINGITIDIS SEROGROUP B FHBP FUSION PROTEIN ANTIGEN AND NEISSERIA MENINGITIDIS SEROGROUP B NADA PROTEIN ANTIGEN 0.5 ML: 50; 50; 50; 25 INJECTION, SUSPENSION INTRAMUSCULAR at 15:11

## 2024-05-03 RX ADMIN — KETOTIFEN FUMARATE 1 DROP: 0.35 SOLUTION/ DROPS OPHTHALMIC at 20:20

## 2024-05-03 RX ADMIN — MEROPENEM 1000 MG: 1 INJECTION, POWDER, FOR SOLUTION INTRAVENOUS at 14:47

## 2024-05-03 RX ADMIN — Medication 250 MG: at 15:24

## 2024-05-03 RX ADMIN — POLYVINYL ALCOHOL, POVIDONE 1 DROP: 14; 6 SOLUTION/ DROPS OPHTHALMIC at 00:11

## 2024-05-03 RX ADMIN — ENOXAPARIN SODIUM 40 MG: 100 INJECTION SUBCUTANEOUS at 07:58

## 2024-05-03 RX ADMIN — CALCIUM GLUCONATE 1000 MG: 10 INJECTION, SOLUTION INTRAVENOUS at 10:39

## 2024-05-03 RX ADMIN — Medication 250 MG: at 12:01

## 2024-05-03 RX ADMIN — OCTREOTIDE ACETATE 50 MCG: 50 INJECTION, SOLUTION INTRAVENOUS; SUBCUTANEOUS at 14:42

## 2024-05-03 RX ADMIN — OCTREOTIDE ACETATE 50 MCG: 50 INJECTION, SOLUTION INTRAVENOUS; SUBCUTANEOUS at 22:40

## 2024-05-03 RX ADMIN — OXYCODONE HYDROCHLORIDE 10 MG: 5 SOLUTION ORAL at 08:22

## 2024-05-03 RX ADMIN — VALPROIC ACID 250 MG: 250 SOLUTION ORAL at 07:58

## 2024-05-03 RX ADMIN — BUPROPION HYDROCHLORIDE 100 MG: 100 TABLET, FILM COATED ORAL at 08:15

## 2024-05-03 RX ADMIN — METOCLOPRAMIDE 10 MG: 5 INJECTION, SOLUTION INTRAMUSCULAR; INTRAVENOUS at 02:00

## 2024-05-03 RX ADMIN — OXYCODONE HYDROCHLORIDE 10 MG: 5 SOLUTION ORAL at 03:38

## 2024-05-03 RX ADMIN — SODIUM CHLORIDE, PRESERVATIVE FREE 10 ML: 5 INJECTION INTRAVENOUS at 08:00

## 2024-05-03 RX ADMIN — MEROPENEM 1000 MG: 1 INJECTION, POWDER, FOR SOLUTION INTRAVENOUS at 05:45

## 2024-05-03 RX ADMIN — SENNOSIDES AND DOCUSATE SODIUM 2 TABLET: 50; 8.6 TABLET ORAL at 07:59

## 2024-05-03 RX ADMIN — POLYVINYL ALCOHOL, POVIDONE 1 DROP: 14; 6 SOLUTION/ DROPS OPHTHALMIC at 08:00

## 2024-05-03 RX ADMIN — IPRATROPIUM BROMIDE AND ALBUTEROL SULFATE 1 DOSE: .5; 2.5 SOLUTION RESPIRATORY (INHALATION) at 08:19

## 2024-05-03 RX ADMIN — INSULIN GLARGINE 16 UNITS: 100 INJECTION, SOLUTION SUBCUTANEOUS at 20:30

## 2024-05-03 RX ADMIN — 0.12% CHLORHEXIDINE GLUCONATE 15 ML: 1.2 RINSE ORAL at 20:19

## 2024-05-03 RX ADMIN — IPRATROPIUM BROMIDE AND ALBUTEROL SULFATE 1 DOSE: .5; 2.5 SOLUTION RESPIRATORY (INHALATION) at 11:34

## 2024-05-03 RX ADMIN — METOCLOPRAMIDE 10 MG: 5 INJECTION, SOLUTION INTRAMUSCULAR; INTRAVENOUS at 20:19

## 2024-05-03 RX ADMIN — LABETALOL HYDROCHLORIDE 10 MG: 5 INJECTION, SOLUTION INTRAVENOUS at 05:24

## 2024-05-03 RX ADMIN — OXYCODONE HYDROCHLORIDE 10 MG: 5 SOLUTION ORAL at 14:40

## 2024-05-03 RX ADMIN — VALPROIC ACID 250 MG: 250 SOLUTION ORAL at 20:30

## 2024-05-03 RX ADMIN — ACETAMINOPHEN 650 MG: 650 SOLUTION ORAL at 22:40

## 2024-05-03 RX ADMIN — SODIUM CHLORIDE, PRESERVATIVE FREE 10 ML: 5 INJECTION INTRAVENOUS at 20:19

## 2024-05-03 RX ADMIN — METOCLOPRAMIDE 10 MG: 5 INJECTION, SOLUTION INTRAMUSCULAR; INTRAVENOUS at 14:41

## 2024-05-03 RX ADMIN — ACETAMINOPHEN 650 MG: 650 SOLUTION ORAL at 00:14

## 2024-05-03 RX ADMIN — METOCLOPRAMIDE 10 MG: 5 INJECTION, SOLUTION INTRAMUSCULAR; INTRAVENOUS at 07:58

## 2024-05-03 RX ADMIN — Medication 0.5 ML: at 14:58

## 2024-05-03 RX ADMIN — POLYVINYL ALCOHOL, POVIDONE 1 DROP: 14; 6 SOLUTION/ DROPS OPHTHALMIC at 14:42

## 2024-05-03 RX ADMIN — POLYVINYL ALCOHOL, POVIDONE 1 DROP: 14; 6 SOLUTION/ DROPS OPHTHALMIC at 20:19

## 2024-05-03 RX ADMIN — OXYCODONE HYDROCHLORIDE 10 MG: 5 SOLUTION ORAL at 20:19

## 2024-05-03 RX ADMIN — MICAFUNGIN SODIUM 100 MG: 100 INJECTION, POWDER, LYOPHILIZED, FOR SOLUTION INTRAVENOUS at 18:13

## 2024-05-03 RX ADMIN — MEROPENEM 1000 MG: 1 INJECTION, POWDER, FOR SOLUTION INTRAVENOUS at 22:38

## 2024-05-03 RX ADMIN — OCTREOTIDE ACETATE 50 MCG: 50 INJECTION, SOLUTION INTRAVENOUS; SUBCUTANEOUS at 10:33

## 2024-05-03 RX ADMIN — IPRATROPIUM BROMIDE AND ALBUTEROL SULFATE 1 DOSE: .5; 2.5 SOLUTION RESPIRATORY (INHALATION) at 19:08

## 2024-05-03 RX ADMIN — CALCIUM GLUCONATE: 98 INJECTION, SOLUTION INTRAVENOUS at 18:11

## 2024-05-03 RX ADMIN — PNEUMOCOCCAL 20-VALENT CONJUGATE VACCINE 0.5 ML
2.2; 2.2; 2.2; 2.2; 2.2; 2.2; 2.2; 2.2; 2.2; 2.2; 2.2; 2.2; 2.2; 2.2; 2.2; 2.2; 4.4; 2.2; 2.2; 2.2 INJECTION, SUSPENSION INTRAMUSCULAR at 15:09

## 2024-05-03 RX ADMIN — BUPROPION HYDROCHLORIDE 100 MG: 100 TABLET, FILM COATED ORAL at 22:38

## 2024-05-03 RX ADMIN — POLYVINYL ALCOHOL, POVIDONE 1 DROP: 14; 6 SOLUTION/ DROPS OPHTHALMIC at 22:38

## 2024-05-03 RX ADMIN — Medication 250 MG: at 20:19

## 2024-05-03 RX ADMIN — HAEMOPHILUS B CONJUGATE VACCINE (MENINGOCOCCAL PROTEIN CONJUGATE) 7.5 MCG: 7.5 INJECTION, SUSPENSION INTRAMUSCULAR at 15:07

## 2024-05-03 RX ADMIN — ACETAMINOPHEN 650 MG: 650 SOLUTION ORAL at 05:21

## 2024-05-03 RX ADMIN — BUPROPION HYDROCHLORIDE 100 MG: 100 TABLET, FILM COATED ORAL at 14:47

## 2024-05-03 RX ADMIN — SODIUM CHLORIDE, PRESERVATIVE FREE 40 MG: 5 INJECTION INTRAVENOUS at 07:59

## 2024-05-03 RX ADMIN — ATORVASTATIN CALCIUM 10 MG: 10 TABLET, FILM COATED ORAL at 20:19

## 2024-05-03 RX ADMIN — POLYVINYL ALCOHOL, POVIDONE 1 DROP: 14; 6 SOLUTION/ DROPS OPHTHALMIC at 02:00

## 2024-05-03 RX ADMIN — OCTREOTIDE ACETATE 50 MCG: 50 INJECTION, SOLUTION INTRAVENOUS; SUBCUTANEOUS at 00:11

## 2024-05-03 RX ADMIN — LABETALOL HYDROCHLORIDE 10 MG: 5 INJECTION, SOLUTION INTRAVENOUS at 04:23

## 2024-05-03 RX ADMIN — WHITE PETROLATUM: .15; .85 OINTMENT OPHTHALMIC at 12:01

## 2024-05-03 RX ADMIN — ACETAMINOPHEN 650 MG: 650 SOLUTION ORAL at 16:25

## 2024-05-03 RX ADMIN — POTASSIUM BICARBONATE 20 MEQ: 782 TABLET, EFFERVESCENT ORAL at 07:58

## 2024-05-03 RX ADMIN — ACETAMINOPHEN 650 MG: 650 SOLUTION ORAL at 12:01

## 2024-05-03 RX ADMIN — SODIUM PHOSPHATE, MONOBASIC, MONOHYDRATE AND SODIUM PHOSPHATE, DIBASIC, ANHYDROUS 15 MMOL: 142; 276 INJECTION, SOLUTION INTRAVENOUS at 08:25

## 2024-05-03 ASSESSMENT — PULMONARY FUNCTION TESTS
PIF_VALUE: 20
PIF_VALUE: 18
PIF_VALUE: 18
PIF_VALUE: 16
PIF_VALUE: 18
PIF_VALUE: 20
PIF_VALUE: 16
PIF_VALUE: 18
PIF_VALUE: 19
PIF_VALUE: 18
PIF_VALUE: 18
PIF_VALUE: 16
PIF_VALUE: 20
PIF_VALUE: 17
PIF_VALUE: 18
PIF_VALUE: 17
PIF_VALUE: 18
PIF_VALUE: 17
PIF_VALUE: 19
PIF_VALUE: 18
PIF_VALUE: 19
PIF_VALUE: 21
PIF_VALUE: 15
PIF_VALUE: 16
PIF_VALUE: 18
PIF_VALUE: 19
PIF_VALUE: 21
PIF_VALUE: 18
PIF_VALUE: 18
PIF_VALUE: 21

## 2024-05-03 ASSESSMENT — PAIN SCALES - GENERAL
PAINLEVEL_OUTOF10: 0

## 2024-05-03 NOTE — PROGRESS NOTES
05/03/24 1914   Patient Observation   Pulse 87   SpO2 99 %   Vent Information   Ventilator Day(s) 16   Ventilator ID [id number]   Vent Mode CPAP/PS   Ventilator Settings   FiO2  40 %   PEEP/CPAP (cmH2O) 5   Pressure Support (cm H2O) 10 cm H2O   Vent Patient Data (Readings)   Vt (Measured) 315 mL   Peak Inspiratory Pressure (cmH2O) 16 cmH2O   Rate Measured 25 br/min   Minute Volume (L/min) 7.81 Liters   Mean Airway Pressure (cmH2O) 7 cmH20   Plateau Pressure (cm H2O) 17 cm H2O   Driving Pressure 12   I:E Ratio 1:3.10   Backup Apnea On   Backup Rate 16 Breaths Per Minute   Backup Vt 350   Vent Alarm Settings   High Pressure (cmH2O) 50 cmH2O   Low Minute Volume (lpm) 4 L/min   High Minute Volume (lpm) 16 L/min   Low Exhaled Vt (ml) 350 mL   High Exhaled Vt (ml) 600 mL   RR Low (bpm) 16   RR High (bpm) 35 br/min   Apnea (secs) 20 secs   Additional Respiratoray Assessments   Humidification Source Heated wire   Humidification Temp 37.1   Ambu Bag With Mask At Bedside Yes   Airway Clearance   Suction Trach   Suction Device Inline suction catheter   Sputum Method Obtained Tracheal   Sputum Amount Moderate   Sputum Color/Odor Tan   Sputum Consistency Thick   Surgical Airway (Trach) 05/01/24 Bernadetteley Cuffed   Placement Date/Time: 05/01/24 0742   Placed By: (c)   Placement Verified By: Capnometry  Surgical Airway Type: Tracheostomy  Brand: Breezy  Style: Cuffed  Size: 8   Status Secured   Site Assessment Dry;Clean   Ties Assessment Intact   Spare Trach at Bedside Yes   Ambu Bag With Mask at Bedside Yes

## 2024-05-03 NOTE — PROGRESS NOTES
[facility]   Daily Progress Note      Patient's Name/Date of Birth: Leatha Willard / 1965    Date: May 3, 2024     Chief Complaint: s/p open whipple, Grade C POPF s/p takeback to OR for completion pancreatectomy    Patient Active Problem List   Diagnosis    Recurrent major depressive disorder (HCC)    Essential hypertension    Hyperlipidemia    Prediabetes    Pre-operative laboratory examination    Class 1 obesity due to excess calories without serious comorbidity with body mass index (BMI) of 32.0 to 32.9 in adult    HIREN (obstructive sleep apnea)    Pancreatic cyst    Colon polyps    Cyst of pancreas    Chest pain    Tobacco abuse-- quit 2 weeks ago    Abdominal pain    Serous cystadenoma    Pancreatic fistula    Sepsis with acute renal failure and septic shock (HCC)    JUSTINE (acute kidney injury) (HCC)    Hypophosphatemia    Acute respiratory failure with hypoxia (HCC)    Post-pancreatectomy diabetes (HCC)    On total parenteral nutrition    Hypoalbuminemia    Electrolyte imbalance    Hypocalcemia    Acute blood loss anemia    Hypokalemia       Subjective:   No further emesis since G port was placed to gravity. CTAP w PO 5/2 with possible disruption of HJ anastomosis. Still with brown midline drainage, not bilious this AM or overnight. Patient is much more awake this AM. Not quite following commands but eyes tracking and awake.       Objective:  BP (!) 147/67   Pulse 80   Temp 99.3 °F (37.4 °C) (Bladder)   Resp 23   Ht 1.626 m (5' 4\")   Wt 89 kg (196 lb 1.6 oz)   SpO2 100%   BMI 33.66 kg/m²   Labs:  Recent Labs     05/01/24 0415 05/02/24 0426 05/03/24  0414   WBC 19.5* 23.4* 23.1*   HGB 7.5* 8.3* 7.8*   HCT 24.4* 27.0* 25.6*     Recent Labs     05/01/24 0415 05/02/24  0426 05/03/24  0414    142 140   K 4.0 3.6 3.9    100 102   CO2 26 27 27   BUN 21* 17 17   CREATININE 0.4* 0.3* 0.4*     Recent Labs     05/01/24 0415 05/02/24  0426 05/03/24  0414   ALKPHOS 130* 133* 154*   ALT 19 18 24

## 2024-05-03 NOTE — PROGRESS NOTES
05/03/24 1134   Patient Observation   Pulse 75   SpO2 100 %   Breath Sounds   Breath Sounds Bilateral Clear;Diminished   Right Upper Lobe Clear;Diminished   Right Middle Lobe Clear;Diminished   Right Lower Lobe Clear;Diminished   Left Upper Lobe Clear;Diminished   Left Lower Lobe Clear;Diminished   Vent Information   Ventilator Day(s) 16   Ventilator ID [id number]   Vent Mode CPAP/PS  (remains on PS)   Ventilator Settings   FiO2  40 %   Vt (Set, mL) 350 mL   Resp Rate (Set) 16 bpm   PEEP/CPAP (cmH2O) 8   Pressure Support (cm H2O) 10 cm H2O   Vent Patient Data (Readings)   Vt (Measured) 359 mL   Peak Inspiratory Pressure (cmH2O) 20 cmH2O   Rate Measured 18 br/min   Minute Volume (L/min) 6.57 Liters   Mean Airway Pressure (cmH2O) 11 cmH20   Plateau Pressure (cm H2O) 17 cm H2O   Driving Pressure 9   I:E Ratio 1:3.60   Backup Apnea On   Backup Rate 16 Breaths Per Minute   Backup Vt 350   Vent Alarm Settings   High Pressure (cmH2O) 50 cmH2O   Low Minute Volume (lpm) 4 L/min   High Minute Volume (lpm) 16 L/min   Low Exhaled Vt (ml) 250 mL   High Exhaled Vt (ml) 600 mL   RR Low (bpm) 16   RR High (bpm) 35 br/min   Apnea (secs) 20 secs   Additional Respiratoray Assessments   Humidification Source Heated wire   Humidification Temp 36.8   Ambu Bag With Mask At Bedside Yes   Surgical Airway (Trach) 05/01/24 Breezy Cuffed   Placement Date/Time: 05/01/24 0742   Placed By: (c)   Placement Verified By: Capnometry  Surgical Airway Type: Tracheostomy  Brand: Breezy  Style: Cuffed  Size: 8   Status Secured  (8 trach)   Ties Assessment Intact   Spare Trach at Bedside Yes   Ambu Bag With Mask at Bedside Yes

## 2024-05-03 NOTE — PROGRESS NOTES
[facility]   Gastroenterology, Hepatology, &  Advanced Endoscopy    Reason for consult: PEG-J tube placement  ASSESSMENT AND PLAN:  -Pt today is more awake.  -No further episodes of emesis post G port was placed to gravity.  - No issues with PEG-J per staff.      -PEG-J tube placed  4/30/24  - Not  fully following commands  - Vent  - GIULIANO draining  -  TPN currently on hold  -Staff state emesis x3 this am, continue to monitor post Peg-J.  - Staff state Peg- J functioning well.  - S/P total pancreatectomy and splenectomy with insulin gtt  -S/p pylorus sparing open Whipple   - Pt remains intubated, sedated,restrained  - Increased restlessness attempting to pull as ETT when not restrained.  - INR 1.1  -Hgb  8.2 -> 7.8  - PLT  638    Post TRACHEOTOMY PERCUTANEOUS BRONCHOSCOPY  Bronchoscopy with bronchial levage 5/1/24      HISTORY OF PRESENT ILLNESS:    Leatha Willard is a 58 y.o. female who presented to the SICU for post-op monitoring following a whipple. The patient has been admitted to the hospital since 4/15/2024 who presents for evaluation of pancreatic cyst. She has a hx of arthritis and back pain s/p cervical fusion. She was being evaluated for her mid back pain with imaging and was found to have a large pancreatic cyst on CT. She then had an MRI/MRCP showing a large >10cm cyst in the pancreatic head extending to the transverse colon mesentery. She had an EUS with Dr. Hammond at [facility]. FNA and fluid analysis was consistent with a benign cyst. There were no suspicious features present. Patient underwent a Pylorus-preserving pancreaticoduodenectomy on 4/15 and was admitted to the ICU post-operatively.  On 4/18 she underwent a complete pancreatectomy and splenectomy secondary to a worsening pancreatic leak.     I/O (24Hr):     Intake/Output Summary (Last 24 hours) at 4/24/2024 0748  Last data filed at 4/24/2024 0703      Gross per 24 hour   Intake 3492.77 ml   Output 4595 ml   Net -1102.23 ml         Past Medical

## 2024-05-03 NOTE — CARE COORDINATION
5/3 Care Coordination: Pt remains in SICU, Cont on vent.. Trach, Peg-J. On IV TPN. With Current status unclear on discharge needs.  Select following. CM/SW will continue to follow for discharge planning.   Vivek NIEVES,RN--BC  [phone]

## 2024-05-03 NOTE — PLAN OF CARE
Problem: Respiratory - Adult  Goal: Achieves optimal ventilation and oxygenation  5/3/2024 0628 by Xochitl Plummer RCP  Outcome: Progressing

## 2024-05-03 NOTE — PROGRESS NOTES
05/03/24 0819   Patient Observation   Pulse 76   SpO2 100 %   Breath Sounds   Breath Sounds Bilateral Clear;Diminished   Right Upper Lobe Clear;Diminished   Right Middle Lobe Clear;Diminished   Right Lower Lobe Clear;Diminished   Left Upper Lobe Clear;Diminished   Left Lower Lobe Clear;Diminished   Vent Information   Ventilator Day(s) 16   Ventilator ID [id number]   Vent Mode CPAP/PS  (patient remains on PS)   Ventilator Settings   FiO2  40 %   PEEP/CPAP (cmH2O) (S)  5   Pressure Support (cm H2O) (S)  10 cm H2O  (rt titrated)   Vent Patient Data (Readings)   Vt (Measured) 351 mL   Peak Inspiratory Pressure (cmH2O) 18 cmH2O   Rate Measured 21 br/min   Minute Volume (L/min) 7.12 Liters   Mean Airway Pressure (cmH2O) 8 cmH20   Plateau Pressure (cm H2O) 17 cm H2O   Driving Pressure 12   I:E Ratio 1:3.00   Backup Apnea On   Backup Rate 16 Breaths Per Minute   Backup Vt 350   Vent Alarm Settings   High Pressure (cmH2O) 50 cmH2O   Low Minute Volume (lpm) 4 L/min   High Minute Volume (lpm) 16 L/min   Low Exhaled Vt (ml) 250 mL   High Exhaled Vt (ml) 600 mL   RR Low (bpm) 16   RR High (bpm) 35 br/min   Apnea (secs) 20 secs   Additional Respiratoray Assessments   Humidification Source Heated wire   Humidification Temp 36.5   Circuit Condensation Drained   Ambu Bag With Mask At Bedside Yes   Airway Clearance   Suction Trach   Suction Device Inline suction catheter   Sputum Method Obtained Endotracheal   Sputum Amount Small   Sputum Color/Odor Tan   Sputum Consistency Thick   Surgical Airway (Trach) 05/01/24 Shiley Cuffed   Placement Date/Time: 05/01/24 0742   Placed By: (c)   Placement Verified By: Capnometry  Surgical Airway Type: Tracheostomy  Brand: Breezy  Style: Cuffed  Size: 8   Status Secured  (8 trach)   Site Assessment Dry;Clean   Ties Assessment Intact   Cuff Pressure   (mlt)   Spare Trach at Bedside Yes   Ambu Bag With Mask at Bedside Yes

## 2024-05-03 NOTE — PROGRESS NOTES
05/03/24 1545   Patient Observation   Pulse 79   SpO2 99 %   Vent Information   Ventilator Day(s) 16   Ventilator ID [id number]   Vent Mode AC/VC   Ventilator Settings   FiO2  40 %   Vt (Set, mL) 350 mL   Resp Rate (Set) 16 bpm   PEEP/CPAP (cmH2O) 8   Vent Patient Data (Readings)   Vt (Measured) 362 mL   Peak Inspiratory Pressure (cmH2O) 20 cmH2O   Rate Measured 18 br/min   Minute Volume (L/min) 6.54 Liters   Mean Airway Pressure (cmH2O) 11 cmH20   Plateau Pressure (cm H2O) 17 cm H2O   Driving Pressure 9   I:E Ratio 1:4.00   Backup Apnea On   Backup Rate 16 Breaths Per Minute   Backup Vt 350   Vent Alarm Settings   High Pressure (cmH2O) 50 cmH2O   Low Minute Volume (lpm) 4 L/min   High Minute Volume (lpm) 16 L/min   Low Exhaled Vt (ml) 250 mL   High Exhaled Vt (ml) 600 mL   RR Low (bpm) 16   RR High (bpm) 35 br/min   Apnea (secs) 20 secs   Additional Respiratoray Assessments   Humidification Source Heated wire   Humidification Temp 37.2   Circuit Condensation Drained   Ambu Bag With Mask At Bedside Yes   Surgical Airway (Trach) 05/01/24 Breezy Cuffed   Placement Date/Time: 05/01/24 0742   Placed By: (c)   Placement Verified By: Capnometry  Surgical Airway Type: Tracheostomy  Brand: Breezy  Style: Cuffed  Size: 8   Status Secured   Ties Assessment Intact   Spare Trach at Bedside Yes   Ambu Bag With Mask at Bedside Yes   Treatment   $Treatment Type $Inhaled Therapy/Meds

## 2024-05-03 NOTE — PROGRESS NOTES
Surgical Intensive Care Unit   Daily Progress Note     Patient's name:  Leatha Willard  Age/Gender: 58 y.o. female  Date of Admission: 4/15/2024  5:37 AM  Length of Stay: 18    *Reason for ICU:   Complete pancreatectomy, splenectomy     HPI:   Leatha Willard is a 58 y.o. female who presents to the SICU for post-op monitoring following a whipple. The patient has been admitted to the hospital since 4/15/2024 who presents for evaluation of pancreatic cyst. She has a hx of arthritis and back pain s/p cervical fusion. She was being evaluated for her mid back pain with imaging and was found to have a large pancreatic cyst on CT. She then had an MRI/MRCP showing a large >10cm cyst in the pancreatic head extending to the transverse colon mesentery. Patient underwent a Pylorus-preserving pancreaticoduodenectomy on 4/15 and was admitted to the ICU post-operatively.  On 4/18 she underwent a complete pancreatectomy and splenectomy secondary to a worsening pancreatic leak.     *Overnight Events:     No overnight events  Pt opening eyes, alert, follows minimal commands       PEGJ today     Problem List:   Patient Active Problem List   Diagnosis    Recurrent major depressive disorder (HCC)    Essential hypertension    Hyperlipidemia    Prediabetes    Pre-operative laboratory examination    Class 1 obesity due to excess calories without serious comorbidity with body mass index (BMI) of 32.0 to 32.9 in adult    HIREN (obstructive sleep apnea)    Pancreatic cyst    Colon polyps    Cyst of pancreas    Chest pain    Tobacco abuse-- quit 2 weeks ago    Abdominal pain    Serous cystadenoma    Pancreatic fistula    Sepsis with acute renal failure and septic shock (HCC)    JUSTINE (acute kidney injury) (HCC)    Hypophosphatemia    Acute respiratory failure with hypoxia (HCC)    Post-pancreatectomy diabetes (HCC)    On total parenteral nutrition    Hypoalbuminemia    Electrolyte imbalance    Hypocalcemia    Acute blood loss anemia    Hypokalemia  PF  1 drop Both Eyes Q4H    Or    artificial tears   Both Eyes Q4H    lidocaine  1 patch TransDERmal Daily    enoxaparin  40 mg SubCUTAneous Daily    atorvastatin  10 mg Oral Nightly    ketotifen fumarate  1 drop Both Eyes BID    pantoprazole (PROTONIX) 40 mg in sodium chloride (PF) 0.9 % 10 mL injection  40 mg IntraVENous Daily     hydrALAZINE, labetalol, sodium chloride, medicated lip balm, benzonatate, glucose, dextrose bolus **OR** dextrose bolus, glucagon (rDNA), dextrose, ondansetron    Home Medications  Medications Prior to Admission: traMADol (ULTRAM) 50 MG tablet, Take 1 tablet by mouth every 6 hours as needed for Pain.  docusate sodium (COLACE) 100 MG capsule, Take 1 capsule by mouth every morning  atorvastatin (LIPITOR) 10 MG tablet, take 1 tablet by mouth once daily  nicotine (NICODERM CQ) 14 MG/24HR, Place 1 patch onto the skin daily  [DISCONTINUED] Multiple Vitamins-Minerals (THERAPEUTIC MULTIVITAMIN-MINERALS) tablet, Take 1 tablet by mouth daily (Patient not taking: Reported on 4/10/2024)  amLODIPine (NORVASC) 5 MG tablet, Take 1 tablet by mouth daily (Patient taking differently: Take 1 tablet by mouth every morning)  gabapentin (NEURONTIN) 300 MG capsule, Take 1 capsule by mouth nightly for 90 days.  olopatadine (PATADAY) 0.2 % SOLN ophthalmic solution, Place 1 drop into both eyes daily as needed (dry eyes)  buPROPion (WELLBUTRIN XL) 300 MG extended release tablet, Take 1 tablet by mouth every morning     ASSESSMENT / PLAN:    Neuro: acute pain syndrome--   tylenol; fentanyl push PRN, Roxicodone  Depression--wellbutrin  Agitation--depakene - weaning  Sedation is off  Opens eyes, does not follow commands  Cardio:   Hemodynamically stable   Solu-cortef completed 4/29  HTN   PRN labetalol, hydralazine  Respiratory:   Respiratory insufficiency   Trach placement 5/1  Wean vent to pressure support  GI:  pancreatic cyst--s/p PP Whipple--FTWP, HPB following  Pancreatic leak--NPO, TPN, antibiotics, HPB

## 2024-05-03 NOTE — PROGRESS NOTES
[facility] PROGRESS NOTE      Chief complaint: Follow-up of septic shock/Candida albicans and glabrata infection/venous peritonitis associated with anastomosis leak    The patient is a 58 y.o. female with history of DM, hypertension, hyperlipidemia, cervical fusion, admitted on 04/15 for symptomatic pancreatic serous cystadenoma abutting many branches of the SMV and SMA prompting pylorus-preserving pancreaticoduodenectomy, placement of biliary stent, portal lymphadenectomy, round ligament and omental pedicle flap harvest, appendectomy on 04/15 during which no clinical findings of infection were noted. Postop course was complicated by intermittent fever since 04/18 up to 101.5 F and pancreatic fistula, prompting reopening recent laparotomy, completion pancreatectomy, splenectomy, omentectomy on 04/18 during which gush of a large volume of dark succus and infected fluid on opening, old infected hematoma, significant amount of necrotic omentum covering the anastomoses, dehiscence of anterior wall anastomosis with bile exiting the site of anastomosis at the jejunum, bile spilling within the lesser sac, necrotic pancreas were noted.  Tissue Gram stain and culture showed few polymorphonuclear leukocytes, no epithelial cells, no organisms, rare growth of Candida albicans, light growth of Candida glabrata, no anaerobes. Piperacillin-tazobactam was started on since admission.  She was noted to have incision wound drainage, prompting repeat CT abdomen and pelvis on 04/23 showing interval decrease in size in periportal right upper quadrant fluid collection without resolution (now measuring 3.6 x 1.7 cm as compared to previously 6.7 x 2.4 cm), new 6 x 4 x 3 cm right retroperitoneal fluid collection between superior mesenteric artery and inferior vena cava, new spindle-shaped fluid collection in subdiaphragmatic anterior left upper quadrant of the abdomen measuring 7 x 3 x 3 cm, increasing small amount of ascites and bilateral  pancreatic serous cystadenoma, s/p pylorus-preserving pancreaticoduodenectomy, placement of biliary stent, portal lymphadenectomy, round ligament and omental pedicle flap harvest, appendectomy on 04/15  Leukocytosis, on steroids  Need for vaccination    Recommendations:  Continue micafungin 100 mg q24h and meropenem 1g q8h for now. Anticipate 3-4 weeks of antibiotic therapy from 04/18.  Follow up Surgery recommendations.  Post splenectomy vaccination to be given today:  - pneumococcal vaccine ([id number]) IM   - meningococcal polysaccharide vaccine (Menveo) IM   - Group B meningococcal vaccine-Bexsero   - HIB polysaccharide vaccine (ActHIB) IM   Follow up in 2 months with [facility] ([phone]) or with primary care to get booster doses for:  1) meningococcal polysaccharide vaccine (Menveo) - second dose   2)Group B meningococcal vaccine-Bexsero second dose  Update the following vaccinations in 5 years:  - meningococcal vaccine booster every 5 years.  Continue local wound care.  Continue supportive care.     Thank you for involving me in the care of Leatha Willard. ID will continue to follow. Please do not hesitate to call for any questions or concerns.    Electronically signed by Kandi Giang MD on 5/3/2024 at 9:55 AM

## 2024-05-03 NOTE — PLAN OF CARE
Problem: ABCDS Injury Assessment  Goal: Absence of physical injury  Outcome: Progressing     Problem: Respiratory - Adult  Goal: Achieves optimal ventilation and oxygenation  Outcome: Progressing     Problem: Cardiovascular - Adult  Goal: Maintains optimal cardiac output and hemodynamic stability  Outcome: Progressing     Problem: Musculoskeletal - Adult  Goal: Return mobility to safest level of function  Outcome: Progressing     Problem: Gastrointestinal - Adult  Goal: Minimal or absence of nausea and vomiting  Outcome: Progressing

## 2024-05-03 NOTE — PROGRESS NOTES
05/03/24 1134   Patient Observation   Pulse 75   SpO2 100 %   Breath Sounds   Breath Sounds Bilateral Clear;Diminished   Right Upper Lobe Clear;Diminished   Right Middle Lobe Clear;Diminished   Right Lower Lobe Clear;Diminished   Left Upper Lobe Clear;Diminished   Left Lower Lobe Clear;Diminished   Vent Information   Ventilator Day(s) 16   Ventilator ID [id number]   Vent Mode AC/VC   Ventilator Settings   FiO2  40 %   Vt (Set, mL) 350 mL   Resp Rate (Set) 16 bpm   PEEP/CPAP (cmH2O) 8   Vent Patient Data (Readings)   Vt (Measured) 359 mL   Peak Inspiratory Pressure (cmH2O) 20 cmH2O   Rate Measured 18 br/min   Minute Volume (L/min) 6.57 Liters   Mean Airway Pressure (cmH2O) 11 cmH20   Plateau Pressure (cm H2O) 17 cm H2O   Driving Pressure 9   I:E Ratio 1:3.60   Backup Apnea On   Backup Rate 16 Breaths Per Minute   Backup Vt 350   Vent Alarm Settings   High Pressure (cmH2O) 50 cmH2O   Low Minute Volume (lpm) 4 L/min   High Minute Volume (lpm) 16 L/min   Low Exhaled Vt (ml) 250 mL   High Exhaled Vt (ml) 600 mL   RR Low (bpm) 16   RR High (bpm) 35 br/min   Apnea (secs) 20 secs   Additional Respiratoray Assessments   Humidification Source Heated wire   Humidification Temp 36.8   Ambu Bag With Mask At Bedside Yes   Surgical Airway (Trach) 05/01/24 Breezy Cuffed   Placement Date/Time: 05/01/24 0742   Placed By: (c)   Placement Verified By: Capnometry  Surgical Airway Type: Tracheostomy  Brand: Breezy  Style: Cuffed  Size: 8   Status Secured  (8 trach)   Ties Assessment Intact   Spare Trach at Bedside Yes   Ambu Bag With Mask at Bedside Yes   Treatment   $Treatment Type $Inhaled Therapy/Meds

## 2024-05-03 NOTE — PROGRESS NOTES
ENDOCRINOLOGY PROGRESS NOTE      Date of admission: 4/15/2024  Date of service: 5/2/2024  Admitting physician: Norma Rogers MD   Primary Care Physician: Mekhi Escalona MD  Consultant physician: Jonathan Curiel MD     Reason for the consultation:  Post-pancreatectomy DM     History of Present Illness:  Leatha Willard is a 58 y.o. old female with PMH of cystic neoplasm of the head of pancreas, HTN, HLD and other listed below admitted to [facility] on 4/15/2024 for completion pancreatectomy. Endocrine service was consulted for diabetes management.    Subjective   The patient was seen and examined at bedside this afternoon, no acute events overnight no hypoglycemia.  Still on TPN    Point of care glucose monitoring   (Independently reviewed)   Recent Labs     05/01/24  1205 05/01/24  1654 05/01/24  2014 05/02/24  0004 05/02/24  0419 05/02/24  0746 05/02/24  1159 05/02/24  1522   POCGLU 119* 188* 134* 142* 167* 142* 160* 125*       Scheduled Meds:   metoclopramide  10 mg IntraVENous Q6H    valproic acid  250 mg Oral 2 times per day    fentanNYL  200 mcg IntraVENous 60 Min Pre-Op    rocuronium  0.6 mg/kg IntraVENous 60 Min Pre-Op    oxyCODONE  10 mg Oral Q6H    insulin glargine  18 Units SubCUTAneous Nightly    insulin lispro  0-18 Units SubCUTAneous Q4H    octreotide  50 mcg IntraVENous Q8H    meropenem  1,000 mg IntraVENous Q8H    buPROPion  100 mg Oral TID    sennosides-docusate sodium  2 tablet Oral Daily    micafungin  100 mg IntraVENous Daily    ipratropium 0.5 mg-albuterol 2.5 mg  1 Dose Inhalation Q4H WA RT    sodium chloride flush  5-40 mL IntraVENous BID    acetaminophen  650 mg Oral 4 times per day    chlorhexidine  15 mL Mouth/Throat BID    Polyvinyl Alcohol-Povidone PF  1 drop Both Eyes Q4H    Or    artificial tears   Both Eyes Q4H    lidocaine  1 patch TransDERmal Daily    enoxaparin  40 mg SubCUTAneous Daily    atorvastatin  10 mg Oral Nightly    ketotifen fumarate  1 drop Both Eyes BID    pantoprazole    Component Value Date/Time    LABA1C 5.8 04/18/2024 11:31 PM    LABA1C 5.3 04/16/2024 04:12 AM    LABA1C 5.8 02/22/2024 11:57 AM     Lab Results   Component Value Date/Time    TSH 0.60 04/21/2024 04:26 AM     Lab Results   Component Value Date/Time    LABA1C 5.8 04/18/2024 11:31 PM    GLUCOSE 160 05/02/2024 04:26 AM    LABCREA 157.3 04/17/2024 02:54 PM     Lab Results   Component Value Date/Time    TRIG 220 04/27/2024 08:42 AM    HDL 56 02/22/2024 11:57 AM    CHOL 201 02/22/2024 11:57 AM    CHOL 167 07/25/2022 07:16 AM       Blood culture   No results found for: \"BC\"    Medical Records/Labs/Images review:   I personally reviewed and summarized previous records   All labs and imaging were reviewed independently     ASSESSMENT & PLAN   Leatha Willard, a 58 y.o.-old female seen today for inpatient diabetes management    Post pancreatectomy diabetes  The patient is on TPN   Glucose level is running good  We recommend the following insulin regimen   Lantus 18 units daily at night  High-dose sliding scale every 4 hours  Continue adding regular insulin 12 units into the TPN bag  Will be checking the glucose every 4 hours and adjust insulin accordingly    Pancreatic mass s/p whipple   Management per surgery service   Will closely monitor BS with you and adjust the insulin if needed     Interdisciplinary plan for communication with healthcare providers:   Consult recommendations were discussed with the Primary Service/Nursing staff    Thank you for allowing us to participate in the care of this patient. Please do not hesitate to contact us with any additional questions.     Jonathan Curiel MD  Endocrinologist, [facility]   [address] [place] [address]   Phone: [phone]  Fax: [phone]  --------------------------------------------  An electronic signature was used to authenticate this note. Jonathan Curiel MD on 5/2/2024 at 9:19 PM

## 2024-05-03 NOTE — PROGRESS NOTES
05/03/24 1545   Patient Observation   Pulse 79   SpO2 99 %   Vent Information   Ventilator Day(s) 16   Ventilator ID [id number]   Vent Mode CPAP/PS   Ventilator Settings   FiO2  40 %   Vt (Set, mL) 350 mL   Resp Rate (Set) 16 bpm   PEEP/CPAP (cmH2O) 8   Pressure Support (cm H2O) 10 cm H2O   Vent Patient Data (Readings)   Vt (Measured) 362 mL   Peak Inspiratory Pressure (cmH2O) 20 cmH2O   Rate Measured 18 br/min   Minute Volume (L/min) 6.54 Liters   Mean Airway Pressure (cmH2O) 11 cmH20   Plateau Pressure (cm H2O) 17 cm H2O   Driving Pressure 9   I:E Ratio 1:4.00   Backup Apnea On   Backup Rate 16 Breaths Per Minute   Backup Vt 350   Vent Alarm Settings   High Pressure (cmH2O) 50 cmH2O   Low Minute Volume (lpm) 4 L/min   High Minute Volume (lpm) 16 L/min   Low Exhaled Vt (ml) 250 mL   High Exhaled Vt (ml) 600 mL   RR Low (bpm) 16   RR High (bpm) 35 br/min   Apnea (secs) 20 secs   Additional Respiratoray Assessments   Humidification Source Heated wire   Humidification Temp 37.2   Circuit Condensation Drained   Ambu Bag With Mask At Bedside Yes   Surgical Airway (Trach) 05/01/24 Bernadetteley Cuffed   Placement Date/Time: 05/01/24 0742   Placed By: (c)   Placement Verified By: Capnometry  Surgical Airway Type: Tracheostomy  Brand: Breezy  Style: Cuffed  Size: 8   Status Secured   Ties Assessment Intact   Spare Trach at Bedside Yes   Ambu Bag With Mask at Bedside Yes

## 2024-05-04 ENCOUNTER — APPOINTMENT (OUTPATIENT)
Dept: GENERAL RADIOLOGY | Age: 59
DRG: 004 | End: 2024-05-04
Attending: STUDENT IN AN ORGANIZED HEALTH CARE EDUCATION/TRAINING PROGRAM
Payer: COMMERCIAL

## 2024-05-04 LAB
AADO2: 138.7 MMHG
ALBUMIN SERPL-MCNC: 2.7 G/DL (ref 3.5–5.2)
ALP SERPL-CCNC: 171 U/L (ref 35–104)
ALT SERPL-CCNC: 22 U/L (ref 0–32)
ANION GAP SERPL CALCULATED.3IONS-SCNC: 14 MMOL/L (ref 7–16)
AST SERPL-CCNC: 24 U/L (ref 0–31)
B.E.: 4.6 MMOL/L (ref -3–3)
BASOPHILS # BLD: 0 K/UL (ref 0–0.2)
BASOPHILS NFR BLD: 0 % (ref 0–2)
BILIRUB SERPL-MCNC: 0.4 MG/DL (ref 0–1.2)
BUN SERPL-MCNC: 14 MG/DL (ref 6–20)
CA-I BLD-SCNC: 1.15 MMOL/L (ref 1.15–1.33)
CALCIUM SERPL-MCNC: 8 MG/DL (ref 8.6–10.2)
CHLORIDE SERPL-SCNC: 96 MMOL/L (ref 98–107)
CO2 SERPL-SCNC: 26 MMOL/L (ref 22–29)
COHB: 0.3 % (ref 0–1.5)
CREAT SERPL-MCNC: 0.4 MG/DL (ref 0.5–1)
CRITICAL: ABNORMAL
DATE ANALYZED: ABNORMAL
DATE OF COLLECTION: ABNORMAL
EOSINOPHIL # BLD: 2.76 K/UL (ref 0.05–0.5)
EOSINOPHILS RELATIVE PERCENT: 13 % (ref 0–6)
ERYTHROCYTE [DISTWIDTH] IN BLOOD BY AUTOMATED COUNT: 15.9 % (ref 11.5–15)
FIO2: 40 %
GFR, ESTIMATED: >90 ML/MIN/1.73M2
GLUCOSE BLD-MCNC: 109 MG/DL (ref 74–99)
GLUCOSE BLD-MCNC: 111 MG/DL (ref 74–99)
GLUCOSE BLD-MCNC: 121 MG/DL (ref 74–99)
GLUCOSE BLD-MCNC: 124 MG/DL (ref 74–99)
GLUCOSE BLD-MCNC: 129 MG/DL (ref 74–99)
GLUCOSE BLD-MCNC: 136 MG/DL (ref 74–99)
GLUCOSE BLD-MCNC: 149 MG/DL (ref 74–99)
GLUCOSE BLD-MCNC: 168 MG/DL (ref 74–99)
GLUCOSE SERPL-MCNC: 112 MG/DL (ref 74–99)
HCO3: 27.8 MMOL/L (ref 22–26)
HCT VFR BLD AUTO: 27.3 % (ref 34–48)
HGB BLD-MCNC: 8.5 G/DL (ref 11.5–15.5)
HHB: 2.9 % (ref 0–5)
INR PPP: 1.1
LAB: ABNORMAL
LYMPHOCYTES NFR BLD: 2.37 K/UL (ref 1.5–4)
LYMPHOCYTES RELATIVE PERCENT: 11 % (ref 20–42)
Lab: 530
MAGNESIUM SERPL-MCNC: 2 MG/DL (ref 1.6–2.6)
MCH RBC QN AUTO: 29.7 PG (ref 26–35)
MCHC RBC AUTO-ENTMCNC: 31.1 G/DL (ref 32–34.5)
MCV RBC AUTO: 95.5 FL (ref 80–99.9)
METHB: 0.5 % (ref 0–1.5)
MODE: ABNORMAL
MONOCYTES NFR BLD: 0.99 K/UL (ref 0.1–0.95)
MONOCYTES NFR BLD: 5 % (ref 2–12)
NEUTROPHILS NFR BLD: 72 % (ref 43–80)
NEUTS SEG NFR BLD: 15.98 K/UL (ref 1.8–7.3)
NUCLEATED RED BLOOD CELLS: 1 PER 100 WBC
O2 SATURATION: 97.1 % (ref 92–98.5)
O2HB: 96.3 % (ref 94–97)
OPERATOR ID: 3342
PATIENT TEMP: 37 C
PCO2: 35.5 MMHG (ref 35–45)
PEEP/CPAP: 5 CMH2O
PFO2: 2.39 MMHG/%
PH BLOOD GAS: 7.51 (ref 7.35–7.45)
PHOSPHATE SERPL-MCNC: 2.9 MG/DL (ref 2.5–4.5)
PLATELET, FLUORESCENCE: 992 K/UL (ref 130–450)
PMV BLD AUTO: 10.2 FL (ref 7–12)
PO2: 95.7 MMHG (ref 75–100)
POTASSIUM SERPL-SCNC: 4.1 MMOL/L (ref 3.5–5)
PROT SERPL-MCNC: 6.8 G/DL (ref 6.4–8.3)
PROTHROMBIN TIME: 12.4 SEC (ref 9.3–12.4)
PS: 8 CMH20
RBC # BLD AUTO: 2.86 M/UL (ref 3.5–5.5)
RBC # BLD: ABNORMAL 10*6/UL
RI(T): 1.45
SODIUM SERPL-SCNC: 136 MMOL/L (ref 132–146)
SOURCE, BLOOD GAS: ABNORMAL
THB: 10.1 G/DL (ref 11.5–16.5)
TIME ANALYZED: 540
WBC OTHER # BLD: 22.1 K/UL (ref 4.5–11.5)

## 2024-05-04 PROCEDURE — C9113 INJ PANTOPRAZOLE SODIUM, VIA: HCPCS | Performed by: STUDENT IN AN ORGANIZED HEALTH CARE EDUCATION/TRAINING PROGRAM

## 2024-05-04 PROCEDURE — 37799 UNLISTED PX VASCULAR SURGERY: CPT

## 2024-05-04 PROCEDURE — 71045 X-RAY EXAM CHEST 1 VIEW: CPT

## 2024-05-04 PROCEDURE — A4216 STERILE WATER/SALINE, 10 ML: HCPCS | Performed by: STUDENT IN AN ORGANIZED HEALTH CARE EDUCATION/TRAINING PROGRAM

## 2024-05-04 PROCEDURE — 82962 GLUCOSE BLOOD TEST: CPT

## 2024-05-04 PROCEDURE — 82330 ASSAY OF CALCIUM: CPT

## 2024-05-04 PROCEDURE — 6370000000 HC RX 637 (ALT 250 FOR IP): Performed by: STUDENT IN AN ORGANIZED HEALTH CARE EDUCATION/TRAINING PROGRAM

## 2024-05-04 PROCEDURE — 2580000003 HC RX 258: Performed by: INTERNAL MEDICINE

## 2024-05-04 PROCEDURE — 6360000002 HC RX W HCPCS: Performed by: INTERNAL MEDICINE

## 2024-05-04 PROCEDURE — 2000000000 HC ICU R&B

## 2024-05-04 PROCEDURE — 6360000002 HC RX W HCPCS: Performed by: STUDENT IN AN ORGANIZED HEALTH CARE EDUCATION/TRAINING PROGRAM

## 2024-05-04 PROCEDURE — 2580000003 HC RX 258: Performed by: STUDENT IN AN ORGANIZED HEALTH CARE EDUCATION/TRAINING PROGRAM

## 2024-05-04 PROCEDURE — 2500000003 HC RX 250 WO HCPCS: Performed by: STUDENT IN AN ORGANIZED HEALTH CARE EDUCATION/TRAINING PROGRAM

## 2024-05-04 PROCEDURE — 80053 COMPREHEN METABOLIC PANEL: CPT

## 2024-05-04 PROCEDURE — 85610 PROTHROMBIN TIME: CPT

## 2024-05-04 PROCEDURE — 82805 BLOOD GASES W/O2 SATURATION: CPT

## 2024-05-04 PROCEDURE — 94640 AIRWAY INHALATION TREATMENT: CPT

## 2024-05-04 PROCEDURE — 99291 CRITICAL CARE FIRST HOUR: CPT | Performed by: STUDENT IN AN ORGANIZED HEALTH CARE EDUCATION/TRAINING PROGRAM

## 2024-05-04 PROCEDURE — 85025 COMPLETE CBC W/AUTO DIFF WBC: CPT

## 2024-05-04 PROCEDURE — 84100 ASSAY OF PHOSPHORUS: CPT

## 2024-05-04 PROCEDURE — 2580000003 HC RX 258

## 2024-05-04 PROCEDURE — 99232 SBSQ HOSP IP/OBS MODERATE 35: CPT | Performed by: INTERNAL MEDICINE

## 2024-05-04 PROCEDURE — 6370000000 HC RX 637 (ALT 250 FOR IP): Performed by: INTERNAL MEDICINE

## 2024-05-04 PROCEDURE — 83735 ASSAY OF MAGNESIUM: CPT

## 2024-05-04 PROCEDURE — 94003 VENT MGMT INPAT SUBQ DAY: CPT

## 2024-05-04 RX ORDER — INSULIN LISPRO 100 [IU]/ML
0-12 INJECTION, SOLUTION INTRAVENOUS; SUBCUTANEOUS EVERY 4 HOURS
Status: DISCONTINUED | OUTPATIENT
Start: 2024-05-04 | End: 2024-05-09

## 2024-05-04 RX ORDER — VALPROIC ACID 250 MG/5ML
250 SOLUTION ORAL DAILY
Status: DISCONTINUED | OUTPATIENT
Start: 2024-05-05 | End: 2024-05-07

## 2024-05-04 RX ORDER — INSULIN GLARGINE 100 [IU]/ML
14 INJECTION, SOLUTION SUBCUTANEOUS NIGHTLY
Status: DISCONTINUED | OUTPATIENT
Start: 2024-05-04 | End: 2024-05-06

## 2024-05-04 RX ADMIN — POLYVINYL ALCOHOL, POVIDONE 1 DROP: 14; 6 SOLUTION/ DROPS OPHTHALMIC at 13:42

## 2024-05-04 RX ADMIN — POLYVINYL ALCOHOL, POVIDONE 1 DROP: 14; 6 SOLUTION/ DROPS OPHTHALMIC at 23:37

## 2024-05-04 RX ADMIN — ACETAMINOPHEN 650 MG: 650 SOLUTION ORAL at 13:42

## 2024-05-04 RX ADMIN — INSULIN LISPRO 2 UNITS: 100 INJECTION, SOLUTION INTRAVENOUS; SUBCUTANEOUS at 19:38

## 2024-05-04 RX ADMIN — CALCIUM GLUCONATE: 98 INJECTION, SOLUTION INTRAVENOUS at 18:13

## 2024-05-04 RX ADMIN — OXYCODONE HYDROCHLORIDE 10 MG: 5 SOLUTION ORAL at 16:08

## 2024-05-04 RX ADMIN — 0.12% CHLORHEXIDINE GLUCONATE 15 ML: 1.2 RINSE ORAL at 07:50

## 2024-05-04 RX ADMIN — SODIUM CHLORIDE, PRESERVATIVE FREE 10 ML: 5 INJECTION INTRAVENOUS at 19:38

## 2024-05-04 RX ADMIN — IPRATROPIUM BROMIDE AND ALBUTEROL SULFATE 1 DOSE: .5; 2.5 SOLUTION RESPIRATORY (INHALATION) at 08:48

## 2024-05-04 RX ADMIN — 0.12% CHLORHEXIDINE GLUCONATE 15 ML: 1.2 RINSE ORAL at 19:38

## 2024-05-04 RX ADMIN — OXYCODONE HYDROCHLORIDE 10 MG: 5 SOLUTION ORAL at 04:30

## 2024-05-04 RX ADMIN — MICAFUNGIN SODIUM 100 MG: 100 INJECTION, POWDER, LYOPHILIZED, FOR SOLUTION INTRAVENOUS at 17:12

## 2024-05-04 RX ADMIN — METOCLOPRAMIDE 10 MG: 5 INJECTION, SOLUTION INTRAMUSCULAR; INTRAVENOUS at 07:52

## 2024-05-04 RX ADMIN — BUPROPION HYDROCHLORIDE 100 MG: 100 TABLET, FILM COATED ORAL at 19:40

## 2024-05-04 RX ADMIN — OXYCODONE HYDROCHLORIDE 10 MG: 5 SOLUTION ORAL at 10:01

## 2024-05-04 RX ADMIN — ACETAMINOPHEN 650 MG: 650 SOLUTION ORAL at 04:30

## 2024-05-04 RX ADMIN — METOCLOPRAMIDE 10 MG: 5 INJECTION, SOLUTION INTRAMUSCULAR; INTRAVENOUS at 13:43

## 2024-05-04 RX ADMIN — KETOTIFEN FUMARATE 1 DROP: 0.35 SOLUTION/ DROPS OPHTHALMIC at 19:39

## 2024-05-04 RX ADMIN — ACETAMINOPHEN 650 MG: 650 SOLUTION ORAL at 23:37

## 2024-05-04 RX ADMIN — IPRATROPIUM BROMIDE AND ALBUTEROL SULFATE 1 DOSE: .5; 2.5 SOLUTION RESPIRATORY (INHALATION) at 11:34

## 2024-05-04 RX ADMIN — ATORVASTATIN CALCIUM 10 MG: 10 TABLET, FILM COATED ORAL at 19:38

## 2024-05-04 RX ADMIN — BUPROPION HYDROCHLORIDE 100 MG: 100 TABLET, FILM COATED ORAL at 07:51

## 2024-05-04 RX ADMIN — VALPROIC ACID 250 MG: 250 SOLUTION ORAL at 07:50

## 2024-05-04 RX ADMIN — IPRATROPIUM BROMIDE AND ALBUTEROL SULFATE 1 DOSE: .5; 2.5 SOLUTION RESPIRATORY (INHALATION) at 16:06

## 2024-05-04 RX ADMIN — SENNOSIDES AND DOCUSATE SODIUM 2 TABLET: 50; 8.6 TABLET ORAL at 07:50

## 2024-05-04 RX ADMIN — SODIUM CHLORIDE, PRESERVATIVE FREE 40 MG: 5 INJECTION INTRAVENOUS at 07:50

## 2024-05-04 RX ADMIN — POLYVINYL ALCOHOL, POVIDONE 1 DROP: 14; 6 SOLUTION/ DROPS OPHTHALMIC at 07:51

## 2024-05-04 RX ADMIN — OCTREOTIDE ACETATE 50 MCG: 50 INJECTION, SOLUTION INTRAVENOUS; SUBCUTANEOUS at 07:56

## 2024-05-04 RX ADMIN — MEROPENEM 1000 MG: 1 INJECTION, POWDER, FOR SOLUTION INTRAVENOUS at 13:45

## 2024-05-04 RX ADMIN — IPRATROPIUM BROMIDE AND ALBUTEROL SULFATE 1 DOSE: .5; 2.5 SOLUTION RESPIRATORY (INHALATION) at 20:01

## 2024-05-04 RX ADMIN — POLYVINYL ALCOHOL, POVIDONE 1 DROP: 14; 6 SOLUTION/ DROPS OPHTHALMIC at 16:09

## 2024-05-04 RX ADMIN — MEROPENEM 1000 MG: 1 INJECTION, POWDER, FOR SOLUTION INTRAVENOUS at 21:33

## 2024-05-04 RX ADMIN — ENOXAPARIN SODIUM 40 MG: 100 INJECTION SUBCUTANEOUS at 07:50

## 2024-05-04 RX ADMIN — METOCLOPRAMIDE 10 MG: 5 INJECTION, SOLUTION INTRAMUSCULAR; INTRAVENOUS at 01:10

## 2024-05-04 RX ADMIN — POLYVINYL ALCOHOL, POVIDONE 1 DROP: 14; 6 SOLUTION/ DROPS OPHTHALMIC at 04:30

## 2024-05-04 RX ADMIN — POLYVINYL ALCOHOL, POVIDONE 1 DROP: 14; 6 SOLUTION/ DROPS OPHTHALMIC at 19:39

## 2024-05-04 RX ADMIN — INSULIN GLARGINE 14 UNITS: 100 INJECTION, SOLUTION SUBCUTANEOUS at 19:38

## 2024-05-04 RX ADMIN — ACETAMINOPHEN 650 MG: 650 SOLUTION ORAL at 18:12

## 2024-05-04 RX ADMIN — OXYCODONE HYDROCHLORIDE 10 MG: 5 SOLUTION ORAL at 21:29

## 2024-05-04 RX ADMIN — BUPROPION HYDROCHLORIDE 100 MG: 100 TABLET, FILM COATED ORAL at 13:43

## 2024-05-04 RX ADMIN — OCTREOTIDE ACETATE 50 MCG: 50 INJECTION, SOLUTION INTRAVENOUS; SUBCUTANEOUS at 16:09

## 2024-05-04 RX ADMIN — KETOTIFEN FUMARATE 1 DROP: 0.35 SOLUTION/ DROPS OPHTHALMIC at 07:52

## 2024-05-04 RX ADMIN — OCTREOTIDE ACETATE 50 MCG: 50 INJECTION, SOLUTION INTRAVENOUS; SUBCUTANEOUS at 23:37

## 2024-05-04 RX ADMIN — MEROPENEM 1000 MG: 1 INJECTION, POWDER, FOR SOLUTION INTRAVENOUS at 06:22

## 2024-05-04 RX ADMIN — METOCLOPRAMIDE 10 MG: 5 INJECTION, SOLUTION INTRAMUSCULAR; INTRAVENOUS at 19:38

## 2024-05-04 RX ADMIN — SODIUM CHLORIDE, PRESERVATIVE FREE 10 ML: 5 INJECTION INTRAVENOUS at 07:49

## 2024-05-04 ASSESSMENT — PULMONARY FUNCTION TESTS
PIF_VALUE: 14
PIF_VALUE: 14
PIF_VALUE: 15
PIF_VALUE: 14
PIF_VALUE: 16
PIF_VALUE: 14
PIF_VALUE: 16
PIF_VALUE: 15
PIF_VALUE: 13
PIF_VALUE: 14
PIF_VALUE: 15
PIF_VALUE: 16
PIF_VALUE: 14
PIF_VALUE: 13
PIF_VALUE: 13
PIF_VALUE: 14
PIF_VALUE: 16
PIF_VALUE: 14
PIF_VALUE: 16
PIF_VALUE: 16
PIF_VALUE: 13
PIF_VALUE: 16
PIF_VALUE: 14

## 2024-05-04 ASSESSMENT — PAIN SCALES - GENERAL
PAINLEVEL_OUTOF10: 0
PAINLEVEL_OUTOF10: 0

## 2024-05-04 NOTE — PLAN OF CARE
Problem: Discharge Planning  Goal: Discharge to home or other facility with appropriate resources  5/4/2024 0740 by Maricel Herrrea RN  Outcome: Progressing  5/3/2024 2211 by Deirdre Bautista RN  Outcome: Not Progressing     Problem: Skin/Tissue Integrity  Goal: Absence of new skin breakdown  5/4/2024 0740 by Maricel Herrera RN  Outcome: Progressing  5/3/2024 2211 by Deirdre Bautista RN  Outcome: Progressing     Problem: Respiratory - Adult  Goal: Achieves optimal ventilation and oxygenation  5/4/2024 0740 by Maricel Herrera RN  Outcome: Progressing  5/3/2024 2211 by Deirdre Bautista RN  Outcome: Progressing     Problem: Cardiovascular - Adult  Goal: Maintains optimal cardiac output and hemodynamic stability  5/4/2024 0740 by Maricel Herrera RN  Outcome: Progressing  Flowsheets (Taken 5/4/2024 0727)  Maintains optimal cardiac output and hemodynamic stability:   Monitor blood pressure and heart rate   Monitor urine output and notify Licensed Independent Practitioner for values outside of normal range  5/3/2024 2211 by Deirdre Bautista RN  Outcome: Progressing  Goal: Absence of cardiac dysrhythmias or at baseline  Recent Flowsheet Documentation  Taken 5/4/2024 0727 by Maricel Herrera RN  Absence of cardiac dysrhythmias or at baseline: Monitor cardiac rate and rhythm     Problem: Musculoskeletal - Adult  Goal: Return mobility to safest level of function  5/4/2024 0740 by Maricel Herrera RN  Outcome: Progressing  Flowsheets (Taken 5/4/2024 0727)  Return Mobility to Safest Level of Function: Assess patient stability and activity tolerance for standing, transferring and ambulating with or without assistive devices  5/3/2024 2211 by Deirdre Bautista RN  Outcome: Progressing  Goal: Maintain proper alignment of affected body part  Recent Flowsheet Documentation  Taken 5/4/2024 0727 by Maricel Herrera RN  Maintain proper alignment of affected body part: Support and protect limb and body alignment per provider's orders  Goal:

## 2024-05-04 NOTE — PROGRESS NOTES
pantoprazole (PROTONIX) 40 mg in sodium chloride (PF) 0.9 % 10 mL injection  40 mg IntraVENous Daily       PRN Meds:   hydrALAZINE, 10 mg, Q30 Min PRN  labetalol, 10 mg, Q30 Min PRN  sodium chloride, , PRN  medicated lip balm, , PRN  benzonatate, 100 mg, TID PRN  glucose, 4 tablet, PRN  dextrose bolus, 125 mL, PRN   Or  dextrose bolus, 250 mL, PRN  glucagon (rDNA), 1 mg, PRN  dextrose, , Continuous PRN  ondansetron, 4 mg, Q6H PRN      Continuous Infusions:   PN-Adult  3-in-1 Central Line (Custom)      PN-Adult  3-in-1 Central Line (Custom) 45.8 mL/hr at 05/04/24 0600    sodium chloride      dextrose         Review of Systems  Non-Obtainable    OBJECTIVE    BP (!) 148/78   Pulse 97   Temp 99 °F (37.2 °C) (Axillary)   Resp 24   Ht 1.626 m (5' 4\")   Wt 90.2 kg (198 lb 12.8 oz)   SpO2 100%   BMI 34.12 kg/m²     Intake/Output Summary (Last 24 hours) at 5/4/2024 1429  Last data filed at 5/4/2024 0600  Gross per 24 hour   Intake 1159.38 ml   Output 1610 ml   Net -450.62 ml       Physical examination:  General: Intubated  HEENT: normocephalic non traumatic, no exophthalmos   Neck: supple  Pulm: good equal air entry no added sounds  CVS: S1 + S2  Abd: Covered with surgical dressing  Skin: warm, no lesions, no rash. No open wounds, no ulcers   Neuro: The patient is intubated     Review of Laboratory Data:  I personally reviewed the following labs:   Recent Labs     05/02/24  0426 05/03/24  0414 05/04/24  0513   WBC 23.4* 23.1* 22.1*   RBC 2.81* 2.67* 2.86*   HGB 8.3* 7.8* 8.5*   HCT 27.0* 25.6* 27.3*   MCV 96.1 95.9 95.5   MCH 29.5 29.2 29.7   MCHC 30.7* 30.5* 31.1*   RDW 14.8 15.4* 15.9*   PLT  --  950*  --    MPV 10.6 10.4 10.2     Recent Labs     05/02/24  0426 05/03/24  0414 05/04/24  0513    140 136   K 3.6 3.9 4.1    102 96*   CO2 27 27 26   BUN 17 17 14   CREATININE 0.3* 0.4* 0.4*   GLUCOSE 160* 109* 112*   CALCIUM 7.8* 8.0* 8.0*   BILITOT 0.3 0.3 0.4   ALKPHOS 133* 154* 171*   AST 25 32* 24   ALT 18  24 22     No results found for: \"BHYDRXBUT\"  Lab Results   Component Value Date/Time    LABA1C 5.8 04/18/2024 11:31 PM    LABA1C 5.3 04/16/2024 04:12 AM    LABA1C 5.8 02/22/2024 11:57 AM     Lab Results   Component Value Date/Time    TSH 0.60 04/21/2024 04:26 AM     Lab Results   Component Value Date/Time    LABA1C 5.8 04/18/2024 11:31 PM    GLUCOSE 112 05/04/2024 05:13 AM    LABCREA 157.3 04/17/2024 02:54 PM     Lab Results   Component Value Date/Time    TRIG 220 04/27/2024 08:42 AM    HDL 56 02/22/2024 11:57 AM    CHOL 201 02/22/2024 11:57 AM    CHOL 167 07/25/2022 07:16 AM       Blood culture   No results found for: \"BC\"    Medical Records/Labs/Images review:   I personally reviewed and summarized previous records   All labs and imaging were reviewed independently     ASSESSMENT & PLAN   Leatha Willard, a 58 y.o.-old female seen today for inpatient diabetes management    Post pancreatectomy diabetes  The patient is on TPN   Glucose level is running good  We recommend the following insulin regimen   Continue Lantus 16 units nightly  Decrease sliding scale to medium dose every 4 hours  Continue adding regular insulin 12 units into the TPN bag  Will be checking the glucose every 4 hours and adjust insulin accordingly    Respiratory failure  Patient status post trach  On mechanical ventilation  Management per critical care team    Pancreatic mass s/p whipple   Management per surgery service   Will closely monitor BS with you and adjust the insulin if needed     Interdisciplinary plan for communication with healthcare providers:   Consult recommendations were discussed with the Primary Service/Nursing staff    Thank you for allowing us to participate in the care of this patient. Please do not hesitate to contact us with any additional questions.     Jonathan Curiel MD  Endocrinologist, [facility]   [address] [place] [id number]   Phone: [phone]  Fax:

## 2024-05-04 NOTE — PROGRESS NOTES
ENDOCRINOLOGY PROGRESS NOTE      Date of admission: 4/15/2024  Date of service: 5/3/2024  Admitting physician: Norma Rogers MD   Primary Care Physician: Mekhi Escalona MD  Consultant physician: Jonathan Curiel MD     Reason for the consultation:  Post-pancreatectomy DM     History of Present Illness:  Leatha Willard is a 58 y.o. old female with PMH of cystic neoplasm of the head of pancreas, HTN, HLD and other listed below admitted to [facility] on 4/15/2024 for completion pancreatectomy. Endocrine service was consulted for diabetes management.    Subjective   I saw and examined the patient at bedside this afternoon, still on TPN.  On mechanical ventilator.    Point of care glucose monitoring   (Independently reviewed)   Recent Labs     05/02/24  1159 05/02/24  1522 05/02/24  2047 05/03/24  0011 05/03/24  0335 05/03/24  0817 05/03/24  1203 05/03/24  1532   POCGLU 160* 125* 179* 124* 124* 120* 125* 144*       Scheduled Meds:   insulin glargine  16 Units SubCUTAneous Nightly    metoclopramide  10 mg IntraVENous Q6H    valproic acid  250 mg Oral 2 times per day    fentanNYL  200 mcg IntraVENous 60 Min Pre-Op    rocuronium  0.6 mg/kg IntraVENous 60 Min Pre-Op    oxyCODONE  10 mg Oral Q6H    insulin lispro  0-18 Units SubCUTAneous Q4H    octreotide  50 mcg IntraVENous Q8H    meropenem  1,000 mg IntraVENous Q8H    buPROPion  100 mg Oral TID    sennosides-docusate sodium  2 tablet Oral Daily    micafungin  100 mg IntraVENous Daily    ipratropium 0.5 mg-albuterol 2.5 mg  1 Dose Inhalation Q4H WA RT    sodium chloride flush  5-40 mL IntraVENous BID    acetaminophen  650 mg Oral 4 times per day    chlorhexidine  15 mL Mouth/Throat BID    Polyvinyl Alcohol-Povidone PF  1 drop Both Eyes Q4H    Or    artificial tears   Both Eyes Q4H    lidocaine  1 patch TransDERmal Daily    enoxaparin  40 mg SubCUTAneous Daily    atorvastatin  10 mg Oral Nightly    ketotifen fumarate  1 drop Both Eyes BID    pantoprazole (PROTONIX) 40 mg in sodium

## 2024-05-04 NOTE — PROGRESS NOTES
Surgical Intensive Care Unit   Daily Progress Note     Patient's name:  Leatha Willard  Age/Gender: 58 y.o. female  Date of Admission: 4/15/2024  5:37 AM  Length of Stay: 19    *Reason for ICU:   Complete pancreatectomy, splenectomy     HPI:   Leatha Willard is a 58 y.o. female who presents to the SICU for post-op monitoring following a whipple. The patient has been admitted to the hospital since 4/15/2024 who presents for evaluation of pancreatic cyst. She has a hx of arthritis and back pain s/p cervical fusion. She was being evaluated for her mid back pain with imaging and was found to have a large pancreatic cyst on CT. She then had an MRI/MRCP showing a large >10cm cyst in the pancreatic head extending to the transverse colon mesentery. Patient underwent a Pylorus-preserving pancreaticoduodenectomy on 4/15 and was admitted to the ICU post-operatively.  On 4/18 she underwent a complete pancreatectomy and splenectomy secondary to a worsening pancreatic leak.     *Overnight Events:     NAEO  Pt opening eyes, alert  Bile in drain, concerning for bile leak     Problem List:   Patient Active Problem List   Diagnosis    Recurrent major depressive disorder (HCC)    Essential hypertension    Hyperlipidemia    Prediabetes    Pre-operative laboratory examination    Class 1 obesity due to excess calories without serious comorbidity with body mass index (BMI) of 32.0 to 32.9 in adult    HIREN (obstructive sleep apnea)    Pancreatic cyst    Colon polyps    Cyst of pancreas    Chest pain    Tobacco abuse-- quit 2 weeks ago    Abdominal pain    Serous cystadenoma    Pancreatic fistula    Sepsis with acute renal failure and septic shock (HCC)    JUSTINE (acute kidney injury) (HCC)    Hypophosphatemia    Acute respiratory failure with hypoxia (HCC)    Post-pancreatectomy diabetes (HCC)    On total parenteral nutrition    Hypoalbuminemia    Electrolyte imbalance    Hypocalcemia    Acute blood loss anemia    Hypokalemia     WBC 23.1 05/03/2024 04:14 AM    RBC 2.67 05/03/2024 04:14 AM    HGB 7.8 05/03/2024 04:14 AM    HCT 25.6 05/03/2024 04:14 AM     05/03/2024 04:14 AM    MCV 95.9 05/03/2024 04:14 AM    MCH 29.2 05/03/2024 04:14 AM    MCHC 30.5 05/03/2024 04:14 AM    RDW 15.4 05/03/2024 04:14 AM    NRBC 3 05/03/2024 04:14 AM    METASPCT 1 05/03/2024 04:14 AM    LYMPHOPCT 10 05/03/2024 04:14 AM    PROMYELOPCT 1 05/01/2024 04:15 AM    MONOPCT 9 05/03/2024 04:14 AM    MYELOPCT 2 05/03/2024 04:14 AM    EOSPCT 13 05/03/2024 04:14 AM    BASOPCT 0 05/03/2024 04:14 AM    MONOSABS 2.01 05/03/2024 04:14 AM    EOSABS 3.01 05/03/2024 04:14 AM    BASOSABS 0.00 05/03/2024 04:14 AM       BMP   Lab Results   Component Value Date/Time     05/03/2024 04:14 AM    K 3.9 05/03/2024 04:14 AM    K 3.9 08/28/2019 05:58 AM     05/03/2024 04:14 AM    CO2 27 05/03/2024 04:14 AM    BUN 17 05/03/2024 04:14 AM    CREATININE 0.4 05/03/2024 04:14 AM    GLUCOSE 109 05/03/2024 04:14 AM    CALCIUM 8.0 05/03/2024 04:14 AM    MG 2.1 05/03/2024 04:14 AM    PHOS 2.6 05/03/2024 04:14 AM       LFTS  Lab Results   Component Value Date/Time    ALKPHOS 154 05/03/2024 04:14 AM    ALT 24 05/03/2024 04:14 AM    AST 32 05/03/2024 04:14 AM    BILITOT 0.3 05/03/2024 04:14 AM       INR  Recent Labs     05/02/24 0426 05/03/24  0414   PROTIME 12.0 12.2   INR 1.1 1.1       APTT  No results for input(s): \"APTT\" in the last 72 hours.    *ABG:   Recent Labs     05/03/24 0425   PH 7.533*   PCO2 35.7   PO2 99.0   HCO3 29.4*   BE 6.4*   O2SAT 97.8       Lactic Acid  Lab Results   Component Value Date/Time    LACTA 1.4 04/18/2024 11:31 PM    LACTA 0.9 02/21/2024 08:29 PM        BNP   No results for input(s): \"BNP\" in the last 72 hours.     Cultures:   No results for input(s): \"BC\" in the last 72 hours.  No results for input(s): \"BLOODCULT2\" in the last 72 hours.    No results for input(s): \"LABURIN\" in the last 72 hours.    *Radiological studies:   CXR

## 2024-05-04 NOTE — PROGRESS NOTES
[facility] Surgery   Daily Progress Note      Patient's Name/Date of Birth: Leatha Willard / 1965    Date: May 4, 2024     Chief Complaint: s/p open whipple, Grade C POPF s/p takeback to OR for completion pancreatectomy    Patient Active Problem List   Diagnosis    Recurrent major depressive disorder (HCC)    Essential hypertension    Hyperlipidemia    Prediabetes    Pre-operative laboratory examination    Class 1 obesity due to excess calories without serious comorbidity with body mass index (BMI) of 32.0 to 32.9 in adult    HIREN (obstructive sleep apnea)    Pancreatic cyst    Colon polyps    Cyst of pancreas    Chest pain    Tobacco abuse-- quit 2 weeks ago    Abdominal pain    Serous cystadenoma    Pancreatic fistula    Sepsis with acute renal failure and septic shock (HCC)    JUSTINE (acute kidney injury) (HCC)    Hypophosphatemia    Acute respiratory failure with hypoxia (HCC)    Post-pancreatectomy diabetes (HCC)    On total parenteral nutrition    Hypoalbuminemia    Electrolyte imbalance    Hypocalcemia    Acute blood loss anemia    Hypokalemia       Subjective:   Pt continues to be more awake on rounds. G port still to gravity. Continues to have brown midline drainage, does not appear bilious.       Objective:  BP (!) 149/78   Pulse 89   Temp 98.5 °F (36.9 °C) (Axillary)   Resp 24   Ht 1.626 m (5' 4\")   Wt 90.2 kg (198 lb 12.8 oz)   SpO2 99%   BMI 34.12 kg/m²   Labs:  Recent Labs     05/02/24 0426 05/03/24 0414 05/04/24  0513   WBC 23.4* 23.1* 22.1*   HGB 8.3* 7.8* 8.5*   HCT 27.0* 25.6* 27.3*       Recent Labs     05/02/24 0426 05/03/24  0414 05/04/24  0513    140 136   K 3.6 3.9 4.1    102 96*   CO2 27 27 26   BUN 17 17 14   CREATININE 0.3* 0.4* 0.4*       Recent Labs     05/02/24 0426 05/03/24  0414 05/04/24  0513   ALKPHOS 133* 154* 171*   ALT 18 24 22   AST 25 32* 24   BILITOT 0.3 0.3 0.4           General appearance: lying in bed, opens eyes and tracks  Head: NCAT, PERRL, anicteric

## 2024-05-04 NOTE — PROGRESS NOTES
04/18.  Symptomatic pancreatic serous cystadenoma, s/p pylorus-preserving pancreaticoduodenectomy, placement of biliary stent, portal lymphadenectomy, round ligament and omental pedicle flap harvest, appendectomy on 04/15  Leukocytosis, on steroids  Need for vaccination    Plan:  Continue micafungin 100 mg q24h and meropenem 1g q8h for now.  At least 4 weeks of IV antibiotic therapy recommended.  Follow up Surgery recommendations.  Post splenectomy vaccination to be given today:  - pneumococcal vaccine ([id number]) IM   - meningococcal polysaccharide vaccine (Menveo) IM   - Group B meningococcal vaccine-Bexsero   - HIB polysaccharide vaccine (ActHIB) IM   Follow up in 2 months with [facility] ([phone]) or with primary care to get booster doses for:  1) meningococcal polysaccharide vaccine (Menveo) - second dose   2)Group B meningococcal vaccine-Bexsero second dose  Update the following vaccinations in 5 years:  - meningococcal vaccine booster every 5 years.  Continue local wound care.  Continue supportive care.     Thank you for involving me in the care of Leatha Willard. ID will continue to follow. Please do not hesitate to call for any questions or concerns.    Electronically signed by Alfredo Mac MD on 5/4/2024 at 3:03 PM

## 2024-05-05 ENCOUNTER — APPOINTMENT (OUTPATIENT)
Dept: GENERAL RADIOLOGY | Age: 59
DRG: 004 | End: 2024-05-05
Attending: STUDENT IN AN ORGANIZED HEALTH CARE EDUCATION/TRAINING PROGRAM
Payer: COMMERCIAL

## 2024-05-05 LAB
AADO2: 130.5 MMHG
ABO + RH BLD: NORMAL
ALBUMIN SERPL-MCNC: 2.6 G/DL (ref 3.5–5.2)
ALP SERPL-CCNC: 154 U/L (ref 35–104)
ALT SERPL-CCNC: 15 U/L (ref 0–32)
ANION GAP SERPL CALCULATED.3IONS-SCNC: 11 MMOL/L (ref 7–16)
ARM BAND NUMBER: NORMAL
AST SERPL-CCNC: 18 U/L (ref 0–31)
B.E.: 6.3 MMOL/L (ref -3–3)
BASOPHILS # BLD: 0.38 K/UL (ref 0–0.2)
BASOPHILS NFR BLD: 2 % (ref 0–2)
BILIRUB SERPL-MCNC: 0.4 MG/DL (ref 0–1.2)
BLOOD BANK SAMPLE EXPIRATION: NORMAL
BLOOD GROUP ANTIBODIES SERPL: NEGATIVE
BUN SERPL-MCNC: 13 MG/DL (ref 6–20)
CA-I BLD-SCNC: 1.15 MMOL/L (ref 1.15–1.33)
CALCIUM SERPL-MCNC: 8.1 MG/DL (ref 8.6–10.2)
CHLORIDE SERPL-SCNC: 95 MMOL/L (ref 98–107)
CO2 SERPL-SCNC: 26 MMOL/L (ref 22–29)
COHB: 0.3 % (ref 0–1.5)
CREAT SERPL-MCNC: 0.4 MG/DL (ref 0.5–1)
CRITICAL: ABNORMAL
DATE ANALYZED: ABNORMAL
DATE OF COLLECTION: ABNORMAL
EOSINOPHIL # BLD: 2.69 K/UL (ref 0.05–0.5)
EOSINOPHILS RELATIVE PERCENT: 12 % (ref 0–6)
ERYTHROCYTE [DISTWIDTH] IN BLOOD BY AUTOMATED COUNT: 15.5 % (ref 11.5–15)
ERYTHROCYTE [DISTWIDTH] IN BLOOD BY AUTOMATED COUNT: 15.6 % (ref 11.5–15)
FIO2: 40 %
GFR, ESTIMATED: >90 ML/MIN/1.73M2
GLUCOSE BLD-MCNC: 149 MG/DL (ref 74–99)
GLUCOSE BLD-MCNC: 163 MG/DL (ref 74–99)
GLUCOSE BLD-MCNC: 198 MG/DL (ref 74–99)
GLUCOSE BLD-MCNC: 219 MG/DL (ref 74–99)
GLUCOSE BLD-MCNC: 295 MG/DL (ref 74–99)
GLUCOSE BLD-MCNC: 98 MG/DL (ref 74–99)
GLUCOSE SERPL-MCNC: 187 MG/DL (ref 74–99)
HCO3: 30.1 MMOL/L (ref 22–26)
HCT VFR BLD AUTO: 26.4 % (ref 34–48)
HCT VFR BLD AUTO: 26.6 % (ref 34–48)
HGB BLD-MCNC: 8.2 G/DL (ref 11.5–15.5)
HGB BLD-MCNC: 8.3 G/DL (ref 11.5–15.5)
HHB: 2.5 % (ref 0–5)
INR PPP: 1.2
LAB: ABNORMAL
LYMPHOCYTES NFR BLD: 4.04 K/UL (ref 1.5–4)
LYMPHOCYTES RELATIVE PERCENT: 18 % (ref 20–42)
Lab: 615
MAGNESIUM SERPL-MCNC: 2.1 MG/DL (ref 1.6–2.6)
MCH RBC QN AUTO: 29.3 PG (ref 26–35)
MCH RBC QN AUTO: 29.6 PG (ref 26–35)
MCHC RBC AUTO-ENTMCNC: 31.1 G/DL (ref 32–34.5)
MCHC RBC AUTO-ENTMCNC: 31.2 G/DL (ref 32–34.5)
MCV RBC AUTO: 94.3 FL (ref 80–99.9)
MCV RBC AUTO: 95 FL (ref 80–99.9)
METHB: 0.3 % (ref 0–1.5)
MODE: ABNORMAL
MONOCYTES NFR BLD: 1.15 K/UL (ref 0.1–0.95)
MONOCYTES NFR BLD: 5 % (ref 2–12)
NEUTROPHILS NFR BLD: 63 % (ref 43–80)
NEUTS SEG NFR BLD: 13.84 K/UL (ref 1.8–7.3)
O2 SATURATION: 97.5 % (ref 92–98.5)
O2HB: 96.9 % (ref 94–97)
OPERATOR ID: 2577
PATIENT TEMP: 37 C
PCO2: 39.8 MMHG (ref 35–45)
PEEP/CPAP: 5 CMH2O
PFO2: 2.47 MMHG/%
PH BLOOD GAS: 7.5 (ref 7.35–7.45)
PHOSPHATE SERPL-MCNC: 2.5 MG/DL (ref 2.5–4.5)
PLATELET # BLD AUTO: 946 K/UL (ref 130–450)
PLATELET, FLUORESCENCE: 980 K/UL (ref 130–450)
PMV BLD AUTO: 10.3 FL (ref 7–12)
PMV BLD AUTO: 9.7 FL (ref 7–12)
PO2: 98.9 MMHG (ref 75–100)
POTASSIUM SERPL-SCNC: 4.2 MMOL/L (ref 3.5–5)
PROT SERPL-MCNC: 6.5 G/DL (ref 6.4–8.3)
PROTHROMBIN TIME: 12.9 SEC (ref 9.3–12.4)
PS: 10 CMH20
RBC # BLD AUTO: 2.8 M/UL (ref 3.5–5.5)
RBC # BLD AUTO: 2.8 M/UL (ref 3.5–5.5)
RBC # BLD: ABNORMAL 10*6/UL
RI(T): 1.32
SODIUM SERPL-SCNC: 132 MMOL/L (ref 132–146)
SOURCE, BLOOD GAS: ABNORMAL
THB: 10.4 G/DL (ref 11.5–16.5)
TIME ANALYZED: 619
WBC OTHER # BLD: 19.9 K/UL (ref 4.5–11.5)
WBC OTHER # BLD: 22.1 K/UL (ref 4.5–11.5)

## 2024-05-05 PROCEDURE — 86900 BLOOD TYPING SEROLOGIC ABO: CPT

## 2024-05-05 PROCEDURE — 6370000000 HC RX 637 (ALT 250 FOR IP): Performed by: STUDENT IN AN ORGANIZED HEALTH CARE EDUCATION/TRAINING PROGRAM

## 2024-05-05 PROCEDURE — 84100 ASSAY OF PHOSPHORUS: CPT

## 2024-05-05 PROCEDURE — 86901 BLOOD TYPING SEROLOGIC RH(D): CPT

## 2024-05-05 PROCEDURE — 85025 COMPLETE CBC W/AUTO DIFF WBC: CPT

## 2024-05-05 PROCEDURE — 82805 BLOOD GASES W/O2 SATURATION: CPT

## 2024-05-05 PROCEDURE — 2000000000 HC ICU R&B

## 2024-05-05 PROCEDURE — 6360000002 HC RX W HCPCS: Performed by: INTERNAL MEDICINE

## 2024-05-05 PROCEDURE — 6360000002 HC RX W HCPCS: Performed by: STUDENT IN AN ORGANIZED HEALTH CARE EDUCATION/TRAINING PROGRAM

## 2024-05-05 PROCEDURE — 85027 COMPLETE CBC AUTOMATED: CPT

## 2024-05-05 PROCEDURE — 94640 AIRWAY INHALATION TREATMENT: CPT

## 2024-05-05 PROCEDURE — 2580000003 HC RX 258: Performed by: INTERNAL MEDICINE

## 2024-05-05 PROCEDURE — 82330 ASSAY OF CALCIUM: CPT

## 2024-05-05 PROCEDURE — 99232 SBSQ HOSP IP/OBS MODERATE 35: CPT | Performed by: INTERNAL MEDICINE

## 2024-05-05 PROCEDURE — 86850 RBC ANTIBODY SCREEN: CPT

## 2024-05-05 PROCEDURE — 94003 VENT MGMT INPAT SUBQ DAY: CPT

## 2024-05-05 PROCEDURE — 99291 CRITICAL CARE FIRST HOUR: CPT | Performed by: STUDENT IN AN ORGANIZED HEALTH CARE EDUCATION/TRAINING PROGRAM

## 2024-05-05 PROCEDURE — C9113 INJ PANTOPRAZOLE SODIUM, VIA: HCPCS | Performed by: STUDENT IN AN ORGANIZED HEALTH CARE EDUCATION/TRAINING PROGRAM

## 2024-05-05 PROCEDURE — 83735 ASSAY OF MAGNESIUM: CPT

## 2024-05-05 PROCEDURE — 36592 COLLECT BLOOD FROM PICC: CPT

## 2024-05-05 PROCEDURE — 2580000003 HC RX 258: Performed by: STUDENT IN AN ORGANIZED HEALTH CARE EDUCATION/TRAINING PROGRAM

## 2024-05-05 PROCEDURE — 2500000003 HC RX 250 WO HCPCS: Performed by: STUDENT IN AN ORGANIZED HEALTH CARE EDUCATION/TRAINING PROGRAM

## 2024-05-05 PROCEDURE — 2580000003 HC RX 258

## 2024-05-05 PROCEDURE — 85610 PROTHROMBIN TIME: CPT

## 2024-05-05 PROCEDURE — 6370000000 HC RX 637 (ALT 250 FOR IP): Performed by: INTERNAL MEDICINE

## 2024-05-05 PROCEDURE — 71045 X-RAY EXAM CHEST 1 VIEW: CPT

## 2024-05-05 PROCEDURE — 80053 COMPREHEN METABOLIC PANEL: CPT

## 2024-05-05 PROCEDURE — 82962 GLUCOSE BLOOD TEST: CPT

## 2024-05-05 PROCEDURE — A4216 STERILE WATER/SALINE, 10 ML: HCPCS | Performed by: STUDENT IN AN ORGANIZED HEALTH CARE EDUCATION/TRAINING PROGRAM

## 2024-05-05 RX ORDER — SODIUM CHLORIDE 0.9 % (FLUSH) 0.9 %
5-40 SYRINGE (ML) INJECTION EVERY 12 HOURS SCHEDULED
Status: DISPENSED | OUTPATIENT
Start: 2024-05-05

## 2024-05-05 RX ORDER — SODIUM CHLORIDE 9 MG/ML
INJECTION, SOLUTION INTRAVENOUS PRN
Status: ACTIVE | OUTPATIENT
Start: 2024-05-05

## 2024-05-05 RX ORDER — SODIUM CHLORIDE 0.9 % (FLUSH) 0.9 %
5-40 SYRINGE (ML) INJECTION PRN
Status: ACTIVE | OUTPATIENT
Start: 2024-05-05

## 2024-05-05 RX ORDER — ONDANSETRON 2 MG/ML
4 INJECTION INTRAMUSCULAR; INTRAVENOUS EVERY 6 HOURS PRN
Status: DISCONTINUED | OUTPATIENT
Start: 2024-05-05 | End: 2024-05-09

## 2024-05-05 RX ORDER — SODIUM CHLORIDE 9 MG/ML
INJECTION, SOLUTION INTRAVENOUS CONTINUOUS
Status: DISCONTINUED | OUTPATIENT
Start: 2024-05-05 | End: 2024-05-09

## 2024-05-05 RX ADMIN — METOCLOPRAMIDE 10 MG: 5 INJECTION, SOLUTION INTRAMUSCULAR; INTRAVENOUS at 07:54

## 2024-05-05 RX ADMIN — KETOTIFEN FUMARATE 1 DROP: 0.35 SOLUTION/ DROPS OPHTHALMIC at 19:29

## 2024-05-05 RX ADMIN — 0.12% CHLORHEXIDINE GLUCONATE 15 ML: 1.2 RINSE ORAL at 07:54

## 2024-05-05 RX ADMIN — OXYCODONE HYDROCHLORIDE 10 MG: 5 SOLUTION ORAL at 10:21

## 2024-05-05 RX ADMIN — MEROPENEM 1000 MG: 1 INJECTION, POWDER, FOR SOLUTION INTRAVENOUS at 05:51

## 2024-05-05 RX ADMIN — INSULIN LISPRO 2 UNITS: 100 INJECTION, SOLUTION INTRAVENOUS; SUBCUTANEOUS at 08:09

## 2024-05-05 RX ADMIN — OXYCODONE HYDROCHLORIDE 10 MG: 5 SOLUTION ORAL at 22:10

## 2024-05-05 RX ADMIN — ACETAMINOPHEN 650 MG: 650 SOLUTION ORAL at 05:37

## 2024-05-05 RX ADMIN — IPRATROPIUM BROMIDE AND ALBUTEROL SULFATE 1 DOSE: .5; 2.5 SOLUTION RESPIRATORY (INHALATION) at 11:37

## 2024-05-05 RX ADMIN — OCTREOTIDE ACETATE 50 MCG: 50 INJECTION, SOLUTION INTRAVENOUS; SUBCUTANEOUS at 07:56

## 2024-05-05 RX ADMIN — INSULIN LISPRO 4 UNITS: 100 INJECTION, SOLUTION INTRAVENOUS; SUBCUTANEOUS at 16:16

## 2024-05-05 RX ADMIN — ATORVASTATIN CALCIUM 10 MG: 10 TABLET, FILM COATED ORAL at 19:27

## 2024-05-05 RX ADMIN — METOCLOPRAMIDE 10 MG: 5 INJECTION, SOLUTION INTRAMUSCULAR; INTRAVENOUS at 02:07

## 2024-05-05 RX ADMIN — SODIUM CHLORIDE, PRESERVATIVE FREE 10 ML: 5 INJECTION INTRAVENOUS at 19:27

## 2024-05-05 RX ADMIN — OCTREOTIDE ACETATE 50 MCG: 50 INJECTION, SOLUTION INTRAVENOUS; SUBCUTANEOUS at 23:51

## 2024-05-05 RX ADMIN — OXYCODONE HYDROCHLORIDE 10 MG: 5 SOLUTION ORAL at 05:37

## 2024-05-05 RX ADMIN — MEROPENEM 1000 MG: 1 INJECTION, POWDER, FOR SOLUTION INTRAVENOUS at 22:13

## 2024-05-05 RX ADMIN — POLYVINYL ALCOHOL, POVIDONE 1 DROP: 14; 6 SOLUTION/ DROPS OPHTHALMIC at 05:37

## 2024-05-05 RX ADMIN — IPRATROPIUM BROMIDE AND ALBUTEROL SULFATE 1 DOSE: .5; 2.5 SOLUTION RESPIRATORY (INHALATION) at 08:58

## 2024-05-05 RX ADMIN — LABETALOL HYDROCHLORIDE 10 MG: 5 INJECTION, SOLUTION INTRAVENOUS at 04:44

## 2024-05-05 RX ADMIN — METOCLOPRAMIDE 10 MG: 5 INJECTION, SOLUTION INTRAMUSCULAR; INTRAVENOUS at 19:27

## 2024-05-05 RX ADMIN — POLYVINYL ALCOHOL, POVIDONE 1 DROP: 14; 6 SOLUTION/ DROPS OPHTHALMIC at 14:21

## 2024-05-05 RX ADMIN — SODIUM CHLORIDE, PRESERVATIVE FREE 40 MG: 5 INJECTION INTRAVENOUS at 07:54

## 2024-05-05 RX ADMIN — VALPROIC ACID 250 MG: 250 SOLUTION ORAL at 07:54

## 2024-05-05 RX ADMIN — SENNOSIDES AND DOCUSATE SODIUM 2 TABLET: 50; 8.6 TABLET ORAL at 07:55

## 2024-05-05 RX ADMIN — ACETAMINOPHEN 650 MG: 650 SOLUTION ORAL at 18:36

## 2024-05-05 RX ADMIN — IPRATROPIUM BROMIDE AND ALBUTEROL SULFATE 1 DOSE: .5; 2.5 SOLUTION RESPIRATORY (INHALATION) at 15:28

## 2024-05-05 RX ADMIN — SODIUM CHLORIDE, PRESERVATIVE FREE 10 ML: 5 INJECTION INTRAVENOUS at 07:56

## 2024-05-05 RX ADMIN — POLYVINYL ALCOHOL, POVIDONE 1 DROP: 14; 6 SOLUTION/ DROPS OPHTHALMIC at 07:55

## 2024-05-05 RX ADMIN — BUPROPION HYDROCHLORIDE 100 MG: 100 TABLET, FILM COATED ORAL at 19:29

## 2024-05-05 RX ADMIN — MICAFUNGIN SODIUM 100 MG: 100 INJECTION, POWDER, LYOPHILIZED, FOR SOLUTION INTRAVENOUS at 16:09

## 2024-05-05 RX ADMIN — CALCIUM GLUCONATE: 98 INJECTION, SOLUTION INTRAVENOUS at 18:20

## 2024-05-05 RX ADMIN — ACETAMINOPHEN 650 MG: 650 SOLUTION ORAL at 11:52

## 2024-05-05 RX ADMIN — INSULIN LISPRO 6 UNITS: 100 INJECTION, SOLUTION INTRAVENOUS; SUBCUTANEOUS at 23:53

## 2024-05-05 RX ADMIN — OCTREOTIDE ACETATE 50 MCG: 50 INJECTION, SOLUTION INTRAVENOUS; SUBCUTANEOUS at 16:07

## 2024-05-05 RX ADMIN — 0.12% CHLORHEXIDINE GLUCONATE 15 ML: 1.2 RINSE ORAL at 19:27

## 2024-05-05 RX ADMIN — POLYVINYL ALCOHOL, POVIDONE 1 DROP: 14; 6 SOLUTION/ DROPS OPHTHALMIC at 19:27

## 2024-05-05 RX ADMIN — MEROPENEM 1000 MG: 1 INJECTION, POWDER, FOR SOLUTION INTRAVENOUS at 14:24

## 2024-05-05 RX ADMIN — ACETAMINOPHEN 650 MG: 650 SOLUTION ORAL at 23:53

## 2024-05-05 RX ADMIN — IPRATROPIUM BROMIDE AND ALBUTEROL SULFATE 1 DOSE: .5; 2.5 SOLUTION RESPIRATORY (INHALATION) at 19:19

## 2024-05-05 RX ADMIN — OXYCODONE HYDROCHLORIDE 10 MG: 5 SOLUTION ORAL at 16:07

## 2024-05-05 RX ADMIN — KETOTIFEN FUMARATE 1 DROP: 0.35 SOLUTION/ DROPS OPHTHALMIC at 07:57

## 2024-05-05 RX ADMIN — BUPROPION HYDROCHLORIDE 100 MG: 100 TABLET, FILM COATED ORAL at 07:54

## 2024-05-05 RX ADMIN — POTASSIUM PHOSPHATE, MONOBASIC AND POTASSIUM PHOSPHATE, DIBASIC 30 MMOL: 224; 236 INJECTION, SOLUTION, CONCENTRATE INTRAVENOUS at 07:59

## 2024-05-05 RX ADMIN — ENOXAPARIN SODIUM 40 MG: 100 INJECTION SUBCUTANEOUS at 07:55

## 2024-05-05 RX ADMIN — BUPROPION HYDROCHLORIDE 100 MG: 100 TABLET, FILM COATED ORAL at 14:22

## 2024-05-05 RX ADMIN — POLYVINYL ALCOHOL, POVIDONE 1 DROP: 14; 6 SOLUTION/ DROPS OPHTHALMIC at 23:53

## 2024-05-05 RX ADMIN — INSULIN LISPRO 2 UNITS: 100 INJECTION, SOLUTION INTRAVENOUS; SUBCUTANEOUS at 05:36

## 2024-05-05 RX ADMIN — POLYVINYL ALCOHOL, POVIDONE 1 DROP: 14; 6 SOLUTION/ DROPS OPHTHALMIC at 11:52

## 2024-05-05 RX ADMIN — METOCLOPRAMIDE 10 MG: 5 INJECTION, SOLUTION INTRAMUSCULAR; INTRAVENOUS at 14:22

## 2024-05-05 ASSESSMENT — PULMONARY FUNCTION TESTS
PIF_VALUE: 16
PIF_VALUE: 15
PIF_VALUE: 14
PIF_VALUE: 16
PIF_VALUE: 15
PIF_VALUE: 16
PIF_VALUE: 15
PIF_VALUE: 15
PIF_VALUE: 16
PIF_VALUE: 15
PIF_VALUE: 16
PIF_VALUE: 15
PIF_VALUE: 16
PIF_VALUE: 15
PIF_VALUE: 15
PIF_VALUE: 16

## 2024-05-05 ASSESSMENT — PAIN SCALES - GENERAL
PAINLEVEL_OUTOF10: 0
PAINLEVEL_OUTOF10: 0

## 2024-05-05 NOTE — PROGRESS NOTES
ENDOCRINOLOGY PROGRESS NOTE      Date of admission: 4/15/2024  Date of service: 5/5/2024  Admitting physician: Norma Rogers MD   Primary Care Physician: Mekhi Escalona MD  Consultant physician: Jonathan Curiel MD     Reason for the consultation:  Post-pancreatectomy DM     History of Present Illness:  Leatha Willard is a 58 y.o. old female with PMH of cystic neoplasm of the head of pancreas, HTN, HLD and other listed below admitted to [facility] on 4/15/2024 for completion pancreatectomy. Endocrine service was consulted for diabetes management.    Subjective   Saw and examined the patient at bedside this morning, no acute events overnight, on TPN,    Point of care glucose monitoring   (Independently reviewed)   Recent Labs     05/04/24  0753 05/04/24  1351 05/04/24  1612 05/04/24  1809 05/04/24  1933 05/04/24  2331 05/05/24  0518 05/05/24  0807   POCGLU 129* 121* 136* 149* 168* 124* 198* 163*       Scheduled Meds:   potassium phosphate IVPB (CENTRAL LINE)  30 mmol IntraVENous Once    valproic acid  250 mg Oral Daily    insulin glargine  14 Units SubCUTAneous Nightly    insulin lispro  0-12 Units SubCUTAneous Q4H    metoclopramide  10 mg IntraVENous Q6H    fentanNYL  200 mcg IntraVENous 60 Min Pre-Op    rocuronium  0.6 mg/kg IntraVENous 60 Min Pre-Op    oxyCODONE  10 mg Oral Q6H    octreotide  50 mcg IntraVENous Q8H    meropenem  1,000 mg IntraVENous Q8H    buPROPion  100 mg Oral TID    sennosides-docusate sodium  2 tablet Oral Daily    micafungin  100 mg IntraVENous Daily    ipratropium 0.5 mg-albuterol 2.5 mg  1 Dose Inhalation Q4H WA RT    sodium chloride flush  5-40 mL IntraVENous BID    acetaminophen  650 mg Oral 4 times per day    chlorhexidine  15 mL Mouth/Throat BID    Polyvinyl Alcohol-Povidone PF  1 drop Both Eyes Q4H    Or    artificial tears   Both Eyes Q4H    lidocaine  1 patch TransDERmal Daily    enoxaparin  40 mg SubCUTAneous Daily    atorvastatin  10 mg Oral Nightly    ketotifen fumarate  1 drop Both  Eyes BID    pantoprazole (PROTONIX) 40 mg in sodium chloride (PF) 0.9 % 10 mL injection  40 mg IntraVENous Daily       PRN Meds:   hydrALAZINE, 10 mg, Q30 Min PRN  labetalol, 10 mg, Q30 Min PRN  sodium chloride, , PRN  medicated lip balm, , PRN  benzonatate, 100 mg, TID PRN  glucose, 4 tablet, PRN  dextrose bolus, 125 mL, PRN   Or  dextrose bolus, 250 mL, PRN  glucagon (rDNA), 1 mg, PRN  dextrose, , Continuous PRN  ondansetron, 4 mg, Q6H PRN      Continuous Infusions:   PN-Adult  3-in-1 Central Line (Custom)      PN-Adult  3-in-1 Central Line (Custom) 45.8 mL/hr at 05/05/24 0600    sodium chloride      dextrose         Review of Systems  Non-Obtainable    OBJECTIVE    BP (!) 154/76   Pulse 81   Temp 99.5 °F (37.5 °C) (Axillary)   Resp 22   Ht 1.626 m (5' 4\")   Wt 90.2 kg (198 lb 12.8 oz)   SpO2 99%   BMI 34.12 kg/m²     Intake/Output Summary (Last 24 hours) at 5/5/2024 0814  Last data filed at 5/5/2024 0600  Gross per 24 hour   Intake 1487.87 ml   Output 1735 ml   Net -247.13 ml       Physical examination:  General: Intubated  HEENT: normocephalic non traumatic, no exophthalmos   Neck: supple  Pulm: good equal air entry no added sounds  CVS: S1 + S2  Abd: Covered with surgical dressing  Skin: warm, no lesions, no rash. No open wounds, no ulcers   Neuro: The patient is intubated     Review of Laboratory Data:  I personally reviewed the following labs:   Recent Labs     05/03/24  0414 05/04/24  0513 05/05/24  0405   WBC 23.1* 22.1* 22.1*   RBC 2.67* 2.86* 2.80*   HGB 7.8* 8.5* 8.3*   HCT 25.6* 27.3* 26.6*   MCV 95.9 95.5 95.0   MCH 29.2 29.7 29.6   MCHC 30.5* 31.1* 31.2*   RDW 15.4* 15.9* 15.6*   *  --   --    MPV 10.4 10.2 10.3     Recent Labs     05/03/24  0414 05/04/24  0513 05/05/24  0405    136 132   K 3.9 4.1 4.2    96* 95*   CO2 27 26 26   BUN 17 14 13   CREATININE 0.4* 0.4* 0.4*   GLUCOSE 109* 112* 187*   CALCIUM 8.0* 8.0* 8.1*   BILITOT 0.3 0.4 0.4   ALKPHOS 154* 171* 154*   AST 32*

## 2024-05-05 NOTE — PROGRESS NOTES
Pt requiring PS 10 for sufficient Vts    05/05/24 0858   Patient Observation   Observations pt continues on PS   Vent Information   Ventilator -31   Vent Mode CPAP/PS   Ventilator Settings   Pressure Support (cm H2O) 10 cm H2O   Vent Patient Data (Readings)   Flow Sensitivity 2 L/min   Backup Apnea On   Backup Rate 16 Breaths Per Minute   Backup Vt 350   Vent Alarm Settings   Low Minute Volume (lpm) 4 L/min   High Minute Volume (lpm) 14 L/min   Low Exhaled Vt (ml) 250 mL   High Exhaled Vt (ml) 600 mL   RR High (bpm) 30 br/min   Apnea (secs) 20 secs   Additional Respiratoray Assessments   Humidification Source Heated wire   Humidification Temp 37   Circuit Condensation Drained   Airway Clearance   Suction Trach;Oral   Suction Device Inline suction catheter   Sputum Method Obtained Tracheal   Sputum Amount Small   Sputum Color/Odor Tan;Clear   Sputum Consistency Thick   Surgical Airway (Trach) 05/01/24 Breezy Cuffed   Placement Date/Time: 05/01/24 0742   Placed By: (c)   Placement Verified By: Capnometry  Surgical Airway Type: Tracheostomy  Brand: Breezy  Style: Cuffed  Size: 8   Status Secured   Site Assessment Dry;Clean   Ties Assessment Intact;Secure   Spare Trach at Bedside Yes   Ambu Bag With Mask at Bedside Yes   Ventilator Associated Pneumonia Bundle   Elevation of Head of Bed to 30-45 Degrees  Yes   Oral Suctioning Yes   ETT/Trach Suctioning Yes   Treatment   $Treatment Type $Inhaled Therapy/Meds

## 2024-05-05 NOTE — PROGRESS NOTES
Av.3 °F (37.4 °C)  Min: 98.7 °F (37.1 °C)  Max: 100.1 °F (37.8 °C)  RR: Resp  Av.8  Min: 19  Max: 28  HR: Pulse  Av.4  Min: 75  Max: 104  BP:  Systolic (24hrs), Av , Min:111 , Max:171   ; Diastolic (24hrs), Av, Min:64, Max:84    SpO2: SpO2  Av.6 %  Min: 98 %  Max: 100 %        *I/O:  Urine output (cc/kg/hr): 920  Bowel movements: 1 bowel movement   Fluid balance: -9905  Body weight: 89 kg    *Lines:   PICC  left cephalic     *Tubes:   Surgical airway   PEG J      *Drains:   Abdominal wound drain site     *Physical Exam:   BP (!) 154/76   Pulse 81   Temp 99.5 °F (37.5 °C) (Axillary)   Resp 22   Ht 1.626 m (5' 4\")   Wt 90.2 kg (198 lb 12.8 oz)   SpO2 99%   BMI 34.12 kg/m²           CONSTITUTIONAL: no acute distress, lying in hospital bed  HEENT: Normocephalic, atraumatic. No scleral icterus.  PULMONARY: no rhonchi/rales/wheezes, no use of accessory muscles, trach in place, on PS.   CARDIOVASCULAR: S1 S2, regular rate, regular rhythm, no murmur/gallop/rub  ABDOMEN: soft, mild distention, gelatinous brown mucoid drainage from midline incision site without dehiscence, nontympanic, no masses GIULIANO drain on left minimal purulent appearing drainage  MUSCULOSKELETAL: moves all extremities purposefully, 2/5 strength BLUE   SKIN/EXTREMITIES: no rashes/ecchymosis, some UE swelling/edema b/l, warm/dry, good capillary refill   NEUROLOGIC: Opens eyes, weakness in upper extremities.     CBC     Lab Results   Component Value Date/Time    WBC 22.1 2024 04:05 AM    RBC 2.80 2024 04:05 AM    HGB 8.3 2024 04:05 AM    HCT 26.6 2024 04:05 AM     2024 04:14 AM    MCV 95.0 2024 04:05 AM    MCH 29.6 2024 04:05 AM    MCHC 31.2 2024 04:05 AM    RDW 15.6 2024 04:05 AM    NRBC 1 2024 05:13 AM    METASPCT 1 2024 04:14 AM    LYMPHOPCT 18 2024 04:05 AM    PROMYELOPCT 1 2024 04:15 AM    MONOPCT 5 2024 04:05 AM     rocuronium  0.6 mg/kg IntraVENous 60 Min Pre-Op    oxyCODONE  10 mg Oral Q6H    octreotide  50 mcg IntraVENous Q8H    meropenem  1,000 mg IntraVENous Q8H    buPROPion  100 mg Oral TID    sennosides-docusate sodium  2 tablet Oral Daily    micafungin  100 mg IntraVENous Daily    ipratropium 0.5 mg-albuterol 2.5 mg  1 Dose Inhalation Q4H WA RT    sodium chloride flush  5-40 mL IntraVENous BID    acetaminophen  650 mg Oral 4 times per day    chlorhexidine  15 mL Mouth/Throat BID    Polyvinyl Alcohol-Povidone PF  1 drop Both Eyes Q4H    Or    artificial tears   Both Eyes Q4H    lidocaine  1 patch TransDERmal Daily    enoxaparin  40 mg SubCUTAneous Daily    atorvastatin  10 mg Oral Nightly    ketotifen fumarate  1 drop Both Eyes BID    pantoprazole (PROTONIX) 40 mg in sodium chloride (PF) 0.9 % 10 mL injection  40 mg IntraVENous Daily     hydrALAZINE, labetalol, sodium chloride, medicated lip balm, benzonatate, glucose, dextrose bolus **OR** dextrose bolus, glucagon (rDNA), dextrose, ondansetron    Home Medications  Medications Prior to Admission: traMADol (ULTRAM) 50 MG tablet, Take 1 tablet by mouth every 6 hours as needed for Pain.  docusate sodium (COLACE) 100 MG capsule, Take 1 capsule by mouth every morning  atorvastatin (LIPITOR) 10 MG tablet, take 1 tablet by mouth once daily  nicotine (NICODERM CQ) 14 MG/24HR, Place 1 patch onto the skin daily  [DISCONTINUED] Multiple Vitamins-Minerals (THERAPEUTIC MULTIVITAMIN-MINERALS) tablet, Take 1 tablet by mouth daily (Patient not taking: Reported on 4/10/2024)  amLODIPine (NORVASC) 5 MG tablet, Take 1 tablet by mouth daily (Patient taking differently: Take 1 tablet by mouth every morning)  gabapentin (NEURONTIN) 300 MG capsule, Take 1 capsule by mouth nightly for 90 days.  olopatadine (PATADAY) 0.2 % SOLN ophthalmic solution, Place 1 drop into both eyes daily as needed (dry eyes)  buPROPion (WELLBUTRIN XL) 300 MG extended release tablet, Take 1 tablet by mouth every

## 2024-05-05 NOTE — PROGRESS NOTES
[facility] PROGRESS NOTE      Chief complaint: Follow-up of septic shock/Candida albicans and glabrata infection/venous peritonitis associated with anastomosis leak    The patient is a 58 y.o. female with history of DM, hypertension, hyperlipidemia, cervical fusion, admitted on 04/15 for symptomatic pancreatic serous cystadenoma abutting many branches of the SMV and SMA prompting pylorus-preserving pancreaticoduodenectomy, placement of biliary stent, portal lymphadenectomy, round ligament and omental pedicle flap harvest, appendectomy on 04/15 during which no clinical findings of infection were noted. Postop course was complicated by intermittent fever since 04/18 up to 101.5 F and pancreatic fistula, prompting reopening recent laparotomy, completion pancreatectomy, splenectomy, omentectomy on 04/18 during which gush of a large volume of dark succus and infected fluid on opening, old infected hematoma, significant amount of necrotic omentum covering the anastomoses, dehiscence of anterior wall anastomosis with bile exiting the site of anastomosis at the jejunum, bile spilling within the lesser sac, necrotic pancreas were noted.  Tissue Gram stain and culture showed few polymorphonuclear leukocytes, no epithelial cells, no organisms, rare growth of Candida albicans, light growth of Candida glabrata, no anaerobes. Piperacillin-tazobactam was started on since admission.  She was noted to have incision wound drainage, prompting repeat CT abdomen and pelvis on 04/23 showing interval decrease in size in periportal right upper quadrant fluid collection without resolution (now measuring 3.6 x 1.7 cm as compared to previously 6.7 x 2.4 cm), new 6 x 4 x 3 cm right retroperitoneal fluid collection between superior mesenteric artery and inferior vena cava, new spindle-shaped fluid collection in subdiaphragmatic anterior left upper quadrant of the abdomen measuring 7 x 3 x 3 cm, increasing small amount of ascites and bilateral  pleural effusions.  She started to have recurrence of fever up to 100.6 Fahrenheit on 04/25 accompanied by worsening leukocytosis up to 28,000.  Respiratory Gram stain and culture showed moderate polymorphonuclear leukocytes, few epithelial cells, no organisms, normal respiratory may.  Wound Gram stain and culture showed no polymorphonuclear leukocytes, rare epithelial cells, no organisms, moderate growth of Candida albicans, light growth of Candida glabrata.  Repeat blood cultures on 04/25 showed no growth.  Repeat CT abdomen and pelvis on 5/02 showed no specific findings of enterocutaneous fistula, unchanged postoperative fluid collections (small fluid collection adjacent to the inferior vena cava, surgical drain in left subdiaphragmatic region with no significant surrounding fluid collection, 4 cm fluid collection in the left anterior mesentery).    Subjective: Patient was seen and examined.She remains in critical condition, on mechanical ventilation, awake but not responding to questions. Afebrile. S/p tracheostomy and PEG placement on 05/01.      Objective:  BP (!) 151/72   Pulse 88   Temp 99 °F (37.2 °C) (Axillary)   Resp 21   Ht 1.626 m (5' 4\")   Wt 90.2 kg (198 lb 12.8 oz)   SpO2 99%   BMI 34.12 kg/m²   Constitutional: Tracheostomy in place, no MV dyssynchrony  Respiratory: Clear breath sounds, no crackles, no wheezes  Cardiovascular: Regular rate and rhythm, no murmurs  Gastrointestinal: Bowel sounds present, soft, nontender  Skin: Warm and dry, no active dermatoses  Musculoskeletal: No joint swelling, no joint erythema    Labs, imaging, and medical records/notes were personally reviewed.    Assessment:  Septic shock/fever/leukocytosis, secondary to surgical site Candida albicans and glabrata infection/bilious peritonitis associated with partial dehiscence of pancreaticojejunostomy anastomosis s/p reopening recent laparotomy, completion pancreatectomy, splenectomy, omentectomy on

## 2024-05-05 NOTE — PROGRESS NOTES
P Surgery   Daily Progress Note      Patient's Name/Date of Birth: Leatha Willard / 1965    Date: May 5, 2024     Chief Complaint: s/p open whipple, Grade C POPF s/p takeback to OR for completion pancreatectomy    Patient Active Problem List   Diagnosis    Recurrent major depressive disorder (HCC)    Essential hypertension    Hyperlipidemia    Prediabetes    Pre-operative laboratory examination    Class 1 obesity due to excess calories without serious comorbidity with body mass index (BMI) of 32.0 to 32.9 in adult    HIREN (obstructive sleep apnea)    Pancreatic cyst    Colon polyps    Cyst of pancreas    Chest pain    Tobacco abuse-- quit 2 weeks ago    Abdominal pain    Serous cystadenoma    Pancreatic fistula    Sepsis with acute renal failure and septic shock (HCC)    JUSTINE (acute kidney injury) (HCC)    Hypophosphatemia    Acute respiratory failure with hypoxia (HCC)    Post-pancreatectomy diabetes (HCC)    On total parenteral nutrition    Hypoalbuminemia    Electrolyte imbalance    Hypocalcemia    Acute blood loss anemia    Hypokalemia       Subjective:   Pt awake intermittently. Will follow some commands. Today was not moving RUE but moved everything else to command. Midline with small amount of bilious drainage on dressing.      Objective:  BP (!) 154/76   Pulse 81   Temp 99.5 °F (37.5 °C) (Axillary)   Resp 22   Ht 1.626 m (5' 4\")   Wt 90.2 kg (198 lb 12.8 oz)   SpO2 99%   BMI 34.12 kg/m²   Labs:  Recent Labs     05/03/24 0414 05/04/24  0513 05/05/24  0405   WBC 23.1* 22.1* 22.1*   HGB 7.8* 8.5* 8.3*   HCT 25.6* 27.3* 26.6*       Recent Labs     05/03/24  0414 05/04/24  0513 05/05/24  0405    136 132   K 3.9 4.1 4.2    96* 95*   CO2 27 26 26   BUN 17 14 13   CREATININE 0.4* 0.4* 0.4*       Recent Labs     05/03/24 0414 05/04/24  0513 05/05/24  0405   ALKPHOS 154* 171* 154*   ALT 24 22 15   AST 32* 24 18   BILITOT 0.3 0.4 0.4           General appearance: lying in bed, opens eyes and  tracks  Head: NCAT, PERRL, anicteric sclera  Neck: supple, no masses. Tracheostomy in place   Lungs: symmetric chest rise on pressure support  Heart: warm throughout  Abdomen: softly distended, midline with continued mucoid brown fluid with bile staining on dressing. staples present; some skin dehiscence and breakdown; thick purulent output from L NAMRATA (5cc); PEG J holding gastric content   Extremities: less dependent edema    Assessment/Plan:  Leatha Willard is a 58 y.o. female POD 14 from open pylorus-preserving Whipple portal lymphadenectomy, appendectomy for symptomatic 13cm serous cystadenoma of the head of the pancreas. Now s/p PJ excision, completion pancreatectomy, splenectomy, omentectomy and washout for PJ disruption.     - MRI brain to assess for possible CVA   - Wean vent as able, pt currently on PS   - Monitor intake and output  - continue TPN   - Continue to change midline dressing frequently   - Continue namrata drain   - Continue to hold tube feedings for now   - Otherwise flush J regularly, no medications via J   - Continue ppi, reglan   - Octreotide empirically for presumed lymph leak   - ID recommendations appreciated   - appreciate Endocrine and critical care recommendations   - continue Lovenox DVT chemoppx   - PT/OT  - as long as midline drainage is not excessive, will hold off on PTHC    DW Dr. Sue Mobley MD      Attending Physician Statement:s/p open whipple, Grade C POPF s/p takeback to OR for completion pancreatectomy    Chief Complaint:     I have examined the patient and performed the key aspects of physical exam, reviewed the record (including all pertinent and new radiology images and laboratory findings), and discussed the case with the surgical team.  I agree with the assessment and plan with the following additions, corrections, and changes. 14pt review of symptoms completed and negative except as mentioned.    Having bile from midline with thick mucopurulent discharge.

## 2024-05-05 NOTE — PLAN OF CARE
Problem: Discharge Planning  Goal: Discharge to home or other facility with appropriate resources  5/5/2024 0830 by Maricel Herrera RN  Outcome: Progressing  5/5/2024 0703 by June Zamora RN  Outcome: Progressing     Problem: Skin/Tissue Integrity  Goal: Absence of new skin breakdown  5/5/2024 0830 by Maricel Herrera RN  Outcome: Progressing  5/5/2024 0703 by June Zamora RN  Outcome: Progressing     Problem: ABCDS Injury Assessment  Goal: Absence of physical injury  5/5/2024 0830 by Maricel Herrera RN  Outcome: Progressing  5/5/2024 0703 by June Zamora RN  Outcome: Progressing     Problem: Respiratory - Adult  Goal: Achieves optimal ventilation and oxygenation  5/5/2024 0830 by Maricel Herrera RN  Outcome: Progressing  5/5/2024 0703 by June Zamora RN  Outcome: Progressing  5/5/2024 0154 by Xochitl Plummer RCP  Outcome: Progressing     Problem: Cardiovascular - Adult  Goal: Maintains optimal cardiac output and hemodynamic stability  5/5/2024 0830 by Maricel Herrera RN  Outcome: Progressing  5/5/2024 0703 by June Zamora RN  Outcome: Progressing  Goal: Absence of cardiac dysrhythmias or at baseline  5/5/2024 0703 by June Zamora RN  Outcome: Progressing     Problem: Musculoskeletal - Adult  Goal: Return mobility to safest level of function  5/5/2024 0830 by Maricel Herrera RN  Outcome: Progressing  5/5/2024 0703 by June Zamora RN  Outcome: Progressing  Goal: Return ADL status to a safe level of function  5/5/2024 0703 by June Zamora RN  Outcome: Progressing     Problem: Gastrointestinal - Adult  Goal: Minimal or absence of nausea and vomiting  5/5/2024 0830 by Maricel Herrera RN  Outcome: Progressing  5/5/2024 0703 by June Zamora RN  Outcome: Progressing  Goal: Maintains or returns to baseline bowel function  5/5/2024 0703 by June Zamora RN  Outcome: Progressing  Goal: Maintains adequate nutritional intake  5/5/2024 0703 by June Zamora RN  Outcome: Progressing     Problem: Genitourinary -  Adult  Goal: Absence of urinary retention  5/5/2024 0830 by Maricel Herrera RN  Outcome: Progressing  5/5/2024 0703 by June Zamora RN  Outcome: Progressing  Goal: Urinary catheter remains patent  5/5/2024 0703 by June Zamora RN  Outcome: Progressing     Problem: Infection - Adult  Goal: Absence of infection at discharge  5/5/2024 0830 by Maricel Herrera RN  Outcome: Progressing  5/5/2024 0703 by June Zamora RN  Outcome: Progressing     Problem: Metabolic/Fluid and Electrolytes - Adult  Goal: Electrolytes maintained within normal limits  5/5/2024 0830 by Maricel Herrera RN  Outcome: Progressing  5/5/2024 0703 by June Zamora RN  Outcome: Progressing  Goal: Hemodynamic stability and optimal renal function maintained  5/5/2024 0703 by June Zamora RN  Outcome: Progressing  Goal: Glucose maintained within prescribed range  5/5/2024 0703 by June Zamora RN  Outcome: Progressing     Problem: Hematologic - Adult  Goal: Maintains hematologic stability  Outcome: Progressing     Problem: Neurosensory - Adult  Goal: Achieves stable or improved neurological status  Outcome: Progressing     Problem: Skin/Tissue Integrity - Adult  Goal: Skin integrity remains intact  Outcome: Progressing     Problem: Chronic Conditions and Co-morbidities  Goal: Patient's chronic conditions and co-morbidity symptoms are monitored and maintained or improved  Outcome: Progressing     Problem: Pain  Goal: Verbalizes/displays adequate comfort level or baseline comfort level  Outcome: Progressing     Problem: Safety - Adult  Goal: Free from fall injury  Outcome: Progressing     Problem: Nutrition Deficit:  Goal: Optimize nutritional status  Outcome: Progressing

## 2024-05-05 NOTE — PLAN OF CARE
Problem: Discharge Planning  Goal: Discharge to home or other facility with appropriate resources  Outcome: Progressing     Problem: Skin/Tissue Integrity  Goal: Absence of new skin breakdown  Description: 1.  Monitor for areas of redness and/or skin breakdown  2.  Assess vascular access sites hourly  3.  Every 4-6 hours minimum:  Change oxygen saturation probe site  4.  Every 4-6 hours:  If on nasal continuous positive airway pressure, respiratory therapy assess nares and determine need for appliance change or resting period.  Outcome: Progressing     Problem: ABCDS Injury Assessment  Goal: Absence of physical injury  Outcome: Progressing     Problem: Respiratory - Adult  Goal: Achieves optimal ventilation and oxygenation  5/5/2024 0703 by June Zamora RN  Outcome: Progressing  5/5/2024 0154 by Xochitl Plummer RCP  Outcome: Progressing     Problem: Cardiovascular - Adult  Goal: Maintains optimal cardiac output and hemodynamic stability  Outcome: Progressing  Goal: Absence of cardiac dysrhythmias or at baseline  Outcome: Progressing     Problem: Musculoskeletal - Adult  Goal: Return mobility to safest level of function  Outcome: Progressing  Goal: Return ADL status to a safe level of function  Outcome: Progressing     Problem: Gastrointestinal - Adult  Goal: Minimal or absence of nausea and vomiting  Outcome: Progressing  Goal: Maintains or returns to baseline bowel function  Outcome: Progressing  Goal: Maintains adequate nutritional intake  Outcome: Progressing     Problem: Genitourinary - Adult  Goal: Absence of urinary retention  Outcome: Progressing  Goal: Urinary catheter remains patent  Outcome: Progressing     Problem: Infection - Adult  Goal: Absence of infection at discharge  Outcome: Progressing     Problem: Metabolic/Fluid and Electrolytes - Adult  Goal: Electrolytes maintained within normal limits  Outcome: Progressing  Goal: Hemodynamic stability and optimal renal function maintained  Outcome:

## 2024-05-06 ENCOUNTER — APPOINTMENT (OUTPATIENT)
Dept: GENERAL RADIOLOGY | Age: 59
DRG: 004 | End: 2024-05-06
Attending: STUDENT IN AN ORGANIZED HEALTH CARE EDUCATION/TRAINING PROGRAM
Payer: COMMERCIAL

## 2024-05-06 LAB
AADO2: 127.2 MMHG
ALBUMIN SERPL-MCNC: 2.8 G/DL (ref 3.5–5.2)
ALP SERPL-CCNC: 155 U/L (ref 35–104)
ALT SERPL-CCNC: 13 U/L (ref 0–32)
ANION GAP SERPL CALCULATED.3IONS-SCNC: 12 MMOL/L (ref 7–16)
AST SERPL-CCNC: 17 U/L (ref 0–31)
B.E.: 4.1 MMOL/L (ref -3–3)
BASOPHILS # BLD: 0.75 K/UL (ref 0–0.2)
BASOPHILS NFR BLD: 4 % (ref 0–2)
BILIRUB SERPL-MCNC: 0.4 MG/DL (ref 0–1.2)
BUN SERPL-MCNC: 14 MG/DL (ref 6–20)
CA-I BLD-SCNC: 1.15 MMOL/L (ref 1.15–1.33)
CALCIUM SERPL-MCNC: 8.5 MG/DL (ref 8.6–10.2)
CHLORIDE SERPL-SCNC: 93 MMOL/L (ref 98–107)
CO2 SERPL-SCNC: 27 MMOL/L (ref 22–29)
COHB: 0.2 % (ref 0–1.5)
CREAT SERPL-MCNC: 0.4 MG/DL (ref 0.5–1)
CRITICAL: ABNORMAL
DATE ANALYZED: ABNORMAL
DATE OF COLLECTION: ABNORMAL
EOSINOPHIL # BLD: 2.63 K/UL (ref 0.05–0.5)
EOSINOPHILS RELATIVE PERCENT: 12 % (ref 0–6)
ERYTHROCYTE [DISTWIDTH] IN BLOOD BY AUTOMATED COUNT: 15.4 % (ref 11.5–15)
FIO2: 40 %
GFR, ESTIMATED: >90 ML/MIN/1.73M2
GLUCOSE BLD-MCNC: 162 MG/DL (ref 74–99)
GLUCOSE BLD-MCNC: 164 MG/DL (ref 74–99)
GLUCOSE BLD-MCNC: 167 MG/DL (ref 74–99)
GLUCOSE BLD-MCNC: 220 MG/DL (ref 74–99)
GLUCOSE BLD-MCNC: 228 MG/DL (ref 74–99)
GLUCOSE SERPL-MCNC: 149 MG/DL (ref 74–99)
HCO3: 27.5 MMOL/L (ref 22–26)
HCT VFR BLD AUTO: 26.9 % (ref 34–48)
HGB BLD-MCNC: 8.3 G/DL (ref 11.5–15.5)
HHB: 2.1 % (ref 0–5)
LAB: ABNORMAL
LYMPHOCYTES NFR BLD: 1.88 K/UL (ref 1.5–4)
LYMPHOCYTES RELATIVE PERCENT: 9 % (ref 20–42)
Lab: 430
MAGNESIUM SERPL-MCNC: 2 MG/DL (ref 1.6–2.6)
MCH RBC QN AUTO: 29.5 PG (ref 26–35)
MCHC RBC AUTO-ENTMCNC: 30.9 G/DL (ref 32–34.5)
MCV RBC AUTO: 95.7 FL (ref 80–99.9)
METHB: 0.3 % (ref 0–1.5)
MODE: ABNORMAL
MONOCYTES NFR BLD: 10 % (ref 2–12)
MONOCYTES NFR BLD: 2.25 K/UL (ref 0.1–0.95)
NEUTROPHILS NFR BLD: 65 % (ref 43–80)
NEUTS SEG NFR BLD: 14.09 K/UL (ref 1.8–7.3)
O2 SATURATION: 97.9 % (ref 92–98.5)
O2HB: 97.4 % (ref 94–97)
OPERATOR ID: 2577
PATIENT TEMP: 37 C
PCO2: 36.8 MMHG (ref 35–45)
PEEP/CPAP: 5 CMH2O
PFO2: 2.64 MMHG/%
PH BLOOD GAS: 7.49 (ref 7.35–7.45)
PHOSPHATE SERPL-MCNC: 2.8 MG/DL (ref 2.5–4.5)
PLATELET # BLD AUTO: 916 K/UL (ref 130–450)
PMV BLD AUTO: 10 FL (ref 7–12)
PO2: 105.7 MMHG (ref 75–100)
POTASSIUM SERPL-SCNC: 4.6 MMOL/L (ref 3.5–5)
PROT SERPL-MCNC: 7 G/DL (ref 6.4–8.3)
PS: 10 CMH20
RBC # BLD AUTO: 2.81 M/UL (ref 3.5–5.5)
RBC # BLD: ABNORMAL 10*6/UL
RI(T): 1.2
SODIUM SERPL-SCNC: 132 MMOL/L (ref 132–146)
SOURCE, BLOOD GAS: ABNORMAL
THB: 10 G/DL (ref 11.5–16.5)
TIME ANALYZED: 438
WBC OTHER # BLD: 21.6 K/UL (ref 4.5–11.5)

## 2024-05-06 PROCEDURE — 97530 THERAPEUTIC ACTIVITIES: CPT

## 2024-05-06 PROCEDURE — 6370000000 HC RX 637 (ALT 250 FOR IP): Performed by: STUDENT IN AN ORGANIZED HEALTH CARE EDUCATION/TRAINING PROGRAM

## 2024-05-06 PROCEDURE — 2500000003 HC RX 250 WO HCPCS

## 2024-05-06 PROCEDURE — 2580000003 HC RX 258: Performed by: INTERNAL MEDICINE

## 2024-05-06 PROCEDURE — 97166 OT EVAL MOD COMPLEX 45 MIN: CPT

## 2024-05-06 PROCEDURE — 97163 PT EVAL HIGH COMPLEX 45 MIN: CPT

## 2024-05-06 PROCEDURE — 94640 AIRWAY INHALATION TREATMENT: CPT

## 2024-05-06 PROCEDURE — 2000000000 HC ICU R&B

## 2024-05-06 PROCEDURE — 2580000003 HC RX 258: Performed by: STUDENT IN AN ORGANIZED HEALTH CARE EDUCATION/TRAINING PROGRAM

## 2024-05-06 PROCEDURE — 6370000000 HC RX 637 (ALT 250 FOR IP): Performed by: INTERNAL MEDICINE

## 2024-05-06 PROCEDURE — C9113 INJ PANTOPRAZOLE SODIUM, VIA: HCPCS | Performed by: STUDENT IN AN ORGANIZED HEALTH CARE EDUCATION/TRAINING PROGRAM

## 2024-05-06 PROCEDURE — 6360000002 HC RX W HCPCS: Performed by: STUDENT IN AN ORGANIZED HEALTH CARE EDUCATION/TRAINING PROGRAM

## 2024-05-06 PROCEDURE — 6360000002 HC RX W HCPCS: Performed by: INTERNAL MEDICINE

## 2024-05-06 PROCEDURE — 84100 ASSAY OF PHOSPHORUS: CPT

## 2024-05-06 PROCEDURE — 83735 ASSAY OF MAGNESIUM: CPT

## 2024-05-06 PROCEDURE — 94003 VENT MGMT INPAT SUBQ DAY: CPT

## 2024-05-06 PROCEDURE — 82805 BLOOD GASES W/O2 SATURATION: CPT

## 2024-05-06 PROCEDURE — 2580000003 HC RX 258

## 2024-05-06 PROCEDURE — 82962 GLUCOSE BLOOD TEST: CPT

## 2024-05-06 PROCEDURE — 82330 ASSAY OF CALCIUM: CPT

## 2024-05-06 PROCEDURE — 36592 COLLECT BLOOD FROM PICC: CPT

## 2024-05-06 PROCEDURE — 2580000003 HC RX 258: Performed by: RADIOLOGY

## 2024-05-06 PROCEDURE — 71045 X-RAY EXAM CHEST 1 VIEW: CPT

## 2024-05-06 PROCEDURE — 80053 COMPREHEN METABOLIC PANEL: CPT

## 2024-05-06 PROCEDURE — 6360000002 HC RX W HCPCS

## 2024-05-06 PROCEDURE — 99291 CRITICAL CARE FIRST HOUR: CPT | Performed by: SURGERY

## 2024-05-06 PROCEDURE — 6370000000 HC RX 637 (ALT 250 FOR IP)

## 2024-05-06 PROCEDURE — 85025 COMPLETE CBC W/AUTO DIFF WBC: CPT

## 2024-05-06 RX ORDER — INSULIN GLARGINE 100 [IU]/ML
8 INJECTION, SOLUTION SUBCUTANEOUS NIGHTLY
Status: DISCONTINUED | OUTPATIENT
Start: 2024-05-06 | End: 2024-05-09

## 2024-05-06 RX ADMIN — SENNOSIDES AND DOCUSATE SODIUM 2 TABLET: 50; 8.6 TABLET ORAL at 08:15

## 2024-05-06 RX ADMIN — KETOTIFEN FUMARATE 1 DROP: 0.35 SOLUTION/ DROPS OPHTHALMIC at 08:15

## 2024-05-06 RX ADMIN — IPRATROPIUM BROMIDE AND ALBUTEROL SULFATE 1 DOSE: .5; 2.5 SOLUTION RESPIRATORY (INHALATION) at 17:26

## 2024-05-06 RX ADMIN — INSULIN LISPRO 4 UNITS: 100 INJECTION, SOLUTION INTRAVENOUS; SUBCUTANEOUS at 16:21

## 2024-05-06 RX ADMIN — KETOTIFEN FUMARATE 1 DROP: 0.35 SOLUTION/ DROPS OPHTHALMIC at 19:32

## 2024-05-06 RX ADMIN — ATORVASTATIN CALCIUM 10 MG: 10 TABLET, FILM COATED ORAL at 19:32

## 2024-05-06 RX ADMIN — IPRATROPIUM BROMIDE AND ALBUTEROL SULFATE 1 DOSE: .5; 2.5 SOLUTION RESPIRATORY (INHALATION) at 08:32

## 2024-05-06 RX ADMIN — MICAFUNGIN SODIUM 100 MG: 100 INJECTION, POWDER, LYOPHILIZED, FOR SOLUTION INTRAVENOUS at 17:06

## 2024-05-06 RX ADMIN — VALPROIC ACID 250 MG: 250 SOLUTION ORAL at 08:16

## 2024-05-06 RX ADMIN — MEROPENEM 1000 MG: 1 INJECTION, POWDER, FOR SOLUTION INTRAVENOUS at 04:25

## 2024-05-06 RX ADMIN — SODIUM CHLORIDE, PRESERVATIVE FREE 40 MG: 5 INJECTION INTRAVENOUS at 08:16

## 2024-05-06 RX ADMIN — METOCLOPRAMIDE 10 MG: 5 INJECTION, SOLUTION INTRAMUSCULAR; INTRAVENOUS at 01:18

## 2024-05-06 RX ADMIN — ACETAMINOPHEN 650 MG: 650 SOLUTION ORAL at 04:23

## 2024-05-06 RX ADMIN — OCTREOTIDE ACETATE 50 MCG: 50 INJECTION, SOLUTION INTRAVENOUS; SUBCUTANEOUS at 08:18

## 2024-05-06 RX ADMIN — OCTREOTIDE ACETATE 50 MCG: 50 INJECTION, SOLUTION INTRAVENOUS; SUBCUTANEOUS at 16:29

## 2024-05-06 RX ADMIN — ACETAMINOPHEN 650 MG: 650 SOLUTION ORAL at 17:46

## 2024-05-06 RX ADMIN — INSULIN LISPRO 2 UNITS: 100 INJECTION, SOLUTION INTRAVENOUS; SUBCUTANEOUS at 08:16

## 2024-05-06 RX ADMIN — METOCLOPRAMIDE 10 MG: 5 INJECTION, SOLUTION INTRAMUSCULAR; INTRAVENOUS at 19:32

## 2024-05-06 RX ADMIN — MEROPENEM 1000 MG: 1 INJECTION, POWDER, FOR SOLUTION INTRAVENOUS at 13:57

## 2024-05-06 RX ADMIN — OXYCODONE HYDROCHLORIDE 10 MG: 5 SOLUTION ORAL at 17:47

## 2024-05-06 RX ADMIN — METOCLOPRAMIDE 10 MG: 5 INJECTION, SOLUTION INTRAMUSCULAR; INTRAVENOUS at 13:51

## 2024-05-06 RX ADMIN — BUPROPION HYDROCHLORIDE 100 MG: 100 TABLET, FILM COATED ORAL at 08:16

## 2024-05-06 RX ADMIN — OXYCODONE HYDROCHLORIDE 10 MG: 5 SOLUTION ORAL at 04:23

## 2024-05-06 RX ADMIN — CALCIUM GLUCONATE: 98 INJECTION, SOLUTION INTRAVENOUS at 17:48

## 2024-05-06 RX ADMIN — INSULIN LISPRO 4 UNITS: 100 INJECTION, SOLUTION INTRAVENOUS; SUBCUTANEOUS at 12:11

## 2024-05-06 RX ADMIN — BUPROPION HYDROCHLORIDE 100 MG: 100 TABLET, FILM COATED ORAL at 13:51

## 2024-05-06 RX ADMIN — MEROPENEM 1000 MG: 1 INJECTION, POWDER, FOR SOLUTION INTRAVENOUS at 22:13

## 2024-05-06 RX ADMIN — POLYVINYL ALCOHOL, POVIDONE 1 DROP: 14; 6 SOLUTION/ DROPS OPHTHALMIC at 08:15

## 2024-05-06 RX ADMIN — SODIUM CHLORIDE, PRESERVATIVE FREE 10 ML: 5 INJECTION INTRAVENOUS at 08:17

## 2024-05-06 RX ADMIN — SODIUM CHLORIDE, PRESERVATIVE FREE 10 ML: 5 INJECTION INTRAVENOUS at 08:15

## 2024-05-06 RX ADMIN — POTASSIUM PHOSPHATE, MONOBASIC AND POTASSIUM PHOSPHATE, DIBASIC 20 MMOL: 224; 236 INJECTION, SOLUTION, CONCENTRATE INTRAVENOUS at 08:23

## 2024-05-06 RX ADMIN — POLYVINYL ALCOHOL, POVIDONE 1 DROP: 14; 6 SOLUTION/ DROPS OPHTHALMIC at 04:23

## 2024-05-06 RX ADMIN — 0.12% CHLORHEXIDINE GLUCONATE 15 ML: 1.2 RINSE ORAL at 19:32

## 2024-05-06 RX ADMIN — BUPROPION HYDROCHLORIDE 100 MG: 100 TABLET, FILM COATED ORAL at 19:32

## 2024-05-06 RX ADMIN — IPRATROPIUM BROMIDE AND ALBUTEROL SULFATE 1 DOSE: .5; 2.5 SOLUTION RESPIRATORY (INHALATION) at 20:10

## 2024-05-06 RX ADMIN — INSULIN LISPRO 2 UNITS: 100 INJECTION, SOLUTION INTRAVENOUS; SUBCUTANEOUS at 04:26

## 2024-05-06 RX ADMIN — SODIUM CHLORIDE, PRESERVATIVE FREE 10 ML: 5 INJECTION INTRAVENOUS at 19:32

## 2024-05-06 RX ADMIN — IPRATROPIUM BROMIDE AND ALBUTEROL SULFATE 1 DOSE: .5; 2.5 SOLUTION RESPIRATORY (INHALATION) at 12:02

## 2024-05-06 RX ADMIN — POLYVINYL ALCOHOL, POVIDONE 1 DROP: 14; 6 SOLUTION/ DROPS OPHTHALMIC at 16:21

## 2024-05-06 RX ADMIN — INSULIN LISPRO 2 UNITS: 100 INJECTION, SOLUTION INTRAVENOUS; SUBCUTANEOUS at 19:34

## 2024-05-06 RX ADMIN — POLYVINYL ALCOHOL, POVIDONE 1 DROP: 14; 6 SOLUTION/ DROPS OPHTHALMIC at 19:32

## 2024-05-06 RX ADMIN — INSULIN GLARGINE 8 UNITS: 100 INJECTION, SOLUTION SUBCUTANEOUS at 19:32

## 2024-05-06 RX ADMIN — ACETAMINOPHEN 650 MG: 650 SOLUTION ORAL at 12:11

## 2024-05-06 RX ADMIN — 0.12% CHLORHEXIDINE GLUCONATE 15 ML: 1.2 RINSE ORAL at 08:16

## 2024-05-06 RX ADMIN — OXYCODONE HYDROCHLORIDE 10 MG: 5 SOLUTION ORAL at 12:11

## 2024-05-06 RX ADMIN — ENOXAPARIN SODIUM 40 MG: 100 INJECTION SUBCUTANEOUS at 08:16

## 2024-05-06 RX ADMIN — METOCLOPRAMIDE 10 MG: 5 INJECTION, SOLUTION INTRAMUSCULAR; INTRAVENOUS at 08:15

## 2024-05-06 RX ADMIN — POLYVINYL ALCOHOL, POVIDONE 1 DROP: 14; 6 SOLUTION/ DROPS OPHTHALMIC at 12:11

## 2024-05-06 ASSESSMENT — PULMONARY FUNCTION TESTS
PIF_VALUE: 15
PIF_VALUE: 16
PIF_VALUE: 15
PIF_VALUE: 16
PIF_VALUE: 15
PIF_VALUE: 16
PIF_VALUE: 15
PIF_VALUE: 15
PIF_VALUE: 16
PIF_VALUE: 16
PIF_VALUE: 15
PIF_VALUE: 16
PIF_VALUE: 15
PIF_VALUE: 16
PIF_VALUE: 15
PIF_VALUE: 16
PIF_VALUE: 16
PIF_VALUE: 15

## 2024-05-06 ASSESSMENT — PAIN SCALES - GENERAL
PAINLEVEL_OUTOF10: 0

## 2024-05-06 NOTE — PROGRESS NOTES
Comprehensive Nutrition Assessment    Type and Reason for Visit:  Reassess    Nutrition Recommendations/Plan:     Continue NPO, Modify Parenteral Nutrition to better meet est needs:     Custom 3-in-1 (1300 ml tv, @ 54.1 ml/hr) to provide   100 gm AA, 177 gm dextrose, 27 gm lipid, & 1300 total kcals    *Note trickle TF providing additional 240 kcals & 21 gm protein    PN + TF Regimen meets 100% est needs         Malnutrition Assessment:  Malnutrition Status:  At risk for malnutrition  (05/01/24 1105)    Context:  Acute Illness     Findings of the 6 clinical characteristics of malnutrition:  Energy Intake:  50% or less of estimated energy requirements for 5 or more days (average)  Weight Loss:  Unable to assess (large fluctuations/ fluid shifts)     Body Fat Loss:  No significant body fat loss     Muscle Mass Loss:  No significant muscle mass loss    Fluid Accumulation:  No significant fluid accumulation     Strength:  Not Performed    Nutrition Assessment:    Pt remains at risk d/t ongoing need for SICU care & PN support s/p trach&  PEG-J. Admit w/ large pancreatic cyst s/p open pylorus preserving whipple 4/15 complicated by worsening pancreatic leak requrining takeback pancreatectomy/ splenectomy 4/18. Noted drainage from MLI / possible bile leak. PMHx CAD, Back pain/ cervical fusion, & prediabetes (now post pancreatectomy DM). Trickle feeds running (intermittent emesis). Full TPN continued. Will monitor & follow.    Nutrition Related Findings:    vent via trach, MAP WNL, -I/O's 10L, +1/+2 pitting edema, hypoactive BS, s/p whipple, abd distention, MLI w/ drainage, closed suction drain, PEG-J (Jport w/ TF G port to gravity), TPN; K/Phos/Mag WNL     Wound Type: Multiple, Surgical Incision       Current Nutrition Intake & Therapies:    Average Meal Intake: NPO  Current Tube Feeding (TF) Orders:  Feeding Route: PEG/J (TF infusing via J port)  Formula: Peptide Based High Protein  Schedule: Continuous  Feeding  Regimen: 10 ml/hr, running  Water Flushes: 30 ml q 4 hr = 180 ml water  Current TF & Flush Orders Provides: 240 ml water, 240 kcals, 21 gm pro, 200 ml free water, 380 ml total water w/ flushes    Current Parenteral Nutrition Orders:  Type and Formula: 3-in-1 Custom   Duration: Continuous  Rate/Volume: 1100 ml tv @ 45.8 ml/hr  Current PN Order Provides: 70 gm AA, 118 gm dextrose, 22 gm lipid, & 900 total kcals    Anthropometric Measures:  Height: 162.6 cm (5' 4\")  Ideal Body Weight (IBW): 120 lbs (55 kg)    Admission Body Weight: 78.5 kg (173 lb) (4/15  actual)  Current Body Weight: 78.5 kg (173 lb) (4/15 adm as CBW remains elevated), 144.2 % IBW.    Current BMI (kg/m2): 29.7  Usual Body Weight: 86.6 kg (191 lb) (3/2023 actual per EMR)  % Weight Change (Calculated): -9.4                    BMI Categories: Overweight (BMI 25.0-29.9)    Estimated Daily Nutrient Needs:  Energy Requirements Based On: Formula  Weight Used for Energy Requirements: Admission  Energy (kcal/day): PS3B 1532; 5991-9918  Weight Used for Protein Requirements: Ideal  Protein (g/day): 1.8-2.2 g/kg IBW; 100-120  Fluid (ml/day): per critical care    Nutrition Diagnosis:   Inadequate oral intake related to altered GI function (post-op whipple complications) as evidenced by NPO or clear liquid status due to medical condition, nutrition support - parenteral nutrition    Nutrition Interventions:   Nutrition Education/Counseling: Education not indicated  Coordination of Nutrition Care: Continue to monitor while inpatient  Plan of Care discussed with: nutrition plan d/w RN    Goals:  Previous Goal Met: Progressing toward Goal(s)  Goals: Tolerate nutrition support at goal rate      Nutrition Monitoring and Evaluation:   Food/Nutrient Intake Outcomes: Parenteral Nutrition Intake/Tolerance, Enteral Nutrition Intake/Tolerance  Physical Signs/Symptoms Outcomes: Biochemical Data, Nutrition Focused Physical Findings, Skin, Weight, GI Status, Fluid Status or

## 2024-05-06 NOTE — PROGRESS NOTES
DAILY VENTILATOR WEANING ASSESSMENT PERFORMED    P/FIO2 Ratio =   264       (<100= do not Wean)                  Cs =     30                     (<32= Instability)  Plat. Pressure = 17  MV = 7.8  RSBI =    Instabilities:       Cardiovascular =       CNS =       Respiratory =       Metabolic =    Parameters    no    Wean per protocol  yes    Ask Physician for a weaning plan yes    Additional Comments:     Performed by Patricia Simerlink, RCP RRT      Reference Table:    Cardiovascular     CNS      1. Mean BP less than or equal to 75   1. Neuromuscular blockade  2. Heart Rate greater than 130   2. RASS of -3, -4, -5  3. Myocardial Ischemia    3. RASS of +3, +4  4. Mechanical Assist Device    4. ICP greater than 15 or             Intracranial Hypertension         Respiratory      Metabolic  1. PEEP equal to or greater than 10cm/H20  1.Temp. (8hrs) less than 95 or > 103  2. Respiratory Rate greater than 35   2. WBC < 5000 or > 74381  3. Minute Volume greater than 15L  4. pH less than 7.30  5. Deteriorating chest X-ray

## 2024-05-06 NOTE — PLAN OF CARE
Problem: Respiratory - Adult  Goal: Achieves optimal ventilation and oxygenation  5/6/2024 0136 by Xochitl Plummer RCP  Outcome: Progressing

## 2024-05-06 NOTE — PROGRESS NOTES
\[facility]\"" Surgery   Daily Progress Note      Patient's Name/Date of Birth: Leatha Willard / 1965    Date: May 6, 2024     Chief Complaint: s/p open whipple, Grade C POPF s/p takeback to OR for completion pancreatectomy    Patient Active Problem List   Diagnosis    Recurrent major depressive disorder (HCC)    Essential hypertension    Hyperlipidemia    Prediabetes    Pre-operative laboratory examination    Class 1 obesity due to excess calories without serious comorbidity with body mass index (BMI) of 32.0 to 32.9 in adult    HIREN (obstructive sleep apnea)    Pancreatic cyst    Colon polyps    Cyst of pancreas    Chest pain    Tobacco abuse-- quit 2 weeks ago    Abdominal pain    Serous cystadenoma    Pancreatic fistula    Sepsis with acute renal failure and septic shock (HCC)    JUSTINE (acute kidney injury) (HCC)    Hypophosphatemia    Acute respiratory failure with hypoxia (HCC)    Post-pancreatectomy diabetes (HCC)    On total parenteral nutrition    Hypoalbuminemia    Electrolyte imbalance    Hypocalcemia    Acute blood loss anemia    Hypokalemia       Subjective:   Patient more awake. Following commands with BUE and BLE.     Objective:  BP (!) 156/76   Pulse 96   Temp 99.3 °F (37.4 °C) (Axillary)   Resp 20   Ht 1.626 m (5' 4\")   Wt 90.4 kg (199 lb 6.4 oz)   SpO2 100%   BMI 34.23 kg/m²   Labs:  Recent Labs     05/05/24  0405 05/05/24  2330 05/06/24  0418   WBC 22.1* 19.9* 21.6*   HGB 8.3* 8.2* 8.3*   HCT 26.6* 26.4* 26.9*       Recent Labs     05/04/24  0513 05/05/24  0405 05/06/24  0418    132 132   K 4.1 4.2 4.6   CL 96* 95* 93*   CO2 26 26 27   BUN 14 13 14   CREATININE 0.4* 0.4* 0.4*       Recent Labs     05/04/24  0513 05/05/24  0405 05/06/24  0418   ALKPHOS 171* 154* 155*   ALT 22 15 13   AST 24 18 17   BILITOT 0.4 0.4 0.4           General appearance: lying in bed, opens eyes. Follows commands with BUE and BLE.   Head: NCAT, PERRL, anicteric sclera  Neck: supple, no masses. Tracheostomy in place  assessment and plan with the following additions, corrections, and changes. 14pt review of symptoms completed and negative except as mentioned.    Much more awake and alert today. Follows commands with upper and lower extremities. Shakes head to questioning. Bilious drianing from midline stopped. G tube draining 600 out for last 24 hrs. Day prior had only 130. Appears when her G tube is not draining her stomach fills and has bilious drainage from midline. Need to keep G to gravity, flush to keep patent. Will start trickle tube feeds today and monitor midline wound. Will hold off on PTHC placement. Since moving all extremities will hold off on MRI brain. Needs PT/OT.     Norma Rogers MD  05/06/24  7:12 AM
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
## 2024-05-06 NOTE — PROGRESS NOTES
05/06/24 0127   Patient Observation   Pulse (!) 104   SpO2 99 %   Breath Sounds   Breath Sounds Bilateral Clear;Diminished   Right Upper Lobe Clear;Diminished   Right Middle Lobe Clear;Diminished   Right Lower Lobe Clear;Diminished   Left Upper Lobe Clear;Diminished   Left Lower Lobe Clear;Diminished   Vent Information   Ventilator Day(s) 19   Ventilator -31   Equipment Changed (S)  Vent Circuit;Suction catheter   Vent Mode CPAP/PS   $Ventilation $Subsequent Day   Ventilator Settings   FiO2  40 %   PEEP/CPAP (cmH2O) 5   Pressure Support (cm H2O) 10 cm H2O   Vent Patient Data (Readings)   Vt (Measured) 331 mL   Peak Inspiratory Pressure (cmH2O) 16 cmH2O   Rate Measured 20 br/min   Minute Volume (L/min) 6.44 Liters   Mean Airway Pressure (cmH2O) 7 cmH20   Plateau Pressure (cm H2O) 20 cm H2O   Driving Pressure 15   I:E Ratio 1:3.30   Static Compliance (L/cm H2O) 22   Backup Apnea On   Vent Alarm Settings   High Pressure (cmH2O) 40 cmH2O   Low Minute Volume (lpm) 4 L/min   High Minute Volume (lpm) 14 L/min   Low Exhaled Vt (ml) 250 mL   High Exhaled Vt (ml) 600 mL   RR High (bpm) (S)  35 br/min   Apnea (secs) 20 secs   Additional Respiratoray Assessments   Humidification Source Heated wire   Humidification Temp 37   Ambu Bag With Mask At Bedside Yes   Backup Trachs Available (Size) 8.0   Airway Clearance   Suction Trach   Surgical Airway (Trach) 05/01/24 Breezy Cuffed   Placement Date/Time: 05/01/24 0742   Placed By: (c)   Placement Verified By: Capnometry  Surgical Airway Type: Tracheostomy  Brand: Breezy  Style: Cuffed  Size: 8   Status Secured   Ties Assessment Secure   Spare Trach at Bedside Yes   Spare Trach Tube One Size Smaller at Bedside Yes   Ambu Bag With Mask at Bedside Yes   Ventilator Associated Pneumonia Bundle   Elevation of Head of Bed to 30-45 Degrees  Yes   Weaning Parameters   Respiratory Rate Observed 20   Ve 6.4      RSBI 60   Skin Assessment   Skin Assessment Clean, dry, & intact

## 2024-05-06 NOTE — PLAN OF CARE
Problem: Respiratory - Adult  Goal: Achieves optimal ventilation and oxygenation  5/6/2024 0838 by Simerlink, Patricia, RCP  Outcome: Progressing
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
## 2024-05-06 NOTE — PLAN OF CARE
Problem: Discharge Planning  Goal: Discharge to home or other facility with appropriate resources  5/5/2024 2006 by Mariaa Tao RN  Outcome: Progressing  5/5/2024 0830 by Maricel Herrera RN  Outcome: Progressing  Flowsheets (Taken 5/5/2024 0800)  Discharge to home or other facility with appropriate resources: Identify barriers to discharge with patient and caregiver  5/5/2024 0703 by June Zamora RN  Outcome: Progressing     Problem: Skin/Tissue Integrity  Goal: Absence of new skin breakdown  Description: 1.  Monitor for areas of redness and/or skin breakdown  2.  Assess vascular access sites hourly  3.  Every 4-6 hours minimum:  Change oxygen saturation probe site  4.  Every 4-6 hours:  If on nasal continuous positive airway pressure, respiratory therapy assess nares and determine need for appliance change or resting period.  5/5/2024 2006 by Mariaa Tao RN  Outcome: Progressing  5/5/2024 0830 by Maricel Herrera RN  Outcome: Progressing  5/5/2024 0703 by June Zamora RN  Outcome: Progressing     Problem: ABCDS Injury Assessment  Goal: Absence of physical injury  5/5/2024 2006 by Mariaa Tao RN  Outcome: Progressing  Flowsheets (Taken 5/5/2024 2000)  Absence of Physical Injury: Implement safety measures based on patient assessment  5/5/2024 0830 by Maricel Herrera RN  Outcome: Progressing  5/5/2024 0703 by June Zamora RN  Outcome: Progressing     Problem: Respiratory - Adult  Goal: Achieves optimal ventilation and oxygenation  5/5/2024 2006 by Mariaa Tao RN  Outcome: Progressing  5/5/2024 0830 by Maricel Herrera RN  Outcome: Progressing  5/5/2024 0703 by June Zamora RN  Outcome: Progressing     Problem: Cardiovascular - Adult  Goal: Maintains optimal cardiac output and hemodynamic stability  5/5/2024 0830 by Maricel Herrera RN  Outcome: Progressing  Flowsheets (Taken 5/5/2024 0800)  Maintains optimal cardiac output and hemodynamic stability:   Monitor blood pressure and heart rate    Monitor urine output and notify Licensed Independent Practitioner for values outside of normal range  5/5/2024 0703 by June Zamora RN  Outcome: Progressing  Goal: Absence of cardiac dysrhythmias or at baseline  Recent Flowsheet Documentation  Taken 5/5/2024 0800 by Maricel Herrera RN  Absence of cardiac dysrhythmias or at baseline: Monitor cardiac rate and rhythm  5/5/2024 0703 by June Zamora RN  Outcome: Progressing     Problem: Musculoskeletal - Adult  Goal: Return mobility to safest level of function  5/5/2024 0830 by Maricel Herrera RN  Outcome: Progressing  Flowsheets (Taken 5/5/2024 0800)  Return Mobility to Safest Level of Function: Assess patient stability and activity tolerance for standing, transferring and ambulating with or without assistive devices  5/5/2024 0703 by June Zamora RN  Outcome: Progressing  Goal: Return ADL status to a safe level of function  Recent Flowsheet Documentation  Taken 5/5/2024 0800 by Maricel Herrera RN  Return ADL Status to a Safe Level of Function: Administer medication as ordered  5/5/2024 0703 by June Zamora RN  Outcome: Progressing     Problem: Gastrointestinal - Adult  Goal: Minimal or absence of nausea and vomiting  5/5/2024 0830 by Maricel Herrera RN  Outcome: Progressing  Flowsheets (Taken 5/5/2024 0800)  Minimal or absence of nausea and vomiting: Maintain NPO status until nausea and vomiting are resolved  5/5/2024 0703 by June Zamora RN  Outcome: Progressing  Goal: Maintains or returns to baseline bowel function  Recent Flowsheet Documentation  Taken 5/5/2024 0800 by Maricel Herrera RN  Maintains or returns to baseline bowel function: Assess bowel function  5/5/2024 0703 by June Zamora RN  Outcome: Progressing  Goal: Maintains adequate nutritional intake  Recent Flowsheet Documentation  Taken 5/5/2024 0800 by Maricel Herrera RN  Maintains adequate nutritional intake: Monitor percentage of each meal consumed  5/5/2024 0703 by June Zamora RN  Outcome:

## 2024-05-06 NOTE — PLAN OF CARE
Problem: Skin/Tissue Integrity  Goal: Absence of new skin breakdown  Description: 1.  Monitor for areas of redness and/or skin breakdown  2.  Assess vascular access sites hourly  3.  Every 4-6 hours minimum:  Change oxygen saturation probe site  4.  Every 4-6 hours:  If on nasal continuous positive airway pressure, respiratory therapy assess nares and determine need for appliance change or resting period.  Outcome: Progressing     Problem: ABCDS Injury Assessment  Goal: Absence of physical injury  Outcome: Progressing  Flowsheets (Taken 5/6/2024 1020)  Absence of Physical Injury: Implement safety measures based on patient assessment     Problem: Respiratory - Adult  Goal: Achieves optimal ventilation and oxygenation  5/6/2024 1020 by Faith Saldivar, RN  Outcome: Progressing  5/6/2024 0838 by Simerlink, Patricia, RCP  Outcome: Progressing  Flowsheets (Taken 5/6/2024 0800 by Faith Saldivar, RN)  Achieves optimal ventilation and oxygenation:   Assess for changes in mentation and behavior   Assess for changes in respiratory status  5/6/2024 0136 by Xochitl Plummer RCP  Outcome: Progressing     Problem: Cardiovascular - Adult  Goal: Maintains optimal cardiac output and hemodynamic stability  Outcome: Progressing  Flowsheets (Taken 5/6/2024 0800)  Maintains optimal cardiac output and hemodynamic stability: Monitor blood pressure and heart rate  Goal: Absence of cardiac dysrhythmias or at baseline  Outcome: Progressing  Flowsheets (Taken 5/6/2024 0800)  Absence of cardiac dysrhythmias or at baseline: Monitor cardiac rate and rhythm     Problem: Musculoskeletal - Adult  Goal: Return mobility to safest level of function  Outcome: Not Progressing  Goal: Return ADL status to a safe level of function  Outcome: Not Progressing     Problem: Gastrointestinal - Adult  Goal: Minimal or absence of nausea and vomiting  Outcome: Progressing  Flowsheets (Taken 5/6/2024 0800)  Minimal or absence of nausea and vomiting: Maintain NPO  status until nausea and vomiting are resolved  Goal: Maintains or returns to baseline bowel function  Outcome: Progressing  Flowsheets (Taken 5/6/2024 0800)  Maintains or returns to baseline bowel function: Assess bowel function  Goal: Maintains adequate nutritional intake  Outcome: Progressing  Flowsheets (Taken 5/6/2024 0800)  Maintains adequate nutritional intake: Monitor intake and output, weight and lab values     Problem: Metabolic/Fluid and Electrolytes - Adult  Goal: Electrolytes maintained within normal limits  Outcome: Progressing  Flowsheets (Taken 5/6/2024 0800)  Electrolytes maintained within normal limits:   Monitor labs and assess patient for signs and symptoms of electrolyte imbalances   Administer electrolyte replacement as ordered  Goal: Hemodynamic stability and optimal renal function maintained  Outcome: Progressing  Flowsheets (Taken 5/6/2024 0800)  Hemodynamic stability and optimal renal function maintained: Monitor labs and assess for signs and symptoms of volume excess or deficit  Goal: Glucose maintained within prescribed range  Outcome: Progressing  Flowsheets (Taken 5/6/2024 0800)  Glucose maintained within prescribed range: Monitor blood glucose as ordered     Problem: Hematologic - Adult  Goal: Maintains hematologic stability  Outcome: Progressing  Flowsheets (Taken 5/6/2024 0800)  Maintains hematologic stability: Assess for signs and symptoms of bleeding or hemorrhage     Problem: Chronic Conditions and Co-morbidities  Goal: Patient's chronic conditions and co-morbidity symptoms are monitored and maintained or improved  Outcome: Progressing  Flowsheets (Taken 5/6/2024 0800)  Care Plan - Patient's Chronic Conditions and Co-Morbidity Symptoms are Monitored and Maintained or Improved: Monitor and assess patient's chronic conditions and comorbid symptoms for stability, deterioration, or improvement     Problem: Safety - Adult  Goal: Free from fall injury  Outcome: Progressing     Problem:

## 2024-05-06 NOTE — CARE COORDINATION
5/6 Care Coordination: Pt remains in SICU, S/P Whipple. S/p total pancreatectomy and splenectomy after leak Remains on Vent,Trach,PEGJ,continue TPN, Continue namrata drain.4 weeks of antibiotic therapy planned per ID. TF today via the J tube, keep G tube to gravity. CM spoke with her son Paul, discuses plan when patient ready to leave SICU, Discussed [facility], He agrees and would want [facility]. Will have [facility] to follow. Await [facility] stability, will need precert. CM/SW will continue to follow for discharge planning.   Vivek SUMMERSN,RN-CV-BC  [phone]

## 2024-05-06 NOTE — PROGRESS NOTES
[facility]  SURGICAL INTENSIVE CARE UNIT (SICU)  ATTENDING PHYSICIAN CRITICAL CARE PROGRESS NOTE     I have personally examined the patient, personally reviewed the record, and personally discussed the case with the resident. I have personally reviewed all relevant labs and imaging data. I am actively managing this patient's medications.  Please refer to the resident's note. I agree with the assessment and plan with the following corrections/additions. The following summarizes my clinical findings and independent assessment.     CC: critical care management after pancreaticoduodenectomy    Hospital Course/Overnight Events:  4/15--pylorus preserving Whipple for large benign pancreatic cyst  4/16--continued ICU d/t nausea  4/17--bobby replaced, pancreatic leak, boluses given, albumin given, JUSTINE, toradol stopped, CVC changed  4/18--CVC replaced, NGT repalced, scop patch, robaxin, d/c IVF; total pancreatectomy and splenectomy, insulin gtt  4/19--remains intubated, TPN, lasix  4/20--albumin x 6 with lasix, robaxin restarted  4/21--low cortisol--steroids started; lasix, 1U PRBC, vent adjustment for inc plateau pressure  4/22--some air trapping on vent overnight  4/23--copious dark mucoid drainage from incision  4/24--had CT abd/pelvis yesterday; still with ongoing drainage from incision  4/25--some agitation overnight; still with midline incision drainage; febrile (Tmax 100.6)  4/26--re-scanned yesterday--unchanged; lalo spont breathing trial; for PEG-J today  4/27--PEG-J not placed yesterday  4/28--ongoing abd wound drainage  4/29: drainage continues, no sedation, attempting SBT, peg tube placement is pending scheduling. Day 12 of intubation, if no improvement a trach will be considered.   4/30: PEGJ placed, failed SBT   5/1: trach placed, trickle tube feeds through PEGJ started  5/2:  NAOE, alert, not following commands  5/3: NAOE, patient alert but does not follow commands   5/4: bilious drainage  from incision drain site, possible bile leak  --nothing new overnight    Medications   Scheduled Meds:    potassium phosphate IVPB (CENTRAL LINE)  20 mmol IntraVENous Once    sodium chloride flush  5-40 mL IntraVENous 2 times per day    valproic acid  250 mg Oral Daily    insulin glargine  14 Units SubCUTAneous Nightly    insulin lispro  0-12 Units SubCUTAneous Q4H    metoclopramide  10 mg IntraVENous Q6H    oxyCODONE  10 mg Oral Q6H    octreotide  50 mcg IntraVENous Q8H    meropenem  1,000 mg IntraVENous Q8H    buPROPion  100 mg Oral TID    sennosides-docusate sodium  2 tablet Oral Daily    micafungin  100 mg IntraVENous Daily    ipratropium 0.5 mg-albuterol 2.5 mg  1 Dose Inhalation Q4H WA RT    sodium chloride flush  5-40 mL IntraVENous BID    acetaminophen  650 mg Oral 4 times per day    chlorhexidine  15 mL Mouth/Throat BID    Polyvinyl Alcohol-Povidone PF  1 drop Both Eyes Q4H    Or    artificial tears   Both Eyes Q4H    lidocaine  1 patch TransDERmal Daily    enoxaparin  40 mg SubCUTAneous Daily    atorvastatin  10 mg Oral Nightly    ketotifen fumarate  1 drop Both Eyes BID    pantoprazole (PROTONIX) 40 mg in sodium chloride (PF) 0.9 % 10 mL injection  40 mg IntraVENous Daily     Continuous Infusions:    PN-Adult  3-in-1 Central Line (Custom)      PN-Adult  3-in-1 Central Line (Custom) 45.8 mL/hr at 24 0815    sodium chloride      sodium chloride      sodium chloride      dextrose       PRN Meds: sodium chloride flush, sodium chloride, ondansetron, hydrALAZINE, labetalol, sodium chloride, medicated lip balm, benzonatate, glucose, dextrose bolus **OR** dextrose bolus, glucagon (rDNA), dextrose, ondansetron    Physical Examination      Vitals:  BP (!) 157/88   Pulse 97   Temp 99.3 °F (37.4 °C) (Axillary)   Resp 20   Ht 1.626 m (5' 4\")   Wt 90.4 kg (199 lb 6.4 oz)   SpO2 100%   BMI 34.23 kg/m²   Temp (24hrs), Av.2 °F (37.3 °C), Min:99 °F (37.2 °C), Max:99.6 °F (37.6 °C)      I/O

## 2024-05-07 ENCOUNTER — APPOINTMENT (OUTPATIENT)
Dept: GENERAL RADIOLOGY | Age: 59
DRG: 004 | End: 2024-05-07
Attending: STUDENT IN AN ORGANIZED HEALTH CARE EDUCATION/TRAINING PROGRAM
Payer: COMMERCIAL

## 2024-05-07 LAB
AADO2: 129.3 MMHG
ALBUMIN SERPL-MCNC: 2.7 G/DL (ref 3.5–5.2)
ALP SERPL-CCNC: 150 U/L (ref 35–104)
ALT SERPL-CCNC: 13 U/L (ref 0–32)
ANION GAP SERPL CALCULATED.3IONS-SCNC: 12 MMOL/L (ref 7–16)
AST SERPL-CCNC: 17 U/L (ref 0–31)
B.E.: 7.9 MMOL/L (ref -3–3)
BASOPHILS # BLD: 0.16 K/UL (ref 0–0.2)
BASOPHILS NFR BLD: 1 % (ref 0–2)
BILIRUB SERPL-MCNC: 0.3 MG/DL (ref 0–1.2)
BUN SERPL-MCNC: 17 MG/DL (ref 6–20)
CA-I BLD-SCNC: 1.15 MMOL/L (ref 1.15–1.33)
CALCIUM SERPL-MCNC: 8.4 MG/DL (ref 8.6–10.2)
CHLORIDE SERPL-SCNC: 94 MMOL/L (ref 98–107)
CO2 SERPL-SCNC: 27 MMOL/L (ref 22–29)
COHB: 0.2 % (ref 0–1.5)
CREAT SERPL-MCNC: 0.4 MG/DL (ref 0.5–1)
CRITICAL: ABNORMAL
DATE ANALYZED: ABNORMAL
DATE OF COLLECTION: ABNORMAL
EOSINOPHIL # BLD: 3.43 K/UL (ref 0.05–0.5)
EOSINOPHILS RELATIVE PERCENT: 20 % (ref 0–6)
ERYTHROCYTE [DISTWIDTH] IN BLOOD BY AUTOMATED COUNT: 15.3 % (ref 11.5–15)
FIO2: 40 %
GFR, ESTIMATED: >90 ML/MIN/1.73M2
GLUCOSE BLD-MCNC: 157 MG/DL (ref 74–99)
GLUCOSE BLD-MCNC: 165 MG/DL (ref 74–99)
GLUCOSE BLD-MCNC: 171 MG/DL (ref 74–99)
GLUCOSE BLD-MCNC: 179 MG/DL (ref 74–99)
GLUCOSE BLD-MCNC: 201 MG/DL (ref 74–99)
GLUCOSE BLD-MCNC: 232 MG/DL (ref 74–99)
GLUCOSE SERPL-MCNC: 146 MG/DL (ref 74–99)
HCO3: 32 MMOL/L (ref 22–26)
HCT VFR BLD AUTO: 27.1 % (ref 34–48)
HGB BLD-MCNC: 8.3 G/DL (ref 11.5–15.5)
HHB: 2.3 % (ref 0–5)
LAB: ABNORMAL
LYMPHOCYTES NFR BLD: 2.8 K/UL (ref 1.5–4)
LYMPHOCYTES RELATIVE PERCENT: 16 % (ref 20–42)
Lab: 430
MAGNESIUM SERPL-MCNC: 1.9 MG/DL (ref 1.6–2.6)
MCH RBC QN AUTO: 29.2 PG (ref 26–35)
MCHC RBC AUTO-ENTMCNC: 30.6 G/DL (ref 32–34.5)
MCV RBC AUTO: 95.4 FL (ref 80–99.9)
METHB: 0.3 % (ref 0–1.5)
MODE: ABNORMAL
MONOCYTES NFR BLD: 0.78 K/UL (ref 0.1–0.95)
MONOCYTES NFR BLD: 4 % (ref 2–12)
MYELOCYTES ABSOLUTE COUNT: 0.16 K/UL
MYELOCYTES: 1 %
NEUTROPHILS NFR BLD: 58 % (ref 43–80)
NEUTS SEG NFR BLD: 10.28 K/UL (ref 1.8–7.3)
O2 SATURATION: 97.7 % (ref 92–98.5)
O2HB: 97.2 % (ref 94–97)
OPERATOR ID: 2577
PATIENT TEMP: 37 C
PCO2: 42.9 MMHG (ref 35–45)
PEEP/CPAP: 5 CMH2O
PFO2: 2.41 MMHG/%
PH BLOOD GAS: 7.49 (ref 7.35–7.45)
PHOSPHATE SERPL-MCNC: 2.5 MG/DL (ref 2.5–4.5)
PLATELET # BLD AUTO: 927 K/UL (ref 130–450)
PMV BLD AUTO: 10.1 FL (ref 7–12)
PO2: 96.6 MMHG (ref 75–100)
POTASSIUM SERPL-SCNC: 4.5 MMOL/L (ref 3.5–5)
PROT SERPL-MCNC: 6.9 G/DL (ref 6.4–8.3)
PS: 10 CMH20
RBC # BLD AUTO: 2.84 M/UL (ref 3.5–5.5)
RBC # BLD: ABNORMAL 10*6/UL
RI(T): 1.34
SODIUM SERPL-SCNC: 133 MMOL/L (ref 132–146)
SOURCE, BLOOD GAS: ABNORMAL
THB: 9.6 G/DL (ref 11.5–16.5)
TIME ANALYZED: 436
WBC # BLD: ABNORMAL 10*3/UL
WBC OTHER # BLD: 17.6 K/UL (ref 4.5–11.5)

## 2024-05-07 PROCEDURE — 6370000000 HC RX 637 (ALT 250 FOR IP): Performed by: STUDENT IN AN ORGANIZED HEALTH CARE EDUCATION/TRAINING PROGRAM

## 2024-05-07 PROCEDURE — 2580000003 HC RX 258: Performed by: INTERNAL MEDICINE

## 2024-05-07 PROCEDURE — 2580000003 HC RX 258: Performed by: RADIOLOGY

## 2024-05-07 PROCEDURE — 99291 CRITICAL CARE FIRST HOUR: CPT | Performed by: SURGERY

## 2024-05-07 PROCEDURE — 94640 AIRWAY INHALATION TREATMENT: CPT

## 2024-05-07 PROCEDURE — 2500000003 HC RX 250 WO HCPCS

## 2024-05-07 PROCEDURE — 6360000002 HC RX W HCPCS: Performed by: INTERNAL MEDICINE

## 2024-05-07 PROCEDURE — 99024 POSTOP FOLLOW-UP VISIT: CPT | Performed by: NURSE PRACTITIONER

## 2024-05-07 PROCEDURE — 6370000000 HC RX 637 (ALT 250 FOR IP): Performed by: INTERNAL MEDICINE

## 2024-05-07 PROCEDURE — 97530 THERAPEUTIC ACTIVITIES: CPT

## 2024-05-07 PROCEDURE — 94003 VENT MGMT INPAT SUBQ DAY: CPT

## 2024-05-07 PROCEDURE — 6360000002 HC RX W HCPCS: Performed by: STUDENT IN AN ORGANIZED HEALTH CARE EDUCATION/TRAINING PROGRAM

## 2024-05-07 PROCEDURE — 6360000002 HC RX W HCPCS

## 2024-05-07 PROCEDURE — 80053 COMPREHEN METABOLIC PANEL: CPT

## 2024-05-07 PROCEDURE — 84100 ASSAY OF PHOSPHORUS: CPT

## 2024-05-07 PROCEDURE — 83735 ASSAY OF MAGNESIUM: CPT

## 2024-05-07 PROCEDURE — 2000000000 HC ICU R&B

## 2024-05-07 PROCEDURE — 82330 ASSAY OF CALCIUM: CPT

## 2024-05-07 PROCEDURE — C9113 INJ PANTOPRAZOLE SODIUM, VIA: HCPCS | Performed by: STUDENT IN AN ORGANIZED HEALTH CARE EDUCATION/TRAINING PROGRAM

## 2024-05-07 PROCEDURE — 2580000003 HC RX 258

## 2024-05-07 PROCEDURE — 82962 GLUCOSE BLOOD TEST: CPT

## 2024-05-07 PROCEDURE — 85025 COMPLETE CBC W/AUTO DIFF WBC: CPT

## 2024-05-07 PROCEDURE — 82805 BLOOD GASES W/O2 SATURATION: CPT

## 2024-05-07 PROCEDURE — 36592 COLLECT BLOOD FROM PICC: CPT

## 2024-05-07 PROCEDURE — 6370000000 HC RX 637 (ALT 250 FOR IP)

## 2024-05-07 PROCEDURE — 71045 X-RAY EXAM CHEST 1 VIEW: CPT

## 2024-05-07 PROCEDURE — 2580000003 HC RX 258: Performed by: STUDENT IN AN ORGANIZED HEALTH CARE EDUCATION/TRAINING PROGRAM

## 2024-05-07 RX ORDER — MAGNESIUM SULFATE IN WATER 40 MG/ML
2000 INJECTION, SOLUTION INTRAVENOUS ONCE
Status: COMPLETED | OUTPATIENT
Start: 2024-05-07 | End: 2024-05-07

## 2024-05-07 RX ADMIN — MICAFUNGIN SODIUM 100 MG: 100 INJECTION, POWDER, LYOPHILIZED, FOR SOLUTION INTRAVENOUS at 17:12

## 2024-05-07 RX ADMIN — SODIUM CHLORIDE, PRESERVATIVE FREE 10 ML: 5 INJECTION INTRAVENOUS at 08:16

## 2024-05-07 RX ADMIN — IPRATROPIUM BROMIDE AND ALBUTEROL SULFATE 1 DOSE: .5; 2.5 SOLUTION RESPIRATORY (INHALATION) at 19:33

## 2024-05-07 RX ADMIN — OXYCODONE HYDROCHLORIDE 10 MG: 5 SOLUTION ORAL at 12:03

## 2024-05-07 RX ADMIN — INSULIN LISPRO 2 UNITS: 100 INJECTION, SOLUTION INTRAVENOUS; SUBCUTANEOUS at 08:15

## 2024-05-07 RX ADMIN — POTASSIUM PHOSPHATE, MONOBASIC AND POTASSIUM PHOSPHATE, DIBASIC 20 MMOL: 224; 236 INJECTION, SOLUTION, CONCENTRATE INTRAVENOUS at 08:55

## 2024-05-07 RX ADMIN — INSULIN LISPRO 4 UNITS: 100 INJECTION, SOLUTION INTRAVENOUS; SUBCUTANEOUS at 21:02

## 2024-05-07 RX ADMIN — MAGNESIUM SULFATE HEPTAHYDRATE 2000 MG: 40 INJECTION, SOLUTION INTRAVENOUS at 08:57

## 2024-05-07 RX ADMIN — IPRATROPIUM BROMIDE AND ALBUTEROL SULFATE 1 DOSE: .5; 2.5 SOLUTION RESPIRATORY (INHALATION) at 16:25

## 2024-05-07 RX ADMIN — OCTREOTIDE ACETATE 50 MCG: 50 INJECTION, SOLUTION INTRAVENOUS; SUBCUTANEOUS at 00:23

## 2024-05-07 RX ADMIN — ACETAMINOPHEN 650 MG: 650 SOLUTION ORAL at 12:03

## 2024-05-07 RX ADMIN — BUPROPION HYDROCHLORIDE 100 MG: 100 TABLET, FILM COATED ORAL at 21:00

## 2024-05-07 RX ADMIN — SODIUM CHLORIDE, PRESERVATIVE FREE 10 ML: 5 INJECTION INTRAVENOUS at 21:00

## 2024-05-07 RX ADMIN — CALCIUM GLUCONATE: 98 INJECTION, SOLUTION INTRAVENOUS at 17:52

## 2024-05-07 RX ADMIN — MEROPENEM 1000 MG: 1 INJECTION, POWDER, FOR SOLUTION INTRAVENOUS at 05:49

## 2024-05-07 RX ADMIN — IPRATROPIUM BROMIDE AND ALBUTEROL SULFATE 1 DOSE: .5; 2.5 SOLUTION RESPIRATORY (INHALATION) at 12:32

## 2024-05-07 RX ADMIN — SENNOSIDES AND DOCUSATE SODIUM 2 TABLET: 50; 8.6 TABLET ORAL at 08:14

## 2024-05-07 RX ADMIN — ACETAMINOPHEN 650 MG: 650 SOLUTION ORAL at 17:48

## 2024-05-07 RX ADMIN — MEROPENEM 1000 MG: 1 INJECTION, POWDER, FOR SOLUTION INTRAVENOUS at 22:05

## 2024-05-07 RX ADMIN — POLYVINYL ALCOHOL, POVIDONE 1 DROP: 14; 6 SOLUTION/ DROPS OPHTHALMIC at 12:03

## 2024-05-07 RX ADMIN — KETOTIFEN FUMARATE 1 DROP: 0.35 SOLUTION/ DROPS OPHTHALMIC at 20:59

## 2024-05-07 RX ADMIN — INSULIN LISPRO 4 UNITS: 100 INJECTION, SOLUTION INTRAVENOUS; SUBCUTANEOUS at 16:29

## 2024-05-07 RX ADMIN — ACETAMINOPHEN 650 MG: 650 SOLUTION ORAL at 00:22

## 2024-05-07 RX ADMIN — POLYVINYL ALCOHOL, POVIDONE 1 DROP: 14; 6 SOLUTION/ DROPS OPHTHALMIC at 00:22

## 2024-05-07 RX ADMIN — KETOTIFEN FUMARATE 1 DROP: 0.35 SOLUTION/ DROPS OPHTHALMIC at 08:15

## 2024-05-07 RX ADMIN — 0.12% CHLORHEXIDINE GLUCONATE 15 ML: 1.2 RINSE ORAL at 08:15

## 2024-05-07 RX ADMIN — ATORVASTATIN CALCIUM 10 MG: 10 TABLET, FILM COATED ORAL at 20:59

## 2024-05-07 RX ADMIN — ENOXAPARIN SODIUM 40 MG: 100 INJECTION SUBCUTANEOUS at 08:15

## 2024-05-07 RX ADMIN — SODIUM CHLORIDE, PRESERVATIVE FREE 10 ML: 5 INJECTION INTRAVENOUS at 20:58

## 2024-05-07 RX ADMIN — POLYVINYL ALCOHOL, POVIDONE 1 DROP: 14; 6 SOLUTION/ DROPS OPHTHALMIC at 16:36

## 2024-05-07 RX ADMIN — INSULIN LISPRO 2 UNITS: 100 INJECTION, SOLUTION INTRAVENOUS; SUBCUTANEOUS at 00:23

## 2024-05-07 RX ADMIN — INSULIN LISPRO 2 UNITS: 100 INJECTION, SOLUTION INTRAVENOUS; SUBCUTANEOUS at 04:01

## 2024-05-07 RX ADMIN — OCTREOTIDE ACETATE 50 MCG: 50 INJECTION, SOLUTION INTRAVENOUS; SUBCUTANEOUS at 08:15

## 2024-05-07 RX ADMIN — OXYCODONE HYDROCHLORIDE 10 MG: 5 SOLUTION ORAL at 05:48

## 2024-05-07 RX ADMIN — LABETALOL HYDROCHLORIDE 10 MG: 5 INJECTION, SOLUTION INTRAVENOUS at 08:05

## 2024-05-07 RX ADMIN — OCTREOTIDE ACETATE 50 MCG: 50 INJECTION, SOLUTION INTRAVENOUS; SUBCUTANEOUS at 16:29

## 2024-05-07 RX ADMIN — OXYCODONE HYDROCHLORIDE 10 MG: 5 SOLUTION ORAL at 00:22

## 2024-05-07 RX ADMIN — BUPROPION HYDROCHLORIDE 100 MG: 100 TABLET, FILM COATED ORAL at 08:14

## 2024-05-07 RX ADMIN — POLYVINYL ALCOHOL, POVIDONE 1 DROP: 14; 6 SOLUTION/ DROPS OPHTHALMIC at 20:59

## 2024-05-07 RX ADMIN — 0.12% CHLORHEXIDINE GLUCONATE 15 ML: 1.2 RINSE ORAL at 20:59

## 2024-05-07 RX ADMIN — METOCLOPRAMIDE 10 MG: 5 INJECTION, SOLUTION INTRAMUSCULAR; INTRAVENOUS at 20:59

## 2024-05-07 RX ADMIN — INSULIN LISPRO 2 UNITS: 100 INJECTION, SOLUTION INTRAVENOUS; SUBCUTANEOUS at 12:03

## 2024-05-07 RX ADMIN — ACETAMINOPHEN 650 MG: 650 SOLUTION ORAL at 05:48

## 2024-05-07 RX ADMIN — BUPROPION HYDROCHLORIDE 100 MG: 100 TABLET, FILM COATED ORAL at 13:55

## 2024-05-07 RX ADMIN — OXYCODONE HYDROCHLORIDE 10 MG: 5 SOLUTION ORAL at 17:48

## 2024-05-07 RX ADMIN — SODIUM CHLORIDE, PRESERVATIVE FREE 40 MG: 5 INJECTION INTRAVENOUS at 08:15

## 2024-05-07 RX ADMIN — INSULIN GLARGINE 8 UNITS: 100 INJECTION, SOLUTION SUBCUTANEOUS at 20:59

## 2024-05-07 RX ADMIN — POLYVINYL ALCOHOL, POVIDONE 1 DROP: 14; 6 SOLUTION/ DROPS OPHTHALMIC at 08:15

## 2024-05-07 RX ADMIN — METOCLOPRAMIDE 10 MG: 5 INJECTION, SOLUTION INTRAMUSCULAR; INTRAVENOUS at 00:22

## 2024-05-07 RX ADMIN — POLYVINYL ALCOHOL, POVIDONE 1 DROP: 14; 6 SOLUTION/ DROPS OPHTHALMIC at 04:01

## 2024-05-07 RX ADMIN — METOCLOPRAMIDE 10 MG: 5 INJECTION, SOLUTION INTRAMUSCULAR; INTRAVENOUS at 08:14

## 2024-05-07 RX ADMIN — MEROPENEM 1000 MG: 1 INJECTION, POWDER, FOR SOLUTION INTRAVENOUS at 13:54

## 2024-05-07 RX ADMIN — IPRATROPIUM BROMIDE AND ALBUTEROL SULFATE 1 DOSE: .5; 2.5 SOLUTION RESPIRATORY (INHALATION) at 08:33

## 2024-05-07 RX ADMIN — METOCLOPRAMIDE 10 MG: 5 INJECTION, SOLUTION INTRAMUSCULAR; INTRAVENOUS at 13:55

## 2024-05-07 ASSESSMENT — PULMONARY FUNCTION TESTS
PIF_VALUE: 18
PIF_VALUE: 15
PIF_VALUE: 18
PIF_VALUE: 15
PIF_VALUE: 18
PIF_VALUE: 16
PIF_VALUE: 16
PIF_VALUE: 18
PIF_VALUE: 18
PIF_VALUE: 17
PIF_VALUE: 18
PIF_VALUE: 16
PIF_VALUE: 17
PIF_VALUE: 18
PIF_VALUE: 16
PIF_VALUE: 17
PIF_VALUE: 15
PIF_VALUE: 17
PIF_VALUE: 16
PIF_VALUE: 17
PIF_VALUE: 18
PIF_VALUE: 16
PIF_VALUE: 15

## 2024-05-07 ASSESSMENT — PAIN SCALES - GENERAL
PAINLEVEL_OUTOF10: 0

## 2024-05-07 NOTE — PROGRESS NOTES
[facility]   Gastroenterology, Hepatology, &  Advanced Endoscopy    Reason for consult: PEG-J tube placement  ASSESSMENT AND PLAN:  -Pt today is awake remains on Ventilator  - Follows commands  -No further episodes of emesis post G port was placed to gravity.  - No issues with PEG-J per staff.  - TG running at 20 cc/hr      -PEG-J tube placed  4/30/24  - Not  fully following commands  - Vent  - GIULIANO draining  -  TPN currently on hold  -Staff state emesis x3 this am, continue to monitor post Peg-J.  - Staff state Peg- J functioning well.  - S/P total pancreatectomy and splenectomy with insulin gtt  -S/p pylorus sparing open Whipple   - Pt remains intubated, sedated,restrained  - Increased restlessness attempting to pull as ETT when not restrained.  - INR 1.2  -Hgb  8.3  - PLT  927     Post TRACHEOTOMY PERCUTANEOUS BRONCHOSCOPY  Bronchoscopy with bronchial levage 5/1/24      HISTORY OF PRESENT ILLNESS:    Leatha Willard is a 58 y.o. female who presented to the SICU for post-op monitoring following a whipple. The patient has been admitted to the hospital since 4/15/2024 who presents for evaluation of pancreatic cyst. She has a hx of arthritis and back pain s/p cervical fusion. She was being evaluated for her mid back pain with imaging and was found to have a large pancreatic cyst on CT. She then had an MRI/MRCP showing a large >10cm cyst in the pancreatic head extending to the transverse colon mesentery. She had an EUS with Dr. Hammond at [facility]. FNA and fluid analysis was consistent with a benign cyst. There were no suspicious features present. Patient underwent a Pylorus-preserving pancreaticoduodenectomy on 4/15 and was admitted to the ICU post-operatively.  On 4/18 she underwent a complete pancreatectomy and splenectomy secondary to a worsening pancreatic leak.     I/O (24Hr):     Intake/Output Summary (Last 24 hours) at 4/24/2024 0748  Last data filed at 4/24/2024 0703      Gross per 24 hour   Intake 3492.77 ml  EXAM:  BP (!) 168/74   Pulse 87   Temp 100.1 °F (37.8 °C) (Axillary)   Resp 24   Ht 1.626 m (5' 4\")   Wt 87.2 kg (192 lb 3.2 oz)   SpO2 99%   BMI 32.99 kg/m²   Physical Exam:  General: Overall ill-appearing, intubated, sedated  HEENT: PERRLA, EOMI, Anicteric sclera, MMM, no rhinorrhea  Cards: RRR, no LE edema  Resp: Breathing comfortably on room air, good air movement, no use of accessory muscles, no audible wheezing  Abdomen:  Left GIULIANO with dark serous drainage  Right GIULIANO with minimal bile tinged serous drainage  Midline incision with copious dark mucoid drainage   Extremities: Moves all extremities, no effusions or bruising.  Skin: No rashes or jaundice  Neuro: pt sedated      Greater than or equal to 25 minutes was spent providing face-to-face patient care, including:  and coordinating care, reviewing the chart, labs, and diagnostics, as well as medical decision making. Greater than 50% of this time was spent instructing and counseling the patient face to face regarding findings and recommendations.    Thank you for including us in the care of this patient. Please do not hesitate to contact us with any additional questions or concerns.      Discussed with Dr. Hahn.  Electronically signed by ALL Najera CNP on 5/7/2024 at 10:10 AM

## 2024-05-07 NOTE — PLAN OF CARE
Problem: Respiratory - Adult  Goal: Achieves optimal ventilation and oxygenation  5/7/2024 0226 by Xochitl Plummer RCP  Outcome: Progressing
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
## 2024-05-07 NOTE — PROGRESS NOTES
[facility]  SURGICAL INTENSIVE CARE UNIT (SICU)  ATTENDING PHYSICIAN CRITICAL CARE PROGRESS NOTE     I have personally examined the patient, personally reviewed the record, and personally discussed the case with the resident. I have personally reviewed all relevant labs and imaging data. I am actively managing this patient's medications.  Please refer to the resident's note. I agree with the assessment and plan with the following corrections/additions. The following summarizes my clinical findings and independent assessment.     CC: critical care management after pancreaticoduodenectomy    Hospital Course/Overnight Events:  4/15--pylorus preserving Whipple for large benign pancreatic cyst  4/16--continued ICU d/t nausea  4/17--bobby replaced, pancreatic leak, boluses given, albumin given, JUSTINE, toradol stopped, CVC changed  4/18--CVC replaced, NGT repalced, scop patch, robaxin, d/c IVF; total pancreatectomy and splenectomy, insulin gtt  4/19--remains intubated, TPN, lasix  4/20--albumin x 6 with lasix, robaxin restarted  4/21--low cortisol--steroids started; lasix, 1U PRBC, vent adjustment for inc plateau pressure  4/22--some air trapping on vent overnight  4/23--copious dark mucoid drainage from incision  4/24--had CT abd/pelvis yesterday; still with ongoing drainage from incision  4/25--some agitation overnight; still with midline incision drainage; febrile (Tmax 100.6)  4/26--re-scanned yesterday--unchanged; lalo spont breathing trial; for PEG-J today  4/27--PEG-J not placed yesterday  4/28--ongoing abd wound drainage  4/29: drainage continues, no sedation, attempting SBT, peg tube placement is pending scheduling. Day 12 of intubation, if no improvement a trach will be considered.   4/30: PEGJ placed, failed SBT   5/1: trach placed, trickle tube feeds through PEGJ started  5/2:  NAOE, alert, not following commands  5/3: NAOE, patient alert but does not follow commands   5/4: bilious drainage
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
## 2024-05-07 NOTE — PROGRESS NOTES
05/07/24 1628   Patient Observation   Pulse 94   SpO2 99 %   Vent Information   Vent Mode CPAP/PS   Ventilator Settings   FiO2  40 %   PEEP/CPAP (cmH2O) 5   Pressure Support (cm H2O) 12 cm H2O   Vent Patient Data (Readings)   Vt (Measured) 335 mL   Peak Inspiratory Pressure (cmH2O) 18 cmH2O   Rate Measured 20 br/min   Minute Volume (L/min) 6.68 Liters   Mean Airway Pressure (cmH2O) 7 cmH20   Plateau Pressure (cm H2O) 17 cm H2O   Driving Pressure 12   I:E Ratio 1:3.90   Flow Sensitivity 2 L/min   Static Compliance (L/cm H2O) 27   Backup Apnea On   Backup Rate 16 Breaths Per Minute   Backup Vt 350   Backup I Time 20   Vent Alarm Settings   High Pressure (cmH2O) 40 cmH2O   Low Minute Volume (lpm) 4 L/min   High Minute Volume (lpm) 14 L/min   Low Exhaled Vt (ml) 250 mL   High Exhaled Vt (ml) 600 mL   RR High (bpm) 30 br/min   Apnea (secs) 20 secs   Additional Respiratoray Assessments   Humidification Source Heated wire   Humidification Temp 37   Circuit Condensation Drained   Ambu Bag With Mask At Bedside Yes   Backup Trachs Available (Size) 8.0

## 2024-05-07 NOTE — PLAN OF CARE
Problem: Gastrointestinal - Adult  Goal: Maintains or returns to baseline bowel function  Outcome: Not Progressing  Flowsheets (Taken 5/7/2024 0800)  Maintains or returns to baseline bowel function:   Assess bowel function   Encourage oral fluids to ensure adequate hydration   Administer ordered medications as needed   Encourage mobilization and activity     Problem: Discharge Planning  Goal: Discharge to home or other facility with appropriate resources  Outcome: Progressing  Flowsheets (Taken 5/7/2024 0800)  Discharge to home or other facility with appropriate resources: Identify barriers to discharge with patient and caregiver     Problem: Cardiovascular - Adult  Goal: Maintains optimal cardiac output and hemodynamic stability  Outcome: Progressing  Flowsheets (Taken 5/7/2024 0800)  Maintains optimal cardiac output and hemodynamic stability:   Monitor blood pressure and heart rate   Assess for signs of decreased cardiac output     Problem: Musculoskeletal - Adult  Goal: Return mobility to safest level of function  Outcome: Progressing  Flowsheets (Taken 5/7/2024 0800)  Return Mobility to Safest Level of Function:   Obtain physical therapy/occupational therapy consults as needed   Ensure adequate protection for wounds/incisions during mobilization   Instruct patient/family in ordered activity level     Problem: Metabolic/Fluid and Electrolytes - Adult  Goal: Electrolytes maintained within normal limits  Outcome: Progressing  Flowsheets (Taken 5/7/2024 0800)  Electrolytes maintained within normal limits:   Monitor labs and assess patient for signs and symptoms of electrolyte imbalances   Administer electrolyte replacement as ordered

## 2024-05-07 NOTE — PROGRESS NOTES
Evaluated patient for Trach Collar, at this time patient is not strong enough be off ventilator. Patient was getting tidal volumes of 270-300 on a PS of 10 and CO2 was slightly elevated on ABG this morning. Bumped PS up to 12 to get tidal volumes of 300-350 which is close to what her former tidal volume control settings were.

## 2024-05-07 NOTE — PROGRESS NOTES
[facility] Surgery   Daily Progress Note      Patient's Name/Date of Birth: Leatha Willard / 1965    Date: May 7, 2024     Chief Complaint: s/p open whipple, Grade C POPF s/p takeback to OR for completion pancreatectomy    Patient Active Problem List   Diagnosis    Recurrent major depressive disorder (HCC)    Essential hypertension    Hyperlipidemia    Prediabetes    Pre-operative laboratory examination    Class 1 obesity due to excess calories without serious comorbidity with body mass index (BMI) of 32.0 to 32.9 in adult    HIREN (obstructive sleep apnea)    Pancreatic cyst    Colon polyps    Cyst of pancreas    Chest pain    Tobacco abuse-- quit 2 weeks ago    Abdominal pain    Serous cystadenoma    Pancreatic fistula    Sepsis with acute renal failure and septic shock (HCC)    JUSTINE (acute kidney injury) (HCC)    Hypophosphatemia    Acute respiratory failure with hypoxia (HCC)    Post-pancreatectomy diabetes (HCC)    On total parenteral nutrition    Hypoalbuminemia    Electrolyte imbalance    Hypocalcemia    Acute blood loss anemia    Hypokalemia       Subjective:   No acute events overnight.  Patient resting comfortably.  Patient still following commands with bilateral upper and lower extremities.  Patient tolerating trickle tube feeds via J-tube without increasing midline drainage per nursing.    Objective:  BP (!) 154/74   Pulse 96   Temp 98.8 °F (37.1 °C) (Axillary)   Resp 22   Ht 1.626 m (5' 4\")   Wt 87.2 kg (192 lb 3.2 oz)   SpO2 99%   BMI 32.99 kg/m²   Labs:  Recent Labs     05/05/24  2330 05/06/24  0418 05/07/24  0343   WBC 19.9* 21.6* 17.6*   HGB 8.2* 8.3* 8.3*   HCT 26.4* 26.9* 27.1*       Recent Labs     05/05/24  0405 05/06/24  0418 05/07/24  0343    132 133   K 4.2 4.6 4.5   CL 95* 93* 94*   CO2 26 27 27   BUN 13 14 17   CREATININE 0.4* 0.4* 0.4*       Recent Labs     05/05/24  0405 05/06/24  0418 05/07/24  0343   ALKPHOS 154* 155* 150*   ALT 15 13 13   AST 18 17 17   BILITOT 0.4 0.4 0.3

## 2024-05-07 NOTE — PROGRESS NOTES
05/07/24 1937   Patient Observation   Pulse 79   SpO2 100 %   Vent Information   Vent Mode CPAP/PS   Ventilator Settings   FiO2  40 %   PEEP/CPAP (cmH2O) 5   Pressure Support (cm H2O) 12 cm H2O   Vent Patient Data (Readings)   Vt (Measured) 381 mL   Peak Inspiratory Pressure (cmH2O) 18 cmH2O   Rate Measured 13 br/min   Minute Volume (L/min) 5.36 Liters   Mean Airway Pressure (cmH2O) 7 cmH20   Plateau Pressure (cm H2O) 17 cm H2O   Driving Pressure 12   I:E Ratio 1:3.90   Flow Sensitivity 2 L/min   Backup Apnea On   Backup Rate 16 Breaths Per Minute   Backup Vt 350   Backup I Time 20   Vent Alarm Settings   High Pressure (cmH2O) 40 cmH2O   Low Minute Volume (lpm) 4 L/min   High Minute Volume (lpm) 14 L/min   Low Exhaled Vt (ml) 250 mL   High Exhaled Vt (ml) 600 mL   RR High (bpm) 30 br/min   Apnea (secs) 20 secs   Additional Respiratoray Assessments   Humidification Source Heated wire   Humidification Temp 37   Circuit Condensation Drained   Ambu Bag With Mask At Bedside Yes   Backup Trachs Available (Size) 8.0

## 2024-05-08 ENCOUNTER — APPOINTMENT (OUTPATIENT)
Dept: GENERAL RADIOLOGY | Age: 59
DRG: 004 | End: 2024-05-08
Attending: STUDENT IN AN ORGANIZED HEALTH CARE EDUCATION/TRAINING PROGRAM
Payer: COMMERCIAL

## 2024-05-08 LAB
AADO2: 125.9 MMHG
ALBUMIN SERPL-MCNC: 2.8 G/DL (ref 3.5–5.2)
ALP SERPL-CCNC: 150 U/L (ref 35–104)
ALT SERPL-CCNC: 12 U/L (ref 0–32)
ANION GAP SERPL CALCULATED.3IONS-SCNC: 12 MMOL/L (ref 7–16)
AST SERPL-CCNC: 17 U/L (ref 0–31)
B.E.: 4.7 MMOL/L (ref -3–3)
BASOPHILS # BLD: 0.13 K/UL (ref 0–0.2)
BASOPHILS NFR BLD: 1 % (ref 0–2)
BILIRUB SERPL-MCNC: 0.3 MG/DL (ref 0–1.2)
BUN SERPL-MCNC: 17 MG/DL (ref 6–20)
CA-I BLD-SCNC: 1.11 MMOL/L (ref 1.15–1.33)
CALCIUM SERPL-MCNC: 8.4 MG/DL (ref 8.6–10.2)
CHLORIDE SERPL-SCNC: 95 MMOL/L (ref 98–107)
CO2 SERPL-SCNC: 28 MMOL/L (ref 22–29)
COHB: 0.3 % (ref 0–1.5)
CREAT SERPL-MCNC: 0.3 MG/DL (ref 0.5–1)
CRITICAL: ABNORMAL
DATE ANALYZED: ABNORMAL
DATE OF COLLECTION: ABNORMAL
EOSINOPHIL # BLD: 2.51 K/UL (ref 0.05–0.5)
EOSINOPHILS RELATIVE PERCENT: 17 % (ref 0–6)
ERYTHROCYTE [DISTWIDTH] IN BLOOD BY AUTOMATED COUNT: 15.2 % (ref 11.5–15)
FIO2: 40 %
GFR, ESTIMATED: >90 ML/MIN/1.73M2
GLUCOSE BLD-MCNC: 144 MG/DL (ref 74–99)
GLUCOSE BLD-MCNC: 162 MG/DL (ref 74–99)
GLUCOSE BLD-MCNC: 165 MG/DL (ref 74–99)
GLUCOSE BLD-MCNC: 176 MG/DL (ref 74–99)
GLUCOSE BLD-MCNC: 195 MG/DL (ref 74–99)
GLUCOSE BLD-MCNC: 259 MG/DL (ref 74–99)
GLUCOSE BLD-MCNC: 262 MG/DL (ref 74–99)
GLUCOSE SERPL-MCNC: 122 MG/DL (ref 74–99)
HCO3: 28.7 MMOL/L (ref 22–26)
HCT VFR BLD AUTO: 26.5 % (ref 34–48)
HGB BLD-MCNC: 8 G/DL (ref 11.5–15.5)
HHB: 2.6 % (ref 0–5)
IMM GRANULOCYTES # BLD AUTO: 0.18 K/UL (ref 0–0.58)
IMM GRANULOCYTES NFR BLD: 1 % (ref 0–5)
LAB: ABNORMAL
LYMPHOCYTES NFR BLD: 3.03 K/UL (ref 1.5–4)
LYMPHOCYTES RELATIVE PERCENT: 21 % (ref 20–42)
Lab: 405
MAGNESIUM SERPL-MCNC: 2.1 MG/DL (ref 1.6–2.6)
MCH RBC QN AUTO: 29.2 PG (ref 26–35)
MCHC RBC AUTO-ENTMCNC: 30.2 G/DL (ref 32–34.5)
MCV RBC AUTO: 96.7 FL (ref 80–99.9)
METHB: 0.2 % (ref 0–1.5)
MODE: ABNORMAL
MONOCYTES NFR BLD: 1.73 K/UL (ref 0.1–0.95)
MONOCYTES NFR BLD: 12 % (ref 2–12)
NEUTROPHILS NFR BLD: 49 % (ref 43–80)
NEUTS SEG NFR BLD: 7.16 K/UL (ref 1.8–7.3)
O2 SATURATION: 97.4 % (ref 92–98.5)
O2HB: 96.9 % (ref 94–97)
OPERATOR ID: 7296
PATIENT TEMP: 37 C
PCO2: 40.4 MMHG (ref 35–45)
PFO2: 2.57 MMHG/%
PH BLOOD GAS: 7.47 (ref 7.35–7.45)
PHOSPHATE SERPL-MCNC: 2.6 MG/DL (ref 2.5–4.5)
PLATELET # BLD AUTO: 832 K/UL (ref 130–450)
PMV BLD AUTO: 10 FL (ref 7–12)
PO2: 102.8 MMHG (ref 75–100)
POTASSIUM SERPL-SCNC: 4.4 MMOL/L (ref 3.5–5)
PROT SERPL-MCNC: 7 G/DL (ref 6.4–8.3)
PS: 12 CMH20
RBC # BLD AUTO: 2.74 M/UL (ref 3.5–5.5)
RBC # BLD: ABNORMAL 10*6/UL
RI(T): 1.23
SODIUM SERPL-SCNC: 135 MMOL/L (ref 132–146)
SOURCE, BLOOD GAS: ABNORMAL
THB: 9.8 G/DL (ref 11.5–16.5)
TIME ANALYZED: 410
WBC OTHER # BLD: 14.7 K/UL (ref 4.5–11.5)

## 2024-05-08 PROCEDURE — A4216 STERILE WATER/SALINE, 10 ML: HCPCS | Performed by: STUDENT IN AN ORGANIZED HEALTH CARE EDUCATION/TRAINING PROGRAM

## 2024-05-08 PROCEDURE — 2500000003 HC RX 250 WO HCPCS

## 2024-05-08 PROCEDURE — 2580000003 HC RX 258

## 2024-05-08 PROCEDURE — 6360000002 HC RX W HCPCS: Performed by: SURGERY

## 2024-05-08 PROCEDURE — 6370000000 HC RX 637 (ALT 250 FOR IP): Performed by: STUDENT IN AN ORGANIZED HEALTH CARE EDUCATION/TRAINING PROGRAM

## 2024-05-08 PROCEDURE — 99254 IP/OBS CNSLTJ NEW/EST MOD 60: CPT | Performed by: INTERNAL MEDICINE

## 2024-05-08 PROCEDURE — 94003 VENT MGMT INPAT SUBQ DAY: CPT

## 2024-05-08 PROCEDURE — C9113 INJ PANTOPRAZOLE SODIUM, VIA: HCPCS | Performed by: STUDENT IN AN ORGANIZED HEALTH CARE EDUCATION/TRAINING PROGRAM

## 2024-05-08 PROCEDURE — 6370000000 HC RX 637 (ALT 250 FOR IP): Performed by: INTERNAL MEDICINE

## 2024-05-08 PROCEDURE — 2000000000 HC ICU R&B

## 2024-05-08 PROCEDURE — 6360000002 HC RX W HCPCS: Performed by: INTERNAL MEDICINE

## 2024-05-08 PROCEDURE — 2500000003 HC RX 250 WO HCPCS: Performed by: SURGERY

## 2024-05-08 PROCEDURE — 83735 ASSAY OF MAGNESIUM: CPT

## 2024-05-08 PROCEDURE — 2580000003 HC RX 258: Performed by: RADIOLOGY

## 2024-05-08 PROCEDURE — 6360000002 HC RX W HCPCS: Performed by: STUDENT IN AN ORGANIZED HEALTH CARE EDUCATION/TRAINING PROGRAM

## 2024-05-08 PROCEDURE — 99232 SBSQ HOSP IP/OBS MODERATE 35: CPT | Performed by: INTERNAL MEDICINE

## 2024-05-08 PROCEDURE — 82805 BLOOD GASES W/O2 SATURATION: CPT

## 2024-05-08 PROCEDURE — 6370000000 HC RX 637 (ALT 250 FOR IP): Performed by: SURGERY

## 2024-05-08 PROCEDURE — 71045 X-RAY EXAM CHEST 1 VIEW: CPT

## 2024-05-08 PROCEDURE — 80053 COMPREHEN METABOLIC PANEL: CPT

## 2024-05-08 PROCEDURE — 82962 GLUCOSE BLOOD TEST: CPT

## 2024-05-08 PROCEDURE — 94640 AIRWAY INHALATION TREATMENT: CPT

## 2024-05-08 PROCEDURE — 36592 COLLECT BLOOD FROM PICC: CPT

## 2024-05-08 PROCEDURE — 6360000002 HC RX W HCPCS

## 2024-05-08 PROCEDURE — 2580000003 HC RX 258: Performed by: INTERNAL MEDICINE

## 2024-05-08 PROCEDURE — 2580000003 HC RX 258: Performed by: SURGERY

## 2024-05-08 PROCEDURE — 99291 CRITICAL CARE FIRST HOUR: CPT | Performed by: SURGERY

## 2024-05-08 PROCEDURE — 84100 ASSAY OF PHOSPHORUS: CPT

## 2024-05-08 PROCEDURE — 2580000003 HC RX 258: Performed by: STUDENT IN AN ORGANIZED HEALTH CARE EDUCATION/TRAINING PROGRAM

## 2024-05-08 PROCEDURE — 82330 ASSAY OF CALCIUM: CPT

## 2024-05-08 PROCEDURE — 85025 COMPLETE CBC W/AUTO DIFF WBC: CPT

## 2024-05-08 RX ORDER — BISACODYL 10 MG
10 SUPPOSITORY, RECTAL RECTAL ONCE
Status: COMPLETED | OUTPATIENT
Start: 2024-05-08 | End: 2024-05-08

## 2024-05-08 RX ORDER — POLYETHYLENE GLYCOL 3350 17 G/17G
17 POWDER, FOR SOLUTION ORAL DAILY
Status: DISPENSED | OUTPATIENT
Start: 2024-05-08

## 2024-05-08 RX ADMIN — POTASSIUM PHOSPHATE, MONOBASIC AND POTASSIUM PHOSPHATE, DIBASIC 20 MMOL: 224; 236 INJECTION, SOLUTION, CONCENTRATE INTRAVENOUS at 11:44

## 2024-05-08 RX ADMIN — IPRATROPIUM BROMIDE AND ALBUTEROL SULFATE 1 DOSE: .5; 2.5 SOLUTION RESPIRATORY (INHALATION) at 20:33

## 2024-05-08 RX ADMIN — METOCLOPRAMIDE 10 MG: 5 INJECTION, SOLUTION INTRAMUSCULAR; INTRAVENOUS at 19:51

## 2024-05-08 RX ADMIN — ACETAMINOPHEN 650 MG: 650 SOLUTION ORAL at 23:31

## 2024-05-08 RX ADMIN — MICAFUNGIN SODIUM 100 MG: 100 INJECTION, POWDER, LYOPHILIZED, FOR SOLUTION INTRAVENOUS at 16:52

## 2024-05-08 RX ADMIN — ACETAMINOPHEN 650 MG: 650 SOLUTION ORAL at 17:28

## 2024-05-08 RX ADMIN — CALCIUM GLUCONATE 2000 MG: 98 INJECTION, SOLUTION INTRAVENOUS at 09:20

## 2024-05-08 RX ADMIN — POLYVINYL ALCOHOL, POVIDONE 1 DROP: 14; 6 SOLUTION/ DROPS OPHTHALMIC at 08:06

## 2024-05-08 RX ADMIN — ACETAMINOPHEN 650 MG: 650 SOLUTION ORAL at 11:45

## 2024-05-08 RX ADMIN — OXYCODONE HYDROCHLORIDE 10 MG: 5 SOLUTION ORAL at 17:28

## 2024-05-08 RX ADMIN — SODIUM CHLORIDE, PRESERVATIVE FREE 40 MG: 5 INJECTION INTRAVENOUS at 08:04

## 2024-05-08 RX ADMIN — SENNOSIDES AND DOCUSATE SODIUM 2 TABLET: 50; 8.6 TABLET ORAL at 08:04

## 2024-05-08 RX ADMIN — 0.12% CHLORHEXIDINE GLUCONATE 15 ML: 1.2 RINSE ORAL at 19:51

## 2024-05-08 RX ADMIN — ATORVASTATIN CALCIUM 10 MG: 10 TABLET, FILM COATED ORAL at 19:52

## 2024-05-08 RX ADMIN — SODIUM CHLORIDE, PRESERVATIVE FREE 10 ML: 5 INJECTION INTRAVENOUS at 19:52

## 2024-05-08 RX ADMIN — WHITE PETROLATUM: .15; .85 OINTMENT OPHTHALMIC at 15:47

## 2024-05-08 RX ADMIN — OCTREOTIDE ACETATE 50 MCG: 50 INJECTION, SOLUTION INTRAVENOUS; SUBCUTANEOUS at 15:47

## 2024-05-08 RX ADMIN — MEROPENEM 1000 MG: 1 INJECTION, POWDER, FOR SOLUTION INTRAVENOUS at 14:13

## 2024-05-08 RX ADMIN — ACETAMINOPHEN 650 MG: 650 SOLUTION ORAL at 00:00

## 2024-05-08 RX ADMIN — INSULIN LISPRO 6 UNITS: 100 INJECTION, SOLUTION INTRAVENOUS; SUBCUTANEOUS at 08:05

## 2024-05-08 RX ADMIN — POLYVINYL ALCOHOL, POVIDONE 1 DROP: 14; 6 SOLUTION/ DROPS OPHTHALMIC at 03:56

## 2024-05-08 RX ADMIN — KETOTIFEN FUMARATE 1 DROP: 0.35 SOLUTION/ DROPS OPHTHALMIC at 19:51

## 2024-05-08 RX ADMIN — BUPROPION HYDROCHLORIDE 100 MG: 100 TABLET, FILM COATED ORAL at 19:51

## 2024-05-08 RX ADMIN — INSULIN LISPRO 2 UNITS: 100 INJECTION, SOLUTION INTRAVENOUS; SUBCUTANEOUS at 19:58

## 2024-05-08 RX ADMIN — BISACODYL 10 MG: 10 SUPPOSITORY RECTAL at 16:40

## 2024-05-08 RX ADMIN — 0.12% CHLORHEXIDINE GLUCONATE 15 ML: 1.2 RINSE ORAL at 08:08

## 2024-05-08 RX ADMIN — OCTREOTIDE ACETATE 50 MCG: 50 INJECTION, SOLUTION INTRAVENOUS; SUBCUTANEOUS at 23:33

## 2024-05-08 RX ADMIN — WHITE PETROLATUM: .15; .85 OINTMENT OPHTHALMIC at 00:01

## 2024-05-08 RX ADMIN — OCTREOTIDE ACETATE 50 MCG: 50 INJECTION, SOLUTION INTRAVENOUS; SUBCUTANEOUS at 00:00

## 2024-05-08 RX ADMIN — MEROPENEM 1000 MG: 1 INJECTION, POWDER, FOR SOLUTION INTRAVENOUS at 06:10

## 2024-05-08 RX ADMIN — METOCLOPRAMIDE 10 MG: 5 INJECTION, SOLUTION INTRAMUSCULAR; INTRAVENOUS at 02:23

## 2024-05-08 RX ADMIN — ACETAMINOPHEN 650 MG: 650 SOLUTION ORAL at 06:04

## 2024-05-08 RX ADMIN — OXYCODONE HYDROCHLORIDE 10 MG: 5 SOLUTION ORAL at 00:00

## 2024-05-08 RX ADMIN — LABETALOL HYDROCHLORIDE 10 MG: 5 INJECTION, SOLUTION INTRAVENOUS at 11:04

## 2024-05-08 RX ADMIN — MEROPENEM 1000 MG: 1 INJECTION, POWDER, FOR SOLUTION INTRAVENOUS at 23:27

## 2024-05-08 RX ADMIN — INSULIN GLARGINE 8 UNITS: 100 INJECTION, SOLUTION SUBCUTANEOUS at 19:51

## 2024-05-08 RX ADMIN — BUPROPION HYDROCHLORIDE 100 MG: 100 TABLET, FILM COATED ORAL at 08:06

## 2024-05-08 RX ADMIN — ENOXAPARIN SODIUM 40 MG: 100 INJECTION SUBCUTANEOUS at 08:03

## 2024-05-08 RX ADMIN — POLYVINYL ALCOHOL, POVIDONE 1 DROP: 14; 6 SOLUTION/ DROPS OPHTHALMIC at 19:51

## 2024-05-08 RX ADMIN — OCTREOTIDE ACETATE 50 MCG: 50 INJECTION, SOLUTION INTRAVENOUS; SUBCUTANEOUS at 08:08

## 2024-05-08 RX ADMIN — METOCLOPRAMIDE 10 MG: 5 INJECTION, SOLUTION INTRAMUSCULAR; INTRAVENOUS at 08:04

## 2024-05-08 RX ADMIN — BUPROPION HYDROCHLORIDE 100 MG: 100 TABLET, FILM COATED ORAL at 14:09

## 2024-05-08 RX ADMIN — OXYCODONE HYDROCHLORIDE 10 MG: 5 SOLUTION ORAL at 06:04

## 2024-05-08 RX ADMIN — CALCIUM GLUCONATE: 98 INJECTION, SOLUTION INTRAVENOUS at 17:28

## 2024-05-08 RX ADMIN — IPRATROPIUM BROMIDE AND ALBUTEROL SULFATE 1 DOSE: .5; 2.5 SOLUTION RESPIRATORY (INHALATION) at 15:23

## 2024-05-08 RX ADMIN — IPRATROPIUM BROMIDE AND ALBUTEROL SULFATE 1 DOSE: .5; 2.5 SOLUTION RESPIRATORY (INHALATION) at 11:45

## 2024-05-08 RX ADMIN — LABETALOL HYDROCHLORIDE 10 MG: 5 INJECTION, SOLUTION INTRAVENOUS at 16:56

## 2024-05-08 RX ADMIN — METOCLOPRAMIDE 10 MG: 5 INJECTION, SOLUTION INTRAMUSCULAR; INTRAVENOUS at 14:08

## 2024-05-08 RX ADMIN — IPRATROPIUM BROMIDE AND ALBUTEROL SULFATE 1 DOSE: .5; 2.5 SOLUTION RESPIRATORY (INHALATION) at 08:48

## 2024-05-08 RX ADMIN — POLYVINYL ALCOHOL, POVIDONE 1 DROP: 14; 6 SOLUTION/ DROPS OPHTHALMIC at 11:45

## 2024-05-08 RX ADMIN — INSULIN LISPRO 2 UNITS: 100 INJECTION, SOLUTION INTRAVENOUS; SUBCUTANEOUS at 00:06

## 2024-05-08 RX ADMIN — OXYCODONE HYDROCHLORIDE 10 MG: 5 SOLUTION ORAL at 23:31

## 2024-05-08 RX ADMIN — SODIUM CHLORIDE, PRESERVATIVE FREE 10 ML: 5 INJECTION INTRAVENOUS at 08:05

## 2024-05-08 RX ADMIN — KETOTIFEN FUMARATE 1 DROP: 0.35 SOLUTION/ DROPS OPHTHALMIC at 08:04

## 2024-05-08 RX ADMIN — POLYETHYLENE GLYCOL 3350 17 G: 17 POWDER, FOR SOLUTION ORAL at 08:04

## 2024-05-08 RX ADMIN — INSULIN LISPRO 6 UNITS: 100 INJECTION, SOLUTION INTRAVENOUS; SUBCUTANEOUS at 15:53

## 2024-05-08 RX ADMIN — POLYVINYL ALCOHOL, POVIDONE 1 DROP: 14; 6 SOLUTION/ DROPS OPHTHALMIC at 23:32

## 2024-05-08 RX ADMIN — OXYCODONE HYDROCHLORIDE 10 MG: 5 SOLUTION ORAL at 11:45

## 2024-05-08 ASSESSMENT — PULMONARY FUNCTION TESTS
PIF_VALUE: 18
PIF_VALUE: 18
PIF_VALUE: 17
PIF_VALUE: 18
PIF_VALUE: 16
PIF_VALUE: 17
PIF_VALUE: 16
PIF_VALUE: 17
PIF_VALUE: 16
PIF_VALUE: 18
PIF_VALUE: 15
PIF_VALUE: 17
PIF_VALUE: 18
PIF_VALUE: 18
PIF_VALUE: 17
PIF_VALUE: 18
PIF_VALUE: 16
PIF_VALUE: 15
PIF_VALUE: 16
PIF_VALUE: 15
PIF_VALUE: 15
PIF_VALUE: 18
PIF_VALUE: 18
PIF_VALUE: 16

## 2024-05-08 ASSESSMENT — PAIN SCALES - GENERAL
PAINLEVEL_OUTOF10: 0

## 2024-05-08 NOTE — PROGRESS NOTES
Nutrition Assessment    Type and Reason for Visit:  Consult (Updated Goal TF Recs)    Nutrition Recommendations/Plan:     Continue NPO. Goal TF Rec when needed to advance further/ if TPN weaned:    Peptide Based (Vital AF 1.2) @ 55 ml/hr + 1 protein mod daily via feeding tube.   Provides: 1320 ml tv, 1584 kcals, 99 gm pro (1684 kcals & 125 gm pro w/ mod), 1070 ml free water  Regimen meets 100% est calorie & protein needs         Current Tube Feeding (TF) Orders:  Feeding Route: PEG/J (J port infusing, G port to gravity)  Formula: Peptide Based High Protein  Schedule: Continuous  Feeding Regimen: 45 ml/hr, running @ 20ml/hr advancing  Water Flushes: 30 ml q 4 hr = 180 ml water  Goal TF & Flush Orders Provides: 1080 ml tv, 1080 kcals, 94 gm pro, 902 ml free water, 1082 ml total water w/ flushes    Current Parenteral Nutrition Orders:  Type and Formula: 3-in-1 Custom   Duration: Continuous  Rate/Volume: 1100 ml tv @ 45.8 ml/hr  Current PN Order Provides: 70 gm AA, 118 gm dextrose, 22 gm lipid, 900 total kcals    Stacie Guillen, GUMARO, LD  Contact: Ext [phone]

## 2024-05-08 NOTE — CONSULTS
Pulmonary Critical Care Medicine      PULMONARY CRITICAL CARE CONSULTATION NOTE.    05/08/24    CONSULTING  PHYSICIAN: Dr. Rogers    REASON FOR REFERRAL: Chronic respiratory failure    Assessment /Recommendations-   Chronic respiratory failure status post tracheostomy and PEG tube placement for failure to liberate from mechanical ventilation on postop day 14 after pancreatico duodenectomy for a large benign pancreatic cyst  Hypoxia secondary to left-sided pleural effusion with compressive atelectasis  -Continue routine tracheostomy care  -Currently on mechanical ventilator on a pressure support of 10/5 with FiO2 of 40%  -Continue the liberation trials with eventual plan to decannulate  -Okay to transfer out of the ICU  -Continue PT OT  -Out of bed to chair as tolerated      History of Present Illness    Ms. Leatha Willard  is a 58 y.o. female was initially admitted for a pylorus-preserving Whipple procedure for a large benign pancreatic cyst under surgical service.  After the surgery she was transferred to the surgical intensive care unit.  She remained intubated and mechanically ventilated on the day 13th of a procedure when plan was made to do tracheostomy and PEG tube placement.  Currently patient is with a tracheostomy on mechanical ventilator on a pressure support mode of ventilation with 10/5 and 40% FiO2.  Awake and follows commands.  Pulmonary team involved for the ongoing management of tracheostomy and mechanical ventilation after the transfer to the floor      Baseline Exercise tolerance/MMRC score( Bold )     MMRC Dyspnea Scale  Grade Description of Breathlessness   0 I only get breathless with strenuous exercise.   1 I get short of breath when hurrying on level ground or walking up a slight hill.   2 On level ground, I walk slower than people of the same age because of breathlessness, or have to stop for breath when walking at my own pace.   3 I stop for breath after walking about  tracheostomy tube      Physical Exam    Vitals:    05/08/24 1145 05/08/24 1200 05/08/24 1215 05/08/24 1300   BP:  125/70  (!) 124/56   Pulse: 64 67  69   Resp:  15 18    Temp:  98 °F (36.7 °C)     TempSrc:  Oral     SpO2: 100% 99%  100%   Weight:       Height:           General-  Not in distress , comfortably lying , Has tracheostomy -  site looks healthy , On trach mask   Head and neck- Tracheostomy tube # 8.0 Shiley, cuffed, EM, MMM , AT/NC, No JVD. No thyromegaly, trachea midline.   Pulmonary - Bilateral air entry adequate with decreased at the left base. no rhonchi , no crackles.   Cardiovascular-  S1, S2 , Mo, No rubs or gallops.    Abdomen-  Has PEG  tube in place , Midline incision , Soft , slightly distended , Bowel sounds diminished , No organomegaly   Neuro-Grossly non focal , mouths words ,  Pupils equal and reactive.   Extremities- 1 + pitting edema , No deformities   Skin- warm , Capillary refill normal , no new rashes.       LABS and Studies:  Objective:  Vital signs: (most recent): Blood pressure (!) 124/56, pulse 69, temperature 98 °F (36.7 °C), temperature source Oral, resp. rate 18, height 1.626 m (5' 4\"), weight 83.5 kg (184 lb), SpO2 100 %, not currently breastfeeding.        CBC:   Recent Labs     05/06/24  0418 05/07/24  0343 05/08/24  0358   WBC 21.6* 17.6* 14.7*   HGB 8.3* 8.3* 8.0*   HCT 26.9* 27.1* 26.5*   * 927* 832*     BMP:    Recent Labs     05/06/24  0418 05/07/24  0343 05/08/24  0358    133 135   K 4.6 4.5 4.4   CL 93* 94* 95*   CO2 27 27 28   BUN 14 17 17   CREATININE 0.4* 0.4* 0.3*   GLUCOSE 149* 146* 122*   CALCIUM 8.5* 8.4* 8.4*   MG 2.0 1.9 2.1   PHOS 2.8 2.5 2.6     HEPATIC:   Recent Labs     05/06/24  0418 05/07/24  0343 05/08/24  0358   AST 17 17 17   ALT 13 13 12   BILITOT 0.4 0.3 0.3   ALKPHOS 155* 150* 150*       BLOOD GAS:   Recent Labs     05/07/24  0430 05/08/24  0405   PH 7.490* 7.470*   PCO2 42.9 40.4   PO2 96.6 102.8*   HCO3 32.0* 28.7*   O2SAT 97.7

## 2024-05-08 NOTE — CARE COORDINATION
5/8 Care Coordination: Pt remains in SICU. S/P Whipple. S/p total pancreatectomy and splenectomy after leak Remains on Vent,Trach,PEGJ,continue TPN, Cont IV ATB. Discharge plan remains [facility] per sons preference. Dr. Rogers does not want pt to discharge there yet. Plan for Transfer to [facility]. Envelope in Soft Chart, Ambulance Sheet Pend in EPIC. CM/SW will continue to follow for discharge planning.   Vivek SUMMERSN,RN-CV-BC  [phone]

## 2024-05-08 NOTE — PROGRESS NOTES
BILITOT 0.4 0.3 0.3           General appearance: lying in bed, opens eyes. Follows commands with BUE and BLE.   Head: NCAT, PERRL, anicteric sclera  Neck: supple, no masses. Tracheostomy in place   Lungs: symmetric chest rise on pressure support  Heart: warm throughout  Abdomen: softly distended, midline with continued mucoid brown fluid with bile staining on dressing. staples present; some skin dehiscence and breakdown near the midpoint of incision; Minimal output from L NAMRATA; PEG J-  gastric content in gravtiy drainage bag.   Extremities: BLE edema noted    Assessment/Plan:  Leatha Willard is a 58 y.o. female s/p open pylorus-preserving Whipple portal lymphadenectomy, appendectomy for symptomatic 13cm serous cystadenoma of the head of the pancreas. Now s/p PJ excision, completion pancreatectomy, splenectomy, omentectomy and washout for PJ disruption.     - Wean vent as able, pt currently on PS   - Monitor intake and output  - continue TPN and replace electrolytes as needed   - Continue to change midline dressing frequently/prn for drainage   - Continue namrata drain   - Otherwise flush J regularly, no medications via J   - Continue ppi, reglan   - Continue Octreotide empirically for presumed lymph leak   - ID recommendations appreciated- at least 4 weeks of antibiotic therapy planned per ID.    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   - Increase TF to goal, do not advance via J tube with unchanged midline drainage amount/characteristic. Continue with G tube to gravity and flush G tube to keep patent with drainage  -Appreciate critical care recs      Agustín Negrete DO  7:26 AM    Chief Resident addendum:  Increase tube feeds.  Continue TPN until tolerating at goal.  Ok to transfer out of SICU.    Consult Pulmonology for vent  management/weaning when out of SICU  Remove left abdominal drain.  Continue PT/ot    Mary Patel DO PGY5  Chief Resident, General Surgery  5/8/2024 10:20 AM        Attending Physician Statement:    Chief Complaint: s/p open whipple, Grade C POPF s/p takeback to OR for completion pancreatectomy     I have examined the patient and performed the key aspects of physical exam, reviewed the record (including all pertinent and new radiology images and laboratory findings), and discussed the case with the surgical team.  I agree with the assessment and plan with the following additions, corrections, and changes. 14pt review of symptoms completed and negative except as mentioned.    Continues to improve. WBC downtrending. G tube draining. Midline drainage non bilious.  Will increase tube feeds. Continue PT/OT. Will need pulmonology to manage vent if transferred out of ICU. Ideally would like to see tube feeds at goal prior to d/c to LTACH without bilious drainage from midline.     Norma Rogers MD  05/08/24  8:36 PM

## 2024-05-08 NOTE — PROGRESS NOTES
[facility]  SURGICAL INTENSIVE CARE UNIT (SICU)  ATTENDING PHYSICIAN CRITICAL CARE PROGRESS NOTE     I have personally examined the patient, personally reviewed the record, and personally discussed the case with the resident. I have personally reviewed all relevant labs and imaging data. I am actively managing this patient's medications.  Please refer to the resident's note. I agree with the assessment and plan with the following corrections/additions. The following summarizes my clinical findings and independent assessment.     CC: critical care management after pancreaticoduodenectomy    Hospital Course/Overnight Events:  4/15--pylorus preserving Whipple for large benign pancreatic cyst  4/16--continued ICU d/t nausea  4/17--bobby replaced, pancreatic leak, boluses given, albumin given, JUSTINE, toradol stopped, CVC changed  4/18--CVC replaced, NGT repalced, scop patch, robaxin, d/c IVF; total pancreatectomy and splenectomy, insulin gtt  4/19--remains intubated, TPN, lasix  4/20--albumin x 6 with lasix, robaxin restarted  4/21--low cortisol--steroids started; lasix, 1U PRBC, vent adjustment for inc plateau pressure  4/22--some air trapping on vent overnight  4/23--copious dark mucoid drainage from incision  4/24--had CT abd/pelvis yesterday; still with ongoing drainage from incision  4/25--some agitation overnight; still with midline incision drainage; febrile (Tmax 100.6)  4/26--re-scanned yesterday--unchanged; lalo spont breathing trial; for PEG-J today  4/27--PEG-J not placed yesterday  4/28--ongoing abd wound drainage  4/29: drainage continues, no sedation, attempting SBT, peg tube placement is pending scheduling. Day 12 of intubation, if no improvement a trach will be considered.   4/30: PEGJ placed, failed SBT   5/1: trach placed, trickle tube feeds through PEGJ started  5/2:  NAOE, alert, not following commands  5/3: NAOE, patient alert but does not follow commands   5/4: bilious drainage

## 2024-05-08 NOTE — PROGRESS NOTES
Decreased PSV setting from 12 to 10. Patient tolerating well.        05/08/24 1145   Patient Observation   Pulse 64   SpO2 100 %   Vent Information   Vent Mode CPAP/PS   Ventilator Settings   FiO2  40 %   PEEP/CPAP (cmH2O) 5   Pressure Support (cm H2O) (S)  10 cm H2O   Vent Patient Data (Readings)   Vt (Measured) 333 mL   Peak Inspiratory Pressure (cmH2O) 15 cmH2O   Rate Measured 14 br/min   Minute Volume (L/min) 4.91 Liters   Mean Airway Pressure (cmH2O) 7 cmH20   Plateau Pressure (cm H2O) 16 cm H2O   Driving Pressure 11   I:E Ratio 1:4.00   Backup Apnea On   Backup Rate 16 Breaths Per Minute   Backup Vt 350   Vent Alarm Settings   High Pressure (cmH2O) 40 cmH2O   Low Minute Volume (lpm) 4 L/min   High Minute Volume (lpm) 14 L/min   Low Exhaled Vt (ml) 250 mL   High Exhaled Vt (ml) 600 mL   RR High (bpm) 30 br/min   Apnea (secs) 20 secs   Additional Respiratoray Assessments   Humidification Source Heated wire   Humidification Temp 36.1   Circuit Condensation Not drained   Ambu Bag With Mask At Bedside Yes   Surgical Airway (Trach) 05/01/24 Breezy Cuffed   Placement Date/Time: 05/01/24 0742   Placed By: (c)   Placement Verified By: Capnometry  Surgical Airway Type: Tracheostomy  Brand: Breezy  Style: Cuffed  Size: 8   Status Secured   Treatment   $Treatment Type $Inhaled Therapy/Meds

## 2024-05-09 ENCOUNTER — APPOINTMENT (OUTPATIENT)
Dept: GENERAL RADIOLOGY | Age: 59
DRG: 004 | End: 2024-05-09
Attending: STUDENT IN AN ORGANIZED HEALTH CARE EDUCATION/TRAINING PROGRAM
Payer: COMMERCIAL

## 2024-05-09 LAB
ALBUMIN SERPL-MCNC: 2.7 G/DL (ref 3.5–5.2)
ALP SERPL-CCNC: 162 U/L (ref 35–104)
ALT SERPL-CCNC: 15 U/L (ref 0–32)
ANION GAP SERPL CALCULATED.3IONS-SCNC: 11 MMOL/L (ref 7–16)
AST SERPL-CCNC: 30 U/L (ref 0–31)
BASOPHILS # BLD: 0.17 K/UL (ref 0–0.2)
BASOPHILS NFR BLD: 1 % (ref 0–2)
BILIRUB SERPL-MCNC: 0.3 MG/DL (ref 0–1.2)
BUN SERPL-MCNC: 15 MG/DL (ref 6–20)
CA-I BLD-SCNC: 1.1 MMOL/L (ref 1.15–1.33)
CALCIUM SERPL-MCNC: 8.6 MG/DL (ref 8.6–10.2)
CHLORIDE SERPL-SCNC: 90 MMOL/L (ref 98–107)
CO2 SERPL-SCNC: 29 MMOL/L (ref 22–29)
CREAT SERPL-MCNC: 0.3 MG/DL (ref 0.5–1)
EOSINOPHIL # BLD: 2.33 K/UL (ref 0.05–0.5)
EOSINOPHILS RELATIVE PERCENT: 16 % (ref 0–6)
ERYTHROCYTE [DISTWIDTH] IN BLOOD BY AUTOMATED COUNT: 15.5 % (ref 11.5–15)
GFR, ESTIMATED: >90 ML/MIN/1.73M2
GLUCOSE BLD-MCNC: 201 MG/DL (ref 74–99)
GLUCOSE BLD-MCNC: 207 MG/DL (ref 74–99)
GLUCOSE BLD-MCNC: 221 MG/DL (ref 74–99)
GLUCOSE BLD-MCNC: 238 MG/DL (ref 74–99)
GLUCOSE BLD-MCNC: 242 MG/DL (ref 74–99)
GLUCOSE BLD-MCNC: 259 MG/DL (ref 74–99)
GLUCOSE SERPL-MCNC: 245 MG/DL (ref 74–99)
HCT VFR BLD AUTO: 29.2 % (ref 34–48)
HGB BLD-MCNC: 9 G/DL (ref 11.5–15.5)
IMM GRANULOCYTES # BLD AUTO: 0.16 K/UL (ref 0–0.58)
IMM GRANULOCYTES NFR BLD: 1 % (ref 0–5)
LYMPHOCYTES NFR BLD: 3.03 K/UL (ref 1.5–4)
LYMPHOCYTES RELATIVE PERCENT: 20 % (ref 20–42)
MAGNESIUM SERPL-MCNC: 1.9 MG/DL (ref 1.6–2.6)
MCH RBC QN AUTO: 29.2 PG (ref 26–35)
MCHC RBC AUTO-ENTMCNC: 30.8 G/DL (ref 32–34.5)
MCV RBC AUTO: 94.8 FL (ref 80–99.9)
MONOCYTES NFR BLD: 1.43 K/UL (ref 0.1–0.95)
MONOCYTES NFR BLD: 10 % (ref 2–12)
NEUTROPHILS NFR BLD: 52 % (ref 43–80)
NEUTS SEG NFR BLD: 7.78 K/UL (ref 1.8–7.3)
PHOSPHATE SERPL-MCNC: 2.6 MG/DL (ref 2.5–4.5)
PLATELET CONFIRMATION: NORMAL
PLATELET, FLUORESCENCE: 704 K/UL (ref 130–450)
PMV BLD AUTO: 10.8 FL (ref 7–12)
POTASSIUM SERPL-SCNC: 4.7 MMOL/L (ref 3.5–5)
PROT SERPL-MCNC: 7.6 G/DL (ref 6.4–8.3)
RBC # BLD AUTO: 3.08 M/UL (ref 3.5–5.5)
RBC # BLD: ABNORMAL 10*6/UL
SODIUM SERPL-SCNC: 130 MMOL/L (ref 132–146)
WBC # BLD: ABNORMAL 10*3/UL
WBC OTHER # BLD: 14.9 K/UL (ref 4.5–11.5)

## 2024-05-09 PROCEDURE — 6360000002 HC RX W HCPCS

## 2024-05-09 PROCEDURE — 2580000003 HC RX 258

## 2024-05-09 PROCEDURE — 71045 X-RAY EXAM CHEST 1 VIEW: CPT

## 2024-05-09 PROCEDURE — A4216 STERILE WATER/SALINE, 10 ML: HCPCS

## 2024-05-09 PROCEDURE — 83735 ASSAY OF MAGNESIUM: CPT

## 2024-05-09 PROCEDURE — 82330 ASSAY OF CALCIUM: CPT

## 2024-05-09 PROCEDURE — 6360000002 HC RX W HCPCS: Performed by: SURGERY

## 2024-05-09 PROCEDURE — 6370000000 HC RX 637 (ALT 250 FOR IP): Performed by: SURGERY

## 2024-05-09 PROCEDURE — 6370000000 HC RX 637 (ALT 250 FOR IP)

## 2024-05-09 PROCEDURE — 82962 GLUCOSE BLOOD TEST: CPT

## 2024-05-09 PROCEDURE — 85025 COMPLETE CBC W/AUTO DIFF WBC: CPT

## 2024-05-09 PROCEDURE — 2500000003 HC RX 250 WO HCPCS: Performed by: SURGERY

## 2024-05-09 PROCEDURE — 80053 COMPREHEN METABOLIC PANEL: CPT

## 2024-05-09 PROCEDURE — 6370000000 HC RX 637 (ALT 250 FOR IP): Performed by: INTERNAL MEDICINE

## 2024-05-09 PROCEDURE — 94640 AIRWAY INHALATION TREATMENT: CPT

## 2024-05-09 PROCEDURE — 2060000000 HC ICU INTERMEDIATE R&B

## 2024-05-09 PROCEDURE — C9113 INJ PANTOPRAZOLE SODIUM, VIA: HCPCS

## 2024-05-09 PROCEDURE — 94003 VENT MGMT INPAT SUBQ DAY: CPT

## 2024-05-09 PROCEDURE — 2580000003 HC RX 258: Performed by: SURGERY

## 2024-05-09 PROCEDURE — 84100 ASSAY OF PHOSPHORUS: CPT

## 2024-05-09 PROCEDURE — 99232 SBSQ HOSP IP/OBS MODERATE 35: CPT | Performed by: INTERNAL MEDICINE

## 2024-05-09 PROCEDURE — 99233 SBSQ HOSP IP/OBS HIGH 50: CPT | Performed by: INTERNAL MEDICINE

## 2024-05-09 PROCEDURE — 36592 COLLECT BLOOD FROM PICC: CPT

## 2024-05-09 RX ORDER — INSULIN LISPRO 100 [IU]/ML
0-18 INJECTION, SOLUTION INTRAVENOUS; SUBCUTANEOUS EVERY 4 HOURS
Status: DISPENSED | OUTPATIENT
Start: 2024-05-09

## 2024-05-09 RX ORDER — SODIUM BICARBONATE 650 MG/1
650 TABLET ORAL 4 TIMES DAILY
Status: DISCONTINUED | OUTPATIENT
Start: 2024-05-09 | End: 2024-05-09

## 2024-05-09 RX ORDER — PETROLATUM 42 G/100G
OINTMENT TOPICAL 2 TIMES DAILY PRN
Status: DISPENSED | OUTPATIENT
Start: 2024-05-09

## 2024-05-09 RX ORDER — SODIUM BICARBONATE 650 MG/1
650 TABLET ORAL 4 TIMES DAILY
Status: DISPENSED | OUTPATIENT
Start: 2024-05-09

## 2024-05-09 RX ORDER — INSULIN GLARGINE 100 [IU]/ML
11 INJECTION, SOLUTION SUBCUTANEOUS NIGHTLY
Status: DISCONTINUED | OUTPATIENT
Start: 2024-05-09 | End: 2024-05-12

## 2024-05-09 RX ADMIN — ACETAMINOPHEN 650 MG: 650 SOLUTION ORAL at 18:25

## 2024-05-09 RX ADMIN — METOCLOPRAMIDE 10 MG: 5 INJECTION, SOLUTION INTRAMUSCULAR; INTRAVENOUS at 20:38

## 2024-05-09 RX ADMIN — INSULIN LISPRO 6 UNITS: 100 INJECTION, SOLUTION INTRAVENOUS; SUBCUTANEOUS at 16:59

## 2024-05-09 RX ADMIN — METOCLOPRAMIDE 10 MG: 5 INJECTION, SOLUTION INTRAMUSCULAR; INTRAVENOUS at 09:31

## 2024-05-09 RX ADMIN — PANCRELIPASE LIPASE, PANCRELIPASE PROTEASE, PANCRELIPASE AMYLASE 80000 UNITS: 20000; 63000; 84000 CAPSULE, DELAYED RELEASE ORAL at 09:34

## 2024-05-09 RX ADMIN — 0.12% CHLORHEXIDINE GLUCONATE 15 ML: 1.2 RINSE ORAL at 09:31

## 2024-05-09 RX ADMIN — METOCLOPRAMIDE 10 MG: 5 INJECTION, SOLUTION INTRAMUSCULAR; INTRAVENOUS at 12:25

## 2024-05-09 RX ADMIN — ACETAMINOPHEN 650 MG: 650 SOLUTION ORAL at 12:24

## 2024-05-09 RX ADMIN — HYDROPHOR: 42 OINTMENT TOPICAL at 20:52

## 2024-05-09 RX ADMIN — KETOTIFEN FUMARATE 1 DROP: 0.35 SOLUTION/ DROPS OPHTHALMIC at 11:11

## 2024-05-09 RX ADMIN — OXYCODONE HYDROCHLORIDE 10 MG: 5 SOLUTION ORAL at 06:06

## 2024-05-09 RX ADMIN — SODIUM CHLORIDE, PRESERVATIVE FREE 40 MG: 5 INJECTION INTRAVENOUS at 09:31

## 2024-05-09 RX ADMIN — 0.12% CHLORHEXIDINE GLUCONATE 15 ML: 1.2 RINSE ORAL at 20:38

## 2024-05-09 RX ADMIN — MEROPENEM 1000 MG: 1 INJECTION, POWDER, FOR SOLUTION INTRAVENOUS at 21:01

## 2024-05-09 RX ADMIN — BUPROPION HYDROCHLORIDE 100 MG: 100 TABLET, FILM COATED ORAL at 09:31

## 2024-05-09 RX ADMIN — INSULIN GLARGINE 11 UNITS: 100 INJECTION, SOLUTION SUBCUTANEOUS at 20:38

## 2024-05-09 RX ADMIN — BUPROPION HYDROCHLORIDE 100 MG: 100 TABLET, FILM COATED ORAL at 21:15

## 2024-05-09 RX ADMIN — OCTREOTIDE ACETATE 50 MCG: 50 INJECTION, SOLUTION INTRAVENOUS; SUBCUTANEOUS at 11:12

## 2024-05-09 RX ADMIN — OXYCODONE HYDROCHLORIDE 10 MG: 5 SOLUTION ORAL at 18:25

## 2024-05-09 RX ADMIN — METOCLOPRAMIDE 10 MG: 5 INJECTION, SOLUTION INTRAMUSCULAR; INTRAVENOUS at 04:26

## 2024-05-09 RX ADMIN — CALCIUM GLUCONATE: 98 INJECTION, SOLUTION INTRAVENOUS at 18:25

## 2024-05-09 RX ADMIN — INSULIN LISPRO 4 UNITS: 100 INJECTION, SOLUTION INTRAVENOUS; SUBCUTANEOUS at 09:30

## 2024-05-09 RX ADMIN — ATORVASTATIN CALCIUM 10 MG: 10 TABLET, FILM COATED ORAL at 20:38

## 2024-05-09 RX ADMIN — OCTREOTIDE ACETATE 50 MCG: 50 INJECTION, SOLUTION INTRAVENOUS; SUBCUTANEOUS at 16:59

## 2024-05-09 RX ADMIN — MICAFUNGIN SODIUM 100 MG: 100 INJECTION, POWDER, LYOPHILIZED, FOR SOLUTION INTRAVENOUS at 18:43

## 2024-05-09 RX ADMIN — OXYCODONE HYDROCHLORIDE 10 MG: 5 SOLUTION ORAL at 12:25

## 2024-05-09 RX ADMIN — IPRATROPIUM BROMIDE AND ALBUTEROL SULFATE 1 DOSE: .5; 2.5 SOLUTION RESPIRATORY (INHALATION) at 15:42

## 2024-05-09 RX ADMIN — LABETALOL HYDROCHLORIDE 10 MG: 5 INJECTION, SOLUTION INTRAVENOUS at 06:40

## 2024-05-09 RX ADMIN — ALTEPLASE 1 MG: 2.2 INJECTION, POWDER, LYOPHILIZED, FOR SOLUTION INTRAVENOUS at 10:20

## 2024-05-09 RX ADMIN — SODIUM CHLORIDE, PRESERVATIVE FREE 10 ML: 5 INJECTION INTRAVENOUS at 09:32

## 2024-05-09 RX ADMIN — SODIUM CHLORIDE, PRESERVATIVE FREE 10 ML: 5 INJECTION INTRAVENOUS at 20:50

## 2024-05-09 RX ADMIN — INSULIN LISPRO 2 UNITS: 100 INJECTION, SOLUTION INTRAVENOUS; SUBCUTANEOUS at 00:16

## 2024-05-09 RX ADMIN — ACETAMINOPHEN 650 MG: 650 SOLUTION ORAL at 06:06

## 2024-05-09 RX ADMIN — KETOTIFEN FUMARATE 1 DROP: 0.35 SOLUTION/ DROPS OPHTHALMIC at 20:44

## 2024-05-09 RX ADMIN — BUPROPION HYDROCHLORIDE 100 MG: 100 TABLET, FILM COATED ORAL at 15:05

## 2024-05-09 RX ADMIN — INSULIN LISPRO 4 UNITS: 100 INJECTION, SOLUTION INTRAVENOUS; SUBCUTANEOUS at 04:26

## 2024-05-09 RX ADMIN — MEROPENEM 1000 MG: 1 INJECTION, POWDER, FOR SOLUTION INTRAVENOUS at 06:29

## 2024-05-09 RX ADMIN — IPRATROPIUM BROMIDE AND ALBUTEROL SULFATE 1 DOSE: .5; 2.5 SOLUTION RESPIRATORY (INHALATION) at 11:52

## 2024-05-09 RX ADMIN — MEROPENEM 1000 MG: 1 INJECTION, POWDER, FOR SOLUTION INTRAVENOUS at 15:07

## 2024-05-09 RX ADMIN — POLYVINYL ALCOHOL, POVIDONE 1 DROP: 14; 6 SOLUTION/ DROPS OPHTHALMIC at 04:26

## 2024-05-09 RX ADMIN — ENOXAPARIN SODIUM 40 MG: 100 INJECTION SUBCUTANEOUS at 09:31

## 2024-05-09 RX ADMIN — IPRATROPIUM BROMIDE AND ALBUTEROL SULFATE 1 DOSE: .5; 2.5 SOLUTION RESPIRATORY (INHALATION) at 19:26

## 2024-05-09 RX ADMIN — INSULIN LISPRO 6 UNITS: 100 INJECTION, SOLUTION INTRAVENOUS; SUBCUTANEOUS at 21:15

## 2024-05-09 RX ADMIN — INSULIN LISPRO 6 UNITS: 100 INJECTION, SOLUTION INTRAVENOUS; SUBCUTANEOUS at 12:25

## 2024-05-09 RX ADMIN — SENNOSIDES AND DOCUSATE SODIUM 2 TABLET: 50; 8.6 TABLET ORAL at 09:30

## 2024-05-09 RX ADMIN — IPRATROPIUM BROMIDE AND ALBUTEROL SULFATE 1 DOSE: .5; 2.5 SOLUTION RESPIRATORY (INHALATION) at 07:38

## 2024-05-09 RX ADMIN — PANCRELIPASE LIPASE, PANCRELIPASE PROTEASE, PANCRELIPASE AMYLASE 80000 UNITS: 20000; 63000; 84000 CAPSULE, DELAYED RELEASE ORAL at 20:39

## 2024-05-09 ASSESSMENT — PAIN DESCRIPTION - LOCATION: LOCATION: ABDOMEN

## 2024-05-09 ASSESSMENT — PULMONARY FUNCTION TESTS
PIF_VALUE: 18
PIF_VALUE: 16
PIF_VALUE: 14

## 2024-05-09 ASSESSMENT — PAIN SCALES - GENERAL: PAINLEVEL_OUTOF10: 0

## 2024-05-09 NOTE — PROGRESS NOTES
Pt has continuous TPN and merrem currently running through both lumens of picc line. Merrem and micafungin are not compatible. Merrem to finish infusing at 1807. Micafungin retimed to 1830 and will administer at that time. Electronically signed by Екатерина Cerna RN on 5/9/2024 at 5:03 PM

## 2024-05-09 NOTE — PROGRESS NOTES
[facility] Surgery   Daily Progress Note      Patient's Name/Date of Birth: Leatha Willard / 1965    Date: May 9, 2024     Chief Complaint: s/p open whipple, Grade C POPF s/p takeback to OR for completion pancreatectomy    Patient Active Problem List   Diagnosis    Recurrent major depressive disorder (HCC)    Essential hypertension    Hyperlipidemia    Prediabetes    Pre-operative laboratory examination    Class 1 obesity due to excess calories without serious comorbidity with body mass index (BMI) of 32.0 to 32.9 in adult    HIREN (obstructive sleep apnea)    Pancreatic cyst    Colon polyps    Cyst of pancreas    Chest pain    Tobacco abuse-- quit 2 weeks ago    Abdominal pain    Serous cystadenoma    Pancreatic fistula    Sepsis with acute renal failure and septic shock (HCC)    JUSTINE (acute kidney injury) (HCC)    Hypophosphatemia    Acute respiratory failure with hypoxia (HCC)    Post-pancreatectomy diabetes (HCC)    On total parenteral nutrition    Hypoalbuminemia    Electrolyte imbalance    Hypocalcemia    Acute blood loss anemia    Hypokalemia       Subjective:   Txr out of SICU overnight.  Remains on vent.  Patient still following commands with bilateral upper and lower extremities.  Patient tolerating increase in tube feeds.  Continues to have midline thick tan mucoid drainage.    Objective:  BP (!) 172/84   Pulse (!) 105   Temp 97.6 °F (36.4 °C) (Temporal)   Resp 22   Ht 1.626 m (5' 4\")   Wt 83.5 kg (184 lb)   SpO2 100%   BMI 31.58 kg/m²   Labs:  Recent Labs     05/07/24  0343 05/08/24  0358 05/09/24  0450   WBC 17.6* 14.7* 14.9*   HGB 8.3* 8.0* 9.0*   HCT 27.1* 26.5* 29.2*       Recent Labs     05/07/24  0343 05/08/24  0358 05/09/24  0450    135 130*   K 4.5 4.4 4.7   CL 94* 95* 90*   CO2 27 28 29   BUN 17 17 15   CREATININE 0.4* 0.3* 0.3*       Recent Labs     05/07/24  0343 05/08/24  0358 05/09/24  0450   ALKPHOS 150* 150* 162*   ALT 13 12 15   AST 17 17 30   BILITOT 0.3 0.3 0.3           General

## 2024-05-09 NOTE — PROGRESS NOTES
4 Eyes Skin Assessment     NAME:  Leatha Willard  YOB: 1965  MEDICAL RECORD NUMBER:  [id number]    The patient is being assessed for  Admission    I agree that at least one RN has performed a thorough Head to Toe Skin Assessment on the patient. ALL assessment sites listed below have been assessed.      Areas assessed by both nurses:    Head, Face, Ears, Shoulders, Back, Chest, Arms, Elbows, Hands, Sacrum. Buttock, Coccyx, Ischium, Legs. Feet and Heels, and Under Medical Devices         Does the Patient have a Wound? Yes wound(s) were present on assessment. LDA wound assessment was Initiated and completed by RN       Donal Prevention initiated by RN: Yes  Wound Care Orders initiated by RN: Yes    Pressure Injury (Stage 3,4, Unstageable, DTI, NWPT, and Complex wounds) if present, place Wound referral order by RN under : No    New Ostomies, if present place, Ostomy referral order under : No     Nurse 1 eSignature: Electronically signed by Yeny Kat RN on 5/9/24 at 8:30 AM EDT    **SHARE this note so that the co-signing nurse can place an eSignature**    Nurse 2 eSignature: Electronically signed by Lakesha Bell RN on 5/9/24 at 10:37 PM EDT

## 2024-05-09 NOTE — PROGRESS NOTES
Pt has peg with meds in G tube only. Spoke with pharmacy, the beads inside the zenpep capsules cannot be crushed. Attempted to flush beads down the tubing, however beads are too big and are getting stuck in the tubing. Was able to pull back beads and tubing is patent. Message out to general surgery regarding this. Received call back from general surgery to flush the beads one at a time. Attempted to flush beads one at a time, but still beads are too big and are not able to pass through. They become stuck in the angle part of the G tube. Charge nurse at bedside and also attempted. Message sent to general surgery regarding this issue. Electronically signed by Екатерина Cerna RN on 5/9/2024 at 1:01 PM    Andrew Negrete at bedside attempting to flush medication. Medication getting stuck at the y portion of the GJ. Electronically signed by Екатерина Cerna RN on 5/9/2024 at 1:39 PM

## 2024-05-09 NOTE — PROGRESS NOTES
Collected: 04/25/24 1632   Order Status: Completed Specimen: Abdomen Updated: 04/27/24 1213    Specimen Description .ABDOMEN    Direct Exam Swab collections are low-yield and rarely indicated. Generally, the specimen volume when collected by swab is small, reducing the probability of isolating organisms: many organisms adhere to the fibers of the swab, which reduces the opportunity or recovering organisms. Interpret results with caution.     NO Polymorphonuclear leukocytes     RARE EPITHELIAL CELLS     NO ORGANISMS SEEN    Culture CANDIDA ALBICANS Identification by MALDI-TOF MODERATE GROWTH Abnormal      BAUDILIO GLABRATA Identification by MALDI-TOF LIGHT GROWTH Abnormal    Culture, Respiratory [[id number]] Collected: 04/25/24 1405   Order Status: Completed Specimen: Endotracheal Updated: 04/27/24 1107    Specimen Description .ENDOTRACHEAL .SPUTUM    Direct Exam MODERATE Polymorphonuclear leukocytes     FEW EPITHELIAL CELLS     NO ORGANISMS SEEN    Culture NORMAL RESPIRATORY DEJON   Culture, Anaerobic [[id number]] Collected: 04/25/24 1632   Order Status: Completed Specimen: Abdomen Updated: 04/27/24 0924    Specimen Description .ABDOMEN    Culture NO ANAEROBIC ORGANISMS ISOLATED   Culture, Blood 1 [[id number]] Collected: 04/21/24 1542   Order Status: Completed Specimen: Blood Updated: 04/26/24 1706    Specimen Description .BLOOD .ARM    Special Requests         Culture NO GROWTH 5 DAYS   Culture, Blood 2 [[id number]] Collected: 04/21/24 1557   Order Status: Completed Specimen: Blood Updated: 04/26/24 1706    Specimen Description .BLOOD .ARM    Special Requests         Culture NO GROWTH 5 DAYS   Culture, Fungus [[id number]] (Abnormal)  Collected: 04/18/24 2012   Order Status: Completed Specimen: Tissue Updated: 04/25/24 1102    Specimen Description .ASCITIC FLUID    Direct Exam NO FUNGAL ELEMENTS SEEN    Culture BAUDILIO ALBICANS RARE GROWTH Abnormal      BAUDILIO GLABRATA LIGHT GROWTH Abnormal        Collected:  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 Anticipate 3-4 weeks of antibiotic therapy from 04/18-05/16.  Follow up Surgery recommendations regarding wound drainage..  Follow up in 2 months (on or after 06/28/2024) with [facility] ([phone]) or with primary care to get booster doses for:  1) meningococcal polysaccharide vaccine (Menveo) - second dose   2)Group B meningococcal vaccine-Bexsero second dose  Update the following vaccinations in 5 years:  - meningococcal vaccine booster every 5 years.  Continue local wound care.  Continue supportive care.     Thank you for involving me in the care of Leatha Willard. ID will continue to follow. Please do not hesitate to call for any questions or concerns.    Electronically signed by Kandi Giang MD on 5/9/2024 at 10:21 AM

## 2024-05-09 NOTE — FLOWSHEET NOTE
Inpatient Wound Care(initial evaluation) 4502 b    Admit Date: 4/15/2024  5:37 AM    Reason for consult:  mid line wound management    Significant history:  refer to chart     Findings:     05/09/24 0920   Skin Integumentary    Skin Integrity Ecchymosis;Redness  (dried scabs)   Location BUE   Skin Integrity Site 2   Skin Integrity Location 2 Ecchymosis  (dried scabs)   Location 2 abdomen   Wound 04/23/24 Abdomen Left;Lower   Date First Assessed/Time First Assessed: 04/23/24 2000   Present on Original Admission: No  Primary Wound Type: Puncture  Location: Abdomen  Wound Location Orientation: Left;Lower   Wound Image    Wound Etiology   (drain site)   Dressing/Treatment Dry dressing  (nurse to apply)   Wound Length (cm) 1 cm   Wound Width (cm) 0.6 cm   Wound Depth (cm) 0.4 cm   Wound Surface Area (cm^2) 0.6 cm^2   Wound Volume (cm^3) 0.24 cm^3   Wound Assessment   (yellow)   Drainage Amount Scant (moist but unmeasurable)   Drainage Description Yellow;Serous   Odor None   Chely-wound Assessment Intact   Wound Thickness Description not for Pressure Injury Partial thickness   Wound 05/09/24 Abdomen Mid   Date First Assessed: 05/09/24   Present on Original Admission: No  Primary Wound Type: Surgical Type  Location: Abdomen  Wound Location Orientation: Mid   Wound Image    Wound Etiology   (fistula)   Dressing/Treatment   (ostomy appliance)   Wound Length (cm) 9 cm   Wound Width (cm) 0.8 cm   Wound Depth (cm) 4 cm   Wound Surface Area (cm^2) 7.2 cm^2   Wound Volume (cm^3) 28.8 cm^3   Wound Assessment   (unable to assess base)   Drainage Amount Small (< 25%)   Drainage Description Brown   Odor None   Chely-wound Assessment   (pink)   Wound 05/09/24 Abdomen Left;Proximal   Date First Assessed: 05/09/24   Present on Original Admission: No  Primary Wound Type: Traumatic  Location: Abdomen  Wound Location Orientation: Left;Proximal   Wound Etiology   (tape tears)   Dressing Status New dressing applied   Wound Cleansed Cleansed

## 2024-05-09 NOTE — PROGRESS NOTES
Anila sent to wound care regarding new consult. Electronically signed by Екатерина Cerna RN on 5/9/2024 at 7:34 AM

## 2024-05-09 NOTE — PROGRESS NOTES
ENDOCRINOLOGY PROGRESS NOTE      Date of admission: 4/15/2024  Date of service: 5/9/2024  Admitting physician: Norma Rogers MD   Primary Care Physician: Mekhi Escalona MD  Consultant physician: Jonathan Curiel MD     Reason for the consultation:  Post-pancreatectomy DM     History of Present Illness:  Leatha Willard is a 58 y.o. old female with PMH of cystic neoplasm of the head of pancreas, HTN, HLD and other listed below admitted to [facility] on 4/15/2024 for completion pancreatectomy. Endocrine service was consulted for diabetes management.    Subjective   The patient was seen this afternoon, transferred out of the ICU.  On TPN and tube feeds    Point of care glucose monitoring   (Independently reviewed)   Recent Labs     05/08/24  1549 05/08/24  1953 05/08/24  2329 05/09/24  0200 05/09/24  0417 05/09/24  0749 05/09/24  1119 05/09/24  1646   POCGLU 262* 162* 195* 207* 242* 238* 259* 221*       Scheduled Meds:   lipase-protease-amylase  80,000 Units Oral 4x Daily    insulin lispro  0-18 Units SubCUTAneous Q4H    insulin glargine  11 Units SubCUTAneous Nightly    sodium bicarbonate  650 mg Oral 4x Daily    polyethylene glycol  17 g Oral Daily    sodium chloride flush  5-40 mL IntraVENous 2 times per day    metoclopramide  10 mg IntraVENous Q6H    oxyCODONE  10 mg Oral Q6H    octreotide  50 mcg IntraVENous Q8H    meropenem  1,000 mg IntraVENous Q8H    buPROPion  100 mg Oral TID    sennosides-docusate sodium  2 tablet Oral Daily    micafungin  100 mg IntraVENous Daily    ipratropium 0.5 mg-albuterol 2.5 mg  1 Dose Inhalation Q4H WA RT    sodium chloride flush  5-40 mL IntraVENous BID    acetaminophen  650 mg Oral 4 times per day    chlorhexidine  15 mL Mouth/Throat BID    Polyvinyl Alcohol-Povidone PF  1 drop Both Eyes Q4H    Or    artificial tears   Both Eyes Q4H    lidocaine  1 patch TransDERmal Daily    enoxaparin  40 mg SubCUTAneous Daily    atorvastatin  10 mg Oral Nightly    ketotifen fumarate  1 drop Both Eyes

## 2024-05-09 NOTE — PROGRESS NOTES
ENDOCRINOLOGY PROGRESS NOTE      Date of admission: 4/15/2024  Date of service: 5/8/2024  Admitting physician: Norma Rogers MD   Primary Care Physician: Mekhi Escalona MD  Consultant physician: Jonathan Curiel MD     Reason for the consultation:  Post-pancreatectomy DM     History of Present Illness:  Leatha Willard is a 58 y.o. old female with PMH of cystic neoplasm of the head of pancreas, HTN, HLD and other listed below admitted to [facility] on 4/15/2024 for completion pancreatectomy. Endocrine service was consulted for diabetes management.    Subjective   Seen this afternoon, still on TPN, also on tube feeds at 20 mL/h, no acute events overnight,    Point of care glucose monitoring   (Independently reviewed)   Recent Labs     05/07/24  1621 05/07/24  2057 05/08/24  0003 05/08/24  0354 05/08/24  0801 05/08/24  1151 05/08/24  1549 05/08/24  1953   POCGLU 201* 232* 176* 144* 259* 165* 262* 162*       Scheduled Meds:   polyethylene glycol  17 g Oral Daily    insulin glargine  8 Units SubCUTAneous Nightly    sodium chloride flush  5-40 mL IntraVENous 2 times per day    insulin lispro  0-12 Units SubCUTAneous Q4H    metoclopramide  10 mg IntraVENous Q6H    oxyCODONE  10 mg Oral Q6H    octreotide  50 mcg IntraVENous Q8H    meropenem  1,000 mg IntraVENous Q8H    buPROPion  100 mg Oral TID    sennosides-docusate sodium  2 tablet Oral Daily    micafungin  100 mg IntraVENous Daily    ipratropium 0.5 mg-albuterol 2.5 mg  1 Dose Inhalation Q4H WA RT    sodium chloride flush  5-40 mL IntraVENous BID    acetaminophen  650 mg Oral 4 times per day    chlorhexidine  15 mL Mouth/Throat BID    Polyvinyl Alcohol-Povidone PF  1 drop Both Eyes Q4H    Or    artificial tears   Both Eyes Q4H    lidocaine  1 patch TransDERmal Daily    enoxaparin  40 mg SubCUTAneous Daily    atorvastatin  10 mg Oral Nightly    ketotifen fumarate  1 drop Both Eyes BID    pantoprazole (PROTONIX) 40 mg in sodium chloride (PF) 0.9 % 10 mL injection  40 mg

## 2024-05-09 NOTE — PROGRESS NOTES
Pulmonary Critical Care Medicine           PULMONARY  CRITICAL CARE   SERVICE DAILY PROGRESS  NOTE     2024   Hospital LOS:  LOS: 24 days     Impression / Recommendations:    Chronic respiratory failure status post tracheostomy and PEG tube placement for failure to liberate from mechanical ventilation on postop day 14 after pancreatico duodenectomy for a large benign pancreatic cyst  Hypoxia secondary to left-sided pleural effusion with compressive atelectasis  -Continue routine tracheostomy care  -Currently on mechanical ventilator on a pressure support of 12/6 with FiO2 of 40%, She was tolerating 10/5 yesterday.   -Continue the liberation trials with eventual plan to decannulate  -Okay to transfer out of the ICU  -Continue PT OT  -Out of bed to chair as tolerated    Interval History/Event Changes:  Now transferred out of the ICU to the regular floor on a mechanical ventilation through tracheostomy  Awake and alert, follows commands  No overnight events  Not in distress  Synchronous with the vent and pressure support mode    Allergies  Allergies   Allergen Reactions    Latex      Other reaction(s): swelling & sores    Milk-Related Compounds      States cannot drink milk -can have food that contains milk    Lisinopril Rash     Red rash on face,arms,upper back    Morphine Nausea And Vomiting    Seasonal Other (See Comments)       Review of Systems    A pertinent review of systems was performed and was otherwise non-contributory.    Vitals-     BP (!) 150/78   Pulse 77   Temp 97.9 °F (36.6 °C) (Oral)   Resp 20   Ht 1.626 m (5' 4\")   Wt 83.5 kg (184 lb)   SpO2 100%   BMI 31.58 kg/m²    Tmax: Temp (24hrs), Av °F (36.7 °C), Min:97 °F (36.1 °C), Max:99.5 °F (37.5 °C)      Hemodynamics:  Cuff:   Systolic (24hrs), Av , Min:108 , Max:172   /Diastolic (24hrs), Av, Min:53, Max:85    Cuff MAP:MAP (mmHg)  Av.8  Min: 68  Max: 150  P:   Pulse  Av.4  Min: 72  Max: 105      Airway:     Observed

## 2024-05-09 NOTE — PLAN OF CARE
Problem: Discharge Planning  Goal: Discharge to home or other facility with appropriate resources  Outcome: Progressing     Problem: Skin/Tissue Integrity  Goal: Absence of new skin breakdown  Description: 1.  Monitor for areas of redness and/or skin breakdown  2.  Assess vascular access sites hourly  3.  Every 4-6 hours minimum:  Change oxygen saturation probe site  4.  Every 4-6 hours:  If on nasal continuous positive airway pressure, respiratory therapy assess nares and determine need for appliance change or resting period.  Outcome: Progressing     Problem: ABCDS Injury Assessment  Goal: Absence of physical injury  Outcome: Progressing     Problem: Respiratory - Adult  Goal: Achieves optimal ventilation and oxygenation  Outcome: Progressing     Problem: Cardiovascular - Adult  Goal: Maintains optimal cardiac output and hemodynamic stability  Outcome: Progressing  Goal: Absence of cardiac dysrhythmias or at baseline  Outcome: Progressing     Problem: Musculoskeletal - Adult  Goal: Return mobility to safest level of function  Outcome: Progressing  Goal: Return ADL status to a safe level of function  Outcome: Progressing     Problem: Gastrointestinal - Adult  Goal: Minimal or absence of nausea and vomiting  Outcome: Progressing  Goal: Maintains or returns to baseline bowel function  Outcome: Progressing  Goal: Maintains adequate nutritional intake  Outcome: Progressing     Problem: Genitourinary - Adult  Goal: Absence of urinary retention  Outcome: Progressing     Problem: Infection - Adult  Goal: Absence of infection at discharge  Outcome: Progressing  Goal: Absence of infection during hospitalization  Outcome: Progressing     Problem: Metabolic/Fluid and Electrolytes - Adult  Goal: Electrolytes maintained within normal limits  Outcome: Progressing  Goal: Hemodynamic stability and optimal renal function maintained  Outcome: Progressing  Goal: Glucose maintained within prescribed range  Outcome: Progressing      Problem: Hematologic - Adult  Goal: Maintains hematologic stability  Outcome: Progressing     Problem: Chronic Conditions and Co-morbidities  Goal: Patient's chronic conditions and co-morbidity symptoms are monitored and maintained or improved  Outcome: Progressing     Problem: Pain  Goal: Verbalizes/displays adequate comfort level or baseline comfort level  Outcome: Progressing     Problem: Safety - Adult  Goal: Free from fall injury  Outcome: Progressing     Problem: Neurosensory - Adult  Goal: Achieves stable or improved neurological status  Outcome: Progressing  Goal: Achieves maximal functionality and self care  Outcome: Progressing     Problem: Skin/Tissue Integrity - Adult  Goal: Skin integrity remains intact  Outcome: Progressing  Goal: Incisions, wounds, or drain sites healing without S/S of infection  Outcome: Progressing  Goal: Oral mucous membranes remain intact  Outcome: Progressing     Problem: Nutrition Deficit:  Goal: Optimize nutritional status  Outcome: Progressing

## 2024-05-10 LAB
ALBUMIN SERPL-MCNC: 2.9 G/DL (ref 3.5–5.2)
ALP SERPL-CCNC: 147 U/L (ref 35–104)
ALT SERPL-CCNC: 13 U/L (ref 0–32)
ANION GAP SERPL CALCULATED.3IONS-SCNC: 11 MMOL/L (ref 7–16)
AST SERPL-CCNC: 17 U/L (ref 0–31)
BILIRUB SERPL-MCNC: 0.2 MG/DL (ref 0–1.2)
BUN SERPL-MCNC: 16 MG/DL (ref 6–20)
CALCIUM SERPL-MCNC: 8.6 MG/DL (ref 8.6–10.2)
CHLORIDE SERPL-SCNC: 94 MMOL/L (ref 98–107)
CO2 SERPL-SCNC: 29 MMOL/L (ref 22–29)
CREAT SERPL-MCNC: 0.3 MG/DL (ref 0.5–1)
GFR, ESTIMATED: >90 ML/MIN/1.73M2
GLUCOSE BLD-MCNC: 145 MG/DL (ref 74–99)
GLUCOSE BLD-MCNC: 175 MG/DL (ref 74–99)
GLUCOSE BLD-MCNC: 186 MG/DL (ref 74–99)
GLUCOSE BLD-MCNC: 187 MG/DL (ref 74–99)
GLUCOSE BLD-MCNC: 217 MG/DL (ref 74–99)
GLUCOSE BLD-MCNC: 252 MG/DL (ref 74–99)
GLUCOSE SERPL-MCNC: 177 MG/DL (ref 74–99)
PHOSPHATE SERPL-MCNC: 2.2 MG/DL (ref 2.5–4.5)
POTASSIUM SERPL-SCNC: 4.5 MMOL/L (ref 3.5–5)
PROT SERPL-MCNC: 7.2 G/DL (ref 6.4–8.3)
SODIUM SERPL-SCNC: 134 MMOL/L (ref 132–146)

## 2024-05-10 PROCEDURE — 2580000003 HC RX 258

## 2024-05-10 PROCEDURE — 84100 ASSAY OF PHOSPHORUS: CPT

## 2024-05-10 PROCEDURE — 94003 VENT MGMT INPAT SUBQ DAY: CPT

## 2024-05-10 PROCEDURE — 6360000002 HC RX W HCPCS

## 2024-05-10 PROCEDURE — 80053 COMPREHEN METABOLIC PANEL: CPT

## 2024-05-10 PROCEDURE — 6370000000 HC RX 637 (ALT 250 FOR IP)

## 2024-05-10 PROCEDURE — 99233 SBSQ HOSP IP/OBS HIGH 50: CPT | Performed by: INTERNAL MEDICINE

## 2024-05-10 PROCEDURE — A4216 STERILE WATER/SALINE, 10 ML: HCPCS

## 2024-05-10 PROCEDURE — 6370000000 HC RX 637 (ALT 250 FOR IP): Performed by: SURGERY

## 2024-05-10 PROCEDURE — 82962 GLUCOSE BLOOD TEST: CPT

## 2024-05-10 PROCEDURE — 94640 AIRWAY INHALATION TREATMENT: CPT

## 2024-05-10 PROCEDURE — C9113 INJ PANTOPRAZOLE SODIUM, VIA: HCPCS

## 2024-05-10 PROCEDURE — 6370000000 HC RX 637 (ALT 250 FOR IP): Performed by: INTERNAL MEDICINE

## 2024-05-10 PROCEDURE — 2060000000 HC ICU INTERMEDIATE R&B

## 2024-05-10 PROCEDURE — 99232 SBSQ HOSP IP/OBS MODERATE 35: CPT | Performed by: INTERNAL MEDICINE

## 2024-05-10 RX ADMIN — 0.12% CHLORHEXIDINE GLUCONATE 15 ML: 1.2 RINSE ORAL at 08:40

## 2024-05-10 RX ADMIN — ACETAMINOPHEN 650 MG: 650 SOLUTION ORAL at 18:17

## 2024-05-10 RX ADMIN — PANCRELIPASE LIPASE, PANCRELIPASE PROTEASE, PANCRELIPASE AMYLASE 80000 UNITS: 20000; 63000; 84000 CAPSULE, DELAYED RELEASE ORAL at 13:34

## 2024-05-10 RX ADMIN — BUPROPION HYDROCHLORIDE 100 MG: 100 TABLET, FILM COATED ORAL at 21:36

## 2024-05-10 RX ADMIN — LABETALOL HYDROCHLORIDE 10 MG: 5 INJECTION, SOLUTION INTRAVENOUS at 18:17

## 2024-05-10 RX ADMIN — METOCLOPRAMIDE 10 MG: 5 INJECTION, SOLUTION INTRAMUSCULAR; INTRAVENOUS at 13:34

## 2024-05-10 RX ADMIN — INSULIN LISPRO 3 UNITS: 100 INJECTION, SOLUTION INTRAVENOUS; SUBCUTANEOUS at 08:39

## 2024-05-10 RX ADMIN — OCTREOTIDE ACETATE 50 MCG: 50 INJECTION, SOLUTION INTRAVENOUS; SUBCUTANEOUS at 16:03

## 2024-05-10 RX ADMIN — KETOTIFEN FUMARATE 1 DROP: 0.35 SOLUTION/ DROPS OPHTHALMIC at 14:39

## 2024-05-10 RX ADMIN — OXYCODONE HYDROCHLORIDE 10 MG: 5 SOLUTION ORAL at 00:06

## 2024-05-10 RX ADMIN — SODIUM CHLORIDE, PRESERVATIVE FREE 10 ML: 5 INJECTION INTRAVENOUS at 21:52

## 2024-05-10 RX ADMIN — MEROPENEM 1000 MG: 1 INJECTION, POWDER, FOR SOLUTION INTRAVENOUS at 14:42

## 2024-05-10 RX ADMIN — SODIUM CHLORIDE, PRESERVATIVE FREE 10 ML: 5 INJECTION INTRAVENOUS at 21:35

## 2024-05-10 RX ADMIN — POLYETHYLENE GLYCOL 3350 17 G: 17 POWDER, FOR SOLUTION ORAL at 08:38

## 2024-05-10 RX ADMIN — OCTREOTIDE ACETATE 50 MCG: 50 INJECTION, SOLUTION INTRAVENOUS; SUBCUTANEOUS at 00:06

## 2024-05-10 RX ADMIN — OXYCODONE HYDROCHLORIDE 10 MG: 5 SOLUTION ORAL at 18:17

## 2024-05-10 RX ADMIN — KETOTIFEN FUMARATE 1 DROP: 0.35 SOLUTION/ DROPS OPHTHALMIC at 21:36

## 2024-05-10 RX ADMIN — SODIUM CHLORIDE, PRESERVATIVE FREE 10 ML: 5 INJECTION INTRAVENOUS at 03:47

## 2024-05-10 RX ADMIN — METOCLOPRAMIDE 10 MG: 5 INJECTION, SOLUTION INTRAMUSCULAR; INTRAVENOUS at 08:39

## 2024-05-10 RX ADMIN — 0.12% CHLORHEXIDINE GLUCONATE 15 ML: 1.2 RINSE ORAL at 21:35

## 2024-05-10 RX ADMIN — SODIUM CHLORIDE, PRESERVATIVE FREE 10 ML: 5 INJECTION INTRAVENOUS at 08:43

## 2024-05-10 RX ADMIN — METOCLOPRAMIDE 10 MG: 5 INJECTION, SOLUTION INTRAMUSCULAR; INTRAVENOUS at 21:34

## 2024-05-10 RX ADMIN — POLYVINYL ALCOHOL, POVIDONE 1 DROP: 14; 6 SOLUTION/ DROPS OPHTHALMIC at 08:41

## 2024-05-10 RX ADMIN — IPRATROPIUM BROMIDE AND ALBUTEROL SULFATE 1 DOSE: .5; 2.5 SOLUTION RESPIRATORY (INHALATION) at 13:17

## 2024-05-10 RX ADMIN — INSULIN GLARGINE 11 UNITS: 100 INJECTION, SOLUTION SUBCUTANEOUS at 21:34

## 2024-05-10 RX ADMIN — OXYCODONE HYDROCHLORIDE 10 MG: 5 SOLUTION ORAL at 07:04

## 2024-05-10 RX ADMIN — INSULIN LISPRO 6 UNITS: 100 INJECTION, SOLUTION INTRAVENOUS; SUBCUTANEOUS at 16:12

## 2024-05-10 RX ADMIN — MEROPENEM 1000 MG: 1 INJECTION, POWDER, FOR SOLUTION INTRAVENOUS at 21:47

## 2024-05-10 RX ADMIN — INSULIN LISPRO 3 UNITS: 100 INJECTION, SOLUTION INTRAVENOUS; SUBCUTANEOUS at 21:44

## 2024-05-10 RX ADMIN — MEROPENEM 1000 MG: 1 INJECTION, POWDER, FOR SOLUTION INTRAVENOUS at 07:10

## 2024-05-10 RX ADMIN — SODIUM BICARBONATE 650 MG: 650 TABLET ORAL at 16:12

## 2024-05-10 RX ADMIN — MICAFUNGIN SODIUM 100 MG: 100 INJECTION, POWDER, LYOPHILIZED, FOR SOLUTION INTRAVENOUS at 16:37

## 2024-05-10 RX ADMIN — ATORVASTATIN CALCIUM 10 MG: 10 TABLET, FILM COATED ORAL at 21:34

## 2024-05-10 RX ADMIN — PANTOPRAZOLE SODIUM 40 MG: 40 INJECTION, POWDER, FOR SOLUTION INTRAVENOUS at 07:02

## 2024-05-10 RX ADMIN — IPRATROPIUM BROMIDE AND ALBUTEROL SULFATE 1 DOSE: .5; 2.5 SOLUTION RESPIRATORY (INHALATION) at 15:52

## 2024-05-10 RX ADMIN — ACETAMINOPHEN 650 MG: 650 SOLUTION ORAL at 00:05

## 2024-05-10 RX ADMIN — PANCRELIPASE LIPASE, PANCRELIPASE PROTEASE, PANCRELIPASE AMYLASE 80000 UNITS: 20000; 63000; 84000 CAPSULE, DELAYED RELEASE ORAL at 21:37

## 2024-05-10 RX ADMIN — SODIUM BICARBONATE 650 MG: 650 TABLET ORAL at 08:40

## 2024-05-10 RX ADMIN — SODIUM BICARBONATE 650 MG: 650 TABLET ORAL at 21:35

## 2024-05-10 RX ADMIN — SODIUM CHLORIDE, PRESERVATIVE FREE 10 ML: 5 INJECTION INTRAVENOUS at 08:40

## 2024-05-10 RX ADMIN — SODIUM BICARBONATE 650 MG: 650 TABLET ORAL at 13:34

## 2024-05-10 RX ADMIN — OXYCODONE HYDROCHLORIDE 10 MG: 5 SOLUTION ORAL at 11:18

## 2024-05-10 RX ADMIN — ENOXAPARIN SODIUM 40 MG: 100 INJECTION SUBCUTANEOUS at 08:40

## 2024-05-10 RX ADMIN — METOCLOPRAMIDE 10 MG: 5 INJECTION, SOLUTION INTRAMUSCULAR; INTRAVENOUS at 03:47

## 2024-05-10 RX ADMIN — IPRATROPIUM BROMIDE AND ALBUTEROL SULFATE 1 DOSE: .5; 2.5 SOLUTION RESPIRATORY (INHALATION) at 07:47

## 2024-05-10 RX ADMIN — POLYVINYL ALCOHOL, POVIDONE 1 DROP: 14; 6 SOLUTION/ DROPS OPHTHALMIC at 16:07

## 2024-05-10 RX ADMIN — PANCRELIPASE LIPASE, PANCRELIPASE PROTEASE, PANCRELIPASE AMYLASE 80000 UNITS: 20000; 63000; 84000 CAPSULE, DELAYED RELEASE ORAL at 16:12

## 2024-05-10 RX ADMIN — LABETALOL HYDROCHLORIDE 10 MG: 5 INJECTION, SOLUTION INTRAVENOUS at 03:47

## 2024-05-10 RX ADMIN — PANTOPRAZOLE SODIUM 40 MG: 40 INJECTION, POWDER, FOR SOLUTION INTRAVENOUS at 18:17

## 2024-05-10 RX ADMIN — IPRATROPIUM BROMIDE AND ALBUTEROL SULFATE 1 DOSE: .5; 2.5 SOLUTION RESPIRATORY (INHALATION) at 19:05

## 2024-05-10 RX ADMIN — OCTREOTIDE ACETATE 50 MCG: 50 INJECTION, SOLUTION INTRAVENOUS; SUBCUTANEOUS at 08:41

## 2024-05-10 RX ADMIN — PANCRELIPASE LIPASE, PANCRELIPASE PROTEASE, PANCRELIPASE AMYLASE 80000 UNITS: 20000; 63000; 84000 CAPSULE, DELAYED RELEASE ORAL at 08:38

## 2024-05-10 RX ADMIN — POLYVINYL ALCOHOL, POVIDONE 1 DROP: 14; 6 SOLUTION/ DROPS OPHTHALMIC at 21:35

## 2024-05-10 RX ADMIN — BUPROPION HYDROCHLORIDE 100 MG: 100 TABLET, FILM COATED ORAL at 13:35

## 2024-05-10 RX ADMIN — ACETAMINOPHEN 650 MG: 650 SOLUTION ORAL at 11:18

## 2024-05-10 RX ADMIN — INSULIN LISPRO 3 UNITS: 100 INJECTION, SOLUTION INTRAVENOUS; SUBCUTANEOUS at 13:35

## 2024-05-10 RX ADMIN — POLYVINYL ALCOHOL, POVIDONE 1 DROP: 14; 6 SOLUTION/ DROPS OPHTHALMIC at 11:19

## 2024-05-10 RX ADMIN — BUPROPION HYDROCHLORIDE 100 MG: 100 TABLET, FILM COATED ORAL at 08:40

## 2024-05-10 RX ADMIN — INSULIN LISPRO 9 UNITS: 100 INJECTION, SOLUTION INTRAVENOUS; SUBCUTANEOUS at 03:46

## 2024-05-10 RX ADMIN — SENNOSIDES AND DOCUSATE SODIUM 2 TABLET: 50; 8.6 TABLET ORAL at 08:40

## 2024-05-10 RX ADMIN — ACETAMINOPHEN 650 MG: 650 SOLUTION ORAL at 07:04

## 2024-05-10 ASSESSMENT — PULMONARY FUNCTION TESTS
PIF_VALUE: 18
PIF_VALUE: 14
PIF_VALUE: 18

## 2024-05-10 NOTE — PROGRESS NOTES
[facility]   Daily Progress Note      Patient's Name/Date of Birth: Leatha Willard / 1965    Date: May 10, 2024     Chief Complaint: s/p open whipple, Grade C POPF s/p takeback to OR for completion pancreatectomy    Patient Active Problem List   Diagnosis    Recurrent major depressive disorder (HCC)    Essential hypertension    Hyperlipidemia    Prediabetes    Pre-operative laboratory examination    Class 1 obesity due to excess calories without serious comorbidity with body mass index (BMI) of 32.0 to 32.9 in adult    HIREN (obstructive sleep apnea)    Pancreatic cyst    Colon polyps    Cyst of pancreas    Chest pain    Tobacco abuse-- quit 2 weeks ago    Abdominal pain    Serous cystadenoma    Pancreatic fistula    Sepsis with acute renal failure and septic shock (HCC)    JUSTINE (acute kidney injury) (HCC)    Hypophosphatemia    Acute respiratory failure with hypoxia (HCC)    Post-pancreatectomy diabetes (HCC)    On total parenteral nutrition    Hypoalbuminemia    Electrolyte imbalance    Hypocalcemia    Acute blood loss anemia    Hypokalemia       Subjective:    Remains on vent.  Patient still following commands with bilateral upper and lower extremities.  Patient tolerating tube feeds.  Much more alert today.    Hyponatremia, in a.m. labs pending    Objective:  BP (!) 168/91   Pulse (!) 109   Temp 98.8 °F (37.1 °C) (Temporal)   Resp 18   Ht 1.626 m (5' 4\")   Wt 83.5 kg (184 lb)   SpO2 99%   BMI 31.58 kg/m²   Labs:  Recent Labs     05/08/24  0358 05/09/24  0450   WBC 14.7* 14.9*   HGB 8.0* 9.0*   HCT 26.5* 29.2*       Recent Labs     05/08/24 0358 05/09/24  0450    130*   K 4.4 4.7   CL 95* 90*   CO2 28 29   BUN 17 15   CREATININE 0.3* 0.3*       Recent Labs     05/08/24  0358 05/09/24  0450   ALKPHOS 150* 162*   ALT 12 15   AST 17 30   BILITOT 0.3 0.3           General appearance: lying in bed, opens eyes. Follows commands with BUE and BLE.  Alert  Head: NCAT, PERRL, anicteric sclera  Neck: supple,

## 2024-05-10 NOTE — PROGRESS NOTES
Pulmonary Critical Care Medicine           PULMONARY  CRITICAL CARE   SERVICE DAILY PROGRESS  NOTE     5/10/2024   Hospital LOS:  LOS: 25 days     Impression / Recommendations:    Chronic respiratory failure ( most likely reversible) status post tracheostomy and PEG tube placement for failure to liberate from mechanical ventilation on postop day 14 after pancreatico duodenectomy for a large benign pancreatic cyst  Hypoxia secondary to left-sided pleural effusion with compressive atelectasis  -Continue routine tracheostomy care  -Currently on mechanical ventilator on a pressure support of 12/6 with minimal , She was tolerating 10/5 earlier this week.   - Patient does not have any chronic pulmonary conditions/ neurological conditions  severe enough to need life long tracheostomy, we should make all efforts to wean the MV off and eventual  decannulation .    - Continue the liberation trials with eventual plan to decannulate  -Okay to transfer out of the ICU  -Continue PT OT  -Out of bed to chair as tolerated    Interval History/Event Changes:  Awake and follows commands   Synchronous and comfortable on MV       Allergies  Allergies   Allergen Reactions    Latex      Other reaction(s): swelling & sores    Milk-Related Compounds      States cannot drink milk -can have food that contains milk    Lisinopril Rash     Red rash on face,arms,upper back    Morphine Nausea And Vomiting    Seasonal Other (See Comments)       Review of Systems    A pertinent review of systems was performed and was otherwise non-contributory.    Vitals-     BP (!) 152/71   Pulse 96   Temp 96.8 °F (36 °C) (Temporal)   Resp 22   Ht 1.626 m (5' 4\")   Wt 83.5 kg (184 lb)   SpO2 99%   BMI 31.58 kg/m²    Tmax: Temp (24hrs), Av.9 °F (36.6 °C), Min:96.8 °F (36 °C), Max:98.8 °F (37.1 °C)      Hemodynamics:  Cuff:   Systolic (24hrs), Av , Min:150 , Max:168   /Diastolic (24hrs), Av, Min:71, Max:91    Cuff MAP:MAP (mmHg)  Av.8   Min: 68  Max: 150  P:   Pulse  Av.6  Min: 77  Max: 109      Airway:     Observed RR: Resp  Av.6  Min: 18  Max: 23   Observed O2 sats: SpO2  Av.3 %  Min: 85 %  Max: 100 %      Intake/Output Summary (Last 24 hours) at 5/10/2024 1110  Last data filed at 5/10/2024 0640  Gross per 24 hour   Intake 2935 ml   Output 2300 ml   Net 635 ml         Physical exam-  General-  Not in distress , comfortably lying , Has tracheostomy -  site looks healthy , On trach mask   Head and neck- Tracheostomy tube # 8.0 Shiley, cuffed, EM, MMM , AT/NC, No JVD. No thyromegaly, trachea midline.   Pulmonary - Bilateral air entry adequate with decreased at the left base. no rhonchi , no crackles.   Cardiovascular-  S1, S2 , Mo, No rubs or gallops.    Abdomen-  Has PEG  tube in place , Midline incision , Soft , slightly distended , Bowel sounds diminished , No organomegaly   Neuro-Grossly non focal , mouths words ,  Pupils equal and reactive.   Extremities- 1 + pitting edema , No deformities   Skin- warm , Capillary refill normal , no new rashes.          Routine labs:    Recent Labs     24  0358 24  0450   WBC 14.7* 14.9*   HGB 8.0* 9.0*   HCT 26.5* 29.2*   *  --      Recent Labs     24  0358 24  0450 05/10/24  0600    130* 134   K 4.4 4.7 4.5   CL 95* 90* 94*   CO2 28 29 29   BUN 17 15 16   CREATININE 0.3* 0.3* 0.3*   MG 2.1 1.9  --    PHOS 2.6 2.6 2.2*       Other Laboratory - Imaging Studies:     Reviewed and as per electronic record. CxR/CT images reviewed by me when available.     Total time spent for this encounter was 51 minutes including but not limited to patient exam , family discussion , discussion with consultants , chart review and documentation.         Abhay Costello MD  05/10/24

## 2024-05-10 NOTE — PLAN OF CARE
Problem: Discharge Planning  Goal: Discharge to home or other facility with appropriate resources  5/10/2024 0153 by Yeny Kat RN  Outcome: Progressing  5/9/2024 1724 by Екатерина Cerna RN  Outcome: Progressing     Problem: Skin/Tissue Integrity  Goal: Absence of new skin breakdown  5/10/2024 0153 by Yeny Kat RN  Outcome: Progressing  5/9/2024 1724 by Екатерина Cerna RN  Outcome: Progressing     Problem: ABCDS Injury Assessment  Goal: Absence of physical injury  5/10/2024 0153 by Yeny Kat RN  Outcome: Progressing  5/9/2024 1724 by Екатерина Cerna RN  Outcome: Progressing     Problem: Respiratory - Adult  Goal: Achieves optimal ventilation and oxygenation  5/10/2024 0153 by Yeny Kat RN  Outcome: Progressing  5/9/2024 1724 by Екатерина Cerna RN  Outcome: Progressing     Problem: Cardiovascular - Adult  Goal: Maintains optimal cardiac output and hemodynamic stability  5/10/2024 0153 by Yeny Kat RN  Outcome: Progressing  5/9/2024 1724 by Екатерина Cerna RN  Outcome: Progressing  Goal: Absence of cardiac dysrhythmias or at baseline  5/10/2024 0153 by Yeny Kat RN  Outcome: Progressing  5/9/2024 1724 by Екатерина Cerna RN  Outcome: Progressing     Problem: Musculoskeletal - Adult  Goal: Return mobility to safest level of function  5/10/2024 0153 by Yeny Kat RN  Outcome: Progressing  5/9/2024 1724 by Екатерина Cerna RN  Outcome: Progressing  Goal: Return ADL status to a safe level of function  5/10/2024 0153 by Yeny Kat RN  Outcome: Progressing  5/9/2024 1724 by Екатерина Cerna RN  Outcome: Progressing     Problem: Gastrointestinal - Adult  Goal: Minimal or absence of nausea and vomiting  5/10/2024 0153 by Yeny Kat RN  Outcome: Progressing  5/9/2024 1724 by Екатерина Cerna RN  Outcome: Progressing  Goal: Maintains or returns to baseline bowel function  5/10/2024 0153 by Yeny Kat RN  Outcome: Progressing  5/9/2024 1724 by Екатерина Cerna RN  Outcome:

## 2024-05-10 NOTE — PROGRESS NOTES
Comprehensive Nutrition Assessment    Type and Reason for Visit:  Reassess    Nutrition Recommendations/Plan:   Continue NPO.   Modify Current EN to appropriately meet current estimated needs & monitor.  D/t pt tolerating EN at goal, recommend to wean off TPN.    TF Recommendations:  Peptide Based High Protein (Vital HP) @ 60ml/hr (Goal) Continuous x 24hr/d= 1440ml TV, 1440kcal, 126gm Pro, 1204ml freewater.     Flush per physician mgmt; please consult for flush rec's if needed.     Malnutrition Assessment:  Malnutrition Status:  At risk for malnutrition (Comment) (05/01/24 1105)    Context:  Acute Illness     Findings of the 6 clinical characteristics of malnutrition:  Energy Intake:  50% or less of estimated energy requirements for 5 or more days (average)  Weight Loss:  Unable to assess (large fluctuations/ fluid shifts)     Body Fat Loss:  No significant body fat loss     Muscle Mass Loss:  No significant muscle mass loss    Fluid Accumulation:  No significant fluid accumulation     Strength:  Not Performed    Nutrition Assessment:    Pt remains at risk d/t ongoing need for EN/PN support s/p trach/PEG-J 4/30. Admit w/ large pancreatic cyst s/p open pylorus preserving whipple/appendectomy 4/15 complicated by worsening pancreatic leak requrining takeback pancreatectomy/ splenectomy 4/18. Noted drainage from MLI / possible bile leak. PMHx CAD, Back pain/ cervical fusion, & prediabetes (now post pancreatectomy DM). Multiple wounds noted. Tolerating EN at goal thus far, weaning off TPN.  Noted current hypophos, correct/monitor PRN. Will provide updated goal EN rec's to appropriately meet current estimated needs & monitor.    Nutrition Related Findings:    AMSx2, trach/vent, MAP 98, no pressors, tender/non-distended Abd, +BS, +PEG-J w/ TF at goal, +1 edema, +I/O's, hypophos, elevated BGL/ on 4/27 Wound Type: Multiple, Surgical Incision, Pressure Injury, Stage II, Partial Thickness       Current Nutrition  Intake & Therapies:    Average Meal Intake: NPO  Average Supplements Intake: NPO  Diet NPO Exceptions are: Sips of Water with Meds  ADULT TUBE FEEDING; Jejunostomy; Peptide Based High Protein; Continuous; 20; Yes; 10; Q 4 hours; 55; 30; Q 4 hours  PN-Adult  3-in-1 Central Line (Custom)  Current Tube Feeding (TF) Orders:  Feeding Route: PEG/J  Formula: Peptide Based High Protein  Schedule: Continuous  Feeding Regimen: 55ml/hr; 1320ml TV  Additives/Modulars: None  Water Flushes: 30ml q 4h= 180ml  Current TF & Flush Orders Provides: .  Goal TF & Flush Orders Provides: 1320kcal, 116gm pro, 1104ml freewater, 1284ml total water w/ flush  Current Parenteral Nutrition Orders:  Type and Formula: 3-in-1 Custom   Lipids: None  Duration: Continuous  Rate/Volume: 22.9ml/hr; 550ml TV  Current PN Order Provides: Same at goal  Goal PN Orders Provides: 451kcal, 35gm AA, 59gm dextrose, 11gm lipids  Additional Calorie Sources:  none currently    Anthropometric Measures:  Height: 162.6 cm (5' 4\")  Ideal Body Weight (IBW): 120 lbs (55 kg)    Admission Body Weight: 78.5 kg (173 lb) (4/15  actual)  Current Body Weight: 78.5 kg (173 lb) (actual 4/15 adm wt used as CBW remains elevated at this time likely 2/2 fluids), 144.2 % IBW. Weight Source: Bed Scale  Current BMI (kg/m2): 29.7  Usual Body Weight: 86.6 kg (191 lb) (3/2023 actual per EMR)  % Weight Change (Calculated): -9.4  Weight Adjustment For: No Adjustment                 BMI Categories: Overweight (BMI 25.0-29.9) (per adm wt)    Estimated Daily Nutrient Needs:  Energy Requirements Based On: Formula  Weight Used for Energy Requirements: Admission  Energy (kcal/day): PS3B 1481; 4165-2867  Weight Used for Protein Requirements: Ideal  Protein (g/day): 1.8-2.3gm/kg IBW=   Method Used for Fluid Requirements: 1 ml/kcal  Fluid (ml/day): 6196-6593    Nutrition Diagnosis:   Inadequate oral intake related to altered GI function as evidenced by NPO or clear liquid status due to medical

## 2024-05-10 NOTE — PROGRESS NOTES
ENDOCRINOLOGY PROGRESS NOTE      Date of admission: 4/15/2024  Date of service: 5/10/2024  Admitting physician: Norma Rogers MD   Primary Care Physician: Mekhi Escalona MD  Consultant physician: Jonathan Curiel MD     Reason for the consultation:  Post-pancreatectomy DM     History of Present Illness:  Leatha Willard is a 58 y.o. old female with PMH of cystic neoplasm of the head of pancreas, HTN, HLD and other listed below admitted to [facility] on 4/15/2024 for completion pancreatectomy. Endocrine service was consulted for diabetes management.    Subjective   I saw and examined the patient at bedside this afternoon, no acute events overnight, still on mechanical ventilator, on both TPN and tube feeds.    Point of care glucose monitoring   (Independently reviewed)   Recent Labs     05/09/24  0749 05/09/24  1119 05/09/24  1646 05/09/24  2105 05/10/24  0008 05/10/24  0336 05/10/24  0800 05/10/24  1116   POCGLU 238* 259* 221* 201* 145* 252* 175* 186*       Scheduled Meds:   pantoprazole (PROTONIX) 40 mg in sodium chloride (PF) 0.9 % 10 mL injection  40 mg IntraVENous Q12H    lipase-protease-amylase  80,000 Units Oral 4x Daily    insulin lispro  0-18 Units SubCUTAneous Q4H    insulin glargine  11 Units SubCUTAneous Nightly    sodium bicarbonate  650 mg Oral 4x Daily    polyethylene glycol  17 g Oral Daily    sodium chloride flush  5-40 mL IntraVENous 2 times per day    metoclopramide  10 mg IntraVENous Q6H    oxyCODONE  10 mg Oral Q6H    octreotide  50 mcg IntraVENous Q8H    meropenem  1,000 mg IntraVENous Q8H    buPROPion  100 mg Oral TID    sennosides-docusate sodium  2 tablet Oral Daily    micafungin  100 mg IntraVENous Daily    ipratropium 0.5 mg-albuterol 2.5 mg  1 Dose Inhalation Q4H WA RT    sodium chloride flush  5-40 mL IntraVENous BID    acetaminophen  650 mg Oral 4 times per day    chlorhexidine  15 mL Mouth/Throat BID    Polyvinyl Alcohol-Povidone PF  1 drop Both Eyes Q4H    Or    artificial tears   Both Eyes

## 2024-05-10 NOTE — PROGRESS NOTES
[facility] PROGRESS NOTE      Chief complaint: Follow-up of septic shock/Candida albicans and glabrata infection/venous peritonitis associated with anastomosis leak    The patient is a 58 y.o. female with history of DM, hypertension, hyperlipidemia, cervical fusion, admitted on 04/15 for symptomatic pancreatic serous cystadenoma abutting many branches of the SMV and SMA prompting pylorus-preserving pancreaticoduodenectomy, placement of biliary stent, portal lymphadenectomy, round ligament and omental pedicle flap harvest, appendectomy on 04/15 during which no clinical findings of infection were noted. Postop course was complicated by intermittent fever since 04/18 up to 101.5 F and pancreatic fistula, prompting reopening recent laparotomy, completion pancreatectomy, splenectomy, omentectomy on 04/18 during which gush of a large volume of dark succus and infected fluid on opening, old infected hematoma, significant amount of necrotic omentum covering the anastomoses, dehiscence of anterior wall anastomosis with bile exiting the site of anastomosis at the jejunum, bile spilling within the lesser sac, necrotic pancreas were noted.  Tissue Gram stain and culture showed few polymorphonuclear leukocytes, no epithelial cells, no organisms, rare growth of Candida albicans, light growth of Candida glabrata, no anaerobes. Piperacillin-tazobactam was started on since admission.  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 Anticipate 4 weeks  from 04/18-05/16.  Follow up Surgery recommendations regarding wound drainage..  Follow up in 2 months (on or after 06/28/2024) with [facility] ([phone]) or with primary care to get booster doses for:  1) meningococcal polysaccharide vaccine (Menveo) - second dose   2)Group B meningococcal vaccine-Bexsero second dose  Update the following vaccinations in 5 years:  - meningococcal vaccine booster every 5 years.  Continue local wound care.  Continue supportive care.  Follow up discharge planning.     Thank you for involving me in the care of Leatha Willard. ID will continue to follow. Please do not hesitate to call for any questions or concerns.    Electronically signed by Kandi Giang MD on 5/10/2024 at 10:13 AM

## 2024-05-10 NOTE — PLAN OF CARE
Problem: Skin/Tissue Integrity  Goal: Absence of new skin breakdown  Description: 1.  Monitor for areas of redness and/or skin breakdown  2.  Assess vascular access sites hourly  3.  Every 4-6 hours minimum:  Change oxygen saturation probe site  4.  Every 4-6 hours:  If on nasal continuous positive airway pressure, respiratory therapy assess nares and determine need for appliance change or resting period.  Outcome: Progressing     Problem: ABCDS Injury Assessment  Goal: Absence of physical injury  Outcome: Progressing     Problem: Respiratory - Adult  Goal: Achieves optimal ventilation and oxygenation  Outcome: Progressing     Problem: Cardiovascular - Adult  Goal: Maintains optimal cardiac output and hemodynamic stability  Outcome: Progressing  Goal: Absence of cardiac dysrhythmias or at baseline  Outcome: Progressing     Problem: Gastrointestinal - Adult  Goal: Minimal or absence of nausea and vomiting  Outcome: Progressing

## 2024-05-10 NOTE — FLOWSHEET NOTE
Inpatient Wound Care(follow up) 4502 b    Admit Date: 4/15/2024  5:37 AM    Reason for consult:  fistula management    Findings:     05/10/24 1452   Fistula   Placement Date: 05/09/24   Present on Admission/Arrival: No  Inserted by: mid abdomen   Stomal Appliance Intact  (no leakage, no need to change)     Plan:  Pouch intact, no need to change  Extra set up in room  Will follow      Julissa Roman RN 5/10/2024 2:53 PM

## 2024-05-10 NOTE — CARE COORDINATION
Message sent to blue team, ok to start precert today for [facility]. Updated select regarding ostomy pouch over midline incision for drainage. Vent support, TPN continues, IV merrem, micafungin with plan for antibiotics through 5/16. Requested they start precert today.     For questions I can be reached at [phone]. COLBY Oreilly

## 2024-05-11 ENCOUNTER — APPOINTMENT (OUTPATIENT)
Dept: GENERAL RADIOLOGY | Age: 59
DRG: 004 | End: 2024-05-11
Attending: STUDENT IN AN ORGANIZED HEALTH CARE EDUCATION/TRAINING PROGRAM
Payer: COMMERCIAL

## 2024-05-11 LAB
ALBUMIN SERPL-MCNC: 2.8 G/DL (ref 3.5–5.2)
ALP SERPL-CCNC: 144 U/L (ref 35–104)
ALT SERPL-CCNC: 14 U/L (ref 0–32)
ANION GAP SERPL CALCULATED.3IONS-SCNC: 11 MMOL/L (ref 7–16)
AST SERPL-CCNC: 20 U/L (ref 0–31)
BASOPHILS # BLD: 0.14 K/UL (ref 0–0.2)
BASOPHILS NFR BLD: 1 % (ref 0–2)
BILIRUB SERPL-MCNC: 0.2 MG/DL (ref 0–1.2)
BUN SERPL-MCNC: 18 MG/DL (ref 6–20)
CALCIUM SERPL-MCNC: 8.3 MG/DL (ref 8.6–10.2)
CHLORIDE SERPL-SCNC: 94 MMOL/L (ref 98–107)
CO2 SERPL-SCNC: 31 MMOL/L (ref 22–29)
CREAT SERPL-MCNC: 0.3 MG/DL (ref 0.5–1)
EOSINOPHIL # BLD: 1.85 K/UL (ref 0.05–0.5)
EOSINOPHILS RELATIVE PERCENT: 11 % (ref 0–6)
ERYTHROCYTE [DISTWIDTH] IN BLOOD BY AUTOMATED COUNT: 15.4 % (ref 11.5–15)
GFR, ESTIMATED: >90 ML/MIN/1.73M2
GLUCOSE BLD-MCNC: 150 MG/DL (ref 74–99)
GLUCOSE BLD-MCNC: 198 MG/DL (ref 74–99)
GLUCOSE BLD-MCNC: 210 MG/DL (ref 74–99)
GLUCOSE BLD-MCNC: 244 MG/DL (ref 74–99)
GLUCOSE BLD-MCNC: 388 MG/DL (ref 74–99)
GLUCOSE SERPL-MCNC: 215 MG/DL (ref 74–99)
HCT VFR BLD AUTO: 28.4 % (ref 34–48)
HGB BLD-MCNC: 8.7 G/DL (ref 11.5–15.5)
LYMPHOCYTES NFR BLD: 2.85 K/UL (ref 1.5–4)
LYMPHOCYTES RELATIVE PERCENT: 17 % (ref 20–42)
MAGNESIUM SERPL-MCNC: 1.6 MG/DL (ref 1.6–2.6)
MCH RBC QN AUTO: 28.9 PG (ref 26–35)
MCHC RBC AUTO-ENTMCNC: 30.6 G/DL (ref 32–34.5)
MCV RBC AUTO: 94.4 FL (ref 80–99.9)
MICROORGANISM SPEC CULT: NORMAL
MICROORGANISM/AGENT SPEC: NORMAL
MONOCYTES NFR BLD: 0.57 K/UL (ref 0.1–0.95)
MONOCYTES NFR BLD: 4 % (ref 2–12)
MYELOCYTES ABSOLUTE COUNT: 0.14 K/UL
MYELOCYTES: 1 %
NEUTROPHILS NFR BLD: 65 % (ref 43–80)
NEUTS SEG NFR BLD: 10.7 K/UL (ref 1.8–7.3)
NUCLEATED RED BLOOD CELLS: 1 PER 100 WBC
PHOSPHATE SERPL-MCNC: 1.7 MG/DL (ref 2.5–4.5)
PHOSPHATE SERPL-MCNC: 14.6 MG/DL (ref 2.5–4.5)
PHOSPHATE SERPL-MCNC: 4.4 MG/DL (ref 2.5–4.5)
PLATELET # BLD AUTO: 647 K/UL (ref 130–450)
PMV BLD AUTO: 9.5 FL (ref 7–12)
POTASSIUM SERPL-SCNC: 3.6 MMOL/L (ref 3.5–5)
PROMYELOCYTES ABSOLUTE COUNT: 0.14 K/UL
PROMYELOCYTES: 1 %
PROT SERPL-MCNC: 7.1 G/DL (ref 6.4–8.3)
RBC # BLD AUTO: 3.01 M/UL (ref 3.5–5.5)
RBC # BLD: ABNORMAL 10*6/UL
SODIUM SERPL-SCNC: 136 MMOL/L (ref 132–146)
SPECIMEN DESCRIPTION: NORMAL
WBC OTHER # BLD: 16.4 K/UL (ref 4.5–11.5)

## 2024-05-11 PROCEDURE — 84100 ASSAY OF PHOSPHORUS: CPT

## 2024-05-11 PROCEDURE — 99232 SBSQ HOSP IP/OBS MODERATE 35: CPT | Performed by: INTERNAL MEDICINE

## 2024-05-11 PROCEDURE — 2580000003 HC RX 258

## 2024-05-11 PROCEDURE — 2060000000 HC ICU INTERMEDIATE R&B

## 2024-05-11 PROCEDURE — 6360000002 HC RX W HCPCS

## 2024-05-11 PROCEDURE — 83735 ASSAY OF MAGNESIUM: CPT

## 2024-05-11 PROCEDURE — 6370000000 HC RX 637 (ALT 250 FOR IP)

## 2024-05-11 PROCEDURE — 85025 COMPLETE CBC W/AUTO DIFF WBC: CPT

## 2024-05-11 PROCEDURE — 6370000000 HC RX 637 (ALT 250 FOR IP): Performed by: INTERNAL MEDICINE

## 2024-05-11 PROCEDURE — 94640 AIRWAY INHALATION TREATMENT: CPT

## 2024-05-11 PROCEDURE — 94003 VENT MGMT INPAT SUBQ DAY: CPT

## 2024-05-11 PROCEDURE — 2700000000 HC OXYGEN THERAPY PER DAY

## 2024-05-11 PROCEDURE — 6370000000 HC RX 637 (ALT 250 FOR IP): Performed by: SURGERY

## 2024-05-11 PROCEDURE — C9113 INJ PANTOPRAZOLE SODIUM, VIA: HCPCS

## 2024-05-11 PROCEDURE — 2500000003 HC RX 250 WO HCPCS

## 2024-05-11 PROCEDURE — A4216 STERILE WATER/SALINE, 10 ML: HCPCS

## 2024-05-11 PROCEDURE — 71045 X-RAY EXAM CHEST 1 VIEW: CPT

## 2024-05-11 PROCEDURE — 82962 GLUCOSE BLOOD TEST: CPT

## 2024-05-11 PROCEDURE — 80053 COMPREHEN METABOLIC PANEL: CPT

## 2024-05-11 RX ORDER — MAGNESIUM SULFATE IN WATER 40 MG/ML
2000 INJECTION, SOLUTION INTRAVENOUS ONCE
Status: COMPLETED | OUTPATIENT
Start: 2024-05-11 | End: 2024-05-11

## 2024-05-11 RX ADMIN — KETOTIFEN FUMARATE 1 DROP: 0.35 SOLUTION/ DROPS OPHTHALMIC at 21:12

## 2024-05-11 RX ADMIN — ACETAMINOPHEN 650 MG: 650 SOLUTION ORAL at 00:07

## 2024-05-11 RX ADMIN — Medication 250 MG: at 21:34

## 2024-05-11 RX ADMIN — INSULIN LISPRO 3 UNITS: 100 INJECTION, SOLUTION INTRAVENOUS; SUBCUTANEOUS at 09:12

## 2024-05-11 RX ADMIN — MAGNESIUM SULFATE HEPTAHYDRATE 2000 MG: 40 INJECTION, SOLUTION INTRAVENOUS at 21:27

## 2024-05-11 RX ADMIN — OXYCODONE HYDROCHLORIDE 10 MG: 5 SOLUTION ORAL at 00:08

## 2024-05-11 RX ADMIN — OXYCODONE HYDROCHLORIDE 10 MG: 5 SOLUTION ORAL at 12:39

## 2024-05-11 RX ADMIN — POLYETHYLENE GLYCOL 3350 17 G: 17 POWDER, FOR SOLUTION ORAL at 09:03

## 2024-05-11 RX ADMIN — SODIUM CHLORIDE, PRESERVATIVE FREE 10 ML: 5 INJECTION INTRAVENOUS at 09:06

## 2024-05-11 RX ADMIN — PANCRELIPASE LIPASE, PANCRELIPASE PROTEASE, PANCRELIPASE AMYLASE 80000 UNITS: 20000; 63000; 84000 CAPSULE, DELAYED RELEASE ORAL at 12:38

## 2024-05-11 RX ADMIN — INSULIN LISPRO 15 UNITS: 100 INJECTION, SOLUTION INTRAVENOUS; SUBCUTANEOUS at 05:00

## 2024-05-11 RX ADMIN — INSULIN LISPRO 3 UNITS: 100 INJECTION, SOLUTION INTRAVENOUS; SUBCUTANEOUS at 12:42

## 2024-05-11 RX ADMIN — PANCRELIPASE LIPASE, PANCRELIPASE PROTEASE, PANCRELIPASE AMYLASE 80000 UNITS: 20000; 63000; 84000 CAPSULE, DELAYED RELEASE ORAL at 09:06

## 2024-05-11 RX ADMIN — SODIUM PHOSPHATE, MONOBASIC, MONOHYDRATE AND SODIUM PHOSPHATE, DIBASIC, ANHYDROUS 30 MMOL: 276; 142 INJECTION, SOLUTION INTRAVENOUS at 14:28

## 2024-05-11 RX ADMIN — POLYVINYL ALCOHOL, POVIDONE 1 DROP: 14; 6 SOLUTION/ DROPS OPHTHALMIC at 09:04

## 2024-05-11 RX ADMIN — ENOXAPARIN SODIUM 40 MG: 100 INJECTION SUBCUTANEOUS at 09:03

## 2024-05-11 RX ADMIN — Medication 250 MG: at 16:19

## 2024-05-11 RX ADMIN — LABETALOL HYDROCHLORIDE 10 MG: 5 INJECTION, SOLUTION INTRAVENOUS at 09:07

## 2024-05-11 RX ADMIN — METOCLOPRAMIDE 10 MG: 5 INJECTION, SOLUTION INTRAMUSCULAR; INTRAVENOUS at 14:49

## 2024-05-11 RX ADMIN — SODIUM CHLORIDE, PRESERVATIVE FREE 10 ML: 5 INJECTION INTRAVENOUS at 09:03

## 2024-05-11 RX ADMIN — HYDROPHOR: 42 OINTMENT TOPICAL at 21:13

## 2024-05-11 RX ADMIN — INSULIN GLARGINE 11 UNITS: 100 INJECTION, SOLUTION SUBCUTANEOUS at 21:12

## 2024-05-11 RX ADMIN — POLYVINYL ALCOHOL, POVIDONE 1 DROP: 14; 6 SOLUTION/ DROPS OPHTHALMIC at 00:07

## 2024-05-11 RX ADMIN — IPRATROPIUM BROMIDE AND ALBUTEROL SULFATE 1 DOSE: .5; 2.5 SOLUTION RESPIRATORY (INHALATION) at 20:35

## 2024-05-11 RX ADMIN — MEROPENEM 1000 MG: 1 INJECTION, POWDER, FOR SOLUTION INTRAVENOUS at 22:03

## 2024-05-11 RX ADMIN — 0.12% CHLORHEXIDINE GLUCONATE 15 ML: 1.2 RINSE ORAL at 09:03

## 2024-05-11 RX ADMIN — SODIUM BICARBONATE 650 MG: 650 TABLET ORAL at 21:12

## 2024-05-11 RX ADMIN — SODIUM BICARBONATE 650 MG: 650 TABLET ORAL at 09:04

## 2024-05-11 RX ADMIN — METOCLOPRAMIDE 10 MG: 5 INJECTION, SOLUTION INTRAMUSCULAR; INTRAVENOUS at 09:04

## 2024-05-11 RX ADMIN — PANCRELIPASE LIPASE, PANCRELIPASE PROTEASE, PANCRELIPASE AMYLASE 80000 UNITS: 20000; 63000; 84000 CAPSULE, DELAYED RELEASE ORAL at 21:11

## 2024-05-11 RX ADMIN — PANTOPRAZOLE SODIUM 40 MG: 40 INJECTION, POWDER, FOR SOLUTION INTRAVENOUS at 20:07

## 2024-05-11 RX ADMIN — Medication 250 MG: at 12:42

## 2024-05-11 RX ADMIN — IPRATROPIUM BROMIDE AND ALBUTEROL SULFATE 1 DOSE: .5; 2.5 SOLUTION RESPIRATORY (INHALATION) at 11:26

## 2024-05-11 RX ADMIN — BUPROPION HYDROCHLORIDE 100 MG: 100 TABLET, FILM COATED ORAL at 14:49

## 2024-05-11 RX ADMIN — PANTOPRAZOLE SODIUM 40 MG: 40 INJECTION, POWDER, FOR SOLUTION INTRAVENOUS at 04:59

## 2024-05-11 RX ADMIN — IPRATROPIUM BROMIDE AND ALBUTEROL SULFATE 1 DOSE: .5; 2.5 SOLUTION RESPIRATORY (INHALATION) at 08:34

## 2024-05-11 RX ADMIN — ATORVASTATIN CALCIUM 10 MG: 10 TABLET, FILM COATED ORAL at 21:12

## 2024-05-11 RX ADMIN — POLYVINYL ALCOHOL, POVIDONE 1 DROP: 14; 6 SOLUTION/ DROPS OPHTHALMIC at 12:48

## 2024-05-11 RX ADMIN — ACETAMINOPHEN 650 MG: 650 SOLUTION ORAL at 04:59

## 2024-05-11 RX ADMIN — BUPROPION HYDROCHLORIDE 100 MG: 100 TABLET, FILM COATED ORAL at 09:06

## 2024-05-11 RX ADMIN — IPRATROPIUM BROMIDE AND ALBUTEROL SULFATE 1 DOSE: .5; 2.5 SOLUTION RESPIRATORY (INHALATION) at 15:32

## 2024-05-11 RX ADMIN — SODIUM BICARBONATE 650 MG: 650 TABLET ORAL at 12:38

## 2024-05-11 RX ADMIN — ACETAMINOPHEN 650 MG: 650 SOLUTION ORAL at 12:39

## 2024-05-11 RX ADMIN — POLYVINYL ALCOHOL, POVIDONE 1 DROP: 14; 6 SOLUTION/ DROPS OPHTHALMIC at 05:37

## 2024-05-11 RX ADMIN — OCTREOTIDE ACETATE 50 MCG: 50 INJECTION, SOLUTION INTRAVENOUS; SUBCUTANEOUS at 14:49

## 2024-05-11 RX ADMIN — PANCRELIPASE LIPASE, PANCRELIPASE PROTEASE, PANCRELIPASE AMYLASE 80000 UNITS: 20000; 63000; 84000 CAPSULE, DELAYED RELEASE ORAL at 16:11

## 2024-05-11 RX ADMIN — ACETAMINOPHEN 650 MG: 650 SOLUTION ORAL at 20:07

## 2024-05-11 RX ADMIN — OXYCODONE HYDROCHLORIDE 10 MG: 5 SOLUTION ORAL at 20:07

## 2024-05-11 RX ADMIN — INSULIN LISPRO 6 UNITS: 100 INJECTION, SOLUTION INTRAVENOUS; SUBCUTANEOUS at 21:21

## 2024-05-11 RX ADMIN — BUPROPION HYDROCHLORIDE 100 MG: 100 TABLET, FILM COATED ORAL at 21:11

## 2024-05-11 RX ADMIN — POLYVINYL ALCOHOL, POVIDONE 1 DROP: 14; 6 SOLUTION/ DROPS OPHTHALMIC at 14:49

## 2024-05-11 RX ADMIN — OCTREOTIDE ACETATE 50 MCG: 50 INJECTION, SOLUTION INTRAVENOUS; SUBCUTANEOUS at 00:07

## 2024-05-11 RX ADMIN — KETOTIFEN FUMARATE 1 DROP: 0.35 SOLUTION/ DROPS OPHTHALMIC at 09:05

## 2024-05-11 RX ADMIN — METOCLOPRAMIDE 10 MG: 5 INJECTION, SOLUTION INTRAMUSCULAR; INTRAVENOUS at 21:12

## 2024-05-11 RX ADMIN — LABETALOL HYDROCHLORIDE 10 MG: 5 INJECTION, SOLUTION INTRAVENOUS at 13:26

## 2024-05-11 RX ADMIN — SENNOSIDES AND DOCUSATE SODIUM 2 TABLET: 50; 8.6 TABLET ORAL at 09:04

## 2024-05-11 RX ADMIN — OXYCODONE HYDROCHLORIDE 10 MG: 5 SOLUTION ORAL at 04:59

## 2024-05-11 RX ADMIN — HYDRALAZINE HYDROCHLORIDE 10 MG: 20 INJECTION INTRAMUSCULAR; INTRAVENOUS at 16:19

## 2024-05-11 RX ADMIN — MEROPENEM 1000 MG: 1 INJECTION, POWDER, FOR SOLUTION INTRAVENOUS at 14:31

## 2024-05-11 RX ADMIN — SODIUM BICARBONATE 650 MG: 650 TABLET ORAL at 16:12

## 2024-05-11 RX ADMIN — OCTREOTIDE ACETATE 50 MCG: 50 INJECTION, SOLUTION INTRAVENOUS; SUBCUTANEOUS at 09:12

## 2024-05-11 RX ADMIN — METOCLOPRAMIDE 10 MG: 5 INJECTION, SOLUTION INTRAMUSCULAR; INTRAVENOUS at 02:05

## 2024-05-11 RX ADMIN — MEROPENEM 1000 MG: 1 INJECTION, POWDER, FOR SOLUTION INTRAVENOUS at 05:35

## 2024-05-11 RX ADMIN — SODIUM CHLORIDE, PRESERVATIVE FREE 10 ML: 5 INJECTION INTRAVENOUS at 21:12

## 2024-05-11 RX ADMIN — INSULIN LISPRO 6 UNITS: 100 INJECTION, SOLUTION INTRAVENOUS; SUBCUTANEOUS at 16:10

## 2024-05-11 RX ADMIN — MICAFUNGIN SODIUM 100 MG: 100 INJECTION, POWDER, LYOPHILIZED, FOR SOLUTION INTRAVENOUS at 20:02

## 2024-05-11 RX ADMIN — 0.12% CHLORHEXIDINE GLUCONATE 15 ML: 1.2 RINSE ORAL at 21:30

## 2024-05-11 ASSESSMENT — PAIN DESCRIPTION - ORIENTATION
ORIENTATION: MID
ORIENTATION: LOWER
ORIENTATION: MID
ORIENTATION: MID

## 2024-05-11 ASSESSMENT — PULMONARY FUNCTION TESTS: PIF_VALUE: 18

## 2024-05-11 ASSESSMENT — PAIN SCALES - GENERAL
PAINLEVEL_OUTOF10: 10
PAINLEVEL_OUTOF10: 4

## 2024-05-11 ASSESSMENT — PAIN DESCRIPTION - PAIN TYPE: TYPE: ACUTE PAIN

## 2024-05-11 ASSESSMENT — PAIN - FUNCTIONAL ASSESSMENT: PAIN_FUNCTIONAL_ASSESSMENT: ACTIVITIES ARE NOT PREVENTED

## 2024-05-11 ASSESSMENT — PAIN DESCRIPTION - DESCRIPTORS
DESCRIPTORS: ACHING

## 2024-05-11 ASSESSMENT — PAIN SCALES - WONG BAKER: WONGBAKER_NUMERICALRESPONSE: NO HURT

## 2024-05-11 ASSESSMENT — PAIN DESCRIPTION - LOCATION
LOCATION: BACK
LOCATION: ABDOMEN

## 2024-05-11 ASSESSMENT — PAIN DESCRIPTION - FREQUENCY: FREQUENCY: CONTINUOUS

## 2024-05-11 ASSESSMENT — PAIN DESCRIPTION - ONSET: ONSET: ON-GOING

## 2024-05-11 NOTE — PROGRESS NOTES
Nutrition Note    Consult received for TF goal recs. Chart reviewed. Goal TF recs provided to better meet est needs. Regimen will meet 100% est calorie/pro needs at goal. Peptide based formula is most appropriate formula and is w/ consideration for optimal GI tolerance/feeding via J port and is also lower in CHO. Pt most recently assessed by RD on 5/10 (see RD note for full assessment).Will follow-up as planned.  Please reconsult if RD needed prior to follow-up.      Recommend:  Immune-Enhancing Formula (Pivot 1.5) @45ml/hr :   Will provide: 1080ml TV, 1620kcal, 102g pro, 820ml free water.   Flush per physician mgmt; please consult for flush recs if needed.    Pt. Remains at risk for refeeding w/ noted current hypophos and elevated BG. Recommend slow advance to goal and monitor/replace electrolytes PRN.     Estimated Daily Nutrient Needs:  Energy Requirements Based On: Formula  Weight Used for Energy Requirements: Admission  Energy (kcal/day): PS3B(1626): 1600-1700kcal  Weight Used for Protein Requirements: Ideal  Protein (g/day): 1.8-2.3gm/kg IBW=   Method Used for Fluid Requirements: 1 ml/kcal  Fluid (ml/day):       Electronically signed by Deepak Miller RD on 5/11/24 at 11:51 AM EDT    Contact: ext [phone]

## 2024-05-11 NOTE — PROGRESS NOTES
[facility] Surgery   Daily Progress Note      Patient's Name/Date of Birth: Leatha Willard / 1965    Date: May 11, 2024     Chief Complaint: s/p open whipple, Grade C POPF s/p takeback to OR for completion pancreatectomy    Patient Active Problem List   Diagnosis    Recurrent major depressive disorder (HCC)    Essential hypertension    Hyperlipidemia    Prediabetes    Pre-operative laboratory examination    Class 1 obesity due to excess calories without serious comorbidity with body mass index (BMI) of 32.0 to 32.9 in adult    HIREN (obstructive sleep apnea)    Pancreatic cyst    Colon polyps    Cyst of pancreas    Chest pain    Tobacco abuse-- quit 2 weeks ago    Abdominal pain    Serous cystadenoma    Pancreatic fistula    Sepsis with acute renal failure and septic shock (HCC)    JUSTINE (acute kidney injury) (HCC)    Hypophosphatemia    Acute respiratory failure with hypoxia (HCC)    Post-pancreatectomy diabetes (HCC)    On total parenteral nutrition    Hypoalbuminemia    Electrolyte imbalance    Hypocalcemia    Acute blood loss anemia    Hypokalemia       Subjective:    Remains on vent.  Patient still following commands with bilateral upper and lower extremities.  Patient tolerating tube feeds.  Midline w/ wound ostomy appliance.     Objective:  BP (!) 160/88   Pulse (!) 115   Temp 98.6 °F (37 °C) (Oral)   Resp 20   Ht 1.626 m (5' 4\")   Wt 83.5 kg (184 lb)   SpO2 99%   BMI 31.58 kg/m²   Labs:  Recent Labs     05/09/24  0450   WBC 14.9*   HGB 9.0*   HCT 29.2*       Recent Labs     05/09/24  0450 05/10/24  0600   * 134   K 4.7 4.5   CL 90* 94*   CO2 29 29   BUN 15 16   CREATININE 0.3* 0.3*       Recent Labs     05/09/24  0450 05/10/24  0600   ALKPHOS 162* 147*   ALT 15 13   AST 30 17   BILITOT 0.3 0.2           General appearance: lying in bed, opens eyes. Follows commands with BUE and BLE.  Alert  Head: NCAT, PERRL, anicteric sclera  Neck: supple, no masses. Tracheostomy in place   Lungs: symmetric chest rise  on pressure support  Heart: warm throughout  Abdomen: softly distended, midline with continued mucoid brown fluid ostomy appliance in place staples present; some skin dehiscence and breakdown near the midpoint of incision; PEG J-  gastric content in gravtiy drainage bag.   Extremities: BLE edema noted    Assessment/Plan:  Leatha Willard is a 58 y.o. female s/p open pylorus-preserving Whipple portal lymphadenectomy, appendectomy for symptomatic 13cm serous cystadenoma of the head of the pancreas. Now s/p PJ excision, completion pancreatectomy, splenectomy, omentectomy and washout for PJ disruption.    - Cont Zenpep 80,000 4 times a day thru G tube; ok to open the pill capsule and dissolve in water and NaBicarb and flush thru G-tube  - continue tube feeds at goal, will change tube feeds to Glucerna continue at goal; appreciate dietary recs  - Appreciate endocrine recommendations   - Wean vent as able, pt currently on PS. Pulmonology following ,appreciate recs   - Monitor intake and output   - Continue to change midline dressing frequently/prn for drainage  - Appreciate Wound Care RN    - Otherwise flush J regularly, no medications via J   - Continue ppi, reglan   - Continue Octreotide empirically for presumed lymph leak   - ID recommendations appreciated- at least 4 weeks of antibiotic therapy planned per ID. 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   - Cont TF, do not advance via J tube with unchanged midline drainage amount/characteristic. Continue with G tube to gravity and flush G tube to keep patent with drainage  -Discharge planning      Agustín Negrete, DO  7:27 AM    She is tolerating her tube feeds after stopping the TPN  Due to the sugars being elevated we will switch her to Glucerna.  Continue wound management  Continue octreotide  Appreciate

## 2024-05-11 NOTE — PROGRESS NOTES
ENDOCRINOLOGY PROGRESS NOTE      Date of admission: 4/15/2024  Date of service: 5/11/2024  Admitting physician: Norma Rogers MD   Primary Care Physician: Mekhi Escalona MD  Consultant physician: Jonathan Curiel MD     Reason for the consultation:  Post-pancreatectomy DM     History of Present Illness:  Leatha Willard is a 58 y.o. old female with PMH of cystic neoplasm of the head of pancreas, HTN, HLD and other listed below admitted to [facility] on 4/15/2024 for completion pancreatectomy. Endocrine service was consulted for diabetes management.    Subjective   I saw and examined the patient at bedside this AM, BS high but she didn't receive any insulin since 9pm last night     Point of care glucose monitoring   (Independently reviewed)   Recent Labs     05/09/24  2105 05/10/24  0008 05/10/24  0336 05/10/24  0800 05/10/24  1116 05/10/24  1601 05/10/24  1955 05/11/24  0452   POCGLU 201* 145* 252* 175* 186* 217* 187* 388*       Scheduled Meds:   pantoprazole (PROTONIX) 40 mg in sodium chloride (PF) 0.9 % 10 mL injection  40 mg IntraVENous Q12H    lipase-protease-amylase  80,000 Units Oral 4x Daily    insulin lispro  0-18 Units SubCUTAneous Q4H    insulin glargine  11 Units SubCUTAneous Nightly    sodium bicarbonate  650 mg Oral 4x Daily    polyethylene glycol  17 g Oral Daily    sodium chloride flush  5-40 mL IntraVENous 2 times per day    metoclopramide  10 mg IntraVENous Q6H    oxyCODONE  10 mg Oral Q6H    octreotide  50 mcg IntraVENous Q8H    meropenem  1,000 mg IntraVENous Q8H    buPROPion  100 mg Oral TID    sennosides-docusate sodium  2 tablet Oral Daily    micafungin  100 mg IntraVENous Daily    ipratropium 0.5 mg-albuterol 2.5 mg  1 Dose Inhalation Q4H WA RT    sodium chloride flush  5-40 mL IntraVENous BID    acetaminophen  650 mg Oral 4 times per day    chlorhexidine  15 mL Mouth/Throat BID    Polyvinyl Alcohol-Povidone PF  1 drop Both Eyes Q4H    Or    artificial tears   Both Eyes Q4H    lidocaine  1 patch

## 2024-05-12 VITALS
RESPIRATION RATE: 24 BRPM | BODY MASS INDEX: 32.11 KG/M2 | SYSTOLIC BLOOD PRESSURE: 163 MMHG | WEIGHT: 188.05 LBS | DIASTOLIC BLOOD PRESSURE: 82 MMHG | OXYGEN SATURATION: 99 % | TEMPERATURE: 99.5 F | HEIGHT: 64 IN | HEART RATE: 96 BPM

## 2024-05-12 LAB
BASOPHILS # BLD: 0.37 K/UL (ref 0–0.2)
BASOPHILS NFR BLD: 3 % (ref 0–2)
EOSINOPHIL # BLD: 0.98 K/UL (ref 0.05–0.5)
EOSINOPHILS RELATIVE PERCENT: 7 % (ref 0–6)
ERYTHROCYTE [DISTWIDTH] IN BLOOD BY AUTOMATED COUNT: 15.4 % (ref 11.5–15)
GLUCOSE BLD-MCNC: 130 MG/DL (ref 74–99)
GLUCOSE BLD-MCNC: 179 MG/DL (ref 74–99)
GLUCOSE BLD-MCNC: 185 MG/DL (ref 74–99)
GLUCOSE BLD-MCNC: 190 MG/DL (ref 74–99)
GLUCOSE BLD-MCNC: 193 MG/DL (ref 74–99)
GLUCOSE BLD-MCNC: 221 MG/DL (ref 74–99)
GLUCOSE BLD-MCNC: 265 MG/DL (ref 74–99)
HCT VFR BLD AUTO: 26.1 % (ref 34–48)
HGB BLD-MCNC: 8.2 G/DL (ref 11.5–15.5)
LYMPHOCYTES NFR BLD: 1.59 K/UL (ref 1.5–4)
LYMPHOCYTES RELATIVE PERCENT: 11 % (ref 20–42)
MAGNESIUM SERPL-MCNC: 1.9 MG/DL (ref 1.6–2.6)
MCH RBC QN AUTO: 29.5 PG (ref 26–35)
MCHC RBC AUTO-ENTMCNC: 31.4 G/DL (ref 32–34.5)
MCV RBC AUTO: 93.9 FL (ref 80–99.9)
MONOCYTES NFR BLD: 1.22 K/UL (ref 0.1–0.95)
MONOCYTES NFR BLD: 9 % (ref 2–12)
NEUTROPHILS NFR BLD: 71 % (ref 43–80)
NEUTS SEG NFR BLD: 10.04 K/UL (ref 1.8–7.3)
PHOSPHATE SERPL-MCNC: 3.9 MG/DL (ref 2.5–4.5)
PLATELET # BLD AUTO: 595 K/UL (ref 130–450)
PMV BLD AUTO: 9.7 FL (ref 7–12)
RBC # BLD AUTO: 2.78 M/UL (ref 3.5–5.5)
RBC # BLD: ABNORMAL 10*6/UL
WBC OTHER # BLD: 14.2 K/UL (ref 4.5–11.5)

## 2024-05-12 PROCEDURE — 6370000000 HC RX 637 (ALT 250 FOR IP)

## 2024-05-12 PROCEDURE — C9113 INJ PANTOPRAZOLE SODIUM, VIA: HCPCS

## 2024-05-12 PROCEDURE — 2580000003 HC RX 258

## 2024-05-12 PROCEDURE — 85025 COMPLETE CBC W/AUTO DIFF WBC: CPT

## 2024-05-12 PROCEDURE — 6360000002 HC RX W HCPCS

## 2024-05-12 PROCEDURE — 2700000000 HC OXYGEN THERAPY PER DAY

## 2024-05-12 PROCEDURE — 82962 GLUCOSE BLOOD TEST: CPT

## 2024-05-12 PROCEDURE — 6370000000 HC RX 637 (ALT 250 FOR IP): Performed by: INTERNAL MEDICINE

## 2024-05-12 PROCEDURE — A4216 STERILE WATER/SALINE, 10 ML: HCPCS

## 2024-05-12 PROCEDURE — 6370000000 HC RX 637 (ALT 250 FOR IP): Performed by: SURGERY

## 2024-05-12 PROCEDURE — 94640 AIRWAY INHALATION TREATMENT: CPT

## 2024-05-12 PROCEDURE — 83735 ASSAY OF MAGNESIUM: CPT

## 2024-05-12 PROCEDURE — 84100 ASSAY OF PHOSPHORUS: CPT

## 2024-05-12 PROCEDURE — 94003 VENT MGMT INPAT SUBQ DAY: CPT

## 2024-05-12 PROCEDURE — 31502 CHANGE OF WINDPIPE AIRWAY: CPT

## 2024-05-12 PROCEDURE — 2060000000 HC ICU INTERMEDIATE R&B

## 2024-05-12 PROCEDURE — 99232 SBSQ HOSP IP/OBS MODERATE 35: CPT | Performed by: INTERNAL MEDICINE

## 2024-05-12 RX ORDER — INSULIN GLARGINE 100 [IU]/ML
15 INJECTION, SOLUTION SUBCUTANEOUS NIGHTLY
Status: DISPENSED | OUTPATIENT
Start: 2024-05-12

## 2024-05-12 RX ADMIN — LABETALOL HYDROCHLORIDE 10 MG: 5 INJECTION, SOLUTION INTRAVENOUS at 12:40

## 2024-05-12 RX ADMIN — IPRATROPIUM BROMIDE AND ALBUTEROL SULFATE 1 DOSE: .5; 2.5 SOLUTION RESPIRATORY (INHALATION) at 19:35

## 2024-05-12 RX ADMIN — SODIUM BICARBONATE 650 MG: 650 TABLET ORAL at 11:55

## 2024-05-12 RX ADMIN — PANTOPRAZOLE SODIUM 40 MG: 40 INJECTION, POWDER, FOR SOLUTION INTRAVENOUS at 17:53

## 2024-05-12 RX ADMIN — LABETALOL HYDROCHLORIDE 10 MG: 5 INJECTION, SOLUTION INTRAVENOUS at 15:50

## 2024-05-12 RX ADMIN — INSULIN LISPRO 3 UNITS: 100 INJECTION, SOLUTION INTRAVENOUS; SUBCUTANEOUS at 15:58

## 2024-05-12 RX ADMIN — ENOXAPARIN SODIUM 40 MG: 100 INJECTION SUBCUTANEOUS at 09:03

## 2024-05-12 RX ADMIN — MEROPENEM 1000 MG: 1 INJECTION, POWDER, FOR SOLUTION INTRAVENOUS at 21:58

## 2024-05-12 RX ADMIN — PANCRELIPASE LIPASE, PANCRELIPASE PROTEASE, PANCRELIPASE AMYLASE 80000 UNITS: 20000; 63000; 84000 CAPSULE, DELAYED RELEASE ORAL at 21:02

## 2024-05-12 RX ADMIN — SODIUM CHLORIDE, PRESERVATIVE FREE 10 ML: 5 INJECTION INTRAVENOUS at 09:07

## 2024-05-12 RX ADMIN — OXYCODONE HYDROCHLORIDE 10 MG: 5 SOLUTION ORAL at 23:57

## 2024-05-12 RX ADMIN — PANCRELIPASE LIPASE, PANCRELIPASE PROTEASE, PANCRELIPASE AMYLASE 80000 UNITS: 20000; 63000; 84000 CAPSULE, DELAYED RELEASE ORAL at 09:05

## 2024-05-12 RX ADMIN — KETOTIFEN FUMARATE 1 DROP: 0.35 SOLUTION/ DROPS OPHTHALMIC at 21:07

## 2024-05-12 RX ADMIN — Medication 250 MG: at 11:54

## 2024-05-12 RX ADMIN — INSULIN LISPRO 3 UNITS: 100 INJECTION, SOLUTION INTRAVENOUS; SUBCUTANEOUS at 21:01

## 2024-05-12 RX ADMIN — OCTREOTIDE ACETATE 50 MCG: 50 INJECTION, SOLUTION INTRAVENOUS; SUBCUTANEOUS at 23:58

## 2024-05-12 RX ADMIN — KETOTIFEN FUMARATE 1 DROP: 0.35 SOLUTION/ DROPS OPHTHALMIC at 09:05

## 2024-05-12 RX ADMIN — Medication 250 MG: at 16:06

## 2024-05-12 RX ADMIN — SODIUM BICARBONATE 650 MG: 650 TABLET ORAL at 09:03

## 2024-05-12 RX ADMIN — METOCLOPRAMIDE 10 MG: 5 INJECTION, SOLUTION INTRAMUSCULAR; INTRAVENOUS at 09:03

## 2024-05-12 RX ADMIN — Medication 250 MG: at 09:06

## 2024-05-12 RX ADMIN — ACETAMINOPHEN 650 MG: 650 SOLUTION ORAL at 23:58

## 2024-05-12 RX ADMIN — OXYCODONE HYDROCHLORIDE 10 MG: 5 SOLUTION ORAL at 06:15

## 2024-05-12 RX ADMIN — OXYCODONE HYDROCHLORIDE 10 MG: 5 SOLUTION ORAL at 00:24

## 2024-05-12 RX ADMIN — ACETAMINOPHEN 650 MG: 650 SOLUTION ORAL at 11:54

## 2024-05-12 RX ADMIN — MICAFUNGIN SODIUM 100 MG: 100 INJECTION, POWDER, LYOPHILIZED, FOR SOLUTION INTRAVENOUS at 16:16

## 2024-05-12 RX ADMIN — ACETAMINOPHEN 650 MG: 650 SOLUTION ORAL at 00:24

## 2024-05-12 RX ADMIN — POLYVINYL ALCOHOL, POVIDONE 1 DROP: 14; 6 SOLUTION/ DROPS OPHTHALMIC at 15:43

## 2024-05-12 RX ADMIN — METOCLOPRAMIDE 10 MG: 5 INJECTION, SOLUTION INTRAMUSCULAR; INTRAVENOUS at 01:42

## 2024-05-12 RX ADMIN — SODIUM BICARBONATE 650 MG: 650 TABLET ORAL at 21:03

## 2024-05-12 RX ADMIN — PANCRELIPASE LIPASE, PANCRELIPASE PROTEASE, PANCRELIPASE AMYLASE 80000 UNITS: 20000; 63000; 84000 CAPSULE, DELAYED RELEASE ORAL at 15:59

## 2024-05-12 RX ADMIN — INSULIN LISPRO 6 UNITS: 100 INJECTION, SOLUTION INTRAVENOUS; SUBCUTANEOUS at 00:23

## 2024-05-12 RX ADMIN — 0.12% CHLORHEXIDINE GLUCONATE 15 ML: 1.2 RINSE ORAL at 09:03

## 2024-05-12 RX ADMIN — BUPROPION HYDROCHLORIDE 100 MG: 100 TABLET, FILM COATED ORAL at 09:06

## 2024-05-12 RX ADMIN — OCTREOTIDE ACETATE 50 MCG: 50 INJECTION, SOLUTION INTRAVENOUS; SUBCUTANEOUS at 00:38

## 2024-05-12 RX ADMIN — ACETAMINOPHEN 650 MG: 650 SOLUTION ORAL at 17:54

## 2024-05-12 RX ADMIN — MEROPENEM 1000 MG: 1 INJECTION, POWDER, FOR SOLUTION INTRAVENOUS at 06:18

## 2024-05-12 RX ADMIN — PANTOPRAZOLE SODIUM 40 MG: 40 INJECTION, POWDER, FOR SOLUTION INTRAVENOUS at 06:15

## 2024-05-12 RX ADMIN — OXYCODONE HYDROCHLORIDE 10 MG: 5 SOLUTION ORAL at 11:54

## 2024-05-12 RX ADMIN — IPRATROPIUM BROMIDE AND ALBUTEROL SULFATE 1 DOSE: .5; 2.5 SOLUTION RESPIRATORY (INHALATION) at 12:07

## 2024-05-12 RX ADMIN — POLYVINYL ALCOHOL, POVIDONE 1 DROP: 14; 6 SOLUTION/ DROPS OPHTHALMIC at 04:13

## 2024-05-12 RX ADMIN — POLYVINYL ALCOHOL, POVIDONE 1 DROP: 14; 6 SOLUTION/ DROPS OPHTHALMIC at 21:05

## 2024-05-12 RX ADMIN — SODIUM CHLORIDE, PRESERVATIVE FREE 10 ML: 5 INJECTION INTRAVENOUS at 21:08

## 2024-05-12 RX ADMIN — INSULIN LISPRO 9 UNITS: 100 INJECTION, SOLUTION INTRAVENOUS; SUBCUTANEOUS at 11:55

## 2024-05-12 RX ADMIN — 0.12% CHLORHEXIDINE GLUCONATE 15 ML: 1.2 RINSE ORAL at 21:05

## 2024-05-12 RX ADMIN — ATORVASTATIN CALCIUM 10 MG: 10 TABLET, FILM COATED ORAL at 21:04

## 2024-05-12 RX ADMIN — SENNOSIDES AND DOCUSATE SODIUM 2 TABLET: 50; 8.6 TABLET ORAL at 09:03

## 2024-05-12 RX ADMIN — POLYVINYL ALCOHOL, POVIDONE 1 DROP: 14; 6 SOLUTION/ DROPS OPHTHALMIC at 11:55

## 2024-05-12 RX ADMIN — SODIUM BICARBONATE 650 MG: 650 TABLET ORAL at 15:43

## 2024-05-12 RX ADMIN — LABETALOL HYDROCHLORIDE 10 MG: 5 INJECTION, SOLUTION INTRAVENOUS at 17:54

## 2024-05-12 RX ADMIN — Medication 250 MG: at 21:49

## 2024-05-12 RX ADMIN — OCTREOTIDE ACETATE 50 MCG: 50 INJECTION, SOLUTION INTRAVENOUS; SUBCUTANEOUS at 09:14

## 2024-05-12 RX ADMIN — IPRATROPIUM BROMIDE AND ALBUTEROL SULFATE 1 DOSE: .5; 2.5 SOLUTION RESPIRATORY (INHALATION) at 07:52

## 2024-05-12 RX ADMIN — INSULIN GLARGINE 15 UNITS: 100 INJECTION, SOLUTION SUBCUTANEOUS at 21:02

## 2024-05-12 RX ADMIN — IPRATROPIUM BROMIDE AND ALBUTEROL SULFATE 1 DOSE: .5; 2.5 SOLUTION RESPIRATORY (INHALATION) at 15:58

## 2024-05-12 RX ADMIN — METOCLOPRAMIDE 10 MG: 5 INJECTION, SOLUTION INTRAMUSCULAR; INTRAVENOUS at 21:05

## 2024-05-12 RX ADMIN — PANCRELIPASE LIPASE, PANCRELIPASE PROTEASE, PANCRELIPASE AMYLASE 80000 UNITS: 20000; 63000; 84000 CAPSULE, DELAYED RELEASE ORAL at 11:54

## 2024-05-12 RX ADMIN — POLYETHYLENE GLYCOL 3350 17 G: 17 POWDER, FOR SOLUTION ORAL at 09:03

## 2024-05-12 RX ADMIN — MEROPENEM 1000 MG: 1 INJECTION, POWDER, FOR SOLUTION INTRAVENOUS at 15:42

## 2024-05-12 RX ADMIN — LABETALOL HYDROCHLORIDE 10 MG: 5 INJECTION, SOLUTION INTRAVENOUS at 19:16

## 2024-05-12 RX ADMIN — ACETAMINOPHEN 650 MG: 650 SOLUTION ORAL at 06:14

## 2024-05-12 RX ADMIN — POLYVINYL ALCOHOL, POVIDONE 1 DROP: 14; 6 SOLUTION/ DROPS OPHTHALMIC at 09:04

## 2024-05-12 RX ADMIN — OXYCODONE HYDROCHLORIDE 10 MG: 5 SOLUTION ORAL at 17:53

## 2024-05-12 RX ADMIN — OCTREOTIDE ACETATE 50 MCG: 50 INJECTION, SOLUTION INTRAVENOUS; SUBCUTANEOUS at 15:59

## 2024-05-12 RX ADMIN — METOCLOPRAMIDE 10 MG: 5 INJECTION, SOLUTION INTRAMUSCULAR; INTRAVENOUS at 15:43

## 2024-05-12 RX ADMIN — INSULIN LISPRO 3 UNITS: 100 INJECTION, SOLUTION INTRAVENOUS; SUBCUTANEOUS at 06:26

## 2024-05-12 RX ADMIN — BUPROPION HYDROCHLORIDE 100 MG: 100 TABLET, FILM COATED ORAL at 21:04

## 2024-05-12 RX ADMIN — BUPROPION HYDROCHLORIDE 100 MG: 100 TABLET, FILM COATED ORAL at 15:59

## 2024-05-12 RX ADMIN — POLYVINYL ALCOHOL, POVIDONE 1 DROP: 14; 6 SOLUTION/ DROPS OPHTHALMIC at 00:24

## 2024-05-12 ASSESSMENT — PAIN SCALES - GENERAL
PAINLEVEL_OUTOF10: 10
PAINLEVEL_OUTOF10: 2
PAINLEVEL_OUTOF10: 10
PAINLEVEL_OUTOF10: 6
PAINLEVEL_OUTOF10: 5
PAINLEVEL_OUTOF10: 6
PAINLEVEL_OUTOF10: 8
PAINLEVEL_OUTOF10: 6

## 2024-05-12 ASSESSMENT — PAIN DESCRIPTION - ORIENTATION
ORIENTATION: LOWER
ORIENTATION: LOWER
ORIENTATION: MID
ORIENTATION: LOWER

## 2024-05-12 ASSESSMENT — PAIN - FUNCTIONAL ASSESSMENT
PAIN_FUNCTIONAL_ASSESSMENT: ACTIVITIES ARE NOT PREVENTED

## 2024-05-12 ASSESSMENT — PAIN DESCRIPTION - FREQUENCY
FREQUENCY: CONTINUOUS
FREQUENCY: CONTINUOUS

## 2024-05-12 ASSESSMENT — PAIN DESCRIPTION - PAIN TYPE
TYPE: ACUTE PAIN
TYPE: ACUTE PAIN

## 2024-05-12 ASSESSMENT — PAIN DESCRIPTION - DESCRIPTORS
DESCRIPTORS: ACHING;DISCOMFORT;THROBBING
DESCRIPTORS: ACHING;DISCOMFORT;THROBBING
DESCRIPTORS: ACHING
DESCRIPTORS: ACHING
DESCRIPTORS: ACHING;DISCOMFORT;THROBBING

## 2024-05-12 ASSESSMENT — PAIN DESCRIPTION - LOCATION
LOCATION: BACK
LOCATION: ABDOMEN
LOCATION: BACK
LOCATION: BACK
LOCATION: ABDOMEN

## 2024-05-12 ASSESSMENT — PAIN DESCRIPTION - ONSET
ONSET: ON-GOING
ONSET: ON-GOING

## 2024-05-12 ASSESSMENT — PULMONARY FUNCTION TESTS: PIF_VALUE: 15

## 2024-05-12 ASSESSMENT — PAIN SCALES - WONG BAKER
WONGBAKER_NUMERICALRESPONSE: NO HURT
WONGBAKER_NUMERICALRESPONSE: NO HURT
WONGBAKER_NUMERICALRESPONSE: HURTS A LITTLE BIT

## 2024-05-12 NOTE — PLAN OF CARE
Problem: Skin/Tissue Integrity  Goal: Absence of new skin breakdown  Description: 1.  Monitor for areas of redness and/or skin breakdown  2.  Assess vascular access sites hourly  3.  Every 4-6 hours minimum:  Change oxygen saturation probe site  4.  Every 4-6 hours:  If on nasal continuous positive airway pressure, respiratory therapy assess nares and determine need for appliance change or resting period.  Outcome: Progressing     Problem: Respiratory - Adult  Goal: Achieves optimal ventilation and oxygenation  Outcome: Progressing     Problem: Musculoskeletal - Adult  Goal: Return ADL status to a safe level of function  Outcome: Progressing     Problem: Genitourinary - Adult  Goal: Absence of urinary retention  Outcome: Progressing     Problem: Infection - Adult  Goal: Absence of infection at discharge  Outcome: Progressing  Goal: Absence of infection during hospitalization  Outcome: Progressing     Problem: Metabolic/Fluid and Electrolytes - Adult  Goal: Glucose maintained within prescribed range  Outcome: Progressing

## 2024-05-12 NOTE — PROGRESS NOTES
Head, normocephalic, atraumatic, Face within normal limits Tracheostomy in place   Lungs: symmetric chest rise on pressure support  Heart: warm throughout  Abdomen: softly distended, midline with continued mucoid brown fluid ostomy appliance in place staples present; some skin dehiscence and breakdown near the midpoint of incision; PEG J-  gastric content in gravtiy drainage bag.   Extremities: BLE edema noted    Assessment/Plan:  Leatha Willard is a 58 y.o. female s/p open pylorus-preserving Whipple portal lymphadenectomy, appendectomy for symptomatic 13cm serous cystadenoma of the head of the pancreas. Now s/p PJ excision, completion pancreatectomy, splenectomy, omentectomy and washout for PJ disruption.    - Cont Zenpep 80,000 4 times a day thru G tube; ok to open the pill capsule and dissolve in water and NaBicarb and flush thru G-tube  - continue tube feeds (Glucerna) at goal; appreciate dietary recs  - Appreciate endocrine recommendations   - Wean vent as able, pt currently on PS. Pulmonology following ,appreciate recs   - Monitor intake and output   - Continue to change midline dressing frequently/prn for drainage  - Appreciate Wound Care RN    - Otherwise flush J regularly, no medications via J   - Continue ppi, reglan   - Continue Octreotide empirically for presumed lymph leak   - ID recommendations appreciated- at least 4 weeks of antibiotic therapy planned per ID. 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   - Cont TF, do not advance via J tube with unchanged midline drainage amount/characteristic. Continue with G tube to gravity and flush G tube to keep patent with drainage  -Discharge planning      Agustín Negrete, DO  7:10 AM      Continues to improve  She is tolerating Glucerna tube feeds and her glucoses are under better control.  Appreciate all consultants input  2 staples were removed from the

## 2024-05-12 NOTE — PROGRESS NOTES
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 Pt. reportedly tolerating Glucerna per chart review. RD provided TF recs per consult on 5/11 for Peptide based formula which is lower in CHO and is w/ consideration for optimal GI tolerance/digestion and absorption as pt. receiving feeding via J port. Will provide TF recs for Glucerna per consult and continue to monitor.     Highly recommend to consider peptide based formula or [employer] formula if pt. does not continue to tolerate diabetic formula (Noted pt. has documented milk related compound allergy on file but can have food that contains milk?). Milk related compounds in TF do not appear to causing any intolerance/allergy reaction at this time but can not be excluded if pt. does develop signs/symptoms of intolerance to the standard TF formulas.       Recommend:   Diabetic formula (Glucerna 1.5) @45ml/hr :  Will provide: 1080ml TV, 1620kcal, 89g pro, 820ml free water. Flush per medical team.    Regimen meets 100% est calorie/protein needs at goal. Consult RD if flush recs needed.    Estimated Daily Nutrient Needs:  Energy Requirements Based On: Formula  Weight Used for Energy Requirements: Admission  Energy (kcal/day): PS3B(1626): 1600-1700kcal  Weight Used for Protein Requirements: Ideal  Protein (g/day): 83-100g (1.5-1.8g/kgIBW)  Method Used for Fluid Requirements: 1 ml/kcal  Fluid (ml/day):       Pt most recently assessed by RD on 5/10 (see RD note for full assessment). Please reconsult if RD needed prior to follow-up.    Electronically signed by Deepak Miller RD on 5/12/24 at 9:31 AM EDT    Contact: ext [phone]

## 2024-05-12 NOTE — PROGRESS NOTES
meningococcal polysaccharide vaccine (Menveo) - second dose   2)Group B meningococcal vaccine-Bexsero second dose  Update the following vaccinations in 5 years:  - meningococcal vaccine booster every 5 years.  Continue local wound care.  Continue supportive care.  Follow up discharge planning.       Electronically signed by Rick Julio MD on 5/12/2024 at 10:59 AM

## 2024-05-12 NOTE — PROGRESS NOTES
ENDOCRINOLOGY PROGRESS NOTE      Date of admission: 4/15/2024  Date of service: 5/12/2024  Admitting physician: Norma Rogers MD   Primary Care Physician: Mekhi Escalona MD  Consultant physician: Jonathan Curiel MD     Reason for the consultation:  Post-pancreatectomy DM     History of Present Illness:  Leatha Willard is a 58 y.o. old female with PMH of cystic neoplasm of the head of pancreas, HTN, HLD and other listed below admitted to [facility] on 4/15/2024 for completion pancreatectomy. Endocrine service was consulted for diabetes management.    Subjective   The patient was seen this morning, no acute events overnight, glucose level above goal but improving.  No hypoglycemia.  Tube feeds running    Point of care glucose monitoring   (Independently reviewed)   Recent Labs     05/11/24  0901 05/11/24  1149 05/11/24  1603 05/11/24  1956 05/12/24  0011 05/12/24  0411 05/12/24  0612 05/12/24  0800   POCGLU 198* 150* 244* 210* 221* 130* 193* 179*       Scheduled Meds:   insulin glargine  15 Units SubCUTAneous Nightly    potassium & sodium phosphates  1 packet Per G Tube 4x Daily    pantoprazole (PROTONIX) 40 mg in sodium chloride (PF) 0.9 % 10 mL injection  40 mg IntraVENous Q12H    lipase-protease-amylase  80,000 Units Oral 4x Daily    insulin lispro  0-18 Units SubCUTAneous Q4H    sodium bicarbonate  650 mg Oral 4x Daily    polyethylene glycol  17 g Oral Daily    sodium chloride flush  5-40 mL IntraVENous 2 times per day    metoclopramide  10 mg IntraVENous Q6H    oxyCODONE  10 mg Oral Q6H    octreotide  50 mcg IntraVENous Q8H    meropenem  1,000 mg IntraVENous Q8H    buPROPion  100 mg Oral TID    sennosides-docusate sodium  2 tablet Oral Daily    micafungin  100 mg IntraVENous Daily    ipratropium 0.5 mg-albuterol 2.5 mg  1 Dose Inhalation Q4H WA RT    sodium chloride flush  5-40 mL IntraVENous BID    acetaminophen  650 mg Oral 4 times per day    chlorhexidine  15 mL Mouth/Throat BID    Polyvinyl Alcohol-Povidone PF  1

## 2024-05-12 NOTE — PLAN OF CARE
Problem: Discharge Planning  Goal: Discharge to home or other facility with appropriate resources  Outcome: Progressing     Problem: Skin/Tissue Integrity  Goal: Absence of new skin breakdown  Description: 1.  Monitor for areas of redness and/or skin breakdown  2.  Assess vascular access sites hourly  3.  Every 4-6 hours minimum:  Change oxygen saturation probe site  4.  Every 4-6 hours:  If on nasal continuous positive airway pressure, respiratory therapy assess nares and determine need for appliance change or resting period.  Outcome: Progressing     Problem: ABCDS Injury Assessment  Goal: Absence of physical injury  Outcome: Progressing     Problem: Respiratory - Adult  Goal: Achieves optimal ventilation and oxygenation  Outcome: Progressing  Flowsheets (Taken 5/12/2024 0003)  Achieves optimal ventilation and oxygenation: Assess for changes in respiratory status     Problem: Cardiovascular - Adult  Goal: Maintains optimal cardiac output and hemodynamic stability  Outcome: Progressing     Problem: Musculoskeletal - Adult  Goal: Return mobility to safest level of function  Outcome: Progressing  Goal: Return ADL status to a safe level of function  Outcome: Progressing     Problem: Gastrointestinal - Adult  Goal: Minimal or absence of nausea and vomiting  Outcome: Progressing  Goal: Maintains or returns to baseline bowel function  Outcome: Progressing  Goal: Maintains adequate nutritional intake  Outcome: Progressing     Problem: Genitourinary - Adult  Goal: Absence of urinary retention  Outcome: Progressing     Problem: Infection - Adult  Goal: Absence of infection at discharge  Outcome: Progressing  Goal: Absence of infection during hospitalization  Outcome: Progressing     Problem: Metabolic/Fluid and Electrolytes - Adult  Goal: Electrolytes maintained within normal limits  Outcome: Progressing  Goal: Hemodynamic stability and optimal renal function maintained  Outcome: Progressing  Goal: Glucose maintained

## 2024-05-13 ENCOUNTER — APPOINTMENT (OUTPATIENT)
Dept: CT IMAGING | Age: 59
DRG: 004 | End: 2024-05-13
Attending: STUDENT IN AN ORGANIZED HEALTH CARE EDUCATION/TRAINING PROGRAM
Payer: COMMERCIAL

## 2024-05-13 ENCOUNTER — APPOINTMENT (OUTPATIENT)
Dept: GENERAL RADIOLOGY | Age: 59
DRG: 004 | End: 2024-05-13
Attending: STUDENT IN AN ORGANIZED HEALTH CARE EDUCATION/TRAINING PROGRAM
Payer: COMMERCIAL

## 2024-05-13 VITALS
TEMPERATURE: 97.4 F | BODY MASS INDEX: 32.37 KG/M2 | HEART RATE: 108 BPM | OXYGEN SATURATION: 97 % | HEIGHT: 64 IN | RESPIRATION RATE: 20 BRPM | WEIGHT: 189.6 LBS | DIASTOLIC BLOOD PRESSURE: 79 MMHG | SYSTOLIC BLOOD PRESSURE: 156 MMHG

## 2024-05-13 LAB
ANION GAP SERPL CALCULATED.3IONS-SCNC: 13 MMOL/L (ref 7–16)
BASOPHILS # BLD: 0.14 K/UL (ref 0–0.2)
BASOPHILS NFR BLD: 1 % (ref 0–2)
BUN SERPL-MCNC: 16 MG/DL (ref 6–20)
CALCIUM SERPL-MCNC: 8.7 MG/DL (ref 8.6–10.2)
CHLORIDE SERPL-SCNC: 91 MMOL/L (ref 98–107)
CO2 SERPL-SCNC: 30 MMOL/L (ref 22–29)
CREAT SERPL-MCNC: 0.3 MG/DL (ref 0.5–1)
EOSINOPHIL # BLD: 1.02 K/UL (ref 0.05–0.5)
EOSINOPHILS RELATIVE PERCENT: 7 % (ref 0–6)
ERYTHROCYTE [DISTWIDTH] IN BLOOD BY AUTOMATED COUNT: 15.6 % (ref 11.5–15)
GFR, ESTIMATED: >90 ML/MIN/1.73M2
GLUCOSE BLD-MCNC: 193 MG/DL (ref 74–99)
GLUCOSE BLD-MCNC: 197 MG/DL (ref 74–99)
GLUCOSE BLD-MCNC: 225 MG/DL (ref 74–99)
GLUCOSE BLD-MCNC: 239 MG/DL (ref 74–99)
GLUCOSE BLD-MCNC: 275 MG/DL (ref 74–99)
GLUCOSE SERPL-MCNC: 279 MG/DL (ref 74–99)
HCT VFR BLD AUTO: 28.7 % (ref 34–48)
HGB BLD-MCNC: 8.8 G/DL (ref 11.5–15.5)
IMM GRANULOCYTES # BLD AUTO: 0.11 K/UL (ref 0–0.58)
IMM GRANULOCYTES NFR BLD: 1 % (ref 0–5)
LYMPHOCYTES NFR BLD: 3.89 K/UL (ref 1.5–4)
LYMPHOCYTES RELATIVE PERCENT: 26 % (ref 20–42)
MAGNESIUM SERPL-MCNC: 2 MG/DL (ref 1.6–2.6)
MCH RBC QN AUTO: 28.9 PG (ref 26–35)
MCHC RBC AUTO-ENTMCNC: 30.7 G/DL (ref 32–34.5)
MCV RBC AUTO: 94.1 FL (ref 80–99.9)
MONOCYTES NFR BLD: 1.23 K/UL (ref 0.1–0.95)
MONOCYTES NFR BLD: 8 % (ref 2–12)
NEUTROPHILS NFR BLD: 58 % (ref 43–80)
NEUTS SEG NFR BLD: 8.86 K/UL (ref 1.8–7.3)
PHOSPHATE SERPL-MCNC: 3.7 MG/DL (ref 2.5–4.5)
PLATELET # BLD AUTO: 554 K/UL (ref 130–450)
PMV BLD AUTO: 9.8 FL (ref 7–12)
POTASSIUM SERPL-SCNC: 3.6 MMOL/L (ref 3.5–5)
RBC # BLD AUTO: 3.05 M/UL (ref 3.5–5.5)
SODIUM SERPL-SCNC: 134 MMOL/L (ref 132–146)
WBC OTHER # BLD: 15.3 K/UL (ref 4.5–11.5)

## 2024-05-13 PROCEDURE — 6360000002 HC RX W HCPCS

## 2024-05-13 PROCEDURE — 6370000000 HC RX 637 (ALT 250 FOR IP)

## 2024-05-13 PROCEDURE — 82962 GLUCOSE BLOOD TEST: CPT

## 2024-05-13 PROCEDURE — 2700000000 HC OXYGEN THERAPY PER DAY

## 2024-05-13 PROCEDURE — 2580000003 HC RX 258

## 2024-05-13 PROCEDURE — 6370000000 HC RX 637 (ALT 250 FOR IP): Performed by: SURGERY

## 2024-05-13 PROCEDURE — 87077 CULTURE AEROBIC IDENTIFY: CPT

## 2024-05-13 PROCEDURE — 85025 COMPLETE CBC W/AUTO DIFF WBC: CPT

## 2024-05-13 PROCEDURE — 6360000004 HC RX CONTRAST MEDICATION: Performed by: RADIOLOGY

## 2024-05-13 PROCEDURE — 36415 COLL VENOUS BLD VENIPUNCTURE: CPT

## 2024-05-13 PROCEDURE — 94640 AIRWAY INHALATION TREATMENT: CPT

## 2024-05-13 PROCEDURE — C9113 INJ PANTOPRAZOLE SODIUM, VIA: HCPCS

## 2024-05-13 PROCEDURE — 74177 CT ABD & PELVIS W/CONTRAST: CPT

## 2024-05-13 PROCEDURE — 99233 SBSQ HOSP IP/OBS HIGH 50: CPT | Performed by: INTERNAL MEDICINE

## 2024-05-13 PROCEDURE — 99232 SBSQ HOSP IP/OBS MODERATE 35: CPT | Performed by: INTERNAL MEDICINE

## 2024-05-13 PROCEDURE — 94003 VENT MGMT INPAT SUBQ DAY: CPT

## 2024-05-13 PROCEDURE — 71045 X-RAY EXAM CHEST 1 VIEW: CPT

## 2024-05-13 PROCEDURE — 71260 CT THORAX DX C+: CPT

## 2024-05-13 PROCEDURE — 80048 BASIC METABOLIC PNL TOTAL CA: CPT

## 2024-05-13 PROCEDURE — 84100 ASSAY OF PHOSPHORUS: CPT

## 2024-05-13 PROCEDURE — 87086 URINE CULTURE/COLONY COUNT: CPT

## 2024-05-13 PROCEDURE — A4216 STERILE WATER/SALINE, 10 ML: HCPCS

## 2024-05-13 PROCEDURE — 83735 ASSAY OF MAGNESIUM: CPT

## 2024-05-13 PROCEDURE — 31502 CHANGE OF WINDPIPE AIRWAY: CPT

## 2024-05-13 PROCEDURE — 6370000000 HC RX 637 (ALT 250 FOR IP): Performed by: INTERNAL MEDICINE

## 2024-05-13 RX ORDER — PETROLATUM 42 G/100G
OINTMENT TOPICAL
Refills: 0 | Status: ON HOLD | DISCHARGE
Start: 2024-05-13

## 2024-05-13 RX ORDER — BUPROPION HYDROCHLORIDE 100 MG/1
100 TABLET ORAL 3 TIMES DAILY
Qty: 60 TABLET | Refills: 3 | Status: ON HOLD | DISCHARGE
Start: 2024-05-13

## 2024-05-13 RX ORDER — OCTREOTIDE ACETATE 50 UG/ML
50 INJECTION, SOLUTION INTRAVENOUS; SUBCUTANEOUS EVERY 8 HOURS
Qty: 90 ML | Status: ON HOLD | DISCHARGE
Start: 2024-05-13

## 2024-05-13 RX ORDER — METOCLOPRAMIDE HYDROCHLORIDE 5 MG/ML
10 INJECTION INTRAMUSCULAR; INTRAVENOUS EVERY 6 HOURS
Status: ON HOLD | DISCHARGE
Start: 2024-05-13

## 2024-05-13 RX ORDER — INSULIN GLARGINE 100 [IU]/ML
15 INJECTION, SOLUTION SUBCUTANEOUS NIGHTLY
Qty: 10 ML | Refills: 3 | Status: ON HOLD | DISCHARGE
Start: 2024-05-13

## 2024-05-13 RX ORDER — DIMETHICONE, OXYBENZONE, AND PADIMATE O 2; 2.5; 6.6 G/100G; G/100G; G/100G
STICK TOPICAL PRN
Refills: 0 | Status: ON HOLD | DISCHARGE
Start: 2024-05-13

## 2024-05-13 RX ORDER — BENZONATATE 100 MG/1
100 CAPSULE ORAL 3 TIMES DAILY PRN
Status: ON HOLD | DISCHARGE
Start: 2024-05-13 | End: 2024-05-20

## 2024-05-13 RX ORDER — SODIUM BICARBONATE 650 MG/1
650 TABLET ORAL 4 TIMES DAILY
Status: ON HOLD | DISCHARGE
Start: 2024-05-13

## 2024-05-13 RX ORDER — POLYETHYLENE GLYCOL 3350 17 G/17G
17 POWDER, FOR SOLUTION ORAL DAILY
Qty: 527 G | Refills: 1 | Status: ON HOLD | DISCHARGE
Start: 2024-05-14 | End: 2024-06-13

## 2024-05-13 RX ORDER — INSULIN LISPRO 100 [IU]/ML
0-18 INJECTION, SOLUTION INTRAVENOUS; SUBCUTANEOUS EVERY 4 HOURS
Status: ON HOLD | DISCHARGE
Start: 2024-05-13

## 2024-05-13 RX ORDER — LABETALOL HYDROCHLORIDE 5 MG/ML
10 INJECTION, SOLUTION INTRAVENOUS EVERY 30 MIN PRN
Status: ON HOLD | DISCHARGE
Start: 2024-05-13

## 2024-05-13 RX ORDER — SENNA AND DOCUSATE SODIUM 50; 8.6 MG/1; MG/1
2 TABLET, FILM COATED ORAL DAILY
Status: ON HOLD | DISCHARGE
Start: 2024-05-14

## 2024-05-13 RX ORDER — INSULIN GLARGINE 100 [IU]/ML
18 INJECTION, SOLUTION SUBCUTANEOUS NIGHTLY
Status: DISCONTINUED | OUTPATIENT
Start: 2024-05-13 | End: 2024-05-13 | Stop reason: HOSPADM

## 2024-05-13 RX ORDER — HYDRALAZINE HYDROCHLORIDE 20 MG/ML
10 INJECTION INTRAMUSCULAR; INTRAVENOUS EVERY 30 MIN PRN
Status: ON HOLD | DISCHARGE
Start: 2024-05-13

## 2024-05-13 RX ORDER — IPRATROPIUM BROMIDE AND ALBUTEROL SULFATE 2.5; .5 MG/3ML; MG/3ML
3 SOLUTION RESPIRATORY (INHALATION)
Qty: 360 ML | Status: ON HOLD | DISCHARGE
Start: 2024-05-13

## 2024-05-13 RX ORDER — LIDOCAINE 4 G/G
1 PATCH TOPICAL DAILY
Status: ON HOLD | DISCHARGE
Start: 2024-05-14

## 2024-05-13 RX ORDER — CHLORHEXIDINE GLUCONATE ORAL RINSE 1.2 MG/ML
15 SOLUTION DENTAL 2 TIMES DAILY
Qty: 420 ML | Refills: 0 | Status: ON HOLD | DISCHARGE
Start: 2024-05-13 | End: 2024-05-27

## 2024-05-13 RX ORDER — OXYCODONE HCL 5 MG/5 ML
10 SOLUTION, ORAL ORAL EVERY 6 HOURS
Refills: 0 | Status: ON HOLD | DISCHARGE
Start: 2024-05-13 | End: 2024-05-16

## 2024-05-13 RX ADMIN — SODIUM BICARBONATE 650 MG: 650 TABLET ORAL at 17:32

## 2024-05-13 RX ADMIN — KETOTIFEN FUMARATE 1 DROP: 0.35 SOLUTION/ DROPS OPHTHALMIC at 21:08

## 2024-05-13 RX ADMIN — 0.12% CHLORHEXIDINE GLUCONATE 15 ML: 1.2 RINSE ORAL at 08:54

## 2024-05-13 RX ADMIN — OCTREOTIDE ACETATE 50 MCG: 50 INJECTION, SOLUTION INTRAVENOUS; SUBCUTANEOUS at 17:36

## 2024-05-13 RX ADMIN — LABETALOL HYDROCHLORIDE 10 MG: 5 INJECTION, SOLUTION INTRAVENOUS at 00:28

## 2024-05-13 RX ADMIN — BUPROPION HYDROCHLORIDE 100 MG: 100 TABLET, FILM COATED ORAL at 13:40

## 2024-05-13 RX ADMIN — WHITE PETROLATUM: .15; .85 OINTMENT OPHTHALMIC at 08:00

## 2024-05-13 RX ADMIN — PANTOPRAZOLE SODIUM 40 MG: 40 INJECTION, POWDER, FOR SOLUTION INTRAVENOUS at 17:31

## 2024-05-13 RX ADMIN — INSULIN LISPRO 3 UNITS: 100 INJECTION, SOLUTION INTRAVENOUS; SUBCUTANEOUS at 00:28

## 2024-05-13 RX ADMIN — Medication 250 MG: at 12:09

## 2024-05-13 RX ADMIN — OXYCODONE HYDROCHLORIDE 10 MG: 5 SOLUTION ORAL at 05:14

## 2024-05-13 RX ADMIN — SODIUM CHLORIDE, PRESERVATIVE FREE 10 ML: 5 INJECTION INTRAVENOUS at 20:28

## 2024-05-13 RX ADMIN — MEROPENEM 1000 MG: 1 INJECTION, POWDER, FOR SOLUTION INTRAVENOUS at 13:30

## 2024-05-13 RX ADMIN — KETOTIFEN FUMARATE 1 DROP: 0.35 SOLUTION/ DROPS OPHTHALMIC at 10:58

## 2024-05-13 RX ADMIN — IPRATROPIUM BROMIDE AND ALBUTEROL SULFATE 1 DOSE: .5; 2.5 SOLUTION RESPIRATORY (INHALATION) at 12:11

## 2024-05-13 RX ADMIN — Medication 250 MG: at 09:20

## 2024-05-13 RX ADMIN — BUPROPION HYDROCHLORIDE 100 MG: 100 TABLET, FILM COATED ORAL at 20:28

## 2024-05-13 RX ADMIN — INSULIN LISPRO 3 UNITS: 100 INJECTION, SOLUTION INTRAVENOUS; SUBCUTANEOUS at 12:15

## 2024-05-13 RX ADMIN — POLYETHYLENE GLYCOL 3350 17 G: 17 POWDER, FOR SOLUTION ORAL at 08:54

## 2024-05-13 RX ADMIN — IOPAMIDOL 75 ML: 755 INJECTION, SOLUTION INTRAVENOUS at 15:48

## 2024-05-13 RX ADMIN — Medication 250 MG: at 20:39

## 2024-05-13 RX ADMIN — METOCLOPRAMIDE 10 MG: 5 INJECTION, SOLUTION INTRAMUSCULAR; INTRAVENOUS at 20:29

## 2024-05-13 RX ADMIN — Medication 250 MG: at 17:00

## 2024-05-13 RX ADMIN — OCTREOTIDE ACETATE 50 MCG: 50 INJECTION, SOLUTION INTRAVENOUS; SUBCUTANEOUS at 08:00

## 2024-05-13 RX ADMIN — POLYVINYL ALCOHOL, POVIDONE 1 DROP: 14; 6 SOLUTION/ DROPS OPHTHALMIC at 20:29

## 2024-05-13 RX ADMIN — 0.12% CHLORHEXIDINE GLUCONATE 15 ML: 1.2 RINSE ORAL at 20:29

## 2024-05-13 RX ADMIN — WHITE PETROLATUM: .15; .85 OINTMENT OPHTHALMIC at 05:52

## 2024-05-13 RX ADMIN — MEROPENEM 1000 MG: 1 INJECTION, POWDER, FOR SOLUTION INTRAVENOUS at 05:20

## 2024-05-13 RX ADMIN — OXYCODONE HYDROCHLORIDE 10 MG: 5 SOLUTION ORAL at 12:08

## 2024-05-13 RX ADMIN — INSULIN LISPRO 6 UNITS: 100 INJECTION, SOLUTION INTRAVENOUS; SUBCUTANEOUS at 05:14

## 2024-05-13 RX ADMIN — ACETAMINOPHEN 650 MG: 650 SOLUTION ORAL at 05:14

## 2024-05-13 RX ADMIN — BUPROPION HYDROCHLORIDE 100 MG: 100 TABLET, FILM COATED ORAL at 08:55

## 2024-05-13 RX ADMIN — SODIUM BICARBONATE 650 MG: 650 TABLET ORAL at 12:29

## 2024-05-13 RX ADMIN — SODIUM BICARBONATE 650 MG: 650 TABLET ORAL at 20:28

## 2024-05-13 RX ADMIN — INSULIN LISPRO 9 UNITS: 100 INJECTION, SOLUTION INTRAVENOUS; SUBCUTANEOUS at 21:07

## 2024-05-13 RX ADMIN — INSULIN GLARGINE 18 UNITS: 100 INJECTION, SOLUTION SUBCUTANEOUS at 20:29

## 2024-05-13 RX ADMIN — ENOXAPARIN SODIUM 40 MG: 100 INJECTION SUBCUTANEOUS at 09:22

## 2024-05-13 RX ADMIN — PANCRELIPASE LIPASE, PANCRELIPASE PROTEASE, PANCRELIPASE AMYLASE 80000 UNITS: 20000; 63000; 84000 CAPSULE, DELAYED RELEASE ORAL at 08:55

## 2024-05-13 RX ADMIN — METOCLOPRAMIDE 10 MG: 5 INJECTION, SOLUTION INTRAMUSCULAR; INTRAVENOUS at 08:54

## 2024-05-13 RX ADMIN — ACETAMINOPHEN 650 MG: 650 SOLUTION ORAL at 17:31

## 2024-05-13 RX ADMIN — ACETAMINOPHEN 650 MG: 650 SOLUTION ORAL at 12:09

## 2024-05-13 RX ADMIN — PANCRELIPASE LIPASE, PANCRELIPASE PROTEASE, PANCRELIPASE AMYLASE 80000 UNITS: 20000; 63000; 84000 CAPSULE, DELAYED RELEASE ORAL at 12:09

## 2024-05-13 RX ADMIN — SENNOSIDES AND DOCUSATE SODIUM 2 TABLET: 50; 8.6 TABLET ORAL at 09:21

## 2024-05-13 RX ADMIN — OXYCODONE HYDROCHLORIDE 10 MG: 5 SOLUTION ORAL at 17:31

## 2024-05-13 RX ADMIN — INSULIN LISPRO 6 UNITS: 100 INJECTION, SOLUTION INTRAVENOUS; SUBCUTANEOUS at 18:00

## 2024-05-13 RX ADMIN — PANCRELIPASE LIPASE, PANCRELIPASE PROTEASE, PANCRELIPASE AMYLASE 80000 UNITS: 20000; 63000; 84000 CAPSULE, DELAYED RELEASE ORAL at 17:31

## 2024-05-13 RX ADMIN — IPRATROPIUM BROMIDE AND ALBUTEROL SULFATE 1 DOSE: .5; 2.5 SOLUTION RESPIRATORY (INHALATION) at 19:49

## 2024-05-13 RX ADMIN — SODIUM CHLORIDE, PRESERVATIVE FREE 10 ML: 5 INJECTION INTRAVENOUS at 08:57

## 2024-05-13 RX ADMIN — PANCRELIPASE LIPASE, PANCRELIPASE PROTEASE, PANCRELIPASE AMYLASE 80000 UNITS: 20000; 63000; 84000 CAPSULE, DELAYED RELEASE ORAL at 20:28

## 2024-05-13 RX ADMIN — WHITE PETROLATUM: .15; .85 OINTMENT OPHTHALMIC at 00:03

## 2024-05-13 RX ADMIN — LABETALOL HYDROCHLORIDE 10 MG: 5 INJECTION, SOLUTION INTRAVENOUS at 04:46

## 2024-05-13 RX ADMIN — METOCLOPRAMIDE 10 MG: 5 INJECTION, SOLUTION INTRAMUSCULAR; INTRAVENOUS at 13:27

## 2024-05-13 RX ADMIN — MICAFUNGIN SODIUM 100 MG: 100 INJECTION, POWDER, LYOPHILIZED, FOR SOLUTION INTRAVENOUS at 17:00

## 2024-05-13 RX ADMIN — ATORVASTATIN CALCIUM 10 MG: 10 TABLET, FILM COATED ORAL at 20:28

## 2024-05-13 RX ADMIN — IPRATROPIUM BROMIDE AND ALBUTEROL SULFATE 1 DOSE: .5; 2.5 SOLUTION RESPIRATORY (INHALATION) at 07:37

## 2024-05-13 RX ADMIN — PANTOPRAZOLE SODIUM 40 MG: 40 INJECTION, POWDER, FOR SOLUTION INTRAVENOUS at 05:20

## 2024-05-13 RX ADMIN — METOCLOPRAMIDE 10 MG: 5 INJECTION, SOLUTION INTRAMUSCULAR; INTRAVENOUS at 02:19

## 2024-05-13 RX ADMIN — SODIUM BICARBONATE 650 MG: 650 TABLET ORAL at 09:21

## 2024-05-13 ASSESSMENT — PAIN DESCRIPTION - ORIENTATION
ORIENTATION: MID
ORIENTATION: LOWER

## 2024-05-13 ASSESSMENT — PULMONARY FUNCTION TESTS
PIF_VALUE: 17
PIF_VALUE: 16
PIF_VALUE: 17

## 2024-05-13 ASSESSMENT — PAIN SCALES - WONG BAKER
WONGBAKER_NUMERICALRESPONSE: HURTS A LITTLE BIT
WONGBAKER_NUMERICALRESPONSE: HURTS A LITTLE BIT

## 2024-05-13 ASSESSMENT — PAIN DESCRIPTION - LOCATION
LOCATION: GENERALIZED
LOCATION: BACK
LOCATION: GENERALIZED

## 2024-05-13 ASSESSMENT — PAIN DESCRIPTION - DESCRIPTORS: DESCRIPTORS: ACHING;THROBBING;DISCOMFORT

## 2024-05-13 ASSESSMENT — PAIN SCALES - GENERAL
PAINLEVEL_OUTOF10: 10
PAINLEVEL_OUTOF10: 2

## 2024-05-13 NOTE — PROGRESS NOTES
Pt needs to come with RN because pt is on a vent.  RN to call back to schedule a time.  Please call [phone] when ready.

## 2024-05-13 NOTE — PROGRESS NOTES
Pulmonary Critical Care Medicine           PULMONARY  CRITICAL CARE   SERVICE DAILY PROGRESS  NOTE     2024   Hospital LOS:  LOS: 28 days     Impression / Recommendations:    Chronic respiratory failure ( reversible, improving ) status post tracheostomy and PEG tube placement for failure to liberate from mechanical ventilation on postop day 14 after pancreatico duodenectomy for a large benign pancreatic cyst  Hypoxia secondary to left-sided pleural effusion with compressive atelectasis  -Continue routine tracheostomy care  -Currently on tracheostomy mask with FiO2 of 35% this is a wean from pressure support of / couple of days ago    - Patient does not have any chronic pulmonary conditions/ neurological conditions  severe enough to need life long tracheostomy, now tolerating trach mask  - Continue the liberation trials with eventual plan to decannulate  - Continue PT OT  - Out of bed to chair as tolerated  CT chest reviewed - left pleural effusion with atelectasis, pulmonary vascular congestion, suboptimal timing after contrast administration  -ID service is following, patient is on meropenem and micafungin    Interval History/Event Changes:  Awake and alert  Tolerating trach mask  Not in distress  Uneventful overnight    Allergies  Allergies   Allergen Reactions    Latex      Other reaction(s): swelling & sores    Milk-Related Compounds      States cannot drink milk -can have food that contains milk    Lisinopril Rash     Red rash on face,arms,upper back    Morphine Nausea And Vomiting    Seasonal Other (See Comments)       Review of Systems    A pertinent review of systems was performed and was otherwise non-contributory.    Vitals-     BP (!) 156/79   Pulse (!) 108   Temp 97.4 °F (36.3 °C)   Resp 20   Ht 1.626 m (5' 4\")   Wt 86 kg (189 lb 9.5 oz)   SpO2 97%   BMI 32.54 kg/m²    Tmax: Temp (24hrs), Av °F (36.7 °C), Min:97.4 °F (36.3 °C), Max:99.5 °F (37.5 °C)      Hemodynamics:  Cuff:

## 2024-05-13 NOTE — PROGRESS NOTES
Occupational Therapy     Date:2024  Patient Name: Leatha Willard  MRN: [id number]  : 1965  Room: [address] [place] [address]     Completed chart review & attempted to see pt with pt off the unit for testing. Will attempt to see pt at another time.    KRISH Brambila/L [id number]

## 2024-05-13 NOTE — PROGRESS NOTES
Physical Therapy    Medical chart reviewed on 5/13/24. PT treatment attempted at 1530 but pt was being transported off unit to CT. Will re-attempt as able. Thank you.    Belgica Murillo, PT, DPT  [id number]

## 2024-05-13 NOTE — PROGRESS NOTES
Pt is in tears in pain. She said her back is a 10/10 pain. Her team has been notified for a PRN pain med. Per team give lidocaine patch now and scheduled pain meds 1 hour early. Pt's PICC line is not drawling, her team has been notified. The Phlebotomist took her labs.

## 2024-05-13 NOTE — PROGRESS NOTES
Decreased PSV to 10. Patient tolerating well. Will attempt trach mask next if continues to tolerate.        05/13/24 0853   Vent Information   Vent Mode CPAP/PS   Ventilator Settings   FiO2  35 %   PEEP/CPAP (cmH2O) 5   Pressure Support (cm H2O) (S)  10 cm H2O   Vent Patient Data (Readings)   Vt Spont (mL) 343 mL   Peak Inspiratory Pressure (cmH2O) 16 cmH2O   Rate Measured 20 br/min   Minute Volume (L/min) 6.7 Liters   Mean Airway Pressure (cmH2O) 7.5 cmH20   I:E Ratio 1:4.1   Flow Sensitivity 2 L/min   Backup Apnea On   Backup Rate 16 Breaths Per Minute   Backup Vt 350   Vent Alarm Settings   High Pressure (cmH2O) 40 cmH2O   Low Minute Volume (lpm) 4.7 L/min   High Minute Volume (lpm) 15 L/min   Low Exhaled Vt (ml) 250 mL   High Exhaled Vt (ml) 850 mL   RR High (bpm) 35 br/min   Apnea (secs) 20 secs   Additional Respiratoray Assessments   Humidification Source Heated wire   Humidification Temp 37   Circuit Condensation Not drained   Ambu Bag With Mask At Bedside Yes   Surgical Airway (Trach) 05/01/24 Bernadetteley Cuffed   Placement Date/Time: 05/01/24 0742   Placed By: (c)   Placement Verified By: Capnometry  Surgical Airway Type: Tracheostomy  Brand: Breezy  Style: Cuffed  Size: 8   Status Secured

## 2024-05-13 NOTE — CARE COORDINATION
CM Update: Precert pending for [facility], initiated Fri 5/10. ANALILIA/destination completed. Transport envelope on soft chart. S/P whipple for large pancreatic cyst extending to the transverse colon mesentery. Complete pancreatectomy. Trach to vent. J/G tube, with tube feeds. CT chest, abdomen and pelvis ordered for today. CM will continue to follow(TF)    1325--Auth obtained for [facility], good through Wed 5/15. Perfect serve to residents to check discharge. (TF)        KRISTOPHER DimasN,RN  Case Management  [phone]

## 2024-05-13 NOTE — PROGRESS NOTES
on pressure support  Heart: warm throughout  Abdomen: softly distended, midline with continued mucoid brown fluid ostomy appliance in place staples present; some skin dehiscence and breakdown near the midpoint of incision; PEG J-  gastric content in gravtiy drainage bag.  4 x 4 over prior GIULIANO tube site in the left lower quadrant, brown drainage present  Extremities: BLE edema noted    Assessment/Plan:  Leatha Willard is a 58 y.o. female s/p open pylorus-preserving Whipple portal lymphadenectomy, appendectomy for symptomatic 13cm serous cystadenoma of the head of the pancreas. Now s/p PJ excision, completion pancreatectomy, splenectomy, omentectomy and washout for PJ disruption.    - Cont Zenpep 80,000 4 times a day thru G tube; ok to open the pill capsule and dissolve in water and NaBicarb and flush thru G-tube  -Will place patient back on the peptide-based tube feeds, appreciate dietary recommendations  -CT chest abdomen pelvis today  - Appreciate endocrine recommendations   - Wean vent as able, pt currently on PS. Pulmonology following ,appreciate recs   - Monitor intake and output   - Continue to monitor midline output via ostomy appliance  - Appreciate Wound Care RN    - Otherwise flush J regularly, no medications via J   - Continue ppi, reglan  -Continue to monitor electrolytes; replace as needed   - Continue Octreotide empirically for presumed lymph leak   - ID recommendations appreciated- at least 4 weeks of antibiotic therapy planned per ID. Needs boosters for vaccines 6/28/24   - continue Lovenox DVT chemoppx   - Will need aggressive PT/OT   - Cont TF, do not advance via J tube with unchanged midline drainage amount/characteristic. Continue with G tube to gravity and flush G tube to keep patent with drainage  -Discharge planning      Agustín Negrete,   6:32 AM    Continues to remain afebrile  Leukocytosis still present between 14-15,000.  Will plan for CT scan of the chest abdomen and pelvis today  Once

## 2024-05-13 NOTE — DISCHARGE INSTR - COC
(cm^2) 1.6 cm^2 05/09/24 0920   Drainage Amount None (dry) 05/13/24 0222   Chely-wound Assessment Intact 05/13/24 0222   Number of days: 4       Incision 04/15/24 Abdomen Mid (Active)   Wound Image   05/09/24 0920   Dressing Status Clean;Dry;Intact 05/13/24 0222   Incision Cleansed Cleansed with saline 05/12/24 1948   Dressing/Treatment Dry dressing 05/09/24 0830   Closure Staples 05/13/24 0222   Margins Approximated 05/13/24 0222   Incision Assessment Other (Comment) 05/13/24 0222   Drainage Amount Small (< 25%) 05/13/24 0222   Drainage Description Brown 05/13/24 0222   Odor None 05/13/24 0222   Chely-incision Assessment Intact;Blanchable erythema;Edematous;Fragile 05/09/24 0830   Number of days: 28        Elimination:  Continence:   Bowel: No  Bladder: No  Urinary Catheter: None   Colostomy/Ileostomy/Ileal Conduit: Yes  Fistula-Stomal Appliance: Intact  Fistula-Output (mL): 30 ml    Date of Last BM: 5/13    Intake/Output Summary (Last 24 hours) at 5/13/2024 1007  Last data filed at 5/13/2024 0546  Gross per 24 hour   Intake 1519 ml   Output 2330 ml   Net -811 ml     I/O last 3 completed shifts:  In: 4082.5 [NG/GT:3174; IV Piggyback:908.5]  Out: 3875 [Urine:3150; Emesis/NG output:410; Stool:315]    Safety Concerns:     At Risk for Falls and Aspiration Risk    Impairments/Disabilities:      N/A    Nutrition Therapy:  Current Nutrition Therapy:   - Tube Feedings:  Diabetic    Routes of Feeding: Jejunal Tube  Liquids: No Liquids  Daily Fluid Restriction: no  Last Modified Barium Swallow with Video (Video Swallowing Test): not done    Treatments at the Time of Hospital Discharge:   Respiratory Treatments: See MAR  Oxygen Therapy:  is on oxygen at 8 L/min per nasal cannula. On 35%  Ventilator:    - Ventilator Settings:    Vt (Set, mL): 350 mL  Resp Rate (Set): 16 bpm  FiO2 : 35 %    PEEP/CPAP (cmH2O): 5       Rehab Therapies: Physical Therapy and Occupational Therapy  Weight Bearing Status/Restrictions: No weight bearing

## 2024-05-13 NOTE — PROGRESS NOTES
ENDOCRINOLOGY PROGRESS NOTE      Date of admission: 4/15/2024  Date of service: 5/13/2024  Admitting physician: Norma Rogers MD   Primary Care Physician: Mekhi Escalona MD  Consultant physician: Jonathan Curiel MD     Reason for the consultation:  Post-pancreatectomy DM     History of Present Illness:  Leatha Willard is a 58 y.o. old female with PMH of cystic neoplasm of the head of pancreas, HTN, HLD and other listed below admitted to [facility] on 4/15/2024 for completion pancreatectomy. Endocrine service was consulted for diabetes management.    Subjective   The patient was seen this afternoon, tube feeds is running, glucose levels slightly above goal.  No hypoglycemia    Point of care glucose monitoring   (Independently reviewed)   Recent Labs     05/12/24  0800 05/12/24  1145 05/12/24  1553 05/12/24  1952 05/12/24  2356 05/13/24  0444 05/13/24  1230 05/13/24  1755   POCGLU 179* 265* 190* 185* 197* 239* 193* 225*       Scheduled Meds:   insulin glargine  18 Units SubCUTAneous Nightly    potassium & sodium phosphates  1 packet Per G Tube 4x Daily    pantoprazole (PROTONIX) 40 mg in sodium chloride (PF) 0.9 % 10 mL injection  40 mg IntraVENous Q12H    lipase-protease-amylase  80,000 Units Oral 4x Daily    insulin lispro  0-18 Units SubCUTAneous Q4H    sodium bicarbonate  650 mg Oral 4x Daily    polyethylene glycol  17 g Oral Daily    sodium chloride flush  5-40 mL IntraVENous 2 times per day    metoclopramide  10 mg IntraVENous Q6H    oxyCODONE  10 mg Oral Q6H    octreotide  50 mcg IntraVENous Q8H    meropenem  1,000 mg IntraVENous Q8H    buPROPion  100 mg Oral TID    sennosides-docusate sodium  2 tablet Oral Daily    micafungin  100 mg IntraVENous Daily    ipratropium 0.5 mg-albuterol 2.5 mg  1 Dose Inhalation Q4H WA RT    sodium chloride flush  5-40 mL IntraVENous BID    acetaminophen  650 mg Oral 4 times per day    chlorhexidine  15 mL Mouth/Throat BID    Polyvinyl Alcohol-Povidone PF  1 drop Both Eyes Q4H    Or

## 2024-05-13 NOTE — PROGRESS NOTES
Pt weaned from  8 am until  4:30 am on trach mask.  Pt placed on ventilator with PSV 12, 35%, Peep 5 at 04:30am on 5-13-24.

## 2024-05-13 NOTE — FLOWSHEET NOTE
ET Nurse(follow up) 4502 b  Admit Date: 4/15/2024  5:37 AM    Reason for consult:  fistula management    Stoma assessment:     05/13/24 0910   Wound 05/09/24 Abdomen Mid   Date First Assessed: 05/09/24   Present on Original Admission: No  Primary Wound Type: Surgical Type  Location: Abdomen  Wound Location Orientation: Mid   Wound Image    Wound Etiology   (fistula)   Dressing/Treatment   (ostomy pouch)   Wound Length (cm) 8 cm   Wound Width (cm) 1.6 cm   Wound Depth (cm)   (fistula- depth not measured)   Wound Surface Area (cm^2) 12.8 cm^2   Change in Wound Size % (l*w) -77.78   Drainage Amount Small (< 25%)   Drainage Description Brown   Odor None   Wound Thickness Description not for Pressure Injury Full thickness        05/13/24 0910   Fistula   Placement Date: 05/09/24   Present on Admission/Arrival: No  Inserted by: mid abdomen   Stomal Appliance 2 piece  (gray)   Peristomal Assessment Intact   Stool Color Brown   Output (mL) 10 ml     **Informed Consent**     photos taken of abdomen and inserted into their chart as part of their permanent medical record for purposes of documentation, treatment management and/or medical review.   All Images taken on 5/13/24 of patient name: Leatha Willard were transmitted and stored on secured Epic  Site located within Media Folder Tab by a registered [employer] Mobile Application Device.      Plan:  pouch changed  Will follow    Julissa Roman RN 5/13/2024 10:34 AM

## 2024-05-13 NOTE — PROGRESS NOTES
Specimen Description .ASCITIC FLUID    Direct Exam Gram stain performed by tissue touch prep     FEW Polymorphonuclear leukocytes     NO EPITHELIAL CELLS     NO ORGANISMS SEEN    Culture BAUDILIO ALBICANS RARE GROWTH For susceptibility, refer to previous culture. Ascites surgical culture collected 4/18/24 at 2010 Abnormal      BAUDILIO GLABRATA LIGHT GROWTH For susceptibility, refer to previous culture. Ascites surgical culture collected 4/18/24 at 2010 Abnormal    Culture, Surgical [[id number]] (Abnormal) Collected: 04/18/24 2010   Order Status: Completed Specimen: Tissue Updated: 04/21/24 1138    Specimen Description .ASCITIC FLUID    Direct Exam Swab collections are low-yield and rarely indicated. Generally, the specimen volume when collected by swab is small, reducing the probability of isolating organisms: many organisms adhere to the fibers of the swab, which reduces the opportunity or recovering organisms. Interpret results with caution.     FEW Polymorphonuclear leukocytes     NO EPITHELIAL CELLS     NO ORGANISMS SEEN    Culture CANDIDA ALBICANS Identification by MALDI-TOF LIGHT GROWTH Organism sent to Reference laboratory for susceptibility testing Abnormal      CANDIDA GLABRATA Identification by MALDI-TOF RARE GROWTH Organism sent to Reference laboratory for susceptibility testing Abnormal      Assessment:      Septic shock- improved   Fever - afebrile   Leukocytosis with eosinophilia - WBC 15 K, eosinophil 1230   Candida albicans and glabrata infection/bilious peritonitis associated with partial dehiscence of pancreaticojejunostomy anastomosis s/p reopening recent laparotomy, completion pancreatectomy, splenectomy, omentectomy on 04/18.    Recommendations:    Micafungin 100 mg q24h and meropenem 1g q8h (   from 04/18-05/16/2024  )   CBC with diff   Peripheral blood smear     Immunoprophylaxis :  Follow up in 2 months (on or after 06/28/2024) with [facility] ([phone]) or with

## 2024-05-13 NOTE — PROGRESS NOTES
Placed patient on DIPIKA. Patient tolerating well.        05/13/24 1212   Oxygen Therapy/Pulse Ox   O2 Therapy Oxygen humidified   O2 Device (S)  Trach mask   O2 Flow Rate (L/min) 8 L/min   FiO2  35 %   SpO2 99 %   Skin Assessment Clean, dry, & intact

## 2024-05-13 NOTE — PROGRESS NOTES
CT chest, abd pelvis reviewed and discussed with Dr. Haddad.    Moderate left pleural effusion.  Stable appearance of postoperative changes in abdomen without evidence of large undrained fluid collection.  Mild hydronephrosis and full urinary bladder.  She is afebrile but has mild leukocytosis.  Concern for possible UTI and likely having incomplete emptying of bladder with presence of hydronephrosis.    Will straight cath to collect urine for culture and to empty bladder.    Ok to discharge to [facility].    Electronically signed by Mary Patel DO on 5/13/2024 at 5:19 PM

## 2024-05-13 NOTE — PLAN OF CARE
Problem: Discharge Planning  Goal: Discharge to home or other facility with appropriate resources  Outcome: Progressing  Flowsheets (Taken 5/13/2024 0222 by Carmen Beck RN)  Discharge to home or other facility with appropriate resources: Identify barriers to discharge with patient and caregiver     Problem: Skin/Tissue Integrity  Goal: Absence of new skin breakdown  Description: 1.  Monitor for areas of redness and/or skin breakdown  2.  Assess vascular access sites hourly  3.  Every 4-6 hours minimum:  Change oxygen saturation probe site  4.  Every 4-6 hours:  If on nasal continuous positive airway pressure, respiratory therapy assess nares and determine need for appliance change or resting period.  5/13/2024 0613 by Lady Nation RN  Outcome: Progressing  5/12/2024 1639 by Sarah Gilmore RN  Outcome: Progressing     Problem: ABCDS Injury Assessment  Goal: Absence of physical injury  Outcome: Progressing     Problem: Respiratory - Adult  Goal: Achieves optimal ventilation and oxygenation  5/13/2024 0613 by Lady Nation RN  Outcome: Progressing  Flowsheets (Taken 5/13/2024 0150 by Carmen Beck RN)  Achieves optimal ventilation and oxygenation: Assess for changes in respiratory status  5/12/2024 1639 by Sarah Gilmore RN  Outcome: Progressing     Problem: Musculoskeletal - Adult  Goal: Return ADL status to a safe level of function  Recent Flowsheet Documentation  Taken 5/12/2024 1948 by Carmen Beck RN  Return ADL Status to a Safe Level of Function: Administer medication as ordered  5/12/2024 1639 by Sarah Gilmore RN  Outcome: Progressing     Problem: Genitourinary - Adult  Goal: Absence of urinary retention  5/12/2024 1639 by Sarah Gilmore RN  Outcome: Progressing     Problem: Infection - Adult  Goal: Absence of infection at discharge  Recent Flowsheet Documentation  Taken 5/13/2024 0222 by Carmen Beck RN  Absence of infection at discharge: Assess and monitor for signs and symptoms

## 2024-05-13 NOTE — PLAN OF CARE
Problem: Discharge Planning  Goal: Discharge to home or other facility with appropriate resources  5/13/2024 1822 by Gisell Lyons RN  Outcome: Completed  5/13/2024 0613 by Lady Nation RN  Outcome: Progressing  Flowsheets (Taken 5/13/2024 0222 by Carmen Beck, RN)  Discharge to home or other facility with appropriate resources: Identify barriers to discharge with patient and caregiver     Problem: Skin/Tissue Integrity  Goal: Absence of new skin breakdown  Description: 1.  Monitor for areas of redness and/or skin breakdown  2.  Assess vascular access sites hourly  3.  Every 4-6 hours minimum:  Change oxygen saturation probe site  4.  Every 4-6 hours:  If on nasal continuous positive airway pressure, respiratory therapy assess nares and determine need for appliance change or resting period.  5/13/2024 1822 by Gisell Lyons RN  Outcome: Completed  5/13/2024 0613 by Lady Nation RN  Outcome: Progressing     Problem: ABCDS Injury Assessment  Goal: Absence of physical injury  5/13/2024 1822 by Gisell Lyons RN  Outcome: Completed  5/13/2024 0613 by Lady Nation RN  Outcome: Progressing     Problem: Respiratory - Adult  Goal: Achieves optimal ventilation and oxygenation  5/13/2024 1822 by Gisell Lyons RN  Outcome: Completed  5/13/2024 0613 by Lady Nation RN  Outcome: Progressing  Flowsheets (Taken 5/13/2024 0150 by Carmen Beck, RN)  Achieves optimal ventilation and oxygenation: Assess for changes in respiratory status     Problem: Cardiovascular - Adult  Goal: Maintains optimal cardiac output and hemodynamic stability  Outcome: Completed  Goal: Absence of cardiac dysrhythmias or at baseline  Outcome: Completed     Problem: Musculoskeletal - Adult  Goal: Return mobility to safest level of function  Outcome: Completed  Goal: Return ADL status to a safe level of function  Outcome: Completed     Problem: Gastrointestinal - Adult  Goal: Minimal or absence of nausea and vomiting  Outcome:

## 2024-05-17 LAB
MICROORGANISM SPEC CULT: ABNORMAL
SPECIMEN DESCRIPTION: ABNORMAL

## 2024-05-18 LAB
MICROORGANISM SPEC CULT: NORMAL
MICROORGANISM/AGENT SPEC: NORMAL
SPECIMEN DESCRIPTION: NORMAL

## 2024-05-25 LAB
MICROORGANISM SPEC CULT: NORMAL
MICROORGANISM/AGENT SPEC: NORMAL
SPECIMEN DESCRIPTION: NORMAL

## 2024-05-29 PROBLEM — Z01.812 PRE-OPERATIVE LABORATORY EXAMINATION: Status: RESOLVED | Noted: 2022-04-14 | Resolved: 2024-05-29

## 2024-06-01 LAB
MICROORGANISM SPEC CULT: NORMAL
MICROORGANISM/AGENT SPEC: NORMAL
SPECIMEN DESCRIPTION: NORMAL

## 2024-06-02 ENCOUNTER — APPOINTMENT (OUTPATIENT)
Dept: CT IMAGING | Age: 59
End: 2024-06-02
Payer: COMMERCIAL

## 2024-06-02 PROCEDURE — 74177 CT ABD & PELVIS W/CONTRAST: CPT

## 2024-06-02 PROCEDURE — 2709999900 HC NON-CHARGEABLE SUPPLY

## 2024-06-06 PROBLEM — Z90.49 H/O WHIPPLE PROCEDURE: Status: ACTIVE | Noted: 2024-06-06

## 2024-06-06 PROBLEM — Z90.410 H/O WHIPPLE PROCEDURE: Status: ACTIVE | Noted: 2024-06-06

## 2024-06-06 PROBLEM — E44.0 MODERATE PROTEIN-CALORIE MALNUTRITION (HCC): Status: ACTIVE | Noted: 2024-06-06

## 2024-06-10 ENCOUNTER — TELEPHONE (OUTPATIENT)
Dept: HEMATOLOGY | Age: 59
End: 2024-06-10

## 2024-06-10 NOTE — TELEPHONE ENCOUNTER
Lori from [facility] called in to make patient a follow up appt with Dr. Rogers.  They are discharging patient to [facility] tomorrow and she will give this appt information to the NH.  I did also give her directions to the office.      Electronically signed by Sinai Rider RN on 6/10/2024 at 11:07 AM

## 2024-06-16 ENCOUNTER — APPOINTMENT (OUTPATIENT)
Dept: GENERAL RADIOLOGY | Age: 59
End: 2024-06-16
Payer: COMMERCIAL

## 2024-06-16 ENCOUNTER — HOSPITAL ENCOUNTER (EMERGENCY)
Age: 59
Discharge: NURSING, REHAB OR LONG-TERM CARE | End: 2024-06-16
Attending: EMERGENCY MEDICINE
Payer: COMMERCIAL

## 2024-06-16 VITALS
SYSTOLIC BLOOD PRESSURE: 145 MMHG | HEART RATE: 90 BPM | WEIGHT: 168 LBS | HEIGHT: 64 IN | RESPIRATION RATE: 24 BRPM | TEMPERATURE: 97.9 F | OXYGEN SATURATION: 96 % | DIASTOLIC BLOOD PRESSURE: 73 MMHG | BODY MASS INDEX: 28.68 KG/M2

## 2024-06-16 DIAGNOSIS — K94.13 MALFUNCTION OF JEJUNOSTOMY TUBE (HCC): Primary | ICD-10-CM

## 2024-06-16 PROCEDURE — 99283 EMERGENCY DEPT VISIT LOW MDM: CPT

## 2024-06-16 PROCEDURE — 6370000000 HC RX 637 (ALT 250 FOR IP): Performed by: EMERGENCY MEDICINE

## 2024-06-16 PROCEDURE — 6360000004 HC RX CONTRAST MEDICATION: Performed by: EMERGENCY MEDICINE

## 2024-06-16 PROCEDURE — 74018 RADEX ABDOMEN 1 VIEW: CPT

## 2024-06-16 RX ADMIN — Medication 10 ML: at 16:36

## 2024-06-16 RX ADMIN — DIATRIZOATE MEGLUMINE AND DIATRIZOATE SODIUM 30 ML: 600; 100 SOLUTION ORAL; RECTAL at 17:51

## 2024-06-16 ASSESSMENT — PAIN - FUNCTIONAL ASSESSMENT: PAIN_FUNCTIONAL_ASSESSMENT: NONE - DENIES PAIN

## 2024-06-16 ASSESSMENT — LIFESTYLE VARIABLES
HOW MANY STANDARD DRINKS CONTAINING ALCOHOL DO YOU HAVE ON A TYPICAL DAY: PATIENT DOES NOT DRINK
HOW OFTEN DO YOU HAVE A DRINK CONTAINING ALCOHOL: NEVER
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
## 2024-06-17 ENCOUNTER — HOSPITAL ENCOUNTER (EMERGENCY)
Age: 59
Discharge: NURSING, REHAB OR LONG-TERM CARE | End: 2024-06-17
Attending: STUDENT IN AN ORGANIZED HEALTH CARE EDUCATION/TRAINING PROGRAM
Payer: COMMERCIAL

## 2024-06-17 VITALS
HEIGHT: 64 IN | SYSTOLIC BLOOD PRESSURE: 132 MMHG | RESPIRATION RATE: 18 BRPM | TEMPERATURE: 97.9 F | BODY MASS INDEX: 28.68 KG/M2 | HEART RATE: 91 BPM | OXYGEN SATURATION: 98 % | DIASTOLIC BLOOD PRESSURE: 71 MMHG | WEIGHT: 168 LBS

## 2024-06-17 DIAGNOSIS — K94.13 MALFUNCTION OF JEJUNOSTOMY TUBE (HCC): Primary | ICD-10-CM

## 2024-06-17 PROCEDURE — 99284 EMERGENCY DEPT VISIT MOD MDM: CPT

## 2024-06-17 PROCEDURE — 6370000000 HC RX 637 (ALT 250 FOR IP): Performed by: STUDENT IN AN ORGANIZED HEALTH CARE EDUCATION/TRAINING PROGRAM

## 2024-06-17 RX ADMIN — Medication 10 ML: at 15:52

## 2024-06-17 NOTE — ED NOTES
Spoke to Tere and gave nurse to nurse and let her know after the clog zapper line is flowing with no problem.

## 2024-06-17 NOTE — DISCHARGE INSTR - COC
Abdominal pain R10.9    Serous cystadenoma D27.9    Pancreatic fistula K86.89    Sepsis with acute renal failure and septic shock (HCC) A41.9, R65.21, N17.9    JUSTINE (acute kidney injury) (HCC) N17.9    Hypophosphatemia E83.39    Acute respiratory failure with hypoxia (HCC) J96.01    Post-pancreatectomy diabetes (HCC) E13.9, E89.1, Z90.410    On total parenteral nutrition Z78.9    Hypoalbuminemia E88.09    Electrolyte imbalance E87.8    Hypocalcemia E83.51    Acute blood loss anemia D62    Hypokalemia E87.6    H/O Whipple procedure Z90.410, Z90.49    Moderate protein-calorie malnutrition (HCC) E44.0       Isolation/Infection:   Isolation            No Isolation          Patient Infection Status       None to display                     Nurse Assessment:  Last Vital Signs: BP (!) 145/73   Pulse 90   Temp 97.9 °F (36.6 °C) (Oral)   Resp 24   Ht 1.626 m (5' 4\")   Wt 76.2 kg (168 lb)   SpO2 96%   BMI 28.84 kg/m²     Last documented pain score (0-10 scale):    Last Weight:   Wt Readings from Last 1 Encounters:   06/16/24 76.2 kg (168 lb)     Mental Status:  {IP PT MENTAL STATUS:53056}    IV Access:  { ANALILIA IV ACCESS:083629178}    Nursing Mobility/ADLs:  Walking   {CHP DME ADLs:278841749}  Transfer  {CHP DME ADLs:760869050}  Bathing  {CHP DME ADLs:810741066}  Dressing  {CHP DME ADLs:687479985}  Toileting  {CHP DME ADLs:386181411}  Feeding  {CHP DME ADLs:875250904}  Med Admin  {P DME ADLs:725747913}  Med Delivery   { ANALILIA MED Delivery:490663332}    Wound Care Documentation and Therapy:  Wound 04/23/24 Abdomen Left;Lower (Active)   Number of days: 54       Wound 05/09/24 Abdomen Mid (Active)   Number of days: 38       Wound 05/09/24 Abdomen Left;Proximal (Active)   Number of days: 38       Wound 05/09/24 Heel Left (Active)   Number of days: 38       Incision 04/15/24 Abdomen Mid (Active)   Number of days: 62        Elimination:  Continence:   Bowel: {YES / NO:19727}  Bladder: {YES / NO:19727}  Urinary Catheter:

## 2024-06-18 ENCOUNTER — HOSPITAL ENCOUNTER (OUTPATIENT)
Age: 59
Setting detail: OBSERVATION
Discharge: NURSING, REHAB OR LONG-TERM CARE | End: 2024-06-21
Attending: STUDENT IN AN ORGANIZED HEALTH CARE EDUCATION/TRAINING PROGRAM | Admitting: INTERNAL MEDICINE
Payer: COMMERCIAL

## 2024-06-18 DIAGNOSIS — Z43.1 PEG (PERCUTANEOUS ENDOSCOPIC GASTROSTOMY) ADJUSTMENT/REPLACEMENT/REMOVAL (HCC): ICD-10-CM

## 2024-06-18 DIAGNOSIS — I10 ESSENTIAL HYPERTENSION: ICD-10-CM

## 2024-06-18 DIAGNOSIS — E78.5 HYPERLIPIDEMIA, UNSPECIFIED HYPERLIPIDEMIA TYPE: ICD-10-CM

## 2024-06-18 DIAGNOSIS — T85.528A JEJUNOSTOMY TUBE FELL OUT: Primary | ICD-10-CM

## 2024-06-18 LAB
ALBUMIN SERPL-MCNC: 3.9 G/DL (ref 3.5–5.2)
ALP SERPL-CCNC: 140 U/L (ref 35–104)
ALT SERPL-CCNC: 23 U/L (ref 0–32)
ANION GAP SERPL CALCULATED.3IONS-SCNC: 11 MMOL/L (ref 7–16)
AST SERPL-CCNC: 19 U/L (ref 0–31)
BASOPHILS # BLD: 0.1 K/UL (ref 0–0.2)
BASOPHILS NFR BLD: 1 % (ref 0–2)
BILIRUB SERPL-MCNC: 0.2 MG/DL (ref 0–1.2)
BUN SERPL-MCNC: 25 MG/DL (ref 6–20)
CALCIUM SERPL-MCNC: 9.6 MG/DL (ref 8.6–10.2)
CHLORIDE SERPL-SCNC: 86 MMOL/L (ref 98–107)
CO2 SERPL-SCNC: 37 MMOL/L (ref 22–29)
CREAT SERPL-MCNC: 0.5 MG/DL (ref 0.5–1)
EOSINOPHIL # BLD: 0.55 K/UL (ref 0.05–0.5)
EOSINOPHILS RELATIVE PERCENT: 3 % (ref 0–6)
ERYTHROCYTE [DISTWIDTH] IN BLOOD BY AUTOMATED COUNT: 14.6 % (ref 11.5–15)
GFR, ESTIMATED: >90 ML/MIN/1.73M2
GLUCOSE BLD-MCNC: 204 MG/DL (ref 74–99)
GLUCOSE SERPL-MCNC: 192 MG/DL (ref 74–99)
HCT VFR BLD AUTO: 36 % (ref 34–48)
HGB BLD-MCNC: 11.2 G/DL (ref 11.5–15.5)
IMM GRANULOCYTES # BLD AUTO: 0.07 K/UL (ref 0–0.58)
IMM GRANULOCYTES NFR BLD: 0 % (ref 0–5)
LYMPHOCYTES NFR BLD: 3.83 K/UL (ref 1.5–4)
LYMPHOCYTES RELATIVE PERCENT: 22 % (ref 20–42)
MCH RBC QN AUTO: 27.3 PG (ref 26–35)
MCHC RBC AUTO-ENTMCNC: 31.1 G/DL (ref 32–34.5)
MCV RBC AUTO: 87.8 FL (ref 80–99.9)
MONOCYTES NFR BLD: 0.87 K/UL (ref 0.1–0.95)
MONOCYTES NFR BLD: 5 % (ref 2–12)
NEUTROPHILS NFR BLD: 69 % (ref 43–80)
NEUTS SEG NFR BLD: 12.12 K/UL (ref 1.8–7.3)
PLATELET, FLUORESCENCE: 1029 K/UL (ref 130–450)
PMV BLD AUTO: 9.9 FL (ref 7–12)
POTASSIUM SERPL-SCNC: 2.9 MMOL/L (ref 3.5–5)
PROT SERPL-MCNC: 8.4 G/DL (ref 6.4–8.3)
RBC # BLD AUTO: 4.1 M/UL (ref 3.5–5.5)
SODIUM SERPL-SCNC: 134 MMOL/L (ref 132–146)
WBC OTHER # BLD: 17.5 K/UL (ref 4.5–11.5)

## 2024-06-18 PROCEDURE — 96366 THER/PROPH/DIAG IV INF ADDON: CPT

## 2024-06-18 PROCEDURE — 85025 COMPLETE CBC W/AUTO DIFF WBC: CPT

## 2024-06-18 PROCEDURE — 96375 TX/PRO/DX INJ NEW DRUG ADDON: CPT

## 2024-06-18 PROCEDURE — 99221 1ST HOSP IP/OBS SF/LOW 40: CPT | Performed by: STUDENT IN AN ORGANIZED HEALTH CARE EDUCATION/TRAINING PROGRAM

## 2024-06-18 PROCEDURE — 6360000002 HC RX W HCPCS: Performed by: HOSPITALIST

## 2024-06-18 PROCEDURE — 2500000003 HC RX 250 WO HCPCS: Performed by: HOSPITALIST

## 2024-06-18 PROCEDURE — 2700000000 HC OXYGEN THERAPY PER DAY

## 2024-06-18 PROCEDURE — 80053 COMPREHEN METABOLIC PANEL: CPT

## 2024-06-18 PROCEDURE — G0378 HOSPITAL OBSERVATION PER HR: HCPCS

## 2024-06-18 PROCEDURE — 2580000003 HC RX 258: Performed by: STUDENT IN AN ORGANIZED HEALTH CARE EDUCATION/TRAINING PROGRAM

## 2024-06-18 PROCEDURE — 99285 EMERGENCY DEPT VISIT HI MDM: CPT

## 2024-06-18 PROCEDURE — 96365 THER/PROPH/DIAG IV INF INIT: CPT

## 2024-06-18 PROCEDURE — 82962 GLUCOSE BLOOD TEST: CPT

## 2024-06-18 PROCEDURE — 6370000000 HC RX 637 (ALT 250 FOR IP): Performed by: HOSPITALIST

## 2024-06-18 RX ORDER — PROMETHAZINE HYDROCHLORIDE 12.5 MG/1
12.5 TABLET ORAL EVERY 6 HOURS PRN
COMMUNITY

## 2024-06-18 RX ORDER — METOCLOPRAMIDE HYDROCHLORIDE 5 MG/ML
10 INJECTION INTRAMUSCULAR; INTRAVENOUS EVERY 6 HOURS
Status: DISCONTINUED | OUTPATIENT
Start: 2024-06-18 | End: 2024-06-21 | Stop reason: HOSPADM

## 2024-06-18 RX ORDER — KETOTIFEN FUMARATE 0.35 MG/ML
1 SOLUTION/ DROPS OPHTHALMIC 2 TIMES DAILY
Status: DISCONTINUED | OUTPATIENT
Start: 2024-06-18 | End: 2024-06-21 | Stop reason: HOSPADM

## 2024-06-18 RX ORDER — HYDRALAZINE HYDROCHLORIDE 20 MG/ML
10 INJECTION INTRAMUSCULAR; INTRAVENOUS EVERY 6 HOURS PRN
Status: DISCONTINUED | OUTPATIENT
Start: 2024-06-18 | End: 2024-06-21 | Stop reason: HOSPADM

## 2024-06-18 RX ORDER — CHLORHEXIDINE GLUCONATE ORAL RINSE 1.2 MG/ML
15 SOLUTION DENTAL 2 TIMES DAILY
Status: ON HOLD | COMMUNITY
End: 2024-06-21 | Stop reason: HOSPADM

## 2024-06-18 RX ORDER — METOCLOPRAMIDE 5 MG/1
5 TABLET ORAL EVERY 8 HOURS PRN
COMMUNITY

## 2024-06-18 RX ORDER — OCTREOTIDE ACETATE 50 UG/ML
50 INJECTION, SOLUTION INTRAVENOUS; SUBCUTANEOUS EVERY 12 HOURS
Status: DISCONTINUED | OUTPATIENT
Start: 2024-06-18 | End: 2024-06-21 | Stop reason: HOSPADM

## 2024-06-18 RX ORDER — LIDOCAINE 4 G/G
1 PATCH TOPICAL DAILY
Status: DISCONTINUED | OUTPATIENT
Start: 2024-06-19 | End: 2024-06-21 | Stop reason: HOSPADM

## 2024-06-18 RX ORDER — ASPIRIN 81 MG/1
81 TABLET, CHEWABLE ORAL DAILY
COMMUNITY

## 2024-06-18 RX ORDER — KETOTIFEN FUMARATE 0.35 MG/ML
1 SOLUTION/ DROPS OPHTHALMIC 2 TIMES DAILY
COMMUNITY

## 2024-06-18 RX ORDER — BISACODYL 10 MG
10 SUPPOSITORY, RECTAL RECTAL DAILY PRN
COMMUNITY

## 2024-06-18 RX ORDER — POLYETHYLENE GLYCOL 3350 17 G/17G
17 POWDER, FOR SOLUTION ORAL DAILY
COMMUNITY
End: 2024-07-03 | Stop reason: ALTCHOICE

## 2024-06-18 RX ORDER — SODIUM CHLORIDE 9 MG/ML
INJECTION, SOLUTION INTRAVENOUS ONCE
Status: COMPLETED | OUTPATIENT
Start: 2024-06-18 | End: 2024-06-18

## 2024-06-18 RX ORDER — SIMETHICONE 80 MG
80 TABLET,CHEWABLE ORAL EVERY 6 HOURS PRN
COMMUNITY

## 2024-06-18 RX ORDER — CARVEDILOL 25 MG/1
25 TABLET ORAL 2 TIMES DAILY WITH MEALS
COMMUNITY

## 2024-06-18 RX ORDER — DEXTROSE MONOHYDRATE, SODIUM CHLORIDE, AND POTASSIUM CHLORIDE 50; 1.49; 4.5 G/1000ML; G/1000ML; G/1000ML
INJECTION, SOLUTION INTRAVENOUS CONTINUOUS
Status: DISCONTINUED | OUTPATIENT
Start: 2024-06-18 | End: 2024-06-21

## 2024-06-18 RX ORDER — INSULIN GLARGINE 100 [IU]/ML
15 INJECTION, SOLUTION SUBCUTANEOUS NIGHTLY
Status: DISCONTINUED | OUTPATIENT
Start: 2024-06-18 | End: 2024-06-21 | Stop reason: HOSPADM

## 2024-06-18 RX ORDER — HYDROXYZINE HYDROCHLORIDE 25 MG/1
25 TABLET, FILM COATED ORAL EVERY 6 HOURS PRN
COMMUNITY

## 2024-06-18 RX ORDER — IPRATROPIUM BROMIDE AND ALBUTEROL SULFATE 2.5; .5 MG/3ML; MG/3ML
1 SOLUTION RESPIRATORY (INHALATION)
Status: DISCONTINUED | OUTPATIENT
Start: 2024-06-19 | End: 2024-06-21 | Stop reason: HOSPADM

## 2024-06-18 RX ORDER — LANOLIN ALCOHOL/MO/W.PET/CERES
3 CREAM (GRAM) TOPICAL NIGHTLY
COMMUNITY

## 2024-06-18 RX ORDER — LEVALBUTEROL INHALATION SOLUTION 0.63 MG/3ML
1 SOLUTION RESPIRATORY (INHALATION) EVERY 6 HOURS PRN
COMMUNITY

## 2024-06-18 RX ORDER — OXYCODONE HYDROCHLORIDE 5 MG/1
5 TABLET ORAL EVERY 6 HOURS PRN
Status: ON HOLD | COMMUNITY
End: 2024-06-21 | Stop reason: HOSPADM

## 2024-06-18 RX ADMIN — POTASSIUM CHLORIDE, DEXTROSE MONOHYDRATE AND SODIUM CHLORIDE: 150; 5; 450 INJECTION, SOLUTION INTRAVENOUS at 21:09

## 2024-06-18 RX ADMIN — INSULIN GLARGINE 15 UNITS: 100 INJECTION, SOLUTION SUBCUTANEOUS at 22:28

## 2024-06-18 RX ADMIN — SODIUM CHLORIDE: 9 INJECTION, SOLUTION INTRAVENOUS at 19:19

## 2024-06-18 RX ADMIN — METOCLOPRAMIDE 10 MG: 5 INJECTION, SOLUTION INTRAMUSCULAR; INTRAVENOUS at 21:49

## 2024-06-18 RX ADMIN — OCTREOTIDE ACETATE 50 MCG: 50 INJECTION, SOLUTION INTRAVENOUS; SUBCUTANEOUS at 21:50

## 2024-06-18 RX ADMIN — KETOTIFEN FUMARATE 1 DROP: 0.25 SOLUTION/ DROPS OPHTHALMIC at 21:50

## 2024-06-18 ASSESSMENT — ENCOUNTER SYMPTOMS
EYES NEGATIVE: 1
ROS SKIN COMMENTS: OPEN WOUND
RESPIRATORY NEGATIVE: 1
ALLERGIC/IMMUNOLOGIC NEGATIVE: 1

## 2024-06-18 ASSESSMENT — LIFESTYLE VARIABLES
HOW OFTEN DO YOU HAVE A DRINK CONTAINING ALCOHOL: NEVER
HOW MANY STANDARD DRINKS CONTAINING ALCOHOL DO YOU HAVE ON A TYPICAL DAY: PATIENT DOES NOT DRINK

## 2024-06-18 ASSESSMENT — PAIN - FUNCTIONAL ASSESSMENT: PAIN_FUNCTIONAL_ASSESSMENT: NONE - DENIES PAIN

## 2024-06-18 NOTE — ED NOTES
Patient picked up by [facility] wheelchair transport.  She was transferred from the bed to the chair.  N2[facility] has been called.

## 2024-06-18 NOTE — ED PROVIDER NOTES
[facility] MED SURG/TELE  EMERGENCY DEPARTMENT ENCOUNTER        Pt Name: Leatha Willard  MRN: [id number]  Birthdate 1965  Date of evaluation: 2024  Provider: Eduardo Pedraza DO  PCP: Mekhi Escalona MD  Note Started: 3:54 PM EDT 24    CHIEF COMPLAINT       Chief Complaint   Patient presents with    J-tube disconnected.     Sent in from NH d/t J-tube found disconnected. Patient has been here past 2 days for issues with J-Tube.       HISTORY OF PRESENT ILLNESS: 1 or more Elements   History From: Patient and EMS    Limitations to history : None    Leatha Willard is a 58 y.o. female who presents to the emergency department from a nursing facility for disconnected J-tube that occurred shortly prior to arrival.  Patient is unclear how this happened.  Patient is n.p.o. at baseline.  She denies any abdominal complaints including no nausea, vomiting, abdominal pain.  PEG tube was placed in April of this year after a Whipple procedure.  Patient was previously seen here yesterday and the day before for G-tube malfunction which was resolved with clog zapper.    Nursing Notes were all reviewed and agreed with or any disagreements were addressed in the HPI.      REVIEW OF EXTERNAL NOTE :       Operative note 2024 reviewed.  Patient had EGD for PEG tube insertion.    REVIEW OF SYSTEMS :           Positives and Pertinent negatives as per HPI.     SURGICAL HISTORY     Past Surgical History:   Procedure Laterality Date    ANKLE SURGERY   or     LEFT   ankles and screws    BACK SURGERY      Cervical surgery  c2-c7    CARDIAC PROCEDURE N/A 2024    Left heart cath / coronary angiography performed by Shivani Ford MD at [facility] [place]ARDIAC CATH LAB     SECTION      x 3    COLONOSCOPY  2023    Dr. aHmmond    FRACTURE SURGERY Left     Rotaor cuff surgery    HYSTERECTOMY (CERVIX STATUS UNKNOWN)      LAPAROTOMY N/A 2024    Exploratory Laparotomy, Abdominal Washout, Completion of Pancreatectomy,  gastroenterology who recommended admission with plan to revise this later tonight or tomorrow morning.  Consults placed to internal medicine who accepted patient for admission.  Basic labs were obtained and patient was started on maintenance fluids.  Labs were pending at the time of admission.  She was admitted in stable condition.    CONSULTS:   IP CONSULT TO GI  IP CONSULT TO FAMILY MEDICINE  IP CONSULT TO INTERNAL MEDICINE  IP CONSULT TO INFECTIOUS DISEASES  IP CONSULT TO INTERNAL MEDICINE        I am the Primary Clinician of Record.    FINAL IMPRESSION      1. Jejunostomy tube fell out          DISPOSITION/PLAN     DISPOSITION Admitted 06/18/2024 05:59:16 PM      PATIENT REFERRED TO:  No follow-up provider specified.    DISCHARGE MEDICATIONS:  Current Discharge Medication List          DISCONTINUED MEDICATIONS:  Current Discharge Medication List                 (Please note that portions of this note were completed with a voice recognition program.  Efforts were made to edit the dictations but occasionally words are mis-transcribed.)    Eduardo Pedraza DO (electronically signed)
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
## 2024-06-18 NOTE — ED NOTES
ED to Inpatient Handoff Report    Notified Constance that electronic handoff available and patient ready for transport to room 527.    Safety Risks: Risk of falls    Patient in Restraints: no    Constant Observer or Patient : no    Telemetry Monitoring Ordered :Yes           Order to transfer to unit without monitor:N/A    Last MEWS: 1 Time completed: 1928    Deterioration Index Score:   Predictive Model Details          30  Factor Value    Calculated 6/18/2024 19:37 37% Supplemental oxygen Nasal cannula    Deterioration Index Model 33% Age 58 years old     12% WBC count abnormal (17.5 k/uL)     5% Systolic 142     5% Potassium abnormal (2.9 mmol/L)     3% BUN abnormal (25 mg/dL)     3% Sodium 134 mmol/L     2% Pulse 91     0% Pulse oximetry 98 %     0% Temperature 97.7 °F (36.5 °C)     0% Respiratory rate 16     0% Hematocrit 36.0 %        Vitals:    06/18/24 1554 06/18/24 1928   BP: 123/73 (!) 142/85   Pulse: 92 91   Resp: 16 16   Temp: 97.9 °F (36.6 °C) 97.7 °F (36.5 °C)   TempSrc: Oral Oral   SpO2: 95% 98%   Weight: 76.7 kg (169 lb)    Height: 1.626 m (5' 4\")          Opportunity for questions and clarification was provided.

## 2024-06-18 NOTE — CARE COORDINATION
Internal Medicine On-call Care Coordination Note     I was called by the ED physician because they recommended admission for this patient and we cover their PCP.  The history as I understand it after discussion with the ED physician is as follows:     Admit for G-J tube dysfunction     I placed admission orders.  Including:     Admit to replace tube  Dr. Hahn consulted to replace tube  Labs and IVF ordered     Dr. Perez or his coverage will see the patient tomorrow for H&P and review of all orders.     Electronically signed by Isidro Davis MD on 6/18/2024 at 5:37 PM

## 2024-06-18 NOTE — PROGRESS NOTES
Database initiated. Patient is A&O (whcarla) comes in from [facility]. She has a PEG tube and gets Vital AF 1.2 @55ml/hr; Free water 35ml/hr. New peg placed today. She has a wound vac on her mid abd wound. States she is bed bound and wears 2 liters oxygen at baseline. Medications and Full Code status verified using facility paperwork.

## 2024-06-19 ENCOUNTER — APPOINTMENT (OUTPATIENT)
Dept: GENERAL RADIOLOGY | Age: 59
End: 2024-06-19
Payer: COMMERCIAL

## 2024-06-19 LAB
ALBUMIN SERPL-MCNC: 3.4 G/DL (ref 3.5–5.2)
ALP SERPL-CCNC: 115 U/L (ref 35–104)
ALT SERPL-CCNC: 16 U/L (ref 0–32)
ANION GAP SERPL CALCULATED.3IONS-SCNC: 10 MMOL/L (ref 7–16)
AST SERPL-CCNC: 14 U/L (ref 0–31)
BASOPHILS # BLD: 0.11 K/UL (ref 0–0.2)
BASOPHILS NFR BLD: 1 % (ref 0–2)
BILIRUB SERPL-MCNC: 0.2 MG/DL (ref 0–1.2)
BUN SERPL-MCNC: 17 MG/DL (ref 6–20)
CALCIUM SERPL-MCNC: 8.9 MG/DL (ref 8.6–10.2)
CHLORIDE SERPL-SCNC: 96 MMOL/L (ref 98–107)
CO2 SERPL-SCNC: 34 MMOL/L (ref 22–29)
CREAT SERPL-MCNC: 0.5 MG/DL (ref 0.5–1)
EOSINOPHIL # BLD: 0.76 K/UL (ref 0.05–0.5)
EOSINOPHILS RELATIVE PERCENT: 5 % (ref 0–6)
ERYTHROCYTE [DISTWIDTH] IN BLOOD BY AUTOMATED COUNT: 14.6 % (ref 11.5–15)
GFR, ESTIMATED: >90 ML/MIN/1.73M2
GLUCOSE BLD-MCNC: 189 MG/DL (ref 74–99)
GLUCOSE BLD-MCNC: 249 MG/DL (ref 74–99)
GLUCOSE BLD-MCNC: 250 MG/DL (ref 74–99)
GLUCOSE SERPL-MCNC: 178 MG/DL (ref 74–99)
HBA1C MFR BLD: 6.6 % (ref 4–5.6)
HCT VFR BLD AUTO: 31.7 % (ref 34–48)
HGB BLD-MCNC: 9.8 G/DL (ref 11.5–15.5)
IMM GRANULOCYTES # BLD AUTO: 0.08 K/UL (ref 0–0.58)
IMM GRANULOCYTES NFR BLD: 1 % (ref 0–5)
LYMPHOCYTES NFR BLD: 3.45 K/UL (ref 1.5–4)
LYMPHOCYTES RELATIVE PERCENT: 23 % (ref 20–42)
MAGNESIUM SERPL-MCNC: 1.8 MG/DL (ref 1.6–2.6)
MCH RBC QN AUTO: 27.3 PG (ref 26–35)
MCHC RBC AUTO-ENTMCNC: 30.9 G/DL (ref 32–34.5)
MCV RBC AUTO: 88.3 FL (ref 80–99.9)
MONOCYTES NFR BLD: 0.95 K/UL (ref 0.1–0.95)
MONOCYTES NFR BLD: 6 % (ref 2–12)
NEUTROPHILS NFR BLD: 64 % (ref 43–80)
NEUTS SEG NFR BLD: 9.55 K/UL (ref 1.8–7.3)
PLATELET # BLD AUTO: 862 K/UL (ref 130–450)
PMV BLD AUTO: 9.7 FL (ref 7–12)
POTASSIUM SERPL-SCNC: 2.8 MMOL/L (ref 3.5–5)
POTASSIUM SERPL-SCNC: 3.8 MMOL/L (ref 3.5–5)
PROT SERPL-MCNC: 7.3 G/DL (ref 6.4–8.3)
RBC # BLD AUTO: 3.59 M/UL (ref 3.5–5.5)
SODIUM SERPL-SCNC: 140 MMOL/L (ref 132–146)
WBC OTHER # BLD: 14.9 K/UL (ref 4.5–11.5)

## 2024-06-19 PROCEDURE — 96376 TX/PRO/DX INJ SAME DRUG ADON: CPT

## 2024-06-19 PROCEDURE — 84132 ASSAY OF SERUM POTASSIUM: CPT

## 2024-06-19 PROCEDURE — 92611 MOTION FLUOROSCOPY/SWALLOW: CPT

## 2024-06-19 PROCEDURE — 83036 HEMOGLOBIN GLYCOSYLATED A1C: CPT

## 2024-06-19 PROCEDURE — 6370000000 HC RX 637 (ALT 250 FOR IP): Performed by: HOSPITALIST

## 2024-06-19 PROCEDURE — 92526 ORAL FUNCTION THERAPY: CPT

## 2024-06-19 PROCEDURE — G0378 HOSPITAL OBSERVATION PER HR: HCPCS

## 2024-06-19 PROCEDURE — 80053 COMPREHEN METABOLIC PANEL: CPT

## 2024-06-19 PROCEDURE — 99232 SBSQ HOSP IP/OBS MODERATE 35: CPT | Performed by: CLINICAL NURSE SPECIALIST

## 2024-06-19 PROCEDURE — 94664 DEMO&/EVAL PT USE INHALER: CPT

## 2024-06-19 PROCEDURE — 85025 COMPLETE CBC W/AUTO DIFF WBC: CPT

## 2024-06-19 PROCEDURE — C9113 INJ PANTOPRAZOLE SODIUM, VIA: HCPCS | Performed by: INTERNAL MEDICINE

## 2024-06-19 PROCEDURE — 97161 PT EVAL LOW COMPLEX 20 MIN: CPT

## 2024-06-19 PROCEDURE — 83735 ASSAY OF MAGNESIUM: CPT

## 2024-06-19 PROCEDURE — 2500000003 HC RX 250 WO HCPCS: Performed by: HOSPITALIST

## 2024-06-19 PROCEDURE — 6370000000 HC RX 637 (ALT 250 FOR IP): Performed by: INTERNAL MEDICINE

## 2024-06-19 PROCEDURE — 74230 X-RAY XM SWLNG FUNCJ C+: CPT

## 2024-06-19 PROCEDURE — 6360000002 HC RX W HCPCS: Performed by: HOSPITALIST

## 2024-06-19 PROCEDURE — 96375 TX/PRO/DX INJ NEW DRUG ADDON: CPT

## 2024-06-19 PROCEDURE — 96366 THER/PROPH/DIAG IV INF ADDON: CPT

## 2024-06-19 PROCEDURE — 97165 OT EVAL LOW COMPLEX 30 MIN: CPT

## 2024-06-19 PROCEDURE — 6360000002 HC RX W HCPCS: Performed by: INTERNAL MEDICINE

## 2024-06-19 PROCEDURE — 82962 GLUCOSE BLOOD TEST: CPT

## 2024-06-19 PROCEDURE — 2500000003 HC RX 250 WO HCPCS: Performed by: INTERNAL MEDICINE

## 2024-06-19 PROCEDURE — 2700000000 HC OXYGEN THERAPY PER DAY

## 2024-06-19 PROCEDURE — 94640 AIRWAY INHALATION TREATMENT: CPT

## 2024-06-19 RX ORDER — PANTOPRAZOLE SODIUM 40 MG/10ML
40 INJECTION, POWDER, LYOPHILIZED, FOR SOLUTION INTRAVENOUS DAILY
Status: DISCONTINUED | OUTPATIENT
Start: 2024-06-19 | End: 2024-06-21 | Stop reason: HOSPADM

## 2024-06-19 RX ORDER — ASPIRIN 81 MG/1
81 TABLET, CHEWABLE ORAL DAILY
Status: DISCONTINUED | OUTPATIENT
Start: 2024-06-19 | End: 2024-06-21 | Stop reason: HOSPADM

## 2024-06-19 RX ORDER — POLYETHYLENE GLYCOL 3350 17 G/17G
17 POWDER, FOR SOLUTION ORAL DAILY
Status: DISCONTINUED | OUTPATIENT
Start: 2024-06-19 | End: 2024-06-21 | Stop reason: HOSPADM

## 2024-06-19 RX ORDER — POTASSIUM CHLORIDE 20 MEQ/1
40 TABLET, EXTENDED RELEASE ORAL PRN
Status: DISCONTINUED | OUTPATIENT
Start: 2024-06-19 | End: 2024-06-21 | Stop reason: HOSPADM

## 2024-06-19 RX ORDER — LANOLIN ALCOHOL/MO/W.PET/CERES
3 CREAM (GRAM) TOPICAL NIGHTLY
Status: DISCONTINUED | OUTPATIENT
Start: 2024-06-19 | End: 2024-06-21 | Stop reason: HOSPADM

## 2024-06-19 RX ORDER — METOCLOPRAMIDE 10 MG/1
5 TABLET ORAL EVERY 8 HOURS PRN
Status: DISCONTINUED | OUTPATIENT
Start: 2024-06-19 | End: 2024-06-21 | Stop reason: HOSPADM

## 2024-06-19 RX ORDER — GLUCAGON 1 MG/ML
1 KIT INJECTION PRN
Status: DISCONTINUED | OUTPATIENT
Start: 2024-06-19 | End: 2024-06-21 | Stop reason: HOSPADM

## 2024-06-19 RX ORDER — DEXTROSE MONOHYDRATE 100 MG/ML
INJECTION, SOLUTION INTRAVENOUS CONTINUOUS PRN
Status: DISCONTINUED | OUTPATIENT
Start: 2024-06-19 | End: 2024-06-21 | Stop reason: HOSPADM

## 2024-06-19 RX ORDER — INSULIN LISPRO 100 [IU]/ML
0-4 INJECTION, SOLUTION INTRAVENOUS; SUBCUTANEOUS EVERY 6 HOURS
Status: DISCONTINUED | OUTPATIENT
Start: 2024-06-19 | End: 2024-06-21 | Stop reason: HOSPADM

## 2024-06-19 RX ORDER — CHLORHEXIDINE GLUCONATE ORAL RINSE 1.2 MG/ML
15 SOLUTION DENTAL 2 TIMES DAILY
Status: DISCONTINUED | OUTPATIENT
Start: 2024-06-19 | End: 2024-06-21 | Stop reason: HOSPADM

## 2024-06-19 RX ORDER — OXYCODONE HYDROCHLORIDE 5 MG/1
5 TABLET ORAL EVERY 6 HOURS PRN
Status: DISCONTINUED | OUTPATIENT
Start: 2024-06-19 | End: 2024-06-21 | Stop reason: HOSPADM

## 2024-06-19 RX ORDER — LEVALBUTEROL INHALATION SOLUTION 0.63 MG/3ML
1 SOLUTION RESPIRATORY (INHALATION) EVERY 6 HOURS PRN
Status: DISCONTINUED | OUTPATIENT
Start: 2024-06-19 | End: 2024-06-21 | Stop reason: HOSPADM

## 2024-06-19 RX ORDER — CARVEDILOL 25 MG/1
25 TABLET ORAL 2 TIMES DAILY WITH MEALS
Status: DISCONTINUED | OUTPATIENT
Start: 2024-06-19 | End: 2024-06-21 | Stop reason: HOSPADM

## 2024-06-19 RX ORDER — POTASSIUM CHLORIDE 7.45 MG/ML
10 INJECTION INTRAVENOUS PRN
Status: DISCONTINUED | OUTPATIENT
Start: 2024-06-19 | End: 2024-06-21 | Stop reason: HOSPADM

## 2024-06-19 RX ORDER — HYDROXYZINE HYDROCHLORIDE 10 MG/1
25 TABLET, FILM COATED ORAL EVERY 6 HOURS PRN
Status: DISCONTINUED | OUTPATIENT
Start: 2024-06-19 | End: 2024-06-21 | Stop reason: HOSPADM

## 2024-06-19 RX ADMIN — INSULIN GLARGINE 15 UNITS: 100 INJECTION, SOLUTION SUBCUTANEOUS at 20:13

## 2024-06-19 RX ADMIN — IPRATROPIUM BROMIDE AND ALBUTEROL SULFATE 1 DOSE: 2.5; .5 SOLUTION RESPIRATORY (INHALATION) at 12:15

## 2024-06-19 RX ADMIN — POTASSIUM CHLORIDE 10 MEQ: 7.46 INJECTION, SOLUTION INTRAVENOUS at 15:30

## 2024-06-19 RX ADMIN — IPRATROPIUM BROMIDE AND ALBUTEROL SULFATE 1 DOSE: 2.5; .5 SOLUTION RESPIRATORY (INHALATION) at 20:38

## 2024-06-19 RX ADMIN — POTASSIUM CHLORIDE, DEXTROSE MONOHYDRATE AND SODIUM CHLORIDE: 150; 5; 450 INJECTION, SOLUTION INTRAVENOUS at 17:46

## 2024-06-19 RX ADMIN — IPRATROPIUM BROMIDE AND ALBUTEROL SULFATE 1 DOSE: 2.5; .5 SOLUTION RESPIRATORY (INHALATION) at 08:48

## 2024-06-19 RX ADMIN — METOCLOPRAMIDE 10 MG: 5 INJECTION, SOLUTION INTRAMUSCULAR; INTRAVENOUS at 15:30

## 2024-06-19 RX ADMIN — OCTREOTIDE ACETATE 50 MCG: 50 INJECTION, SOLUTION INTRAVENOUS; SUBCUTANEOUS at 09:01

## 2024-06-19 RX ADMIN — PANTOPRAZOLE SODIUM 40 MG: 40 INJECTION, POWDER, FOR SOLUTION INTRAVENOUS at 08:59

## 2024-06-19 RX ADMIN — 0.12% CHLORHEXIDINE GLUCONATE 15 ML: 1.2 RINSE ORAL at 08:59

## 2024-06-19 RX ADMIN — BARIUM SULFATE 70 G: 0.81 POWDER, FOR SUSPENSION ORAL at 15:37

## 2024-06-19 RX ADMIN — METOCLOPRAMIDE 10 MG: 5 INJECTION, SOLUTION INTRAMUSCULAR; INTRAVENOUS at 08:59

## 2024-06-19 RX ADMIN — POTASSIUM CHLORIDE 10 MEQ: 7.46 INJECTION, SOLUTION INTRAVENOUS at 10:36

## 2024-06-19 RX ADMIN — POTASSIUM CHLORIDE 10 MEQ: 7.46 INJECTION, SOLUTION INTRAVENOUS at 11:39

## 2024-06-19 RX ADMIN — CARVEDILOL 25 MG: 25 TABLET, FILM COATED ORAL at 16:39

## 2024-06-19 RX ADMIN — POTASSIUM CHLORIDE, DEXTROSE MONOHYDRATE AND SODIUM CHLORIDE: 150; 5; 450 INJECTION, SOLUTION INTRAVENOUS at 06:59

## 2024-06-19 RX ADMIN — INSULIN LISPRO 2 UNITS: 100 INJECTION, SOLUTION INTRAVENOUS; SUBCUTANEOUS at 16:46

## 2024-06-19 RX ADMIN — POTASSIUM CHLORIDE 10 MEQ: 7.46 INJECTION, SOLUTION INTRAVENOUS at 14:20

## 2024-06-19 RX ADMIN — METOCLOPRAMIDE 10 MG: 5 INJECTION, SOLUTION INTRAMUSCULAR; INTRAVENOUS at 20:14

## 2024-06-19 RX ADMIN — METOCLOPRAMIDE 10 MG: 5 INJECTION, SOLUTION INTRAMUSCULAR; INTRAVENOUS at 04:02

## 2024-06-19 RX ADMIN — OCTREOTIDE ACETATE 50 MCG: 50 INJECTION, SOLUTION INTRAVENOUS; SUBCUTANEOUS at 20:14

## 2024-06-19 RX ADMIN — IPRATROPIUM BROMIDE AND ALBUTEROL SULFATE 1 DOSE: 2.5; .5 SOLUTION RESPIRATORY (INHALATION) at 15:36

## 2024-06-19 RX ADMIN — POTASSIUM CHLORIDE 10 MEQ: 7.46 INJECTION, SOLUTION INTRAVENOUS at 16:39

## 2024-06-19 RX ADMIN — BARIUM SULFATE 120 ML: 400 SUSPENSION ORAL at 15:37

## 2024-06-19 RX ADMIN — BARIUM SULFATE 10 ML: 400 PASTE ORAL at 15:36

## 2024-06-19 RX ADMIN — POTASSIUM CHLORIDE 10 MEQ: 7.46 INJECTION, SOLUTION INTRAVENOUS at 12:45
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
## 2024-06-19 NOTE — PROGRESS NOTES
Call placed to [facility] to clarify wound vac orders. Unit nurse stated that wound vac orders were to only change PRN, order was not clear about regular changes. Per unit nurse change was last completed on Monday, however pt states it has not been changed since Friday and states that usually it is changed every other day. Forwarded to wound care nurse, left voicemail requesting call back with change orders, frequency, and last set of measurements. Waiting call back.

## 2024-06-19 NOTE — H&P
Internal Medicine History & Physical     Name: Leatha Willard  : 1965  Chief Complaint: J-tube disconnected. (Sent in from NH d/t J-tube found disconnected. Patient has been here past 2 days for issues with J-Tube.)  Primary Care Physician: Mekhi Escalona MD  Admission date: 2024  Date of service: 2024   Unit: [address] MED SURG/TELE     History of Present Illness  Leatha is a 58 y.o. year old female who was recently discharged from the  hospital on . She had initially presented to the hospital on 4/15 for a pylorus preserving whipple procedure d/t symptomatic pancreatic serous cystadenoma. She had a pancreatic leak and went to OR for pancreatectomy and splenectomy on . She was treated with broad spectrum ATB for fungicemia. She was started on pressors for hypotension. She was not able to be extubated and underwent trach on . She was admitted to [facility] hospital on  for continuation of care. She had an RRT on the date of admission for tachycardia and tachypnea. She had emesis x2 and KUB showed ileus vs obstruction. Surgery was consulted and determined delayed gastric emptying secondary to the whipple procedure. She was liberated from the vent on . She was de-cannulated on 6/3. She was discharged from [facility] on  to a nursing home. She presented to the ED on the same day for clogged PEG tube with malfunction of the J-tube.     Today, she is seen and examined lying in bed in no apparent distress. She is alert and oriented, but not speaking clearly. She has the wound vac attached to her abdomen. G-J tube will need replaced. ID checking cultures. Lung sounds are clear. She is on 2L oxygen. Speech to see in order to check swallow function, she cannot currently receive medications through J-tube.     Medical history obtained through patient record. No family at bedside.     Past Medical History:   Diagnosis Date    Cervical pain 2022    Colon polyps     found on  left-sided pleural effusion with compression atelectasis in the left lower lobe.     CT ABDOMEN PELVIS W CONTRAST    Result Date: 6/2/2024  EXAMINATION: CT OF THE ABDOMEN AND PELVIS WITH CONTRAST 6/2/2024 7:07 pm TECHNIQUE: CT of the abdomen and pelvis was performed with the administration of intravenous contrast. Multiplanar reformatted images are provided for review. Automated exposure control, iterative reconstruction, and/or weight based adjustment of the mA/kV was utilized to reduce the radiation dose to as low as reasonably achievable. COMPARISON: Reviewed CT brain of February 22, 2024 and postoperative CTs of the abdomen pelvis of April 18, April 23, April 25, April 30, May 2nd, and May 13. HISTORY: ORDERING SYSTEM PROVIDED HISTORY: fistula abscess TECHNOLOGIST PROVIDED HISTORY: Po and iv contrast Is the patient pregnant?->No Reason for Exam:->fistula abscess What is the patient's sedation requirement?->No Sedation Height:162.6cm Weight:78.1kg FINDINGS: Clinical informed: Pancreatic surgery Whipple procedure in April 15, completion of pancreatectomy/splenectomy/omentectomy in April 18, percutaneous gastrostomy tube placement in April 30, There is a jejunal catheter which is located in the efferent loop of the gastrojejunal anastomosis.  The catheter appears to be in proper position. There are significant is stranding of the omental mesenteric fat planes particular in the left upper quadrant and in the infra hepatic region from the midline to the right. There are several ill-defined is more confluent densities which can represent vague ill-defined pockets of fluid accumulation some of them appears to be mature and encapsulated which can represent abscess as seen anteriorly in the left meso gastric region, approximately 8 cm to the left of midline. 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   Completed Merrem/Micafungin 5/19    Generalized Weakness:   PT/OT  Increase activity as able    Continue home medications other than as noted above.  Follow labs  DVT prophylaxis with Lovenox  Please see orders for further management and care.   for discharge planning  Discharge plan: back to [facility]   6/19/2024  6:29 AM    I can be reached through [employer].    NOTE:  This report was transcribed using voice recognition software.  Every effort was made to ensure accuracy; however, inadvertent computerized transcription errors may be present.    Addendum: I have personally participated in a face-to-face history and physical exam on the date of service with the patient. I have discussed the case with the nurse practitioner. I also participated in medical decision making on the date of service and I agree with all of the pertinent clinical information unless indicated in my editing of the note. I have reviewed and edited the note above based on my findings during my history, exam, and decision making on the same day of service.     My additional thoughts:   She presented to the hospital with a clogged G/J-tube  She has a very complex medical history and I [facility] well [facility] the skilled nursing facility  The plan is for her G/J-tube to be replaced  For now we have to hold medications because we do not have a route to give them because she failed her swallow evaluation  We will recheck a

## 2024-06-19 NOTE — PROGRESS NOTES
Hepatobiliary and Pancreatic Surgery Progress Note    CC: J-tube malfunction status post open total pancreatectomy     Subjective: Patient states she is feeling better today and was able to get some rest.  Denies abdominal pain.  States she had 1 bout of nausea and vomiting last evening prior to going to sleep, none overnight or this morning.  Denies hematemesis, coffee-ground emesis.  States vomit was return of which she drank to yellow.    OBJECTIVE      Physical    BP (!) 121/58   Pulse 89   Temp 98.3 °F (36.8 °C) (Oral)   Resp 16   Ht 1.626 m (5' 4\")   Wt 76.7 kg (169 lb)   SpO2 95%   BMI 29.01 kg/m²       General appearance: appears in no acute distress  Lungs:respiratory effort normal without accessory numbers  Heart: no pedal edema  Abdomen: Soft, nondistended, nontender, no guarding, no peritoneal signs midline incision healed except for 3 cm portion at the center which has a wound VAC in place with thick purulent yellow-brown appearing material.  PEG intact and clamped upper mid abdomen.  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  -Asked to see patient by her GI and by the patient herself as she had appt today with Dr Rogers  -She appears to be doing a lot better since her discharge from the hospital.  She has been decannulated and is speaking.  She has failed a swallow evaluation however.  She is still dependent on her PEG-J for tube feeding.  -J portion of her PEG appears to have been dislodged and Dr. Hahn plans to replace this.  -Her wound VAC does appear to have more purulent/bilious output than what was

## 2024-06-19 NOTE — PROGRESS NOTES
Physical Therapy  Facility/Department: [address] MED SURG/TELE  Physical Therapy Initial Assessment    Name: Leatha Willard  : 1965  MRN: [id number]  Date of Service: 2024    Attending Provider:  Dale Perez DO    Evaluating PT:  Gabino Rock Jr., P.T.    Room #:  0527/0527-A  Diagnosis:  Jejunostomy tube fell out [T85.528A]  Dislodged jejunostomy tube [T85.528A]  Pertinent PMHx/PSHx:  4/15 Pylorus-preserving pancreaticoduodenectomy, Placement of biliary stent, Portal lymphadenectomy, Round ligament and omental pedicle flap harvest, Appendectomy.  Reopening recent laparotomy, Completion pancreatectomy, Splenectomy, Omentectomy, Intra-operative ultrasound.  EGD, PEG.  trach  Precautions:  Falls, bed/chair alarm, PEG tube, [place] >30° when tube feed is running.      SUBJECTIVE:    Pt lives with her daughter in a mobile home with 1 step to enter.  Pt ambulated with no AD prior to her surgeries in 2024.  Pt came in from [facility] and is a 2 person assist for transfers to and from .  She has been using  for mobility as she has not been able to stand up yet with therapy at [facility] or [facility].     OBJECTIVE:   Initial Evaluation  Date: 24 Treatment Short Term/ Long Term   Goals   Was pt agreeable to Eval/treatment? yes     Does pt have pain? C/o abdominal area pain     Bed Mobility  Rolling: SBA  Supine to sit: MOD A  Sit to supine: MOD A  Scooting: MOD A to EOB  Independent    Transfers Sit to stand: NA  Stand to sit: NA  Stand pivot: NA  SBA   Ambulation   NA  100 feet with ww SBA   Stair negotiation: ascended and descended NA  1 step with 1 rail SBA   AM-PAC 6 Clicks        BLE ROM is WFL.   BLE strength is grossly 3-/5 to 4-/5.   Sensation:  Pt denies numbness and tingling to extremities  Balance: sitting is SBA  Endurance: fair-    Patient education  Pt educated on bed mobility    Patient response to education:   Pt verbalized understanding Pt demonstrated skill Pt requires further

## 2024-06-19 NOTE — PROGRESS NOTES
SPEECH/LANGUAGE PATHOLOGY  VIDEOFLUOROSCOPIC STUDY OF SWALLOWING (MBS)   and PLAN OF CARE    PATIENT NAME:  Leatha Willard  (female)     MRN:  [id number]    :  1965  (58 y.o.)  STATUS:  Inpatient: Room 0527/0527-A    TODAY'S DATE:  2024  REFERRING PROVIDER:   Dr. Perez  SPECIFIC PROVIDER ORDER: FL modified barium swallow with video  Date of order:  24   REASON FOR REFERRAL: Assess oropharyngeal swallow   EVALUATING THERAPIST: Belle Heard      RESULTS:      DYSPHAGIA DIAGNOSIS:  mild  pharyngeal phase dysphagia     Patient with regurgitation from esophagus on x1 instance with puree consistency. Patient reported this also to happen during MBSS completed . If clinically indicated, may benefit from GI consult to rule out esophageal dysmotility.     DIET RECOMMENDATIONS:  Minced and moist consistency solids (IDDSI level 5) with  nectar consistency (mildly thick - IDDSI level 2) liquids    FEEDING RECOMMENDATIONS:    Assistance level:  Stand by/ Hand over Hand assistance is needed during all oral intake     Compensatory strategies recommended: Thorough oral care to prevent colonization of oral bacteria   Upright in bed/ chair as tolerated  Fully alert for all PO  Slow rate of intake   SINGLE cup sips  SMALL bites     Discussed recommendations with:  patient nurse in person    Laryngeal Penetration and Aspiration:  Penetration WITH aspiration was observed in today's study with  thin liquid    SPEECH THERAPY  PLAN OF CARE   The dysphagia POC is established based on physician order and dysphagia diagnosis    Skilled SLP intervention for dysphagia management on acute care up to 5 x per week until goals met, pt plateaus in function and/or discharged from hospital      Conditions Requiring Skilled Therapeutic Intervention for dysphagia:    swallow triggered once bolus head entered the valleculae    SPECIFIC DYSPHAGIA INTERVENTIONS TO INCLUDE:     compensatory swallowing strategies to improve airway

## 2024-06-19 NOTE — CARE COORDINATION
Transition of Care-Met with patient at there bedside, introduced myself and CM role in discharge planning. Patient admitted from [facility], past stay at [facility]. She confirmed plan is to return to facility at discharge. Spoke to liaison, patient will need pre-cert once she has been here >24hr. GI is following-plan to replace J-Tube tomorrow and take down wound vac and assess wound. PT score 12/24-awaiting OT evaluations to submit pre-cert.     Diamond SUMMERSN, RN  [facility]

## 2024-06-19 NOTE — DISCHARGE INSTR - COC
Continuity of Care Form    Patient Name: Leatha Willard   :  1965  MRN:  [id number]    Admit date:  2024  Discharge date:  2024    Code Status Order: Prior   Advance Directives:     Admitting Physician:  Dale Perez DO  PCP: Mekhi Escalona MD    Discharging Nurse: Lauren  Discharging Hospital Unit/Room#: 0527/0527-A  Discharging Unit Phone Number: [phone]    Emergency Contact:   Extended Emergency Contact Information  Primary Emergency Contact: Sudheer alexPaul  Address: [address] [place] [address] [address]           [place] [id number]  Home Phone: [phone]  Mobile Phone: [phone]  Relation: Other  Secondary Emergency Contact: Paul Willard  Address: [address] [place] [address] [address]           [address] [place] [address] [id number] [place] [address]  Home Phone: [phone]  Mobile Phone: [phone]  Relation: Child    Past Surgical History:  Past Surgical History:   Procedure Laterality Date    ANKLE SURGERY   or     LEFT   ankles and screws    BACK SURGERY      Cervical surgery  c2-c7    CARDIAC PROCEDURE N/A 2024    Left heart cath / coronary angiography performed by Shivani Ford [place] [facility]     SECTION      x 3    COLONOSCOPY  2023    Dr. Hammond    FRACTURE SURGERY Left     Rotaor cuff surgery    HYSTERECTOMY (CERVIX STATUS UNKNOWN)      LAPAROTOMY N/A 2024    Exploratory Laparotomy, Abdominal Washout, Completion of Pancreatectomy, Splenectomy, Omentectomy performed by Norma Rogers MD [place] [facility] [place]    PANCREAS SURGERY N/A 4/15/2024    Open Whipple procedure performed by Norma Rogers MD at [facility] [place]    TRACHEAL SURGERY N/A 2024    TRACHEOTOMY PERCUTANEOUS BRONCHOSCOPY performed by James Motley MD at [facility]    UPPER GASTROINTESTINAL ENDOSCOPY N/A 2023    EGD ESOPHAGOGASTRODUODENOSCOPY ULTRASOUND, FNA, performed by Ortiz Hammond DO at [facility]    UPPER GASTROINTESTINAL  Skilled Nursing Facility for less 30 days.     Update Admission H&P: Changes in H&P as follows - please see discharge summary    PHYSICIAN SIGNATURE:  Electronically signed by ALL Anderson CNP on 6/21/24 at 7:39 AM EDT

## 2024-06-19 NOTE — PROGRESS NOTES
Sent message to Dr. Hahn with video swallow results and request to start diet. He deferred diet to Dr. Rogers. Diet ok to start per Dr. Rogers per dietary recommendations.

## 2024-06-19 NOTE — PLAN OF CARE
Problem: ABCDS Injury Assessment  Goal: Absence of physical injury  6/19/2024 1007 by Floridalma Andre RN  Outcome: Progressing  6/19/2024 0216 by Arianne Trotter RN  Outcome: Progressing     Problem: Skin/Tissue Integrity  Goal: Absence of new skin breakdown  Description: 1.  Monitor for areas of redness and/or skin breakdown  2.  Assess vascular access sites hourly  3.  Every 4-6 hours minimum:  Change oxygen saturation probe site  4.  Every 4-6 hours:  If on nasal continuous positive airway pressure, respiratory therapy assess nares and determine need for appliance change or resting period.  6/19/2024 1007 by Floridalma Andre RN  Outcome: Progressing  6/19/2024 0216 by Arianne Trotter RN  Outcome: Progressing     Problem: Discharge Planning  Goal: Discharge to home or other facility with appropriate resources  6/19/2024 1007 by Floridalma Andre RN  Outcome: Progressing  6/19/2024 0216 by Arianne Trotter RN  Outcome: Progressing     Problem: Chronic Conditions and Co-morbidities  Goal: Patient's chronic conditions and co-morbidity symptoms are monitored and maintained or improved  Outcome: Progressing     Problem: Safety - Adult  Goal: Free from fall injury  Outcome: Progressing
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
## 2024-06-19 NOTE — PROGRESS NOTES
Comprehensive Nutrition Assessment    Type and Reason for Visit:  Initial, Positive Nutrition Screen    Nutrition Recommendations/Plan:     Continue NPO, advanced diet as medically feasible per SLP recommendation for minced & moist solids with mildly thick liquids. Will add ONS with nutrition progression.    Please consult dietitian for TF recommendation if needed    Will continue inpatient monitoring     Malnutrition Assessment:  Malnutrition Status:  At risk for malnutrition (Comment) (06/19/24 1530)    Context:  Acute Illness     Findings of the 6 clinical characteristics of malnutrition:  Energy Intake:  Mild decrease in energy intake (Comment)  Weight Loss:  No significant weight loss (from wt X7 months, recent hx large fluctuations/fluid shifts)     Body Fat Loss:  No significant body fat loss     Muscle Mass Loss:  No significant muscle mass loss    Fluid Accumulation:  No significant fluid accumulation     Strength:  Not Performed    Nutrition Assessment:    Pt admits w/ clogged J portion of PEG/J w/ plan for replacement. Recent hx complicated medical course r/t pancreatic serous cystadenoma s/p pylorus preserving whipple w/ cholecystectomy/lymphadenectomy/appendectomy 4/15, pancreatectomy/splenectomy 4/18, post-pancreatectomy DM, trach (decanulated 6/5). Pt reporting she has been having emesis everyday and not sure she is tolerating TF formula (Vital AF), she is noted to have milk-related compounds allergy. SLP recommendation for minced & moist solids with mildly thick liquids s/p MBSS 6/19. Will monitor for nutrition progression & add ONS w/ diet.    Nutrition Related Findings:    A&O X4, I&Os not avail, BLE NP edema, abd WDL, K+ 2.8, A1C 6.6, PEG/J, +IVF, Reglan   Wound Type: Surgical Incision, Wound Vac, Wound Consult Pending       Current Nutrition Intake & Therapies:    Average Meal Intake: NPO  Average Supplements Intake: NPO  No diet orders on file    Anthropometric Measures:  Height: 162.6 cm  (5' 4\")  Ideal Body Weight (IBW): 120 lbs (55 kg)    Admission Body Weight: 73.4 kg (161 lb 14.4 oz) (6/19 bed per RD measurement)  Current Body Weight: 73.4 kg (161 lb 14.4 oz) (6/19), 134.9 % IBW. Weight Source: Bed Scale  Current BMI (kg/m2): 27.8  Usual Body Weight: 77.6 kg (171 lb) (11/17/23 actual per EMR)  % Weight Change (Calculated): -5.3                    BMI Categories: Overweight (BMI 25.0-29.9)    Estimated Daily Nutrient Needs:  Energy Requirements Based On: Formula  Weight Used for Energy Requirements: Current  Energy (kcal/day):   Weight Used for Protein Requirements: Ideal  Protein (g/day): 70-80  Method Used for Fluid Requirements: 1 ml/kcal  Fluid (ml/day):     Nutrition Diagnosis:   Inadequate oral intake related to swallowing difficulty as evidenced by NPO or clear liquid status due to medical condition    Nutrition Interventions:   Food and/or Nutrient Delivery: Continue NPO  Nutrition Education/Counseling: No recommendation at this time  Coordination of Nutrition Care: Continue to monitor while inpatient       Goals:     Goals: other (specify), by next RD assessment  Specify Other Goals: nutrition progression    Nutrition Monitoring and Evaluation:      Food/Nutrient Intake Outcomes: Diet Advancement/Tolerance  Physical Signs/Symptoms Outcomes: Biochemical Data, GI Status, Fluid Status or Edema, Nutrition Focused Physical Findings, Skin, Weight    Discharge Planning:    Too soon to determine     Miranda D'Amico, RD, LD  Contact: [phone]

## 2024-06-19 NOTE — PROGRESS NOTES
OCCUPATIONAL THERAPY INITIAL EVALUATION    [facility]   [address] [facility]        Date:2024                                                  Patient Name: Leatha Willard    MRN: [id number]    : 1965    Room: [address] [place] [address]    Evaluating OT: Xochitl Fox OTR/L #[id number]    Referring Provider:  Dale Perez DO     Specific Provider Orders/Date:  OT Eval and Treat , 2024     Diagnosis:   1. Jejunostomy tube fell out         Surgery: None       Pertinent Medical History: DM, HTN, open whipple procedure 4/15/24, exploratory laparotomy/completion of pancreatectomy and splenectomy 24, PEG 24, tracheostomy 24 (decannulated 6/3/24)     Precautions:  Fall Risk, alarm, O2, PEG/J-tube      Assessment of current deficits    [x] Functional mobility  [x]ADLs  [x] Strength               []Cognition    [x] Functional transfers   [x] IADLs         [x] Safety Awareness   [x]Endurance    [] Fine Coordination              [x] Balance      [] Vision/perception   []Sensation     []Gross Motor Coordination  [] ROM  [] Delirium                   [] Motor Control     OT PLAN OF CARE   OT POC based on physician orders, patient diagnosis and results of clinical assessment    Frequency/Duration 2-5 days/wk for 2-4 weeks PRN     Specific OT Treatment Interventions to include:   * Instruction/training on adapted ADL techniques and AE recommendations to increase functional independence within precautions       * Training on energy conservation strategies, correct breathing pattern and techniques to improve independence/tolerance for self-care routine  * Functional transfer/mobility training/DME recommendations for increased independence, safety, and fall prevention  * Patient/Family education to increase follow through with safety techniques and functional independence  * Recommendation of environmental modifications for increased safety with

## 2024-06-19 NOTE — PROGRESS NOTES
4 Eyes Skin Assessment     NAME:  Leatha Willard  YOB: 1965  MEDICAL RECORD NUMBER:  [id number]    The patient is being assessed for  Admission    I agree that at least one RN has performed a thorough Head to Toe Skin Assessment on the patient. ALL assessment sites listed below have been assessed.      Areas assessed by both nurses:    Head, Face, Ears, Shoulders, Back, Chest, Arms, Elbows, Hands, Sacrum. Buttock, Coccyx, Ischium, Legs. Feet and Heels, and Under Medical Devices         Does the Patient have a Wound? Yes wound(s) were present on assessment. LDA wound assessment was Initiated and completed by RN       Donal Prevention initiated by RN: No  Wound Care Orders initiated by RN: Yes    Pressure Injury (Stage 3,4, Unstageable, DTI, NWPT, and Complex wounds) if present, place Wound referral order by RN under : No    New Ostomies, if present place, Ostomy referral order under : No     Nurse 1 eSignature: Electronically signed by Arianne Trotter RN on 6/19/24 at 1:47 AM EDT    **SHARE this note so that the co-signing nurse can place an eSignature**    Nurse 2 eSignature: Electronically signed by Juliet Rivas RN on 6/19/24 at 3:54 AM EDT

## 2024-06-19 NOTE — ACP (ADVANCE CARE PLANNING)
Advance Care Planning   Healthcare Decision Maker:    Primary Decision Maker: Paul Willard - Child - [phone]    Click here to complete Healthcare Decision Makers including selection of the Healthcare Decision Maker Relationship (ie \"Primary\").

## 2024-06-19 NOTE — PROGRESS NOTES
Speech Language Pathology      NAME:  Leatha Willard  :  1965  DATE: 2024  ROOM:  [address] [place], [place] [address]A    Order received. Chart reviewed.    Per chart review, patient currently NPO with J-tube. Patient completed most recent MBSS at outside facility on 24 where she was continued to be recommended for NPO. Order received this date for bedside swallow evaluation. Due to known dysphagia, patient not appropriate for CSE. SLP discussed with charge RN above. Charge RN to contact physician to determine if repeat MBSS is appropriate.     Jejunostomy tube fell out [T85.528A]  Dislodged jejunostomy tube [T85.528A]    Julianne Salmeron M.S. CCC-SLP/L  Speech Language Pathologist  [phone]

## 2024-06-20 ENCOUNTER — ANESTHESIA (OUTPATIENT)
Dept: ENDOSCOPY | Age: 59
End: 2024-06-20
Payer: COMMERCIAL

## 2024-06-20 ENCOUNTER — ANESTHESIA EVENT (OUTPATIENT)
Dept: ENDOSCOPY | Age: 59
End: 2024-06-20
Payer: COMMERCIAL

## 2024-06-20 PROBLEM — Z43.1 PEG (PERCUTANEOUS ENDOSCOPIC GASTROSTOMY) ADJUSTMENT/REPLACEMENT/REMOVAL (HCC): Status: ACTIVE | Noted: 2024-06-18

## 2024-06-20 LAB
ALBUMIN SERPL-MCNC: 3.1 G/DL (ref 3.5–5.2)
ALP SERPL-CCNC: 109 U/L (ref 35–104)
ALT SERPL-CCNC: 15 U/L (ref 0–32)
ANION GAP SERPL CALCULATED.3IONS-SCNC: 7 MMOL/L (ref 7–16)
AST SERPL-CCNC: 15 U/L (ref 0–31)
BASOPHILS # BLD: 0.12 K/UL (ref 0–0.2)
BASOPHILS NFR BLD: 1 % (ref 0–2)
BILIRUB SERPL-MCNC: 0.2 MG/DL (ref 0–1.2)
BUN SERPL-MCNC: 10 MG/DL (ref 6–20)
CALCIUM SERPL-MCNC: 8.7 MG/DL (ref 8.6–10.2)
CHLORIDE SERPL-SCNC: 97 MMOL/L (ref 98–107)
CO2 SERPL-SCNC: 30 MMOL/L (ref 22–29)
CREAT SERPL-MCNC: 0.4 MG/DL (ref 0.5–1)
EOSINOPHIL # BLD: 0.86 K/UL (ref 0.05–0.5)
EOSINOPHILS RELATIVE PERCENT: 6 % (ref 0–6)
ERYTHROCYTE [DISTWIDTH] IN BLOOD BY AUTOMATED COUNT: 14.6 % (ref 11.5–15)
GFR, ESTIMATED: >90 ML/MIN/1.73M2
GLUCOSE BLD-MCNC: 147 MG/DL (ref 74–99)
GLUCOSE BLD-MCNC: 202 MG/DL (ref 74–99)
GLUCOSE BLD-MCNC: 246 MG/DL (ref 74–99)
GLUCOSE BLD-MCNC: 248 MG/DL (ref 74–99)
GLUCOSE SERPL-MCNC: 204 MG/DL (ref 74–99)
HCT VFR BLD AUTO: 29.2 % (ref 34–48)
HGB BLD-MCNC: 8.9 G/DL (ref 11.5–15.5)
IMM GRANULOCYTES # BLD AUTO: 0.06 K/UL (ref 0–0.58)
IMM GRANULOCYTES NFR BLD: 0 % (ref 0–5)
LYMPHOCYTES NFR BLD: 3.86 K/UL (ref 1.5–4)
LYMPHOCYTES RELATIVE PERCENT: 27 % (ref 20–42)
MCH RBC QN AUTO: 27.1 PG (ref 26–35)
MCHC RBC AUTO-ENTMCNC: 30.5 G/DL (ref 32–34.5)
MCV RBC AUTO: 88.8 FL (ref 80–99.9)
MONOCYTES NFR BLD: 0.81 K/UL (ref 0.1–0.95)
MONOCYTES NFR BLD: 6 % (ref 2–12)
NEUTROPHILS NFR BLD: 61 % (ref 43–80)
NEUTS SEG NFR BLD: 8.76 K/UL (ref 1.8–7.3)
PLATELET # BLD AUTO: 771 K/UL (ref 130–450)
PMV BLD AUTO: 10.2 FL (ref 7–12)
POTASSIUM SERPL-SCNC: 3.6 MMOL/L (ref 3.5–5)
PROT SERPL-MCNC: 6.6 G/DL (ref 6.4–8.3)
RBC # BLD AUTO: 3.29 M/UL (ref 3.5–5.5)
SODIUM SERPL-SCNC: 134 MMOL/L (ref 132–146)
WBC OTHER # BLD: 14.5 K/UL (ref 4.5–11.5)

## 2024-06-20 PROCEDURE — 7100000010 HC PHASE II RECOVERY - FIRST 15 MIN: Performed by: STUDENT IN AN ORGANIZED HEALTH CARE EDUCATION/TRAINING PROGRAM

## 2024-06-20 PROCEDURE — 7100000011 HC PHASE II RECOVERY - ADDTL 15 MIN: Performed by: STUDENT IN AN ORGANIZED HEALTH CARE EDUCATION/TRAINING PROGRAM

## 2024-06-20 PROCEDURE — 90471 IMMUNIZATION ADMIN: CPT | Performed by: SPECIALIST

## 2024-06-20 PROCEDURE — C9113 INJ PANTOPRAZOLE SODIUM, VIA: HCPCS | Performed by: INTERNAL MEDICINE

## 2024-06-20 PROCEDURE — 6360000002 HC RX W HCPCS: Performed by: NURSE ANESTHETIST, CERTIFIED REGISTERED

## 2024-06-20 PROCEDURE — G0378 HOSPITAL OBSERVATION PER HR: HCPCS

## 2024-06-20 PROCEDURE — 90734 MENACWYD/MENACWYCRM VACC IM: CPT | Performed by: SPECIALIST

## 2024-06-20 PROCEDURE — 6370000000 HC RX 637 (ALT 250 FOR IP): Performed by: HOSPITALIST

## 2024-06-20 PROCEDURE — 3700000000 HC ANESTHESIA ATTENDED CARE: Performed by: STUDENT IN AN ORGANIZED HEALTH CARE EDUCATION/TRAINING PROGRAM

## 2024-06-20 PROCEDURE — 6360000002 HC RX W HCPCS: Performed by: HOSPITALIST

## 2024-06-20 PROCEDURE — 2500000003 HC RX 250 WO HCPCS: Performed by: NURSE ANESTHETIST, CERTIFIED REGISTERED

## 2024-06-20 PROCEDURE — 85025 COMPLETE CBC W/AUTO DIFF WBC: CPT

## 2024-06-20 PROCEDURE — 2709999900 HC NON-CHARGEABLE SUPPLY: Performed by: STUDENT IN AN ORGANIZED HEALTH CARE EDUCATION/TRAINING PROGRAM

## 2024-06-20 PROCEDURE — 80053 COMPREHEN METABOLIC PANEL: CPT

## 2024-06-20 PROCEDURE — 6360000002 HC RX W HCPCS: Performed by: INTERNAL MEDICINE

## 2024-06-20 PROCEDURE — 43246 EGD PLACE GASTROSTOMY TUBE: CPT | Performed by: STUDENT IN AN ORGANIZED HEALTH CARE EDUCATION/TRAINING PROGRAM

## 2024-06-20 PROCEDURE — 2580000003 HC RX 258: Performed by: NURSE ANESTHETIST, CERTIFIED REGISTERED

## 2024-06-20 PROCEDURE — 82962 GLUCOSE BLOOD TEST: CPT

## 2024-06-20 PROCEDURE — 6370000000 HC RX 637 (ALT 250 FOR IP): Performed by: INTERNAL MEDICINE

## 2024-06-20 PROCEDURE — 2700000000 HC OXYGEN THERAPY PER DAY

## 2024-06-20 PROCEDURE — 6360000002 HC RX W HCPCS: Performed by: SPECIALIST

## 2024-06-20 PROCEDURE — 99232 SBSQ HOSP IP/OBS MODERATE 35: CPT | Performed by: CLINICAL NURSE SPECIALIST

## 2024-06-20 PROCEDURE — 94640 AIRWAY INHALATION TREATMENT: CPT

## 2024-06-20 PROCEDURE — 2720000010 HC SURG SUPPLY STERILE: Performed by: STUDENT IN AN ORGANIZED HEALTH CARE EDUCATION/TRAINING PROGRAM

## 2024-06-20 PROCEDURE — 3700000001 HC ADD 15 MINUTES (ANESTHESIA): Performed by: STUDENT IN AN ORGANIZED HEALTH CARE EDUCATION/TRAINING PROGRAM

## 2024-06-20 PROCEDURE — 3609013300 HC EGD TUBE PLACEMENT: Performed by: STUDENT IN AN ORGANIZED HEALTH CARE EDUCATION/TRAINING PROGRAM

## 2024-06-20 PROCEDURE — 96366 THER/PROPH/DIAG IV INF ADDON: CPT

## 2024-06-20 DEVICE — MIC* GASTRIC-JEJUNAL FEEDING TUBE KIT WITH ENFIT® CONNECTOR - ENDOSCOPIC/RADIOLOGIC PLACEMENT
Type: IMPLANTABLE DEVICE | Status: FUNCTIONAL
Brand: AVANOS

## 2024-06-20 RX ORDER — GLYCOPYRROLATE 0.2 MG/ML
INJECTION INTRAMUSCULAR; INTRAVENOUS PRN
Status: DISCONTINUED | OUTPATIENT
Start: 2024-06-20 | End: 2024-06-20 | Stop reason: SDUPTHER

## 2024-06-20 RX ORDER — LIDOCAINE HYDROCHLORIDE 20 MG/ML
INJECTION, SOLUTION EPIDURAL; INFILTRATION; INTRACAUDAL; PERINEURAL PRN
Status: DISCONTINUED | OUTPATIENT
Start: 2024-06-20 | End: 2024-06-20 | Stop reason: SDUPTHER

## 2024-06-20 RX ORDER — SODIUM CHLORIDE 9 MG/ML
INJECTION, SOLUTION INTRAVENOUS CONTINUOUS PRN
Status: DISCONTINUED | OUTPATIENT
Start: 2024-06-20 | End: 2024-06-20 | Stop reason: SDUPTHER

## 2024-06-20 RX ORDER — PROPOFOL 10 MG/ML
INJECTION, EMULSION INTRAVENOUS PRN
Status: DISCONTINUED | OUTPATIENT
Start: 2024-06-20 | End: 2024-06-20 | Stop reason: SDUPTHER

## 2024-06-20 RX ADMIN — PANTOPRAZOLE SODIUM 40 MG: 40 INJECTION, POWDER, FOR SOLUTION INTRAVENOUS at 16:54

## 2024-06-20 RX ADMIN — CARVEDILOL 25 MG: 25 TABLET, FILM COATED ORAL at 16:54

## 2024-06-20 RX ADMIN — IPRATROPIUM BROMIDE AND ALBUTEROL SULFATE 1 DOSE: 2.5; .5 SOLUTION RESPIRATORY (INHALATION) at 21:10

## 2024-06-20 RX ADMIN — METOCLOPRAMIDE 10 MG: 5 INJECTION, SOLUTION INTRAMUSCULAR; INTRAVENOUS at 20:56

## 2024-06-20 RX ADMIN — INSULIN GLARGINE 15 UNITS: 100 INJECTION, SOLUTION SUBCUTANEOUS at 20:56

## 2024-06-20 RX ADMIN — Medication 0.5 ML: at 17:22

## 2024-06-20 RX ADMIN — GLYCOPYRROLATE 0.1 MG: 0.2 INJECTION INTRAMUSCULAR; INTRAVENOUS at 14:41

## 2024-06-20 RX ADMIN — INSULIN LISPRO 1 UNITS: 100 INJECTION, SOLUTION INTRAVENOUS; SUBCUTANEOUS at 06:30

## 2024-06-20 RX ADMIN — METOCLOPRAMIDE 10 MG: 5 INJECTION, SOLUTION INTRAMUSCULAR; INTRAVENOUS at 16:54

## 2024-06-20 RX ADMIN — PROPOFOL 310 MG: 10 INJECTION, EMULSION INTRAVENOUS at 14:41

## 2024-06-20 RX ADMIN — INSULIN LISPRO 1 UNITS: 100 INJECTION, SOLUTION INTRAVENOUS; SUBCUTANEOUS at 00:45

## 2024-06-20 RX ADMIN — SODIUM CHLORIDE: 9 INJECTION, SOLUTION INTRAVENOUS at 14:38

## 2024-06-20 RX ADMIN — OCTREOTIDE ACETATE 50 MCG: 50 INJECTION, SOLUTION INTRAVENOUS; SUBCUTANEOUS at 20:56

## 2024-06-20 RX ADMIN — LIDOCAINE HYDROCHLORIDE 80 MG: 20 INJECTION, SOLUTION EPIDURAL; INFILTRATION; INTRACAUDAL; PERINEURAL at 14:41

## 2024-06-20 RX ADMIN — METOCLOPRAMIDE 10 MG: 5 INJECTION, SOLUTION INTRAMUSCULAR; INTRAVENOUS at 04:20

## 2024-06-20 RX ADMIN — ASPIRIN 81 MG CHEWABLE TABLET 81 MG: 81 TABLET CHEWABLE at 16:54

## 2024-06-20 ASSESSMENT — ENCOUNTER SYMPTOMS: DYSPNEA ACTIVITY LEVEL: NO INTERVAL CHANGE

## 2024-06-20 ASSESSMENT — PAIN - FUNCTIONAL ASSESSMENT: PAIN_FUNCTIONAL_ASSESSMENT: 0-10

## 2024-06-20 ASSESSMENT — LIFESTYLE VARIABLES: SMOKING_STATUS: 0

## 2024-06-20 NOTE — PROGRESS NOTES
When I attempted to see the patient, she was off the floor for G/J-tube replacement.  I will attempt to see her later today.    Electronically signed by Dale Perez DO on 6/20/2024 at 9:50 AM

## 2024-06-20 NOTE — PLAN OF CARE
Problem: ABCDS Injury Assessment  Goal: Absence of physical injury  Outcome: Progressing     Problem: Skin/Tissue Integrity  Goal: Absence of new skin breakdown  Description: 1.  Monitor for areas of redness and/or skin breakdown  2.  Assess vascular access sites hourly  3.  Every 4-6 hours minimum:  Change oxygen saturation probe site  4.  Every 4-6 hours:  If on nasal continuous positive airway pressure, respiratory therapy assess nares and determine need for appliance change or resting period.  Outcome: Progressing     Problem: Discharge Planning  Goal: Discharge to home or other facility with appropriate resources  6/20/2024 0957 by Floridalma Andre RN  Outcome: Progressing  6/20/2024 0222 by Arianne Trotter RN  Outcome: Progressing     Problem: Chronic Conditions and Co-morbidities  Goal: Patient's chronic conditions and co-morbidity symptoms are monitored and maintained or improved  Outcome: Progressing     Problem: Safety - Adult  Goal: Free from fall injury  6/20/2024 0957 by Floridalma Andre, RN  Outcome: Progressing  6/20/2024 0222 by Arianne Trotter RN  Outcome: Progressing     Problem: Nutrition Deficit:  Goal: Optimize nutritional status  6/20/2024 0957 by Floridalma Andre RN  Outcome: Progressing  6/20/2024 0222 by Arianne Trotter RN  Outcome: Progressing

## 2024-06-20 NOTE — BRIEF OP NOTE
Brief Postoperative Note      Patient: Leatha Willard  YOB: 1965  MRN: [id number]    Date of Procedure: 6/20/2024    Pre-Op Diagnosis Codes:     * PEG (percutaneous endoscopic gastrostomy) adjustment/replacement/removal (HCC) [Z43.1]    Post-Op Diagnosis: Same.        Procedure(s):  ESOPHAGOGASTRODUODENOSCOPY PERCUTANEOUS ENDOSCOPIC GASTROSTOMY TUBE PLACEMENT    Surgeon(s):  Ayan Hahn MD    Assistant:  * No surgical staff found *    Anesthesia: Monitor Anesthesia Care    Estimated Blood Loss (mL): less than 50     Complications: None    Specimens:   * No specimens in log *    Implants:  Implant Name Type Inv. Item Serial No.  Lot No. LRB No. Used Action   TUBE ENTRL FEED GASTRO-JEJUNAL 18 FRX30 CM 7-10 CC AKI-KEY - [id number]  TUBE ENTRL FEED GASTRO-JEJUNAL 18 FRX30 CM 7-10 CC AKI-KEY  \[facility] [place] [facility]\""S MEDICAL-  N/A 1 Implanted         Drains:   Negative Pressure Wound Therapy Abdomen (Active)   Unit Type Home unit 06/20/24 0815   Dressing Type Black Foam 06/20/24 0815       Gastrostomy/Enterostomy/Jejunostomy Tube PEG-Jejunostomy LUQ 22 fr (Active)   $ Gastrostomy insertion $ Yes 06/20/24 0900   External Catheter Length (cm) 4 cm 06/20/24 0900   Site Description Clean, dry & intact 06/20/24 0900       [REMOVED] Gastrostomy/Enterostomy/Jejunostomy Tube PEG-Jejunostomy LUQ 24 fr (Removed)   J Port Status Other (comment) 06/20/24 0815       [REMOVED] External Urinary Catheter (Removed)       [REMOVED] Fistula (Removed)       Findings:    Successful PEG-J exchange.     Recommendations:  Okay to begin tube feeds.     Electronically signed by Ayan Hahn MD on 6/20/2024 at 3:28 PM

## 2024-06-20 NOTE — CARE COORDINATION
Transition of Care-plan to replace j-tube today. Patient will return to [facility] at discharge. Pre-cert was submitted Wednesday 6/19, facility might be able to accept pending, will need to check with liaison at discharge if auth is still pending.  ANALILIA, and rachael form completed in soft chart.    Diamond SUMMERSN, RN  [facility]

## 2024-06-20 NOTE — ANESTHESIA POSTPROCEDURE EVALUATION
Department of Anesthesiology  Postprocedure Note    Patient: Leatha Willard  MRN: [id number]  YOB: 1965  Date of evaluation: 6/20/2024    Procedure Summary       Date: 06/20/24 Room / Location: [place] [address] / [facility]    Anesthesia Start: 1438 Anesthesia Stop: 1522    Procedure: ESOPHAGOGASTRODUODENOSCOPY PERCUTANEOUS ENDOSCOPIC GASTROSTOMY TUBE PLACEMENT Diagnosis:       PEG (percutaneous endoscopic gastrostomy) adjustment/replacement/removal (HCC)      (PEG (percutaneous endoscopic gastrostomy) adjustment/replacement/removal (HCC) [Z43.1])    Surgeons: Ayan Hahn MD Responsible Provider: Daryl Orellana DO    Anesthesia Type: MAC ASA Status: 4            Anesthesia Type: MAC    Jewel Phase I:      Jewel Phase II:      Anesthesia Post Evaluation    Level of consciousness: awake  Pain score: 1  Airway patency: patent  Nausea & Vomiting: no vomiting and no nausea  Cardiovascular status: hemodynamically stable  Respiratory status: acceptable  Hydration status: stable  Pain management: adequate    No notable events documented.

## 2024-06-20 NOTE — PROGRESS NOTES
Hepatobiliary and Pancreatic Surgery Progress Note    CC: J-tube malfunction status post open total pancreatectomy     Subjective: Patient states she is feeling much better.  Denies abdominal pain.  States denies nausea or vomiting.  States she had a BM yesterday.    OBJECTIVE      Physical    BP (!) 122/58   Pulse 78   Temp 97.9 °F (36.6 °C) (Oral)   Resp 18   Ht 1.626 m (5' 4\")   Wt 73.4 kg (161 lb 14.4 oz)   SpO2 97%   BMI 27.79 kg/m²       General appearance: appears in no acute distress, laying in bed  Lungs:respiratory effort normal without accessory numbers  Heart: no pedal edema  Abdomen: Soft, nondistended, nontender, no guarding, no peritoneal signs midline incision healed except for 3 cm portion at the center which has a wound VAC in place with thick purulent yellow-brown appearing material.  PEG intact and clamped upper mid abdomen.  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  -Asked to see patient by her GI and by the patient herself as she had appt today with Dr Rogers  -She appears to be doing a lot better since her discharge from the hospital.  She has been decannulated and is speaking.  She has failed a swallow evaluation however.  She is still dependent on her PEG-J for tube feeding.  -J portion of her PEG appears to have been dislodged and Dr. Hahn plans to replace this. PEGJ replacement pending timing.   -Her wound VAC does appear to have more purulent/bilious output than what was seen in the hospital.  Recent CT shows fistula to just below the left lobe of the liver.  Will take down vac prior to d/c

## 2024-06-20 NOTE — PROGRESS NOTES
Internal Medicine Progress Note    Patient's name: Leatha Willard  : 1965  Chief complaints (on day of admission): J-tube disconnected. (Sent in from NH d/t J-tube found disconnected. Patient has been here past 2 days for issues with J-Tube.)  Admission date: 2024  Date of service: 2024   Room: [facility]  Primary care physician: Mekhi Escalona MD  Reason for visit: Follow-up for PEG tube malfunction    Subjective  Leatha was seen and examined.  He was lying in bed in the preop area getting ready to get her PEG tube/J-tube.  She had no complaints and she is feeling well.  She passed her modified barium swallow and she is able to have an oral diet as of yesterday.    Review of Systems  There are no new complaints of chest pain, shortness of breath, abdominal pain, nausea, vomiting, diarrhea, constipation.    Hospital Medications  Current Facility-Administered Medications   Medication Dose Route Frequency Provider Last Rate Last Admin    aspirin chewable tablet 81 mg  81 mg PEG Tube Daily Dale Perez,         carvedilol (COREG) tablet 25 mg  25 mg PEG Tube BID WC Dale Perez, DO   25 mg at 24 1639    chlorhexidine (PERIDEX) 0.12 % solution 15 mL  15 mL Mouth/Throat BID Dale Perez, DO   15 mL at 24 0859    hydrOXYzine HCl (ATARAX) tablet 25 mg  25 mg PEG Tube Q6H PRN Dale Perez,         levalbuterol (XOPENEX) nebulization 0.63 mg  1 ampule Nebulization Q6H PRN Dale Perez,         melatonin tablet 3 mg  3 mg PEG Tube Nightly Dale Perez,         metoclopramide (REGLAN) tablet 5 mg  5 mg PEG Tube Q8H PRN Dale Perez,         pantoprazole (PROTONIX) injection 40 mg  40 mg IntraVENous Daily Dale Perez, DO   40 mg at 24 0859    polyethylene glycol (GLYCOLAX) packet 17 g  17 g Per G Tube Daily Dale Perez,         oxyCODONE (ROXICODONE) immediate release tablet 5 mg  5 mg PEG Tube Q6H PRN Dale Perez,         glucose chewable

## 2024-06-20 NOTE — PROGRESS NOTES
Occupational Therapy  Patient treatment attempted this AM.  Patient off unit for a procedure, will continue OT POC as able and appropriate.    Svetlana RUTHERFORD/ALYSSIA [id number]

## 2024-06-20 NOTE — PROGRESS NOTES
Physical Therapy    Pt NA for Rx, off the floor at a procedure. Will follow on another date/time.  Sandor Valdivia PTA [id number]

## 2024-06-20 NOTE — PROGRESS NOTES
Patient off the floor all day. Per staff negative pressure from admission in place  Surgery following  Negra Miguel, CNS, Wound Care

## 2024-06-20 NOTE — PROGRESS NOTES
Speech Language Pathology  NAME:  Leatha Willard  :  1965  DATE: 2024  ROOM:  ENDO POOL ROOM/NONE    Pt unavailable at 1500 for Dysphagia therapy     REASON:  Off unit for testing/ procedure ; endo    Will re-attempt as appropriate.       Thank You    Julianne Salmeron M.S. CCC-SLP/L  Speech Language Pathologist  [phone]

## 2024-06-20 NOTE — ANESTHESIA PRE PROCEDURE
Temp:  98.8 °F (37.1 °C) 97.9 °F (36.6 °C) 97.2 °F (36.2 °C)   TempSrc:  Oral Oral Temporal   SpO2:  99% 97% 97%   Weight:       Height:                                                  BP Readings from Last 3 Encounters:   06/20/24 136/69   06/17/24 132/71   06/16/24 (!) 145/73       NPO Status: Time of last liquid consumption: 1700                        Time of last solid consumption: 1700                        Date of last liquid consumption: 06/19/24                        Date of last solid food consumption: 06/19/24    BMI:   Wt Readings from Last 3 Encounters:   06/19/24 73.4 kg (161 lb 14.4 oz)   06/17/24 76.2 kg (168 lb)   06/16/24 76.2 kg (168 lb)     Body mass index is 27.79 kg/m².    CBC:   Lab Results   Component Value Date/Time    WBC 14.5 06/20/2024 03:47 AM    RBC 3.29 06/20/2024 03:47 AM    HGB 8.9 06/20/2024 03:47 AM    HCT 29.2 06/20/2024 03:47 AM    MCV 88.8 06/20/2024 03:47 AM    RDW 14.6 06/20/2024 03:47 AM     06/20/2024 03:47 AM       CMP:   Lab Results   Component Value Date/Time     06/20/2024 03:47 AM    K 3.6 06/20/2024 03:47 AM    K 3.9 08/28/2019 05:58 AM    CL 97 06/20/2024 03:47 AM    CO2 30 06/20/2024 03:47 AM    BUN 10 06/20/2024 03:47 AM    CREATININE 0.4 06/20/2024 03:47 AM    GFRAA >60 07/25/2022 07:16 AM    LABGLOM >90 06/20/2024 03:47 AM    LABGLOM >90 04/30/2024 05:21 AM    GLUCOSE 204 06/20/2024 03:47 AM    CALCIUM 8.7 06/20/2024 03:47 AM    BILITOT 0.2 06/20/2024 03:47 AM    ALKPHOS 109 06/20/2024 03:47 AM    AST 15 06/20/2024 03:47 AM    ALT 15 06/20/2024 03:47 AM       POC Tests:   Recent Labs     06/20/24  0629   POCGLU 202*         Coags:   Lab Results   Component Value Date/Time    PROTIME 12.9 05/05/2024 04:05 AM    INR 1.2 05/05/2024 04:05 AM       HCG (If Applicable): No results found for: \"PREGTESTUR\", \"PREGSERUM\", \"HCG\", \"HCGQUANT\"     ABGs: No results found for: \"PHART\", \"PO2ART\", \"FZE9ARS\", \"PBV9FPM\", \"BEART\", \"L8QIHFJR\"     Type & Screen (If

## 2024-06-21 VITALS
TEMPERATURE: 98.1 F | SYSTOLIC BLOOD PRESSURE: 135 MMHG | DIASTOLIC BLOOD PRESSURE: 53 MMHG | BODY MASS INDEX: 27.64 KG/M2 | WEIGHT: 161.9 LBS | HEIGHT: 64 IN | OXYGEN SATURATION: 98 % | HEART RATE: 83 BPM | RESPIRATION RATE: 18 BRPM

## 2024-06-21 LAB
ALBUMIN SERPL-MCNC: 3.3 G/DL (ref 3.5–5.2)
ALP SERPL-CCNC: 126 U/L (ref 35–104)
ALT SERPL-CCNC: 17 U/L (ref 0–32)
ANION GAP SERPL CALCULATED.3IONS-SCNC: 8 MMOL/L (ref 7–16)
AST SERPL-CCNC: 16 U/L (ref 0–31)
BASOPHILS # BLD: 0.09 K/UL (ref 0–0.2)
BASOPHILS NFR BLD: 1 % (ref 0–2)
BILIRUB SERPL-MCNC: 0.2 MG/DL (ref 0–1.2)
BUN SERPL-MCNC: 6 MG/DL (ref 6–20)
CALCIUM SERPL-MCNC: 8.7 MG/DL (ref 8.6–10.2)
CHLORIDE SERPL-SCNC: 101 MMOL/L (ref 98–107)
CO2 SERPL-SCNC: 28 MMOL/L (ref 22–29)
CREAT SERPL-MCNC: 0.4 MG/DL (ref 0.5–1)
EOSINOPHIL # BLD: 0.87 K/UL (ref 0.05–0.5)
EOSINOPHILS RELATIVE PERCENT: 5 % (ref 0–6)
ERYTHROCYTE [DISTWIDTH] IN BLOOD BY AUTOMATED COUNT: 14.6 % (ref 11.5–15)
GFR, ESTIMATED: >90 ML/MIN/1.73M2
GLUCOSE BLD-MCNC: 164 MG/DL (ref 74–99)
GLUCOSE BLD-MCNC: 188 MG/DL (ref 74–99)
GLUCOSE BLD-MCNC: 252 MG/DL (ref 74–99)
GLUCOSE SERPL-MCNC: 169 MG/DL (ref 74–99)
HCT VFR BLD AUTO: 29.8 % (ref 34–48)
HGB BLD-MCNC: 9.3 G/DL (ref 11.5–15.5)
IMM GRANULOCYTES # BLD AUTO: 0.05 K/UL (ref 0–0.58)
IMM GRANULOCYTES NFR BLD: 0 % (ref 0–5)
LYMPHOCYTES NFR BLD: 2.94 K/UL (ref 1.5–4)
LYMPHOCYTES RELATIVE PERCENT: 17 % (ref 20–42)
MAGNESIUM SERPL-MCNC: 1.5 MG/DL (ref 1.6–2.6)
MCH RBC QN AUTO: 26.6 PG (ref 26–35)
MCHC RBC AUTO-ENTMCNC: 31.2 G/DL (ref 32–34.5)
MCV RBC AUTO: 85.4 FL (ref 80–99.9)
MONOCYTES NFR BLD: 1.01 K/UL (ref 0.1–0.95)
MONOCYTES NFR BLD: 6 % (ref 2–12)
NEUTROPHILS NFR BLD: 71 % (ref 43–80)
NEUTS SEG NFR BLD: 12.16 K/UL (ref 1.8–7.3)
PLATELET # BLD AUTO: 750 K/UL (ref 130–450)
PMV BLD AUTO: 9.9 FL (ref 7–12)
POTASSIUM SERPL-SCNC: 3.4 MMOL/L (ref 3.5–5)
PROT SERPL-MCNC: 6.8 G/DL (ref 6.4–8.3)
RBC # BLD AUTO: 3.49 M/UL (ref 3.5–5.5)
SODIUM SERPL-SCNC: 137 MMOL/L (ref 132–146)
WBC OTHER # BLD: 17.1 K/UL (ref 4.5–11.5)

## 2024-06-21 PROCEDURE — 6370000000 HC RX 637 (ALT 250 FOR IP): Performed by: INTERNAL MEDICINE

## 2024-06-21 PROCEDURE — 96366 THER/PROPH/DIAG IV INF ADDON: CPT

## 2024-06-21 PROCEDURE — 83735 ASSAY OF MAGNESIUM: CPT

## 2024-06-21 PROCEDURE — 99232 SBSQ HOSP IP/OBS MODERATE 35: CPT | Performed by: CLINICAL NURSE SPECIALIST

## 2024-06-21 PROCEDURE — 6360000002 HC RX W HCPCS: Performed by: INTERNAL MEDICINE

## 2024-06-21 PROCEDURE — 85025 COMPLETE CBC W/AUTO DIFF WBC: CPT

## 2024-06-21 PROCEDURE — 82962 GLUCOSE BLOOD TEST: CPT

## 2024-06-21 PROCEDURE — C9113 INJ PANTOPRAZOLE SODIUM, VIA: HCPCS | Performed by: INTERNAL MEDICINE

## 2024-06-21 PROCEDURE — G0378 HOSPITAL OBSERVATION PER HR: HCPCS

## 2024-06-21 PROCEDURE — 2700000000 HC OXYGEN THERAPY PER DAY

## 2024-06-21 PROCEDURE — 80053 COMPREHEN METABOLIC PANEL: CPT

## 2024-06-21 PROCEDURE — 6360000002 HC RX W HCPCS: Performed by: HOSPITALIST

## 2024-06-21 PROCEDURE — 96376 TX/PRO/DX INJ SAME DRUG ADON: CPT

## 2024-06-21 PROCEDURE — 6370000000 HC RX 637 (ALT 250 FOR IP): Performed by: HOSPITALIST

## 2024-06-21 PROCEDURE — 94640 AIRWAY INHALATION TREATMENT: CPT

## 2024-06-21 RX ORDER — ATORVASTATIN CALCIUM 10 MG/1
10 TABLET, FILM COATED ORAL NIGHTLY
DISCHARGE
Start: 2024-06-21

## 2024-06-21 RX ORDER — INSULIN GLARGINE 100 [IU]/ML
25 INJECTION, SOLUTION SUBCUTANEOUS NIGHTLY
DISCHARGE
Start: 2024-06-21

## 2024-06-21 RX ORDER — LIDOCAINE 4 G/G
1 PATCH TOPICAL DAILY
DISCHARGE
Start: 2024-06-21

## 2024-06-21 RX ORDER — AMLODIPINE BESYLATE 5 MG/1
5 TABLET ORAL DAILY
DISCHARGE
Start: 2024-06-21

## 2024-06-21 RX ADMIN — CARVEDILOL 25 MG: 25 TABLET, FILM COATED ORAL at 09:02

## 2024-06-21 RX ADMIN — METOCLOPRAMIDE 10 MG: 5 INJECTION, SOLUTION INTRAMUSCULAR; INTRAVENOUS at 04:01

## 2024-06-21 RX ADMIN — METOCLOPRAMIDE 10 MG: 5 INJECTION, SOLUTION INTRAMUSCULAR; INTRAVENOUS at 09:03

## 2024-06-21 RX ADMIN — INSULIN LISPRO 2 UNITS: 100 INJECTION, SOLUTION INTRAVENOUS; SUBCUTANEOUS at 00:54

## 2024-06-21 RX ADMIN — PANTOPRAZOLE SODIUM 40 MG: 40 INJECTION, POWDER, FOR SOLUTION INTRAVENOUS at 09:03

## 2024-06-21 RX ADMIN — IPRATROPIUM BROMIDE AND ALBUTEROL SULFATE 1 DOSE: 2.5; .5 SOLUTION RESPIRATORY (INHALATION) at 08:05

## 2024-06-21 RX ADMIN — ASPIRIN 81 MG CHEWABLE TABLET 81 MG: 81 TABLET CHEWABLE at 09:02

## 2024-06-21 RX ADMIN — POTASSIUM BICARBONATE 40 MEQ: 782 TABLET, EFFERVESCENT ORAL at 06:27

## 2024-06-21 RX ADMIN — OCTREOTIDE ACETATE 50 MCG: 50 INJECTION, SOLUTION INTRAVENOUS; SUBCUTANEOUS at 09:04

## 2024-06-21 NOTE — PROGRESS NOTES
Hepatobiliary and Pancreatic Surgery Progress Note    CC: J-tube malfunction status post open total pancreatectomy     Subjective: Patient states she is feeling better.  Denies abdominal pain.  Tolerating diet.  She denies nausea or vomiting.  States she had a BM yesterday.    OBJECTIVE      Physical    BP (!) 135/53   Pulse 83   Temp 98.1 °F (36.7 °C)   Resp 18   Ht 1.626 m (5' 4\")   Wt 73.4 kg (161 lb 14.4 oz)   SpO2 98%   BMI 27.79 kg/m²       General appearance: appears in no acute distress, laying in bed  Lungs:respiratory effort normal without accessory numbers  Heart: no pedal edema  Abdomen: Soft, nondistended, nontender, no guarding, no peritoneal signs midline incision healed except for 3 cm portion at the center with dressing intact.  PEG-J intact and clamped, dressing intact.  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 S/P PEG-J 6/20/24.   -Asked to see patient by her GI and by the patient herself as she had appt today with Dr Rogers  -She appears to be doing a lot better since her discharge from the hospital.  She has been decannulated and is speaking.  She has failed a swallow evaluation however.  She is still dependent on her PEG-J for tube feeding.  -Her wound VAC does appear to have more purulent/bilious output than what was seen in the hospital.  Recent CT shows fistula to just below the left lobe of the liver. Wound Vac taken down by Dr Rogers, wet to dry dressings and can replace at nursing home.   -Continue current insulin regimen per endocrine, recommendations for post pancreatectomy diabetes.  -She
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
## 2024-06-21 NOTE — PLAN OF CARE
Problem: Skin/Tissue Integrity  Goal: Absence of new skin breakdown  Description: 1.  Monitor for areas of redness and/or skin breakdown  2.  Assess vascular access sites hourly  3.  Every 4-6 hours minimum:  Change oxygen saturation probe site  4.  Every 4-6 hours:  If on nasal continuous positive airway pressure, respiratory therapy assess nares and determine need for appliance change or resting period.  Outcome: Progressing     Problem: ABCDS Injury Assessment  Goal: Absence of physical injury  Outcome: Progressing     Problem: Discharge Planning  Goal: Discharge to home or other facility with appropriate resources  Outcome: Progressing     Problem: Safety - Adult  Goal: Free from fall injury  Outcome: Progressing     Problem: Pain  Goal: Verbalizes/displays adequate comfort level or baseline comfort level  Outcome: Progressing

## 2024-06-21 NOTE — PROGRESS NOTES
Internal Medicine Progress Note    Patient's name: Leatha Willrad  : 1965  Chief complaints (on day of admission): J-tube disconnected. (Sent in from NH d/t J-tube found disconnected. Patient has been here past 2 days for issues with J-Tube.)  Admission date: 2024  Date of service: 2024   Room: [address] MED SURG/TELE  Primary care physician: Mekhi Escalona MD  Reason for visit: Follow-up for j-tube malfunction    Subjective  Leatha is seen lying in bed asleep in no distress.  She does easily awaken to voice.  She reports being tired but expresses that she feels \"fine\".  She denies any shortness of breath or difficulty breathing.  She denies any nausea or vomiting.  She does report some mild discomfort in her abdomen around the tube but denies the need for pain medication.  Wound VAC was removed yesterday and a wet-to-dry dressing was placed.  No other issues or concerns from nursing.    Review of Systems  Full 10 point review of systems negative unless mentioned above.    Hospital Medications  Current Facility-Administered Medications   Medication Dose Route Frequency Provider Last Rate Last Admin    magnesium sulfate 3,000 mg in sodium chloride 0.9 % 250 mL IVPB  3,000 mg IntraVENous Once Chelsea Ruiz, APRN - CNP        aspirin chewable tablet 81 mg  81 mg PEG Tube Daily Dale Perez, DO   81 mg at 24 1654    carvedilol (COREG) tablet 25 mg  25 mg PEG Tube BID WC Dale Perez, DO   25 mg at 24 1654    chlorhexidine (PERIDEX) 0.12 % solution 15 mL  15 mL Mouth/Throat BID Dale Perez, DO   15 mL at 24 0859    hydrOXYzine HCl (ATARAX) tablet 25 mg  25 mg PEG Tube Q6H PRN Dale Perez, DO        levalbuterol (XOPENEX) nebulization 0.63 mg  1 ampule Nebulization Q6H PRN Dale Perez, DO        melatonin tablet 3 mg  3 mg PEG Tube Nightly Dale Perez, DO        metoclopramide (REGLAN) tablet 5 mg  5 mg PEG Tube Q8H PRN Dale Perez, DO        pantoprazole  Merrem/Micafungin 5/19     Generalized Weakness:   PT/OT  Increase activity as able    Follow labs   DVT prophylaxis  Please see orders for further management and care.  Discharge plan: SNF once insurance authorization is obtained and ok with Hepatobiliary Surgery -- possibly later today    The pertinent details of this case were discussed with Dr. Perez.    Electronically signed by ALL Anderson CNP on 6/21/2024 at 7:23 AM

## 2024-06-21 NOTE — CARE COORDINATION
Transition of Care-Spoke with attending-ok to discharge. J-tube replaced 6/20, passed swallow evaluation. Message sent to facility to see if auth was back and or if we can send pending-awaiting response.    Diamond SUMMERSN, RN  [facility]

## 2024-06-21 NOTE — CARE COORDINATION
Transition of Care-Spoke with attending, updated that facility can take with pending pre-cert. Plan to discharge. [facility] will transport patient at 1130 am. Facility liaison and Alok Burr. (  ) updated, unable to reach her son -had a VM that was not set up.  Nurse to Nurse to call report to [facility] is [phone].    Diamond SUMMERSN, RN  [facility]

## 2024-06-21 NOTE — PLAN OF CARE
Problem: ABCDS Injury Assessment  Goal: Absence of physical injury  6/21/2024 1126 by Laruen Real RN  Outcome: Progressing  6/21/2024 0126 by Arianne Trotter RN  Outcome: Progressing     Problem: Skin/Tissue Integrity  Goal: Absence of new skin breakdown  Description: 1.  Monitor for areas of redness and/or skin breakdown  2.  Assess vascular access sites hourly  3.  Every 4-6 hours minimum:  Change oxygen saturation probe site  4.  Every 4-6 hours:  If on nasal continuous positive airway pressure, respiratory therapy assess nares and determine need for appliance change or resting period.  6/21/2024 1126 by Lauren Real RN  Outcome: Progressing  6/21/2024 0126 by Arianne Trotter RN  Outcome: Progressing     Problem: Discharge Planning  Goal: Discharge to home or other facility with appropriate resources  6/21/2024 1126 by Lauren Real RN  Outcome: Progressing  6/21/2024 0126 by Arianne Trotter RN  Outcome: Progressing     Problem: Chronic Conditions and Co-morbidities  Goal: Patient's chronic conditions and co-morbidity symptoms are monitored and maintained or improved  6/21/2024 1126 by Lauren Real RN  Outcome: Progressing  6/21/2024 0126 by Arianne Trotter RN  Outcome: Progressing     Problem: Safety - Adult  Goal: Free from fall injury  6/21/2024 1126 by Lauren Real RN  Outcome: Progressing  6/21/2024 0126 by Arianne Trotter RN  Outcome: Progressing     Problem: Nutrition Deficit:  Goal: Optimize nutritional status  6/21/2024 1126 by Lauren Real RN  Outcome: Progressing  6/21/2024 0126 by Arianne Trotter RN  Outcome: Progressing     Problem: Pain  Goal: Verbalizes/displays adequate comfort level or baseline comfort level  6/21/2024 1126 by Lauren Real RN  Outcome: Progressing  6/21/2024 0126 by Arianne Trotter RN  Outcome: Progressing
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
## 2024-07-03 ENCOUNTER — OFFICE VISIT (OUTPATIENT)
Dept: HEMATOLOGY | Age: 59
End: 2024-07-03

## 2024-07-03 ENCOUNTER — HOSPITAL ENCOUNTER (OUTPATIENT)
Dept: INFUSION THERAPY | Age: 59
Setting detail: INFUSION SERIES
Discharge: HOME | End: 2024-07-03
Payer: COMMERCIAL

## 2024-07-03 VITALS
SYSTOLIC BLOOD PRESSURE: 107 MMHG | OXYGEN SATURATION: 100 % | DIASTOLIC BLOOD PRESSURE: 61 MMHG | HEART RATE: 72 BPM | RESPIRATION RATE: 16 BRPM | TEMPERATURE: 96.6 F

## 2024-07-03 VITALS
OXYGEN SATURATION: 98 % | BODY MASS INDEX: 27.46 KG/M2 | SYSTOLIC BLOOD PRESSURE: 101 MMHG | DIASTOLIC BLOOD PRESSURE: 48 MMHG | WEIGHT: 160 LBS | TEMPERATURE: 97.4 F | HEART RATE: 71 BPM

## 2024-07-03 DIAGNOSIS — K65.1 INTRA-ABDOMINAL ABSCESS (HCC): Primary | ICD-10-CM

## 2024-07-03 DIAGNOSIS — Z90.410 POST-PANCREATECTOMY DIABETES (HCC): ICD-10-CM

## 2024-07-03 DIAGNOSIS — E44.0 MODERATE PROTEIN-CALORIE MALNUTRITION (HCC): ICD-10-CM

## 2024-07-03 DIAGNOSIS — K86.2 PANCREATIC CYST: ICD-10-CM

## 2024-07-03 DIAGNOSIS — Z90.81 POST-SPLENECTOMY: ICD-10-CM

## 2024-07-03 DIAGNOSIS — E13.9 POST-PANCREATECTOMY DIABETES (HCC): ICD-10-CM

## 2024-07-03 DIAGNOSIS — Z90.81 POST-SPLENECTOMY: Primary | ICD-10-CM

## 2024-07-03 DIAGNOSIS — E89.1 POST-PANCREATECTOMY DIABETES (HCC): ICD-10-CM

## 2024-07-03 DIAGNOSIS — T81.30XS ABDOMINAL WOUND DEHISCENCE, SEQUELA: ICD-10-CM

## 2024-07-03 PROCEDURE — 99024 POSTOP FOLLOW-UP VISIT: CPT | Performed by: STUDENT IN AN ORGANIZED HEALTH CARE EDUCATION/TRAINING PROGRAM

## 2024-07-03 PROCEDURE — 90471 IMMUNIZATION ADMIN: CPT | Performed by: STUDENT IN AN ORGANIZED HEALTH CARE EDUCATION/TRAINING PROGRAM

## 2024-07-03 PROCEDURE — 90620 MENB-4C VACCINE IM: CPT | Performed by: STUDENT IN AN ORGANIZED HEALTH CARE EDUCATION/TRAINING PROGRAM

## 2024-07-03 PROCEDURE — 96372 THER/PROPH/DIAG INJ SC/IM: CPT

## 2024-07-03 PROCEDURE — 6360000002 HC RX W HCPCS: Performed by: STUDENT IN AN ORGANIZED HEALTH CARE EDUCATION/TRAINING PROGRAM

## 2024-07-03 RX ORDER — DIPHENHYDRAMINE HYDROCHLORIDE 50 MG/ML
50 INJECTION INTRAMUSCULAR; INTRAVENOUS
OUTPATIENT
Start: 2024-07-31

## 2024-07-03 RX ORDER — ONDANSETRON 2 MG/ML
8 INJECTION INTRAMUSCULAR; INTRAVENOUS
Status: CANCELLED | OUTPATIENT
Start: 2024-07-03

## 2024-07-03 RX ORDER — ACETAMINOPHEN 325 MG/1
650 TABLET ORAL
OUTPATIENT
Start: 2024-07-31

## 2024-07-03 RX ORDER — ONDANSETRON 2 MG/ML
8 INJECTION INTRAMUSCULAR; INTRAVENOUS
OUTPATIENT
Start: 2024-07-31

## 2024-07-03 RX ORDER — ENEMA 19; 7 G/133ML; G/133ML
1 ENEMA RECTAL
COMMUNITY

## 2024-07-03 RX ORDER — POLYETHYLENE GLYCOL 3350 17 G/17G
17 POWDER, FOR SOLUTION ORAL DAILY
COMMUNITY

## 2024-07-03 RX ORDER — ACETAMINOPHEN 325 MG/1
650 TABLET ORAL
Status: CANCELLED | OUTPATIENT
Start: 2024-07-03

## 2024-07-03 RX ORDER — SODIUM CHLORIDE 9 MG/ML
INJECTION, SOLUTION INTRAVENOUS CONTINUOUS
OUTPATIENT
Start: 2024-07-31

## 2024-07-03 RX ORDER — EPINEPHRINE 1 MG/ML
0.3 INJECTION, SOLUTION, CONCENTRATE INTRAVENOUS PRN
OUTPATIENT
Start: 2024-07-31

## 2024-07-03 RX ORDER — EPINEPHRINE 1 MG/ML
0.3 INJECTION, SOLUTION, CONCENTRATE INTRAVENOUS PRN
Status: CANCELLED | OUTPATIENT
Start: 2024-07-03

## 2024-07-03 RX ORDER — ALBUTEROL SULFATE 90 UG/1
4 AEROSOL, METERED RESPIRATORY (INHALATION) PRN
OUTPATIENT
Start: 2024-07-31

## 2024-07-03 RX ORDER — DIPHENHYDRAMINE HYDROCHLORIDE 50 MG/ML
50 INJECTION INTRAMUSCULAR; INTRAVENOUS
Status: CANCELLED | OUTPATIENT
Start: 2024-07-03

## 2024-07-03 RX ORDER — SODIUM CHLORIDE 9 MG/ML
INJECTION, SOLUTION INTRAVENOUS CONTINUOUS
Status: CANCELLED | OUTPATIENT
Start: 2024-07-03

## 2024-07-03 RX ORDER — ALBUTEROL SULFATE 90 UG/1
4 AEROSOL, METERED RESPIRATORY (INHALATION) PRN
Status: CANCELLED | OUTPATIENT
Start: 2024-07-03

## 2024-07-03 RX ADMIN — NEISSERIA MENINGITIDIS SEROGROUP B NHBA FUSION PROTEIN ANTIGEN, NEISSERIA MENINGITIDIS SEROGROUP B FHBP FUSION PROTEIN ANTIGEN AND NEISSERIA MENINGITIDIS SEROGROUP B NADA PROTEIN ANTIGEN 0.5 ML: 50; 50; 50; 25 INJECTION, SUSPENSION INTRAMUSCULAR at 12:18

## 2024-07-03 NOTE — PROGRESS NOTES
Patient tolerated bexsero vaccine  well. Remained on unit for 25 minutes after treatment. Patient alert and oriented x3. No distress noted. Vital signs stable. Patient denies any new or worsening pain. Educated patient on possible side effects and treatment of medication.     Patient verbalized understanding. Provided patient education and/or discharge material.Patient denies any needs. All questions answered. D/C in stable condition.

## 2024-07-03 NOTE — PROGRESS NOTES
reviewing the chart, labs, and diagnostics, as well as medical decision making. Greater than 50% of this time was spent instructing and counseling the patient face to face regarding findings and recommendations.      Norma Rogers MD  7/3/2024 3:21 PM

## 2024-07-08 ENCOUNTER — TELEPHONE (OUTPATIENT)
Dept: HEMATOLOGY | Age: 59
End: 2024-07-08

## 2024-07-08 DIAGNOSIS — K65.1 INTRA-ABDOMINAL ABSCESS (HCC): Primary | ICD-10-CM

## 2024-07-08 NOTE — TELEPHONE ENCOUNTER
Leatha is scheduled for CT on 8/5/24 at [facility]. Arrive 1pm, Scan 130pm, NPO 4 hours. Called and LVM for Leatha to return call to clinic.

## 2024-08-03 SDOH — ECONOMIC STABILITY: FOOD INSECURITY: WITHIN THE PAST 12 MONTHS, YOU WORRIED THAT YOUR FOOD WOULD RUN OUT BEFORE YOU GOT MONEY TO BUY MORE.: SOMETIMES TRUE

## 2024-08-03 SDOH — ECONOMIC STABILITY: INCOME INSECURITY: HOW HARD IS IT FOR YOU TO PAY FOR THE VERY BASICS LIKE FOOD, HOUSING, MEDICAL CARE, AND HEATING?: SOMEWHAT HARD

## 2024-08-03 SDOH — ECONOMIC STABILITY: FOOD INSECURITY: WITHIN THE PAST 12 MONTHS, THE FOOD YOU BOUGHT JUST DIDN'T LAST AND YOU DIDN'T HAVE MONEY TO GET MORE.: SOMETIMES TRUE

## 2024-08-06 ENCOUNTER — OFFICE VISIT (OUTPATIENT)
Dept: FAMILY MEDICINE CLINIC | Age: 59
End: 2024-08-06

## 2024-08-06 ENCOUNTER — HOSPITAL ENCOUNTER (OUTPATIENT)
Age: 59
Discharge: HOME | End: 2024-08-06
Attending: STUDENT IN AN ORGANIZED HEALTH CARE EDUCATION/TRAINING PROGRAM
Payer: COMMERCIAL

## 2024-08-06 ENCOUNTER — HOSPITAL ENCOUNTER (OUTPATIENT)
Dept: CT IMAGING | Age: 59
Discharge: HOME | End: 2024-08-08
Attending: STUDENT IN AN ORGANIZED HEALTH CARE EDUCATION/TRAINING PROGRAM
Payer: COMMERCIAL

## 2024-08-06 VITALS
WEIGHT: 145.5 LBS | HEIGHT: 64 IN | DIASTOLIC BLOOD PRESSURE: 57 MMHG | OXYGEN SATURATION: 97 % | BODY MASS INDEX: 24.84 KG/M2 | SYSTOLIC BLOOD PRESSURE: 102 MMHG | HEART RATE: 79 BPM | RESPIRATION RATE: 16 BRPM | TEMPERATURE: 97.4 F

## 2024-08-06 DIAGNOSIS — K86.89 PANCREATIC INSUFFICIENCY: ICD-10-CM

## 2024-08-06 DIAGNOSIS — H04.129 DRY EYE: ICD-10-CM

## 2024-08-06 DIAGNOSIS — L29.9 ITCH: ICD-10-CM

## 2024-08-06 DIAGNOSIS — K65.1 INTRA-ABDOMINAL ABSCESS (HCC): ICD-10-CM

## 2024-08-06 DIAGNOSIS — F39 MOOD DISORDER (HCC): ICD-10-CM

## 2024-08-06 DIAGNOSIS — R79.89 ELEVATED SERUM CREATININE: Primary | ICD-10-CM

## 2024-08-06 DIAGNOSIS — I10 ESSENTIAL HYPERTENSION: ICD-10-CM

## 2024-08-06 DIAGNOSIS — E78.5 HYPERLIPIDEMIA, UNSPECIFIED HYPERLIPIDEMIA TYPE: ICD-10-CM

## 2024-08-06 LAB
ANION GAP SERPL CALCULATED.3IONS-SCNC: 10 MMOL/L (ref 7–16)
BUN SERPL-MCNC: 11 MG/DL (ref 6–23)
CALCIUM SERPL-MCNC: 9.2 MG/DL (ref 8.6–10.2)
CHLORIDE SERPL-SCNC: 98 MMOL/L (ref 98–107)
CO2 SERPL-SCNC: 27 MMOL/L (ref 22–29)
CREAT SERPL-MCNC: 1.3 MG/DL (ref 0.5–1)
GFR, ESTIMATED: 47 ML/MIN/1.73M2
GLUCOSE SERPL-MCNC: 242 MG/DL (ref 74–99)
POTASSIUM SERPL-SCNC: 3.7 MMOL/L (ref 3.5–5)
SODIUM SERPL-SCNC: 135 MMOL/L (ref 132–146)

## 2024-08-06 PROCEDURE — 80048 BASIC METABOLIC PNL TOTAL CA: CPT

## 2024-08-06 PROCEDURE — 74177 CT ABD & PELVIS W/CONTRAST: CPT

## 2024-08-06 PROCEDURE — 36415 COLL VENOUS BLD VENIPUNCTURE: CPT

## 2024-08-06 PROCEDURE — 6360000004 HC RX CONTRAST MEDICATION: Performed by: RADIOLOGY

## 2024-08-06 RX ORDER — OLOPATADINE HYDROCHLORIDE 2 MG/ML
1 SOLUTION/ DROPS OPHTHALMIC DAILY PRN
Qty: 1 EACH | Refills: 2 | Status: SHIPPED | OUTPATIENT
Start: 2024-08-06

## 2024-08-06 RX ORDER — AMLODIPINE BESYLATE 5 MG/1
2.5 TABLET ORAL DAILY
Qty: 30 TABLET | Refills: 2 | Status: SHIPPED | OUTPATIENT
Start: 2024-08-06

## 2024-08-06 RX ORDER — KETOTIFEN FUMARATE 0.35 MG/ML
1 SOLUTION/ DROPS OPHTHALMIC 2 TIMES DAILY
Qty: 1 EACH | Refills: 1 | Status: SHIPPED | OUTPATIENT
Start: 2024-08-06

## 2024-08-06 RX ORDER — METOCLOPRAMIDE 5 MG/1
5 TABLET ORAL EVERY 8 HOURS PRN
Qty: 120 TABLET | Status: CANCELLED | OUTPATIENT
Start: 2024-08-06

## 2024-08-06 RX ORDER — BUPROPION HYDROCHLORIDE 100 MG/1
100 TABLET ORAL 3 TIMES DAILY
Qty: 60 TABLET | Refills: 3 | Status: CANCELLED | OUTPATIENT
Start: 2024-08-06

## 2024-08-06 RX ORDER — CARVEDILOL 25 MG/1
12.5 TABLET ORAL 2 TIMES DAILY WITH MEALS
Qty: 60 TABLET | Refills: 2 | Status: SHIPPED | OUTPATIENT
Start: 2024-08-06

## 2024-08-06 RX ORDER — ASPIRIN 81 MG/1
81 TABLET ORAL DAILY
Qty: 90 TABLET | Refills: 0 | Status: SHIPPED | OUTPATIENT
Start: 2024-08-06

## 2024-08-06 RX ORDER — ATORVASTATIN CALCIUM 10 MG/1
10 TABLET, FILM COATED ORAL NIGHTLY
Qty: 60 TABLET | Refills: 2 | Status: SHIPPED | OUTPATIENT
Start: 2024-08-06

## 2024-08-06 RX ORDER — INSULIN GLARGINE 100 [IU]/ML
10 INJECTION, SOLUTION SUBCUTANEOUS NIGHTLY
Qty: 5 ADJUSTABLE DOSE PRE-FILLED PEN SYRINGE | Refills: 0 | Status: SHIPPED | OUTPATIENT
Start: 2024-08-06

## 2024-08-06 RX ORDER — BLOOD-GLUCOSE SENSOR
1 EACH MISCELLANEOUS
Qty: 6 EACH | Refills: 3 | Status: SHIPPED | OUTPATIENT
Start: 2024-08-06

## 2024-08-06 RX ORDER — HYDROXYZINE HYDROCHLORIDE 25 MG/1
25 TABLET, FILM COATED ORAL EVERY 8 HOURS PRN
Qty: 30 TABLET | Refills: 1 | Status: SHIPPED | OUTPATIENT
Start: 2024-08-06

## 2024-08-06 RX ORDER — LANOLIN ALCOHOL/MO/W.PET/CERES
3 CREAM (GRAM) TOPICAL NIGHTLY
Qty: 60 TABLET | Refills: 0 | Status: SHIPPED | OUTPATIENT
Start: 2024-08-06

## 2024-08-06 RX ORDER — BUPROPION HYDROCHLORIDE 150 MG/1
150 TABLET ORAL EVERY MORNING
Qty: 90 TABLET | Refills: 0 | Status: SHIPPED | OUTPATIENT
Start: 2024-08-06

## 2024-08-06 RX ADMIN — IOPAMIDOL 75 ML: 755 INJECTION, SOLUTION INTRAVENOUS at 12:47

## 2024-08-06 ASSESSMENT — ENCOUNTER SYMPTOMS
NAUSEA: 0
APNEA: 0
RHINORRHEA: 0
WHEEZING: 0
DIARRHEA: 0
VOMITING: 0
ABDOMINAL DISTENTION: 0
ABDOMINAL PAIN: 0
COUGH: 0

## 2024-08-06 NOTE — PROGRESS NOTES
[facility]  Precepting Note    Subjective:  Pancreatic cyst surgery with complications  Prolonged ICU stay with trach and PEG  Discharged from [facility] on July 23  Lives with family  Not using PEG any longer- passed PEG  Feels like getting closer to baseline    Had mild Cr elevation on recent labs before having contrast - needs rechecked     Main issues related to medication management    DM secondary to pancreatic dysfunction   On insulin  Needs ZenPEP refill     ROS otherwise negative     Past medical, surgical, family and social history were reviewed, non-contributory, and unchanged unless otherwise stated.    Objective:    BP (!) 102/57   Pulse 79   Temp 97.4 °F (36.3 °C) (Temporal)   Resp 16   Ht 1.626 m (5' 4.02\")   Wt 66 kg (145 lb 8.1 oz)   SpO2 97%   BMI 24.96 kg/m²     Exam is as noted by resident with the following changes, additions or corrections:    General:  NAD; alert & oriented x 3   Heart:  RRR, no murmurs, gallops, or rubs.  Lungs:  CTA bilaterally, no wheeze, rales or rhonchi  Abd: bowel sounds present, nontender, nondistended, no masses      Assessment/Plan:  History of pancreatic insufficiency  Hyperlipidemia  Hypertension  Elevated creatinine    Plan  Extensive medication management performed with several medications reviewed, meds discontinued and medications refilled  Routine labs ordered       Attending Physician Statement  I have reviewed the chart, including any radiology or labs. I have discussed the case, including pertinent history and exam findings with the resident.  I agree with the assessment, plan and orders as documented by the resident.  Please refer to the resident note for additional information.      Electronically signed by Ortiz Varela MD on 8/6/2024 at 4:03 PM  
edema.   Neurological:      General: No focal deficit present.      Mental Status: She is alert and oriented to person, place, and time.   Psychiatric:         Mood and Affect: Mood normal.         Behavior: Behavior normal.           ASSESSMENT AND PLAN     1. Hyperlipidemia, unspecified hyperlipidemia type  Patient requested refill for the medication  - atorvastatin (LIPITOR) 10 MG tablet; Take 1 tablet by mouth at bedtime  Dispense: 60 tablet; Refill: 2  - aspirin 81 MG EC tablet; Take 1 tablet by mouth daily  Dispense: 90 tablet; Refill: 0    2. Essential hypertension  Patient's blood pressure is borderline low, reduce her amlodipine from 5 mg to 2.5 mg daily.  Reduce patient's carvedilol from 25 to 12.5 mg BID.  - amLODIPine (NORVASC) 5 MG tablet; Take 0.5 tablets by mouth daily  Dispense: 30 tablet; Refill: 2  - carvedilol (COREG) 25 MG tablet; Take 0.5 tablets by mouth 2 times daily (with meals)  Dispense: 60 tablet; Refill: 2    3. Elevated serum creatinine  Patient had a creatinine of 1.3 on the BMP labs drawn before the patient had her CAT scan with IV contrast.  Would expect the serum creatinine to rise over the next couple of days, told patient to adequately hydrate herself.  Would like to check a BMP on Friday, 8/8/2024  - Basic Metabolic Panel; Future    4. Itch  Refill provided  - hydrOXYzine HCl (ATARAX) 25 MG tablet; Take 1 tablet by mouth every 8 hours as needed for Itching  Dispense: 30 tablet; Refill: 1    5. Dry eye  Refill provided  - ketotifen fumarate (ZADITOR) 0.035 % ophthalmic solution; Place 1 drop into both eyes 2 times daily  Dispense: 1 each; Refill: 1    6. Mood disorder (HCC)  Starting patient back on her Wellbutrin however we are starting on a lower dose that she was on previously for now and with titrated up as needed.  Patient currently denies having any suicidal homicidal ideation, agrees with the plan.  - melatonin 3 MG TABS tablet; Take 1 tablet by mouth at bedtime  Dispense:

## 2024-08-06 NOTE — PATIENT INSTRUCTIONS
[facility] Food Resources*  (Call [employer]/211 for more resources)         HELP NETWORK OF [facility]:  What they do: Provides 24-hr, 7 days a week access to information on community resources for food & clothing help for [facility] AND [facility]  Phone: 211 or 930-415-2606  Text “HELP NETWORK” to 914892 for local food resources  DEPARTMENT OF JOB AND FAMILY SERVICES:  What they do: SNAP, provide a card to use like cash to purchase healthy foods at approved retailers  [facility]  Phone: [phone]  [facility]  Phone: [phone]  [facility]  Phone: [phone]  Website: [place].[facility]:  [address] [place] [address]  What they offer: Food and clothing distribution on Tuesdays from 9 am to 12pm & Thursdays from 4pm to 7pm.   Phone Number: [phone] ext. 503  Website: www.ecomom.[employer]  [facility] [employer]  What they offer: food items that stop at various housing and community services organizations, follow a month schedule.  Call to learn more.  Phone Number: [phone]      OUR [facility] KITCHEN:  [address] [place] [address]  What they offer: Serves breakfast and lunch daily, 7 AM-9AM and 11AM-1PM (closed Sundays)  Contact: [phone]  [employer]: [address] [place] [address]  What they offer: Hot evening meals on Mondays and Tuesdays; doors open at 4:00PM; dinner served 5PM-6PM  Phone Number: [phone]  Website: [employer]    [employer] [facility] [employer]:  What they offer: Emergency food assistance  Phone number: 111.355.2901  Website: protestantfamil[employer]ervice[employer]  [employer]:  What they offer: Frozen meals for private pay, government funded programs and some insurances   Phone Number: [phone]  Website: [employer]  [employer] ([facility]):  What they offer: Hot dinner and cold lunch Monday-Friday ($14/day); hot dinner Monday-Friday

## 2024-08-09 ENCOUNTER — HOSPITAL ENCOUNTER (OUTPATIENT)
Age: 59
Discharge: HOME | End: 2024-08-09
Payer: COMMERCIAL

## 2024-08-09 DIAGNOSIS — R79.89 ELEVATED SERUM CREATININE: ICD-10-CM

## 2024-08-09 LAB
ANION GAP SERPL CALCULATED.3IONS-SCNC: 12 MMOL/L (ref 7–16)
BUN SERPL-MCNC: 10 MG/DL (ref 6–23)
CALCIUM SERPL-MCNC: 8.7 MG/DL (ref 8.6–10.2)
CHLORIDE SERPL-SCNC: 101 MMOL/L (ref 98–107)
CO2 SERPL-SCNC: 27 MMOL/L (ref 22–29)
CREAT SERPL-MCNC: 0.4 MG/DL (ref 0.5–1)
GFR, ESTIMATED: >90 ML/MIN/1.73M2
GLUCOSE SERPL-MCNC: 137 MG/DL (ref 74–99)
POTASSIUM SERPL-SCNC: 3.2 MMOL/L (ref 3.5–5)
SODIUM SERPL-SCNC: 140 MMOL/L (ref 132–146)

## 2024-08-09 PROCEDURE — 80048 BASIC METABOLIC PNL TOTAL CA: CPT

## 2024-08-09 PROCEDURE — 36415 COLL VENOUS BLD VENIPUNCTURE: CPT

## 2024-08-12 DIAGNOSIS — E87.6 HYPOKALEMIA: Primary | ICD-10-CM

## 2024-08-12 RX ORDER — POTASSIUM CHLORIDE 20 MEQ/1
20 TABLET, EXTENDED RELEASE ORAL DAILY
Qty: 14 TABLET | Refills: 0 | Status: SHIPPED | OUTPATIENT
Start: 2024-08-12 | End: 2024-08-26

## 2024-08-14 ENCOUNTER — OFFICE VISIT (OUTPATIENT)
Dept: HEMATOLOGY | Age: 59
End: 2024-08-14
Payer: COMMERCIAL

## 2024-08-14 VITALS
TEMPERATURE: 97.3 F | BODY MASS INDEX: 2.56 KG/M2 | HEART RATE: 85 BPM | DIASTOLIC BLOOD PRESSURE: 55 MMHG | HEIGHT: 64 IN | SYSTOLIC BLOOD PRESSURE: 102 MMHG | OXYGEN SATURATION: 95 % | WEIGHT: 15 LBS

## 2024-08-14 DIAGNOSIS — K86.81 EXOCRINE PANCREATIC INSUFFICIENCY: ICD-10-CM

## 2024-08-14 DIAGNOSIS — T81.30XS ABDOMINAL WOUND DEHISCENCE, SEQUELA: ICD-10-CM

## 2024-08-14 DIAGNOSIS — E89.1 POST-PANCREATECTOMY DIABETES (HCC): Primary | ICD-10-CM

## 2024-08-14 DIAGNOSIS — K65.1 INTRA-ABDOMINAL ABSCESS (HCC): ICD-10-CM

## 2024-08-14 DIAGNOSIS — D13.6 PANCREATIC CYSTADENOMA: ICD-10-CM

## 2024-08-14 DIAGNOSIS — E13.9 POST-PANCREATECTOMY DIABETES (HCC): Primary | ICD-10-CM

## 2024-08-14 DIAGNOSIS — Z90.410 POST-PANCREATECTOMY DIABETES (HCC): Primary | ICD-10-CM

## 2024-08-14 DIAGNOSIS — Z90.81 POST-SPLENECTOMY: ICD-10-CM

## 2024-08-14 PROCEDURE — G8427 DOCREV CUR MEDS BY ELIG CLIN: HCPCS | Performed by: STUDENT IN AN ORGANIZED HEALTH CARE EDUCATION/TRAINING PROGRAM

## 2024-08-14 PROCEDURE — G8418 CALC BMI BLW LOW PARAM F/U: HCPCS | Performed by: STUDENT IN AN ORGANIZED HEALTH CARE EDUCATION/TRAINING PROGRAM

## 2024-08-14 PROCEDURE — 3044F HG A1C LEVEL LT 7.0%: CPT | Performed by: STUDENT IN AN ORGANIZED HEALTH CARE EDUCATION/TRAINING PROGRAM

## 2024-08-14 PROCEDURE — 3017F COLORECTAL CA SCREEN DOC REV: CPT | Performed by: STUDENT IN AN ORGANIZED HEALTH CARE EDUCATION/TRAINING PROGRAM

## 2024-08-14 PROCEDURE — 3078F DIAST BP <80 MM HG: CPT | Performed by: STUDENT IN AN ORGANIZED HEALTH CARE EDUCATION/TRAINING PROGRAM

## 2024-08-14 PROCEDURE — 99214 OFFICE O/P EST MOD 30 MIN: CPT | Performed by: STUDENT IN AN ORGANIZED HEALTH CARE EDUCATION/TRAINING PROGRAM

## 2024-08-14 PROCEDURE — 4004F PT TOBACCO SCREEN RCVD TLK: CPT | Performed by: STUDENT IN AN ORGANIZED HEALTH CARE EDUCATION/TRAINING PROGRAM

## 2024-08-14 PROCEDURE — 3074F SYST BP LT 130 MM HG: CPT | Performed by: STUDENT IN AN ORGANIZED HEALTH CARE EDUCATION/TRAINING PROGRAM

## 2024-08-14 RX ORDER — PANCRELIPASE 36000; 180000; 114000 [USP'U]/1; [USP'U]/1; [USP'U]/1
4 CAPSULE, DELAYED RELEASE PELLETS ORAL
Qty: 300 CAPSULE | Refills: 5 | Status: SHIPPED | OUTPATIENT
Start: 2024-08-14

## 2024-08-15 ENCOUNTER — HOSPITAL ENCOUNTER (OUTPATIENT)
Dept: INFUSION THERAPY | Age: 59
Setting detail: INFUSION SERIES
Discharge: HOME | End: 2024-08-15
Payer: COMMERCIAL

## 2024-08-15 VITALS
HEART RATE: 76 BPM | RESPIRATION RATE: 16 BRPM | OXYGEN SATURATION: 98 % | TEMPERATURE: 97.5 F | SYSTOLIC BLOOD PRESSURE: 102 MMHG | DIASTOLIC BLOOD PRESSURE: 55 MMHG

## 2024-08-15 DIAGNOSIS — Z90.81 POST-SPLENECTOMY: Primary | ICD-10-CM

## 2024-08-15 PROCEDURE — 90734 MENACWYD/MENACWYCRM VACC IM: CPT | Performed by: STUDENT IN AN ORGANIZED HEALTH CARE EDUCATION/TRAINING PROGRAM

## 2024-08-15 PROCEDURE — 90732 PPSV23 VACC 2 YRS+ SUBQ/IM: CPT | Performed by: STUDENT IN AN ORGANIZED HEALTH CARE EDUCATION/TRAINING PROGRAM

## 2024-08-15 PROCEDURE — 6360000002 HC RX W HCPCS: Performed by: STUDENT IN AN ORGANIZED HEALTH CARE EDUCATION/TRAINING PROGRAM

## 2024-08-15 PROCEDURE — G0009 ADMIN PNEUMOCOCCAL VACCINE: HCPCS | Performed by: STUDENT IN AN ORGANIZED HEALTH CARE EDUCATION/TRAINING PROGRAM

## 2024-08-15 PROCEDURE — 90471 IMMUNIZATION ADMIN: CPT | Performed by: STUDENT IN AN ORGANIZED HEALTH CARE EDUCATION/TRAINING PROGRAM

## 2024-08-15 PROCEDURE — 96372 THER/PROPH/DIAG INJ SC/IM: CPT

## 2024-08-15 RX ORDER — ALBUTEROL SULFATE 90 UG/1
4 AEROSOL, METERED RESPIRATORY (INHALATION) PRN
OUTPATIENT
Start: 2024-08-15

## 2024-08-15 RX ORDER — ACETAMINOPHEN 325 MG/1
650 TABLET ORAL
OUTPATIENT
Start: 2024-08-15

## 2024-08-15 RX ORDER — ONDANSETRON 2 MG/ML
8 INJECTION INTRAMUSCULAR; INTRAVENOUS
OUTPATIENT
Start: 2024-08-15

## 2024-08-15 RX ORDER — EPINEPHRINE 1 MG/ML
0.3 INJECTION, SOLUTION, CONCENTRATE INTRAVENOUS PRN
OUTPATIENT
Start: 2024-08-15

## 2024-08-15 RX ORDER — DIPHENHYDRAMINE HYDROCHLORIDE 50 MG/ML
50 INJECTION INTRAMUSCULAR; INTRAVENOUS
OUTPATIENT
Start: 2024-08-15

## 2024-08-15 RX ORDER — SODIUM CHLORIDE 9 MG/ML
INJECTION, SOLUTION INTRAVENOUS CONTINUOUS
OUTPATIENT
Start: 2024-08-15

## 2024-08-15 RX ADMIN — MENINGOCOCCAL CYW-135 LIQUID DILUENT 0.5 ML: RECON SOLN at 09:56

## 2024-08-15 RX ADMIN — PNEUMOCOCCAL VACCINE POLYVALENT 0.5 ML
25; 25; 25; 25; 25; 25; 25; 25; 25; 25; 25; 25; 25; 25; 25; 25; 25; 25; 25; 25; 25; 25; 25 INJECTION, SOLUTION INTRAMUSCULAR; SUBCUTANEOUS at 09:56

## 2024-08-15 NOTE — FLOWSHEET NOTE
Patient tolerated injections well.  Patient alert and oriented x3. No distress noted. Vital signs stable. Patient denies any new or worsening pain. Educated patient on possible side effects and treatment of medication.     Patient verbalized understanding. Offered patient education and/or discharge material. Patient declined. Patient denies any needs. All questions answered. D/C in stable condition.

## 2024-08-20 ENCOUNTER — HOSPITAL ENCOUNTER (OUTPATIENT)
Age: 59
Discharge: HOME | End: 2024-08-20
Payer: COMMERCIAL

## 2024-08-20 DIAGNOSIS — E87.6 HYPOKALEMIA: ICD-10-CM

## 2024-08-20 LAB
ANION GAP SERPL CALCULATED.3IONS-SCNC: 11 MMOL/L (ref 7–16)
BUN SERPL-MCNC: 11 MG/DL (ref 6–23)
CALCIUM SERPL-MCNC: 9.2 MG/DL (ref 8.6–10.2)
CHLORIDE SERPL-SCNC: 102 MMOL/L (ref 98–107)
CO2 SERPL-SCNC: 24 MMOL/L (ref 22–29)
CREAT SERPL-MCNC: 0.4 MG/DL (ref 0.5–1)
GFR, ESTIMATED: >90 ML/MIN/1.73M2
GLUCOSE SERPL-MCNC: 205 MG/DL (ref 74–99)
POTASSIUM SERPL-SCNC: 3.8 MMOL/L (ref 3.5–5)
SODIUM SERPL-SCNC: 137 MMOL/L (ref 132–146)

## 2024-08-20 PROCEDURE — 36415 COLL VENOUS BLD VENIPUNCTURE: CPT

## 2024-08-20 PROCEDURE — 80048 BASIC METABOLIC PNL TOTAL CA: CPT

## 2024-08-20 NOTE — PROGRESS NOTES
Hepatobiliary and Pancreatic Surgery Progress Note    CC: Follow-up status post total pancreatectomy    Subjective:   7/3/24: Ms. Willard is a 59-year-old female who is now status post open Whipple complicated by grade C postop pancreatic fistula requiring takeback to the OR for completion pancreatectomy and splenectomy. 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 She had a PEG-J placed in the hospital as well for severe protein calorie malnutrition. She was able to be discharged to [facility] ([facility] for about a month. She was decannulated from her trach while at [facility] and a wound VAC was placed over her wound which was granulating in. She was then eventually discharged to a SNF to continue to get physical therapy. She did present to [place] ED in June with dislodgment of her J-tube. This was replaced by Dr. Hhan. She was able to be discharged back to the SNF and has been working with physical therapy. She uses a wheelchair to get around but has been able to walk short distances. She is still on some oxygen. She has passed a swallow evaluation finally and has been starting to eat solid food. She had an CT abdomen pelvis done on 6/2 which showed persistent fluid collection near the left lobe of the liver tracking towards her open abdominal wound. She has been seen by wound care at the SNF and topical debridement wound care has been performed.     8/14/24: Patient presents today for follow-up with new imaging.  She continues to improve and is starting to walk more.  She does still require a wheelchair with going long distances.  She is eating and tolerating a diet and is no longer using her J-tube for feeding since she left the hospital.  A CT abdomen pelvis showed improvement in the upper abdominal fluid collection near the liver and what appears to be closure of the abdominal wound.  On exam though she does still have some purulence draining from the

## 2024-09-12 ENCOUNTER — OFFICE VISIT (OUTPATIENT)
Dept: FAMILY MEDICINE CLINIC | Age: 59
End: 2024-09-12
Payer: COMMERCIAL

## 2024-09-12 VITALS
HEART RATE: 79 BPM | TEMPERATURE: 97.7 F | DIASTOLIC BLOOD PRESSURE: 67 MMHG | RESPIRATION RATE: 18 BRPM | WEIGHT: 142 LBS | HEIGHT: 64 IN | OXYGEN SATURATION: 98 % | SYSTOLIC BLOOD PRESSURE: 120 MMHG | BODY MASS INDEX: 24.24 KG/M2

## 2024-09-12 DIAGNOSIS — K86.89 PANCREATIC INSUFFICIENCY: Primary | ICD-10-CM

## 2024-09-12 DIAGNOSIS — Z23 NEED FOR VACCINATION: ICD-10-CM

## 2024-09-12 DIAGNOSIS — Z12.31 ENCOUNTER FOR SCREENING MAMMOGRAM FOR MALIGNANT NEOPLASM OF BREAST: ICD-10-CM

## 2024-09-12 PROCEDURE — G8420 CALC BMI NORM PARAMETERS: HCPCS

## 2024-09-12 PROCEDURE — G8427 DOCREV CUR MEDS BY ELIG CLIN: HCPCS

## 2024-09-12 PROCEDURE — 3078F DIAST BP <80 MM HG: CPT

## 2024-09-12 PROCEDURE — 3017F COLORECTAL CA SCREEN DOC REV: CPT

## 2024-09-12 PROCEDURE — 3074F SYST BP LT 130 MM HG: CPT

## 2024-09-12 PROCEDURE — 4004F PT TOBACCO SCREEN RCVD TLK: CPT

## 2024-09-12 PROCEDURE — 99213 OFFICE O/P EST LOW 20 MIN: CPT

## 2024-09-12 RX ORDER — INSULIN GLARGINE 100 [IU]/ML
10 INJECTION, SOLUTION SUBCUTANEOUS NIGHTLY
Qty: 5 ADJUSTABLE DOSE PRE-FILLED PEN SYRINGE | Refills: 0 | Status: SHIPPED | OUTPATIENT
Start: 2024-09-12

## 2024-09-19 ENCOUNTER — OFFICE VISIT (OUTPATIENT)
Dept: SURGERY | Age: 59
End: 2024-09-19
Payer: COMMERCIAL

## 2024-09-19 VITALS
WEIGHT: 139 LBS | OXYGEN SATURATION: 96 % | DIASTOLIC BLOOD PRESSURE: 62 MMHG | BODY MASS INDEX: 23.73 KG/M2 | SYSTOLIC BLOOD PRESSURE: 100 MMHG | HEIGHT: 64 IN | HEART RATE: 76 BPM | TEMPERATURE: 97.9 F

## 2024-09-19 DIAGNOSIS — E89.1 POST-PANCREATECTOMY DIABETES (HCC): ICD-10-CM

## 2024-09-19 DIAGNOSIS — K86.81 EXOCRINE PANCREATIC INSUFFICIENCY: ICD-10-CM

## 2024-09-19 DIAGNOSIS — Z90.410 POST-PANCREATECTOMY DIABETES (HCC): ICD-10-CM

## 2024-09-19 DIAGNOSIS — E13.9 POST-PANCREATECTOMY DIABETES (HCC): ICD-10-CM

## 2024-09-19 DIAGNOSIS — D27.9 SEROUS CYSTADENOMA: Primary | ICD-10-CM

## 2024-09-19 DIAGNOSIS — Z90.81 POST-SPLENECTOMY: ICD-10-CM

## 2024-09-19 PROCEDURE — 3074F SYST BP LT 130 MM HG: CPT | Performed by: STUDENT IN AN ORGANIZED HEALTH CARE EDUCATION/TRAINING PROGRAM

## 2024-09-19 PROCEDURE — 3044F HG A1C LEVEL LT 7.0%: CPT | Performed by: STUDENT IN AN ORGANIZED HEALTH CARE EDUCATION/TRAINING PROGRAM

## 2024-09-19 PROCEDURE — 3017F COLORECTAL CA SCREEN DOC REV: CPT | Performed by: STUDENT IN AN ORGANIZED HEALTH CARE EDUCATION/TRAINING PROGRAM

## 2024-09-19 PROCEDURE — G8420 CALC BMI NORM PARAMETERS: HCPCS | Performed by: STUDENT IN AN ORGANIZED HEALTH CARE EDUCATION/TRAINING PROGRAM

## 2024-09-19 PROCEDURE — 99212 OFFICE O/P EST SF 10 MIN: CPT | Performed by: STUDENT IN AN ORGANIZED HEALTH CARE EDUCATION/TRAINING PROGRAM

## 2024-09-19 PROCEDURE — 3078F DIAST BP <80 MM HG: CPT | Performed by: STUDENT IN AN ORGANIZED HEALTH CARE EDUCATION/TRAINING PROGRAM

## 2024-09-19 PROCEDURE — 4004F PT TOBACCO SCREEN RCVD TLK: CPT | Performed by: STUDENT IN AN ORGANIZED HEALTH CARE EDUCATION/TRAINING PROGRAM

## 2024-09-19 PROCEDURE — G8427 DOCREV CUR MEDS BY ELIG CLIN: HCPCS | Performed by: STUDENT IN AN ORGANIZED HEALTH CARE EDUCATION/TRAINING PROGRAM

## 2024-09-25 ENCOUNTER — HOSPITAL ENCOUNTER (OUTPATIENT)
Dept: MAMMOGRAPHY | Age: 59
Discharge: HOME | End: 2024-09-27
Payer: COMMERCIAL

## 2024-09-25 VITALS — BODY MASS INDEX: 23.56 KG/M2 | HEIGHT: 64 IN | WEIGHT: 138 LBS

## 2024-09-25 DIAGNOSIS — Z12.31 ENCOUNTER FOR SCREENING MAMMOGRAM FOR MALIGNANT NEOPLASM OF BREAST: ICD-10-CM

## 2024-09-25 DIAGNOSIS — N63.20 MASS OF LEFT BREAST, UNSPECIFIED QUADRANT: Primary | ICD-10-CM

## 2024-09-25 PROCEDURE — 77067 SCR MAMMO BI INCL CAD: CPT

## 2024-09-26 DIAGNOSIS — R92.8 ABNORMAL MAMMOGRAM: Primary | ICD-10-CM

## 2024-09-27 ENCOUNTER — TELEPHONE (OUTPATIENT)
Dept: GENERAL RADIOLOGY | Age: 59
End: 2024-09-27

## 2024-10-01 DIAGNOSIS — K86.89 PANCREATIC INSUFFICIENCY: ICD-10-CM

## 2024-10-15 ENCOUNTER — HOSPITAL ENCOUNTER (OUTPATIENT)
Dept: GENERAL RADIOLOGY | Age: 59
Discharge: HOME | End: 2024-10-17
Payer: COMMERCIAL

## 2024-10-15 DIAGNOSIS — R92.8 ABNORMAL MAMMOGRAM: ICD-10-CM

## 2024-10-15 PROCEDURE — 76642 ULTRASOUND BREAST LIMITED: CPT

## 2024-10-15 PROCEDURE — 77065 DX MAMMO INCL CAD UNI: CPT

## 2024-10-28 DIAGNOSIS — E78.5 HYPERLIPIDEMIA, UNSPECIFIED HYPERLIPIDEMIA TYPE: ICD-10-CM

## 2024-10-29 NOTE — TELEPHONE ENCOUNTER
Name of Medication(s) Requested:  Requested Prescriptions     Pending Prescriptions Disp Refills    ASPIRIN LOW DOSE 81 MG EC tablet [Pharmacy Med Name: ASPIRIN 81MG EC LOW DOSETABLETS] 90 tablet 0     Sig: TAKE 1 TABLET BY MOUTH DAILY       Medication is on current medication list Yes    Dosage and directions were verified? Yes    Quantity verified: 90 day supply     Pharmacy Verified?  Yes    Last Appointment:  8/6/2024    Future appts:  Future Appointments   Date Time Provider Department Center   10/31/2024  8:15 AM Norma Rogers MD [place] SURG [facility]   1/20/2025 10:20 AM Dory Pacheco DO [place] SLEEP [facility]   1/21/2025  1:00 PM James Judge, APRN - CNP [facility]        (If no appt send self scheduling link. .REFILLAPPT)  Scheduling request sent?     [x] Yes  [] No    Does patient need updated?  [] Yes  [x] No

## 2024-10-30 RX ORDER — ASPIRIN 81 MG/1
81 TABLET, COATED ORAL DAILY
Qty: 90 TABLET | Refills: 0 | Status: SHIPPED | OUTPATIENT
Start: 2024-10-30

## 2024-10-31 ENCOUNTER — OFFICE VISIT (OUTPATIENT)
Dept: SURGERY | Age: 59
End: 2024-10-31

## 2024-10-31 VITALS
OXYGEN SATURATION: 95 % | TEMPERATURE: 98.1 F | HEIGHT: 64 IN | DIASTOLIC BLOOD PRESSURE: 68 MMHG | WEIGHT: 141 LBS | SYSTOLIC BLOOD PRESSURE: 104 MMHG | HEART RATE: 73 BPM | BODY MASS INDEX: 24.07 KG/M2

## 2024-10-31 DIAGNOSIS — E89.1 POST-PANCREATECTOMY DIABETES (HCC): Primary | ICD-10-CM

## 2024-10-31 DIAGNOSIS — Z90.410 POST-PANCREATECTOMY DIABETES (HCC): Primary | ICD-10-CM

## 2024-10-31 DIAGNOSIS — Z90.81 POST-SPLENECTOMY: ICD-10-CM

## 2024-10-31 DIAGNOSIS — E13.9 POST-PANCREATECTOMY DIABETES (HCC): Primary | ICD-10-CM

## 2024-10-31 DIAGNOSIS — D27.9 SEROUS CYSTADENOMA: ICD-10-CM

## 2024-10-31 DIAGNOSIS — K86.81 EXOCRINE PANCREATIC INSUFFICIENCY: ICD-10-CM

## 2024-10-31 NOTE — PROGRESS NOTES
months    Thank you for the consultation and allowing me to take part in Ms Sudheer's care.    Greater than or equal to 10 minutes was spent providing face-to-face patient care, including:  and coordinating care, reviewing the chart, labs, and diagnostics, as well as medical decision making. Greater than 50% of this time was spent instructing and counseling the patient face to face regarding findings and recommendations.      Norma Rogers MD  10/31/2024 8:12 AM

## 2024-11-05 ENCOUNTER — OFFICE VISIT (OUTPATIENT)
Dept: FAMILY MEDICINE CLINIC | Age: 59
End: 2024-11-05

## 2024-11-05 VITALS
TEMPERATURE: 97.7 F | OXYGEN SATURATION: 97 % | WEIGHT: 140 LBS | RESPIRATION RATE: 16 BRPM | HEART RATE: 93 BPM | DIASTOLIC BLOOD PRESSURE: 79 MMHG | SYSTOLIC BLOOD PRESSURE: 117 MMHG | HEIGHT: 64 IN | BODY MASS INDEX: 23.9 KG/M2

## 2024-11-05 DIAGNOSIS — E08.9 DIABETES MELLITUS DUE TO UNDERLYING CONDITION WITHOUT COMPLICATION, UNSPECIFIED WHETHER LONG TERM INSULIN USE (HCC): Primary | ICD-10-CM

## 2024-11-05 DIAGNOSIS — I10 ESSENTIAL HYPERTENSION: ICD-10-CM

## 2024-11-05 DIAGNOSIS — K86.89 PANCREATIC INSUFFICIENCY: ICD-10-CM

## 2024-11-05 LAB — HBA1C MFR BLD: 9.5 %

## 2024-11-05 RX ORDER — INSULIN GLARGINE 100 [IU]/ML
14 INJECTION, SOLUTION SUBCUTANEOUS NIGHTLY
Qty: 5 ADJUSTABLE DOSE PRE-FILLED PEN SYRINGE | Refills: 0
Start: 2024-11-05

## 2024-11-05 RX ORDER — CARVEDILOL 6.25 MG/1
6.25 TABLET ORAL 2 TIMES DAILY
Qty: 60 TABLET | Refills: 0 | Status: SHIPPED | OUTPATIENT
Start: 2024-11-05

## 2024-11-05 NOTE — PROGRESS NOTES
S: 59 y.o. female with   Chief Complaint   Patient presents with    Medication Refill       58 yo here to fu on HBP. On Coreg and amlodipine.  Notes intermittent light headedness. (Frequency?)   On insulin d/t pancreatectomy for serous pancreatic cyst removed early 2024.      O: VS:  height is 1.626 m (5' 4\") and weight is 63.5 kg (140 lb). Her temporal temperature is 97.7 °F (36.5 °C). Her blood pressure is 117/79 and her pulse is 93. Her respiration is 16 and oxygen saturation is 97%.   BP Readings from Last 3 Encounters:   11/05/24 117/79   10/31/24 104/68   09/19/24 100/62     See resident note      Impression/Plan:  1) Lightheaded spells-due to BP dropping or BS dropping?  Have patient check BP and BS during episodes of lightheadedness to   clear this question.  2) HBP   Decrease Coreg dose from 12.5 mg twice daily to 6.25 mg twice daily.    Continue amlodipine at 2.5 mg daily.    (If BP is truly dropping during these episodes-working to get rid of the   twice daily medicine rather than the daily medicine).  3) IDDM - HbA1C increased from 6.6 to 9.5.   Currently on 10 U Lantis qHS + SS Lispro 3-4 x/day   Recently saw Dr. Fox - referred to endocrinologist to be put on an   insulin pump  Increase Lantus from 10 to 14 units nightly.  Patient to notify us if any   problems with hypoglycemia.  4) Follow-up 1 month-sooner if problems.      Health Maintenance Due   Topic Date Due    HIV screen  Never done    Respiratory Syncytial Virus (RSV) Pregnant or age 60 yrs+ (1 - 1-dose 60+ series) Never done         Attending Physician Statement  I have discussed the case, including pertinent history and exam findings with the resident. I agree with the documented assessment and plan.      Ortiz Daugherty MD
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
## 2024-11-25 DIAGNOSIS — F39 MOOD DISORDER (HCC): ICD-10-CM

## 2024-11-25 RX ORDER — BUPROPION HYDROCHLORIDE 150 MG/1
150 TABLET ORAL EVERY MORNING
Qty: 90 TABLET | Refills: 0 | Status: SHIPPED | OUTPATIENT
Start: 2024-11-25

## 2024-11-25 NOTE — TELEPHONE ENCOUNTER
Name of Medication(s) Requested:  Requested Prescriptions     Pending Prescriptions Disp Refills    buPROPion (WELLBUTRIN XL) 150 MG extended release tablet 90 tablet 0     Sig: Take 1 tablet by mouth every morning       Medication is on current medication list Yes    Dosage and directions were verified? Yes    Quantity verified: 90 day supply     Pharmacy Verified?  Yes    Last Appointment:  11/5/2024    Future appts:  Future Appointments   Date Time Provider Department Center   12/2/2024  2:40 PM Mekhi Escalona MD [place] [facility] DEP   12/9/2024  1:00 PM Mekhi Escalona MD [place] [facility]   1/20/2025 10:20 AM Dory Pacheco, DO [place] SLEEP [facility]   1/21/2025  1:00 PM James Judge APRN - CNP [facility]   3/18/2025 10:45 AM Jonathan Curiel MD [place] ENDO [facility]   4/17/2025 10:00 AM [facility]        (If no appt send self scheduling link. .REFILLAPPT)  Scheduling request sent?     [] Yes  [x] No    Does patient need updated?  [] Yes  [x] No

## 2024-12-01 DIAGNOSIS — I10 ESSENTIAL HYPERTENSION: ICD-10-CM

## 2024-12-02 ENCOUNTER — OFFICE VISIT (OUTPATIENT)
Dept: FAMILY MEDICINE CLINIC | Age: 59
End: 2024-12-02

## 2024-12-02 VITALS
HEART RATE: 78 BPM | HEIGHT: 64 IN | SYSTOLIC BLOOD PRESSURE: 120 MMHG | OXYGEN SATURATION: 97 % | RESPIRATION RATE: 16 BRPM | DIASTOLIC BLOOD PRESSURE: 75 MMHG | WEIGHT: 135 LBS | TEMPERATURE: 98 F | BODY MASS INDEX: 23.05 KG/M2

## 2024-12-02 DIAGNOSIS — I10 ESSENTIAL HYPERTENSION: ICD-10-CM

## 2024-12-02 DIAGNOSIS — L29.9 ITCH: ICD-10-CM

## 2024-12-02 DIAGNOSIS — F39 MOOD DISORDER (HCC): ICD-10-CM

## 2024-12-02 DIAGNOSIS — K86.89 PANCREATIC INSUFFICIENCY: ICD-10-CM

## 2024-12-02 RX ORDER — INSULIN GLARGINE 100 [IU]/ML
14 INJECTION, SOLUTION SUBCUTANEOUS NIGHTLY
Qty: 5 ADJUSTABLE DOSE PRE-FILLED PEN SYRINGE | Refills: 0 | Status: SHIPPED | OUTPATIENT
Start: 2024-12-02

## 2024-12-02 RX ORDER — ACYCLOVIR 800 MG/1
1 TABLET ORAL 3 TIMES DAILY
Qty: 1 EACH | Refills: 2 | Status: CANCELLED | OUTPATIENT
Start: 2024-12-02

## 2024-12-02 RX ORDER — ACYCLOVIR 400 MG/1
1 TABLET ORAL 3 TIMES DAILY
Qty: 1 EACH | Refills: 0 | Status: SHIPPED | OUTPATIENT
Start: 2024-12-02

## 2024-12-02 RX ORDER — AMLODIPINE BESYLATE 5 MG/1
2.5 TABLET ORAL DAILY
Qty: 30 TABLET | Refills: 2 | Status: SHIPPED | OUTPATIENT
Start: 2024-12-02

## 2024-12-02 RX ORDER — CARVEDILOL 6.25 MG/1
6.25 TABLET ORAL 2 TIMES DAILY
Qty: 180 TABLET | Refills: 1 | Status: SHIPPED | OUTPATIENT
Start: 2024-12-02

## 2024-12-02 RX ORDER — INSULIN LISPRO 100 [IU]/ML
4 INJECTION, SOLUTION INTRAVENOUS; SUBCUTANEOUS
Qty: 1 ADJUSTABLE DOSE PRE-FILLED PEN SYRINGE | Refills: 1 | Status: SHIPPED | OUTPATIENT
Start: 2024-12-02

## 2024-12-02 RX ORDER — HYDROXYZINE HYDROCHLORIDE 25 MG/1
25 TABLET, FILM COATED ORAL EVERY 8 HOURS PRN
Qty: 30 TABLET | Refills: 1 | Status: SHIPPED | OUTPATIENT
Start: 2024-12-02

## 2024-12-02 RX ORDER — BUPROPION HYDROCHLORIDE 300 MG/1
300 TABLET ORAL EVERY MORNING
Qty: 30 TABLET | Refills: 1 | Status: SHIPPED | OUTPATIENT
Start: 2024-12-02

## 2024-12-02 NOTE — PATIENT INSTRUCTIONS
Sliding scale insulin  201-250: 2 units  251-300: 4 units  301-350: 6 units  350 plus: 8 units     Fixed meal time insulin (Humalog):  Take 4 units TID with meals only      If blood sugar > 500 units please give us a call

## 2024-12-03 RX ORDER — CARVEDILOL 6.25 MG/1
6.25 TABLET ORAL 2 TIMES DAILY
Qty: 60 TABLET | Refills: 0 | OUTPATIENT
Start: 2024-12-03

## 2024-12-19 NOTE — PROGRESS NOTES
[facility]  Department of Family Medicine  Family Medicine Residency Program      Patient: Leatha Willard 59 y.o. female     Date of Service: 12/19/24      Chief complaint:   Chief Complaint   Patient presents with    Diabetes     Feels Dexcom is not accurate based on finger stick comparison    Mood Disorder    Nicotine Dependence     Interested in the patch, has helped in the past       HISTORY OF PRESENTING ILLNESS     58-year-old female with past medical history of hypertension, tobacco abuse, obstructive sleep apnea, hyperlipidemia, prediabetes, pancreatic cyst status post Whipple surgery in April 2024, status post tracheostomy and decannulation, status post pancreatectomy and splenectomy presenting to the clinic to follow up on diabetes:     DM  Was put on a new sliding scale regimen on 12/02/24:  Patient had been compliant to the insulin regimen discussed at last visit  Brought her log of sugars  Got Dexcom monitor on December 13, was still pricking her fingers 3-4 times a day.  Noted significant variation between CGM glucose reading and fingerstick glucose readings.  Patient checks her sugars after having her meals and gives sliding scale insulin accordingly.  Discussed with patient that the Dexcom monitor eventually catches up to the fingerstick glucose readings, the reading at that incident can give lower glucose however the trend is accurate and she should continue wearing the CGM monitor.  Hypoglycemia: Patient felt nauseous, sweaty just once in the middle of the night as opposed to 4 or 5 episodes previously.  CGM reading at that time was in the 50s however her fingerstick glucose was in the 90s.    Mood disorder  Mood has been stable on the Wellbutrin  mg daily.      Health Maintenance:  Health Maintenance Due   Topic Date Due    Diabetic foot exam  Never done    HIV screen  Never done    Diabetic Alb to Cr ratio (uACR) test  Never done    Diabetic retinal exam  Never done

## 2024-12-20 ENCOUNTER — OFFICE VISIT (OUTPATIENT)
Dept: FAMILY MEDICINE CLINIC | Age: 59
End: 2024-12-20
Payer: COMMERCIAL

## 2024-12-20 VITALS
HEART RATE: 70 BPM | SYSTOLIC BLOOD PRESSURE: 126 MMHG | HEIGHT: 64 IN | WEIGHT: 138 LBS | BODY MASS INDEX: 23.56 KG/M2 | OXYGEN SATURATION: 98 % | DIASTOLIC BLOOD PRESSURE: 74 MMHG | RESPIRATION RATE: 18 BRPM | TEMPERATURE: 97.1 F

## 2024-12-20 DIAGNOSIS — K86.89 PANCREATIC INSUFFICIENCY: Primary | ICD-10-CM

## 2024-12-20 DIAGNOSIS — E08.9 DIABETES MELLITUS DUE TO UNDERLYING CONDITION WITHOUT COMPLICATION, UNSPECIFIED WHETHER LONG TERM INSULIN USE (HCC): ICD-10-CM

## 2024-12-20 PROCEDURE — 4004F PT TOBACCO SCREEN RCVD TLK: CPT

## 2024-12-20 PROCEDURE — G8484 FLU IMMUNIZE NO ADMIN: HCPCS

## 2024-12-20 PROCEDURE — 3074F SYST BP LT 130 MM HG: CPT

## 2024-12-20 PROCEDURE — G8420 CALC BMI NORM PARAMETERS: HCPCS

## 2024-12-20 PROCEDURE — G8427 DOCREV CUR MEDS BY ELIG CLIN: HCPCS

## 2024-12-20 PROCEDURE — 3017F COLORECTAL CA SCREEN DOC REV: CPT

## 2024-12-20 PROCEDURE — 99213 OFFICE O/P EST LOW 20 MIN: CPT

## 2024-12-20 PROCEDURE — 3078F DIAST BP <80 MM HG: CPT

## 2024-12-20 RX ORDER — ACYCLOVIR 400 MG/1
1 TABLET ORAL 3 TIMES DAILY
Qty: 2 EACH | Refills: 2 | Status: SHIPPED | OUTPATIENT
Start: 2024-12-20

## 2024-12-20 RX ORDER — INSULIN LISPRO 100 [IU]/ML
INJECTION, SOLUTION INTRAVENOUS; SUBCUTANEOUS
Qty: 1 ADJUSTABLE DOSE PRE-FILLED PEN SYRINGE | Refills: 2 | Status: SHIPPED | OUTPATIENT
Start: 2024-12-20

## 2024-12-20 NOTE — PATIENT INSTRUCTIONS
Sliding Scale  For blood glucose >150, inject additional insulin as below ; 0-7  Units into the skin up to 3 times daily (with meals).     Low  Dose Corrective Algorithm/ Sliding Scale  Glucose: Dose:  If <139              No additional insulin  140-199  1 Units extra  200-249  2 Units  250-299  3 Units  300-349  4 Units  350-400  5 Units  Above 500 give us a call

## 2024-12-20 NOTE — PROGRESS NOTES
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   She has complications of new onset diabetes  Was having some hypoglycemic episodes  Lab Results   Component Value Date/Time    LABA1C 9.5 11/05/2024 01:30 PM    LABA1C 6.6 06/19/2024 09:38 AM    LABA1C 5.8 04/18/2024 11:31 PM     She was having some hypoglycemic episodes - dose of lantus was reduced to 14 units  She got the dexcom monitor     ROS otherwise as per resident note     Past medical, surgical, family and social history were reviewed, non-contributory, and unchanged unless otherwise stated.    Objective:    /74   Pulse 70   Temp 97.1 °F (36.2 °C) (Temporal)   Resp 18   Ht 1.626 m (5' 4\")   Wt 62.6 kg (138 lb)   SpO2 98%   BMI 23.69 kg/m²     Exam is as noted by resident with the following changes, additions or corrections:    General:  NAD; alert & oriented x 3   Heart:  RRR, no murmurs, gallops, or rubs.  Lungs:  CTA bilaterally, no wheeze, rales or rhonchi  Extrem:  No clubbing, cyanosis, or edema    Assessment/Plan:  Diabetes with hypo and hyperglycemia   Adjust timing of sliding scale insulin -s he is taking it separate from mealtime insulin   Continue CGM - explained difference in readings  Diabetes education referral     Attending Physician Statement  I have reviewed the chart, including any radiology or labs. I have discussed the case, including pertinent history and exam findings with the resident.  I agree with the assessment, plan and orders as documented by the resident.  Please refer to the resident note for additional information.      Electronically signed by Marisol Gibbs MD on 12/20/2024 at 1:50 PM

## 2024-12-23 RX ORDER — NICOTINE 21 MG/24HR
1 PATCH, TRANSDERMAL 24 HOURS TRANSDERMAL DAILY
Qty: 14 PATCH | Refills: 0 | Status: SHIPPED | OUTPATIENT
Start: 2024-12-23 | End: 2025-01-06

## 2025-01-10 ENCOUNTER — TELEPHONE (OUTPATIENT)
Dept: FAMILY MEDICINE CLINIC | Age: 60
End: 2025-01-10

## 2025-01-10 DIAGNOSIS — I10 ESSENTIAL HYPERTENSION: ICD-10-CM

## 2025-01-10 RX ORDER — BUPROPION HYDROCHLORIDE 300 MG/1
300 TABLET ORAL EVERY MORNING
Qty: 30 TABLET | Refills: 1 | OUTPATIENT
Start: 2025-01-10

## 2025-01-10 RX ORDER — CARVEDILOL 6.25 MG/1
6.25 TABLET ORAL 2 TIMES DAILY
Qty: 180 TABLET | Refills: 1 | OUTPATIENT
Start: 2025-01-10

## 2025-01-10 NOTE — TELEPHONE ENCOUNTER
Name of Medication(s) Requested:  Requested Prescriptions     Pending Prescriptions Disp Refills    nicotine (NICODERM CQ) 14 MG/24HR 14 patch 0     Sig: Place 1 patch onto the skin daily for 14 days       Medication is on current medication list Yes    Dosage and directions were verified? Yes    Quantity verified: 30 day supply     Pharmacy Verified?  Yes    Last Appointment:  12/20/2024    Future appts:  Future Appointments   Date Time Provider Department Center   1/20/2025 10:20 AM Dory Pacheco DO POLAND SLEEP [facility]   1/21/2025  1:00 PM [facility]   1/31/2025  1:00 PM [facility]   2/18/2025  5:30 PM NIA CARPIO ED EDUCATOR [facility] H[facility]   2/19/2025  5:30 PM NIA CARPIO ED EDUCATOR [facility] HOD   2/20/2025  5:30 PM NIA CARPIO ED EDUCATOR [facility]   3/18/2025 10:45 AM Jonathan Curiel MD BDM ENDO [facility]   4/17/2025 10:00 AM Norma Rogers MD N LIMA SURG [facility]        (If no appt send self scheduling link. .REFILLAPPT)  Scheduling request sent?     [] Yes  [] No    Does patient need updated?  [] Yes  [] No

## 2025-01-10 NOTE — TELEPHONE ENCOUNTER
Refill @ lunch time    Nicoderm CQ 14MG/24HR    [place] @ [facility]    Last Appointment   12/20/2024  Next Appointment  1/31/2025

## 2025-01-13 RX ORDER — NICOTINE 21 MG/24HR
1 PATCH, TRANSDERMAL 24 HOURS TRANSDERMAL DAILY
Qty: 14 PATCH | Refills: 0 | Status: SHIPPED | OUTPATIENT
Start: 2025-01-13 | End: 2025-01-27

## 2025-01-17 ASSESSMENT — SLEEP AND FATIGUE QUESTIONNAIRES
HOW LIKELY ARE YOU TO NOD OFF OR FALL ASLEEP WHILE SITTING INACTIVE IN A PUBLIC PLACE: WOULD NEVER DOZE
HOW LIKELY ARE YOU TO NOD OFF OR FALL ASLEEP WHILE SITTING QUIETLY AFTER LUNCH WITHOUT ALCOHOL: WOULD NEVER DOZE
HOW LIKELY ARE YOU TO NOD OFF OR FALL ASLEEP WHILE LYING DOWN TO REST IN THE AFTERNOON WHEN CIRCUMSTANCES PERMIT: SLIGHT CHANCE OF DOZING
HOW LIKELY ARE YOU TO NOD OFF OR FALL ASLEEP IN A CAR, WHILE STOPPED FOR A FEW MINUTES IN TRAFFIC: WOULD NEVER DOZE
HOW LIKELY ARE YOU TO NOD OFF OR FALL ASLEEP WHILE WATCHING TV: MODERATE CHANCE OF DOZING
HOW LIKELY ARE YOU TO NOD OFF OR FALL ASLEEP WHILE SITTING AND READING: WOULD NEVER DOZE
HOW LIKELY ARE YOU TO NOD OFF OR FALL ASLEEP WHILE SITTING INACTIVE IN A PUBLIC PLACE: WOULD NEVER DOZE
HOW LIKELY ARE YOU TO NOD OFF OR FALL ASLEEP WHILE WATCHING TV: MODERATE CHANCE OF DOZING
HOW LIKELY ARE YOU TO NOD OFF OR FALL ASLEEP WHEN YOU ARE A PASSENGER IN A CAR FOR AN HOUR WITHOUT A BREAK: WOULD NEVER DOZE
HOW LIKELY ARE YOU TO NOD OFF OR FALL ASLEEP IN A CAR, WHILE STOPPED FOR A FEW MINUTES IN TRAFFIC: WOULD NEVER DOZE
HOW LIKELY ARE YOU TO NOD OFF OR FALL ASLEEP WHILE SITTING QUIETLY AFTER LUNCH WITHOUT ALCOHOL: WOULD NEVER DOZE
HOW LIKELY ARE YOU TO NOD OFF OR FALL ASLEEP WHILE SITTING AND READING: WOULD NEVER DOZE
HOW LIKELY ARE YOU TO NOD OFF OR FALL ASLEEP WHEN YOU ARE A PASSENGER IN A CAR FOR AN HOUR WITHOUT A BREAK: WOULD NEVER DOZE
HOW LIKELY ARE YOU TO NOD OFF OR FALL ASLEEP WHILE SITTING AND TALKING TO SOMEONE: WOULD NEVER DOZE
ESS TOTAL SCORE: 3
HOW LIKELY ARE YOU TO NOD OFF OR FALL ASLEEP WHILE LYING DOWN TO REST IN THE AFTERNOON WHEN CIRCUMSTANCES PERMIT: SLIGHT CHANCE OF DOZING
HOW LIKELY ARE YOU TO NOD OFF OR FALL ASLEEP WHILE SITTING AND TALKING TO SOMEONE: WOULD NEVER DOZE

## 2025-01-20 ENCOUNTER — TELEMEDICINE (OUTPATIENT)
Dept: SLEEP MEDICINE | Age: 60
End: 2025-01-20
Payer: COMMERCIAL

## 2025-01-20 DIAGNOSIS — R53.83 OTHER FATIGUE: ICD-10-CM

## 2025-01-20 DIAGNOSIS — G47.33 OSA (OBSTRUCTIVE SLEEP APNEA): Primary | ICD-10-CM

## 2025-01-20 PROCEDURE — 4004F PT TOBACCO SCREEN RCVD TLK: CPT | Performed by: INTERNAL MEDICINE

## 2025-01-20 PROCEDURE — 3017F COLORECTAL CA SCREEN DOC REV: CPT | Performed by: INTERNAL MEDICINE

## 2025-01-20 PROCEDURE — 99214 OFFICE O/P EST MOD 30 MIN: CPT | Performed by: INTERNAL MEDICINE

## 2025-01-20 PROCEDURE — G8420 CALC BMI NORM PARAMETERS: HCPCS | Performed by: INTERNAL MEDICINE

## 2025-01-20 PROCEDURE — G8427 DOCREV CUR MEDS BY ELIG CLIN: HCPCS | Performed by: INTERNAL MEDICINE

## 2025-01-20 ASSESSMENT — ENCOUNTER SYMPTOMS
ALLERGIC/IMMUNOLOGIC NEGATIVE: 1
BACK PAIN: 1
GASTROINTESTINAL NEGATIVE: 1
RESPIRATORY NEGATIVE: 1
EYES NEGATIVE: 1

## 2025-01-20 NOTE — PROGRESS NOTES
Units  350-400  5 Units  Above 500 give us a call 1 Adjustable Dose Pre-filled Pen Syringe 2    Continuous Glucose Sensor (DEXCOM G7 SENSOR) MISC 1 each by Does not apply route 3 times daily 2 each 2    carvedilol (COREG) 6.25 MG tablet Take 1 tablet by mouth 2 times daily 180 tablet 1    melatonin 3 MG TABS tablet Take 1 tablet by mouth at bedtime 60 tablet 0    insulin glargine (LANTUS SOLOSTAR) 100 UNIT/ML injection pen Inject 14 Units into the skin nightly 5 Adjustable Dose Pre-filled Pen Syringe 0    hydrOXYzine HCl (ATARAX) 25 MG tablet Take 1 tablet by mouth every 8 hours as needed for Itching 30 tablet 1    amLODIPine (NORVASC) 5 MG tablet Take 0.5 tablets by mouth daily 30 tablet 2    buPROPion (WELLBUTRIN XL) 300 MG extended release tablet Take 1 tablet by mouth every morning 30 tablet 1    Continuous Glucose Sensor (DEXCOM G7 SENSOR) MISC 1 each by Does not apply route in the morning, at noon, and at bedtime 1 each 0    Continuous Glucose  (DEXCOM G7 ) MAHENDRA 1 each by Does not apply route in the morning, at noon, and at bedtime 1 each 0    insulin lispro, 1 Unit Dial, (HUMALOG KWIKPEN) 100 UNIT/ML SOPN Inject 4 Units into the skin 3 times daily (before meals) 1 Adjustable Dose Pre-filled Pen Syringe 1    ASPIRIN LOW DOSE 81 MG EC tablet TAKE 1 TABLET BY MOUTH DAILY 90 tablet 0    lipase-protease-amylase (CREON) 45819-928607 units CPEP delayed release capsule Take 4 capsules by mouth 3 times daily (with meals) 300 capsule 5    atorvastatin (LIPITOR) 10 MG tablet Take 1 tablet by mouth at bedtime 60 tablet 2    ketotifen fumarate (ZADITOR) 0.035 % ophthalmic solution Place 1 drop into both eyes 2 times daily 1 each 1    olopatadine (PATADAY) 0.2 % SOLN ophthalmic solution Place 1 drop into both eyes daily as needed (dry eyes) 1 each 2     No current facility-administered medications for this visit.        Review of Systems  (Sleep ROS above)  Review of Systems   HENT: Negative.     Eyes:

## 2025-01-22 DIAGNOSIS — E08.9 DIABETES MELLITUS DUE TO UNDERLYING CONDITION WITHOUT COMPLICATION, UNSPECIFIED WHETHER LONG TERM INSULIN USE (HCC): ICD-10-CM

## 2025-01-22 RX ORDER — INSULIN LISPRO 100 [IU]/ML
INJECTION, SOLUTION INTRAVENOUS; SUBCUTANEOUS
Qty: 1 ADJUSTABLE DOSE PRE-FILLED PEN SYRINGE | Refills: 2 | Status: SHIPPED | OUTPATIENT
Start: 2025-01-22

## 2025-01-22 NOTE — TELEPHONE ENCOUNTER
Name of Medication(s) Requested:  Requested Prescriptions     Pending Prescriptions Disp Refills    insulin lispro, 1 Unit Dial, (HUMALOG KWIKPEN) 100 UNIT/ML SOPN 1 Adjustable Dose Pre-filled Pen Syringe 2     Sig: Sliding Scale For blood glucose >150       Medication is on current medication list Yes    Dosage and directions were verified? Yes    Quantity verified: 90 day supply     Pharmacy Verified?  Yes    Last Appointment:  12/20/2024    Future appts:  Future Appointments   Date Time Provider Department Center   1/31/2025  1:00 PM Mekhi Escalona MD [facility] ECC DEP   2/18/2025  5:30 PM BRANDON DIAB ED EDUCATOR [facility] [place] HOD   2/19/2025  5:30 PM SEB DIAB ED EDUCATOR [facility] HOD   2/20/2025  5:30 PM BRANDON DIAB ED EDUCATOR [facility]   2/26/2025  1:30 PM [facility] AUDIO [facility]   2/26/2025  1:45 PM [facility]   3/18/2025 10:45 AM Jonathan Curiel MD BDM ENDO [facility]   4/17/2025 10:00 AM Norma Rogers MD N GRANDA SURG [facility]   1/19/2026 10:40 AM Dory Pacheco DO POLAND SLEEP [facility]        (If no appt send self scheduling link. .REFILLAPPT)  Scheduling request sent?     [] Yes  [] No    Does patient need updated?  [] Yes  [] No

## 2025-01-28 SDOH — ECONOMIC STABILITY: FOOD INSECURITY: WITHIN THE PAST 12 MONTHS, YOU WORRIED THAT YOUR FOOD WOULD RUN OUT BEFORE YOU GOT MONEY TO BUY MORE.: NEVER TRUE

## 2025-01-28 SDOH — ECONOMIC STABILITY: INCOME INSECURITY: IN THE LAST 12 MONTHS, WAS THERE A TIME WHEN YOU WERE NOT ABLE TO PAY THE MORTGAGE OR RENT ON TIME?: NO

## 2025-01-28 SDOH — ECONOMIC STABILITY: FOOD INSECURITY: WITHIN THE PAST 12 MONTHS, THE FOOD YOU BOUGHT JUST DIDN'T LAST AND YOU DIDN'T HAVE MONEY TO GET MORE.: SOMETIMES TRUE

## 2025-01-28 SDOH — ECONOMIC STABILITY: TRANSPORTATION INSECURITY
IN THE PAST 12 MONTHS, HAS THE LACK OF TRANSPORTATION KEPT YOU FROM MEDICAL APPOINTMENTS OR FROM GETTING MEDICATIONS?: NO

## 2025-01-28 ASSESSMENT — PATIENT HEALTH QUESTIONNAIRE - PHQ9
SUM OF ALL RESPONSES TO PHQ9 QUESTIONS 1 & 2: 6
10. IF YOU CHECKED OFF ANY PROBLEMS, HOW DIFFICULT HAVE THESE PROBLEMS MADE IT FOR YOU TO DO YOUR WORK, TAKE CARE OF THINGS AT HOME, OR GET ALONG WITH OTHER PEOPLE: SOMEWHAT DIFFICULT
SUM OF ALL RESPONSES TO PHQ QUESTIONS 1-9: 18
10. IF YOU CHECKED OFF ANY PROBLEMS, HOW DIFFICULT HAVE THESE PROBLEMS MADE IT FOR YOU TO DO YOUR WORK, TAKE CARE OF THINGS AT HOME, OR GET ALONG WITH OTHER PEOPLE: SOMEWHAT DIFFICULT
2. FEELING DOWN, DEPRESSED OR HOPELESS: NEARLY EVERY DAY
SUM OF ALL RESPONSES TO PHQ QUESTIONS 1-9: 18
4. FEELING TIRED OR HAVING LITTLE ENERGY: NEARLY EVERY DAY
8. MOVING OR SPEAKING SO SLOWLY THAT OTHER PEOPLE COULD HAVE NOTICED. OR THE OPPOSITE - BEING SO FIDGETY OR RESTLESS THAT YOU HAVE BEEN MOVING AROUND A LOT MORE THAN USUAL: SEVERAL DAYS
4. FEELING TIRED OR HAVING LITTLE ENERGY: NEARLY EVERY DAY
6. FEELING BAD ABOUT YOURSELF - OR THAT YOU ARE A FAILURE OR HAVE LET YOURSELF OR YOUR FAMILY DOWN: SEVERAL DAYS
7. TROUBLE CONCENTRATING ON THINGS, SUCH AS READING THE NEWSPAPER OR WATCHING TELEVISION: SEVERAL DAYS
3. TROUBLE FALLING OR STAYING ASLEEP: NEARLY EVERY DAY
9. THOUGHTS THAT YOU WOULD BE BETTER OFF DEAD, OR OF HURTING YOURSELF: NOT AT ALL
6. FEELING BAD ABOUT YOURSELF - OR THAT YOU ARE A FAILURE OR HAVE LET YOURSELF OR YOUR FAMILY DOWN: SEVERAL DAYS
SUM OF ALL RESPONSES TO PHQ QUESTIONS 1-9: 18
1. LITTLE INTEREST OR PLEASURE IN DOING THINGS: NEARLY EVERY DAY
5. POOR APPETITE OR OVEREATING: NEARLY EVERY DAY
SUM OF ALL RESPONSES TO PHQ QUESTIONS 1-9: 18
1. LITTLE INTEREST OR PLEASURE IN DOING THINGS: NEARLY EVERY DAY
7. TROUBLE CONCENTRATING ON THINGS, SUCH AS READING THE NEWSPAPER OR WATCHING TELEVISION: SEVERAL DAYS
3. TROUBLE FALLING OR STAYING ASLEEP: NEARLY EVERY DAY
5. POOR APPETITE OR OVEREATING: NEARLY EVERY DAY
2. FEELING DOWN, DEPRESSED OR HOPELESS: NEARLY EVERY DAY
8. MOVING OR SPEAKING SO SLOWLY THAT OTHER PEOPLE COULD HAVE NOTICED. OR THE OPPOSITE, BEING SO FIGETY OR RESTLESS THAT YOU HAVE BEEN MOVING AROUND A LOT MORE THAN USUAL: SEVERAL DAYS
SUM OF ALL RESPONSES TO PHQ QUESTIONS 1-9: 18
9. THOUGHTS THAT YOU WOULD BE BETTER OFF DEAD, OR OF HURTING YOURSELF: NOT AT ALL

## 2025-01-31 ENCOUNTER — OFFICE VISIT (OUTPATIENT)
Dept: FAMILY MEDICINE CLINIC | Age: 60
End: 2025-01-31

## 2025-01-31 VITALS
HEART RATE: 71 BPM | RESPIRATION RATE: 16 BRPM | HEIGHT: 64 IN | WEIGHT: 131 LBS | TEMPERATURE: 97.7 F | OXYGEN SATURATION: 98 % | SYSTOLIC BLOOD PRESSURE: 111 MMHG | DIASTOLIC BLOOD PRESSURE: 74 MMHG | BODY MASS INDEX: 22.36 KG/M2

## 2025-01-31 DIAGNOSIS — E78.5 HYPERLIPIDEMIA, UNSPECIFIED HYPERLIPIDEMIA TYPE: ICD-10-CM

## 2025-01-31 DIAGNOSIS — L29.9 ITCH: ICD-10-CM

## 2025-01-31 DIAGNOSIS — I10 ESSENTIAL HYPERTENSION: ICD-10-CM

## 2025-01-31 DIAGNOSIS — E08.9 DIABETES MELLITUS DUE TO UNDERLYING CONDITION WITHOUT COMPLICATION, UNSPECIFIED WHETHER LONG TERM INSULIN USE (HCC): ICD-10-CM

## 2025-01-31 DIAGNOSIS — K86.89 PANCREATIC INSUFFICIENCY: ICD-10-CM

## 2025-01-31 RX ORDER — INSULIN LISPRO 100 [IU]/ML
3 INJECTION, SOLUTION INTRAVENOUS; SUBCUTANEOUS
Qty: 1 ADJUSTABLE DOSE PRE-FILLED PEN SYRINGE | Refills: 2 | Status: SHIPPED | OUTPATIENT
Start: 2025-01-31

## 2025-01-31 RX ORDER — ATORVASTATIN CALCIUM 10 MG/1
10 TABLET, FILM COATED ORAL NIGHTLY
Qty: 60 TABLET | Refills: 2 | Status: SHIPPED | OUTPATIENT
Start: 2025-01-31

## 2025-01-31 RX ORDER — ACYCLOVIR 400 MG/1
1 TABLET ORAL 3 TIMES DAILY
Qty: 2 EACH | Refills: 2 | Status: SHIPPED | OUTPATIENT
Start: 2025-01-31

## 2025-01-31 RX ORDER — AMLODIPINE BESYLATE 5 MG/1
2.5 TABLET ORAL DAILY
Qty: 30 TABLET | Refills: 2 | Status: SHIPPED | OUTPATIENT
Start: 2025-01-31

## 2025-01-31 RX ORDER — INSULIN GLARGINE 100 [IU]/ML
15 INJECTION, SOLUTION SUBCUTANEOUS NIGHTLY
Qty: 5 ADJUSTABLE DOSE PRE-FILLED PEN SYRINGE | Refills: 2 | Status: SHIPPED | OUTPATIENT
Start: 2025-01-31

## 2025-01-31 RX ORDER — CYCLOBENZAPRINE HCL 10 MG
10 TABLET ORAL 3 TIMES DAILY PRN
COMMUNITY
Start: 2025-01-29

## 2025-01-31 RX ORDER — NICOTINE 21 MG/24HR
1 PATCH, TRANSDERMAL 24 HOURS TRANSDERMAL DAILY
Qty: 14 PATCH | Refills: 0 | Status: CANCELLED | OUTPATIENT
Start: 2025-01-31 | End: 2025-02-14

## 2025-01-31 RX ORDER — HYDROXYZINE HYDROCHLORIDE 25 MG/1
25 TABLET, FILM COATED ORAL EVERY 8 HOURS PRN
Qty: 30 TABLET | Refills: 1 | Status: SHIPPED | OUTPATIENT
Start: 2025-01-31

## 2025-01-31 RX ORDER — ASPIRIN 81 MG/1
81 TABLET ORAL DAILY
Qty: 90 TABLET | Refills: 2 | Status: SHIPPED | OUTPATIENT
Start: 2025-01-31

## 2025-01-31 NOTE — PROGRESS NOTES
S: 59 y.o. female with   Chief Complaint   Patient presents with    Diabetes       DM - insulin dependent.  She was having hypoglycemic events. She had adjustments at that time.     O: VS:  height is 1.626 m (5' 4\") and weight is 59.4 kg (131 lb). Her temporal temperature is 97.7 °F (36.5 °C). Her blood pressure is 111/74 and her pulse is 71. Her respiration is 16 and oxygen saturation is 98%.   BP Readings from Last 3 Encounters:   01/31/25 111/74   12/20/24 126/74   12/02/24 120/75     See resident note      Impression/Plan:   1) DM - adjust slightly - reduce mealtime slightly  2) htn - decrease meds         Health Maintenance Due   Topic Date Due    Diabetic foot exam  Never done    HIV screen  Never done    Diabetic Alb to Cr ratio (uACR) test  Never done    Diabetic retinal exam  Never done    Respiratory Syncytial Virus (RSV) Pregnant or age 60 yrs+ (1 - Risk 60-74 years 1-dose series) Never done    A1C test (Diabetic or Prediabetic)  02/05/2025    Lipids  02/22/2025         Attending Physician Statement  I have discussed the case, including pertinent history and exam findings with the resident. I also have seen the patient and performed key portions of the examination.  I agree with the documented assessment and plan.      James Cordova MD

## 2025-01-31 NOTE — PROGRESS NOTES
[facility]  Department of Family Medicine  Family Medicine Residency Program      Patient: Leatha Willard 59 y.o. female     Date of Service: 1/31/25      Chief complaint:   Chief Complaint   Patient presents with    Diabetes       HISTORY OF PRESENTING ILLNESS   58-year-old female with past medical history of hypertension, tobacco abuse, obstructive sleep apnea, hyperlipidemia, prediabetes, pancreatic cyst status post Whipple surgery in April 2024, status post tracheostomy and decannulation, status post pancreatectomy and splenectomy presenting to the clinic for the chief complaint mentioned above.    Diabetes in the setting of pancreatectomy (whipples)  Feels like she is doing better overall  Has been getting more comfortable with the CGM  Sugars have been slightly low in the 80s, sometimes 70s but patient has not had concerning symptoms such as tremors, diaphoresis as before.  Doing 14 units of Lantus nightly and 4 units of lispro TID with meals. Also doing the sliding scale.  last A1c: On 11/5/2024: Was 9.5  Does snack on pretzels here and there  Working on improving her diet  Has appointment coming up with Diabetes educator and endocrinology    HTN  Compliant to coreg 6.25 mg BID and  amlodipine 2.5 mg BID.   Denies light headedness but feels like pressures are dropping a little low  Is okay with stopping the coreg    Smoking cigarettes  4 cigarettes a day  Will cut down the cigarette to 3 cigarettes a day in the next two weeks  Needs refill on the nicotine patch    Health maintenance  Will call for colonoscopy this year with Dr Hammond, had one last year which showed presence of large polyps.  Father had colon cancer     Health Maintenance:  Health Maintenance Due   Topic Date Due    Diabetic foot exam  Never done    HIV screen  Never done    Diabetic Alb to Cr ratio (uACR) test  Never done    Diabetic retinal exam  Never done    Respiratory Syncytial Virus (RSV) Pregnant or age 60

## 2025-02-03 RX ORDER — BUPROPION HYDROCHLORIDE 300 MG/1
300 TABLET ORAL EVERY MORNING
Qty: 30 TABLET | Refills: 1 | Status: SHIPPED | OUTPATIENT
Start: 2025-02-03

## 2025-02-03 NOTE — TELEPHONE ENCOUNTER
Name of Medication(s) Requested:  Requested Prescriptions     Pending Prescriptions Disp Refills    buPROPion (WELLBUTRIN XL) 300 MG extended release tablet 30 tablet 1     Sig: Take 1 tablet by mouth every morning       Medication is on current medication list Yes    Dosage and directions were verified? Yes    Quantity verified: 30 day supply     Pharmacy Verified?  Yes    Last Appointment:  1/31/2025    Future appts:  Future Appointments   Date Time Provider Department Center   2/18/2025  5:30 PM [facility] DIAB ED EDUCATOR [facility]   2/19/2025  5:30 PM SEB DIAB ED EDUCATOR [facility]   2/20/2025  5:30 PM [facility] DIAB ED EDUCATOR [facility]   2/26/2025  1:30 PM SCHEDULE, TASHI CH AUDIO [facility] AUDIO [facility]   2/26/2025  1:45 PM James Judge APRN - CNP [facility] ENT [facility]   3/5/2025 11:00 AM SCHEDULE, [facility] SE BDMN PC [facility] DEP   3/18/2025 10:45 AM Jonathan Curiel MD BDM ENDO [facility]   4/17/2025 10:00 AM Norma Rogers MD N LIMA SURG [facility]   5/2/2025  1:00 PM Mekhi Escalona MD [facility] DEP   1/19/2026 10:40 AM Dory Pacheco, DO [place] SLEEP [facility]        (If no appt send self scheduling link. .REFILLAPPT)  Scheduling request sent?     [] Yes  [x] No    Does patient need updated?  [] Yes  [x] No

## 2025-02-06 DIAGNOSIS — E08.9 DIABETES MELLITUS DUE TO UNDERLYING CONDITION WITHOUT COMPLICATION, UNSPECIFIED WHETHER LONG TERM INSULIN USE (HCC): ICD-10-CM

## 2025-02-06 RX ORDER — ACYCLOVIR 400 MG/1
1 TABLET ORAL 3 TIMES DAILY
Qty: 2 EACH | Refills: 2 | Status: CANCELLED | OUTPATIENT
Start: 2025-02-06

## 2025-02-06 NOTE — TELEPHONE ENCOUNTER
Name of Medication(s) Requested:  Requested Prescriptions     Pending Prescriptions Disp Refills    Continuous Glucose Sensor (DEXCOM G7 SENSOR) MISC 3 each 2     Si each by Does not apply route every 10 days       Medication is on current medication list Yes    Dosage and directions were verified? Yes    Quantity verified: 30 day supply     Pharmacy Verified?  Yes    Last Appointment:  2025    Future appts:  Future Appointments   Date Time Provider Department Center   2025  5:30 PM SEB DIAB ED EDUCATOR [facility]   2025  5:30 PM SEBZ DIAB ED EDUCATOR [facility]   2025  5:30 PM SEB DIAB ED EDUCATOR [facility]   2025  1:30 PM SCHEDULE, [facility]   2025  1:45 PM [facility]   3/5/2025 11:00 AM SCHEDULE, [facility] SE BDMN PC [facility] DEP   3/18/2025 10:45 AM Jonathan Curiel MD BDM ENDO [facility]   2025 10:00 AM Norma Rogers MD N LIMA SURG [facility]   2025  1:00 PM Mekhi Escalona MD [facility] DEP   2026 10:40 AM Dory Pacheco DO POLAND SLEEP [facility]        (If no appt send self scheduling link. .REFILLAPPT)  Scheduling request sent?     [] Yes  [x] No    Does patient need updated?  [] Yes  [x] No

## 2025-02-07 RX ORDER — ACYCLOVIR 400 MG/1
1 TABLET ORAL
Qty: 3 EACH | Refills: 2 | Status: SHIPPED | OUTPATIENT
Start: 2025-02-07

## 2025-02-17 NOTE — TELEPHONE ENCOUNTER
Name of Medication(s) Requested:  Requested Prescriptions     Pending Prescriptions Disp Refills    nicotine (NICODERM CQ) 7 MG/24HR 14 patch 0     Sig: Place 1 patch onto the skin daily for 14 days       Medication is on current medication list Yes    Dosage and directions were verified? Yes    Quantity verified: 30 day supply     Pharmacy Verified?  Yes    Last Appointment:  1/31/2025    Future appts:  Future Appointments   Date Time Provider Department Center   2/18/2025  5:30 PM SEB DIAB ED EDUCATOR [facility]   2/19/2025  5:30 PM SEB DIAB ED EDUCATOR [facility]   2/20/2025  5:[facility]   2/26/2025  1:30 PM SCHEDULE, TASHI CH AUDIO [facility] AUDIO [facility]   2/26/2025  1:45 PM [facility]   3/5/2025 11:00 AM SCHEDULE, [facility] SE BDMN PC [facility] DEP   3/18/2025 10:45 AM Jonathan Curiel MD BDM ENDO [facility]   4/17/2025 10:00 AM Norma Rogers MD N LIMA SURG [facility]   5/2/2025  1:00 PM Mekhi Escalona MD [facility] DEP   1/19/2026 10:40 AM Dory Pacheco DO POLAND SLEEP [facility]        (If no appt send self scheduling link. .REFILLAPPT)  Scheduling request sent?     [] Yes  [x] No    Does patient need updated?  [] Yes  [x] No

## 2025-02-18 ENCOUNTER — HOSPITAL ENCOUNTER (OUTPATIENT)
Dept: DIABETES SERVICES | Age: 60
Setting detail: THERAPIES SERIES
Discharge: HOME | End: 2025-02-18
Payer: COMMERCIAL

## 2025-02-18 PROCEDURE — G0109 DIAB MANAGE TRN IND/GROUP: HCPCS

## 2025-02-18 SDOH — ECONOMIC STABILITY: FOOD INSECURITY: ADDITIONAL INFORMATION: NO

## 2025-02-19 ENCOUNTER — HOSPITAL ENCOUNTER (OUTPATIENT)
Dept: DIABETES SERVICES | Age: 60
Setting detail: THERAPIES SERIES
Discharge: HOME | End: 2025-02-19
Payer: COMMERCIAL

## 2025-02-19 PROCEDURE — G0109 DIAB MANAGE TRN IND/GROUP: HCPCS

## 2025-02-20 ENCOUNTER — HOSPITAL ENCOUNTER (OUTPATIENT)
Dept: DIABETES SERVICES | Age: 60
Setting detail: THERAPIES SERIES
Discharge: HOME | End: 2025-02-20
Payer: COMMERCIAL

## 2025-02-20 PROCEDURE — G0109 DIAB MANAGE TRN IND/GROUP: HCPCS

## 2025-02-20 ASSESSMENT — PROBLEM AREAS IN DIABETES QUESTIONNAIRE (PAID)
WORRYING ABOUT THE FUTURE AND THE POSSIBILITY OF SERIOUS COMPLICATIONS: NOT A PROBLEM
COPING WITH COMPLICATIONS OF DIABETES: NOT A PROBLEM
FEELING SCARED WHEN YOU THINK ABOUT LIVING WITH DIABETES: NOT A PROBLEM
FEELING DEPRESSED WHEN YOU THINK ABOUT LIVING WITH DIABETES: NOT A PROBLEM
FEELING THAT DIABETES IS TAKING UP TOO MUCH OF YOUR MENTAL AND PHYSICAL ENERGY EVERY DAY: MINOR PROBLEM
PAID-5 TOTAL SCORE: 1

## 2025-02-20 NOTE — PROGRESS NOTES
for ketone testing &  when to call physician   -Identify severe weather/situation crisis  & diabetes supplies management 1 [x] All       []    []      []    4              Using medications safely:     -State effects of diabetes medicines on blood glucose levels;  -List diabetes medication taken, action & side effects 1 [x] All     []    []  4    Insulin/Injectables/glucagon    -Name appropriate injection sites; proper storage; supplies needed;   -Demonstrate proper technique 1 [x] All     []      []   4 2/18/25 KMS  Lispro s.s. TID meals  Lantus 15 units at night    Monitoring blood glucose, interpreting and using results:     -Identify the purpose of testing   -Identify recommended & personal blood glucose targets & HgbA1C target levels  -State the Importance of logging blood glucose levels for pattern recognition;   -State benefits of reading/using pt generated health data   -Verbalize safe lancet disposal/CGM  -Demonstrate proper testing technique 1 [x] All         []  []    []      []  []  []  4 2/18/25 KMS  Continuous glucose monitor  Monitors 4 times daily    Incorporating physical activity into lifestyle:     -State effect of exercise on blood glucose levels;   -State benefits of regular exercise;   -Define safety considerations/food choices if needed.  -Describe contraindications/maintenance of activity. 1 [x] All       []  []  []  []  4 2/18/25 KMS  Rides stationary bike daily    Incorporating nutritional management into lifestyle:     -Describe effect of type, amount & timing of food on blood glucose  -Describe methods for preparing and planning   healthy meals  -Correctly read food labels for nutritional values  -Name 3 foods high in Carbohydrate  -Plan a sample 4 carbohydrate-controlled meal using Diabetes Plate Method  -Verbalized ability to measure and count carbohydrate gram servings using food labels and carbohydrate food list.  -Plan a carbohydrate-controlled meal based on individual

## 2025-02-21 NOTE — PROGRESS NOTES
Diabetes Self-Management Education Record    Participant Name: Leatha Willard  Referring Provider: Mekhi Escalona MD    Leatha Willard is a 59 y.o. White female referred for diabetes self-management education. Participant has Type 1 DM for <1 year.     Assessment/Evaluation Ratings:  1=Needs Instruction   4=Demonstrates Understanding/Competency  2=Needs Review   NC=Not Covered    3=Comprehends Key Points  N/A=Not Applicable      Topics/Learning Objectives Pre-session Assess Date:  Instructor initials/date  2/18/25 KMS Instruction Provided    2/18/25 KMS 2/19/25 dch Yearly follow-up/  reinforcement date Post- session Eval Comments   Diabetes disease process & Treatment process:   -Define type of diabetes in simple terms.  - Describe the ABCs of  diabetes management  -Identify own type of diabetes  -Identify lifestyle changes/treatment options  -Other:  1 [x] All     []   []  []  []  []  []   4 2/18/25 KMS  Type 1 DM post Whipple surgery  A1C 9.5%   Developing strategies for Healthy coping/psychosocial issues:     -Describe feelings about living with diabetes  -Identify coping strategies and sources of stress  -Identify support needed & support network available  -Complete PAID-5 Diabetes questionnaire 1 [x] All       []  []  []  []  4 Date: 2/18/25 KMS  PAID-5 Score: 1    2/20/25 KMS  Discussed personal obstacles and lifestyle/behavior modifications that would be beneficial for patient to improve diabetes distress. Engaged in problem-solving using real-life scenarios and analysis of sample glucose log sheets. Reviewed healthy coping and stress reduction techniques. Participated in mindfulness exercises. Participant finished class by choosing a goal to work on for the next 3 months.        Prevention, detection & treatment of Chronic complications:    -Identify the prevention, detection and treatment for complications including immunizations, preventive eye, foot, dental and renal exams as indicated per the 
Diabetes Self-Management Education Record    Participant Name: Leatha Willard  Referring Provider: Mekhi Escalona MD    Leatha Willard is a 59 y.o. White female referred for diabetes self-management education. Participant has Type 1 DM for <1 year.     Assessment/Evaluation Ratings:  1=Needs Instruction   4=Demonstrates Understanding/Competency  2=Needs Review   NC=Not Covered    3=Comprehends Key Points  N/A=Not Applicable      Topics/Learning Objectives Pre-session Assess Date:  Instructor initials/date  2/18/25 KMS Instruction Provided    2/18/25 KMS Yearly follow-up/  reinforcement date Post- session Eval Comments   Diabetes disease process & Treatment process:   -Define type of diabetes in simple terms.  - Describe the ABCs of  diabetes management  -Identify own type of diabetes  -Identify lifestyle changes/treatment options  -Other:  1 [x] All     []   []  []  []  []  []   4 2/18/25 KMS  Type 1 DM post Whipple surgery  A1C 9.5%   Developing strategies for Healthy coping/psychosocial issues:     -Describe feelings about living with diabetes  -Identify coping strategies and sources of stress  -Identify support needed & support network available  -Complete PAID-5 Diabetes questionnaire 1 [x] All       []  []  []  []  4 Date: 2/18/25 KMS  PAID-5 Score: 1         Prevention, detection & treatment of Chronic complications:    -Identify the prevention, detection and treatment for complications including immunizations, preventive eye, foot, dental and renal exams as indicated per the participant's duration of diabetes and health status.  -Define the natural course of diabetes and the relationship of blood glucose levels to long term complications of diabetes.  1 [x] All       []         []    4    Prevention, detection & treatment of acute complications:    -State the causes,signs & symptoms of hyper & hypoglycemia, and prevention & treatment strategies.   -Describe sick day guidelines  DKA /indications for ketone 
  Session 2 2/19/25 dch 1730 2000  5    DSMES Meal plan RDN     0    Session 3 RN 2/20/25 KMS 1830 2000  3    Session 3 RDN 2/20/25 [facility] 1730 1830  2    DSMES individual appnt RN/RDN     0    Meter Instructions     0    GDM individual     0    GDM group     0    MNT individual initial     0    MNT individual f/u     0    Yearly DSMES f/u     0      Additional Comments:

## 2025-02-26 ENCOUNTER — PROCEDURE VISIT (OUTPATIENT)
Dept: AUDIOLOGY | Age: 60
End: 2025-02-26
Payer: COMMERCIAL

## 2025-02-26 ENCOUNTER — OFFICE VISIT (OUTPATIENT)
Dept: ENT CLINIC | Age: 60
End: 2025-02-26
Payer: COMMERCIAL

## 2025-02-26 VITALS
WEIGHT: 130 LBS | DIASTOLIC BLOOD PRESSURE: 84 MMHG | HEART RATE: 101 BPM | HEIGHT: 64 IN | BODY MASS INDEX: 22.2 KG/M2 | SYSTOLIC BLOOD PRESSURE: 138 MMHG

## 2025-02-26 DIAGNOSIS — H90.3 SENSORINEURAL HEARING LOSS (SNHL) OF BOTH EARS: Primary | ICD-10-CM

## 2025-02-26 DIAGNOSIS — J34.89 NASAL DRYNESS: ICD-10-CM

## 2025-02-26 DIAGNOSIS — H93.12 TINNITUS OF LEFT EAR: ICD-10-CM

## 2025-02-26 PROCEDURE — 3079F DIAST BP 80-89 MM HG: CPT | Performed by: NURSE PRACTITIONER

## 2025-02-26 PROCEDURE — G8420 CALC BMI NORM PARAMETERS: HCPCS | Performed by: NURSE PRACTITIONER

## 2025-02-26 PROCEDURE — 4004F PT TOBACCO SCREEN RCVD TLK: CPT | Performed by: NURSE PRACTITIONER

## 2025-02-26 PROCEDURE — G8427 DOCREV CUR MEDS BY ELIG CLIN: HCPCS | Performed by: NURSE PRACTITIONER

## 2025-02-26 PROCEDURE — 99213 OFFICE O/P EST LOW 20 MIN: CPT | Performed by: NURSE PRACTITIONER

## 2025-02-26 PROCEDURE — 3075F SYST BP GE 130 - 139MM HG: CPT | Performed by: NURSE PRACTITIONER

## 2025-02-26 PROCEDURE — 92567 TYMPANOMETRY: CPT

## 2025-02-26 PROCEDURE — 92557 COMPREHENSIVE HEARING TEST: CPT

## 2025-02-26 PROCEDURE — 3017F COLORECTAL CA SCREEN DOC REV: CPT | Performed by: NURSE PRACTITIONER

## 2025-02-26 RX ORDER — ECHINACEA PURPUREA EXTRACT 125 MG
2 TABLET ORAL 4 TIMES DAILY
Qty: 3 EACH | Refills: 3 | Status: SHIPPED | OUTPATIENT
Start: 2025-02-26

## 2025-02-26 ASSESSMENT — ENCOUNTER SYMPTOMS
SINUS PAIN: 0
SINUS PRESSURE: 0
RHINORRHEA: 0
RESPIRATORY NEGATIVE: 1
EYES NEGATIVE: 1
SHORTNESS OF BREATH: 0
STRIDOR: 0
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
## 2025-02-26 NOTE — PROGRESS NOTES
This patient was referred for audiometric and tympanometric testing by FABIEN Castro due to hearing loss.     Audiometry using pure tone air and bone conduction testing revealed hearing sensitivity within normal limits through 500 Hz, sloping to moderate sensorineural hearing loss, bilaterally. Reliability was good. Speech reception thresholds were in good agreement with the pure tone averages, bilaterally. Speech discrimination scores were excellent, bilaterally.    Tympanometry revealed normal middle ear peak pressure and compliance, bilaterally.    The results were reviewed with the patient and ordering provider.     Recommendations for follow up will be made pending ordering provider consult.    Sarah Khan/CCC-A  [place] Lic [id number]  Electronically signed by Sarah Khan on 2/26/2025 at 1:40 PM

## 2025-03-03 ENCOUNTER — OFFICE VISIT (OUTPATIENT)
Dept: FAMILY MEDICINE CLINIC | Age: 60
End: 2025-03-03
Payer: COMMERCIAL

## 2025-03-03 VITALS
BODY MASS INDEX: 22.71 KG/M2 | OXYGEN SATURATION: 98 % | HEART RATE: 93 BPM | RESPIRATION RATE: 16 BRPM | DIASTOLIC BLOOD PRESSURE: 73 MMHG | TEMPERATURE: 97.5 F | HEIGHT: 64 IN | SYSTOLIC BLOOD PRESSURE: 123 MMHG | WEIGHT: 133 LBS

## 2025-03-03 DIAGNOSIS — G62.9 NEUROPATHY: ICD-10-CM

## 2025-03-03 DIAGNOSIS — E08.9 DIABETES MELLITUS DUE TO UNDERLYING CONDITION WITHOUT COMPLICATION, UNSPECIFIED WHETHER LONG TERM INSULIN USE (HCC): Primary | ICD-10-CM

## 2025-03-03 LAB
ALBUMIN: 4.1 G/DL (ref 3.5–5.2)
ALP BLD-CCNC: 246 U/L (ref 35–104)
ALT SERPL-CCNC: 17 U/L (ref 0–32)
ANION GAP SERPL CALCULATED.3IONS-SCNC: 22 MMOL/L (ref 7–16)
AST SERPL-CCNC: 24 U/L (ref 0–31)
BASOPHILS ABSOLUTE: 0.11 K/UL (ref 0–0.2)
BASOPHILS RELATIVE PERCENT: 1 % (ref 0–2)
BILIRUB SERPL-MCNC: 0.2 MG/DL (ref 0–1.2)
BUN BLDV-MCNC: 12 MG/DL (ref 6–23)
CALCIUM SERPL-MCNC: 9.8 MG/DL (ref 8.6–10.2)
CHLORIDE BLD-SCNC: 102 MMOL/L (ref 98–107)
CHOLESTEROL, TOTAL: 170 MG/DL
CO2: 21 MMOL/L (ref 22–29)
CREAT SERPL-MCNC: 0.5 MG/DL (ref 0.5–1)
CREATININE URINE: 31.7 MG/DL (ref 29–226)
EOSINOPHILS ABSOLUTE: 0.59 K/UL (ref 0.05–0.5)
EOSINOPHILS RELATIVE PERCENT: 5 % (ref 0–6)
GFR, ESTIMATED: >90 ML/MIN/1.73M2
GLUCOSE BLD-MCNC: 225 MG/DL (ref 74–99)
HBA1C MFR BLD: 9.9 %
HCT VFR BLD CALC: 44.6 % (ref 34–48)
HDLC SERPL-MCNC: 48 MG/DL
HEMOGLOBIN: 14.2 G/DL (ref 11.5–15.5)
IMMATURE GRANULOCYTES %: 1 % (ref 0–5)
IMMATURE GRANULOCYTES ABSOLUTE: 0.06 K/UL (ref 0–0.58)
LDL CHOLESTEROL: 100 MG/DL
LYMPHOCYTES ABSOLUTE: 3.05 K/UL (ref 1.5–4)
LYMPHOCYTES RELATIVE PERCENT: 25 % (ref 20–42)
MCH RBC QN AUTO: 28.6 PG (ref 26–35)
MCHC RBC AUTO-ENTMCNC: 31.8 G/DL (ref 32–34.5)
MCV RBC AUTO: 89.7 FL (ref 80–99.9)
MICROALBUMIN/CREAT 24H UR: <12 MG/L (ref 0–19)
MICROALBUMIN/CREAT UR-RTO: NORMAL MCG/MG CREAT (ref 0–30)
MONOCYTES ABSOLUTE: 0.51 K/UL (ref 0.1–0.95)
MONOCYTES RELATIVE PERCENT: 4 % (ref 2–12)
NEUTROPHILS ABSOLUTE: 8.06 K/UL (ref 1.8–7.3)
NEUTROPHILS RELATIVE PERCENT: 65 % (ref 43–80)
PDW BLD-RTO: 14.5 % (ref 11.5–15)
PLATELET # BLD: 600 K/UL (ref 130–450)
PMV BLD AUTO: 11 FL (ref 7–12)
POTASSIUM SERPL-SCNC: 4.5 MMOL/L (ref 3.5–5)
RBC # BLD: 4.97 M/UL (ref 3.5–5.5)
SODIUM BLD-SCNC: 145 MMOL/L (ref 132–146)
TOTAL PROTEIN: 8.1 G/DL (ref 6.4–8.3)
TRIGL SERPL-MCNC: 109 MG/DL
TSH SERPL DL<=0.05 MIU/L-ACNC: 1.06 UIU/ML (ref 0.27–4.2)
VLDLC SERPL CALC-MCNC: 22 MG/DL
WBC # BLD: 12.4 K/UL (ref 4.5–11.5)

## 2025-03-03 PROCEDURE — 3078F DIAST BP <80 MM HG: CPT

## 2025-03-03 PROCEDURE — 3017F COLORECTAL CA SCREEN DOC REV: CPT

## 2025-03-03 PROCEDURE — 83036 HEMOGLOBIN GLYCOSYLATED A1C: CPT

## 2025-03-03 PROCEDURE — 4004F PT TOBACCO SCREEN RCVD TLK: CPT

## 2025-03-03 PROCEDURE — 3074F SYST BP LT 130 MM HG: CPT

## 2025-03-03 PROCEDURE — G8420 CALC BMI NORM PARAMETERS: HCPCS

## 2025-03-03 PROCEDURE — 99214 OFFICE O/P EST MOD 30 MIN: CPT

## 2025-03-03 PROCEDURE — G8427 DOCREV CUR MEDS BY ELIG CLIN: HCPCS

## 2025-03-03 RX ORDER — INSULIN LISPRO 100 [IU]/ML
INJECTION, SOLUTION INTRAVENOUS; SUBCUTANEOUS
Qty: 1 ADJUSTABLE DOSE PRE-FILLED PEN SYRINGE | Refills: 2 | Status: SHIPPED | OUTPATIENT
Start: 2025-03-03

## 2025-03-03 RX ORDER — GABAPENTIN 100 MG/1
100 CAPSULE ORAL 2 TIMES DAILY
Qty: 180 CAPSULE | Refills: 0 | Status: SHIPPED | OUTPATIENT
Start: 2025-03-03 | End: 2025-08-30

## 2025-03-03 NOTE — PROGRESS NOTES
S: 59 y.o. female with   Chief Complaint   Patient presents with    Foot Pain     Bilateral foot pain, burning, tingling    Diabetes       Diabetes  -f/u  -on insulin  -brittle  -on cgm  -lantus increased previously  -also on 3 units tid short acting and also sliding scale  -seeing endocrinology march 18      O: VS:  height is 1.626 m (5' 4.02\") and weight is 60.3 kg (133 lb). Her temporal temperature is 97.5 °F (36.4 °C). Her blood pressure is 123/73 and her pulse is 93. Her respiration is 16 and oxygen saturation is 98%.   BP Readings from Last 3 Encounters:   03/03/25 123/73   02/26/25 138/84   01/31/25 111/74     See resident note    Impression/Plan:   1) Diabetes - A1c now 9.9 and worsening, increase short acting to 4 units at breakfast and lunch, having neuropathy, consider refer to podiatry, start gabapentin 100 bid        Health Maintenance Due   Topic Date Due    Diabetic foot exam  Never done    HIV screen  Never done    Diabetic Alb to Cr ratio (uACR) test  Never done    Diabetic retinal exam  Never done    Respiratory Syncytial Virus (RSV) Pregnant or age 60 yrs+ (1 - Risk 60-74 years 1-dose series) Never done    COVID-19 Vaccine (6 - 2024-25 season) 09/01/2024    A1C test (Diabetic or Prediabetic)  02/05/2025    Lipids  02/22/2025         Attending Physician Statement  I have discussed the case, including pertinent history and exam findings with the resident. I also have seen the patient and performed key portions of the examination.  I agree with the documented assessment and plan.      Zeke Wheeler, DO  
use of voice recognition software. Although effort is taken to assure the accuracy of this document, it is possible that grammatical, syntax,  or spelling errors may occur.    Mekhi Escalona MD  Family Medicine Resident, PGY-2  [facility]  3/3/2025 1:43 PM

## 2025-03-11 RX ORDER — PANCRELIPASE 36000; 180000; 114000 [USP'U]/1; [USP'U]/1; [USP'U]/1
CAPSULE, DELAYED RELEASE PELLETS ORAL
Qty: 300 CAPSULE | Refills: 5 | Status: SHIPPED | OUTPATIENT
Start: 2025-03-11

## 2025-03-18 ENCOUNTER — OFFICE VISIT (OUTPATIENT)
Dept: ENDOCRINOLOGY | Age: 60
End: 2025-03-18
Payer: COMMERCIAL

## 2025-03-18 VITALS
DIASTOLIC BLOOD PRESSURE: 77 MMHG | OXYGEN SATURATION: 98 % | TEMPERATURE: 98 F | SYSTOLIC BLOOD PRESSURE: 127 MMHG | HEART RATE: 95 BPM | HEIGHT: 64 IN | BODY MASS INDEX: 22.88 KG/M2 | WEIGHT: 134 LBS

## 2025-03-18 DIAGNOSIS — E78.5 DYSLIPIDEMIA: ICD-10-CM

## 2025-03-18 DIAGNOSIS — E10.65 TYPE 1 DIABETES MELLITUS WITH HYPERGLYCEMIA (HCC): Primary | ICD-10-CM

## 2025-03-18 DIAGNOSIS — Z91.119 DIETARY NONCOMPLIANCE: ICD-10-CM

## 2025-03-18 DIAGNOSIS — E89.1 POST-PANCREATECTOMY DIABETES (HCC): ICD-10-CM

## 2025-03-18 DIAGNOSIS — E13.9 POST-PANCREATECTOMY DIABETES (HCC): ICD-10-CM

## 2025-03-18 DIAGNOSIS — Z97.8 USES SELF-APPLIED CONTINUOUS GLUCOSE MONITORING DEVICE: ICD-10-CM

## 2025-03-18 DIAGNOSIS — Z90.410 POST-PANCREATECTOMY DIABETES (HCC): ICD-10-CM

## 2025-03-18 PROCEDURE — 3017F COLORECTAL CA SCREEN DOC REV: CPT | Performed by: INTERNAL MEDICINE

## 2025-03-18 PROCEDURE — 99214 OFFICE O/P EST MOD 30 MIN: CPT | Performed by: INTERNAL MEDICINE

## 2025-03-18 PROCEDURE — G8427 DOCREV CUR MEDS BY ELIG CLIN: HCPCS | Performed by: INTERNAL MEDICINE

## 2025-03-18 PROCEDURE — 3078F DIAST BP <80 MM HG: CPT | Performed by: INTERNAL MEDICINE

## 2025-03-18 PROCEDURE — 3046F HEMOGLOBIN A1C LEVEL >9.0%: CPT | Performed by: INTERNAL MEDICINE

## 2025-03-18 PROCEDURE — 2022F DILAT RTA XM EVC RTNOPTHY: CPT | Performed by: INTERNAL MEDICINE

## 2025-03-18 PROCEDURE — 4004F PT TOBACCO SCREEN RCVD TLK: CPT | Performed by: INTERNAL MEDICINE

## 2025-03-18 PROCEDURE — G8420 CALC BMI NORM PARAMETERS: HCPCS | Performed by: INTERNAL MEDICINE

## 2025-03-18 PROCEDURE — 95251 CONT GLUC MNTR ANALYSIS I&R: CPT | Performed by: INTERNAL MEDICINE

## 2025-03-18 PROCEDURE — 3074F SYST BP LT 130 MM HG: CPT | Performed by: INTERNAL MEDICINE
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
## 2025-04-03 RX ORDER — INSULIN LISPRO 100 [IU]/ML
INJECTION, SOLUTION INTRAVENOUS; SUBCUTANEOUS
Qty: 30 ML | Refills: 5 | Status: SHIPPED | OUTPATIENT
Start: 2025-04-03

## 2025-04-17 ENCOUNTER — OFFICE VISIT (OUTPATIENT)
Dept: SURGERY | Age: 60
End: 2025-04-17
Payer: COMMERCIAL

## 2025-04-17 VITALS
TEMPERATURE: 98.2 F | HEIGHT: 64 IN | SYSTOLIC BLOOD PRESSURE: 110 MMHG | WEIGHT: 138 LBS | DIASTOLIC BLOOD PRESSURE: 72 MMHG | OXYGEN SATURATION: 94 % | HEART RATE: 92 BPM | BODY MASS INDEX: 23.56 KG/M2

## 2025-04-17 DIAGNOSIS — Z90.410 POST-PANCREATECTOMY DIABETES (HCC): Primary | ICD-10-CM

## 2025-04-17 DIAGNOSIS — K86.81 EXOCRINE PANCREATIC INSUFFICIENCY: ICD-10-CM

## 2025-04-17 DIAGNOSIS — E89.1 POST-PANCREATECTOMY DIABETES (HCC): Primary | ICD-10-CM

## 2025-04-17 DIAGNOSIS — E13.9 POST-PANCREATECTOMY DIABETES (HCC): Primary | ICD-10-CM

## 2025-04-17 DIAGNOSIS — D27.9 SEROUS CYSTADENOMA: ICD-10-CM

## 2025-04-17 DIAGNOSIS — Z90.81 POST-SPLENECTOMY: ICD-10-CM

## 2025-04-17 PROCEDURE — 4004F PT TOBACCO SCREEN RCVD TLK: CPT | Performed by: STUDENT IN AN ORGANIZED HEALTH CARE EDUCATION/TRAINING PROGRAM

## 2025-04-17 PROCEDURE — 99213 OFFICE O/P EST LOW 20 MIN: CPT | Performed by: STUDENT IN AN ORGANIZED HEALTH CARE EDUCATION/TRAINING PROGRAM

## 2025-04-17 PROCEDURE — 3074F SYST BP LT 130 MM HG: CPT | Performed by: STUDENT IN AN ORGANIZED HEALTH CARE EDUCATION/TRAINING PROGRAM

## 2025-04-17 PROCEDURE — 3017F COLORECTAL CA SCREEN DOC REV: CPT | Performed by: STUDENT IN AN ORGANIZED HEALTH CARE EDUCATION/TRAINING PROGRAM

## 2025-04-17 PROCEDURE — 3046F HEMOGLOBIN A1C LEVEL >9.0%: CPT | Performed by: STUDENT IN AN ORGANIZED HEALTH CARE EDUCATION/TRAINING PROGRAM

## 2025-04-17 PROCEDURE — 3078F DIAST BP <80 MM HG: CPT | Performed by: STUDENT IN AN ORGANIZED HEALTH CARE EDUCATION/TRAINING PROGRAM

## 2025-04-17 PROCEDURE — G8420 CALC BMI NORM PARAMETERS: HCPCS | Performed by: STUDENT IN AN ORGANIZED HEALTH CARE EDUCATION/TRAINING PROGRAM

## 2025-04-17 PROCEDURE — G8427 DOCREV CUR MEDS BY ELIG CLIN: HCPCS | Performed by: STUDENT IN AN ORGANIZED HEALTH CARE EDUCATION/TRAINING PROGRAM

## 2025-04-17 RX ORDER — PEDIATRIC MULTIVIT 61/D3/VIT K 1500-800
1 CAPSULE ORAL DAILY
Qty: 60 TABLET | Refills: 5 | Status: SHIPPED | OUTPATIENT
Start: 2025-04-17

## 2025-04-17 NOTE — PROGRESS NOTES
pancreatectomy for 15cm SCN, post pancreatectomy diabetes, exocrine pancreatic insufficiency     PLAN:    - Increased creon to 4 capsules with meals due to oily stools  - Added ADEK vitamin suppliments as well  - Agree with insulin pump to better control blood sugars  - Continue endocrine follow up  - Follow up as needed    Thank you for the consultation and allowing me to take part in Ms. Willard's care.    Greater than or equal to 20 minutes was spent providing face-to-face patient care, including:  and coordinating care, reviewing the chart, labs, and diagnostics, as well as medical decision making. Greater than 50% of this time was spent instructing and counseling the patient face to face regarding findings and recommendations.  Norma Rogers MD   4/17/2025 10:01 AM

## 2025-04-28 DIAGNOSIS — L29.9 ITCH: ICD-10-CM

## 2025-04-28 RX ORDER — HYDROXYZINE HYDROCHLORIDE 25 MG/1
25 TABLET, FILM COATED ORAL EVERY 8 HOURS PRN
Qty: 30 TABLET | Refills: 1 | Status: SHIPPED | OUTPATIENT
Start: 2025-04-28

## 2025-04-28 NOTE — TELEPHONE ENCOUNTER
Name of Medication(s) Requested:  Requested Prescriptions     Pending Prescriptions Disp Refills    hydrOXYzine HCl (ATARAX) 25 MG tablet 30 tablet 1     Sig: Take 1 tablet by mouth every 8 hours as needed for Itching    nicotine (NICODERM CQ) 7 MG/24HR 28 patch 0     Sig: Place 1 patch onto the skin daily for 28 days       Medication is on current medication list Yes    Dosage and directions were verified? Yes    Quantity verified: 30 day supply     Pharmacy Verified?  Yes    Last Appointment:  3/3/2025    Future appts:  Future Appointments   Date Time Provider Department Center   4/29/2025 11:45 AM Jonathan Curiel MD BDM ENDO [facility]   5/2/2025 11:10 AM Mekhi Escalona MD [facility] BS ECC [facility]   5/21/2025 11:00 AM Mayito Tam DPM N LIM[place] POD [facility]   1/19/2026 10:40 AM Dory Pacheco DO POLAND SLEEP [facility]   2/26/2026  1:00 PM SCHEDULE, TASHI CH AUDIO [facility] AUDIO [facility]   2/26/2026  1:15 PM James Judge, APRN - CNP [facility] ENT [facility]        (If no appt send self scheduling link. .REFILLAPPT)  Scheduling request sent?     [] Yes  [x] No    Does patient need updated?  [] Yes  [x] No

## 2025-04-29 ENCOUNTER — OFFICE VISIT (OUTPATIENT)
Dept: ENDOCRINOLOGY | Age: 60
End: 2025-04-29
Payer: COMMERCIAL

## 2025-04-29 VITALS
HEART RATE: 85 BPM | DIASTOLIC BLOOD PRESSURE: 68 MMHG | TEMPERATURE: 98.4 F | SYSTOLIC BLOOD PRESSURE: 105 MMHG | OXYGEN SATURATION: 99 % | HEIGHT: 64 IN | WEIGHT: 138 LBS | BODY MASS INDEX: 23.56 KG/M2 | RESPIRATION RATE: 18 BRPM

## 2025-04-29 DIAGNOSIS — E89.1 POST-PANCREATECTOMY DIABETES (HCC): Primary | ICD-10-CM

## 2025-04-29 DIAGNOSIS — Z90.410 POST-PANCREATECTOMY DIABETES (HCC): Primary | ICD-10-CM

## 2025-04-29 DIAGNOSIS — E13.9 POST-PANCREATECTOMY DIABETES (HCC): Primary | ICD-10-CM

## 2025-04-29 DIAGNOSIS — E78.2 MIXED HYPERLIPIDEMIA: ICD-10-CM

## 2025-04-29 PROCEDURE — G2211 COMPLEX E/M VISIT ADD ON: HCPCS | Performed by: INTERNAL MEDICINE

## 2025-04-29 PROCEDURE — G8428 CUR MEDS NOT DOCUMENT: HCPCS | Performed by: INTERNAL MEDICINE

## 2025-04-29 PROCEDURE — 3078F DIAST BP <80 MM HG: CPT | Performed by: INTERNAL MEDICINE

## 2025-04-29 PROCEDURE — G8420 CALC BMI NORM PARAMETERS: HCPCS | Performed by: INTERNAL MEDICINE

## 2025-04-29 PROCEDURE — 3074F SYST BP LT 130 MM HG: CPT | Performed by: INTERNAL MEDICINE

## 2025-04-29 PROCEDURE — 4004F PT TOBACCO SCREEN RCVD TLK: CPT | Performed by: INTERNAL MEDICINE

## 2025-04-29 PROCEDURE — 3017F COLORECTAL CA SCREEN DOC REV: CPT | Performed by: INTERNAL MEDICINE

## 2025-04-29 PROCEDURE — 99214 OFFICE O/P EST MOD 30 MIN: CPT | Performed by: INTERNAL MEDICINE

## 2025-04-29 PROCEDURE — 3046F HEMOGLOBIN A1C LEVEL >9.0%: CPT | Performed by: INTERNAL MEDICINE
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
## 2025-05-02 ENCOUNTER — TELEPHONE (OUTPATIENT)
Dept: ENDOCRINOLOGY | Age: 60
End: 2025-05-02

## 2025-05-02 ENCOUNTER — OFFICE VISIT (OUTPATIENT)
Dept: FAMILY MEDICINE CLINIC | Age: 60
End: 2025-05-02
Payer: COMMERCIAL

## 2025-05-02 VITALS
HEIGHT: 64 IN | SYSTOLIC BLOOD PRESSURE: 107 MMHG | WEIGHT: 139 LBS | TEMPERATURE: 97.3 F | BODY MASS INDEX: 23.73 KG/M2 | HEART RATE: 80 BPM | DIASTOLIC BLOOD PRESSURE: 62 MMHG | RESPIRATION RATE: 16 BRPM | OXYGEN SATURATION: 97 %

## 2025-05-02 DIAGNOSIS — F32.A DEPRESSION, UNSPECIFIED DEPRESSION TYPE: Primary | ICD-10-CM

## 2025-05-02 DIAGNOSIS — Z72.0 TOBACCO ABUSE: ICD-10-CM

## 2025-05-02 DIAGNOSIS — I10 ESSENTIAL HYPERTENSION: ICD-10-CM

## 2025-05-02 PROCEDURE — 3074F SYST BP LT 130 MM HG: CPT

## 2025-05-02 PROCEDURE — 3078F DIAST BP <80 MM HG: CPT

## 2025-05-02 PROCEDURE — 3017F COLORECTAL CA SCREEN DOC REV: CPT

## 2025-05-02 PROCEDURE — 4004F PT TOBACCO SCREEN RCVD TLK: CPT

## 2025-05-02 PROCEDURE — G8427 DOCREV CUR MEDS BY ELIG CLIN: HCPCS

## 2025-05-02 PROCEDURE — 99213 OFFICE O/P EST LOW 20 MIN: CPT

## 2025-05-02 PROCEDURE — G8420 CALC BMI NORM PARAMETERS: HCPCS

## 2025-05-02 RX ORDER — BUPROPION HYDROCHLORIDE 300 MG/1
300 TABLET ORAL EVERY MORNING
Qty: 30 TABLET | Refills: 1 | Status: SHIPPED | OUTPATIENT
Start: 2025-05-02

## 2025-05-02 NOTE — PROGRESS NOTES
[facility]  Precepting Note    Subjective:  Type 2 diabetes  S/p whipple for pancreatic cyst  She is now seeing endocrinology and using insulin pump  Seeing Dr. Curiel again in June    Hypertension  Developed after surgery quite high  Weaned off carvedilol as it came down over time  Now only on Amlodipine 2.5 mg     Mood disorder   Wellbutrin  Stable    Tobacco abuse 3-5 cig/day  Using patch      ROS otherwise negative    Past medical, surgical, family and social history were reviewed, non-contributory, and unchanged unless otherwise stated.    Objective:    /62   Pulse 80   Temp 97.3 °F (36.3 °C) (Temporal)   Resp 16   Ht 1.626 m (5' 4.02\")   Wt 63 kg (139 lb)   SpO2 97%   BMI 23.85 kg/m²     Exam is as noted by resident with the following changes, additions or corrections:      Assessment/Plan:    Type 2 Diabetes - now on insulin pump.   HTN - stop Amlodipine  Tobacco abuse - continue nicotine patch  Depression - continue wellbutrin     Attending Physician Statement  I have reviewed the chart, including any radiology or labs.  I agree with the assessment, plan and orders as documented by the resident.  Please refer to the resident note for additional information.      Electronically signed by NABEEL MCLEAN MD on 5/2/2025 at 11:29 AM

## 2025-05-02 NOTE — PROGRESS NOTES
[facility]  Department of Family Medicine  Family Medicine Residency Program      Patient: Leatha Willard 59 y.o. female     Date of Service: 5/2/25      Chief complaint:   Chief Complaint   Patient presents with    Hypertension    Depression       HISTORY OF PRESENTING ILLNESS     58-year-old female with past medical history of hypertension, tobacco abuse, obstructive sleep apnea, hyperlipidemia, prediabetes, pancreatic cyst status post Whipple surgery in April 2024, status post tracheostomy and decannulation, status post pancreatectomy and splenectomy presenting to the clinic for the chief complaint mentioned above :    Diabetes secondary to Whipple's   Had an appointment with Endocrinology and is now on an insulin pump. Has had it for three days now and seems to be working well for her. Denies episodes of hypoglycemia.  Will have an upcoming appointment with Endo in a couple of weeks to readjust dose of insulin.       Htn  On norvasc 2.5 mg daily  Pressures on the lower side usually  Sometimes feels a little dizzy but denies loss of consciousness, chest pain, sob, leg swelling      Mood disorder  Stable on Wellbutrin 300 mg daily  Denies SI/HI      Tobacco use  Has cut down to 3 to 4 cigarettes daily, would smoke an additional one or two cigarettes if feels stressed  Using the nicotine patch consistently, 7 mg/24 hour  Eventual goal is to quit smoking         Health Maintenance:  Health Maintenance Due   Topic Date Due    Diabetic foot exam  Never done    HIV screen  Never done    Diabetic retinal exam  Never done    Respiratory Syncytial Virus (RSV) Pregnant or age 60 yrs+ (1 - Risk 60-74 years 1-dose series) Never done    COVID-19 Vaccine (6 - 2024-25 season) 09/01/2024    Meningococcal B vaccine (3 of 5 - Increased Risk Bexsero 3-dose series) 11/03/2024    Lung Cancer Screening &/or Counseling  05/13/2025    A1C test (Diabetic or Prediabetic)  06/03/2025     Past Medical History:

## 2025-05-21 ENCOUNTER — OFFICE VISIT (OUTPATIENT)
Dept: PODIATRY | Age: 60
End: 2025-05-21
Payer: COMMERCIAL

## 2025-05-21 VITALS — BODY MASS INDEX: 23.85 KG/M2 | WEIGHT: 139 LBS

## 2025-05-21 DIAGNOSIS — E11.9 TYPE 2 DIABETES MELLITUS WITHOUT COMPLICATION, UNSPECIFIED WHETHER LONG TERM INSULIN USE (HCC): Primary | ICD-10-CM

## 2025-05-21 DIAGNOSIS — M20.42 HAMMER TOES OF BOTH FEET: ICD-10-CM

## 2025-05-21 DIAGNOSIS — G60.8 HEREDITARY SENSORY NEUROPATHY: ICD-10-CM

## 2025-05-21 DIAGNOSIS — L84 CORNS AND CALLOSITIES: ICD-10-CM

## 2025-05-21 DIAGNOSIS — M20.41 HAMMER TOES OF BOTH FEET: ICD-10-CM

## 2025-05-21 DIAGNOSIS — B35.1 TINEA UNGUIUM: ICD-10-CM

## 2025-05-21 DIAGNOSIS — Z87.891 SMOKING HISTORY: ICD-10-CM

## 2025-05-21 PROCEDURE — 11721 DEBRIDE NAIL 6 OR MORE: CPT | Performed by: PODIATRIST

## 2025-05-21 PROCEDURE — G8427 DOCREV CUR MEDS BY ELIG CLIN: HCPCS | Performed by: PODIATRIST

## 2025-05-21 PROCEDURE — G8420 CALC BMI NORM PARAMETERS: HCPCS | Performed by: PODIATRIST

## 2025-05-21 PROCEDURE — 4004F PT TOBACCO SCREEN RCVD TLK: CPT | Performed by: PODIATRIST

## 2025-05-21 PROCEDURE — 3046F HEMOGLOBIN A1C LEVEL >9.0%: CPT | Performed by: PODIATRIST

## 2025-05-21 PROCEDURE — 3017F COLORECTAL CA SCREEN DOC REV: CPT | Performed by: PODIATRIST

## 2025-05-21 PROCEDURE — 2022F DILAT RTA XM EVC RTNOPTHY: CPT | Performed by: PODIATRIST

## 2025-05-21 PROCEDURE — 99204 OFFICE O/P NEW MOD 45 MIN: CPT | Performed by: PODIATRIST

## 2025-05-21 PROCEDURE — 11056 PARNG/CUTG B9 HYPRKR LES 2-4: CPT | Performed by: PODIATRIST

## 2025-05-21 RX ORDER — PERPHENAZINE 16 MG
1 TABLET ORAL DAILY
Qty: 60 CAPSULE | Refills: 2 | Status: SHIPPED | OUTPATIENT
Start: 2025-05-21

## 2025-05-21 RX ORDER — PRENATAL VIT 91/IRON/FOLIC/DHA 28-975-200
COMBINATION PACKAGE (EA) ORAL
Qty: 42 G | Refills: 1 | Status: SHIPPED | OUTPATIENT
Start: 2025-05-21

## 2025-05-21 RX ORDER — AMMONIUM LACTATE 12 G/100G
LOTION TOPICAL
Qty: 400 G | Refills: 2 | Status: SHIPPED | OUTPATIENT
Start: 2025-05-21
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
## 2025-05-27 ENCOUNTER — TELEPHONE (OUTPATIENT)
Dept: ENDOCRINOLOGY | Age: 60
End: 2025-05-27

## 2025-05-27 RX ORDER — GABAPENTIN 100 MG/1
100 CAPSULE ORAL 2 TIMES DAILY
Qty: 180 CAPSULE | Refills: 0 | Status: SHIPPED | OUTPATIENT
Start: 2025-05-27 | End: 2025-11-23

## 2025-05-27 NOTE — TELEPHONE ENCOUNTER
Name of Medication(s) Requested:  Requested Prescriptions     Pending Prescriptions Disp Refills    gabapentin (NEURONTIN) 100 MG capsule 180 capsule 0     Sig: Take 1 capsule by mouth 2 times daily for 180 days. Intended supply: 90 days       Medication is on current medication list Yes    Dosage and directions were verified? Yes    Quantity verified: 90 day supply     Pharmacy Verified?  Yes    Last Appointment:  5/2/2025    Future appts:  Future Appointments   Date Time Provider Department Center   7/30/2025 10:15 AM Mayito Tam DPM N [place] POD [facility]   1/19/2026 10:40 AM Dory Pacheco DO POLAND SLEEP [facility]   2/26/2026  1:00 PM SCHEDULE, T[facility]I [facility] AUDIO [facility] AUDIO [facility]   2/26/2026  1:15 PM James Judge APRN - CNP [facility] ENT [facility]        (If no appt send self scheduling link. .REFILLAPPT)  Scheduling request sent?     [x] Yes  [] No    Does patient need updated?  [] Yes  [x] No

## 2025-05-27 NOTE — TELEPHONE ENCOUNTER
Overall, blood sugars are looking good on pump.    She is having some hypoglycemia after late bolusing.  Please remind her to bolus prior to eating to prevent that.    Hesitant to tighten up any pump settings because they do not want to make her hypoglycemia worse.  When she starts bolusing consistently before meals, we can strengthen things up a bit, if necessary

## 2025-05-27 NOTE — TELEPHONE ENCOUNTER
[employer] attached     It looks as though she only entered smart guard today. It takes about a week for things to auto regulate. I would give it one week in smart guard before we make any additional changes.

## 2025-06-11 DIAGNOSIS — F32.A DEPRESSION, UNSPECIFIED DEPRESSION TYPE: ICD-10-CM

## 2025-06-11 RX ORDER — BUPROPION HYDROCHLORIDE 300 MG/1
300 TABLET ORAL EVERY MORNING
Qty: 30 TABLET | Refills: 1 | Status: SHIPPED | OUTPATIENT
Start: 2025-06-11

## 2025-06-11 NOTE — TELEPHONE ENCOUNTER
Name of Medication(s) Requested:  Requested Prescriptions     Pending Prescriptions Disp Refills    buPROPion (WELLBUTRIN XL) 300 MG extended release tablet 30 tablet 1     Sig: Take 1 tablet by mouth every morning       Medication is on current medication list Yes    Dosage and directions were verified? Yes    Quantity verified: 30 day supply     Pharmacy Verified?  Yes    Last Appointment:  5/2/2025    Future appts:  Future Appointments   Date Time Provider Department Center   6/26/2025 12:20 PM Caitlin Escoto APRN - CNP BDM ENDO [facility]   7/30/2025 10:15 AM Mayito Tam DPM N [place] POD [facility]   9/4/2025  1:00 PM [facility]   1/19/2026 10:40 AM Dory Pacheco DO POLAND SLEEP [facility]   2/26/2026  1:00 PM SCHEDULE, TASHI CH AUDIO [facility] AUDIO [facility]   2/26/2026  1:15 PM James Judge APRN - CNP [facility] ENT [facility]        (If no appt send self scheduling link. .REFILLAPPT)  Scheduling request sent?     [] Yes  [x] No    Does patient need updated?  [] Yes  [x] No

## 2025-06-16 NOTE — TELEPHONE ENCOUNTER
Informed patient that a prior auth request was submitted and approved for her up coming surgery on 10/27/23 with Dr. Kurt Cortez. [place] [address], Authorization# [id number], Valid 10/27/23 to 10/28/23. Patient stated she has appt with PAT 10/20/23. Spoke to patient as follow up. Informed patient of prescriptions placed by Dr. Walker and directives. Patient verbalizes understanding. Patient states she was able to schedule dermatology appointment for 7/8/25.

## 2025-06-19 ENCOUNTER — TELEPHONE (OUTPATIENT)
Dept: ENDOCRINOLOGY | Age: 60
End: 2025-06-19

## 2025-06-19 NOTE — TELEPHONE ENCOUNTER
Patient states she is on Steroid Mayo, prednisone times 6 days she is concerned blood sugars are high does she continue steroid mayo for back or stop taking. She states she has 5 days left Blodd sugar are 260 or about..

## 2025-06-19 NOTE — TELEPHONE ENCOUNTER
Temporarily change carb ratio to 13 while on steroids.  It is very common to have high blood sugars with steroid use.  They should start coming down after she completes the steroid pack and she can put her carb ratio back up to 15 at that time.    I recommend avoiding steroids if at all possible, but sometimes it is necessary.

## 2025-06-26 ENCOUNTER — OFFICE VISIT (OUTPATIENT)
Dept: ENDOCRINOLOGY | Age: 60
End: 2025-06-26
Payer: COMMERCIAL

## 2025-06-26 VITALS
RESPIRATION RATE: 20 BRPM | HEIGHT: 64 IN | HEART RATE: 83 BPM | OXYGEN SATURATION: 97 % | BODY MASS INDEX: 24.14 KG/M2 | WEIGHT: 141.4 LBS | TEMPERATURE: 97.8 F

## 2025-06-26 DIAGNOSIS — E10.65 TYPE 1 DIABETES MELLITUS WITH HYPERGLYCEMIA (HCC): Primary | ICD-10-CM

## 2025-06-26 DIAGNOSIS — E78.2 MIXED HYPERLIPIDEMIA: ICD-10-CM

## 2025-06-26 LAB — HBA1C MFR BLD: 7.4 %

## 2025-06-26 PROCEDURE — 95251 CONT GLUC MNTR ANALYSIS I&R: CPT | Performed by: FAMILY MEDICINE

## 2025-06-26 PROCEDURE — G8420 CALC BMI NORM PARAMETERS: HCPCS | Performed by: FAMILY MEDICINE

## 2025-06-26 PROCEDURE — 3017F COLORECTAL CA SCREEN DOC REV: CPT | Performed by: FAMILY MEDICINE

## 2025-06-26 PROCEDURE — 2022F DILAT RTA XM EVC RTNOPTHY: CPT | Performed by: FAMILY MEDICINE

## 2025-06-26 PROCEDURE — 99214 OFFICE O/P EST MOD 30 MIN: CPT | Performed by: FAMILY MEDICINE

## 2025-06-26 PROCEDURE — 83036 HEMOGLOBIN GLYCOSYLATED A1C: CPT | Performed by: FAMILY MEDICINE

## 2025-06-26 PROCEDURE — G8427 DOCREV CUR MEDS BY ELIG CLIN: HCPCS | Performed by: FAMILY MEDICINE

## 2025-06-26 PROCEDURE — 4004F PT TOBACCO SCREEN RCVD TLK: CPT | Performed by: FAMILY MEDICINE

## 2025-06-26 PROCEDURE — 3051F HG A1C>EQUAL 7.0%<8.0%: CPT | Performed by: FAMILY MEDICINE

## 2025-06-26 NOTE — PROGRESS NOTES
spray 2 sprays by Nasal route 4 times daily 3 each 3    Continuous Glucose Sensor (DEXCOM G7 SENSOR) MISC 1 each by Does not apply route every 10 days 3 each 2    cyclobenzaprine (FLEXERIL) 10 MG tablet Take 1 tablet by mouth 3 times daily as needed for Muscle spasms      aspirin (ASPIRIN LOW DOSE) 81 MG EC tablet Take 1 tablet by mouth daily 90 tablet 2    atorvastatin (LIPITOR) 10 MG tablet Take 1 tablet by mouth at bedtime 60 tablet 2    Continuous Glucose Sensor (DEXCOM G7 SENSOR) MISC 1 each by Does not apply route 3 times daily 2 each 2    insulin glargine (LANTUS SOLOSTAR) 100 UNIT/ML injection pen Inject 15 Units into the skin nightly 5 Adjustable Dose Pre-filled Pen Syringe 2    Continuous Glucose  (DEXCOM G7 ) MAHENDRA 1 each by Does not apply route in the morning, at noon, and at bedtime 1 each 0    ketotifen fumarate (ZADITOR) 0.035 % ophthalmic solution Place 1 drop into both eyes 2 times daily 1 each 1    olopatadine (PATADAY) 0.2 % SOLN ophthalmic solution Place 1 drop into both eyes daily as needed (dry eyes) 1 each 2     No current facility-administered medications for this visit.       Review of Systems  Constitutional: No fever, no chills, no diaphoresis, no generalized weakness.  HEENT: No blurred vision, No sore throat, no ear pain, no hair loss  Neck: denied any neck swelling, difficulty swallowing,   Cardio-pulmonary: No CP, SOB or palpitation, No orthopnea or PND. No cough or wheezing.  GI: No N/V/D, no constipation, No abdominal pain, no melena or hematochezia   : Denied any dysuria, hematuria, flank pain, discharge, or incontinence.   Skin: denied any rash, ulcer, Hirsute, or hyperpigmentation.   MSK: denied any joint deformity, joint pain/swelling, muscle pain, or back pain.  Neuro: no numbness, no tingling, no weakness, _    OBJECTIVE    Pulse 83   Temp 97.8 °F (36.6 °C)   Resp 20   Ht 1.626 m (5' 4\")   Wt 64.1 kg (141 lb 6.4 oz)   SpO2 97%   BMI 24.27 kg/m²   BP

## 2025-07-08 DIAGNOSIS — L29.9 ITCH: ICD-10-CM

## 2025-07-08 NOTE — TELEPHONE ENCOUNTER
Name of Medication(s) Requested:  Requested Prescriptions     Pending Prescriptions Disp Refills    hydrOXYzine HCl (ATARAX) 25 MG tablet 30 tablet 1     Sig: Take 1 tablet by mouth every 8 hours as needed for Itching       Medication is on current medication list Yes    Dosage and directions were verified? Yes    Quantity verified: 30 day supply     Pharmacy Verified?  Yes    Last Appointment:  5/2/2025    Future appts:  Future Appointments   Date Time Provider Department Center   7/30/2025 10:15 AM Mayito Tam DPM N [place] POD [facility]   9/4/2025  1:00 PM Mekhi Escalona MD [facility] BS ECC [facility]   10/6/2025 12:45 PM Jonathan Curiel MD BDM ENDO [facility]   1/19/2026 10:40 AM Dory Pacheco DO POLAND SLEEP [facility]   2/26/2026  1:00 PM SCHEDULE, TASHI CH AUDIO [facility] AUDIO [facility]   2/26/2026  1:15 PM James Judge, APRN - CNP [facility] ENT [facility]        (If no appt send self scheduling link. .REFILLAPPT)  Scheduling request sent?     [] Yes  [x] No    Does patient need updated?  [] Yes  [x] No

## 2025-07-09 RX ORDER — HYDROXYZINE HYDROCHLORIDE 25 MG/1
25 TABLET, FILM COATED ORAL EVERY 8 HOURS PRN
Qty: 30 TABLET | Refills: 1 | Status: SHIPPED | OUTPATIENT
Start: 2025-07-09

## 2025-07-10 DIAGNOSIS — M54.50 CHRONIC LOW BACK PAIN WITHOUT SCIATICA, UNSPECIFIED BACK PAIN LATERALITY: Primary | ICD-10-CM

## 2025-07-10 DIAGNOSIS — G89.29 CHRONIC LOW BACK PAIN WITHOUT SCIATICA, UNSPECIFIED BACK PAIN LATERALITY: Primary | ICD-10-CM

## 2025-07-10 RX ORDER — GABAPENTIN 300 MG/1
300 CAPSULE ORAL 2 TIMES DAILY
Qty: 180 CAPSULE | Refills: 1 | Status: SHIPPED | OUTPATIENT
Start: 2025-07-10 | End: 2026-01-06

## 2025-07-30 ENCOUNTER — PROCEDURE VISIT (OUTPATIENT)
Dept: PODIATRY | Age: 60
End: 2025-07-30
Payer: COMMERCIAL

## 2025-07-30 VITALS — BODY MASS INDEX: 24.2 KG/M2 | WEIGHT: 141 LBS

## 2025-07-30 DIAGNOSIS — M20.42 HAMMER TOES OF BOTH FEET: ICD-10-CM

## 2025-07-30 DIAGNOSIS — M20.41 HAMMER TOES OF BOTH FEET: ICD-10-CM

## 2025-07-30 DIAGNOSIS — G60.8 HEREDITARY SENSORY NEUROPATHY: ICD-10-CM

## 2025-07-30 DIAGNOSIS — E11.9 TYPE 2 DIABETES MELLITUS WITHOUT COMPLICATION, UNSPECIFIED WHETHER LONG TERM INSULIN USE (HCC): Primary | ICD-10-CM

## 2025-07-30 DIAGNOSIS — L84 CORNS AND CALLOSITIES: ICD-10-CM

## 2025-07-30 DIAGNOSIS — Z87.891 SMOKING HISTORY: ICD-10-CM

## 2025-07-30 DIAGNOSIS — B35.1 TINEA UNGUIUM: ICD-10-CM

## 2025-07-30 PROCEDURE — 11721 DEBRIDE NAIL 6 OR MORE: CPT | Performed by: PODIATRIST

## 2025-07-30 PROCEDURE — 11056 PARNG/CUTG B9 HYPRKR LES 2-4: CPT | Performed by: PODIATRIST

## 2025-07-30 NOTE — PROGRESS NOTES
Patient in for nail and callous care  Type 2 diabetic  Mekhi Escalona MD  LOV 5/2/25      Electronically signed by Mayito Tam DPM on 7/30/2025 at 12:54 PM    CC:   Diabetic foot exam and painful elongated toenails 1-5 right and left    HPI:   Follow-up diabetic foot ankle exam.  No recent injury or trauma.  History gabapentin.  No open skin lesions or abrasions.    ROS:  Const: Denies constitutional symptoms  Musculo: Denies symptoms other than stated above  Skin: Denies symptoms other than stated above      Current Outpatient Medications:     gabapentin (NEURONTIN) 300 MG capsule, Take 1 capsule by mouth 2 times daily for 180 days. Intended supply: 90 days, Disp: 180 capsule, Rfl: 1    hydrOXYzine HCl (ATARAX) 25 MG tablet, Take 1 tablet by mouth every 8 hours as needed for Itching, Disp: 30 tablet, Rfl: 1    buPROPion (WELLBUTRIN XL) 300 MG extended release tablet, Take 1 tablet by mouth every morning, Disp: 30 tablet, Rfl: 1    ammonium lactate (LAC-HYDRIN) 12 % lotion, Apply topically twice daily, Disp: 400 g, Rfl: 2    terbinafine (LAMISIL) 1 % cream, Apply affected area topically 2 times daily., Disp: 42 g, Rfl: 1    Alpha-Lipoic Acid 600 MG CAPS, Take 1 capsule by mouth daily, Disp: 60 capsule, Rfl: 2    Multiple Vitamins-Minerals (ADEK GUMMIES PLUS ZN) CHEW, Take 1 tablet by mouth daily, Disp: 60 tablet, Rfl: 5    insulin lispro (HUMALOG,ADMELOG) 100 UNIT/ML SOLN injection vial, Use with pump max dose 50 units a day, Disp: 30 mL, Rfl: 5    CREON 74627-855047 units CPEP delayed release capsule, TAKE 4 CAPSULES BY MOUTH THREE TIMES DAILY WITH MEALS (Patient taking differently: Take 3 capsules by mouth 3 times daily (with meals)), Disp: 300 capsule, Rfl: 5    sodium chloride (ALTAMIST SPRAY) 0.65 % nasal spray, 2 sprays by Nasal route 4 times daily, Disp: 3 each, Rfl: 3    Continuous Glucose Sensor (DEXCOM G7 SENSOR) MISC, 1 each by Does not apply route every 10 days, Disp: 3 each, Rfl: 2

## 2025-07-31 ENCOUNTER — TELEPHONE (OUTPATIENT)
Dept: ENDOCRINOLOGY | Age: 60
End: 2025-07-31

## 2025-07-31 NOTE — TELEPHONE ENCOUNTER
Temporarily take carb ratio down to 13.  When blood sugars start to come back down to normal she can take it back up to 15 which is her current setting.    Auto corrections will automatically adjust for the increased blood sugars.    High blood sugars usually last 5 to 7 days after an injection

## 2025-07-31 NOTE — TELEPHONE ENCOUNTER
Patient called and she is having high blood sugars because she had steriod injection in knee yesterday and she is wondering what to  do?

## 2025-08-19 DIAGNOSIS — L29.9 ITCH: ICD-10-CM

## 2025-08-19 RX ORDER — HYDROXYZINE HYDROCHLORIDE 25 MG/1
25 TABLET, FILM COATED ORAL EVERY 8 HOURS PRN
Qty: 30 TABLET | Refills: 1 | Status: SHIPPED | OUTPATIENT
Start: 2025-08-19

## 2025-09-02 ENCOUNTER — HOSPITAL ENCOUNTER (EMERGENCY)
Age: 60
Discharge: HOME | End: 2025-09-02
Payer: COMMERCIAL

## 2025-09-02 VITALS
RESPIRATION RATE: 16 BRPM | BODY MASS INDEX: 24.89 KG/M2 | DIASTOLIC BLOOD PRESSURE: 77 MMHG | SYSTOLIC BLOOD PRESSURE: 130 MMHG | OXYGEN SATURATION: 97 % | HEART RATE: 95 BPM | WEIGHT: 145 LBS | TEMPERATURE: 97.7 F

## 2025-09-02 DIAGNOSIS — T63.441A LOCAL REACTION TO BEE STING, ACCIDENTAL OR UNINTENTIONAL, INITIAL ENCOUNTER: Primary | ICD-10-CM

## 2025-09-02 PROCEDURE — 6370000000 HC RX 637 (ALT 250 FOR IP): Performed by: PHYSICIAN ASSISTANT

## 2025-09-02 PROCEDURE — 99283 EMERGENCY DEPT VISIT LOW MDM: CPT

## 2025-09-02 RX ORDER — IBUPROFEN 600 MG/1
600 TABLET, FILM COATED ORAL ONCE
Status: COMPLETED | OUTPATIENT
Start: 2025-09-02 | End: 2025-09-02

## 2025-09-02 RX ORDER — PREDNISONE 20 MG/1
40 TABLET ORAL ONCE
Status: COMPLETED | OUTPATIENT
Start: 2025-09-02 | End: 2025-09-02

## 2025-09-02 RX ORDER — DIPHENHYDRAMINE HCL 25 MG
25 TABLET ORAL ONCE
Status: COMPLETED | OUTPATIENT
Start: 2025-09-02 | End: 2025-09-02

## 2025-09-02 RX ORDER — ACETAMINOPHEN 325 MG/1
650 TABLET ORAL ONCE
Status: COMPLETED | OUTPATIENT
Start: 2025-09-02 | End: 2025-09-02

## 2025-09-02 RX ADMIN — IBUPROFEN 600 MG: 600 TABLET ORAL at 21:37

## 2025-09-02 RX ADMIN — ACETAMINOPHEN 650 MG: 325 TABLET ORAL at 21:36

## 2025-09-02 RX ADMIN — DIPHENHYDRAMINE HCL 25 MG: 25 TABLET ORAL at 21:36

## 2025-09-02 RX ADMIN — PREDNISONE 40 MG: 20 TABLET ORAL at 21:36

## 2025-09-02 ASSESSMENT — PAIN SCALES - GENERAL: PAINLEVEL_OUTOF10: 8

## 2025-09-02 ASSESSMENT — PAIN DESCRIPTION - PAIN TYPE: TYPE: ACUTE PAIN

## 2025-09-02 ASSESSMENT — PAIN DESCRIPTION - LOCATION: LOCATION: ABDOMEN

## 2025-09-02 ASSESSMENT — PAIN - FUNCTIONAL ASSESSMENT: PAIN_FUNCTIONAL_ASSESSMENT: 0-10

## 2025-09-02 ASSESSMENT — PAIN DESCRIPTION - DESCRIPTORS: DESCRIPTORS: BURNING

## 2025-09-04 ENCOUNTER — OFFICE VISIT (OUTPATIENT)
Dept: FAMILY MEDICINE CLINIC | Age: 60
End: 2025-09-04

## 2025-09-04 VITALS
TEMPERATURE: 97.4 F | HEART RATE: 77 BPM | BODY MASS INDEX: 25.27 KG/M2 | OXYGEN SATURATION: 97 % | RESPIRATION RATE: 18 BRPM | SYSTOLIC BLOOD PRESSURE: 98 MMHG | DIASTOLIC BLOOD PRESSURE: 62 MMHG | WEIGHT: 148 LBS | HEIGHT: 64 IN

## 2025-09-04 DIAGNOSIS — Z93.0 TRACHEOSTOMY STATUS (HCC): ICD-10-CM

## 2025-09-04 DIAGNOSIS — F32.A DEPRESSION, UNSPECIFIED DEPRESSION TYPE: ICD-10-CM

## 2025-09-04 DIAGNOSIS — E78.5 HYPERLIPIDEMIA, UNSPECIFIED HYPERLIPIDEMIA TYPE: ICD-10-CM

## 2025-09-04 DIAGNOSIS — Z01.818 PRE-OP EXAM: ICD-10-CM

## 2025-09-04 DIAGNOSIS — M17.11 OSTEOARTHRITIS OF RIGHT KNEE, UNSPECIFIED OSTEOARTHRITIS TYPE: Primary | ICD-10-CM

## 2025-09-04 LAB
BASOPHILS ABSOLUTE: 0.12 K/UL (ref 0–0.2)
BASOPHILS RELATIVE PERCENT: 1 % (ref 0–2)
EOSINOPHILS ABSOLUTE: 1.13 K/UL (ref 0.05–0.5)
EOSINOPHILS RELATIVE PERCENT: 7 % (ref 0–6)
HCT VFR BLD CALC: 38.4 % (ref 34–48)
HEMOGLOBIN: 12 G/DL (ref 11.5–15.5)
IMMATURE GRANULOCYTES %: 1 % (ref 0–5)
IMMATURE GRANULOCYTES ABSOLUTE: 0.09 K/UL (ref 0–0.58)
LYMPHOCYTES ABSOLUTE: 5.33 K/UL (ref 1.5–4)
LYMPHOCYTES RELATIVE PERCENT: 34 % (ref 20–42)
MCH RBC QN AUTO: 28.4 PG (ref 26–35)
MCHC RBC AUTO-ENTMCNC: 31.3 G/DL (ref 32–34.5)
MCV RBC AUTO: 90.8 FL (ref 80–99.9)
MONOCYTES ABSOLUTE: 1.02 K/UL (ref 0.1–0.95)
MONOCYTES RELATIVE PERCENT: 7 % (ref 2–12)
NEUTROPHILS ABSOLUTE: 7.93 K/UL (ref 1.8–7.3)
NEUTROPHILS RELATIVE PERCENT: 51 % (ref 43–80)
PDW BLD-RTO: 15.9 % (ref 11.5–15)
PLATELET # BLD: 670 K/UL (ref 130–450)
PMV BLD AUTO: 9.7 FL (ref 7–12)
RBC # BLD: 4.23 M/UL (ref 3.5–5.5)
WBC # BLD: 15.6 K/UL (ref 4.5–11.5)

## 2025-09-04 RX ORDER — BUPROPION HYDROCHLORIDE 300 MG/1
300 TABLET ORAL EVERY MORNING
Qty: 30 TABLET | Refills: 1 | Status: SHIPPED | OUTPATIENT
Start: 2025-09-04

## 2025-09-04 RX ORDER — ATORVASTATIN CALCIUM 10 MG/1
10 TABLET, FILM COATED ORAL NIGHTLY
Qty: 60 TABLET | Refills: 2 | Status: SHIPPED | OUTPATIENT
Start: 2025-09-04

## 2025-09-05 DIAGNOSIS — D75.839 THROMBOCYTOSIS: Primary | ICD-10-CM

## 2025-09-05 LAB
ANION GAP SERPL CALCULATED.3IONS-SCNC: 15 MMOL/L (ref 7–16)
BUN BLDV-MCNC: 17 MG/DL (ref 8–23)
CALCIUM SERPL-MCNC: 8.6 MG/DL (ref 8.8–10.2)
CHLORIDE BLD-SCNC: 103 MMOL/L (ref 98–107)
CO2: 22 MMOL/L (ref 22–29)
CREAT SERPL-MCNC: 0.7 MG/DL (ref 0.5–1)
GFR, ESTIMATED: >90 ML/MIN/1.73M2
GLUCOSE BLD-MCNC: 129 MG/DL (ref 74–99)
POTASSIUM SERPL-SCNC: 4.3 MMOL/L (ref 3.5–5.1)
SODIUM BLD-SCNC: 140 MMOL/L (ref 136–145)

## 2025-09-06 LAB
CULTURE: NORMAL
SPECIMEN DESCRIPTION: NORMAL

## (undated) DEVICE — SPONGE,PEANUT,XRAY,ST,SM,3/8",5/CARD: Brand: MEDLINE INDUSTRIES, INC.

## (undated) DEVICE — TOWEL,OR,DSP,ST,BLUE,STD,6/PK,12PK/CS: Brand: MEDLINE

## (undated) DEVICE — ENDOVIVE SFT PEG KIT PUSH WENFIT 24F BX2

## (undated) DEVICE — CANNULA NSL ORAL AD FOR CAPNOFLEX CO2 O2 AIRLFE

## (undated) DEVICE — PVC FEEDING TUBE: Brand: VYGON

## (undated) DEVICE — E-Z CLEAN, NON-STICK, PTFE COATED, ELECTROSURGICAL BLADE ELECTRODE, MODIFIED EXTENDED INSULATION, 6.5 INCH (16.5 CM): Brand: MEGADYNE

## (undated) DEVICE — GLOVE SURG SZ 65 THK91MIL LTX FREE SYN POLYISOPRENE

## (undated) DEVICE — YANKAUER,POOLE TIP,STERILE,50/CS: Brand: MEDLINE

## (undated) DEVICE — TRAUMA: Brand: MEDLINE INDUSTRIES, INC.

## (undated) DEVICE — MAGNETIC INSTR DRAPE 20X16: Brand: MEDLINE INDUSTRIES, INC.

## (undated) DEVICE — GLOVE SURG SZ 7 L12IN FNGR THK79MIL GRN LTX FREE

## (undated) DEVICE — GAUZE,SPONGE,AVANT,4"X4",4PLY,STRL,10/TR: Brand: MEDLINE

## (undated) DEVICE — SURGICAL PROCEDURE PACK TRAUM

## (undated) DEVICE — OPTIFOAM GENTLE EX, SACRUM, 9X9: Brand: MEDLINE

## (undated) DEVICE — Device

## (undated) DEVICE — PICO 7 10CM X 40CM: Brand: PICO™ 7

## (undated) DEVICE — COVER,LIGHT HANDLE,FLX,2/PK: Brand: MEDLINE INDUSTRIES, INC.

## (undated) DEVICE — SPONGE LAP W18XL18IN WHT COT 4 PLY FLD STRUNG RADPQ DISP ST 2 PER PACK

## (undated) DEVICE — STAPLER INT L28CM 60MM 12 FIRING B FRM PWD + ECHELON FLX

## (undated) DEVICE — SINGLE USE SUCTION VALVE MAJ-209: Brand: SINGLE USE SUCTION VALVE (STERILE)

## (undated) DEVICE — CATHETER DIAG 5FR L100CM LUMN ID0.047IN JL3.5 CRV 0 SIDE H

## (undated) DEVICE — KIT ANGIO W/ AT P65 PREM HND CTRL FOR CNTRST DEL ANGIOTOUCH

## (undated) DEVICE — GAUZE,SPONGE,4"X4",8PLY,STRL,LF,10/TRAY: Brand: MEDLINE

## (undated) DEVICE — BLANKET WRM W35.4XL86.6IN FULL UNDERBODY + FORC AIR

## (undated) DEVICE — ENDOSCOPIC ULTRASOUND FINE NEEDLE BIOPSY (FNB) DEVICE: Brand: ACQUIRE

## (undated) DEVICE — ELECTRODE PT RET AD L9FT HI MOIST COND ADH HYDRGEL CORDED

## (undated) DEVICE — BITEBLOCK 54FR W/ DENT RIM BLOX

## (undated) DEVICE — GUIDEWIRE VASC L260CM 0.035IN J TIP L3MM PTFE FIX COR NAMIC

## (undated) DEVICE — GOWN,BREATHABLE SLV,AURORA,XLG,STRL: Brand: MEDLINE

## (undated) DEVICE — DEVICE GRSP L230CM SHTH DIA2.4MM HYBRID JAW FLX DST WIRE

## (undated) DEVICE — LIQUIBAND RAPID ADHESIVE 36/CS 0.8ML: Brand: MEDLINE

## (undated) DEVICE — SEALER LATCHING 23CM LIGASURE MARYLAND XP

## (undated) DEVICE — CANNULA NSL CANN NSL L25FT TBNG AD O2 SUP SFT UC

## (undated) DEVICE — DOUBLE  SWIVEL ELBOW 15M - DOUBLE FLIP TOP CAP WITH SEAL - 22M/15F: Brand: DOUBLE  SWIVEL ELBOW 15M - DOUBLE FLIP TOP CAP WITH SEAL - 22M/15F

## (undated) DEVICE — WORKING LENGTH 155CM, WORKING CHANNEL 2.8MM: Brand: RESOLUTION 360 CLIP

## (undated) DEVICE — Device: Brand: MEDEX

## (undated) DEVICE — SUTURE BOOTIES, YELLOW, STERILE, 5 PAIR/PAD; 5 PADS/BOX: Brand: KEY SURGICAL SUTURE BOOTIES

## (undated) DEVICE — SOLUTION IRRIG 1000ML 0.9% SOD CHL USP POUR PLAS BTL

## (undated) DEVICE — 3M™ MICROPORE™ TAPE, 1530-2: Brand: 3M™ MICROPORE™

## (undated) DEVICE — PACK SURG CARDIAC CATH

## (undated) DEVICE — AGENT HEMOSTATIC SURG ORIGINAL ABS 4X8IN LOOSE KNIT 12/CA

## (undated) DEVICE — RELOAD STPL L60MM H1.5-3.6MM REG TISS BLU GRIPPING SURF B

## (undated) DEVICE — GOWN,SURGICAL,AURORA,SLEEVE: Brand: MEDLINE

## (undated) DEVICE — GOWN,SIRUS,FABRNF,L,20/CS: Brand: MEDLINE

## (undated) DEVICE — NDLHOLDER F/P WEBSTER FN8064 S: Brand: CENTURION MEDICAL PRODUCTS CORP

## (undated) DEVICE — DRAIN CHN 19FR L0.25MM DIA6.3MM SIL RND HUBLESS FULL FLUT

## (undated) DEVICE — RELOAD STPL H4.1X2MM DIA60MM THCK TISS GRN 6 ROW PWR GST B

## (undated) DEVICE — SPONGE,DRAIN,NONWVN,4"X4",6PLY,STRL,LF: Brand: MEDLINE

## (undated) DEVICE — KIT MFLD ISOLATN NACL CNTRST PRT TBNG SPIK W/ PRSS TRNSDUC

## (undated) DEVICE — DEFENDO AIR WATER SUCTION AND BIOPSY VALVE KIT FOR  OLYMPUS: Brand: DEFENDO AIR/WATER/SUCTION AND BIOPSY VALVE

## (undated) DEVICE — APPLICATOR MEDICATED 26 CC SOLUTION HI LT ORNG CHLORAPREP

## (undated) DEVICE — SOLUTION IRRIG 500ML 0.9% SOD CHLO USP POUR PLAS BTL

## (undated) DEVICE — GAUZE,SPONGE,4"X4",16PLY,XRAY,STRL,LF: Brand: MEDLINE

## (undated) DEVICE — DRAPE,UTILTY,TAPE,15X26, 4EA/PK: Brand: MEDLINE

## (undated) DEVICE — CLIP LIG M BLU TI HRT SHP WIRE HORZ 600 PER BX

## (undated) DEVICE — ANGIOGRAPHIC CATHETER: Brand: EXPO™

## (undated) DEVICE — PAD, DEFIB, ADULT, RADIOTRAN, PHYSIO, LO: Brand: MEDLINE

## (undated) DEVICE — GLIDESHEATH NITINOL HYDROPHILIC COATED INTRODUCER SHEATH: Brand: GLIDESHEATH

## (undated) DEVICE — TOWEL,OR,DSP,ST,WHITE,DLX,4/PK,20PK/CS: Brand: MEDLINE

## (undated) DEVICE — COUNTER NDL 30 COUNT DBL MAG

## (undated) DEVICE — 3M™ IOBAN™ 2 ANTIMICROBIAL INCISE DRAPE 6650EZ: Brand: IOBAN™ 2

## (undated) DEVICE — DRAIN SURG L0.75IN TRCR

## (undated) DEVICE — CONTAINER SPEC ANAERB VACTNR

## (undated) DEVICE — E-Z CLEAN, NON-STICK, PTFE COATED, ELECTROSURGICAL BLADE ELECTRODE, MODIFIED EXTENDED INSULATION, 2.5 INCH (6.35 CM): Brand: MEGADYNE

## (undated) DEVICE — BLADE,STAINLESS-STEEL,10,STRL,DISPOSABLE: Brand: MEDLINE

## (undated) DEVICE — SWAB SPEC COLL SHFT L5.25IN POLYUR FOAM TIP SFT DBL MEDIA

## (undated) DEVICE — STAPLER INT L60MM REG TISS BLU B FRM 8 FIRING 2 ROW AUTO

## (undated) DEVICE — GENERAL PURPOSE TRAY W/CVD FCP: Brand: MEDLINE INDUSTRIES, INC.

## (undated) DEVICE — TTL1LYR 16FR10ML 100%SIL TMPST TR: Brand: MEDLINE

## (undated) DEVICE — BAND COMPR L24CM REG CLR PLAS HEMSTAT EXT HK AND LOOP RETEN

## (undated) DEVICE — RESCUE COMBO FORCEPS

## (undated) DEVICE — SPONGE GZ W4XL4IN RAYON POLY CVR W/NONWOVEN FAB STRL 2/PK

## (undated) DEVICE — LOOP VES W25MM THK1MM MAXI RED SIL FLD REPELLENT 100 PER

## (undated) DEVICE — TOWEL,OR,DSP,ST,GREEN,DLX,XR,4/PK,20PK/C: Brand: MEDLINE

## (undated) DEVICE — GRADUATE TRIANG MEASURE 1000ML BLK PRNT

## (undated) DEVICE — SUTURE PROL SZ 0 L30IN NONABSORBABLE BLU L26MM CT-2 1/2 CIR 8412H

## (undated) DEVICE — BRONCHOSCOPY PACK: Brand: MEDLINE INDUSTRIES, INC.

## (undated) DEVICE — SINGLE USE BIOPSY VALVE MAJ-210: Brand: SINGLE USE BIOPSY VALVE (STERILE)

## (undated) DEVICE — DRAPE,REIN 53X77,STERILE: Brand: MEDLINE

## (undated) DEVICE — 3M™ IOBAN™ 2 ANTIMICROBIAL INCISE DRAPE 6651EZ: Brand: IOBAN™ 2

## (undated) DEVICE — BLOCK BITE 60FR RUBBER ADLT DENTAL

## (undated) DEVICE — Device: Brand: BALLOON3